# Patient Record
Sex: MALE | Race: BLACK OR AFRICAN AMERICAN | NOT HISPANIC OR LATINO
[De-identification: names, ages, dates, MRNs, and addresses within clinical notes are randomized per-mention and may not be internally consistent; named-entity substitution may affect disease eponyms.]

---

## 2017-03-28 ENCOUNTER — APPOINTMENT (OUTPATIENT)
Dept: ELECTROPHYSIOLOGY | Facility: CLINIC | Age: 77
End: 2017-03-28

## 2017-07-06 ENCOUNTER — APPOINTMENT (OUTPATIENT)
Dept: ELECTROPHYSIOLOGY | Facility: CLINIC | Age: 77
End: 2017-07-06

## 2017-08-20 ENCOUNTER — EMERGENCY (EMERGENCY)
Facility: HOSPITAL | Age: 77
LOS: 1 days | Discharge: HOME | End: 2017-08-20
Attending: EMERGENCY MEDICINE | Admitting: EMERGENCY MEDICINE
Payer: COMMERCIAL

## 2017-08-20 VITALS
OXYGEN SATURATION: 98 % | DIASTOLIC BLOOD PRESSURE: 87 MMHG | RESPIRATION RATE: 18 BRPM | SYSTOLIC BLOOD PRESSURE: 120 MMHG | TEMPERATURE: 98 F | HEART RATE: 68 BPM

## 2017-08-20 VITALS
HEART RATE: 64 BPM | OXYGEN SATURATION: 100 % | DIASTOLIC BLOOD PRESSURE: 103 MMHG | RESPIRATION RATE: 18 BRPM | SYSTOLIC BLOOD PRESSURE: 123 MMHG

## 2017-08-20 LAB
ALBUMIN SERPL ELPH-MCNC: 4 G/DL — SIGNIFICANT CHANGE UP (ref 3.3–5)
ALP SERPL-CCNC: 71 U/L — SIGNIFICANT CHANGE UP (ref 40–120)
ALT FLD-CCNC: 9 U/L — SIGNIFICANT CHANGE UP (ref 4–41)
APTT BLD: 28.9 SEC — SIGNIFICANT CHANGE UP (ref 27.5–37.4)
AST SERPL-CCNC: 15 U/L — SIGNIFICANT CHANGE UP (ref 4–40)
BILIRUB SERPL-MCNC: 0.7 MG/DL — SIGNIFICANT CHANGE UP (ref 0.2–1.2)
BUN SERPL-MCNC: 15 MG/DL — SIGNIFICANT CHANGE UP (ref 7–23)
CALCIUM SERPL-MCNC: 9.6 MG/DL — SIGNIFICANT CHANGE UP (ref 8.4–10.5)
CHLORIDE SERPL-SCNC: 107 MMOL/L — SIGNIFICANT CHANGE UP (ref 98–107)
CO2 SERPL-SCNC: 27 MMOL/L — SIGNIFICANT CHANGE UP (ref 22–31)
CREAT SERPL-MCNC: 1.12 MG/DL — SIGNIFICANT CHANGE UP (ref 0.5–1.3)
GLUCOSE SERPL-MCNC: 90 MG/DL — SIGNIFICANT CHANGE UP (ref 70–99)
HCT VFR BLD CALC: 33 % — LOW (ref 39–50)
HGB BLD-MCNC: 11 G/DL — LOW (ref 13–17)
INR BLD: 1.1 — SIGNIFICANT CHANGE UP (ref 0.88–1.17)
MCHC RBC-ENTMCNC: 30.2 PG — SIGNIFICANT CHANGE UP (ref 27–34)
MCHC RBC-ENTMCNC: 33.3 % — SIGNIFICANT CHANGE UP (ref 32–36)
MCV RBC AUTO: 90.7 FL — SIGNIFICANT CHANGE UP (ref 80–100)
NRBC # FLD: 0 — SIGNIFICANT CHANGE UP
PLATELET # BLD AUTO: 164 K/UL — SIGNIFICANT CHANGE UP (ref 150–400)
PMV BLD: 9.8 FL — SIGNIFICANT CHANGE UP (ref 7–13)
POTASSIUM SERPL-MCNC: 3.6 MMOL/L — SIGNIFICANT CHANGE UP (ref 3.5–5.3)
POTASSIUM SERPL-SCNC: 3.6 MMOL/L — SIGNIFICANT CHANGE UP (ref 3.5–5.3)
PROT SERPL-MCNC: 6.8 G/DL — SIGNIFICANT CHANGE UP (ref 6–8.3)
PROTHROM AB SERPL-ACNC: 12.4 SEC — SIGNIFICANT CHANGE UP (ref 9.8–13.1)
RBC # BLD: 3.64 M/UL — LOW (ref 4.2–5.8)
RBC # FLD: 13 % — SIGNIFICANT CHANGE UP (ref 10.3–14.5)
SODIUM SERPL-SCNC: 145 MMOL/L — SIGNIFICANT CHANGE UP (ref 135–145)
WBC # BLD: 4.22 K/UL — SIGNIFICANT CHANGE UP (ref 3.8–10.5)
WBC # FLD AUTO: 4.22 K/UL — SIGNIFICANT CHANGE UP (ref 3.8–10.5)

## 2017-08-20 PROCEDURE — 99285 EMERGENCY DEPT VISIT HI MDM: CPT | Mod: GC

## 2017-08-20 PROCEDURE — 93971 EXTREMITY STUDY: CPT | Mod: 26,LT

## 2017-08-20 NOTE — ED PROVIDER NOTE - ATTENDING CONTRIBUTION TO CARE
78 y/o male h/o cva, dm, htn here with atraumatic left ankle swelling this am.  No trauma, fall, calf pain/tenderness, recent long travel or prolonged immob.  No prior h/o dvt/pe.  Denies f/c, ha, neck stiffness, cp, sob, cough, abd pain, n/v/d, dysuria, rash.  Afebrile, vs wnl, nad, ctabil, s1s2 rrr no m/r/g, abd soft non ttp no r/g, no cva tenderness b/l, left ankle swollen, not deformed, no erythema, non ttp, full rom ankle, full wt bearing, strong dp pulse, distally nv intact, neg homans, neg emily.  R/o DVT, doubt fx as wt bearing and no h/o trauma.  Obtain cbc, bmp, coags, duplex, reassess.

## 2017-08-20 NOTE — ED PROVIDER NOTE - PMH
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

## 2017-08-20 NOTE — ED PROVIDER NOTE - OBJECTIVE STATEMENT
Patient is a 78 yo M with PMHx of CVA (1990), unknown arrhythmia s/p pacemaker, HTN, DM, HLD who presents today for 1 day history of L ankle swelling. He states that he first noticed it went waking up this AM. Denies trauma, pain, calf tenderness. He states that his leg feels warm but denies changes in sensation. He has never experienced this before. Denies HA, changes in vision, fever, chills, SOB, CP. He went to urgent care earlier who directed him to the ED for r/o DVT.

## 2017-08-20 NOTE — ED ADULT NURSE NOTE - OBJECTIVE STATEMENT
Pt received to spot 27. Pt complaining of left ankle and leg swelling. Pt states that he noticed the swelling this morning. Pt states he had pain to his left ankle earlier but denies any pain at this time. Pt denies any trauma. Pt appears in no acute distress.

## 2017-10-09 ENCOUNTER — APPOINTMENT (OUTPATIENT)
Dept: ELECTROPHYSIOLOGY | Facility: CLINIC | Age: 77
End: 2017-10-09

## 2018-01-11 ENCOUNTER — APPOINTMENT (OUTPATIENT)
Dept: ELECTROPHYSIOLOGY | Facility: CLINIC | Age: 78
End: 2018-01-11
Payer: COMMERCIAL

## 2018-01-11 PROCEDURE — 93280 PM DEVICE PROGR EVAL DUAL: CPT

## 2018-07-12 ENCOUNTER — APPOINTMENT (OUTPATIENT)
Dept: ELECTROPHYSIOLOGY | Facility: CLINIC | Age: 78
End: 2018-07-12
Payer: MEDICARE

## 2018-07-12 PROCEDURE — 93280 PM DEVICE PROGR EVAL DUAL: CPT

## 2018-10-23 ENCOUNTER — APPOINTMENT (OUTPATIENT)
Dept: ELECTROPHYSIOLOGY | Facility: CLINIC | Age: 78
End: 2018-10-23
Payer: MEDICARE

## 2018-10-23 PROCEDURE — 93296 REM INTERROG EVL PM/IDS: CPT

## 2018-10-23 PROCEDURE — 93294 REM INTERROG EVL PM/LDLS PM: CPT

## 2019-01-24 ENCOUNTER — APPOINTMENT (OUTPATIENT)
Dept: ELECTROPHYSIOLOGY | Facility: CLINIC | Age: 79
End: 2019-01-24
Payer: MEDICARE

## 2019-01-24 PROCEDURE — 93280 PM DEVICE PROGR EVAL DUAL: CPT

## 2019-01-29 ENCOUNTER — APPOINTMENT (OUTPATIENT)
Dept: ELECTROPHYSIOLOGY | Facility: CLINIC | Age: 79
End: 2019-01-29

## 2019-02-21 ENCOUNTER — EMERGENCY (EMERGENCY)
Facility: HOSPITAL | Age: 79
LOS: 1 days | Discharge: HOME | End: 2019-02-21
Attending: EMERGENCY MEDICINE | Admitting: EMERGENCY MEDICINE
Payer: COMMERCIAL

## 2019-02-21 VITALS
OXYGEN SATURATION: 99 % | TEMPERATURE: 98 F | DIASTOLIC BLOOD PRESSURE: 92 MMHG | RESPIRATION RATE: 18 BRPM | HEART RATE: 69 BPM | SYSTOLIC BLOOD PRESSURE: 150 MMHG

## 2019-02-21 VITALS
RESPIRATION RATE: 18 BRPM | DIASTOLIC BLOOD PRESSURE: 96 MMHG | OXYGEN SATURATION: 100 % | SYSTOLIC BLOOD PRESSURE: 171 MMHG | HEART RATE: 59 BPM | TEMPERATURE: 99 F

## 2019-02-21 LAB
ALBUMIN SERPL ELPH-MCNC: 4.1 G/DL — SIGNIFICANT CHANGE UP (ref 3.3–5)
ALP SERPL-CCNC: 79 U/L — SIGNIFICANT CHANGE UP (ref 40–120)
ALT FLD-CCNC: 9 U/L — SIGNIFICANT CHANGE UP (ref 4–41)
ANION GAP SERPL CALC-SCNC: 10 MMO/L — SIGNIFICANT CHANGE UP (ref 7–14)
APPEARANCE UR: CLEAR — SIGNIFICANT CHANGE UP
AST SERPL-CCNC: 17 U/L — SIGNIFICANT CHANGE UP (ref 4–40)
BILIRUB SERPL-MCNC: 1.1 MG/DL — SIGNIFICANT CHANGE UP (ref 0.2–1.2)
BILIRUB UR-MCNC: NEGATIVE — SIGNIFICANT CHANGE UP
BLOOD UR QL VISUAL: NEGATIVE — SIGNIFICANT CHANGE UP
BUN SERPL-MCNC: 10 MG/DL — SIGNIFICANT CHANGE UP (ref 7–23)
CALCIUM SERPL-MCNC: 9.9 MG/DL — SIGNIFICANT CHANGE UP (ref 8.4–10.5)
CHLORIDE SERPL-SCNC: 105 MMOL/L — SIGNIFICANT CHANGE UP (ref 98–107)
CO2 SERPL-SCNC: 26 MMOL/L — SIGNIFICANT CHANGE UP (ref 22–31)
COLOR SPEC: YELLOW — SIGNIFICANT CHANGE UP
CREAT SERPL-MCNC: 1.12 MG/DL — SIGNIFICANT CHANGE UP (ref 0.5–1.3)
GLUCOSE SERPL-MCNC: 105 MG/DL — HIGH (ref 70–99)
GLUCOSE UR-MCNC: NEGATIVE — SIGNIFICANT CHANGE UP
HCT VFR BLD CALC: 38.1 % — LOW (ref 39–50)
HGB BLD-MCNC: 12.5 G/DL — LOW (ref 13–17)
KETONES UR-MCNC: NEGATIVE — SIGNIFICANT CHANGE UP
LEUKOCYTE ESTERASE UR-ACNC: NEGATIVE — SIGNIFICANT CHANGE UP
MCHC RBC-ENTMCNC: 30 PG — SIGNIFICANT CHANGE UP (ref 27–34)
MCHC RBC-ENTMCNC: 32.8 % — SIGNIFICANT CHANGE UP (ref 32–36)
MCV RBC AUTO: 91.4 FL — SIGNIFICANT CHANGE UP (ref 80–100)
NITRITE UR-MCNC: NEGATIVE — SIGNIFICANT CHANGE UP
NRBC # FLD: 0 K/UL — LOW (ref 25–125)
PH UR: 6 — SIGNIFICANT CHANGE UP (ref 5–8)
PLATELET # BLD AUTO: 186 K/UL — SIGNIFICANT CHANGE UP (ref 150–400)
PMV BLD: 9.4 FL — SIGNIFICANT CHANGE UP (ref 7–13)
POTASSIUM SERPL-MCNC: 4.1 MMOL/L — SIGNIFICANT CHANGE UP (ref 3.5–5.3)
POTASSIUM SERPL-SCNC: 4.1 MMOL/L — SIGNIFICANT CHANGE UP (ref 3.5–5.3)
PROT SERPL-MCNC: 7.7 G/DL — SIGNIFICANT CHANGE UP (ref 6–8.3)
PROT UR-MCNC: NEGATIVE — SIGNIFICANT CHANGE UP
RBC # BLD: 4.17 M/UL — LOW (ref 4.2–5.8)
RBC # FLD: 12.5 % — SIGNIFICANT CHANGE UP (ref 10.3–14.5)
SODIUM SERPL-SCNC: 141 MMOL/L — SIGNIFICANT CHANGE UP (ref 135–145)
SP GR SPEC: 1.02 — SIGNIFICANT CHANGE UP (ref 1–1.04)
UROBILINOGEN FLD QL: NORMAL — SIGNIFICANT CHANGE UP
WBC # BLD: 6.04 K/UL — SIGNIFICANT CHANGE UP (ref 3.8–10.5)
WBC # FLD AUTO: 6.04 K/UL — SIGNIFICANT CHANGE UP (ref 3.8–10.5)

## 2019-02-21 PROCEDURE — 74019 RADEX ABDOMEN 2 VIEWS: CPT | Mod: 26

## 2019-02-21 PROCEDURE — 99283 EMERGENCY DEPT VISIT LOW MDM: CPT

## 2019-02-21 RX ORDER — LACTULOSE 10 G/15ML
20 SOLUTION ORAL ONCE
Qty: 0 | Refills: 0 | Status: COMPLETED | OUTPATIENT
Start: 2019-02-21 | End: 2019-02-21

## 2019-02-21 RX ORDER — LACTULOSE 10 G/15ML
30 SOLUTION ORAL
Qty: 960 | Refills: 0
Start: 2019-02-21

## 2019-02-21 RX ORDER — MINERAL OIL
133 OIL (ML) MISCELLANEOUS ONCE
Qty: 0 | Refills: 0 | Status: COMPLETED | OUTPATIENT
Start: 2019-02-21 | End: 2019-02-21

## 2019-02-21 RX ORDER — MINERAL OIL
133 OIL (ML) MISCELLANEOUS
Qty: 2 | Refills: 2
Start: 2019-02-21

## 2019-02-21 NOTE — ED ADULT NURSE NOTE - NSIMPLEMENTINTERV_GEN_ALL_ED
Implemented All Universal Safety Interventions:  [place] to call system. Call bell, personal items and telephone within reach. Instruct patient to call for assistance. Room bathroom lighting operational. Non-slip footwear when patient is off stretcher. Physically safe environment: no spills, clutter or unnecessary equipment. Stretcher in lowest position, wheels locked, appropriate side rails in place.

## 2019-02-21 NOTE — ED PROVIDER NOTE - NSFOLLOWUPINSTRUCTIONS_ED_ALL_ED_FT
See Constipation Discharge Instructions    Follow up with PCP next week.    Copies of labs and x-rays provided.

## 2019-02-21 NOTE — ED ADULT NURSE NOTE - OBJECTIVE STATEMENT
Pt received a&ox3, c/o generalized abd pain x 5 days, abd soft, non distended, mild generalized tenderness, pt denies fever/chills/NVD/Urinary symptoms, last BM yesterday, pt appears comfortable and to be in NAD, MD finch performed, will continue to monitor.

## 2019-02-21 NOTE — ED PROVIDER NOTE - CLINICAL SUMMARY MEDICAL DECISION MAKING FREE TEXT BOX
Elderly patient with no past abdominal surgeries presents with constipation with no N/V/D, fever/chills or urinary complaints.   Based on the ED W/U, the final diagnosis is  Constipation.     Will prescribe Lactulose and a fleet enema so that pt. can take these medication and have a BM in the comfort of his home and nt have to worry about soiling himself on route home.   Pt. is able to tolerate PO in the ED.  D/C and F/u plan and instructions were discussed prior to D/C.

## 2019-02-21 NOTE — ED PROVIDER NOTE - OBJECTIVE STATEMENT
77 y/o M presents to the ED with c/o abdominal pain with inability to have a BM for the past 3-4 days.   Pain is not associated fever/chills. N/V/D, urinary complaints, trauma.     No previous abdominal surgeries or known Hx of constipation

## 2019-02-21 NOTE — ED PROVIDER NOTE - CARE PLAN
Principal Discharge DX:	Constipation, unspecified constipation type  Assessment and plan of treatment:	Abdominal Pain with constipation X 1 week- R/O obstruction  1.  Labs  2.  AXR  3. Reassess

## 2019-05-07 ENCOUNTER — APPOINTMENT (OUTPATIENT)
Dept: ELECTROPHYSIOLOGY | Facility: CLINIC | Age: 79
End: 2019-05-07

## 2019-07-25 ENCOUNTER — APPOINTMENT (OUTPATIENT)
Dept: ELECTROPHYSIOLOGY | Facility: CLINIC | Age: 79
End: 2019-07-25
Payer: MEDICARE

## 2019-07-25 DIAGNOSIS — I49.5 SICK SINUS SYNDROME: ICD-10-CM

## 2019-07-25 PROCEDURE — 93280 PM DEVICE PROGR EVAL DUAL: CPT

## 2019-07-30 PROBLEM — I49.5 SINOATRIAL NODE DYSFUNCTION: Status: ACTIVE | Noted: 2017-07-06

## 2019-10-28 ENCOUNTER — APPOINTMENT (OUTPATIENT)
Dept: ELECTROPHYSIOLOGY | Facility: CLINIC | Age: 79
End: 2019-10-28

## 2020-01-29 ENCOUNTER — APPOINTMENT (OUTPATIENT)
Dept: ELECTROPHYSIOLOGY | Facility: CLINIC | Age: 80
End: 2020-01-29

## 2020-02-05 NOTE — ED PROVIDER NOTE - NSALCOHOLTYPE_GEN__A_CORE_SD
Pt taken to postpartum in stable condition per WC. Cares of pt transferred at this time. liquor/Socially

## 2020-02-20 ENCOUNTER — APPOINTMENT (OUTPATIENT)
Dept: ELECTROPHYSIOLOGY | Facility: CLINIC | Age: 80
End: 2020-02-20
Payer: MEDICARE

## 2020-02-20 PROCEDURE — 93296 REM INTERROG EVL PM/IDS: CPT

## 2020-02-20 PROCEDURE — 93294 REM INTERROG EVL PM/LDLS PM: CPT

## 2020-03-13 ENCOUNTER — EMERGENCY (EMERGENCY)
Facility: HOSPITAL | Age: 80
LOS: 0 days | Discharge: HOME | End: 2020-03-13
Attending: EMERGENCY MEDICINE
Payer: COMMERCIAL

## 2020-03-13 VITALS
HEIGHT: 69 IN | RESPIRATION RATE: 20 BRPM | WEIGHT: 240.08 LBS | DIASTOLIC BLOOD PRESSURE: 98 MMHG | TEMPERATURE: 101 F | OXYGEN SATURATION: 95 % | SYSTOLIC BLOOD PRESSURE: 162 MMHG | HEART RATE: 79 BPM

## 2020-03-13 VITALS
DIASTOLIC BLOOD PRESSURE: 93 MMHG | HEART RATE: 60 BPM | TEMPERATURE: 99 F | SYSTOLIC BLOOD PRESSURE: 156 MMHG | OXYGEN SATURATION: 98 % | RESPIRATION RATE: 17 BRPM

## 2020-03-13 DIAGNOSIS — J06.9 ACUTE UPPER RESPIRATORY INFECTION, UNSPECIFIED: ICD-10-CM

## 2020-03-13 DIAGNOSIS — B97.29 OTHER CORONAVIRUS AS THE CAUSE OF DISEASES CLASSIFIED ELSEWHERE: ICD-10-CM

## 2020-03-13 DIAGNOSIS — Z79.84 LONG TERM (CURRENT) USE OF ORAL HYPOGLYCEMIC DRUGS: ICD-10-CM

## 2020-03-13 DIAGNOSIS — E11.9 TYPE 2 DIABETES MELLITUS WITHOUT COMPLICATIONS: ICD-10-CM

## 2020-03-13 DIAGNOSIS — I10 ESSENTIAL (PRIMARY) HYPERTENSION: ICD-10-CM

## 2020-03-13 DIAGNOSIS — Z87.891 PERSONAL HISTORY OF NICOTINE DEPENDENCE: ICD-10-CM

## 2020-03-13 DIAGNOSIS — R05 COUGH: ICD-10-CM

## 2020-03-13 DIAGNOSIS — Z86.73 PERSONAL HISTORY OF TRANSIENT ISCHEMIC ATTACK (TIA), AND CEREBRAL INFARCTION WITHOUT RESIDUAL DEFICITS: ICD-10-CM

## 2020-03-13 DIAGNOSIS — J11.1 INFLUENZA DUE TO UNIDENTIFIED INFLUENZA VIRUS WITH OTHER RESPIRATORY MANIFESTATIONS: ICD-10-CM

## 2020-03-13 LAB
ALBUMIN SERPL ELPH-MCNC: 3.5 G/DL — SIGNIFICANT CHANGE UP (ref 3.3–5)
ALP SERPL-CCNC: 91 U/L — SIGNIFICANT CHANGE UP (ref 40–120)
ALT FLD-CCNC: 17 U/L — SIGNIFICANT CHANGE UP (ref 12–78)
ANION GAP SERPL CALC-SCNC: 7 MMOL/L — SIGNIFICANT CHANGE UP (ref 5–17)
AST SERPL-CCNC: 22 U/L — SIGNIFICANT CHANGE UP (ref 15–37)
BASOPHILS # BLD AUTO: 0.01 K/UL — SIGNIFICANT CHANGE UP (ref 0–0.2)
BASOPHILS NFR BLD AUTO: 0.2 % — SIGNIFICANT CHANGE UP (ref 0–2)
BILIRUB SERPL-MCNC: 1.1 MG/DL — SIGNIFICANT CHANGE UP (ref 0.2–1.2)
BUN SERPL-MCNC: 12 MG/DL — SIGNIFICANT CHANGE UP (ref 7–23)
CALCIUM SERPL-MCNC: 9.3 MG/DL — SIGNIFICANT CHANGE UP (ref 8.5–10.1)
CHLORIDE SERPL-SCNC: 108 MMOL/L — SIGNIFICANT CHANGE UP (ref 96–108)
CO2 SERPL-SCNC: 26 MMOL/L — SIGNIFICANT CHANGE UP (ref 22–31)
CREAT SERPL-MCNC: 1.14 MG/DL — SIGNIFICANT CHANGE UP (ref 0.5–1.3)
EOSINOPHIL # BLD AUTO: 0.13 K/UL — SIGNIFICANT CHANGE UP (ref 0–0.5)
EOSINOPHIL NFR BLD AUTO: 2.4 % — SIGNIFICANT CHANGE UP (ref 0–6)
FLU A RESULT: SIGNIFICANT CHANGE UP
FLU A RESULT: SIGNIFICANT CHANGE UP
FLUAV AG NPH QL: SIGNIFICANT CHANGE UP
FLUBV AG NPH QL: SIGNIFICANT CHANGE UP
GLUCOSE SERPL-MCNC: 91 MG/DL — SIGNIFICANT CHANGE UP (ref 70–99)
HCT VFR BLD CALC: 38.1 % — LOW (ref 39–50)
HGB BLD-MCNC: 12.5 G/DL — LOW (ref 13–17)
IMM GRANULOCYTES NFR BLD AUTO: 0.4 % — SIGNIFICANT CHANGE UP (ref 0–1.5)
LACTATE SERPL-SCNC: 1.1 MMOL/L — SIGNIFICANT CHANGE UP (ref 0.7–2)
LYMPHOCYTES # BLD AUTO: 0.97 K/UL — LOW (ref 1–3.3)
LYMPHOCYTES # BLD AUTO: 18.3 % — SIGNIFICANT CHANGE UP (ref 13–44)
MAGNESIUM SERPL-MCNC: 1.9 MG/DL — SIGNIFICANT CHANGE UP (ref 1.6–2.6)
MCHC RBC-ENTMCNC: 30.1 PG — SIGNIFICANT CHANGE UP (ref 27–34)
MCHC RBC-ENTMCNC: 32.8 GM/DL — SIGNIFICANT CHANGE UP (ref 32–36)
MCV RBC AUTO: 91.8 FL — SIGNIFICANT CHANGE UP (ref 80–100)
MONOCYTES # BLD AUTO: 0.78 K/UL — SIGNIFICANT CHANGE UP (ref 0–0.9)
MONOCYTES NFR BLD AUTO: 14.7 % — HIGH (ref 2–14)
NEUTROPHILS # BLD AUTO: 3.4 K/UL — SIGNIFICANT CHANGE UP (ref 1.8–7.4)
NEUTROPHILS NFR BLD AUTO: 64 % — SIGNIFICANT CHANGE UP (ref 43–77)
NRBC # BLD: 0 /100 WBCS — SIGNIFICANT CHANGE UP (ref 0–0)
PLATELET # BLD AUTO: 162 K/UL — SIGNIFICANT CHANGE UP (ref 150–400)
POTASSIUM SERPL-MCNC: 3.6 MMOL/L — SIGNIFICANT CHANGE UP (ref 3.5–5.3)
POTASSIUM SERPL-SCNC: 3.6 MMOL/L — SIGNIFICANT CHANGE UP (ref 3.5–5.3)
PROT SERPL-MCNC: 7.2 GM/DL — SIGNIFICANT CHANGE UP (ref 6–8.3)
RBC # BLD: 4.15 M/UL — LOW (ref 4.2–5.8)
RBC # FLD: 12.7 % — SIGNIFICANT CHANGE UP (ref 10.3–14.5)
RSV RESULT: SIGNIFICANT CHANGE UP
RSV RNA RESP QL NAA+PROBE: SIGNIFICANT CHANGE UP
SODIUM SERPL-SCNC: 141 MMOL/L — SIGNIFICANT CHANGE UP (ref 135–145)
WBC # BLD: 5.31 K/UL — SIGNIFICANT CHANGE UP (ref 3.8–10.5)
WBC # FLD AUTO: 5.31 K/UL — SIGNIFICANT CHANGE UP (ref 3.8–10.5)

## 2020-03-13 PROCEDURE — 71045 X-RAY EXAM CHEST 1 VIEW: CPT | Mod: 26

## 2020-03-13 PROCEDURE — 99284 EMERGENCY DEPT VISIT MOD MDM: CPT

## 2020-03-13 RX ORDER — SODIUM CHLORIDE 9 MG/ML
1000 INJECTION INTRAMUSCULAR; INTRAVENOUS; SUBCUTANEOUS ONCE
Refills: 0 | Status: COMPLETED | OUTPATIENT
Start: 2020-03-13 | End: 2020-03-13

## 2020-03-13 RX ORDER — PHENYLEPHRINE/DM/ACETAMINOP/GG 5-10-325MG
2 TABLET ORAL
Qty: 30 | Refills: 0
Start: 2020-03-13 | End: 2020-03-17

## 2020-03-13 RX ORDER — CODEINE SULFATE 60 MG/1
30 TABLET ORAL ONCE
Refills: 0 | Status: DISCONTINUED | OUTPATIENT
Start: 2020-03-13 | End: 2020-03-13

## 2020-03-13 RX ORDER — IBUPROFEN 200 MG
600 TABLET ORAL ONCE
Refills: 0 | Status: COMPLETED | OUTPATIENT
Start: 2020-03-13 | End: 2020-03-13

## 2020-03-13 RX ORDER — ALBUTEROL 90 UG/1
2.5 AEROSOL, METERED ORAL ONCE
Refills: 0 | Status: COMPLETED | OUTPATIENT
Start: 2020-03-13 | End: 2020-03-13

## 2020-03-13 RX ORDER — ACETAMINOPHEN 500 MG
975 TABLET ORAL ONCE
Refills: 0 | Status: COMPLETED | OUTPATIENT
Start: 2020-03-13 | End: 2020-03-13

## 2020-03-13 RX ADMIN — Medication 975 MILLIGRAM(S): at 08:15

## 2020-03-13 RX ADMIN — SODIUM CHLORIDE 1000 MILLILITER(S): 9 INJECTION INTRAMUSCULAR; INTRAVENOUS; SUBCUTANEOUS at 10:19

## 2020-03-13 RX ADMIN — Medication 600 MILLIGRAM(S): at 10:19

## 2020-03-13 RX ADMIN — Medication 600 MILLIGRAM(S): at 08:15

## 2020-03-13 RX ADMIN — SODIUM CHLORIDE 1000 MILLILITER(S): 9 INJECTION INTRAMUSCULAR; INTRAVENOUS; SUBCUTANEOUS at 08:32

## 2020-03-13 RX ADMIN — Medication 975 MILLIGRAM(S): at 10:19

## 2020-03-13 RX ADMIN — ALBUTEROL 2.5 MILLIGRAM(S): 90 AEROSOL, METERED ORAL at 08:44

## 2020-03-13 RX ADMIN — CODEINE SULFATE 30 MILLIGRAM(S): 60 TABLET ORAL at 10:19

## 2020-03-13 RX ADMIN — CODEINE SULFATE 30 MILLIGRAM(S): 60 TABLET ORAL at 08:38

## 2020-03-13 RX ADMIN — Medication 100 MILLIGRAM(S): at 08:38

## 2020-03-13 NOTE — ED ADULT NURSE NOTE - CAS ELECT INFOMATION PROVIDED
pt and grandson educated on patient self quarantine for 14 days. Pt and family verbalize understanding/DC instructions

## 2020-03-13 NOTE — ED PROVIDER NOTE - NSFOLLOWUPINSTRUCTIONS_ED_ALL_ED_FT
You are instructed to ISOLATE yourself at home for 14 days with minimal departure outside.    Take the cough medication as prescribed.

## 2020-03-13 NOTE — ED ADULT NURSE NOTE - NSIMPLEMENTINTERV_GEN_ALL_ED
Implemented All Fall Risk Interventions:  [place] to call system. Call bell, personal items and telephone within reach. Instruct patient to call for assistance. Room bathroom lighting operational. Non-slip footwear when patient is off stretcher. Physically safe environment: no spills, clutter or unnecessary equipment. Stretcher in lowest position, wheels locked, appropriate side rails in place. Provide visual cue, wrist band, yellow gown, etc. Monitor gait and stability. Monitor for mental status changes and reorient to person, place, and time. Review medications for side effects contributing to fall risk. Reinforce activity limits and safety measures with patient and family.

## 2020-03-13 NOTE — ED PROVIDER NOTE - PATIENT PORTAL LINK FT
You can access the FollowMyHealth Patient Portal offered by [facility] by registering at the following website: [email or web address]. By joining [employer]’s FollowMyHealth portal, you will also be able to view your health information using other applications (apps) compatible with our system.

## 2020-03-13 NOTE — ED PROVIDER NOTE - CLINICAL SUMMARY MEDICAL DECISION MAKING FREE TEXT BOX
pt pw cough and rhinorrhea. rule out flu vs pna pt pw cough and rhinorrhea. rule out flu vs pna. xray neg. flu neg. will send covid testing but given patient is well with normal vitals. will dc home.

## 2020-03-13 NOTE — ED PROVIDER NOTE - PHYSICAL EXAMINATION
Gen: Alert, NAD  Head: NC, AT   Eyes: PERRL, EOMI, normal lids/conjunctiva  ENT: copious rhinorrhea   Neck: supple, no tenderness, Trachea midline  Pulm: actively coughing.   CV: RRR, no M/R/G, 2+ radial and dp pulses bl, no edema  Abd: soft, NT/ND, +BS, no hepatosplenomegaly  Mskel: extremities x4 with normal ROM and no joint effusions. no ctl spine ttp.   Skin: no rash, no bruising   Neuro: AAOx3, no sensory/motor deficits, CN 2-12 intact

## 2020-03-13 NOTE — ED ADULT TRIAGE NOTE - HEIGHT IN CM
Inpatient Anticoagulation Service Note    Date: 4/7/2018  Reason for Anticoagulation: Deep Vein Thrombosis, Atrial Fibrillation   [id number] VASc Score: 6    Hemoglobin Value: 12.1  Hematocrit Value: 38.5  Lab Platelet Value: 229  Target INR: 2.0 to 3.0    INR from last 7 days     Date/Time INR Value    04/07/18 1106 (!)  1.17        Dose from last 7 days     Date/Time Dose (mg)    04/07/18 1500  10        Significant Interactions: Antibiotics, Thyroid Medications, Other (Comments) (Carbamazapine, TCA, SSRI)  Bridge Therapy: Yes  Bridge Therapy Start Date: 04/07/18  Days of Overlap Therapy: 0  INR Value Greater than 2 Prior to Discontinuation of Parenteral Anticoagulation: Not Applicable     Reversal Agent Administered: Not Applicable  Comments: Restarting patient on home dose of warfarin. Admitted for SOB and dizziness. Ran out of warfarin approximetly 10 days ago, follows with [facility]. Drug interactions noted. Heparin running for bridge therapy, patient previously treated for DVT, now with atrial fibrillation on EKG, patient has history of TIA. Will continue to follow and adjust warfarin appropriately.       Plan:  10mg  Education Material Provided?: No (Chronic anticoagulation with warfarin)  Pharmacist suggested discharge dosing: 10mg daily expect for 5mg on Wednesday. INR follow up within 72 hours of discharge.      Hunter Fernandes, PharmD               [phone]

## 2020-03-13 NOTE — ED PROVIDER NOTE - OBJECTIVE STATEMENT
Pertinent PMH/PSH/FHx/SHx and Review of Systems contained within:  79M hx of htn, dm, cva with right side weakness pw cough, fever, and rhinorrhea x1 day. notes severe coughing fits, not prod. the coughing makes him feel sob. no nausea, vomiting, cp, rash, bleeding, numbness, rash, bleeding, vision loss, dysuria. patient hasn't take anything for symptoms. no sick contacts, no recent travel  Fh and Sh not otherwise contributory  ROS otherwise negative

## 2020-03-13 NOTE — ED ADULT NURSE REASSESSMENT NOTE - NS ED NURSE REASSESS COMMENT FT1
Pt had decrease coughing at discharge. No apparent respiratory distress noted upon leaving ED with family

## 2020-03-13 NOTE — ED ADULT NURSE NOTE - OBJECTIVE STATEMENT
Pt c/o constant coughing since yesterday, denies any pain. Respiration even and unlabored upon arrival to ED. Labs sent, medicated as ordered. Plan of care discussed with patient and family

## 2020-03-14 LAB — RAPID RVP RESULT: SIGNIFICANT CHANGE UP

## 2020-03-15 LAB — SARS-COV-2 RNA SPEC QL NAA+PROBE: DETECTED

## 2020-03-18 LAB
CULTURE RESULTS: SIGNIFICANT CHANGE UP
CULTURE RESULTS: SIGNIFICANT CHANGE UP
SPECIMEN SOURCE: SIGNIFICANT CHANGE UP
SPECIMEN SOURCE: SIGNIFICANT CHANGE UP

## 2020-06-30 ENCOUNTER — EMERGENCY (EMERGENCY)
Facility: HOSPITAL | Age: 80
LOS: 1 days | Discharge: HOME | End: 2020-06-30
Attending: EMERGENCY MEDICINE | Admitting: EMERGENCY MEDICINE
Payer: COMMERCIAL

## 2020-06-30 VITALS
TEMPERATURE: 98 F | RESPIRATION RATE: 16 BRPM | DIASTOLIC BLOOD PRESSURE: 96 MMHG | HEART RATE: 90 BPM | SYSTOLIC BLOOD PRESSURE: 162 MMHG | OXYGEN SATURATION: 98 %

## 2020-06-30 VITALS
SYSTOLIC BLOOD PRESSURE: 185 MMHG | RESPIRATION RATE: 18 BRPM | HEART RATE: 71 BPM | OXYGEN SATURATION: 100 % | DIASTOLIC BLOOD PRESSURE: 108 MMHG

## 2020-06-30 PROCEDURE — 99283 EMERGENCY DEPT VISIT LOW MDM: CPT

## 2020-06-30 RX ORDER — MINERAL OIL
133 OIL (ML) MISCELLANEOUS ONCE
Refills: 0 | Status: COMPLETED | OUTPATIENT
Start: 2020-06-30 | End: 2020-06-30

## 2020-06-30 RX ORDER — LACTULOSE 10 G/15ML
30 SOLUTION ORAL
Qty: 900 | Refills: 0
Start: 2020-06-30

## 2020-06-30 RX ADMIN — Medication 133 MILLILITER(S): at 09:45

## 2020-06-30 NOTE — ED ADULT NURSE NOTE - NSIMPLEMENTINTERV_GEN_ALL_ED
Implemented All Fall with Harm Risk Interventions:  [place] to call system. Call bell, personal items and telephone within reach. Instruct patient to call for assistance. Room bathroom lighting operational. Non-slip footwear when patient is off stretcher. Physically safe environment: no spills, clutter or unnecessary equipment. Stretcher in lowest position, wheels locked, appropriate side rails in place. Provide visual cue, wrist band, yellow gown, etc. Monitor gait and stability. Monitor for mental status changes and reorient to person, place, and time. Review medications for side effects contributing to fall risk. Reinforce activity limits and safety measures with patient and family. Provide visual clues: red socks.

## 2020-06-30 NOTE — ED ADULT NURSE NOTE - CHIEF COMPLAINT QUOTE
Pt. brought to [facility] ED by [facility] for constipation x 1 week. No BM x 2 days. Pt. does not have any pain. Has history of stroke with right sided deficits, DM,  at scene. NAD noted.

## 2020-06-30 NOTE — ED PROVIDER NOTE - PROGRESS NOTE DETAILS
Patient received enema with no bowel movement. Abdomen still soft nontender. Will send lactulose to pharmacy. Patient understands return instructions.

## 2020-06-30 NOTE — ED ADULT NURSE NOTE - OBJECTIVE STATEMENT
pt received to 12, aox2.  pt c/o constipation.  reports last BM was 2 days ago but then states "he thinks he had one today".  abd soft, nondistended, non-tender.  pt has PMH: CVA with right sided residual.  skin on buttock is dry and scaly with some bleeding areas due to pt scratching.  MD at bedside.  awaiting further orders, will monoitor

## 2020-06-30 NOTE — ED PROVIDER NOTE - CLINICAL SUMMARY MEDICAL DECISION MAKING FREE TEXT BOX
79 yo M with DM, HTN, CVA with residual R sided weakness presents with constipation for 2 days. Vitals WNL. Abdomen benign. Absent bowel movement likely secondary to chronic constipation. No concern for SBO or intraabdominal pathology. Will do enema / provide laxatives and likely dc home

## 2020-06-30 NOTE — ED ADULT TRIAGE NOTE - CHIEF COMPLAINT QUOTE
Pt. brought to [facility] ED by [facility] for constipation x 1 week. No BM x 2 days. Pt. does not have any pain. Has history of stroke with right sided deficits, DM,  at scene. NAD noted.

## 2020-06-30 NOTE — ED PROVIDER NOTE - OBJECTIVE STATEMENT
81 yo M with DM, HTN, CVA with residual R sided weakness presents with constipation for 2 days. Takes constipation at home but does not remember name. Denies abdominal pain, nausea, vomiting or abdominal surgeries. No fever.

## 2020-06-30 NOTE — ED PROVIDER NOTE - PATIENT PORTAL LINK FT
You can access the FollowMyHealth Patient Portal offered by [facility] by registering at the following website: [email or web address]. By joining [employer]’s FollowMyHealth portal, you will also be able to view your health information using other applications (apps) compatible with our system.

## 2020-06-30 NOTE — ED PROVIDER NOTE - NSFOLLOWUPINSTRUCTIONS_ED_ALL_ED_FT
You were seen in the ED for constipation  The following labs/imaging were obtained: see attached (if applicable)  Take lactulose as indicated. Increase water and fiber intake.   Return to the ED if you develop abdominal pain, nausea, vomiting, or no bowel movement in the next 2 days, or worsening or new concerning symptoms.  Follow up with your primary care in 2-3 days.  Discussed with pt results of work up, strict return precautions, and need for follow up.  Pt expressed understanding and agrees with plan. Complex Repair And Z Plasty Text: The defect edges were debeveled with a #15 scalpel blade.  The primary defect was closed partially with a complex linear closure.  Given the location of the remaining defect, shape of the defect and the proximity to free margins a Z plasty was deemed most appropriate for complete closure of the defect.  Using a sterile surgical marker, an appropriate advancement flap was drawn incorporating the defect and placing the expected incisions within the relaxed skin tension lines where possible.    The area thus outlined was incised deep to adipose tissue with a #15 scalpel blade.  The skin margins were undermined to an appropriate distance in all directions utilizing iris scissors.

## 2020-06-30 NOTE — ED PROVIDER NOTE - PHYSICAL EXAMINATION
Gen: AAOx3, non-toxic  Head: NCAT  HEENT: EOMI, oral mucosa moist, normal conjunctiva  Lung: CTAB, no respiratory distress, no wheezes/rhonchi/rales B/L, speaking in full sentences  CV: RRR, no murmurs, rubs or gallops  Abd: soft, NTND, no guarding, no CVA tenderness  Rectal : no impacted stool appreciated   MSK: no visible deformities  Neuro: No focal sensory or motor deficits, normal CN exam   Skin: Warm, well perfused, no rash  Psych: normal affect.

## 2020-06-30 NOTE — ED PROVIDER NOTE - ATTENDING CONTRIBUTION TO CARE
Attending note:   After face to face evaluation of this patient, I concur with above noted hx, pe, and care plan for this patient.  Rosen: 80 yom with sensation to stool but no BM for 4 days. Takes lactulose for constipation that is chronic. Wife also noted rigth foot pain. No abd pain, no n/v, +good appetite. On exam, pt is well appearing, rue contracted, clear lungs, abd soft and non tender, rectal as noted. Pt most likely has constipation but it does not appears to be severe with no complications, will give enema and reassess.

## 2020-07-08 ENCOUNTER — EMERGENCY (EMERGENCY)
Facility: HOSPITAL | Age: 80
LOS: 1 days | Discharge: HOME | End: 2020-07-08
Attending: STUDENT IN AN ORGANIZED HEALTH CARE EDUCATION/TRAINING PROGRAM | Admitting: STUDENT IN AN ORGANIZED HEALTH CARE EDUCATION/TRAINING PROGRAM
Payer: COMMERCIAL

## 2020-07-08 VITALS
DIASTOLIC BLOOD PRESSURE: 78 MMHG | HEART RATE: 78 BPM | OXYGEN SATURATION: 99 % | RESPIRATION RATE: 18 BRPM | TEMPERATURE: 98 F | SYSTOLIC BLOOD PRESSURE: 138 MMHG

## 2020-07-08 VITALS
SYSTOLIC BLOOD PRESSURE: 135 MMHG | HEART RATE: 81 BPM | OXYGEN SATURATION: 99 % | DIASTOLIC BLOOD PRESSURE: 68 MMHG | RESPIRATION RATE: 18 BRPM | TEMPERATURE: 98 F

## 2020-07-08 LAB
ALBUMIN SERPL ELPH-MCNC: 4.1 G/DL — SIGNIFICANT CHANGE UP (ref 3.3–5)
ALP SERPL-CCNC: 71 U/L — SIGNIFICANT CHANGE UP (ref 40–120)
ALT FLD-CCNC: 8 U/L — SIGNIFICANT CHANGE UP (ref 4–41)
ANION GAP SERPL CALC-SCNC: 11 MMO/L — SIGNIFICANT CHANGE UP (ref 7–14)
APTT BLD: 28.6 SEC — SIGNIFICANT CHANGE UP (ref 27–36.3)
AST SERPL-CCNC: 13 U/L — SIGNIFICANT CHANGE UP (ref 4–40)
BASOPHILS # BLD AUTO: 0.02 K/UL — SIGNIFICANT CHANGE UP (ref 0–0.2)
BASOPHILS NFR BLD AUTO: 0.2 % — SIGNIFICANT CHANGE UP (ref 0–2)
BILIRUB SERPL-MCNC: 1.2 MG/DL — SIGNIFICANT CHANGE UP (ref 0.2–1.2)
BUN SERPL-MCNC: 12 MG/DL — SIGNIFICANT CHANGE UP (ref 7–23)
CALCIUM SERPL-MCNC: 9.9 MG/DL — SIGNIFICANT CHANGE UP (ref 8.4–10.5)
CHLORIDE SERPL-SCNC: 105 MMOL/L — SIGNIFICANT CHANGE UP (ref 98–107)
CO2 SERPL-SCNC: 24 MMOL/L — SIGNIFICANT CHANGE UP (ref 22–31)
CREAT SERPL-MCNC: 1.02 MG/DL — SIGNIFICANT CHANGE UP (ref 0.5–1.3)
EOSINOPHIL # BLD AUTO: 0.37 K/UL — SIGNIFICANT CHANGE UP (ref 0–0.5)
EOSINOPHIL NFR BLD AUTO: 4.1 % — SIGNIFICANT CHANGE UP (ref 0–6)
GLUCOSE SERPL-MCNC: 92 MG/DL — SIGNIFICANT CHANGE UP (ref 70–99)
HCT VFR BLD CALC: 35.4 % — LOW (ref 39–50)
HGB BLD-MCNC: 11.8 G/DL — LOW (ref 13–17)
IMM GRANULOCYTES NFR BLD AUTO: 0.2 % — SIGNIFICANT CHANGE UP (ref 0–1.5)
INR BLD: 1.16 — SIGNIFICANT CHANGE UP (ref 0.88–1.17)
LYMPHOCYTES # BLD AUTO: 1.66 K/UL — SIGNIFICANT CHANGE UP (ref 1–3.3)
LYMPHOCYTES # BLD AUTO: 18.4 % — SIGNIFICANT CHANGE UP (ref 13–44)
MCHC RBC-ENTMCNC: 30.1 PG — SIGNIFICANT CHANGE UP (ref 27–34)
MCHC RBC-ENTMCNC: 33.3 % — SIGNIFICANT CHANGE UP (ref 32–36)
MCV RBC AUTO: 90.3 FL — SIGNIFICANT CHANGE UP (ref 80–100)
MONOCYTES # BLD AUTO: 0.66 K/UL — SIGNIFICANT CHANGE UP (ref 0–0.9)
MONOCYTES NFR BLD AUTO: 7.3 % — SIGNIFICANT CHANGE UP (ref 2–14)
NEUTROPHILS # BLD AUTO: 6.29 K/UL — SIGNIFICANT CHANGE UP (ref 1.8–7.4)
NEUTROPHILS NFR BLD AUTO: 69.8 % — SIGNIFICANT CHANGE UP (ref 43–77)
NRBC # FLD: 0 K/UL — SIGNIFICANT CHANGE UP (ref 0–0)
PLATELET # BLD AUTO: 165 K/UL — SIGNIFICANT CHANGE UP (ref 150–400)
PMV BLD: 10.3 FL — SIGNIFICANT CHANGE UP (ref 7–13)
POTASSIUM SERPL-MCNC: 3.5 MMOL/L — SIGNIFICANT CHANGE UP (ref 3.5–5.3)
POTASSIUM SERPL-SCNC: 3.5 MMOL/L — SIGNIFICANT CHANGE UP (ref 3.5–5.3)
PROT SERPL-MCNC: 6.9 G/DL — SIGNIFICANT CHANGE UP (ref 6–8.3)
PROTHROM AB SERPL-ACNC: 13.3 SEC — HIGH (ref 9.8–13.1)
RBC # BLD: 3.92 M/UL — LOW (ref 4.2–5.8)
RBC # FLD: 12.9 % — SIGNIFICANT CHANGE UP (ref 10.3–14.5)
SODIUM SERPL-SCNC: 140 MMOL/L — SIGNIFICANT CHANGE UP (ref 135–145)
WBC # BLD: 9.02 K/UL — SIGNIFICANT CHANGE UP (ref 3.8–10.5)
WBC # FLD AUTO: 9.02 K/UL — SIGNIFICANT CHANGE UP (ref 3.8–10.5)

## 2020-07-08 PROCEDURE — 99284 EMERGENCY DEPT VISIT MOD MDM: CPT

## 2020-07-08 PROCEDURE — 73630 X-RAY EXAM OF FOOT: CPT | Mod: 26,RT

## 2020-07-08 PROCEDURE — 73610 X-RAY EXAM OF ANKLE: CPT | Mod: 26,RT

## 2020-07-08 RX ORDER — MORPHINE SULFATE 50 MG/1
2 CAPSULE, EXTENDED RELEASE ORAL ONCE
Refills: 0 | Status: DISCONTINUED | OUTPATIENT
Start: 2020-07-08 | End: 2020-07-08

## 2020-07-08 RX ORDER — ACETAMINOPHEN 500 MG
650 TABLET ORAL ONCE
Refills: 0 | Status: DISCONTINUED | OUTPATIENT
Start: 2020-07-08 | End: 2020-07-08

## 2020-07-08 RX ORDER — IBUPROFEN 200 MG
600 TABLET ORAL ONCE
Refills: 0 | Status: COMPLETED | OUTPATIENT
Start: 2020-07-08 | End: 2020-07-08

## 2020-07-08 RX ADMIN — Medication 600 MILLIGRAM(S): at 21:04

## 2020-07-08 RX ADMIN — MORPHINE SULFATE 2 MILLIGRAM(S): 50 CAPSULE, EXTENDED RELEASE ORAL at 22:41

## 2020-07-08 RX ADMIN — MORPHINE SULFATE 2 MILLIGRAM(S): 50 CAPSULE, EXTENDED RELEASE ORAL at 22:14

## 2020-07-08 NOTE — ED PROVIDER NOTE - PATIENT PORTAL LINK FT
You can access the FollowMyHealth Patient Portal offered by [facility] by registering at the following website: [email or web address]. By joining [employer]’s FollowMyHealth portal, you will also be able to view your health information using other applications (apps) compatible with our system.

## 2020-07-08 NOTE — ED PROVIDER NOTE - OBJECTIVE STATEMENT
79 yo M with DM, HTN, CVA with residual R sided weakness pw right foot swelling. per wife the stair railing fell on his foot. wife put ice on it but he was in a lot of pain. pt did not fall. has not been able to weight bear. pt minimally ambulatory at baseline.

## 2020-07-08 NOTE — ED ADULT NURSE NOTE - OBJECTIVE STATEMENT
received pt to room 13 A&oX2-3 at baseline, ambulatory with assistance at baseline, calm and cooperative. Pt is complaining of right foot swelling and bruising from when a "railing fell in his ankle today." Hx of CVA with right sided residual weakness, HTN, DM. Right foot is observed to be swollen, tender to touch and bruising is noted to below toes. Pedal pulses positive bilaterally. Denies chest pain, abdominal pain, SOB, fever/chills. Pt resting comfortably, warm blankets provided. meds given as ordered.

## 2020-07-08 NOTE — ED ADULT NURSE NOTE - NSFALLRSKHARMRISK_ED_ALL_ED
Chief Complaint:   Patient presents with:   Follow - Up    HPI:   This is a 53year old male presenting for annual physical.  Patient was seen for vasovagal episode in November 2019 was noted to have acute anxiety and possible vasovagal from chest injury wh Cardiovascular: Negative for chest pain, palpitations and leg swelling. Gastrointestinal: Negative for vomiting, abdominal pain, diarrhea, blood in stool and abdominal distention.    Endocrine: Negative for cold intolerance, heat intolerance, polydipsia intact distal pulses. No murmur heard. Edema not present. Pulmonary/Chest: Effort normal and breath sounds normal. No stridor. No respiratory distress. He has no wheezes. Abdominal: Soft. Bowel sounds are normal. He exhibits no distension.  There is no

## 2020-07-08 NOTE — CONSULT NOTE ADULT - ASSESSMENT
79 yo male with left foot 5th metatarsal base comminuted fracture w 5th metatarsal spiral fracture extending from proximal medial to distal lateral  - pt seen and evaluated  - VSS, no open wounds, no acute signs of infection  - Due to nature of fracture there was a concern for compartment pressure, measured foot compartment pressure using wick's catheter in calcaneal compartment, 2nd, 4th interspace were 8, 6, and 9 mmHg respectively  - no concern for compartment syndrome after measurement of normal pressures.  - pod plan no acute surgical intervention at this time  - applied Ag compression and posterior splint to the right LLE   - Instructed pt to keep dressing dry, clean, and intact till follow up  - Instructed pt to stay NWB to the RLE, pt is s/p CVA with residual weakness to the right side, uses wheelchair for ambulation  - follow up w Dr. Prakash 1 week after dc  - discussed w/ attending

## 2020-07-08 NOTE — ED PROVIDER NOTE - ATTENDING CONTRIBUTION TO CARE
81 yo M with DM, HTN, CVA with residual R sided weakness presents right foot pain and swelling. pt was carrying a 20lb weight and dropped it landing on his right first toe. now has pain in toe and swelling to whole foot  has not been able to ambulate on foot  will check xrs ro fracture 81 yo M with DM, HTN, CVA with residual R sided weakness presents right foot pain and swelling. pt was holding onto stair rail which then fell off and  landing on his right first toe. now has pain in toe and swelling to whole foot  has not been able to ambulate on foot  will check xrs ro fracture

## 2020-07-08 NOTE — ED PROVIDER NOTE - NSFOLLOWUPINSTRUCTIONS_ED_ALL_ED_FT
Thank you for visiting our Emergency Department, it has been a pleasure taking part in your healthcare. Please follow up with your primary doctor within x48 hours.    Your discharge diagnosis is: right foot fracture  Please follow-up podiatry within one week   Please come back if you have worsening pain or change in your skin.   please take over the counter pain medication such as motrin (600mg every 6hours) and tylenol (650mg every 4hours) for pain and follow up with your primary care doctor.     Return precautions to the Emergency Department include but are not limited to: unrelenting nausea, vomiting, fever, chills, chest pain, shortness of breath, dizziness, abdominal pain, worsening pain, syncope, blood in urine or stool, headache that doesn't resolve, numbness or tingling, loss of sensation, loss of motor function, or any other concerning symptoms.

## 2020-07-08 NOTE — ED ADULT TRIAGE NOTE - CHIEF COMPLAINT QUOTE
Pt s/p fall while going down stairs states he lost his  . Pt denies any LOC arrives with swelling to right ankle and entire foot. Pt with right side deficit secondary to stroke in the past and dementia.  Pt is awake and alert x 2. Pt type 2 DM finger stick 99 in triage.

## 2020-07-08 NOTE — CONSULT NOTE ADULT - SUBJECTIVE AND OBJECTIVE BOX
Podiatry pager #: [phone] ([place])/ [phone] ([facility])    Patient is a 80y old  Male who presents with a chief complaint of right foot pain and swelling    HPI: 81 yo M with DM, HTN, CVA with residual R sided weakness w right foot swelling. per wife the stair railing fell on his right foot. wife put ice on it but he was in a lot of pain. pt did not fall. has not been able to weight bear. pt minimally ambulatory at baseline.       PAST MEDICAL & SURGICAL HISTORY:  Hyperlipidemia  Diabetes  HTN (hypertension)  CVA (cerebral infarction): 1998 with residual right sided weakness  No significant past surgical history      MEDICATIONS  (STANDING):    MEDICATIONS  (PRN):      Allergies    No Known Allergies    Intolerances        VITALS:    Vital Signs Last 24 Hrs  T(C): 36.7 (08 Jul 2020 23:04), Max: 36.7 (08 Jul 2020 23:04)  T(F): 98.1 (08 Jul 2020 23:04), Max: 98.1 (08 Jul 2020 23:04)  HR: 81 (08 Jul 2020 23:04) (78 - 81)  BP: 135/68 (08 Jul 2020 23:04) (128/83 - 138/78)  BP(mean): --  RR: 18 (08 Jul 2020 23:04) (18 - 18)  SpO2: 99% (08 Jul 2020 23:04) (99% - 100%)    LABS:                          11.8   9.02  )-----------( 165      ( 08 Jul 2020 22:10 )             35.4       07-08    140  |  105  |  12  ----------------------------<  92  3.5   |  24  |  1.02    Ca    9.9      08 Jul 2020 22:10    TPro  6.9  /  Alb  4.1  /  TBili  1.2  /  DBili  x   /  AST  13  /  ALT  8   /  AlkPhos  71  07-08      CAPILLARY BLOOD GLUCOSE      POCT Blood Glucose.: 99 mg/dL (08 Jul 2020 19:49)      PT/INR - ( 08 Jul 2020 22:10 )   PT: 13.3 SEC;   INR: 1.16          PTT - ( 08 Jul 2020 22:10 )  PTT:28.6 SEC    LOWER EXTREMITY PHYSICAL EXAM:    Vascular: DP/PT 2/4 L, DP/PT nonpalpable due to edema R,  CFT <3 seconds B/L, Temperature gradient warm to cool B/L  Neuro: Epicritic sensation intact to the level of ankle B/L  Musculoskeletal/Ortho: tenderness to palpation to the right 5th metatarsal base and shaft  Skin: no open wounds, no clinical signs of infection      RADIOLOGY & ADDITIONAL STUDIES:  < from: Xray Foot AP + Lateral + Oblique, Right (07.08.20 @ 20:49) >    ******PRELIMINARY REPORT******    ******PRELIMINARY REPORT******            EXAM:  RAD FOOT MIN 3 VIEWS RIGHT        PROCEDURE DATE:  Jul 8 2020     ******PRELIMINARY REPORT******    ******PRELIMINARY REPORT******            INTERPRETATION:  Acutecomminuted fracture of mid to distal right fifth metacarpal.  No dislocation.  Right ankle soft tissue swelling.            ******PRELIMINARY REPORT******    ******PRELIMINARY REPORT******          ELDER BLACKBURN M.D., RADIOLOGY RESIDENT                       < end of copied text >

## 2020-07-08 NOTE — PROVIDER CONTACT NOTE (OTHER) - BACKGROUND
Arranged [facility] [phone]. Trip# [id number]. P/U 2.30. Spoke with wife Nerissa at [phone], she will be home to receive pt and she agreed to pay $65 cash to the .

## 2020-07-08 NOTE — ED PROVIDER NOTE - PROGRESS NOTE DETAILS
Yovanny Suarez, PGY 3: podiatry aware. for acute fracture. Yovanny Suarez, PGY 3: spoke with podiatry and concerning for compartment syndrome. will get labs and type and screen Yovanny Suarez, PGY 3: bedside compartment pressure was checked by podiatry and had <10 in calcaneous area and the dorsal area. Yovanny Suarez, PGY 3: splint placed and will d/c home with podiatry follow-up. wife is called.

## 2020-07-08 NOTE — ED PROVIDER NOTE - PHYSICAL EXAMINATION
Gen:  Well appearning in NAD  Head:  NC/AT  Resp: No distress   Ext: extensive swelling of right foot, tenderness 1st digit midfoot    Skin: warm and dry as visualized

## 2020-07-08 NOTE — ED PROVIDER NOTE - CARE PROVIDER_API CALL
Saturnino Prakash  PODIATRIC MEDICINE AND SURGERY  [address] [address] [place] [address]  Phone: [phone]  Fax: [phone]  Follow Up Time: 4-6 Days

## 2020-07-09 LAB
BLD GP AB SCN SERPL QL: NEGATIVE — SIGNIFICANT CHANGE UP
RH IG SCN BLD-IMP: POSITIVE — SIGNIFICANT CHANGE UP

## 2020-07-09 NOTE — ED ADULT NURSE REASSESSMENT NOTE - NS ED NURSE REASSESS COMMENT FT1
pt resting comfortably, states pain has improved. Denies any other acute complaints at this time, vitals stable as documented. Warm and clean blankets provided. pt discharged at this time, [place] arranged for pt. pickup to be at 12:30

## 2020-07-20 ENCOUNTER — APPOINTMENT (OUTPATIENT)
Dept: ELECTROPHYSIOLOGY | Facility: CLINIC | Age: 80
End: 2020-07-20
Payer: COMMERCIAL

## 2020-07-20 PROCEDURE — 93296 REM INTERROG EVL PM/IDS: CPT

## 2020-07-20 PROCEDURE — 93295 DEV INTERROG REMOTE 1/2/MLT: CPT

## 2020-09-10 ENCOUNTER — INPATIENT (INPATIENT)
Facility: HOSPITAL | Age: 80
LOS: 8 days | Discharge: NURSING, REHAB OR LONG-TERM CARE | End: 2020-09-19
Attending: INTERNAL MEDICINE | Admitting: INTERNAL MEDICINE
Payer: COMMERCIAL

## 2020-09-10 VITALS
HEART RATE: 79 BPM | OXYGEN SATURATION: 99 % | DIASTOLIC BLOOD PRESSURE: 66 MMHG | RESPIRATION RATE: 18 BRPM | TEMPERATURE: 98 F | SYSTOLIC BLOOD PRESSURE: 102 MMHG

## 2020-09-10 LAB
ALBUMIN SERPL ELPH-MCNC: 4.1 G/DL — SIGNIFICANT CHANGE UP (ref 3.3–5)
ALP SERPL-CCNC: 75 U/L — SIGNIFICANT CHANGE UP (ref 40–120)
ALT FLD-CCNC: 9 U/L — SIGNIFICANT CHANGE UP (ref 4–41)
ANION GAP SERPL CALC-SCNC: 10 MMO/L — SIGNIFICANT CHANGE UP (ref 7–14)
AST SERPL-CCNC: 11 U/L — SIGNIFICANT CHANGE UP (ref 4–40)
BILIRUB SERPL-MCNC: 0.9 MG/DL — SIGNIFICANT CHANGE UP (ref 0.2–1.2)
BUN SERPL-MCNC: 15 MG/DL — SIGNIFICANT CHANGE UP (ref 7–23)
CALCIUM SERPL-MCNC: 9.8 MG/DL — SIGNIFICANT CHANGE UP (ref 8.4–10.5)
CHLORIDE SERPL-SCNC: 103 MMOL/L — SIGNIFICANT CHANGE UP (ref 98–107)
CO2 SERPL-SCNC: 26 MMOL/L — SIGNIFICANT CHANGE UP (ref 22–31)
CREAT SERPL-MCNC: 1.21 MG/DL — SIGNIFICANT CHANGE UP (ref 0.5–1.3)
GLUCOSE SERPL-MCNC: 132 MG/DL — HIGH (ref 70–99)
HCT VFR BLD CALC: 35.6 % — LOW (ref 39–50)
HGB BLD-MCNC: 11.7 G/DL — LOW (ref 13–17)
MCHC RBC-ENTMCNC: 30.2 PG — SIGNIFICANT CHANGE UP (ref 27–34)
MCHC RBC-ENTMCNC: 32.9 % — SIGNIFICANT CHANGE UP (ref 32–36)
MCV RBC AUTO: 92 FL — SIGNIFICANT CHANGE UP (ref 80–100)
NRBC # FLD: 0 K/UL — SIGNIFICANT CHANGE UP (ref 0–0)
PLATELET # BLD AUTO: 171 K/UL — SIGNIFICANT CHANGE UP (ref 150–400)
PMV BLD: 9.6 FL — SIGNIFICANT CHANGE UP (ref 7–13)
POTASSIUM SERPL-MCNC: 3.7 MMOL/L — SIGNIFICANT CHANGE UP (ref 3.5–5.3)
POTASSIUM SERPL-SCNC: 3.7 MMOL/L — SIGNIFICANT CHANGE UP (ref 3.5–5.3)
PROT SERPL-MCNC: 6.8 G/DL — SIGNIFICANT CHANGE UP (ref 6–8.3)
RBC # BLD: 3.87 M/UL — LOW (ref 4.2–5.8)
RBC # FLD: 12.9 % — SIGNIFICANT CHANGE UP (ref 10.3–14.5)
SODIUM SERPL-SCNC: 139 MMOL/L — SIGNIFICANT CHANGE UP (ref 135–145)
TROPONIN T, HIGH SENSITIVITY: 12 NG/L — SIGNIFICANT CHANGE UP (ref ?–14)
WBC # BLD: 6.65 K/UL — SIGNIFICANT CHANGE UP (ref 3.8–10.5)
WBC # FLD AUTO: 6.65 K/UL — SIGNIFICANT CHANGE UP (ref 3.8–10.5)

## 2020-09-10 PROCEDURE — 71045 X-RAY EXAM CHEST 1 VIEW: CPT | Mod: 26

## 2020-09-10 PROCEDURE — 99285 EMERGENCY DEPT VISIT HI MDM: CPT

## 2020-09-10 PROCEDURE — 93010 ELECTROCARDIOGRAM REPORT: CPT

## 2020-09-10 NOTE — ED PROVIDER NOTE - OBJECTIVE STATEMENT
80 y.o. male hx of HTN, HLD, DM, CAD, s/p PPM on aspirin presents to the ED s/p syncopal episode after dinner while sitting at dining table around 8pm. Patient was unconscious for 10-20 minutes as per wife. When patient awoke, he was back to his baseline mental status. Wife denies patient having shaking episode, urination on self. Patient stated that before the episode, he suddenly felt nauseous and diaphoretic. Denied chest pain, shortness of breath, abdominal pain, urinary complaints, numbness, tingling, headache, visual changes.    Pacemaker: Medtronic; SN: [id number]; Model # [id number] 80 y.o. male hx of HTN, HLD, DM, CAD, s/p PPM on aspirin presents to the ED s/p syncopal episode after dinner while sitting at dining table around 8pm. Patient was unconscious for 10-20 minutes as per wife. When patient awoke, he was back to his baseline mental status. Wife denies patient having shaking episode, urination on self. Patient stated that before the episode, he suddenly felt nauseous and diaphoretic. Denied chest pain, shortness of breath, abdominal pain, urinary complaints, numbness, tingling, headache, visual changes. Patient did not fall out of chair. Did not hit head.    Pacemaker: Medtronic; SN: [id number]; Model # [id number]

## 2020-09-10 NOTE — ED ADULT NURSE NOTE - CHIEF COMPLAINT QUOTE
Per EMS, pt. had syncopal episode while his wife was feeding him dinner. Did not fall. Per EMS, pt.'s wife stated pt. lost consciousness for about 1 minute. After episode, pt. was vomiting,  on scene. Denies abd pain, nausea, chest pain, dizziness, weakness. 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 Finger Stick 152.  Wife [phone]

## 2020-09-10 NOTE — ED PROVIDER NOTE - NS ED ROS FT
CONSTITUTIONAL: SWEATING (Not currently) No fevers, chills, fatigue,   HEENT: No nasal congestion, runny nose, sore throat, jaw pain  CV: No chest pain, palpitations, paroxysmal nocturnal dyspnea, orthopnea  PULM: No cough, shortness of breath  GI: NAUSEA (not currently). No abdominal pain, vomiting, bloody stools  : No dysuria, hematuria, polyuria  SKIN: No rashes, arm or leg swelling  MSK: No muscle aches, back pain  NEURO: No headache, seizure, paresthesias  PSYCH: No anxiety

## 2020-09-10 NOTE — ED PROVIDER NOTE - PROGRESS NOTE DETAILS
Ernesto Parker D.O., PGY2 (Resident)  Attempted to call EP multiple times at [phone] and [phone] but no answer. Will continue to try. Ernesto Parker D.O., PGY2 (Resident)  Wife called about patient, stating he has a rash on his gluteal area x few days. Rash examined with nurse at bedside. Lichenified, no open areas. Will monitor. ANIKA: Pt was admitted to hospital for syncopal episode but started to become agitated and trying to get out of bed despite fall precautions. Tried to explain to pt that he could not get out of bed without assistance. He then reported he required to use bathroom to urinate, provided a urinal but pt continued to try to climb of out bed and began trying to kick and hit staff as well as bite staff. Tried to redirect pt and assess AO status but pt confused, unsure where he was and did not know year. Did not think he was in a hospital. Due to danger to self and others, provide ativan to pt initially 1mg IV, MAR paged and he arrived, orderd 2.5 mg Haldol. pt continued to be agitated and continued to be violent so provide one more dose 1mg IV Ativan and now pt calm and somnolent but no resp distress or apnea. will place on monitor. MAR at bedside and aware.

## 2020-09-10 NOTE — ED PROVIDER NOTE - ATTENDING CONTRIBUTION TO CARE
Patient is an 81 yo M with history of DM, HTN, hyperlipidemia, CVA with right sided weakness here for evaluation of syncopal episode that occurred today while eating dinner. He states he was eating dinner, felt sweaty and passed out. He states he had a little bit of mid-sternal chest pain that has now resolved. No headache, shortness of breath, abdominal pain. Denies fevers, chills, nausea, vomiting. Per patient's wife, patient was out for 10-20 minutes.     VS noted  Gen. no acute distress, Non toxic   HEENT: EOMI, mmm,   Lungs: CTAB/L no C/ W /R   CVS: RRR   Abd; Soft non tender, non distended   Ext: no edema  Skin: no rash  Neuro: awake, alert, CN 2-12 intact, right sided UE weakness, right sided LE weakness, LUE/LLE 5/5  a/p: syncope - ekg w/ paced rhythm. plan for labs, troponin, EP eval, admission  - Mary COVARRUBIAS

## 2020-09-10 NOTE — ED PROVIDER NOTE - PHYSICAL EXAMINATION
GENERAL: elderly male, lying in bed, NAD. Vital signs are within normal limits  HEENT: NC/AT, conjunctiva noninjected, sclera anicteric, moist mucous membranes, no tongue bite marks  NECK: Supple, trachea midline  LUNG: CTAB, no w/r/r appreciated  CV: RRR, no m/r/g appreciated, Pulses- Radial: 2+ b/l; posterior tibial: 2+ b/l; dorsalis pedis: 2+ b/l  ABDOMEN: Soft, NTND, no rebound or guarding. diaper on, no urine.  BACK: No midline spinal tenderness or step-offs. No paraspinal tenderness to palpation. No CVA tenderness  MSK: No edema, no visible deformities, full range of motion UE/LE b/l, 5/5 muscle strength LUE AND LLE. 4/5 RLE AND RUE , nontender extremities  NEURO: AAOx4 (to person, place, time, event), sensation grossly intact  -Cranial Nerves:  --CN II: PERRL  --CN III, IV, VI: EOMI b/l  --CN V1-3: Facial sensation intact to touch  --CN VII: RIGHT SIDED FACIAL DROOP (BASELINE)  --CN VIII: Hearing intact to rubbing fingers b/l  --CN IX, X: Palate elevates symmetrically. Normal phonation  --CN XI: Heading turning and shoulder shrug intact b/l  --CN XII: Tongue midline with normal movements   SKIN: Warm, dry, well perfused, no evidence of rash  PSYCH: Normal mood and affect

## 2020-09-10 NOTE — ED ADULT NURSE NOTE - OBJECTIVE STATEMENT
Pt arrives awake alert calm pleasant affect. Pt st" I passed out for a minute my wife told me....I was laying in bed. I have no chest pain no shortness of breath..I had a stroke and my rt side was affected and I use walker to walk...was ok today. I feel fine now. " Pt placed on cm vs as noted. sl and labs sent EKG in chart.

## 2020-09-10 NOTE — ED PROVIDER NOTE - CLINICAL SUMMARY MEDICAL DECISION MAKING FREE TEXT BOX
80 y.o. male has PPM here s/p witnessed syncopal episode at 8pm, felt nauseous and sweaty before the episode, down for 10-20 mins before waking up to wife. Vitals wnl. Exam w/o obvious murmur, new focal neuro deficit. Concern for cardiogenic cause, do not think primary neurovasc event given patient back at baseline mental status w/o focal deficit. Do not think seizure as no witnessed shaking, not postictal, no tongue bite marks. Will check labs, trop, consult EP for PPM interrogation, reassess.

## 2020-09-10 NOTE — ED ADULT TRIAGE NOTE - CHIEF COMPLAINT QUOTE
Per EMS, pt. had syncopal episode while his wife was feeding him dinner. Did not fall. Per EMS, pt.'s wife stated pt. lost consciousness for about 1 minute. After episode, pt. was vomiting,  on scene. Denies abd pain, nausea, chest pain, dizziness, weakness. 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 Finger Stick 152.  Wife [phone]

## 2020-09-11 DIAGNOSIS — R41.89 OTHER SYMPTOMS AND SIGNS INVOLVING COGNITIVE FUNCTIONS AND AWARENESS: ICD-10-CM

## 2020-09-11 DIAGNOSIS — R55 SYNCOPE AND COLLAPSE: ICD-10-CM

## 2020-09-11 DIAGNOSIS — E11.9 TYPE 2 DIABETES MELLITUS WITHOUT COMPLICATIONS: ICD-10-CM

## 2020-09-11 DIAGNOSIS — Z95.0 PRESENCE OF CARDIAC PACEMAKER: Chronic | ICD-10-CM

## 2020-09-11 DIAGNOSIS — I10 ESSENTIAL (PRIMARY) HYPERTENSION: ICD-10-CM

## 2020-09-11 DIAGNOSIS — Z29.9 ENCOUNTER FOR PROPHYLACTIC MEASURES, UNSPECIFIED: ICD-10-CM

## 2020-09-11 DIAGNOSIS — F03.90 UNSPECIFIED DEMENTIA, UNSPECIFIED SEVERITY, WITHOUT BEHAVIORAL DISTURBANCE, PSYCHOTIC DISTURBANCE, MOOD DISTURBANCE, AND ANXIETY: ICD-10-CM

## 2020-09-11 DIAGNOSIS — E78.5 HYPERLIPIDEMIA, UNSPECIFIED: ICD-10-CM

## 2020-09-11 LAB
ALBUMIN SERPL ELPH-MCNC: 4.1 G/DL — SIGNIFICANT CHANGE UP (ref 3.3–5)
ALP SERPL-CCNC: 80 U/L — SIGNIFICANT CHANGE UP (ref 40–120)
ALT FLD-CCNC: 9 U/L — SIGNIFICANT CHANGE UP (ref 4–41)
ANION GAP SERPL CALC-SCNC: 8 MMO/L — SIGNIFICANT CHANGE UP (ref 7–14)
APPEARANCE UR: CLEAR — SIGNIFICANT CHANGE UP
APTT BLD: 23.8 SEC — LOW (ref 27–36.3)
AST SERPL-CCNC: 14 U/L — SIGNIFICANT CHANGE UP (ref 4–40)
BASOPHILS # BLD AUTO: 0.01 K/UL — SIGNIFICANT CHANGE UP (ref 0–0.2)
BASOPHILS NFR BLD AUTO: 0.1 % — SIGNIFICANT CHANGE UP (ref 0–2)
BILIRUB SERPL-MCNC: 1.2 MG/DL — SIGNIFICANT CHANGE UP (ref 0.2–1.2)
BILIRUB UR-MCNC: NEGATIVE — SIGNIFICANT CHANGE UP
BLOOD UR QL VISUAL: NEGATIVE — SIGNIFICANT CHANGE UP
BUN SERPL-MCNC: 14 MG/DL — SIGNIFICANT CHANGE UP (ref 7–23)
CALCIUM SERPL-MCNC: 9.8 MG/DL — SIGNIFICANT CHANGE UP (ref 8.4–10.5)
CHLORIDE SERPL-SCNC: 104 MMOL/L — SIGNIFICANT CHANGE UP (ref 98–107)
CHOLEST SERPL-MCNC: 129 MG/DL — SIGNIFICANT CHANGE UP (ref 120–199)
CO2 SERPL-SCNC: 26 MMOL/L — SIGNIFICANT CHANGE UP (ref 22–31)
COLOR SPEC: YELLOW — SIGNIFICANT CHANGE UP
CREAT SERPL-MCNC: 1.03 MG/DL — SIGNIFICANT CHANGE UP (ref 0.5–1.3)
EOSINOPHIL # BLD AUTO: 0.24 K/UL — SIGNIFICANT CHANGE UP (ref 0–0.5)
EOSINOPHIL NFR BLD AUTO: 3.3 % — SIGNIFICANT CHANGE UP (ref 0–6)
GLUCOSE SERPL-MCNC: 97 MG/DL — SIGNIFICANT CHANGE UP (ref 70–99)
GLUCOSE UR-MCNC: NEGATIVE — SIGNIFICANT CHANGE UP
HBA1C BLD-MCNC: 5.5 % — SIGNIFICANT CHANGE UP (ref 4–5.6)
HCT VFR BLD CALC: 35.8 % — LOW (ref 39–50)
HDLC SERPL-MCNC: 49 MG/DL — SIGNIFICANT CHANGE UP (ref 35–55)
HGB BLD-MCNC: 11.8 G/DL — LOW (ref 13–17)
IMM GRANULOCYTES NFR BLD AUTO: 0.4 % — SIGNIFICANT CHANGE UP (ref 0–1.5)
INR BLD: 1.18 — HIGH (ref 0.88–1.16)
KETONES UR-MCNC: NEGATIVE — SIGNIFICANT CHANGE UP
LEUKOCYTE ESTERASE UR-ACNC: NEGATIVE — SIGNIFICANT CHANGE UP
LIPID PNL WITH DIRECT LDL SERPL: 77 MG/DL — SIGNIFICANT CHANGE UP
LYMPHOCYTES # BLD AUTO: 1.25 K/UL — SIGNIFICANT CHANGE UP (ref 1–3.3)
LYMPHOCYTES # BLD AUTO: 17.4 % — SIGNIFICANT CHANGE UP (ref 13–44)
MAGNESIUM SERPL-MCNC: 1.9 MG/DL — SIGNIFICANT CHANGE UP (ref 1.6–2.6)
MCHC RBC-ENTMCNC: 30.9 PG — SIGNIFICANT CHANGE UP (ref 27–34)
MCHC RBC-ENTMCNC: 33 % — SIGNIFICANT CHANGE UP (ref 32–36)
MCV RBC AUTO: 93.7 FL — SIGNIFICANT CHANGE UP (ref 80–100)
MONOCYTES # BLD AUTO: 0.51 K/UL — SIGNIFICANT CHANGE UP (ref 0–0.9)
MONOCYTES NFR BLD AUTO: 7.1 % — SIGNIFICANT CHANGE UP (ref 2–14)
NEUTROPHILS # BLD AUTO: 5.15 K/UL — SIGNIFICANT CHANGE UP (ref 1.8–7.4)
NEUTROPHILS NFR BLD AUTO: 71.7 % — SIGNIFICANT CHANGE UP (ref 43–77)
NITRITE UR-MCNC: NEGATIVE — SIGNIFICANT CHANGE UP
NRBC # FLD: 0 K/UL — SIGNIFICANT CHANGE UP (ref 0–0)
PH UR: 6 — SIGNIFICANT CHANGE UP (ref 5–8)
PLATELET # BLD AUTO: 154 K/UL — SIGNIFICANT CHANGE UP (ref 150–400)
PMV BLD: 10.4 FL — SIGNIFICANT CHANGE UP (ref 7–13)
POTASSIUM SERPL-MCNC: 3.6 MMOL/L — SIGNIFICANT CHANGE UP (ref 3.5–5.3)
POTASSIUM SERPL-SCNC: 3.6 MMOL/L — SIGNIFICANT CHANGE UP (ref 3.5–5.3)
PROT SERPL-MCNC: 7.1 G/DL — SIGNIFICANT CHANGE UP (ref 6–8.3)
PROT UR-MCNC: 20 — SIGNIFICANT CHANGE UP
PROTHROM AB SERPL-ACNC: 13.3 SEC — SIGNIFICANT CHANGE UP (ref 10.6–13.6)
RBC # BLD: 3.82 M/UL — LOW (ref 4.2–5.8)
RBC # FLD: 12.9 % — SIGNIFICANT CHANGE UP (ref 10.3–14.5)
RBC CASTS # UR COMP ASSIST: SIGNIFICANT CHANGE UP (ref 0–?)
SARS-COV-2 RNA SPEC QL NAA+PROBE: SIGNIFICANT CHANGE UP
SODIUM SERPL-SCNC: 138 MMOL/L — SIGNIFICANT CHANGE UP (ref 135–145)
SP GR SPEC: 1.03 — SIGNIFICANT CHANGE UP (ref 1–1.04)
TRIGL SERPL-MCNC: 50 MG/DL — SIGNIFICANT CHANGE UP (ref 10–149)
TROPONIN T, HIGH SENSITIVITY: 11 NG/L — SIGNIFICANT CHANGE UP (ref ?–14)
TSH SERPL-MCNC: 1.1 UIU/ML — SIGNIFICANT CHANGE UP (ref 0.27–4.2)
UROBILINOGEN FLD QL: 4 — SIGNIFICANT CHANGE UP
WBC # BLD: 7.19 K/UL — SIGNIFICANT CHANGE UP (ref 3.8–10.5)
WBC # FLD AUTO: 7.19 K/UL — SIGNIFICANT CHANGE UP (ref 3.8–10.5)
WBC UR QL: SIGNIFICANT CHANGE UP (ref 0–?)

## 2020-09-11 PROCEDURE — 93010 ELECTROCARDIOGRAM REPORT: CPT

## 2020-09-11 PROCEDURE — 70450 CT HEAD/BRAIN W/O DYE: CPT | Mod: 26

## 2020-09-11 PROCEDURE — 99222 1ST HOSP IP/OBS MODERATE 55: CPT

## 2020-09-11 RX ORDER — HALOPERIDOL DECANOATE 100 MG/ML
2.5 INJECTION INTRAMUSCULAR ONCE
Refills: 0 | Status: COMPLETED | OUTPATIENT
Start: 2020-09-11 | End: 2020-09-11

## 2020-09-11 RX ORDER — INSULIN LISPRO 100/ML
VIAL (ML) SUBCUTANEOUS AT BEDTIME
Refills: 0 | Status: DISCONTINUED | OUTPATIENT
Start: 2020-09-11 | End: 2020-09-12

## 2020-09-11 RX ORDER — ATORVASTATIN CALCIUM 80 MG/1
20 TABLET, FILM COATED ORAL AT BEDTIME
Refills: 0 | Status: DISCONTINUED | OUTPATIENT
Start: 2020-09-11 | End: 2020-09-19

## 2020-09-11 RX ORDER — DEXTROSE 50 % IN WATER 50 %
15 SYRINGE (ML) INTRAVENOUS ONCE
Refills: 0 | Status: DISCONTINUED | OUTPATIENT
Start: 2020-09-11 | End: 2020-09-12

## 2020-09-11 RX ORDER — ASPIRIN/CALCIUM CARB/MAGNESIUM 324 MG
81 TABLET ORAL DAILY
Refills: 0 | Status: DISCONTINUED | OUTPATIENT
Start: 2020-09-11 | End: 2020-09-19

## 2020-09-11 RX ORDER — SODIUM CHLORIDE 9 MG/ML
1000 INJECTION, SOLUTION INTRAVENOUS
Refills: 0 | Status: DISCONTINUED | OUTPATIENT
Start: 2020-09-11 | End: 2020-09-12

## 2020-09-11 RX ORDER — DEXTROSE 50 % IN WATER 50 %
25 SYRINGE (ML) INTRAVENOUS ONCE
Refills: 0 | Status: DISCONTINUED | OUTPATIENT
Start: 2020-09-11 | End: 2020-09-12

## 2020-09-11 RX ORDER — HALOPERIDOL DECANOATE 100 MG/ML
1 INJECTION INTRAMUSCULAR EVERY 6 HOURS
Refills: 0 | Status: DISCONTINUED | OUTPATIENT
Start: 2020-09-11 | End: 2020-09-19

## 2020-09-11 RX ORDER — INSULIN LISPRO 100/ML
VIAL (ML) SUBCUTANEOUS
Refills: 0 | Status: DISCONTINUED | OUTPATIENT
Start: 2020-09-11 | End: 2020-09-12

## 2020-09-11 RX ORDER — DEXTROSE 50 % IN WATER 50 %
12.5 SYRINGE (ML) INTRAVENOUS ONCE
Refills: 0 | Status: DISCONTINUED | OUTPATIENT
Start: 2020-09-11 | End: 2020-09-12

## 2020-09-11 RX ORDER — METOPROLOL TARTRATE 50 MG
25 TABLET ORAL DAILY
Refills: 0 | Status: DISCONTINUED | OUTPATIENT
Start: 2020-09-11 | End: 2020-09-19

## 2020-09-11 RX ORDER — NYSTATIN CREAM 100000 [USP'U]/G
1 CREAM TOPICAL
Refills: 0 | Status: DISCONTINUED | OUTPATIENT
Start: 2020-09-11 | End: 2020-09-19

## 2020-09-11 RX ORDER — PREGABALIN 225 MG/1
1000 CAPSULE ORAL DAILY
Refills: 0 | Status: DISCONTINUED | OUTPATIENT
Start: 2020-09-11 | End: 2020-09-19

## 2020-09-11 RX ORDER — ACETAMINOPHEN 500 MG
650 TABLET ORAL EVERY 6 HOURS
Refills: 0 | Status: DISCONTINUED | OUTPATIENT
Start: 2020-09-11 | End: 2020-09-19

## 2020-09-11 RX ORDER — ISOSORBIDE MONONITRATE 60 MG/1
30 TABLET, EXTENDED RELEASE ORAL DAILY
Refills: 0 | Status: DISCONTINUED | OUTPATIENT
Start: 2020-09-11 | End: 2020-09-19

## 2020-09-11 RX ORDER — GLUCAGON INJECTION, SOLUTION 0.5 MG/.1ML
1 INJECTION, SOLUTION SUBCUTANEOUS ONCE
Refills: 0 | Status: DISCONTINUED | OUTPATIENT
Start: 2020-09-11 | End: 2020-09-12

## 2020-09-11 RX ORDER — HALOPERIDOL DECANOATE 100 MG/ML
0.5 INJECTION INTRAMUSCULAR ONCE
Refills: 0 | Status: COMPLETED | OUTPATIENT
Start: 2020-09-11 | End: 2020-09-11

## 2020-09-11 RX ORDER — ENOXAPARIN SODIUM 100 MG/ML
40 INJECTION SUBCUTANEOUS DAILY
Refills: 0 | Status: DISCONTINUED | OUTPATIENT
Start: 2020-09-11 | End: 2020-09-19

## 2020-09-11 RX ADMIN — Medication 1 MILLIGRAM(S): at 04:54

## 2020-09-11 RX ADMIN — HALOPERIDOL DECANOATE 2.5 MILLIGRAM(S): 100 INJECTION INTRAMUSCULAR at 04:54

## 2020-09-11 RX ADMIN — Medication 1 MILLIGRAM(S): at 05:00

## 2020-09-11 RX ADMIN — ATORVASTATIN CALCIUM 20 MILLIGRAM(S): 80 TABLET, FILM COATED ORAL at 21:54

## 2020-09-11 RX ADMIN — Medication 25 MILLIGRAM(S): at 11:27

## 2020-09-11 RX ADMIN — Medication 650 MILLIGRAM(S): at 15:58

## 2020-09-11 RX ADMIN — Medication 650 MILLIGRAM(S): at 14:58

## 2020-09-11 RX ADMIN — Medication 81 MILLIGRAM(S): at 11:27

## 2020-09-11 RX ADMIN — HALOPERIDOL DECANOATE 0.5 MILLIGRAM(S): 100 INJECTION INTRAMUSCULAR at 14:58

## 2020-09-11 RX ADMIN — PREGABALIN 1000 MICROGRAM(S): 225 CAPSULE ORAL at 12:16

## 2020-09-11 RX ADMIN — ISOSORBIDE MONONITRATE 30 MILLIGRAM(S): 60 TABLET, EXTENDED RELEASE ORAL at 12:16

## 2020-09-11 RX ADMIN — ENOXAPARIN SODIUM 40 MILLIGRAM(S): 100 INJECTION SUBCUTANEOUS at 11:27

## 2020-09-11 NOTE — BEHAVIORAL HEALTH ASSESSMENT NOTE - OTHER
confused in bed , uses wheelchair at home minimally conversational - used few words, unable to answer most direct questions stated name clearly, other words mumbled at times restless unable to comply at this time fall, syncope ams

## 2020-09-11 NOTE — CONSULT NOTE ADULT - ASSESSMENT
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 PPM interrogation revealed no episodes that would correlate with his syncope. ECG showed atrial paced rhythm. During exam the patient fell asleep multiple times but was easily arousable.     Plan:   -Patients syncope is unlikely due to an arrhythmia given PPM interrogation and ECG  -TSH normal, electrolytes WNL    Tara Canales MD  Cardiology Fellow

## 2020-09-11 NOTE — ED ADULT NURSE REASSESSMENT NOTE - NS ED NURSE REASSESS COMMENT FT1
Charge RN notified will need c;o for safety.
Pt with noted sacral rash? dermatitis. Pt is incont of stool and noted to have 2 depends on saturated with urine. Resident Parker at bedside to eval rash. PM pericare provided . Will cont to assess freq pt to be kept clean and dry.

## 2020-09-11 NOTE — ED ADULT NURSE REASSESSMENT NOTE - CONDITION
deteriorated/Pt admitted Tele DX Syncopy Pt climbing out of bed, st" I need to go to bathroom...pt offered urinal becoming very agitated, pulling off monitor, attempts to pull out IV.

## 2020-09-11 NOTE — ED ADULT NURSE REASSESSMENT NOTE - REASSESS COMMUNICATION
MAR notified. ED MD Pryor at bedside Ativan and haldol given as ordered./ED physician notified
ED physician notified/Resident Parker

## 2020-09-11 NOTE — H&P ADULT - HISTORY OF PRESENT ILLNESS
79 y/o male, with a PmHx of HTN, HLD, Dementia, DM, CAD, PPM (Medtronic), CVA (1998 w/residual right sided weakness), COVID 3/2020, presented to the [facility] ED with syncope. Unable to get any information from patient. According to the ED chart he had become extremely agitated and with given Haldol and Ativan to calm him down. Information was obtained from his wife Nerissa via telephone. According to Nerissa, pt had gone to his PCP Dr. Patel yesterday for a physical and follow up at around 4pm. At around 5 pm he had gotten back home and when he was going back in the house (uses a wheel chair since fracturing his right foot a few months ago and has bilateral knee problems, unable to ambulate well), when the wheelchair fell backwards and then he fell to the ground. Wife states he didn't get injured at the time and she was able to help him up and get him in the house. Then at around 1800hrs, while sitting at the dinner table, he had just finished eating when his wife states he went unconscious for about 10 minutes (never fell out of the chair) so she called 911. She states she was having a hard time waking him up. After about 10 minutes she states he finally came around and was back to his baseline. Wife states he is A & O x 3 with periods of confusion. She denies him complaining of any fever, chills, chest pain, HA, dizziness, blurred vision, abd pain, n/v, sick contacts, recent travel. Wife states he had a similar episode of syncope about 1 month ago as well but never went to the hospital or PCP at that time. Currently, he is only able to be aroused via painful stimuli (opens his eyes and then goes back to sleep). He is now being admitted to telemetry for syncope.    ==> Of Note, pt had a Flu shot yesterday in PCP's office

## 2020-09-11 NOTE — BEHAVIORAL HEALTH ASSESSMENT NOTE - NSBHCHARTREVIEWLAB_PSY_A_CORE FT
11.8   7.19  )-----------( 154      ( 11 Sep 2020 07:02 )             35.8   09-11    138  |  104  |  14  ----------------------------<  97  3.6   |  26  |  1.03    Ca    9.8      11 Sep 2020 07:02  Mg     1.9     09-11    TPro  7.1  /  Alb  4.1  /  TBili  1.2  /  DBili  x   /  AST  14  /  ALT  9   /  AlkPhos  80  09-11  Urinalysis (09.11.20 @ 01:48)    Color: YELLOW    Urine Appearance: CLEAR    Glucose: NEGATIVE    Bilirubin: NEGATIVE    Ketone - Urine: NEGATIVE    Specific Gravity: 1.028    Blood: NEGATIVE    pH - Urine: 6.0    Protein, Urine: 20    Urobilinogen: 4    Nitrite: NEGATIVE    Leukocyte Esterase Concentration: NEGATIVE    Red Blood Cell - Urine: 0-2    White Blood Cell - Urine: 0-2

## 2020-09-11 NOTE — H&P ADULT - NSICDXPASTMEDICALHX_GEN_ALL_CORE_FT
PAST MEDICAL HISTORY:  CVA (cerebral infarction) 1998 with residual right sided weakness    Dementia     Diabetes     HTN (hypertension)     Hyperlipidemia

## 2020-09-11 NOTE — ADVANCED PRACTICE NURSE CONSULT - REASON FOR CONSULT
Patient seen on skin care rounds after wound care referral received for assessment of skin impairment and recommendations of topical management. Chart reviewed: Nghia 17, BMI 32.3kg/m2, WBC 7.19, Hemoglobin A1C 5.5%.  Patient H/O of HTN, HLD, Dementia, DM, CAD, PPM (Medtronic), CVA (1998 w/residual right sided weakness), COVID 3/2020, presented to the [facility] ED with syncope. Unable to get any information from patient. According to the ED chart he had become extremely agitated and with given Haldol and Ativan to calm him down. Information was obtained from his wife Nerissa via telephone. According to Nerissa, pt had gone to his PCP Dr. Patel yesterday for a physical and follow up at around 4pm. At around 5 pm he had gotten back home and when he was going back in the house (uses a wheel chair since fracturing his right foot a few months ago and has bilateral knee problems, unable to ambulate well), when the wheelchair fell backwards and then he fell to the ground. Wife states he didn't get injured at the time and she was able to help him up and get him in the house. Then at around 1800hrs, while sitting at the dinner table, he had just finished eating when his wife states he went unconscious for about 10 minutes (never fell out of the chair) so she called 911. She states she was having a hard time waking him up. After about 10 minutes she states he finally came around and was back to his baseline. Wife states he is A & O x 3 with periods of confusion. She denies him complaining of any fever, chills, chest pain, HA, dizziness, blurred vision, abd pain, n/v, sick contacts, recent travel. Wife states he had a similar episode of syncope about 1 month ago as well but never went to the hospital or PCP at that time. Currently, he is only able to be aroused via painful stimuli (opens his eyes and then goes back to sleep). He is now being admitted to telemetry for syncope.

## 2020-09-11 NOTE — ED ADULT NURSE REASSESSMENT NOTE - COMFORT CARE
darkened lights/plan of care explained/warm blanket provided/Pt not answering questions intially axox2-3 now not answering questions appropriately....
repositioned/side rails up/warm blanket provided/Pt incontinent, pt check q2.
repositioned/warm blanket provided/darkened lights
side rails up/plan of care explained/repositioned/warm blanket provided

## 2020-09-11 NOTE — BEHAVIORAL HEALTH ASSESSMENT NOTE - SUMMARY
Patient is an 79 y/o male, with a PmHx of HTN, HLD, Dementia, DM, CAD, PPM (Medtronic), CVA (1998 w/residual right sided weakness), COVID 3/2020, presented with syncope. Admitted to telemetry for syncope. Patient lives with wife at home, former smoker, no current substance use, no formal psychiatric hx. Patient with no hx of self harm or  harm to others are per wife collateral.  Wife states patients baseline is alert, oriented to his name, others names, place, and date.  Wife also reports patient has not been formally diagnosed with dementia however was recently referred to get testing from patients PCP.  Wife states at home patient is calm, no behavioral issues, but does note when he is in the hospital, he can become restless and agitated "when he wants to go home and when im not there" she states. Wife has no acute safety concerns for SI or HI.    PLAN  - patient on no standing medications at this time  - PRN for agitation if qtc < 500, haldol 1mg q6hrs   - monitor use of PRN, if frequent can consider standing intervention and would recommend repeat EKG to monitor qtc  - Level of observation will defer to primary team.  Collateral with no concern for SI/HI.   - delirium labs : b12, folate  - ongoing medical work up - neuro?

## 2020-09-11 NOTE — H&P ADULT - NEGATIVE OPHTHALMOLOGIC SYMPTOMS
no loss of vision R/no blurred vision L/no blurred vision R/no photophobia/no diplopia/no loss of vision L

## 2020-09-11 NOTE — ED ADULT NURSE REASSESSMENT NOTE - CONDITION
improved/Report to ESSU1 ARIE Maldonado PT transported to ESSU 1 PCA Bebe remains with Pt. Pt reassessed following Meds given for agitation:

## 2020-09-11 NOTE — BEHAVIORAL HEALTH ASSESSMENT NOTE - RISK ASSESSMENT
Low Acute Suicide Risk Risk: male gender, ams  Protective: no psychiaitric hx, support from wife, no recent SI or HI statements and no acute concern from wife, no inpt stays, no substance abuse, domiciled     patient unable to participate in full assessment, however as per wife patient is not an acute risk to self in regards to SI and HI.  He is however at increased risk of agitation during his hospital stay with ams. N/A

## 2020-09-11 NOTE — H&P ADULT - NSHPPHYSICALEXAM_GEN_ALL_CORE
Vital Signs Last 24 Hrs  T(C): 36.7 (11 Sep 2020 09:01), Max: 37.2 (11 Sep 2020 06:10)  T(F): 98 (11 Sep 2020 09:01), Max: 98.9 (11 Sep 2020 06:10)  HR: 64 (11 Sep 2020 09:01) (62 - 88)  BP: 140/80 (11 Sep 2020 09:02) (102/66 - 172/80)  BP(mean): --  RR: 18 (11 Sep 2020 09:02) (16 - 22)  SpO2: 100% (11 Sep 2020 09:02) (97% - 100%)    EKG: Electronic A-paced @ 65, T inv III, V5-6

## 2020-09-11 NOTE — BEHAVIORAL HEALTH ASSESSMENT NOTE - CASE SUMMARY
Patient is an 81 y/o male, with a PmHx of HTN, HLD, Dementia, DM, CAD, PPM (Medtronic), CVA (1998 w/residual right sided weakness), COVID 3/2020, presented with syncope. Admitted to telemetry for syncope. Patient lives with wife at home, former smoker, no current substance use, no formal psychiatric hx. Patient with no hx of self harm or  harm to others are per wife collateral.  Wife states patients baseline is alert, oriented to his name, others names, place, and date.  Wife also reports patient has not been formally diagnosed with dementia however was recently referred to get testing from patients PCP.  Wife states at home patient is calm, no behavioral issues, but does note when he is in the hospital, he can become restless and agitated "when he wants to go home and when im not there" she states. Wife has no acute safety concerns for SI or HI.     Chart reviewed, seen today, and discussed with primary team. Pt is pleasant, calm, cooperative. A bit fuzzy about details around his admission, and disoriented to date/year, but otherwise in no distress. He is redirectable, denies psychiatric distress, and lacks focal neuro deficits. He is amenable to continued care as needed.     Given low psychiatric acuity and patient being near baseline with lingering medical/hypoactive delirium, will sign off for now but welcome calls/questions as needed. Agree with observation level per primary medical team judgement and use of PRN medications only for severe instances.

## 2020-09-11 NOTE — BEHAVIORAL HEALTH ASSESSMENT NOTE - HPI (INCLUDE ILLNESS QUALITY, SEVERITY, DURATION, TIMING, CONTEXT, MODIFYING FACTORS, ASSOCIATED SIGNS AND SYMPTOMS)
Patient is an 81 y/o male, with a PmHx of HTN, HLD, Dementia, DM, CAD, PPM (Medtronic), CVA (1998 w/residual right sided weakness), COVID 3/2020, presented with syncope. Admitted to telemetry for syncope. Patient lives with wife at home, former smoker, no current substance use, no formal psychiatric hx including no outpt treatment and no inpt treatment.  Patient with no hx of self harm or  harm to others are per wife collateral.  Wife states patients baseline is alert, oriented to his name, others names, place, and date.  Wife also reports patient has not been formally diagnosed with dementia however was recently referred to get testing from patients PCP.  Wife states at home patient is calm, no behavioral issues, but does note when he is in the hospital, he can become restless and agitated "when he wants to go home and when im not there" she states. Wife has no acute safety concerns for SI or HI.    patient was seen and assessed at bedside.  Patient agitated in Ed and received PRN haldol 2.5mg, ativan 1mg x 2 with relief of agitation.  Again, today patient became agitated and received 0.5mg haldol prior to this providers assessment.  Patient without rigidity on exam.  He is alert, able to state his name and that he is in no pain, unable to answer where he is, why he is in the hospital or the date.  patient noted to be touching his own stool during assessment and attempting to climb oob at times.  He is unable to fully cooperate with interview, stays silent for much of the time without response to direct questions.  he did state he felt safe when asked, however he did report "yes" to many questions without certainty of full understanding.

## 2020-09-11 NOTE — H&P ADULT - ASSESSMENT
81 y/o male, with a PmHx of HTN, HLD, Dementia, DM, CAD, PPM (Medtronic), CVA (1998 w/residual right sided weakness), COVID 3/2020, presented to the [facility] ED with syncope. Admitted to telemetry for syncope.

## 2020-09-11 NOTE — CONSULT NOTE ADULT - SUBJECTIVE AND OBJECTIVE BOX
Tara Canales MD  Cardiology Fellow  [phone]  All Cardiology service information can be found  on [email or web address], password: [email or web address]    Patient seen and evaluated at bedside    Chief Complaint:    HPI:  81 y/o male, with a PmHx of HTN, HLD, Dementia, DM, CAD, PPM ([employer]), CVA ( w/residual right sided weakness), COVID 3/2020, presented to the [facility] ED with syncope. Unable to get any information from patient. According to the ED chart he had become extremely agitated and with given Haldol and Ativan to calm him down. Information was obtained from his wife Nerissa via telephone. According to Nerissa, pt had gone to his PCP Dr. Patel yesterday for a physical and follow up at around 4pm. At around 5 pm he had gotten back home and when he was going back in the house (uses a wheel chair since fracturing his right foot a few months ago and has bilateral knee problems, unable to ambulate well), when the wheelchair fell backwards and then he fell to the ground. Wife states he didn't get injured at the time and she was able to help him up and get him in the house. Then at around 1800hrs, while sitting at the dinner table, he had just finished eating when his wife states he went unconscious for about 10 minutes (never fell out of the chair) so she called 911. She states she was having a hard time waking him up. After about 10 minutes she states he finally came around and was back to his baseline. Wife states he is A & O x 3 with periods of confusion. She denies him complaining of any fever, chills, chest pain, HA, dizziness, blurred vision, abd pain, n/v, sick contacts, recent travel. Wife states he had a similar episode of syncope about 1 month ago as well but never went to the hospital or PCP at that time. Currently, he is only able to be aroused via painful stimuli (opens his eyes and then goes back to sleep). He is now being admitted to telemetry for syncope.    ==> Of Note, pt had a Flu shot yesterday in PCP's office (11 Sep 2020 08:32)    The patient had his PPM interrogated overnight, which showed no episodes, no bradycardia or pauses, VT/VF. This morning the patient was having lunch, states he feels fine, he does not know why he is here. He oriented to self and time but now to place.       PMHx:   Dementia  Hyperlipidemia  Diabetes  HTN (hypertension)  CVA (cerebral infarction)      PSHx:   Cardiac pacemaker  No significant past surgical history      Allergies:  No Known Allergies      Home Meds:    Current Medications:   acetaminophen   Tablet .. 650 milliGRAM(s) Oral every 6 hours PRN  aspirin enteric coated 81 milliGRAM(s) Oral daily  atorvastatin 20 milliGRAM(s) Oral at bedtime  cyanocobalamin 1000 MICROGram(s) Oral daily  dextrose 40% Gel 15 Gram(s) Oral once PRN  dextrose 5%. 1000 milliLiter(s) IV Continuous <Continuous>  dextrose 50% Injectable 12.5 Gram(s) IV Push once  dextrose 50% Injectable 25 Gram(s) IV Push once  dextrose 50% Injectable 25 Gram(s) IV Push once  enoxaparin Injectable 40 milliGRAM(s) SubCutaneous daily  glucagon  Injectable 1 milliGRAM(s) IntraMuscular once PRN  insulin lispro (HumaLOG) corrective regimen sliding scale   SubCutaneous three times a day before meals  insulin lispro (HumaLOG) corrective regimen sliding scale   SubCutaneous at bedtime  isosorbide   mononitrate ER Tablet (IMDUR) 30 milliGRAM(s) Oral daily  metoprolol succinate ER 25 milliGRAM(s) Oral daily      FAMILY HISTORY:  FHx: dementia: Father  FH: diabetes mellitus: Mother, Brother        REVIEW OF SYSTEMS:  patient denies, no reliable due to dementia     Physical Exam:  T(F): 98.6 (), Max: 98.9 ()  HR: 65 () (62 - 88)  BP: 146/78 () (102/66 - 172/80)  RR: 18 ()  SpO2: 98% ()  GENERAL: No acute distress, well-developed, patient falls asleep during the interview, easily arousable  HEAD:  Atraumatic, Normocephalic  ENT: EOMI, PERRLA, conjunctiva and sclera clear, Neck supple, No JVD, moist mucosa  CHEST/LUNG: Clear to auscultation bilaterally; No wheeze, equal breath sounds bilaterally   BACK: No spinal tenderness  HEART: Regular rate and rhythm; No murmurs, rubs, or gallops  ABDOMEN: Soft, Nontender, Nondistended; Bowel sounds present  EXTREMITIES:  No clubbing, cyanosis, or edema    Cardiovascular Diagnostic Testing:    ECG: Personally reviewed:    CXR: Personally reviewed    Labs: Personally reviewed                          )-----------( 154      ( 11 Sep 2020 07:02 )             35.8         138  |  104  |  14  ----------------------------<  97  3.6   |  26  |  1.03    Ca    9.8      11 Sep 2020 07:02  Mg     1.9         TPro  7.1  /  Alb  4.1  /  TBili  1.2  /  DBili  x   /  AST  14  /  ALT  9   /  AlkPhos  80      PT/INR - ( 11 Sep 2020 07:02 )   PT: 13.3 SEC;   INR: 1.18          PTT - ( 11 Sep 2020 07:02 )  PTT:23.8 SEC            Total Cholesterol: 129  LDL: 77  HDL: 49  T      Thyroid Stimulating Hormone, Serum: 1.10 uIU/mL ( @ 07:02)

## 2020-09-11 NOTE — H&P ADULT - RS GEN PE MLT RESP DETAILS PC
airway patent/clear to auscultation bilaterally/good air movement/breath sounds equal/respirations non-labored/no chest wall tenderness

## 2020-09-11 NOTE — ADVANCED PRACTICE NURSE CONSULT - RECOMMEDATIONS
Encourage PO intake     Topical Recommendations    Bilateral groin, sacral fold and buttocks: Clean with skin cleanser. Pat dry. Apply a thin layer of ANTIFUNGAL barrier cream. Apply twice a day or PRN with incontinence.     Continue low air loss bed therapy, heel elevation with Z-flex fluidized positioning boots, turn & reposition q2h with Z-alberto fluidized positioning device, soft pillow behind knees continue moisture management with barrier creams & single breathable pad, continue measures to decrease friction/shear/pressure.     Please contact Wound Care Service Line if we can be of further assistance (ext [phone]).

## 2020-09-11 NOTE — H&P ADULT - MUSCULOSKELETAL COMMENTS
bilateral chronic knee pain and right foot fracture (from a few months ago when cement fell on his foot)

## 2020-09-11 NOTE — H&P ADULT - ATTENDING COMMENTS
80 y.o. M w/ a hx of dementia, prior stroke w/ residual R sided weakness, CAD, PPM placement, HTN who presents after a syncopal episode. Patient was unable to provide any history but per hx obtained above and chart review- patient had been feeling nauseous and diaphoretic prior to the episode and then had a syncopal episode lasting about 10 minutes. No seizure activity noted.  At baseline, patient is wheelchair bound, A&Ox 2-3. EMS was called. In the ED, patient was agitated and received Haldol, Ativan x 2. PE: VSS, patient awake and able to intermittently answer questions though not reliably. Labs stable, Sujit 12 >11. CT head negative for ICH or stroke. PPM interrogated, no events detected.   # Syncope: Unclear etiology of syncope currently as unable to gather much information from the patient. May have had a vagal event given reported hx of diaphoresis and nausea right before. Obtain TTE. Monitor on tele. Will try to get more history from the patient as he wakes up  # Dementia: Would avoid any chemical restraints. Currently calm and cooperative. Would start 1:1 if patient becomes agitated. High risk for sundowning.

## 2020-09-11 NOTE — BEHAVIORAL HEALTH ASSESSMENT NOTE - NSBHCHARTREVIEWVS_PSY_A_CORE FT
ICU Vital Signs Last 24 Hrs  T(C): 36.7 (11 Sep 2020 14:06), Max: 37.2 (11 Sep 2020 06:10)  T(F): 98.1 (11 Sep 2020 14:06), Max: 98.9 (11 Sep 2020 06:10)  HR: 61 (11 Sep 2020 14:06) (61 - 88)  BP: 136/85 (11 Sep 2020 14:06) (102/66 - 172/80)  BP(mean): --  ABP: --  ABP(mean): --  RR: 18 (11 Sep 2020 14:06) (16 - 22)  SpO2: 98% (11 Sep 2020 14:06) (97% - 100%)

## 2020-09-11 NOTE — CHART NOTE - NSCHARTNOTEFT_GEN_A_CORE
ELECTROPHYSIOLOGY  Limited Device Interrogation Performed   Date/Time: 9/11 @ 0400  :    Parametric    Mode:  AAIR<=>DDDR   Rate:  60 bpm       Upper track 120 bpm       Observation: patient activity less than 1hr/day for 30 weeks.    Impression/Plan:    No events  Normal PPM / ICD function.   Normal sensing and pacing. Good battery status. No reprogramming.

## 2020-09-11 NOTE — CONSULT NOTE ADULT - ATTENDING COMMENTS
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 PPM interrogation revealed no episodes that would correlate with his syncope. ECG showed atrial paced rhythm. During exam the patient fell asleep multiple times but was easily arousable. No evidence of arrhythmias on tele or PPM.

## 2020-09-11 NOTE — BEHAVIORAL HEALTH ASSESSMENT NOTE - NSBHCHARTREVIEWINVESTIGATE_PSY_A_CORE FT
< from: 12 Lead ECG (09.10.20 @ 21:38) >    Ventricular Rate 65 BPM    Atrial Rate 65 BPM    P-R Interval 128 ms    QRS Duration 96 ms    Q-T Interval 392 ms    QTC Calculation(Bezet) 407 ms    R Axis -45 degrees    T Axis 15 degrees    Diagnosis Line Electronic atrial pacemaker  Left anterior fascicular block  Inferior infarct , age undetermined  Cannot rule out Anterior infarct , age undetermined  Abnormal ECG    < end of copied text >

## 2020-09-11 NOTE — BEHAVIORAL HEALTH ASSESSMENT NOTE - DETAILS
patient confused, unable to answer direct questions agitated in ED on 9/10 - wife reports no agitation at home

## 2020-09-11 NOTE — H&P ADULT - NEGATIVE ENMT SYMPTOMS
no sinus symptoms/no nasal congestion/no nasal discharge/no post-nasal discharge/no nasal obstruction

## 2020-09-11 NOTE — ADVANCED PRACTICE NURSE CONSULT - ASSESSMENT
A&Ox2, disoriented to year of birth, place and situation, patient received in enhanced care, right side weakness, bedbound, incontinent of urine and stool. Actively incontinence of urine during skin assessment, incontinence care provided and condom catheter placed. Skin warm, dry with increased moisture in intertriginous folds, adequate skin turgor, scattered areas of hyperpigmentation and hypopigmentation. Blanchable erythema on bilateral heels. Hypopigmentation in penis and right lower leg.     Severe Incontinence/moisture associated dermatitis with evidence of suspected candidiasis in bilateral groin and buttocks- noted purple hue and dry flaky skin. Intact skin.

## 2020-09-11 NOTE — ED ADULT NURSE REASSESSMENT NOTE - GENERAL PATIENT STATE
agitated, kicking attmpts to bite RN./anxious
comfortable appearance/cooperative/Pt appears less agitated, no longer climbing from bed no longer kicking and attempting to bite RN. Allows RN to replace CM leads and obtain vs.
comfortable appearance/pt is aox2
comfortable appearance

## 2020-09-11 NOTE — H&P ADULT - NSHPSOCIALHISTORY_GEN_ALL_CORE
Marital Status:     Occupation: Used to work Maintenance for a [employer]    Tobacco Use: d/c smoking 25 years ago; used to smoke about a pack per day x 20 years    ETOH Use: denies    Flu Vaccine:    9/10/2020                              Pneumonia Vaccine: ?

## 2020-09-12 LAB
ANION GAP SERPL CALC-SCNC: 8 MMO/L — SIGNIFICANT CHANGE UP (ref 7–14)
BASOPHILS # BLD AUTO: 0.01 K/UL — SIGNIFICANT CHANGE UP (ref 0–0.2)
BASOPHILS NFR BLD AUTO: 0.3 % — SIGNIFICANT CHANGE UP (ref 0–2)
BUN SERPL-MCNC: 11 MG/DL — SIGNIFICANT CHANGE UP (ref 7–23)
CALCIUM SERPL-MCNC: 9.5 MG/DL — SIGNIFICANT CHANGE UP (ref 8.4–10.5)
CHLORIDE SERPL-SCNC: 106 MMOL/L — SIGNIFICANT CHANGE UP (ref 98–107)
CO2 SERPL-SCNC: 24 MMOL/L — SIGNIFICANT CHANGE UP (ref 22–31)
CREAT SERPL-MCNC: 1.01 MG/DL — SIGNIFICANT CHANGE UP (ref 0.5–1.3)
CULTURE RESULTS: NO GROWTH — SIGNIFICANT CHANGE UP
EOSINOPHIL # BLD AUTO: 0.36 K/UL — SIGNIFICANT CHANGE UP (ref 0–0.5)
EOSINOPHIL NFR BLD AUTO: 9.5 % — HIGH (ref 0–6)
GLUCOSE SERPL-MCNC: 82 MG/DL — SIGNIFICANT CHANGE UP (ref 70–99)
HBA1C BLD-MCNC: 5.5 % — SIGNIFICANT CHANGE UP (ref 4–5.6)
HCT VFR BLD CALC: 39.2 % — SIGNIFICANT CHANGE UP (ref 39–50)
HGB BLD-MCNC: 12.6 G/DL — LOW (ref 13–17)
IMM GRANULOCYTES NFR BLD AUTO: 0.3 % — SIGNIFICANT CHANGE UP (ref 0–1.5)
LYMPHOCYTES # BLD AUTO: 1.44 K/UL — SIGNIFICANT CHANGE UP (ref 1–3.3)
LYMPHOCYTES # BLD AUTO: 38.1 % — SIGNIFICANT CHANGE UP (ref 13–44)
MCHC RBC-ENTMCNC: 30.3 PG — SIGNIFICANT CHANGE UP (ref 27–34)
MCHC RBC-ENTMCNC: 32.1 % — SIGNIFICANT CHANGE UP (ref 32–36)
MCV RBC AUTO: 94.2 FL — SIGNIFICANT CHANGE UP (ref 80–100)
MONOCYTES # BLD AUTO: 0.41 K/UL — SIGNIFICANT CHANGE UP (ref 0–0.9)
MONOCYTES NFR BLD AUTO: 10.8 % — SIGNIFICANT CHANGE UP (ref 2–14)
NEUTROPHILS # BLD AUTO: 1.55 K/UL — LOW (ref 1.8–7.4)
NEUTROPHILS NFR BLD AUTO: 41 % — LOW (ref 43–77)
NRBC # FLD: 0 K/UL — SIGNIFICANT CHANGE UP (ref 0–0)
PLATELET # BLD AUTO: 146 K/UL — LOW (ref 150–400)
PMV BLD: 10 FL — SIGNIFICANT CHANGE UP (ref 7–13)
POTASSIUM SERPL-MCNC: 4.2 MMOL/L — SIGNIFICANT CHANGE UP (ref 3.5–5.3)
POTASSIUM SERPL-SCNC: 4.2 MMOL/L — SIGNIFICANT CHANGE UP (ref 3.5–5.3)
RBC # BLD: 4.16 M/UL — LOW (ref 4.2–5.8)
RBC # FLD: 12.6 % — SIGNIFICANT CHANGE UP (ref 10.3–14.5)
SARS-COV-2 IGG SERPL QL IA: POSITIVE
SARS-COV-2 IGM SERPL IA-ACNC: 35.6 INDEX — HIGH
SODIUM SERPL-SCNC: 138 MMOL/L — SIGNIFICANT CHANGE UP (ref 135–145)
SPECIMEN SOURCE: SIGNIFICANT CHANGE UP
WBC # BLD: 3.78 K/UL — LOW (ref 3.8–10.5)
WBC # FLD AUTO: 3.78 K/UL — LOW (ref 3.8–10.5)

## 2020-09-12 PROCEDURE — 99223 1ST HOSP IP/OBS HIGH 75: CPT

## 2020-09-12 PROCEDURE — 99233 SBSQ HOSP IP/OBS HIGH 50: CPT | Mod: 25

## 2020-09-12 PROCEDURE — 99233 SBSQ HOSP IP/OBS HIGH 50: CPT

## 2020-09-12 PROCEDURE — 93280 PM DEVICE PROGR EVAL DUAL: CPT | Mod: 26,GC

## 2020-09-12 RX ORDER — LANOLIN ALCOHOL/MO/W.PET/CERES
3 CREAM (GRAM) TOPICAL AT BEDTIME
Refills: 0 | Status: DISCONTINUED | OUTPATIENT
Start: 2020-09-12 | End: 2020-09-13

## 2020-09-12 RX ORDER — POLYETHYLENE GLYCOL 3350 17 G/17G
17 POWDER, FOR SOLUTION ORAL DAILY
Refills: 0 | Status: DISCONTINUED | OUTPATIENT
Start: 2020-09-12 | End: 2020-09-19

## 2020-09-12 RX ORDER — HALOPERIDOL DECANOATE 100 MG/ML
2.5 INJECTION INTRAMUSCULAR ONCE
Refills: 0 | Status: COMPLETED | OUTPATIENT
Start: 2020-09-12 | End: 2020-09-12

## 2020-09-12 RX ORDER — SENNA PLUS 8.6 MG/1
2 TABLET ORAL AT BEDTIME
Refills: 0 | Status: DISCONTINUED | OUTPATIENT
Start: 2020-09-12 | End: 2020-09-19

## 2020-09-12 RX ORDER — HALOPERIDOL DECANOATE 100 MG/ML
1 INJECTION INTRAMUSCULAR ONCE
Refills: 0 | Status: COMPLETED | OUTPATIENT
Start: 2020-09-12 | End: 2020-09-12

## 2020-09-12 RX ADMIN — ENOXAPARIN SODIUM 40 MILLIGRAM(S): 100 INJECTION SUBCUTANEOUS at 11:56

## 2020-09-12 RX ADMIN — Medication 3 MILLIGRAM(S): at 22:04

## 2020-09-12 RX ADMIN — NYSTATIN CREAM 1 APPLICATION(S): 100000 CREAM TOPICAL at 09:55

## 2020-09-12 RX ADMIN — HALOPERIDOL DECANOATE 2.5 MILLIGRAM(S): 100 INJECTION INTRAMUSCULAR at 19:30

## 2020-09-12 RX ADMIN — NYSTATIN CREAM 1 APPLICATION(S): 100000 CREAM TOPICAL at 18:23

## 2020-09-12 RX ADMIN — ISOSORBIDE MONONITRATE 30 MILLIGRAM(S): 60 TABLET, EXTENDED RELEASE ORAL at 11:57

## 2020-09-12 RX ADMIN — HALOPERIDOL DECANOATE 1 MILLIGRAM(S): 100 INJECTION INTRAMUSCULAR at 19:07

## 2020-09-12 RX ADMIN — Medication 25 MILLIGRAM(S): at 06:45

## 2020-09-12 RX ADMIN — HALOPERIDOL DECANOATE 1 MILLIGRAM(S): 100 INJECTION INTRAMUSCULAR at 16:54

## 2020-09-12 RX ADMIN — PREGABALIN 1000 MICROGRAM(S): 225 CAPSULE ORAL at 11:56

## 2020-09-12 RX ADMIN — Medication 81 MILLIGRAM(S): at 11:56

## 2020-09-12 NOTE — PROVIDER CONTACT NOTE (OTHER) - SITUATION
Patient agitated, aggressive towards staff, and trying to get out of bed. Patient already received 1mg Haldol IM and still aggressive.

## 2020-09-12 NOTE — PHYSICAL THERAPY INITIAL EVALUATION ADULT - PERTINENT HX OF CURRENT PROBLEM, REHAB EVAL
Patient is an 80 year old male admitted to [facility] on 9/11 s/p syncopal episode at home. PMH: HTN, HLD, Dementia, DM, CAD, PPM ([employer]), CVA (1998 w/residual right sided weakness), COVID 3/2020. CT Head: Moderate to severe chronic small vessel ischemic changes in the frontoparietal white matter.

## 2020-09-12 NOTE — PROVIDER CONTACT NOTE (OTHER) - SITUATION
Patient agitated, aggressive towards staff, trying to get out of bed. Patient received a total of 2mg Haldol IM within past hour with patient very agitated and attempting to kick staff and security.

## 2020-09-12 NOTE — PROVIDER CONTACT NOTE (OTHER) - ASSESSMENT
Patient refusing assessment, care, vitals and medications. Patient restless, agitated upon approach and with touch, patient noted to swat hands and kick legs with approach or attempts to touch. Last vitals per flowsheet stable. Patient alert and orientated to person.

## 2020-09-12 NOTE — PHYSICAL THERAPY INITIAL EVALUATION ADULT - PATIENT PROFILE REVIEW, REHAB EVAL
yes/PT orders received: no formal activity order. Consult with ARIE BERNABE, pt may participate in PT evaluation.

## 2020-09-12 NOTE — PHYSICAL THERAPY INITIAL EVALUATION ADULT - RANGE OF MOTION EXAMINATION, REHAB EVAL
Left UE ROM was WFL (within functional limits)/bilateral lower extremity ROM was WFL (within functional limits)/Right shoulder flexion 0-25 degrees, elbow contracted

## 2020-09-12 NOTE — PROVIDER CONTACT NOTE (OTHER) - RECOMMENDATIONS
ACP made aware, will continue to try to encourage patient to allow for assessment. Will continue to monitor.

## 2020-09-12 NOTE — PROVIDER CONTACT NOTE (OTHER) - SITUATION
Patient refusing assessment, care, vitals and medications. Patient restless, agitated upon approach and with touch, patient noted to swat hands and kick legs with approach or touch.

## 2020-09-12 NOTE — PROGRESS NOTE ADULT - PROBLEM SELECTOR PLAN 6
DVT ppx: Lovenox 40mg sc daily  Dispo: PT eval DVT ppx: Lovenox 40mg sc daily  Dispo: PT eval recs rehab, wife agreeable

## 2020-09-12 NOTE — PHYSICAL THERAPY INITIAL EVALUATION ADULT - ADDITIONAL COMMENTS
Patient is a poor historian. Patient reports he lives with his wife in a private house. Patient reports he can ambulate with a rolling walker, however per chart review patient primarily uses wheelchair.     Patient was left semi-supine in bed as found, all lines/tubes intact and call rueda within reach, ARIE hatfield

## 2020-09-13 LAB
ANION GAP SERPL CALC-SCNC: 11 MMO/L — SIGNIFICANT CHANGE UP (ref 7–14)
BASOPHILS # BLD AUTO: 0.01 K/UL — SIGNIFICANT CHANGE UP (ref 0–0.2)
BASOPHILS NFR BLD AUTO: 0.3 % — SIGNIFICANT CHANGE UP (ref 0–2)
BUN SERPL-MCNC: 13 MG/DL — SIGNIFICANT CHANGE UP (ref 7–23)
CALCIUM SERPL-MCNC: 9.5 MG/DL — SIGNIFICANT CHANGE UP (ref 8.4–10.5)
CHLORIDE SERPL-SCNC: 109 MMOL/L — HIGH (ref 98–107)
CO2 SERPL-SCNC: 23 MMOL/L — SIGNIFICANT CHANGE UP (ref 22–31)
CREAT SERPL-MCNC: 0.98 MG/DL — SIGNIFICANT CHANGE UP (ref 0.5–1.3)
EOSINOPHIL # BLD AUTO: 0.32 K/UL — SIGNIFICANT CHANGE UP (ref 0–0.5)
EOSINOPHIL NFR BLD AUTO: 8.4 % — HIGH (ref 0–6)
FOLATE SERPL-MCNC: 8.6 NG/ML — SIGNIFICANT CHANGE UP (ref 4.7–20)
GLUCOSE SERPL-MCNC: 94 MG/DL — SIGNIFICANT CHANGE UP (ref 70–99)
HCT VFR BLD CALC: 36.4 % — LOW (ref 39–50)
HGB BLD-MCNC: 11.8 G/DL — LOW (ref 13–17)
IMM GRANULOCYTES NFR BLD AUTO: 0.3 % — SIGNIFICANT CHANGE UP (ref 0–1.5)
LYMPHOCYTES # BLD AUTO: 1.09 K/UL — SIGNIFICANT CHANGE UP (ref 1–3.3)
LYMPHOCYTES # BLD AUTO: 28.5 % — SIGNIFICANT CHANGE UP (ref 13–44)
MCHC RBC-ENTMCNC: 29.3 PG — SIGNIFICANT CHANGE UP (ref 27–34)
MCHC RBC-ENTMCNC: 32.4 % — SIGNIFICANT CHANGE UP (ref 32–36)
MCV RBC AUTO: 90.3 FL — SIGNIFICANT CHANGE UP (ref 80–100)
MONOCYTES # BLD AUTO: 0.44 K/UL — SIGNIFICANT CHANGE UP (ref 0–0.9)
MONOCYTES NFR BLD AUTO: 11.5 % — SIGNIFICANT CHANGE UP (ref 2–14)
NEUTROPHILS # BLD AUTO: 1.95 K/UL — SIGNIFICANT CHANGE UP (ref 1.8–7.4)
NEUTROPHILS NFR BLD AUTO: 51 % — SIGNIFICANT CHANGE UP (ref 43–77)
NRBC # FLD: 0 K/UL — SIGNIFICANT CHANGE UP (ref 0–0)
PLATELET # BLD AUTO: 160 K/UL — SIGNIFICANT CHANGE UP (ref 150–400)
PMV BLD: 9.8 FL — SIGNIFICANT CHANGE UP (ref 7–13)
POTASSIUM SERPL-MCNC: 3.9 MMOL/L — SIGNIFICANT CHANGE UP (ref 3.5–5.3)
POTASSIUM SERPL-SCNC: 3.9 MMOL/L — SIGNIFICANT CHANGE UP (ref 3.5–5.3)
RBC # BLD: 4.03 M/UL — LOW (ref 4.2–5.8)
RBC # FLD: 12.4 % — SIGNIFICANT CHANGE UP (ref 10.3–14.5)
SODIUM SERPL-SCNC: 143 MMOL/L — SIGNIFICANT CHANGE UP (ref 135–145)
VIT B12 SERPL-MCNC: 806 PG/ML — SIGNIFICANT CHANGE UP (ref 200–900)
WBC # BLD: 3.82 K/UL — SIGNIFICANT CHANGE UP (ref 3.8–10.5)
WBC # FLD AUTO: 3.82 K/UL — SIGNIFICANT CHANGE UP (ref 3.8–10.5)

## 2020-09-13 PROCEDURE — 93306 TTE W/DOPPLER COMPLETE: CPT | Mod: 26

## 2020-09-13 PROCEDURE — 99233 SBSQ HOSP IP/OBS HIGH 50: CPT

## 2020-09-13 RX ORDER — LANOLIN ALCOHOL/MO/W.PET/CERES
3 CREAM (GRAM) TOPICAL
Refills: 0 | Status: DISCONTINUED | OUTPATIENT
Start: 2020-09-13 | End: 2020-09-19

## 2020-09-13 RX ORDER — QUETIAPINE FUMARATE 200 MG/1
12.5 TABLET, FILM COATED ORAL
Refills: 0 | Status: DISCONTINUED | OUTPATIENT
Start: 2020-09-13 | End: 2020-09-14

## 2020-09-13 RX ORDER — FOLIC ACID 0.8 MG
1 TABLET ORAL DAILY
Refills: 0 | Status: DISCONTINUED | OUTPATIENT
Start: 2020-09-13 | End: 2020-09-19

## 2020-09-13 RX ADMIN — NYSTATIN CREAM 1 APPLICATION(S): 100000 CREAM TOPICAL at 11:41

## 2020-09-13 RX ADMIN — Medication 81 MILLIGRAM(S): at 11:40

## 2020-09-13 RX ADMIN — Medication 1 MILLIGRAM(S): at 11:40

## 2020-09-13 RX ADMIN — QUETIAPINE FUMARATE 12.5 MILLIGRAM(S): 200 TABLET, FILM COATED ORAL at 18:05

## 2020-09-13 RX ADMIN — Medication 25 MILLIGRAM(S): at 06:27

## 2020-09-13 RX ADMIN — ISOSORBIDE MONONITRATE 30 MILLIGRAM(S): 60 TABLET, EXTENDED RELEASE ORAL at 11:40

## 2020-09-13 RX ADMIN — PREGABALIN 1000 MICROGRAM(S): 225 CAPSULE ORAL at 11:40

## 2020-09-13 RX ADMIN — ENOXAPARIN SODIUM 40 MILLIGRAM(S): 100 INJECTION SUBCUTANEOUS at 11:40

## 2020-09-13 RX ADMIN — POLYETHYLENE GLYCOL 3350 17 GRAM(S): 17 POWDER, FOR SOLUTION ORAL at 11:40

## 2020-09-13 NOTE — PROGRESS NOTE ADULT - ATTENDING COMMENTS
Spoke with patient's wife Nerissa [phone] and updated. She is okay with starting low dose anti-psychotic for agitation at night. Wife would like him to go to rehab.
Spoke with patient's wife Nerissa [phone] and updated

## 2020-09-14 LAB
ANION GAP SERPL CALC-SCNC: 11 MMO/L — SIGNIFICANT CHANGE UP (ref 7–14)
BASOPHILS # BLD AUTO: 0.02 K/UL — SIGNIFICANT CHANGE UP (ref 0–0.2)
BASOPHILS NFR BLD AUTO: 0.4 % — SIGNIFICANT CHANGE UP (ref 0–2)
BUN SERPL-MCNC: 15 MG/DL — SIGNIFICANT CHANGE UP (ref 7–23)
CALCIUM SERPL-MCNC: 9.6 MG/DL — SIGNIFICANT CHANGE UP (ref 8.4–10.5)
CHLORIDE SERPL-SCNC: 106 MMOL/L — SIGNIFICANT CHANGE UP (ref 98–107)
CO2 SERPL-SCNC: 23 MMOL/L — SIGNIFICANT CHANGE UP (ref 22–31)
CREAT SERPL-MCNC: 1.11 MG/DL — SIGNIFICANT CHANGE UP (ref 0.5–1.3)
EOSINOPHIL # BLD AUTO: 0.36 K/UL — SIGNIFICANT CHANGE UP (ref 0–0.5)
EOSINOPHIL NFR BLD AUTO: 7.8 % — HIGH (ref 0–6)
GLUCOSE SERPL-MCNC: 88 MG/DL — SIGNIFICANT CHANGE UP (ref 70–99)
HCT VFR BLD CALC: 41.1 % — SIGNIFICANT CHANGE UP (ref 39–50)
HGB BLD-MCNC: 13.1 G/DL — SIGNIFICANT CHANGE UP (ref 13–17)
IMM GRANULOCYTES NFR BLD AUTO: 0.2 % — SIGNIFICANT CHANGE UP (ref 0–1.5)
LYMPHOCYTES # BLD AUTO: 1.98 K/UL — SIGNIFICANT CHANGE UP (ref 1–3.3)
LYMPHOCYTES # BLD AUTO: 42.7 % — SIGNIFICANT CHANGE UP (ref 13–44)
MCHC RBC-ENTMCNC: 29.5 PG — SIGNIFICANT CHANGE UP (ref 27–34)
MCHC RBC-ENTMCNC: 31.9 % — LOW (ref 32–36)
MCV RBC AUTO: 92.6 FL — SIGNIFICANT CHANGE UP (ref 80–100)
MONOCYTES # BLD AUTO: 0.45 K/UL — SIGNIFICANT CHANGE UP (ref 0–0.9)
MONOCYTES NFR BLD AUTO: 9.7 % — SIGNIFICANT CHANGE UP (ref 2–14)
NEUTROPHILS # BLD AUTO: 1.82 K/UL — SIGNIFICANT CHANGE UP (ref 1.8–7.4)
NEUTROPHILS NFR BLD AUTO: 39.2 % — LOW (ref 43–77)
NRBC # FLD: 0 K/UL — SIGNIFICANT CHANGE UP (ref 0–0)
PLATELET # BLD AUTO: 183 K/UL — SIGNIFICANT CHANGE UP (ref 150–400)
PMV BLD: 10.1 FL — SIGNIFICANT CHANGE UP (ref 7–13)
POTASSIUM SERPL-MCNC: 4 MMOL/L — SIGNIFICANT CHANGE UP (ref 3.5–5.3)
POTASSIUM SERPL-SCNC: 4 MMOL/L — SIGNIFICANT CHANGE UP (ref 3.5–5.3)
RBC # BLD: 4.44 M/UL — SIGNIFICANT CHANGE UP (ref 4.2–5.8)
RBC # FLD: 12.5 % — SIGNIFICANT CHANGE UP (ref 10.3–14.5)
SODIUM SERPL-SCNC: 140 MMOL/L — SIGNIFICANT CHANGE UP (ref 135–145)
WBC # BLD: 4.64 K/UL — SIGNIFICANT CHANGE UP (ref 3.8–10.5)
WBC # FLD AUTO: 4.64 K/UL — SIGNIFICANT CHANGE UP (ref 3.8–10.5)

## 2020-09-14 PROCEDURE — 99232 SBSQ HOSP IP/OBS MODERATE 35: CPT

## 2020-09-14 RX ADMIN — Medication 3 MILLIGRAM(S): at 22:00

## 2020-09-14 RX ADMIN — ATORVASTATIN CALCIUM 20 MILLIGRAM(S): 80 TABLET, FILM COATED ORAL at 22:00

## 2020-09-14 RX ADMIN — NYSTATIN CREAM 1 APPLICATION(S): 100000 CREAM TOPICAL at 05:53

## 2020-09-14 RX ADMIN — ISOSORBIDE MONONITRATE 30 MILLIGRAM(S): 60 TABLET, EXTENDED RELEASE ORAL at 11:30

## 2020-09-14 RX ADMIN — Medication 25 MILLIGRAM(S): at 05:53

## 2020-09-14 RX ADMIN — ENOXAPARIN SODIUM 40 MILLIGRAM(S): 100 INJECTION SUBCUTANEOUS at 11:30

## 2020-09-14 RX ADMIN — Medication 1 MILLIGRAM(S): at 11:30

## 2020-09-14 RX ADMIN — NYSTATIN CREAM 1 APPLICATION(S): 100000 CREAM TOPICAL at 17:44

## 2020-09-14 RX ADMIN — SENNA PLUS 2 TABLET(S): 8.6 TABLET ORAL at 22:00

## 2020-09-14 RX ADMIN — Medication 81 MILLIGRAM(S): at 11:30

## 2020-09-14 RX ADMIN — PREGABALIN 1000 MICROGRAM(S): 225 CAPSULE ORAL at 11:30

## 2020-09-15 ENCOUNTER — TRANSCRIPTION ENCOUNTER (OUTPATIENT)
Age: 80
End: 2020-09-15

## 2020-09-15 LAB
ANION GAP SERPL CALC-SCNC: 10 MMO/L — SIGNIFICANT CHANGE UP (ref 7–14)
BASOPHILS # BLD AUTO: 0.03 K/UL — SIGNIFICANT CHANGE UP (ref 0–0.2)
BASOPHILS NFR BLD AUTO: 0.6 % — SIGNIFICANT CHANGE UP (ref 0–2)
BUN SERPL-MCNC: 16 MG/DL — SIGNIFICANT CHANGE UP (ref 7–23)
CALCIUM SERPL-MCNC: 9.9 MG/DL — SIGNIFICANT CHANGE UP (ref 8.4–10.5)
CHLORIDE SERPL-SCNC: 106 MMOL/L — SIGNIFICANT CHANGE UP (ref 98–107)
CO2 SERPL-SCNC: 26 MMOL/L — SIGNIFICANT CHANGE UP (ref 22–31)
CREAT SERPL-MCNC: 1.03 MG/DL — SIGNIFICANT CHANGE UP (ref 0.5–1.3)
EOSINOPHIL # BLD AUTO: 0.39 K/UL — SIGNIFICANT CHANGE UP (ref 0–0.5)
EOSINOPHIL NFR BLD AUTO: 8 % — HIGH (ref 0–6)
GLUCOSE SERPL-MCNC: 88 MG/DL — SIGNIFICANT CHANGE UP (ref 70–99)
HCT VFR BLD CALC: 40.3 % — SIGNIFICANT CHANGE UP (ref 39–50)
HGB BLD-MCNC: 13.1 G/DL — SIGNIFICANT CHANGE UP (ref 13–17)
IMM GRANULOCYTES NFR BLD AUTO: 0.4 % — SIGNIFICANT CHANGE UP (ref 0–1.5)
LYMPHOCYTES # BLD AUTO: 2.13 K/UL — SIGNIFICANT CHANGE UP (ref 1–3.3)
LYMPHOCYTES # BLD AUTO: 43.7 % — SIGNIFICANT CHANGE UP (ref 13–44)
MCHC RBC-ENTMCNC: 29.6 PG — SIGNIFICANT CHANGE UP (ref 27–34)
MCHC RBC-ENTMCNC: 32.5 % — SIGNIFICANT CHANGE UP (ref 32–36)
MCV RBC AUTO: 91 FL — SIGNIFICANT CHANGE UP (ref 80–100)
MONOCYTES # BLD AUTO: 0.49 K/UL — SIGNIFICANT CHANGE UP (ref 0–0.9)
MONOCYTES NFR BLD AUTO: 10.1 % — SIGNIFICANT CHANGE UP (ref 2–14)
NEUTROPHILS # BLD AUTO: 1.81 K/UL — SIGNIFICANT CHANGE UP (ref 1.8–7.4)
NEUTROPHILS NFR BLD AUTO: 37.2 % — LOW (ref 43–77)
NRBC # FLD: 0 K/UL — SIGNIFICANT CHANGE UP (ref 0–0)
PLATELET # BLD AUTO: 174 K/UL — SIGNIFICANT CHANGE UP (ref 150–400)
PMV BLD: 9.8 FL — SIGNIFICANT CHANGE UP (ref 7–13)
POTASSIUM SERPL-MCNC: 4.1 MMOL/L — SIGNIFICANT CHANGE UP (ref 3.5–5.3)
POTASSIUM SERPL-SCNC: 4.1 MMOL/L — SIGNIFICANT CHANGE UP (ref 3.5–5.3)
RBC # BLD: 4.43 M/UL — SIGNIFICANT CHANGE UP (ref 4.2–5.8)
RBC # FLD: 12.4 % — SIGNIFICANT CHANGE UP (ref 10.3–14.5)
SODIUM SERPL-SCNC: 142 MMOL/L — SIGNIFICANT CHANGE UP (ref 135–145)
WBC # BLD: 4.87 K/UL — SIGNIFICANT CHANGE UP (ref 3.8–10.5)
WBC # FLD AUTO: 4.87 K/UL — SIGNIFICANT CHANGE UP (ref 3.8–10.5)

## 2020-09-15 PROCEDURE — 99232 SBSQ HOSP IP/OBS MODERATE 35: CPT

## 2020-09-15 PROCEDURE — 93280 PM DEVICE PROGR EVAL DUAL: CPT | Mod: 26

## 2020-09-15 RX ORDER — SODIUM CHLORIDE 9 MG/ML
1000 INJECTION INTRAMUSCULAR; INTRAVENOUS; SUBCUTANEOUS
Refills: 0 | Status: DISCONTINUED | OUTPATIENT
Start: 2020-09-15 | End: 2020-09-16

## 2020-09-15 RX ADMIN — Medication 25 MILLIGRAM(S): at 05:54

## 2020-09-15 RX ADMIN — Medication 81 MILLIGRAM(S): at 12:49

## 2020-09-15 RX ADMIN — PREGABALIN 1000 MICROGRAM(S): 225 CAPSULE ORAL at 12:49

## 2020-09-15 RX ADMIN — NYSTATIN CREAM 1 APPLICATION(S): 100000 CREAM TOPICAL at 18:49

## 2020-09-15 RX ADMIN — ISOSORBIDE MONONITRATE 30 MILLIGRAM(S): 60 TABLET, EXTENDED RELEASE ORAL at 12:49

## 2020-09-15 RX ADMIN — NYSTATIN CREAM 1 APPLICATION(S): 100000 CREAM TOPICAL at 05:54

## 2020-09-15 RX ADMIN — ENOXAPARIN SODIUM 40 MILLIGRAM(S): 100 INJECTION SUBCUTANEOUS at 12:49

## 2020-09-15 RX ADMIN — Medication 1 MILLIGRAM(S): at 12:49

## 2020-09-15 NOTE — DISCHARGE NOTE PROVIDER - NSDCFUSCHEDAPPT_GEN_ALL_CORE_FT
DESHAWN TIWARI ; 10/09/2020 ; [facility] [phone]  DESHAWN TIWARI ; 10/20/2020 ; [facility] [phone]

## 2020-09-15 NOTE — PROVIDER CONTACT NOTE (OTHER) - RECOMMENDATIONS
Provider said that the patient came into the hospital with these symptoms; all tests that were run on him were negative; provider said that she will come to the floor to see him

## 2020-09-15 NOTE — DISCHARGE NOTE PROVIDER - CARE PROVIDER_API CALL
YOUR PCP,   PCP: Dr. Burleson   Cardio: Dr. Mora Peoples  Phone: (   )    -  Fax: (   )    -  Follow Up Time: 1 week

## 2020-09-15 NOTE — PROGRESS NOTE ADULT - PROBLEM SELECTOR PLAN 6
DVT ppx: Lovenox 40mg sc daily  Dispo: PT eval recs rehab, wife agreeable, SW aware, pending placement

## 2020-09-15 NOTE — DISCHARGE NOTE PROVIDER - PROVIDER TOKENS
FREE:[LAST:[YOUR PCP],PHONE:[(   )    -],FAX:[(   )    -],ADDRESS:[PCP: Dr. Burleson   Cardio: Dr. Mora Peoples],FOLLOWUP:[1 week]]

## 2020-09-15 NOTE — DISCHARGE NOTE PROVIDER - NSDCMRMEDTOKEN_GEN_ALL_CORE_FT
aspirin 81 mg oral delayed release tablet: 1 tab(s) orally once a day  atorvastatin 20 mg oral tablet: 1 tab(s) orally once a day (at bedtime)  isosorbide mononitrate 30 mg oral tablet, extended release: 1 tab(s) orally once a day (in the morning)  metFORMIN 500 mg oral tablet, extended release: 1 tab(s) orally once a day  metoprolol succinate 25 mg oral tablet, extended release: 1 tab(s) orally once a day  Vitamin B12 1000 mcg oral tablet: 1 tab(s) orally once a day   acetaminophen 325 mg oral tablet: 2 tab(s) orally every 6 hours, As needed, Temp greater or equal to 38C (100.4F), Mild Pain (1 - 3), Moderate Pain (4 - 6)  aspirin 81 mg oral delayed release tablet: 1 tab(s) orally once a day  atorvastatin 20 mg oral tablet: 1 tab(s) orally once a day (at bedtime)  folic acid 1 mg oral tablet: 1 tab(s) orally once a day  isosorbide mononitrate 30 mg oral tablet, extended release: 1 tab(s) orally once a day (in the morning)  melatonin 3 mg oral tablet: 1 tab(s) orally   metoprolol succinate 25 mg oral tablet, extended release: 1 tab(s) orally once a day  nystatin 100,000 units/g topical cream: 1 application topically 2 times a day  polyethylene glycol 3350 oral powder for reconstitution: 17 gram(s) orally once a day  senna oral tablet: 2 tab(s) orally once a day (at bedtime)  Vitamin B12 1000 mcg oral tablet: 1 tab(s) orally once a day

## 2020-09-15 NOTE — DISCHARGE NOTE PROVIDER - HOSPITAL COURSE
80M HTN, Dementia, T2DM, CAD, PPM ([employer]), CVA (1998 w/residual right sided weakness), COVID 3/2020 admitted for syncope and agitation.  Syncope workup so far negative for cardiogenic/neurogenic causes. Currently awaiting MARILU placement.      Problem/Plan - 1:  ·  Problem: Syncope.  Plan: - Unclear etiology of syncope.  Possible that pt experienced a vagal event given reported hx of diaphoresis and nausea preceding event  - TTE reviewed, largely unremarkable, Stage 1 diastolic dysfunction  - No events on telemetry  - PPM interrogation revealed no episodes that correlated with his syncope  - CT head w/ chronic changes and volume loss   - Unlikely to benefit from orthostatics as uses wheelchair at baseline  - PT recs rehab and wife in agreement.      Problem/Plan - 2:  ·  Problem: Dementia.  Plan: - With behavioral disturbance   - Patient A&Ox2 at baseline, required Haldol 9/11 for agitation. Now calm and cooperative   - Psych recs appreciated, can give Haldol 1mg q6h PRN severe agitation, QTC from today 404ms   - Encourage re-orientation   - B12, folate, tsh wnl  - Per psych, dc Seroquel; will resume melatonin as written.      Problem/Plan - 3:  ·  Problem: HTN (hypertension).  Plan: - C/w home Imdur and Toprol.      Problem/Plan - 4:  ·  Problem: Diabetes.  Plan: A1c= 5.5, no need for SSI.      Problem/Plan - 5:  ·  Problem: Hyperlipidemia.  Plan: C/w statin.      Problem/Plan - 6:  Problem: Need for prophylactic measure. Plan: DVT ppx: Lovenox 40mg sc daily  Dispo: PT eval recs rehab, wife agreeable, SW aware.      Discharge Medication reconciliation and follow up reviewed with Medical Attending and confirmed before discharge __________________ 80M HTN, Dementia, T2DM, CAD, PPM ([employer]), CVA (1998 w/residual right sided weakness), COVID 3/2020 admitted for syncope and agitation.  Syncope workup so far negative for cardiogenic/neurogenic causes. Currently awaiting MARILU placement.      Syncope.  Plan: - Unclear etiology of syncope.  Possible that pt experienced a vagal event given reported hx of diaphoresis and nausea preceding event  - TTE reviewed, largely unremarkable, Stage 1 diastolic dysfunction  - No events on telemetry  - PPM interrogation revealed no episodes that correlated with his syncope  - CT head w/ chronic changes and volume loss   - Unlikely to benefit from orthostatics as uses wheelchair at baseline  - PT recs rehab and wife in agreement.      Dementia.  Plan: - With behavioral disturbance   - Patient A&Ox2 at baseline, required Haldol 9/11 for agitation. Now calm and cooperative   - Psych recs appreciated, can give Haldol 1mg q6h PRN severe agitation, QTC from today 404ms   - Encourage re-orientation   - B12, folate, tsh wnl  - Per psych, dc Seroquel; will resume melatonin as written.     HTN (hypertension).  Plan: - C/w home Imdur and Toprol.     Diabetes.  Plan: A1c= 5.5, no need for SSI.     Hyperlipidemia.  Plan: C/w statin.      Need for prophylactic measure. Plan: DVT ppx: Lovenox 40mg sc daily  Dispo: PT eval recs rehab, wife agreeable, SW aware.      Discharge Medication reconciliation and follow up reviewed with Medical Attending and confirmed before discharge on 9/19/2020

## 2020-09-15 NOTE — DISCHARGE NOTE PROVIDER - NSDCCPCAREPLAN_GEN_ALL_CORE_FT
PRINCIPAL DISCHARGE DIAGNOSIS  Diagnosis: Syncope  Assessment and Plan of Treatment: Workup unrevealing, Echo largely unremarkable Stage 1 diastolic dysfunction  . Possibly vagal event.  follow up with your primary care doctor within one week if you do not have one you can call the medicine clinic at [phone] to make an appointment      SECONDARY DISCHARGE DIAGNOSES  Diagnosis: Dementia  Assessment and Plan of Treatment: Dementia   ******Continue your medications ****??? as directed and follow up with your PCP and psychiatrist for further evaluation and medical management. If you are ever in need of immediate psychiatric assistance you may reach out to the [facility] [phone]. To make a routine appointment at [facility] Call [phone]    Diagnosis: HTN (hypertension)  Assessment and Plan of Treatment: HTN (hypertension) Low sodium and fat diet, Continue blood pressure medication regimen as directed. Monitor for any visual changes, headaches or dizziness.  Monitor blood pressure regularly.  Follow up with your primary care provider for further management for high blood pressure.    Diagnosis: Hyperlipidemia  Assessment and Plan of Treatment: Hyperlipidemia Continue prescribed medications to control your cholesterol levels and a DASH (Low fat/salt) diet. Follow up with your primary care provider upon discharge for further management and monitoring of cholesterol levels. exercise daily and continue current medications. Follow up with primary care physician and cardiologist for management.    Diagnosis: Diabetes  Assessment and Plan of Treatment: Diabetes Continue your medication regimen and a consistent carbohydrate diet (Meaning eating the same amount of carbohydrates at the same time each day). Monitor blood glucose levels throughout the day before meals and at bedtime. Record blood sugars and bring to outpatient providers appointment in order to be reviewed by your doctor for management modifications. If your sugars are more than 400 or less than 70 you should contact your PCP immediately. Monitor for signs/symptoms of low blood glucose, such as, dizziness, altered mental status, or cool/clammy skin. In addition, monitor for signs/symptoms of high blood glucose, such as, feeling hot, dry, fatigued, or with increased thirst/urination. Monitor finger sticks pre-meal and bedtime, low salt, fat and carbohydrate diet, minimize glucose intake.  Exercise daily for at least 30 minutes and weight loss.  Follow up with primary care physician and endocrinologist for routine Hemoglobin A1C checks and management.  Follow up with your ophthalmologist for routine yearly vision exams.Make regular podiatry appointments in order to have feet checked for wounds and uncontrolled toe nail growth to prevent infections, as well as, appointments with an ophthalmologist to monitor your vision.     PRINCIPAL DISCHARGE DIAGNOSIS  Diagnosis: Syncope  Assessment and Plan of Treatment: Workup unrevealing, Echo largely unremarkable Stage 1 diastolic dysfunction  . Possibly vagal event.  follow up with your primary care doctor within one week if you do not have one you can call the medicine clinic at [phone] to make an appointment      SECONDARY DISCHARGE DIAGNOSES  Diagnosis: Diabetes  Assessment and Plan of Treatment: Diabetes Continue your medication regimen and a consistent carbohydrate diet (Meaning eating the same amount of carbohydrates at the same time each day). Monitor blood glucose levels throughout the day before meals and at bedtime. Record blood sugars and bring to outpatient providers appointment in order to be reviewed by your doctor for management modifications. If your sugars are more than 400 or less than 70 you should contact your PCP immediately. Monitor for signs/symptoms of low blood glucose, such as, dizziness, altered mental status, or cool/clammy skin. In addition, monitor for signs/symptoms of high blood glucose, such as, feeling hot, dry, fatigued, or with increased thirst/urination. Monitor finger sticks pre-meal and bedtime, low salt, fat and carbohydrate diet, minimize glucose intake.  Exercise daily for at least 30 minutes and weight loss.  Follow up with primary care physician and endocrinologist for routine Hemoglobin A1C checks and management.  Follow up with your ophthalmologist for routine yearly vision exams.Make regular podiatry appointments in order to have feet checked for wounds and uncontrolled toe nail growth to prevent infections, as well as, appointments with an ophthalmologist to monitor your vision.    Diagnosis: Dementia  Assessment and Plan of Treatment: Dementia   ******Continue your medications ****??? as directed and follow up with your PCP and psychiatrist for further evaluation and medical management. If you are ever in need of immediate psychiatric assistance you may reach out to the [facility] [phone]. To make a routine appointment at [facility] Call [phone]    Diagnosis: HTN (hypertension)  Assessment and Plan of Treatment: HTN (hypertension) Low sodium and fat diet, Continue blood pressure medication regimen as directed. Monitor for any visual changes, headaches or dizziness.  Monitor blood pressure regularly.  Follow up with your primary care provider for further management for high blood pressure.    Diagnosis: Hyperlipidemia  Assessment and Plan of Treatment: Hyperlipidemia Continue prescribed medications to control your cholesterol levels and a DASH (Low fat/salt) diet. Follow up with your primary care provider upon discharge for further management and monitoring of cholesterol levels. exercise daily and continue current medications. Follow up with primary care physician and cardiologist for management.

## 2020-09-16 LAB
ANION GAP SERPL CALC-SCNC: 13 MMO/L — SIGNIFICANT CHANGE UP (ref 7–14)
BUN SERPL-MCNC: 14 MG/DL — SIGNIFICANT CHANGE UP (ref 7–23)
CALCIUM SERPL-MCNC: 10 MG/DL — SIGNIFICANT CHANGE UP (ref 8.4–10.5)
CHLORIDE SERPL-SCNC: 103 MMOL/L — SIGNIFICANT CHANGE UP (ref 98–107)
CO2 SERPL-SCNC: 21 MMOL/L — LOW (ref 22–31)
CREAT SERPL-MCNC: 0.98 MG/DL — SIGNIFICANT CHANGE UP (ref 0.5–1.3)
GLUCOSE SERPL-MCNC: 92 MG/DL — SIGNIFICANT CHANGE UP (ref 70–99)
HCT VFR BLD CALC: 41.4 % — SIGNIFICANT CHANGE UP (ref 39–50)
HGB BLD-MCNC: 13.7 G/DL — SIGNIFICANT CHANGE UP (ref 13–17)
MAGNESIUM SERPL-MCNC: 2 MG/DL — SIGNIFICANT CHANGE UP (ref 1.6–2.6)
MCHC RBC-ENTMCNC: 30.2 PG — SIGNIFICANT CHANGE UP (ref 27–34)
MCHC RBC-ENTMCNC: 33.1 % — SIGNIFICANT CHANGE UP (ref 32–36)
MCV RBC AUTO: 91.4 FL — SIGNIFICANT CHANGE UP (ref 80–100)
NRBC # FLD: 0 K/UL — SIGNIFICANT CHANGE UP (ref 0–0)
PHOSPHATE SERPL-MCNC: 3.1 MG/DL — SIGNIFICANT CHANGE UP (ref 2.5–4.5)
PLATELET # BLD AUTO: 164 K/UL — SIGNIFICANT CHANGE UP (ref 150–400)
PMV BLD: 10.6 FL — SIGNIFICANT CHANGE UP (ref 7–13)
POTASSIUM SERPL-MCNC: 4.3 MMOL/L — SIGNIFICANT CHANGE UP (ref 3.5–5.3)
POTASSIUM SERPL-SCNC: 4.3 MMOL/L — SIGNIFICANT CHANGE UP (ref 3.5–5.3)
RBC # BLD: 4.53 M/UL — SIGNIFICANT CHANGE UP (ref 4.2–5.8)
RBC # FLD: 12.5 % — SIGNIFICANT CHANGE UP (ref 10.3–14.5)
SODIUM SERPL-SCNC: 137 MMOL/L — SIGNIFICANT CHANGE UP (ref 135–145)
WBC # BLD: 5.01 K/UL — SIGNIFICANT CHANGE UP (ref 3.8–10.5)
WBC # FLD AUTO: 5.01 K/UL — SIGNIFICANT CHANGE UP (ref 3.8–10.5)

## 2020-09-16 PROCEDURE — 99232 SBSQ HOSP IP/OBS MODERATE 35: CPT

## 2020-09-16 RX ADMIN — HALOPERIDOL DECANOATE 1 MILLIGRAM(S): 100 INJECTION INTRAMUSCULAR at 18:22

## 2020-09-16 RX ADMIN — PREGABALIN 1000 MICROGRAM(S): 225 CAPSULE ORAL at 11:27

## 2020-09-16 RX ADMIN — Medication 81 MILLIGRAM(S): at 11:27

## 2020-09-16 RX ADMIN — ISOSORBIDE MONONITRATE 30 MILLIGRAM(S): 60 TABLET, EXTENDED RELEASE ORAL at 11:27

## 2020-09-16 RX ADMIN — SENNA PLUS 2 TABLET(S): 8.6 TABLET ORAL at 22:27

## 2020-09-16 RX ADMIN — NYSTATIN CREAM 1 APPLICATION(S): 100000 CREAM TOPICAL at 17:37

## 2020-09-16 RX ADMIN — Medication 25 MILLIGRAM(S): at 06:04

## 2020-09-16 RX ADMIN — ENOXAPARIN SODIUM 40 MILLIGRAM(S): 100 INJECTION SUBCUTANEOUS at 11:27

## 2020-09-16 RX ADMIN — NYSTATIN CREAM 1 APPLICATION(S): 100000 CREAM TOPICAL at 06:04

## 2020-09-16 RX ADMIN — POLYETHYLENE GLYCOL 3350 17 GRAM(S): 17 POWDER, FOR SOLUTION ORAL at 11:28

## 2020-09-16 RX ADMIN — ATORVASTATIN CALCIUM 20 MILLIGRAM(S): 80 TABLET, FILM COATED ORAL at 22:27

## 2020-09-16 NOTE — PROVIDER CONTACT NOTE (OTHER) - ACTION/TREATMENT ORDERED:
Give 12pm BP at 11am.
ACP aware. Continue to encourage. Continue to monitor.
Debby made aware. See eMAR.
Debby made aware. See eMAR.
Will continue to monitor

## 2020-09-16 NOTE — CHART NOTE - NSCHARTNOTEFT_GEN_A_CORE
Notified by RN that patient was dizzy and lethargic. 250 CC bolus of Normal Saline fluids ordered. Patient returned back to baseline.   Will continue to monitor patient closely.    Jessie Isaac PA-C  pager [phone]

## 2020-09-17 PROCEDURE — 99232 SBSQ HOSP IP/OBS MODERATE 35: CPT

## 2020-09-17 RX ADMIN — Medication 81 MILLIGRAM(S): at 11:51

## 2020-09-17 RX ADMIN — POLYETHYLENE GLYCOL 3350 17 GRAM(S): 17 POWDER, FOR SOLUTION ORAL at 11:52

## 2020-09-17 RX ADMIN — NYSTATIN CREAM 1 APPLICATION(S): 100000 CREAM TOPICAL at 06:59

## 2020-09-17 RX ADMIN — Medication 3 MILLIGRAM(S): at 21:55

## 2020-09-17 RX ADMIN — ATORVASTATIN CALCIUM 20 MILLIGRAM(S): 80 TABLET, FILM COATED ORAL at 21:55

## 2020-09-17 RX ADMIN — ISOSORBIDE MONONITRATE 30 MILLIGRAM(S): 60 TABLET, EXTENDED RELEASE ORAL at 11:51

## 2020-09-17 RX ADMIN — SENNA PLUS 2 TABLET(S): 8.6 TABLET ORAL at 21:55

## 2020-09-17 RX ADMIN — Medication 25 MILLIGRAM(S): at 06:59

## 2020-09-17 RX ADMIN — ENOXAPARIN SODIUM 40 MILLIGRAM(S): 100 INJECTION SUBCUTANEOUS at 11:52

## 2020-09-17 RX ADMIN — PREGABALIN 1000 MICROGRAM(S): 225 CAPSULE ORAL at 11:51

## 2020-09-17 RX ADMIN — NYSTATIN CREAM 1 APPLICATION(S): 100000 CREAM TOPICAL at 17:32

## 2020-09-18 ENCOUNTER — TRANSCRIPTION ENCOUNTER (OUTPATIENT)
Age: 80
End: 2020-09-18

## 2020-09-18 LAB — SARS-COV-2 RNA SPEC QL NAA+PROBE: SIGNIFICANT CHANGE UP

## 2020-09-18 PROCEDURE — 99232 SBSQ HOSP IP/OBS MODERATE 35: CPT

## 2020-09-18 RX ADMIN — NYSTATIN CREAM 1 APPLICATION(S): 100000 CREAM TOPICAL at 17:05

## 2020-09-18 RX ADMIN — NYSTATIN CREAM 1 APPLICATION(S): 100000 CREAM TOPICAL at 05:46

## 2020-09-18 RX ADMIN — Medication 25 MILLIGRAM(S): at 05:45

## 2020-09-18 RX ADMIN — Medication 3 MILLIGRAM(S): at 22:51

## 2020-09-18 RX ADMIN — SENNA PLUS 2 TABLET(S): 8.6 TABLET ORAL at 22:51

## 2020-09-18 RX ADMIN — POLYETHYLENE GLYCOL 3350 17 GRAM(S): 17 POWDER, FOR SOLUTION ORAL at 12:13

## 2020-09-18 RX ADMIN — HALOPERIDOL DECANOATE 1 MILLIGRAM(S): 100 INJECTION INTRAMUSCULAR at 02:33

## 2020-09-18 RX ADMIN — ENOXAPARIN SODIUM 40 MILLIGRAM(S): 100 INJECTION SUBCUTANEOUS at 12:13

## 2020-09-18 RX ADMIN — ATORVASTATIN CALCIUM 20 MILLIGRAM(S): 80 TABLET, FILM COATED ORAL at 22:51

## 2020-09-18 RX ADMIN — Medication 81 MILLIGRAM(S): at 12:13

## 2020-09-18 RX ADMIN — HALOPERIDOL DECANOATE 1 MILLIGRAM(S): 100 INJECTION INTRAMUSCULAR at 12:14

## 2020-09-18 RX ADMIN — Medication 1 MILLIGRAM(S): at 12:13

## 2020-09-18 RX ADMIN — HALOPERIDOL DECANOATE 1 MILLIGRAM(S): 100 INJECTION INTRAMUSCULAR at 06:10

## 2020-09-18 RX ADMIN — PREGABALIN 1000 MICROGRAM(S): 225 CAPSULE ORAL at 12:13

## 2020-09-18 RX ADMIN — ISOSORBIDE MONONITRATE 30 MILLIGRAM(S): 60 TABLET, EXTENDED RELEASE ORAL at 12:13

## 2020-09-18 NOTE — DIETITIAN INITIAL EVALUATION ADULT. - PERTINENT MEDS FT
MEDICATIONS  (STANDING):  aspirin enteric coated 81 milliGRAM(s) Oral daily  atorvastatin 20 milliGRAM(s) Oral at bedtime  cyanocobalamin 1000 MICROGram(s) Oral daily  enoxaparin Injectable 40 milliGRAM(s) SubCutaneous daily  folic acid 1 milliGRAM(s) Oral daily  isosorbide   mononitrate ER Tablet (IMDUR) 30 milliGRAM(s) Oral daily  melatonin 3 milliGRAM(s) Oral <User Schedule>  metoprolol succinate ER 25 milliGRAM(s) Oral daily  nystatin Cream 1 Application(s) Topical two times a day  polyethylene glycol 3350 17 Gram(s) Oral daily  senna 2 Tablet(s) Oral at bedtime

## 2020-09-18 NOTE — DISCHARGE NOTE NURSING/CASE MANAGEMENT/SOCIAL WORK - PATIENT PORTAL LINK FT
You can access the FollowMyHealth Patient Portal offered by [facility] by registering at the following website: [email or web address]. By joining [employer]’s FollowMyHealth portal, you will also be able to view your health information using other applications (apps) compatible with our system.

## 2020-09-18 NOTE — DIETITIAN INITIAL EVALUATION ADULT. - PERTINENT LABORATORY DATA
"Anesthesia Transfer of Care Note    Patient: Kamar Muñoz    Procedure(s) Performed: Procedure(s) (LRB):  COLONOSCOPY Suprep (N/A)    Patient location: GI    Anesthesia Type: MAC    Transport from OR: Transported from OR on room air with adequate spontaneous ventilation    Post pain: adequate analgesia    Post assessment: no apparent anesthetic complications and tolerated procedure well    Post vital signs: stable    Level of consciousness: awake, alert and oriented    Nausea/Vomiting: no nausea/vomiting    Complications: none    Transfer of care protocol was followed      Last vitals:   Visit Vitals  BP (!) 142/91 (BP Location: Left arm)   Pulse 77   Temp 36.9 °C (98.4 °F) (Temporal)   Resp 18   Ht 6' 2" (1.88 m)   Wt 89.8 kg (198 lb)   SpO2 (!) 90%   BMI 25.42 kg/m²     " 09-16 Na 137 mmol/L Glu 92 mg/dL K+ 4.3 mmol/L Cr 0.98 mg/dL BUN 14 mg/dL Phos 3.1 mg/dL

## 2020-09-18 NOTE — DIETITIAN INITIAL EVALUATION ADULT. - OTHER INFO
81 y/o male with dementia admitted with dx of syncope and collapse. Unable to conduct a face to face interview or nutrition-focused physical exam due to limited contact restrictions related to isolation precautions on unit. Attempted to contact pt's wife, however call went to voicemail. Collateral obtained from chart review. Unable to assess PO intake at the present time. No GI distress (nausea/vomiting/diarrhea/constipation) per chart; fecal incontinence noted. No chewing or swallowing difficulties noted. Unable to assess wt history. BMI in relation to weight is in Obesity Class I. If oral intake is suboptimal, consider Glucerna Therapeutic Nutrition Shake 240mls 2x daily (440kcals, 20g protein) to optimize oral intake. RDN services to remain available as needed.

## 2020-09-19 VITALS
OXYGEN SATURATION: 100 % | DIASTOLIC BLOOD PRESSURE: 82 MMHG | SYSTOLIC BLOOD PRESSURE: 134 MMHG | HEART RATE: 63 BPM | TEMPERATURE: 98 F | RESPIRATION RATE: 18 BRPM

## 2020-09-19 PROCEDURE — 99239 HOSP IP/OBS DSCHRG MGMT >30: CPT

## 2020-09-19 RX ORDER — POLYETHYLENE GLYCOL 3350 17 G/17G
17 POWDER, FOR SOLUTION ORAL
Qty: 0 | Refills: 0 | DISCHARGE
Start: 2020-09-19

## 2020-09-19 RX ORDER — NYSTATIN CREAM 100000 [USP'U]/G
1 CREAM TOPICAL
Qty: 0 | Refills: 0 | DISCHARGE
Start: 2020-09-19

## 2020-09-19 RX ORDER — LANOLIN ALCOHOL/MO/W.PET/CERES
1 CREAM (GRAM) TOPICAL
Qty: 0 | Refills: 0 | DISCHARGE
Start: 2020-09-19

## 2020-09-19 RX ORDER — FOLIC ACID 0.8 MG
1 TABLET ORAL
Qty: 0 | Refills: 0 | DISCHARGE
Start: 2020-09-19

## 2020-09-19 RX ORDER — SENNA PLUS 8.6 MG/1
2 TABLET ORAL
Qty: 0 | Refills: 0 | DISCHARGE
Start: 2020-09-19

## 2020-09-19 RX ORDER — ACETAMINOPHEN 500 MG
2 TABLET ORAL
Qty: 0 | Refills: 0 | DISCHARGE
Start: 2020-09-19

## 2020-09-19 RX ORDER — METOPROLOL TARTRATE 50 MG
25 TABLET ORAL
Refills: 0 | Status: DISCONTINUED | OUTPATIENT
Start: 2020-09-19 | End: 2020-09-19

## 2020-09-19 RX ORDER — METFORMIN HYDROCHLORIDE 850 MG/1
1 TABLET ORAL
Qty: 0 | Refills: 0 | DISCHARGE

## 2020-09-19 RX ADMIN — NYSTATIN CREAM 1 APPLICATION(S): 100000 CREAM TOPICAL at 06:21

## 2020-09-19 NOTE — PROGRESS NOTE ADULT - PROBLEM SELECTOR PLAN 3
- C/w home Imdur and Toprol
BP acceptable  C/w home Imdur and Toprol
- C/w home Imdur and Toprol
- C/w home Imdur and Toprol
BP acceptable  C/w home Imdur and Toprol

## 2020-09-19 NOTE — PROGRESS NOTE ADULT - PROBLEM SELECTOR PLAN 4
A1c= 5.5, no need for SSI

## 2020-09-19 NOTE — PROGRESS NOTE ADULT - PROBLEM SELECTOR PLAN 2
- With behavioral disturbance   - Patient A&Ox2 at baseline, required Haldol 9/11 and 9/12 for agitation. Now calm and cooperative, no PRNs needed since then   - Psych recs appreciated, can give Haldol 1mg q6h PRN severe agitation, recent QTC from 9/14 404ms   - Encourage re-orientation   - B12, folate, tsh wnl  - Per psych, dc Seroquel; will resume melatonin as written
- With behavioral disturbance   - Patient A&Ox2 at baseline, required Haldol 9/11 for agitation. Now calm and cooperative   - Psych recs appreciated, can give Haldol 1mg q6h PRN severe agitation, QTC from today 404ms   - Encourage re-orientation   - B12, folate, tsh wnl  - Per psych, dc Seroquel; will resume melatonin as written
- With behavioral disturbance   - Patient A&Ox2 at baseline, required Haldol 9/11 and 9/12 for agitation. Now calm and cooperative, no PRNs needed since then   - Psych recs appreciated, can give Haldol 1mg q6h PRN severe agitation, recent QTC from 9/14 404ms   - Encourage re-orientation   - B12, folate, tsh wnl  - Per psych, dc Seroquel; will resume melatonin as written
- With behavioral disturbance   - Patient A&Ox2 at baseline, required Haldol 9/11 and 9/12 for agitation. Now calm and cooperative, no PRNs needed since then   - Psych recs appreciated, can give Haldol 1mg q6h PRN severe agitation, recent QTC from 9/14 404ms   - Encourage re-orientation   - B12, folate, tsh wnl  - Per psych, dc Seroquel; will resume melatonin as written
With behavioral disturbance   Patient A&Ox2 at baseline, required Haldol overnight for agitation. Now calm and cooperative   - Psych recs appreciated, can give Haldol 1mg q6h PRN severe agitation   - Encourage re-orientation   - B12, folate, tsh wnl  - Start on standing melatonin and low dose seroquel - likely component of sundowning/ delirium
- With behavioral disturbance   - Patient A&Ox2 at baseline, required Haldol 9/11 and 9/12 for agitation. Now calm and cooperative, no PRNs needed since then   - Psych recs appreciated, can give Haldol 1mg q6h PRN severe agitation, recent QTC from 9/14 404ms   - Encourage re-orientation   - B12, folate, tsh wnl  - Per psych, dc Seroquel; will resume melatonin as written
- With behavioral disturbance   - Patient A&Ox2 at baseline, required Haldol 9/11 and 9/12 for agitation. Now calm and cooperative, no PRNs needed since then   - Psych recs appreciated, can give Haldol 1mg q6h PRN severe agitation, recent QTC from 9/14 404ms   - Encourage re-orientation   - B12, folate, tsh wnl  - Per psych, dc Seroquel; will resume melatonin as written
Patient A&Ox2 at baseline, required Haldol and ativan for agitation. Now calm and cooperative   - Psych recs appreciated, can give Haldol 1mg q6h PRN severe agitation   - Encourage re-orientation   - B12, folate in AM

## 2020-09-19 NOTE — PROGRESS NOTE ADULT - PROBLEM SELECTOR PLAN 1
- Unclear etiology of syncope.  Possible that pt experienced a vagal event given reported hx of diaphoresis and nausea preceding event  - TTE reviewed, largely unremarkable, Stage 1 diastolic dysfunction  - No events on telemetry  - PPM interrogation revealed no episodes that correlated with his syncope  - CT head w/ chronic changes and volume loss   - Unlikely to benefit from orthostatics as uses wheelchair at baseline  - PT recs rehab and wife in agreement
- Pt likely experienced a vagal event given reported hx of diaphoresis and nausea preceding event  - TTE reviewed, largely unremarkable, Stage 1 diastolic dysfunction  - No events on telemetry  - PPM interrogation revealed no episodes that correlated with his syncope  - CT head w/ chronic changes and volume loss   - Unlikely to benefit from orthostatics as uses wheelchair at baseline  - DC telemetry   - PT recs rehab and wife in agreement
- Unclear etiology of syncope.  Possible that pt experienced a vagal event given reported hx of diaphoresis and nausea preceding event  - TTE reviewed, largely unremarkable, Stage 1 diastolic dysfunction  - No events on telemetry  - PPM interrogation revealed no episodes that correlated with his syncope  - CT head w/ chronic changes and volume loss   - Unlikely to benefit from orthostatics as uses wheelchair at baseline  - DC telemetry   - PT recs rehab and wife in agreement
Unclear etiology of syncope currently as unable to gather much information from the patient. May have had a vagal event given reported hx of diaphoresis and nausea right before.  - TTE reviewed, largely unremarkable, Stage 1 diastolic dysfunction  - Monitor on tele.  - PPM interrogation revealed no episodes that correlated with his syncope  - CT head w/ chronic changes and volume loss   - Unlikely to benefit from orthostatics as uses wheelchair at baseline  - PT recs rehab
- Pt likely experienced a vagal event given reported hx of diaphoresis and nausea preceding event  - TTE reviewed, largely unremarkable, Stage 1 diastolic dysfunction  - No events on telemetry  - PPM interrogation revealed no episodes that correlated with his syncope  - CT head w/ chronic changes and volume loss   - Unlikely to benefit from orthostatics as uses wheelchair at baseline  - DC telemetry   - PT recs rehab and wife in agreement
- Unclear etiology of syncope.  Possible that pt experienced a vagal event given reported hx of diaphoresis and nausea preceding event  - TTE reviewed, largely unremarkable, Stage 1 diastolic dysfunction  - No events on telemetry  - PPM interrogation revealed no episodes that correlated with his syncope  - CT head w/ chronic changes and volume loss   - Unlikely to benefit from orthostatics as uses wheelchair at baseline  - DC telemetry   - PT recs rehab and wife in agreement
Unclear etiology of syncope currently as unable to gather much information from the patient. May have had a vagal event given reported hx of diaphoresis and nausea right before.  - Awaiting TTE.  - Monitor on tele.  - PPM interrogation revealed no episodes that correlated with his syncope  - CT head w/ chronic changes and volume loss   - Unlikely to benefit from orthostatics as uses wheelchair at baseline  - PT recs rehab
- Pt likely experienced a vagal event given reported hx of diaphoresis and nausea preceding event  - TTE reviewed, largely unremarkable, Stage 1 diastolic dysfunction  - No events on telemetry  - PPM interrogation revealed no episodes that correlated with his syncope  - CT head w/ chronic changes and volume loss   - Unlikely to benefit from orthostatics as uses wheelchair at baseline  - DC telemetry   - PT recs rehab and wife in agreement, plan for d/c today

## 2020-09-19 NOTE — PROGRESS NOTE ADULT - SUBJECTIVE AND OBJECTIVE BOX
[facility] Division of Hospital Medicine  Bhavana Reese DO  Pager (CHRIS, 8A-5P): [phone]    Patient is a 80y old  Male who presents with a chief complaint of Syncope (13 Sep 2020 15:12)    SUBJECTIVE / OVERNIGHT EVENTS: Pt a poor historian, but able to state his name and that he's in the hospital.  He cannot recall why he's here and believes his wife is at bedside when she isn't.  Pt's agitation improved; calm and cooperative this morning.     MEDICATIONS  (STANDING):  aspirin enteric coated 81 milliGRAM(s) Oral daily  atorvastatin 20 milliGRAM(s) Oral at bedtime  cyanocobalamin 1000 MICROGram(s) Oral daily  enoxaparin Injectable 40 milliGRAM(s) SubCutaneous daily  folic acid 1 milliGRAM(s) Oral daily  isosorbide   mononitrate ER Tablet (IMDUR) 30 milliGRAM(s) Oral daily  melatonin 3 milliGRAM(s) Oral <User Schedule>  metoprolol succinate ER 25 milliGRAM(s) Oral daily  nystatin Cream 1 Application(s) Topical two times a day  polyethylene glycol 3350 17 Gram(s) Oral daily  senna 2 Tablet(s) Oral at bedtime    MEDICATIONS  (PRN):  acetaminophen   Tablet .. 650 milliGRAM(s) Oral every 6 hours PRN Temp greater or equal to 38C (100.4F), Mild Pain (1 - 3), Moderate Pain (4 - 6)  haloperidol    Injectable 1 milliGRAM(s) IntraMuscular every 6 hours PRN Agitation      I&O's Summary    13 Sep 2020 07:01  -  14 Sep 2020 07:00  --------------------------------------------------------  IN: 340 mL / OUT: 450 mL / NET: -110 mL    14 Sep 2020 07:01  -  14 Sep 2020 13:46  --------------------------------------------------------  IN: 0 mL / OUT: 200 mL / NET: -200 mL        PHYSICAL EXAM:  Vital Signs Last 24 Hrs  T(C): 36.7 (14 Sep 2020 09:46), Max: 36.7 (13 Sep 2020 17:33)  T(F): 98 (14 Sep 2020 09:46), Max: 98.1 (13 Sep 2020 17:33)  HR: 60 (14 Sep 2020 09:46) (60 - 68)  BP: 160/94 (14 Sep 2020 09:46) (112/60 - 160/94)  BP(mean): --  RR: 16 (14 Sep 2020 09:46) (16 - 116)  SpO2: 99% (14 Sep 2020 09:46) (99% - 100%)    CONSTITUTIONAL: NAD, well-developed, well-groomed  EYES: PERRLA; conjunctiva and sclera clear  RESPIRATORY: Normal respiratory effort; lungs are clear to auscultation bilaterally  CARDIOVASCULAR: Regular rate and rhythm, normal S1 and S2, no murmur/rub/gallop; No lower extremity edema  ABDOMEN: Nontender to palpation, normoactive bowel sounds, no rebound/guarding  MUSCULOSKELETAL:  No joint swelling or tenderness to palpation  PSYCH: A+O to person, place; not to time; unable to carry on lengthy conversation   NEUROLOGY: CN 2-12 are intact and symmetric; no gross sensory deficits;   SKIN: No rashes; no palpable lesions    LABS:                        13.1   4.64  )-----------( 183      ( 14 Sep 2020 06:31 )             41.1     09-14    140  |  106  |  15  ----------------------------<  88  4.0   |  23  |  1.11    Ca    9.6      14 Sep 2020 06:31      RADIOLOGY & ADDITIONAL TESTS:  Results Reviewed:   < from: CT Head No Cont (09.11.20 @ 02:27) >  IMPRESSION:    No acute intracranial hemorrhage or mass effect.    Moderate to severe chronic small vessel ischemic changesin the frontoparietal white matter and chronic appearing left basal ganglia infarct.    Cerebral and cerebellar volume loss and ventriculomegaly.    < end of copied text >    < from: Xray Chest 1 View- PORTABLE-Urgent (Xray Chest 1 View- PORTABLE-Urgent .) (09.10.20 @ 22:44) >  IMPRESSION:    The lungs are clear although the patient's chin obscures the apices and right hand overlies the right lung base.    Pacemaker on the left chest wall.  No pleural effusion or pneumothorax.  Heart size cannot be accurately assessed on this AP projection.    < end of copied text >      
[facility] Division of Hospital Medicine  Erica Britt MD  Pager: [phone]      Patient is a 80y old  Male who presents with a chief complaint of Syncope (12 Sep 2020 16:19)      SUBJECTIVE / OVERNIGHT EVENTS: patient agitated overnight (kicking, attempting to hit staff, security called), given Haldol 1mg without improvement, then given Haldol 2.5mg with improvement in agitation. This AM, patient has no complaints, but is not able to tell me why he is in the hospital.     MEDICATIONS  (STANDING):  aspirin enteric coated 81 milliGRAM(s) Oral daily  atorvastatin 20 milliGRAM(s) Oral at bedtime  cyanocobalamin 1000 MICROGram(s) Oral daily  enoxaparin Injectable 40 milliGRAM(s) SubCutaneous daily  folic acid 1 milliGRAM(s) Oral daily  isosorbide   mononitrate ER Tablet (IMDUR) 30 milliGRAM(s) Oral daily  melatonin 3 milliGRAM(s) Oral <User Schedule>  metoprolol succinate ER 25 milliGRAM(s) Oral daily  nystatin Cream 1 Application(s) Topical two times a day  polyethylene glycol 3350 17 Gram(s) Oral daily  QUEtiapine 12.5 milliGRAM(s) Oral <User Schedule>  senna 2 Tablet(s) Oral at bedtime    MEDICATIONS  (PRN):  acetaminophen   Tablet .. 650 milliGRAM(s) Oral every 6 hours PRN Temp greater or equal to 38C (100.4F), Mild Pain (1 - 3), Moderate Pain (4 - 6)  haloperidol    Injectable 1 milliGRAM(s) IntraMuscular every 6 hours PRN Agitation      CAPILLARY BLOOD GLUCOSE      POCT Blood Glucose.: 89 mg/dL (13 Sep 2020 07:26)  POCT Blood Glucose.: 99 mg/dL (12 Sep 2020 16:55)    I&O's Summary    12 Sep 2020 07:01  -  13 Sep 2020 07:00  --------------------------------------------------------  IN: 20 mL / OUT: 2450 mL / NET: -2430 mL    13 Sep 2020 07:01  -  13 Sep 2020 15:12  --------------------------------------------------------  IN: 240 mL / OUT: 0 mL / NET: 240 mL        PHYSICAL EXAM:  Vital Signs Last 24 Hrs  T(C): 36.9 (13 Sep 2020 13:23), Max: 37.2 (13 Sep 2020 06:22)  T(F): 98.4 (13 Sep 2020 13:23), Max: 98.9 (13 Sep 2020 06:22)  HR: 51 (13 Sep 2020 13:23) (51 - 62)  BP: 120/77 (13 Sep 2020 13:23) (120/77 - 162/92)  BP(mean): --  RR: 18 (13 Sep 2020 13:23) (18 - 18)  SpO2: 98% (13 Sep 2020 13:23) (98% - 99%)    CONSTITUTIONAL: elderly male in NAD  ENMT: Moist oral mucosa  RESPIRATORY: Normal respiratory effort; lungs are clear to auscultation bilaterally  CARDIOVASCULAR:  S1/S2; No lower extremity edema  ABDOMEN: Nontender to palpation, normoactive bowel sounds, no rebound/guarding, decreased bowel sounds   PSYCH: A+O to person and year and place, not to month or situation. Calm and cooperative   NEUROLOGY: Contracted RUE w/ limited movement. Follows commands, 5/5 strength bilateral LE   SKIN: No rashes; no palpable lesions    LABS:                        11.8   3.82  )-----------( 160      ( 13 Sep 2020 06:47 )             36.4     09-13    143  |  109<H>  |  13  ----------------------------<  94  3.9   |  23  |  0.98    Ca    9.5      13 Sep 2020 06:47      Culture - Urine (collected 11 Sep 2020 00:51)  Source: .Urine Catheterized  Final Report (12 Sep 2020 12:13):    No growth    TTE:  CONCLUSIONS:  1. Mitral annular calcification, otherwise normal mitral  valve. Minimal mitral regurgitation.  2. Aortic valve not well visualized; appears calcified.  Mild aortic regurgitation.  3. Endocardium not well visualized; grossly normal left  ventricular systolic function.  4. Mild diastolic dysfunction (Stage I).  5. Unable to accurately evaluate right ventricular size or  systolic function.  A device wire is noted in the right  heart.    RADIOLOGY & ADDITIONAL TESTS:  Results Reviewed:   Imaging Personally Reviewed:  Electrocardiogram Personally Reviewed:    COORDINATION OF CARE:  Care Discussed with Consultants/Other Providers [Y/N]:  Prior or Outpatient Records Reviewed [Y/N]:  
[facility] Division of Hospital Medicine  Bhavana Reese DO  Pager (CHRIS, 8A-5P): [phone]    Patient is a 80y old  Male who presents with a chief complaint of Syncope (16 Sep 2020 14:19)    SUBJECTIVE / OVERNIGHT EVENTS: Pt doing well, no acute complaints.  Pt is much more awake and alert, interactive today than yesterday.  Denies CP, SOB.     MEDICATIONS  (STANDING):  aspirin enteric coated 81 milliGRAM(s) Oral daily  atorvastatin 20 milliGRAM(s) Oral at bedtime  cyanocobalamin 1000 MICROGram(s) Oral daily  enoxaparin Injectable 40 milliGRAM(s) SubCutaneous daily  folic acid 1 milliGRAM(s) Oral daily  isosorbide   mononitrate ER Tablet (IMDUR) 30 milliGRAM(s) Oral daily  melatonin 3 milliGRAM(s) Oral <User Schedule>  metoprolol succinate ER 25 milliGRAM(s) Oral daily  nystatin Cream 1 Application(s) Topical two times a day  polyethylene glycol 3350 17 Gram(s) Oral daily  senna 2 Tablet(s) Oral at bedtime    MEDICATIONS  (PRN):  acetaminophen   Tablet .. 650 milliGRAM(s) Oral every 6 hours PRN Temp greater or equal to 38C (100.4F), Mild Pain (1 - 3), Moderate Pain (4 - 6)  haloperidol    Injectable 1 milliGRAM(s) IntraMuscular every 6 hours PRN Agitation      CAPILLARY BLOOD GLUCOSE        I&O's Summary    16 Sep 2020 07:01  -  17 Sep 2020 07:00  --------------------------------------------------------  IN: 595 mL / OUT: 1305 mL / NET: -710 mL    17 Sep 2020 07:01  -  17 Sep 2020 15:12  --------------------------------------------------------  IN: 100 mL / OUT: 0 mL / NET: 100 mL        PHYSICAL EXAM:  Vital Signs Last 24 Hrs  T(C): 36.6 (17 Sep 2020 13:57), Max: 36.8 (16 Sep 2020 22:23)  T(F): 97.8 (17 Sep 2020 13:57), Max: 98.3 (16 Sep 2020 22:23)  HR: 60 (17 Sep 2020 13:57) (60 - 75)  BP: 100/63 (17 Sep 2020 13:57) (100/63 - 145/91)  BP(mean): --  RR: 17 (17 Sep 2020 13:57) (17 - 18)  SpO2: 100% (17 Sep 2020 13:57) (97% - 100%)    CONSTITUTIONAL: NAD, well-developed, well-groomed  EYES: PERRLA; conjunctiva and sclera clear  RESPIRATORY: Normal respiratory effort; lungs are clear to auscultation bilaterally  CARDIOVASCULAR: Regular rate and rhythm, normal S1 and S2, no murmur/rub/gallop; No lower extremity edema  ABDOMEN: Nontender to palpation, normoactive bowel sounds, no rebound/guarding  MUSCULOSKELETAL:  No joint swelling or tenderness to palpation  PSYCH: A+O to person, place; flat affect  NEUROLOGY: CN 2-12 are intact and symmetric; no gross sensory deficits;   SKIN: No rashes; no palpable lesions    LABS:                        13.7   5.01  )-----------( 164      ( 16 Sep 2020 10:15 )             41.4     09-16    137  |  103  |  14  ----------------------------<  92  4.3   |  21<L>  |  0.98    Ca    10.0      16 Sep 2020 10:15  Phos  3.1     09-16  Mg     2.0     09-16                  
[facility] Division of Hospital Medicine  Bhavana Reese DO  Pager (CHRIS, 8A-5P): [phone]    Patient is a 80y old  Male who presents with a chief complaint of Syncope (15 Sep 2020 12:14)    SUBJECTIVE / OVERNIGHT EVENTS: Pt calm and cooperative, denies any acute complaints.  Per PCA at bedside, pt consumed some of his breakfast. Pt politely asking to go home.      MEDICATIONS  (STANDING):  aspirin enteric coated 81 milliGRAM(s) Oral daily  atorvastatin 20 milliGRAM(s) Oral at bedtime  cyanocobalamin 1000 MICROGram(s) Oral daily  enoxaparin Injectable 40 milliGRAM(s) SubCutaneous daily  folic acid 1 milliGRAM(s) Oral daily  isosorbide   mononitrate ER Tablet (IMDUR) 30 milliGRAM(s) Oral daily  melatonin 3 milliGRAM(s) Oral <User Schedule>  metoprolol succinate ER 25 milliGRAM(s) Oral daily  nystatin Cream 1 Application(s) Topical two times a day  polyethylene glycol 3350 17 Gram(s) Oral daily  senna 2 Tablet(s) Oral at bedtime    MEDICATIONS  (PRN):  acetaminophen   Tablet .. 650 milliGRAM(s) Oral every 6 hours PRN Temp greater or equal to 38C (100.4F), Mild Pain (1 - 3), Moderate Pain (4 - 6)  haloperidol    Injectable 1 milliGRAM(s) IntraMuscular every 6 hours PRN Agitation      CAPILLARY BLOOD GLUCOSE        I&O's Summary    14 Sep 2020 07:01  -  15 Sep 2020 07:00  --------------------------------------------------------  IN: 0 mL / OUT: 850 mL / NET: -850 mL    15 Sep 2020 07:01  -  15 Sep 2020 13:30  --------------------------------------------------------  IN: 0 mL / OUT: 50 mL / NET: -50 mL        PHYSICAL EXAM:  Vital Signs Last 24 Hrs  T(C): 36.4 (15 Sep 2020 10:26), Max: 36.7 (15 Sep 2020 02:00)  T(F): 97.5 (15 Sep 2020 10:26), Max: 98 (15 Sep 2020 02:00)  HR: 60 (15 Sep 2020 12:44) (60 - 84)  BP: 151/82 (15 Sep 2020 12:44) (122/78 - 155/93)  BP(mean): --  RR: 17 (15 Sep 2020 10:26) (17 - 18)  SpO2: 100% (15 Sep 2020 10:26) (95% - 100%)    CONSTITUTIONAL: NAD, well-developed, well-groomed  EYES: PERRLA; conjunctiva and sclera clear  RESPIRATORY: Normal respiratory effort; lungs are clear to auscultation bilaterally  CARDIOVASCULAR: Regular rate and rhythm, normal S1 and S2, no murmur/rub/gallop; No lower extremity edema  ABDOMEN: Nontender to palpation, normoactive bowel sounds, no rebound/guarding  MUSCLOSKELETAL:  No joint swelling or tenderness to palpation  PSYCH: A+O to person, place not to time.  Calm, pleasant   NEUROLOGY: CN 2-12 are intact and symmetric; no gross sensory deficits;   SKIN: No rashes; no palpable lesions    LABS:                        13.1   4.87  )-----------( 174      ( 15 Sep 2020 06:00 )             40.3     09-15    142  |  106  |  16  ----------------------------<  88  4.1   |  26  |  1.03    Ca    9.9      15 Sep 2020 06:00  
[facility] Division of Hospital Medicine  Bhavana Reese DO  Pager (CHRIS, 8A-5P): [phone]    Patient is a 80y old  Male who presents with a chief complaint of Syncope (15 Sep 2020 13:29)    SUBJECTIVE / OVERNIGHT EVENTS: Overnight events noted; today, pt a bit more sleepy compared to yesterday, but still responding to questions and responding to simple commands.  Denies CP, SOB, abd pain; just feels "so-so."   Remains afebrile, breathing comfortable, no new URI symptoms     MEDICATIONS  (STANDING):  aspirin enteric coated 81 milliGRAM(s) Oral daily  atorvastatin 20 milliGRAM(s) Oral at bedtime  cyanocobalamin 1000 MICROGram(s) Oral daily  enoxaparin Injectable 40 milliGRAM(s) SubCutaneous daily  folic acid 1 milliGRAM(s) Oral daily  isosorbide   mononitrate ER Tablet (IMDUR) 30 milliGRAM(s) Oral daily  melatonin 3 milliGRAM(s) Oral <User Schedule>  metoprolol succinate ER 25 milliGRAM(s) Oral daily  nystatin Cream 1 Application(s) Topical two times a day  polyethylene glycol 3350 17 Gram(s) Oral daily  senna 2 Tablet(s) Oral at bedtime    MEDICATIONS  (PRN):  acetaminophen   Tablet .. 650 milliGRAM(s) Oral every 6 hours PRN Temp greater or equal to 38C (100.4F), Mild Pain (1 - 3), Moderate Pain (4 - 6)  haloperidol    Injectable 1 milliGRAM(s) IntraMuscular every 6 hours PRN Agitation      15 Sep 2020 07:01  -  16 Sep 2020 07:00  --------------------------------------------------------  IN: 0 mL / OUT: 550 mL / NET: -550 mL    16 Sep 2020 07:01  -  16 Sep 2020 14:19  --------------------------------------------------------  IN: 275 mL / OUT: 500 mL / NET: -225 mL        PHYSICAL EXAM:  Vital Signs Last 24 Hrs  T(C): 37.1 (16 Sep 2020 10:00), Max: 37.1 (16 Sep 2020 10:00)  T(F): 98.8 (16 Sep 2020 10:00), Max: 98.8 (16 Sep 2020 10:00)  HR: 61 (16 Sep 2020 10:00) (60 - 97)  BP: 138/80 (16 Sep 2020 11:11) (116/76 - 162/90)  BP(mean): --  RR: 18 (16 Sep 2020 10:00) (17 - 18)  SpO2: 98% (16 Sep 2020 10:00) (97% - 100%)    CONSTITUTIONAL: NAD, well-developed, well-groomed  EYES: PERRLA; conjunctiva and sclera clear  RESPIRATORY: Normal respiratory effort; lungs are clear to auscultation bilaterally  CARDIOVASCULAR: Regular rate and rhythm, normal S1 and S2, no murmur/rub/gallop; No lower extremity edema  ABDOMEN: Nontender to palpation, normoactive bowel sounds, no rebound/guarding  MUSCULOSKELETAL:  No joint swelling or tenderness to palpation  PSYCH: A+O to person, place; flat affect  NEUROLOGY: CN 2-12 are intact and symmetric; no gross sensory deficits;   SKIN: No rashes; no palpable lesions    LABS:                        13.7   5.01  )-----------( 164      ( 16 Sep 2020 10:15 )             41.4     09-16    137  |  103  |  14  ----------------------------<  92  4.3   |  21<L>  |  0.98    Ca    10.0      16 Sep 2020 10:15  Phos  3.1     09-16  Mg     2.0     09-16      
Patient is a 80y old  Male who presents with a chief complaint of Syncope (16 Sep 2020 14:19)    SUBJECTIVE / OVERNIGHT EVENTS: Patient feels well has no complaints. He states that he is without pain. He has been calm and has not required any PRN medications for agitation     MEDICATIONS  (STANDING):  aspirin enteric coated 81 milliGRAM(s) Oral daily  atorvastatin 20 milliGRAM(s) Oral at bedtime  cyanocobalamin 1000 MICROGram(s) Oral daily  enoxaparin Injectable 40 milliGRAM(s) SubCutaneous daily  folic acid 1 milliGRAM(s) Oral daily  isosorbide   mononitrate ER Tablet (IMDUR) 30 milliGRAM(s) Oral daily  melatonin 3 milliGRAM(s) Oral <User Schedule>  metoprolol succinate ER 25 milliGRAM(s) Oral daily  nystatin Cream 1 Application(s) Topical two times a day  polyethylene glycol 3350 17 Gram(s) Oral daily  senna 2 Tablet(s) Oral at bedtime    MEDICATIONS  (PRN):  acetaminophen   Tablet .. 650 milliGRAM(s) Oral every 6 hours PRN Temp greater or equal to 38C (100.4F), Mild Pain (1 - 3), Moderate Pain (4 - 6)  haloperidol    Injectable 1 milliGRAM(s) IntraMuscular every 6 hours PRN Agitation      CAPILLARY BLOOD GLUCOSE        I&O's Summary    16 Sep 2020 07:01  -  17 Sep 2020 07:00  --------------------------------------------------------  IN: 595 mL / OUT: 1305 mL / NET: -710 mL    17 Sep 2020 07:01  -  17 Sep 2020 15:12  --------------------------------------------------------  IN: 100 mL / OUT: 0 mL / NET: 100 mL        PHYSICAL EXAM:  Vital Signs Last 24 Hrs  T(C): 36.7 (09-18-20 @ 12:10)  T(F): 98.1 (09-18-20 @ 12:10), Max: 98.1 (09-17-20 @ 17:15)  HR: 60 (09-18-20 @ 12:10) (60 - 70)  BP: 157/75 (09-18-20 @ 12:10)  BP(mean): --  RR: 18 (09-18-20 @ 12:10) (17 - 18)  SpO2: 100% (09-18-20 @ 12:10) (97% - 100%)  Wt(kg): --    09-17 @ 07:01  -  09-18 @ 07:00  --------------------------------------------------------  IN: 620 mL / OUT: 400 mL / NET: 220 mL    09-18 @ 07:01  -  09-18 @ 15:29  --------------------------------------------------------  IN: 400 mL / OUT: 101 mL / NET: 299 mL      Constitutional: NAD, awake and alert  EYES: PERRLA, normal sclera  Respiratory: no respiratory distress, Breath sounds are clear bilaterally. No wheezing, rales or rhonchi  Cardiovascular: S1 and S2, regular rate and rhythm, no murmurs appreacitated. peripheral pulses palpable x4  Gastrointestinal: Soft, nontender, nondistended. +BS  Extremities: No lower extremity edema, no calf tenderness, warm to touch  Psych: Alert and Oriented only to person, normal mood, normal affect                  
[facility] Division of Hospital Medicine  Erica Britt MD  Pager: [phone]      Patient is a 80y old  Male who presents with a chief complaint of Syncope (11 Sep 2020 12:46)      SUBJECTIVE / OVERNIGHT EVENTS: HPI limited as patient is poor historian. He denies chest pain, SOB, fevers, chills, dizziness. Tele: A-paced     MEDICATIONS  (STANDING):  aspirin enteric coated 81 milliGRAM(s) Oral daily  atorvastatin 20 milliGRAM(s) Oral at bedtime  cyanocobalamin 1000 MICROGram(s) Oral daily  dextrose 5%. 1000 milliLiter(s) (50 mL/Hr) IV Continuous <Continuous>  dextrose 50% Injectable 12.5 Gram(s) IV Push once  dextrose 50% Injectable 25 Gram(s) IV Push once  dextrose 50% Injectable 25 Gram(s) IV Push once  enoxaparin Injectable 40 milliGRAM(s) SubCutaneous daily  insulin lispro (HumaLOG) corrective regimen sliding scale   SubCutaneous three times a day before meals  insulin lispro (HumaLOG) corrective regimen sliding scale   SubCutaneous at bedtime  isosorbide   mononitrate ER Tablet (IMDUR) 30 milliGRAM(s) Oral daily  metoprolol succinate ER 25 milliGRAM(s) Oral daily  nystatin Cream 1 Application(s) Topical two times a day    MEDICATIONS  (PRN):  acetaminophen   Tablet .. 650 milliGRAM(s) Oral every 6 hours PRN Temp greater or equal to 38C (100.4F), Mild Pain (1 - 3), Moderate Pain (4 - 6)  dextrose 40% Gel 15 Gram(s) Oral once PRN Blood Glucose LESS THAN 70 milliGRAM(s)/deciliter  glucagon  Injectable 1 milliGRAM(s) IntraMuscular once PRN Glucose LESS THAN 70 milligrams/deciliter  haloperidol    Injectable 1 milliGRAM(s) IntraMuscular every 6 hours PRN Agitation      CAPILLARY BLOOD GLUCOSE      POCT Blood Glucose.: 87 mg/dL (12 Sep 2020 11:34)  POCT Blood Glucose.: 75 mg/dL (12 Sep 2020 06:58)  POCT Blood Glucose.: 92 mg/dL (11 Sep 2020 21:14)  POCT Blood Glucose.: 102 mg/dL (11 Sep 2020 16:31)    I&O's Summary    12 Sep 2020 07:01  -  12 Sep 2020 16:20  --------------------------------------------------------  IN: 20 mL / OUT: 600 mL / NET: -580 mL        PHYSICAL EXAM:  Vital Signs Last 24 Hrs  T(C): 36.8 (12 Sep 2020 14:08), Max: 36.9 (12 Sep 2020 01:30)  T(F): 98.3 (12 Sep 2020 14:08), Max: 98.4 (12 Sep 2020 01:30)  HR: 60 (12 Sep 2020 14:08) (59 - 90)  BP: 122/70 (12 Sep 2020 14:08) (122/70 - 162/85)  BP(mean): --  RR: 19 (12 Sep 2020 14:08) (18 - 19)  SpO2: 99% (12 Sep 2020 14:08) (99% - 100%)    CONSTITUTIONAL: elderly male in NAD  ENMT: Moist oral mucosa  RESPIRATORY: Normal respiratory effort; lungs are clear to auscultation bilaterally  CARDIOVASCULAR:  S1/S2; No lower extremity edema  ABDOMEN: Nontender to palpation, normoactive bowel sounds, no rebound/guarding, decreased bowel sounds   PSYCH: A+O to person and year, not to month or place.  NEUROLOGY: Contracted RUE w/ limited movement. Follows commands, 5/5 strength bilateral LE   SKIN: No rashes; no palpable lesions    LABS:                        12.6   3.78  )-----------( 146      ( 12 Sep 2020 07:10 )             39.2     09-12    138  |  106  |  11  ----------------------------<  82  4.2   |  24  |  1.01    Ca    9.5      12 Sep 2020 07:10  Mg     1.9     09-11    TPro  7.1  /  Alb  4.1  /  TBili  1.2  /  DBili  x   /  AST  14  /  ALT  9   /  AlkPhos  80  09-11    PT/INR - ( 11 Sep 2020 07:02 )   PT: 13.3 SEC;   INR: 1.18          PTT - ( 11 Sep 2020 07:02 )  PTT:23.8 SEC      Urinalysis Basic - ( 11 Sep 2020 01:48 )    Color: YELLOW / Appearance: CLEAR / S.028 / pH: 6.0  Gluc: NEGATIVE / Ketone: NEGATIVE  / Bili: NEGATIVE / Urobili: 4   Blood: NEGATIVE / Protein: 20 / Nitrite: NEGATIVE   Leuk Esterase: NEGATIVE / RBC: 0-2 / WBC 0-2   Sq Epi: x / Non Sq Epi: x / Bacteria: x        Culture - Urine (collected 11 Sep 2020 00:51)  Source: .Urine Catheterized  Final Report (12 Sep 2020 12:13):    No growth        RADIOLOGY & ADDITIONAL TESTS:  Results Reviewed:   Imaging Personally Reviewed:  Electrocardiogram Personally Reviewed:    COORDINATION OF CARE:  Care Discussed with Consultants/Other Providers [Y/N]:  Prior or Outpatient Records Reviewed [Y/N]:  
Patient is a 80y old  Male who presents with a chief complaint of Syncope (18 Sep 2020 15:29)      SUBJECTIVE / OVERNIGHT EVENTS:    MEDICATIONS  (STANDING):  aspirin enteric coated 81 milliGRAM(s) Oral daily  atorvastatin 20 milliGRAM(s) Oral at bedtime  cyanocobalamin 1000 MICROGram(s) Oral daily  enoxaparin Injectable 40 milliGRAM(s) SubCutaneous daily  folic acid 1 milliGRAM(s) Oral daily  isosorbide   mononitrate ER Tablet (IMDUR) 30 milliGRAM(s) Oral daily  melatonin 3 milliGRAM(s) Oral <User Schedule>  metoprolol tartrate 25 milliGRAM(s) Oral two times a day  nystatin Cream 1 Application(s) Topical two times a day  polyethylene glycol 3350 17 Gram(s) Oral daily  senna 2 Tablet(s) Oral at bedtime    MEDICATIONS  (PRN):  acetaminophen   Tablet .. 650 milliGRAM(s) Oral every 6 hours PRN Temp greater or equal to 38C (100.4F), Mild Pain (1 - 3), Moderate Pain (4 - 6)  haloperidol    Injectable 1 milliGRAM(s) IntraMuscular every 6 hours PRN Agitation      Vital Signs Last 24 Hrs  T(C): 36.6 (19 Sep 2020 10:00), Max: 36.9 (18 Sep 2020 17:55)  T(F): 97.9 (19 Sep 2020 10:00), Max: 98.4 (18 Sep 2020 17:55)  HR: 63 (19 Sep 2020 10:00) (60 - 65)  BP: 134/82 (19 Sep 2020 10:00) (126/79 - 153/90)  BP(mean): --  RR: 18 (19 Sep 2020 10:00) (17 - 18)  SpO2: 99% (19 Sep 2020 10:00) (97% - 100%)  CAPILLARY BLOOD GLUCOSE        I&O's Summary    18 Sep 2020 07:01  -  19 Sep 2020 07:00  --------------------------------------------------------  IN: 575 mL / OUT: 251 mL / NET: 324 mL    19 Sep 2020 07:01  -  19 Sep 2020 15:41  --------------------------------------------------------  IN: 120 mL / OUT: 200 mL / NET: -80 mL      Constitutional: NAD, awake and alert  EYES: PERRLA, normal sclera  Respiratory: no respiratory distress, Breath sounds are clear bilaterally. No wheezing, rales or rhonchi  Cardiovascular: S1 and S2, regular rate and rhythm, no murmurs. peripheral pulses palpable  Gastrointestinal: Soft, nontender, nondistended. +BS  Extremities: No lower extremity edema, no calf tenderness, warm to touch  Psych: Alert and Oriented only to person,, normal affect

## 2020-10-09 ENCOUNTER — APPOINTMENT (OUTPATIENT)
Dept: ELECTROPHYSIOLOGY | Facility: CLINIC | Age: 80
End: 2020-10-09

## 2020-10-20 ENCOUNTER — APPOINTMENT (OUTPATIENT)
Dept: ELECTROPHYSIOLOGY | Facility: CLINIC | Age: 80
End: 2020-10-20

## 2020-10-26 ENCOUNTER — FORM ENCOUNTER (OUTPATIENT)
Age: 80
End: 2020-10-26

## 2021-06-09 ENCOUNTER — INPATIENT (INPATIENT)
Facility: HOSPITAL | Age: 81
LOS: 4 days | Discharge: HOME | End: 2021-06-14
Attending: HOSPITALIST | Admitting: HOSPITALIST
Payer: COMMERCIAL

## 2021-06-09 VITALS
HEART RATE: 66 BPM | RESPIRATION RATE: 16 BRPM | SYSTOLIC BLOOD PRESSURE: 125 MMHG | OXYGEN SATURATION: 100 % | TEMPERATURE: 98 F | HEIGHT: 66 IN | DIASTOLIC BLOOD PRESSURE: 76 MMHG

## 2021-06-09 DIAGNOSIS — Z95.0 PRESENCE OF CARDIAC PACEMAKER: Chronic | ICD-10-CM

## 2021-06-09 PROCEDURE — 99285 EMERGENCY DEPT VISIT HI MDM: CPT

## 2021-06-09 NOTE — ED ADULT TRIAGE NOTE - CHIEF COMPLAINT QUOTE
Pt w/ hx of GERD CVA with residual right side weakness, dementia baseline aaox2 minimally verbal follows commands c/o abdominal pain after eating tonight [facility] Pt called 911, denied nausea vomiting fever chills  Daughter Nerissa [phone]

## 2021-06-10 DIAGNOSIS — I63.40 CEREBRAL INFARCTION DUE TO EMBOLISM OF UNSPECIFIED CEREBRAL ARTERY: ICD-10-CM

## 2021-06-10 DIAGNOSIS — R68.83 CHILLS (WITHOUT FEVER): ICD-10-CM

## 2021-06-10 DIAGNOSIS — N39.0 URINARY TRACT INFECTION, SITE NOT SPECIFIED: ICD-10-CM

## 2021-06-10 DIAGNOSIS — I10 ESSENTIAL (PRIMARY) HYPERTENSION: ICD-10-CM

## 2021-06-10 DIAGNOSIS — E11.9 TYPE 2 DIABETES MELLITUS WITHOUT COMPLICATIONS: ICD-10-CM

## 2021-06-10 DIAGNOSIS — Z29.9 ENCOUNTER FOR PROPHYLACTIC MEASURES, UNSPECIFIED: ICD-10-CM

## 2021-06-10 DIAGNOSIS — F03.90 UNSPECIFIED DEMENTIA, UNSPECIFIED SEVERITY, WITHOUT BEHAVIORAL DISTURBANCE, PSYCHOTIC DISTURBANCE, MOOD DISTURBANCE, AND ANXIETY: ICD-10-CM

## 2021-06-10 DIAGNOSIS — R10.9 UNSPECIFIED ABDOMINAL PAIN: ICD-10-CM

## 2021-06-10 DIAGNOSIS — N17.9 ACUTE KIDNEY FAILURE, UNSPECIFIED: ICD-10-CM

## 2021-06-10 LAB
ALBUMIN SERPL ELPH-MCNC: 3.9 G/DL — SIGNIFICANT CHANGE UP (ref 3.3–5)
ALP SERPL-CCNC: 81 U/L — SIGNIFICANT CHANGE UP (ref 40–120)
ALT FLD-CCNC: 8 U/L — SIGNIFICANT CHANGE UP (ref 4–41)
ANION GAP SERPL CALC-SCNC: 12 MMOL/L — SIGNIFICANT CHANGE UP (ref 7–14)
APPEARANCE UR: CLEAR — SIGNIFICANT CHANGE UP
AST SERPL-CCNC: 13 U/L — SIGNIFICANT CHANGE UP (ref 4–40)
BACTERIA # UR AUTO: NEGATIVE — SIGNIFICANT CHANGE UP
BASOPHILS # BLD AUTO: 0.03 K/UL — SIGNIFICANT CHANGE UP (ref 0–0.2)
BASOPHILS NFR BLD AUTO: 0.6 % — SIGNIFICANT CHANGE UP (ref 0–2)
BILIRUB SERPL-MCNC: 1.1 MG/DL — SIGNIFICANT CHANGE UP (ref 0.2–1.2)
BILIRUB UR-MCNC: NEGATIVE — SIGNIFICANT CHANGE UP
BLOOD GAS VENOUS COMPREHENSIVE RESULT: SIGNIFICANT CHANGE UP
BUN SERPL-MCNC: 18 MG/DL — SIGNIFICANT CHANGE UP (ref 7–23)
CALCIUM SERPL-MCNC: 9.9 MG/DL — SIGNIFICANT CHANGE UP (ref 8.4–10.5)
CHLORIDE SERPL-SCNC: 106 MMOL/L — SIGNIFICANT CHANGE UP (ref 98–107)
CO2 SERPL-SCNC: 22 MMOL/L — SIGNIFICANT CHANGE UP (ref 22–31)
COLOR SPEC: YELLOW — SIGNIFICANT CHANGE UP
CREAT SERPL-MCNC: 1.35 MG/DL — HIGH (ref 0.5–1.3)
DIFF PNL FLD: ABNORMAL
EOSINOPHIL # BLD AUTO: 0.15 K/UL — SIGNIFICANT CHANGE UP (ref 0–0.5)
EOSINOPHIL NFR BLD AUTO: 3 % — SIGNIFICANT CHANGE UP (ref 0–6)
EPI CELLS # UR: SIGNIFICANT CHANGE UP
GLUCOSE SERPL-MCNC: 99 MG/DL — SIGNIFICANT CHANGE UP (ref 70–99)
GLUCOSE UR QL: NEGATIVE — SIGNIFICANT CHANGE UP
HCT VFR BLD CALC: 38.8 % — LOW (ref 39–50)
HGB BLD-MCNC: 12.3 G/DL — LOW (ref 13–17)
HYALINE CASTS # UR AUTO: NEGATIVE — SIGNIFICANT CHANGE UP (ref 0–7)
IANC: 3.17 K/UL — SIGNIFICANT CHANGE UP (ref 1.5–8.5)
IMM GRANULOCYTES NFR BLD AUTO: 0.4 % — SIGNIFICANT CHANGE UP (ref 0–1.5)
KETONES UR-MCNC: NEGATIVE — SIGNIFICANT CHANGE UP
LEUKOCYTE ESTERASE UR-ACNC: NEGATIVE — SIGNIFICANT CHANGE UP
LYMPHOCYTES # BLD AUTO: 1.12 K/UL — SIGNIFICANT CHANGE UP (ref 1–3.3)
LYMPHOCYTES # BLD AUTO: 22.4 % — SIGNIFICANT CHANGE UP (ref 13–44)
MAGNESIUM SERPL-MCNC: 2 MG/DL — SIGNIFICANT CHANGE UP (ref 1.6–2.6)
MCHC RBC-ENTMCNC: 30.1 PG — SIGNIFICANT CHANGE UP (ref 27–34)
MCHC RBC-ENTMCNC: 31.7 GM/DL — LOW (ref 32–36)
MCV RBC AUTO: 95.1 FL — SIGNIFICANT CHANGE UP (ref 80–100)
MONOCYTES # BLD AUTO: 0.51 K/UL — SIGNIFICANT CHANGE UP (ref 0–0.9)
MONOCYTES NFR BLD AUTO: 10.2 % — SIGNIFICANT CHANGE UP (ref 2–14)
NEUTROPHILS # BLD AUTO: 3.17 K/UL — SIGNIFICANT CHANGE UP (ref 1.8–7.4)
NEUTROPHILS NFR BLD AUTO: 63.4 % — SIGNIFICANT CHANGE UP (ref 43–77)
NITRITE UR-MCNC: NEGATIVE — SIGNIFICANT CHANGE UP
NRBC # BLD: 0 /100 WBCS — SIGNIFICANT CHANGE UP
NRBC # FLD: 0 K/UL — SIGNIFICANT CHANGE UP
PH UR: 5.5 — SIGNIFICANT CHANGE UP (ref 5–8)
PHOSPHATE SERPL-MCNC: 3.3 MG/DL — SIGNIFICANT CHANGE UP (ref 2.5–4.5)
PLATELET # BLD AUTO: 168 K/UL — SIGNIFICANT CHANGE UP (ref 150–400)
POTASSIUM SERPL-MCNC: 4.1 MMOL/L — SIGNIFICANT CHANGE UP (ref 3.5–5.3)
POTASSIUM SERPL-SCNC: 4.1 MMOL/L — SIGNIFICANT CHANGE UP (ref 3.5–5.3)
PROT SERPL-MCNC: 7 G/DL — SIGNIFICANT CHANGE UP (ref 6–8.3)
PROT UR-MCNC: ABNORMAL
RBC # BLD: 4.08 M/UL — LOW (ref 4.2–5.8)
RBC # FLD: 13.1 % — SIGNIFICANT CHANGE UP (ref 10.3–14.5)
RBC CASTS # UR COMP ASSIST: SIGNIFICANT CHANGE UP /HPF (ref 0–4)
SARS-COV-2 RNA SPEC QL NAA+PROBE: SIGNIFICANT CHANGE UP
SODIUM SERPL-SCNC: 140 MMOL/L — SIGNIFICANT CHANGE UP (ref 135–145)
SP GR SPEC: 1.02 — SIGNIFICANT CHANGE UP (ref 1.01–1.02)
UROBILINOGEN FLD QL: SIGNIFICANT CHANGE UP
WBC # BLD: 5 K/UL — SIGNIFICANT CHANGE UP (ref 3.8–10.5)
WBC # FLD AUTO: 5 K/UL — SIGNIFICANT CHANGE UP (ref 3.8–10.5)
WBC UR QL: 1 /HPF — SIGNIFICANT CHANGE UP (ref 0–5)

## 2021-06-10 PROCEDURE — 99223 1ST HOSP IP/OBS HIGH 75: CPT

## 2021-06-10 PROCEDURE — 70496 CT ANGIOGRAPHY HEAD: CPT | Mod: 26

## 2021-06-10 PROCEDURE — 70450 CT HEAD/BRAIN W/O DYE: CPT | Mod: 26,59

## 2021-06-10 PROCEDURE — 70498 CT ANGIOGRAPHY NECK: CPT | Mod: 26

## 2021-06-10 PROCEDURE — 93280 PM DEVICE PROGR EVAL DUAL: CPT | Mod: 26

## 2021-06-10 PROCEDURE — 74177 CT ABD & PELVIS W/CONTRAST: CPT | Mod: 26

## 2021-06-10 RX ORDER — ATORVASTATIN CALCIUM 80 MG/1
80 TABLET, FILM COATED ORAL AT BEDTIME
Refills: 0 | Status: DISCONTINUED | OUTPATIENT
Start: 2021-06-10 | End: 2021-06-14

## 2021-06-10 RX ORDER — HYDRALAZINE HCL 50 MG
5 TABLET ORAL ONCE
Refills: 0 | Status: COMPLETED | OUTPATIENT
Start: 2021-06-10 | End: 2021-06-10

## 2021-06-10 RX ORDER — LABETALOL HCL 100 MG
10 TABLET ORAL ONCE
Refills: 0 | Status: DISCONTINUED | OUTPATIENT
Start: 2021-06-10 | End: 2021-06-10

## 2021-06-10 RX ORDER — HYDRALAZINE HCL 50 MG
10 TABLET ORAL ONCE
Refills: 0 | Status: DISCONTINUED | OUTPATIENT
Start: 2021-06-10 | End: 2021-06-10

## 2021-06-10 RX ORDER — SODIUM CHLORIDE 9 MG/ML
500 INJECTION INTRAMUSCULAR; INTRAVENOUS; SUBCUTANEOUS ONCE
Refills: 0 | Status: COMPLETED | OUTPATIENT
Start: 2021-06-10 | End: 2021-06-10

## 2021-06-10 RX ORDER — ASPIRIN/CALCIUM CARB/MAGNESIUM 324 MG
300 TABLET ORAL DAILY
Refills: 0 | Status: DISCONTINUED | OUTPATIENT
Start: 2021-06-10 | End: 2021-06-12

## 2021-06-10 RX ORDER — HEPARIN SODIUM 5000 [USP'U]/ML
5000 INJECTION INTRAVENOUS; SUBCUTANEOUS EVERY 8 HOURS
Refills: 0 | Status: DISCONTINUED | OUTPATIENT
Start: 2021-06-10 | End: 2021-06-14

## 2021-06-10 RX ORDER — HALOPERIDOL DECANOATE 100 MG/ML
5 INJECTION INTRAMUSCULAR ONCE
Refills: 0 | Status: COMPLETED | OUTPATIENT
Start: 2021-06-10 | End: 2021-06-10

## 2021-06-10 RX ADMIN — SODIUM CHLORIDE 500 MILLILITER(S): 9 INJECTION INTRAMUSCULAR; INTRAVENOUS; SUBCUTANEOUS at 06:52

## 2021-06-10 RX ADMIN — Medication 5 MILLIGRAM(S): at 17:36

## 2021-06-10 RX ADMIN — SODIUM CHLORIDE 1000 MILLILITER(S): 9 INJECTION INTRAMUSCULAR; INTRAVENOUS; SUBCUTANEOUS at 08:25

## 2021-06-10 RX ADMIN — HEPARIN SODIUM 5000 UNIT(S): 5000 INJECTION INTRAVENOUS; SUBCUTANEOUS at 14:57

## 2021-06-10 RX ADMIN — Medication 2 MILLIGRAM(S): at 02:30

## 2021-06-10 RX ADMIN — HALOPERIDOL DECANOATE 5 MILLIGRAM(S): 100 INJECTION INTRAMUSCULAR at 02:20

## 2021-06-10 RX ADMIN — HEPARIN SODIUM 5000 UNIT(S): 5000 INJECTION INTRAVENOUS; SUBCUTANEOUS at 23:43

## 2021-06-10 NOTE — ED ADULT NURSE REASSESSMENT NOTE - NS ED NURSE REASSESS COMMENT FT1
Er code stroke was call ICU RN with pt. pt was not responding to verbal and tactile and sent for CT.   Neuro at bed no TPA at present time as per neuro, pt presently moving left arm and leg, head movements, pt hypertensive at present time. neurology aware, COVID 19 test done.   Pending dispo.      Ginette Resendez RN

## 2021-06-10 NOTE — H&P ADULT - PROBLEM SELECTOR PLAN 3
-AAO to self and place at baseline  -Currently AA0 to self only  -s/p ativan and haldol in ED due to sundowning  -Monitor for now

## 2021-06-10 NOTE — H&P ADULT - PROBLEM SELECTOR PLAN 2
-Initial complaint of abdominal pain  -CT with significant stool burden. ED attempted manual disimpaction however unsuccessful   -Start daily suppository and consider enema  -When tolerating PO start aggressive PO bowel regimen

## 2021-06-10 NOTE — ED PROVIDER NOTE - NS ED ROS FT

## 2021-06-10 NOTE — ED PROVIDER NOTE - PROGRESS NOTE DETAILS
Collateral from wife  (Nerissa @ [phone]):  Wife reports pt recently diagnosed w/ UTI and has is currently on day 4 of ciprofloxacin. Pt didn't eat  throughout the day and after finally eating dinner, c/o cold sweats, weak, unable to get up, minimally responsive, grabbing his abd in pain. Explained to wife pt's current mental status/confusion and she reports that this is baseline for him.  -Dr. Cramer Jam Jauregui, DO PGY-1: Spoke to pts wife Nerissa, agrees w/ above. Will give IVF bolus, reassess BMP for elevated Cr Dr. Clemens: When pt was placed back into a room to perform a disimpaction pt was noted to be more lethargic with left facial droop and left UE weakness. Noted to have a right UE contraction which is from previous CVA. FS 86 and stroke code called. Dr. Clemens: When pt was placed back into a room to perform a disimpaction pt was noted to be more lethargic with left facial droop and left UE weakness. Noted to have a right UE contraction which is from previous CVA. FS 86. Last known normal was around 8:15am as pt has been sleeping since with 1:1 at [facility]. Stroke code called. Dr. Clemens: Pt was signed out to me awaiting CT of abd for abd pain. Pt has a history of Dementia and on a 1:1. Jam Jauregui, DO PGY-1: Rectal disimpaction attempted, stool ball palpable, some stool removed but majority of stool still not able to be removed

## 2021-06-10 NOTE — ED PROVIDER NOTE - PMH
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

## 2021-06-10 NOTE — ED ADULT NURSE NOTE - NSIMPLEMENTINTERV_GEN_ALL_ED
Implemented All Fall Risk Interventions:  Friend to call system. Call bell, personal items and telephone within reach. Instruct patient to call for assistance. Room bathroom lighting operational. Non-slip footwear when patient is off stretcher. Physically safe environment: no spills, clutter or unnecessary equipment. Stretcher in lowest position, wheels locked, appropriate side rails in place. Provide visual cue, wrist band, yellow gown, etc. Monitor gait and stability. Monitor for mental status changes and reorient to person, place, and time. Review medications for side effects contributing to fall risk. Reinforce activity limits and safety measures with patient and family.

## 2021-06-10 NOTE — CONSULT NOTE ADULT - SUBJECTIVE AND OBJECTIVE BOX
Neurology  Consult Note  06-10-21    Name:  DESHAWN TIWARI; 81y (1940)    Reason for Admission: Chills without fever      Neurology consult: 80yo R-handed man with PMH of CVA (1998 with residual RUE weakness), HTN, HLD, DM, PPM, and dementia (baseline AAOx1-2) presented to the ED with abdominal distension and pain after eating dinner yesterday. Neurology consulted as code stroke. At approximately 1155h while in the ED, patient was noticed to be lethargic with possible L-sided weakness and L facial droop. Code stroke was called. Patient's wife (Ms. Multani [phone]) was called for collateral and for possible tPA administration as the LKN was initially unclear. She reports patient was less responsive yesterday evening which was why she called EMS but he was alert and talking today. Of note patient was being treated for a UTI and had been on ciprofloxacin for 4 days.     Patient was reportedly at baseline with LKN 0815h this morning when he was conversant. Patient was subsequently on 1:1 monitoring for agitation, described as restlessness, but was noted to be calm and potentially sleeping after hygiene maintenance.    NIHSS 19. Pre-MRS 4 as he needs assistance with ADLs and uses a wheelchair. CTH shows likely chronic L corona radiata infarct with no acute infarct. CTA was a limited study but showed no LVO. Patient was not a candidate for tPA since he was out of window and was not a candidate for thrombectomy due to no LVO.    Review of Systems:  As states in HPI.        PMHx:   CVA (cerebral infarction)    HTN (hypertension)    Diabetes    Hyperlipidemia    Dementia      FH:   diabetes mellitus    dementia      PSuHx:     Cardiac pacemaker        Medications:  MEDICATIONS  (STANDING):  heparin   Injectable 5000 Unit(s) SubCutaneous every 8 hours  hydrALAZINE Injectable 10 milliGRAM(s) IV Push once      Vitals:  T(C): 36.1 (06-10-21 @ 06:05), Max: 36.9 (06-09-21 @ 22:56)  HR: 60 (06-10-21 @ 12:36) (60 - 77)  BP: 212/96 (06-10-21 @ 12:36) (102/67 - 212/96)  RR: 18 (06-10-21 @ 12:36) (16 - 18)  SpO2: 99% (06-10-21 @ 12:36) (98% - 100%)    Physical Examination:  Neurologic:  - Mental Status: Stuporous with eyes closed.  - Cranial Nerves: Pupils are 2mm, equal, round, and reactive to light; Mild OD esotropia otherwise eyes in primary gaze. Face shows mild/moderate L lower facial palsy, however patient has hx of chronic R facial and is missing bottom teeth. Head turning intact.  - Motor: RUE is chronically contracted. Rest of extremities are antigravity but hit the bed within 5 seconds.  - Reflexes: 3+ and symmetric at the L biceps, brachioradialis, 2+ in the R triceps, 1+ in the R brachioradialis, 1+ L knee, and 2+ R knee. Plantar responses down in the L and up in the R.  - Sensory: Withdraws throughout extremities to noxious stimuli  - Coordination: Patient unable to complete task.    Labs:                        12.3   5.00  )-----------( 168      ( 10 Aries 2021 04:43 )             38.8     06-10    140  |  106  |  18  ----------------------------<  99  4.1   |  22  |  1.35<H>    Ca    9.9      10 Aries 2021 04:43  Phos  3.3     06-10  Mg     2.0     06-10    TPro  7.0  /  Alb  3.9  /  TBili  1.1  /  DBili  x   /  AST  13  /  ALT  8   /  AlkPhos  81  06-10    CAPILLARY BLOOD GLUCOSE  98 (10 Aries 2021 13:59)      POCT Blood Glucose.: 83 mg/dL (10 Aries 2021 11:56)    LIVER FUNCTIONS - ( 10 Aries 2021 04:43 )  Alb: 3.9 g/dL / Pro: 7.0 g/dL / ALK PHOS: 81 U/L / ALT: 8 U/L / AST: 13 U/L / GGT: x               Radiology:  CT Brain Stroke Protocol (06.10.21 @ 12:22)  Abnormal low-attenuation involving the left corona radiata region is again seen this is likely compatible with an old infarct.    CT Angio Neck w/ IV Cont (06.10.21 @ 12:22)  IMPRESSION: Limited poorly diagnostic CT angiogram of the neck and [place].  Grossly, no significant stenosis is seen involving the distal common carotid, proximal internal and external carotid arteries though better quality CT angiogram of the neck is recommended when possible. Limited evaluation of the vertebral arteries appear grossly normal Neurology  Consult Note  06-10-21    Name:  DESHAWN TIWARI; 81y (1940)    Reason for Admission: Chills without fever      Neurology consult: 80yo R-handed man with PMH of CVA (1998 with residual RUE weakness), HTN, HLD, DM, PPM, and dementia (baseline AAOx1-2) presented to the ED with abdominal distension and pain after eating dinner yesterday. Neurology consulted as code stroke. At approximately 1155h while in the ED, patient was noticed to be lethargic with possible L-sided weakness and L facial droop. Code stroke was called. Patient's wife (Ms. Multani [phone]) was called for collateral and for possible tPA administration as the LKN was initially unclear. She reports patient was less responsive yesterday evening which was why she called EMS but he was alert and talking today. Of note patient was being treated for a UTI and had been on ciprofloxacin for 4 days.     Patient was reportedly at baseline with LKN 0815h this morning when he was conversant. Patient was subsequently on 1:1 monitoring for agitation, described as restlessness, but was noted to be calm and potentially sleeping after hygiene maintenance.    NIHSS 19. Pre-MRS 4 as he needs assistance with ADLs and uses a wheelchair. CTH shows likely chronic L corona radiata infarct with no acute infarct. CTA was a limited study but showed no LVO. Patient was not a candidate for tPA since he was out of window and was not a candidate for thrombectomy due to no LVO.    Review of Systems:  CONSTITUTIONAL:   HEENT:  No visual loss, blurred vision, double vision.  No hearing loss, sneezing, congestion, runny nose or sore throat.  SKIN:  No rash or itching.  CARDIOVASCULAR:  No chest pain, chest pressure or chest discomfort. No palpitations or edema.  RESPIRATORY:  No shortness of breath, cough or sputum.  GASTROINTESTINAL:  No anorexia, nausea, vomiting or diarrhea. No abdominal pain.  GENITOURINARY:  NO dysuria. No increased frequency. No retention. No incontinence.  NEUROLOGICAL:  See HPI  MUSCULOSKELETAL:  No muscle, back pain, joint pain or stiffness.  HEMATOLOGIC:  No anemia, bleeding or bruising.  PSYCHIATRIC:    ENDOCRINOLOGIC:  No reports of sweating, cold or heat intolerance. No polyuria or polydipsia.        PMHx:   CVA (cerebral infarction)  HTN (hypertension)  Diabetes  Hyperlipidemia  Dementia      FH:   diabetes mellitus  dementia      PSuHx:   Cardiac pacemaker    SHx: lives with wife, walks with walker at baseline    Medications:  MEDICATIONS  (STANDING):  heparin   Injectable 5000 Unit(s) SubCutaneous every 8 hours  hydrALAZINE Injectable 10 milliGRAM(s) IV Push once      Vitals:  T(C): 36.1 (06-10-21 @ 06:05), Max: 36.9 (06-09-21 @ 22:56)  HR: 60 (06-10-21 @ 12:36) (60 - 77)  BP: 212/96 (06-10-21 @ 12:36) (102/67 - 212/96)  RR: 18 (06-10-21 @ 12:36) (16 - 18)  SpO2: 99% (06-10-21 @ 12:36) (98% - 100%)    Physical Examination:  Gen: NAD, nontoxic  HEENT: normocephalic, atraumatic, eyes non icteric, MMM    Neurologic:  - Mental Status: Stuporous with eyes closed.  - Cranial Nerves: Pupils are 2mm, equal, round, and reactive to light; Mild OD esotropia otherwise eyes in primary gaze. Face shows mild/moderate L lower facial palsy, however patient has hx of chronic R facial and is missing bottom teeth. Head turning intact.  - Motor: RUE is chronically contracted. Rest of extremities are antigravity but hit the bed within 5 seconds.  - Reflexes: 3+ and symmetric at the L biceps, brachioradialis, 2+ in the R triceps, 1+ in the R brachioradialis, 1+ L knee, and 2+ R knee. Plantar responses down in the L and up in the R.  - Sensory: Withdraws throughout extremities to noxious stimuli  - Coordination: Patient unable to complete task.    Labs:                        12.3   5.00  )-----------( 168      ( 10 Aries 2021 04:43 )             38.8     06-10    140  |  106  |  18  ----------------------------<  99  4.1   |  22  |  1.35<H>    Ca    9.9      10 Aries 2021 04:43  Phos  3.3     06-10  Mg     2.0     06-10    TPro  7.0  /  Alb  3.9  /  TBili  1.1  /  DBili  x   /  AST  13  /  ALT  8   /  AlkPhos  81  06-10    CAPILLARY BLOOD GLUCOSE  98 (10 Aries 2021 13:59)      POCT Blood Glucose.: 83 mg/dL (10 Aries 2021 11:56)    LIVER FUNCTIONS - ( 10 Aries 2021 04:43 )  Alb: 3.9 g/dL / Pro: 7.0 g/dL / ALK PHOS: 81 U/L / ALT: 8 U/L / AST: 13 U/L / GGT: x               Radiology:  CT Brain Stroke Protocol (06.10.21 @ 12:22)  Abnormal low-attenuation involving the left corona radiata region is again seen this is likely compatible with an old infarct.    CT Angio Neck w/ IV Cont (06.10.21 @ 12:22)  IMPRESSION: Limited poorly diagnostic CT angiogram of the neck and [place].  Grossly, no significant stenosis is seen involving the distal common carotid, proximal internal and external carotid arteries though better quality CT angiogram of the neck is recommended when possible. Limited evaluation of the vertebral arteries appear grossly normal

## 2021-06-10 NOTE — H&P ADULT - ASSESSMENT
82 y/o M with PMH of CVA with residual RUE weakness, HTN, HLD, GERD who presents to ED with abdominal pain. Course complicated by code stroke, now admitted for further stroke workup

## 2021-06-10 NOTE — ED ADULT NURSE NOTE - CHIEF COMPLAINT QUOTE
Pt w/ hx of GERD CVA with residual right side weakness, dementia baseline aaox2 minimally verbal follows commands c/o abdominal pain after eating tonight [facility] Pt called 911, denied nausea vomiting fever chills  Daughter Nerissa [phone]

## 2021-06-10 NOTE — ED PROVIDER NOTE - ATTENDING CONTRIBUTION TO CARE
MD Cramer:  I performed a face to face bedside interview with patient regarding history of present illness, review of symptoms and past medical history. I completed an independent physical exam(documented below).  I have discussed patient's plan of care with resident.   I agree with note as stated above, having amended the EMR as needed to reflect my findings. I have discussed the assessment and plan of care.  This includes during the time I functioned as the attending physician for this patient.  PE:  Gen: Alert, NAD  Head: NC, AT,  EOMI, normal lids/conjunctiva  ENT:  normal hearing, patent oropharynx without erythema/exudate  Neck: +supple, no tenderness/meningismus/JVD, +Trachea midline  Chest: no chest wall tenderness, equal chest rise  Pulm: Bilateral BS, normal resp effort, no wheeze/stridor/retractions  CV: RRR, no M/R/G, +dist pulses  Abd: +BS, soft, NT/ND  Rectal: deferred  Mskel: no edema/erythema/cyanosis  Skin: no rash  Neuro: AAOx3, no sensory/motor deficits, CN 2-12 intact   MDM:   82yo MD Cramer:  I performed a face to face bedside interview with patient regarding history of present illness, review of symptoms and past medical history. I completed an independent physical exam(documented below).  I have discussed patient's plan of care with resident.   I agree with note as stated above, having amended the EMR as needed to reflect my findings. I have discussed the assessment and plan of care.  This includes during the time I functioned as the attending physician for this patient.  PE:  Gen: Alert, NAD  Head: NC, AT,  EOMI, normal lids/conjunctiva  ENT:  normal hearing, patent oropharynx without erythema/exudate  Neck: +supple, no tenderness/meningismus/JVD, +Trachea midline  Chest: no chest wall tenderness, equal chest rise  Pulm: Bilateral BS, normal resp effort, no wheeze/stridor/retractions  CV: RRR, no M/R/G, +dist pulses  Abd: +BS, soft, NT/ND  Rectal: deferred  Mskel: no edema/erythema/cyanosis  Skin: no rash  Neuro: AAOx2, moves all extremities, follows some commands but confused  MDM:   80yo M w/ pmh as above (including dementia), bibems from home for complaints of abd pain after eating, however on our assessment - pt denying abd pain and does not know why he is here. Pt is confused (likely baseline given dementia) and refusing labs/workup. Tried contacting wife/daughter for collateral but unsuccessful, left VM. Will medicate to allow for w/u.

## 2021-06-10 NOTE — ED PROVIDER NOTE - CLINICAL SUMMARY MEDICAL DECISION MAKING FREE TEXT BOX
81M with PMH including GERD and CVA with RUE weakness p/w abdominal pain after eating dinner per EMS. On arrival to ED pt denies every having any symptoms. Pt well appearing, no new deficits on exam, abdomen completely benign. Collateral unable to be obtained. Will assess basic labs, ua, ekg, then determine need for further evaluation.

## 2021-06-10 NOTE — ED PROVIDER NOTE - OBJECTIVE STATEMENT
81M with PMH including HTN, HLD, DM, GERD, and CVA with RUE weakness p/w abdominal pain after eating dinner per EMS. On arrival to ED pt is A&Ox2 denies ever having any symptoms including fever, HA, chest pain, abd pain, N/V/D, new numbness/weakness.

## 2021-06-10 NOTE — PROCEDURE NOTE - ADDITIONAL PROCEDURE DETAILS
Appropriate pacing and sensing  Excellent capture threshold  Patient had some 3 episodes of NSVT since Jan 2021. The longest was 6 beats and the most recent event occurred on June 7, 2021 and had 3 beats of NSVT   No reprogramming done Appropriate pacing and sensing  Excellent capture threshold  Patient had some 3 episodes of NSVT since Jan 2021. The longest was 6 beats and the most recent event occurred on June 7, 2021 and had 3 beats of NSVT   No reprogramming done    MRI PPM mri conditional Appropriate pacing and sensing  Excellent capture threshold  Patient had some 3 episodes of NSVT since Jan 2021. The longest was 6 beats and the most recent event occurred on June 7, 2021 and had 3 beats of NSVT   No reprogramming done    MRI PPM and RA/RV leads are MRI conditional per [employer] representative Ellis ([phone]). Cardiology order form was fax to [facility] at [phone].

## 2021-06-10 NOTE — ED ADULT NURSE NOTE - OBJECTIVE STATEMENT
81 year old male received to rm 22 AAOx2. Per triage note pt called EMS for abd pain after eating. PMHx dementia. Pt presents with confusion. Pt denies pain at this time, stating "I don't know why I'm here, I have no pain.. who brought me here, where is my wife?" Pt refusing vital signs and blood work. MD Reyes made aware. Abd soft, round, non tender to palpation. Respirations even and unlabored sating 100% on room air. Bed in lowest position, safety maintained. 81 year old male received to rm 22 AAOx2. Per triage note pt called EMS for abd pain after eating. PMHx dementia. Pt presents with confusion. Pt denies pain at this time, stating "I don't know why I'm here, I have no pain.. who brought me here, where is my wife?" Pt refusing vital signs and blood work. MD Reyes made aware. Abd soft, round, non tender to palpation. Respirations even and unlabored sating 100% on room air. Pt arrived in gown with no clothing/belongings. Pt dentures placed in denture box with patient label in bag at bedside. Bed in lowest position, safety maintained.

## 2021-06-10 NOTE — H&P ADULT - PROBLEM SELECTOR PLAN 4
-Permissive HTN to 220 systolic for 24 hrs as per neuro -Permissive HTN to 220 systolic for 24 hrs as per neuro  -restart home meds tomorrow as tolerated

## 2021-06-10 NOTE — H&P ADULT - PROBLEM SELECTOR PLAN 1
-Patient with history of CVA with RUE weakness  -Patient noted to have LUE weakness and facial droop in ED and code stroke called  -Seen by neuro. Being admitted for stroke workup  -Needs MRI brain w/o con; MRA neck w/wocon  -TTE; PPM interrogation   -ASA/statin  -permissive HTN x 24 hrs  -Tele monitoring  -Check hgbA1c and lipid panel  -Speech and swallow and PT eval -Patient with history of CVA with RUE weakness  -Patient noted to have LUE weakness and facial droop in ED and code stroke called  -Seen by neuro. Being admitted for stroke workup  -Needs MRI brain however unable to do due to PPM.  -Will check repeat CTH non con in 24-48 hrs per neuro   -TTE; PPM interrogation   -ASA/statin  -permissive HTN x 24 hrs  -Tele monitoring  -Check hgbA1c and lipid panel  -Speech and swallow and PT eval

## 2021-06-10 NOTE — CONSULT NOTE ADULT - ASSESSMENT
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 Neurology consulted as code stroke. Patient was reportedly at baseline with LKN 0815h this morning when he was conversant. At approximately 1155h while in the ED, patient was noticed to be lethargic with possible L-sided weakness and L facial droop. Physical exam remarkable for altered stuporous mental status, RUE contracture, L facial droop, and extremities antigravity to noxious stimuli. NIHSS 19. Pre-MRS 4. CTH shows likely chronic L corona radiata infarct with no acute infarct. CTA was a limited study but showed no LVO. Patient was not a candidate for tPA since he was out of window and was not a candidate for thrombectomy due to no LVO.    Impression: L-hemiparesis and altered mental status possibly due to R brain dysfunction due to unclear etiology, r/o acute ischemic CVA of unclear mechanism vs. toxic-metabolic encephalopathy in the setting of fecal impaction.    Recommendations:  [] permissive HTN for 24 hours up to 220/110  [] gradual normotension after 24 hrs  [] telemetry monitoring  [] MRI head non contrast  [] STAT repeat CTH if change in neuro status  [] TTE with bubble  [] Continue ASA 81 daily if passes dysphagia, otherwise 300mg per rectum  [] start atorvastatin 80 mg daily, titrate to LDL<70  [] DVT ppx  [] CBC, BMP, LFTs, TSH  [] Lipid panel  [] HbA1C  [] PT/OT  [] S/S  [] dysphagia screen    Case to be seen and discussed with neurology attending Dr. Kang. 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 Neurology consulted as code stroke. Patient was reportedly at baseline with LKN 0815h this morning when he was conversant. At approximately 1155h while in the ED, patient was noticed to be lethargic with possible L-sided weakness and L facial droop. Physical exam remarkable for altered stuporous mental status, RUE contracture, L facial droop, and extremities antigravity to noxious stimuli. NIHSS 19. Pre-MRS 4. CTH shows likely chronic L corona radiata infarct with no acute infarct. CTA was a limited study but showed no LVO. Patient was not a candidate for tPA since he was out of window and was not a candidate for thrombectomy due to no LVO.    Impression: L-hemiparesis and altered mental status possibly due to R brain dysfunction of unclear etiology, r/o acute ischemic CVA of unclear mechanism vs. toxic-metabolic encephalopathy in the setting of fecal impaction and recent UTI.    Recommendations:  [] permissive HTN for 24 hours up to 220/110  [] gradual normotension after 24 hrs  [] telemetry monitoring  [] MRI head non contrast  [] STAT repeat CTH if change in neuro status  [] TTE with bubble  [] Continue ASA 81 daily if passes dysphagia, otherwise 300mg per rectum  [] start atorvastatin 80 mg daily, titrate to LDL<70  [] DVT ppx  [] CBC, BMP, LFTs, TSH  [] Lipid panel  [] HbA1C  [] PT/OT  [] S/S  [] dysphagia screen    Case to be seen and discussed with neurology attending Dr. Kang. 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 Neurology consulted as code stroke. Patient was reportedly at baseline with LKN 0815h this morning when he was conversant. At approximately 1155h while in the ED, patient was noticed to be lethargic with possible L-sided weakness and L facial droop. Physical exam remarkable for altered stuporous mental status, RUE contracture, L facial droop, and extremities antigravity to noxious stimuli. NIHSS 19. Pre-MRS 4. CTH shows likely chronic L corona radiata infarct with no acute infarct. CTA was a limited study but showed no LVO. Patient was not a candidate for tPA since he was out of window and was not a candidate for thrombectomy due to no LVO.    Impression: L-hemiparesis and altered mental status possibly due to R brain dysfunction of unclear etiology, r/o acute ischemic CVA of unclear mechanism vs. toxic-metabolic encephalopathy in the setting of fecal impaction and recent UTI vs. seizure.    Recommendations:  [] permissive HTN for 24 hours up to 220/110  [] gradual normotension after 24 hrs  [] telemetry monitoring  [] MRI head non contrast  [] STAT repeat CTH if change in neuro status  [] TTE with bubble  [] Would consider vEEG if no improvement in mental status but would prioritize MRI at this time  [] Continue ASA 81 daily if passes dysphagia, otherwise 300mg per rectum  [] start atorvastatin 80 mg daily, titrate to LDL<70  [] DVT ppx  [] CBC, BMP, LFTs, TSH  [] Lipid panel  [] HbA1C  [] PT/OT  [] S/S  [] dysphagia screen    Case to be seen and discussed with neurology attending Dr. Kang.

## 2021-06-10 NOTE — H&P ADULT - HISTORY OF PRESENT ILLNESS
82 y/o M with PMH of CVA with residual RUE weakness, HTN, HLD, GERD who presents to ED with abdominal pain. During my evaluation patient found to be sedated and unable to provide any history. Per ED documentation patient initially presented with abdominal pain. Per wife patient was recently diagnosed with UTI and is on 4th day of cipro at home. He didnt eat anything all day and grabbed his stomach in pain at which point patient presented to ED. In ED patient underwent CT which showed significant stool burden. Overnight patient also with agitation and received ativan and haldol. After attempted disimpaction by ED provider, patient noted to have facial droop and LUE weakness at which point stroke code was called. At this time patient is responsive to sternal rub. He is able to tell his name but no further history could be obtained.     Vital Signs Last 24 Hrs  T(C): 36.7 (10 Aries 2021 14:43), Max: 36.9 (09 Jun 2021 22:56)  T(F): 98 (10 Aries 2021 14:43), Max: 98.4 (09 Jun 2021 22:56)  HR: 65 (10 Aries 2021 14:43) (60 - 77)  BP: 229/128 (10 Aries 2021 14:43) (102/67 - 229/128)  BP(mean): --  RR: 12 (10 Aries 2021 14:43) (12 - 18)  SpO2: 100% (10 Aries 2021 14:43) (98% - 100%)  80 y/o M with PMH of CVA with residual RUE weakness, HTN, HLD, GERD who presents to ED with abdominal pain. During my evaluation patient found to be sedated and unable to provide any history. Per ED documentation patient initially presented with abdominal pain. Per wife patient was recently diagnosed with UTI and is on 4th day of cipro at home. He didnt eat anything all day and grabbed his stomach in pain at which point patient presented to ED. In ED patient underwent CT which showed significant stool burden. Overnight patient also with agitation and received ativan and haldol. After attempted disimpaction by ED provider, patient noted to have facial droop and LUE weakness at which point stroke code was called. At this time patient is responsive to sternal rub. He is able to tell his name but no further history could be obtained. Per wife PPM was placed about 20 years ago. Not sure if he has had MRI since PPM placement.     Vital Signs Last 24 Hrs  T(C): 36.7 (10 Aries 2021 14:43), Max: 36.9 (09 Jun 2021 22:56)  T(F): 98 (10 Aries 2021 14:43), Max: 98.4 (09 Jun 2021 22:56)  HR: 65 (10 Aries 2021 14:43) (60 - 77)  BP: 229/128 (10 Aries 2021 14:43) (102/67 - 229/128)  BP(mean): --  RR: 12 (10 Aries 2021 14:43) (12 - 18)  SpO2: 100% (10 Aries 2021 14:43) (98% - 100%)

## 2021-06-10 NOTE — H&P ADULT - PROBLEM SELECTOR PLAN 7
-Recent diagnosis on UTI  -S/p 4 day course of cipro  -UA here negative  -Monitor off antibiotics now
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
## 2021-06-10 NOTE — ED ADULT NURSE REASSESSMENT NOTE - NS ED NURSE REASSESS COMMENT FT1
Pt straight cathed with 200 cc clear vincenzo urine. Pt did not tolerate well, scant red blood noted upon insertion. UA/UC sent to lab.

## 2021-06-10 NOTE — ED PROVIDER NOTE - PHYSICAL EXAMINATION
Gen: AAOx2, non-toxic  Head: NCAT  HEENT: EOMI, oral mucosa moist, normal conjunctiva  Lung: CTAB, no respiratory distress, no wheezes/rhonchi/rales B/L, speaking in full sentences  CV: RRR, no murmurs, rubs or gallops  Abd: soft, NTND, no guarding, no CVA tenderness  MSK: full ROM of both legs w/o pain or limitation. no visible deformities  Neuro: +contracted RUE, no other deficits noted  Skin: Warm, well perfused, no rash  Psych: normal affect.     Demarcus Reyes PGY3
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
## 2021-06-11 LAB
A1C WITH ESTIMATED AVERAGE GLUCOSE RESULT: 5.5 % — SIGNIFICANT CHANGE UP (ref 4–5.6)
ANION GAP SERPL CALC-SCNC: 12 MMOL/L — SIGNIFICANT CHANGE UP (ref 7–14)
BUN SERPL-MCNC: 12 MG/DL — SIGNIFICANT CHANGE UP (ref 7–23)
CALCIUM SERPL-MCNC: 9.9 MG/DL — SIGNIFICANT CHANGE UP (ref 8.4–10.5)
CHLORIDE SERPL-SCNC: 104 MMOL/L — SIGNIFICANT CHANGE UP (ref 98–107)
CHOLEST SERPL-MCNC: 199 MG/DL — SIGNIFICANT CHANGE UP
CO2 SERPL-SCNC: 21 MMOL/L — LOW (ref 22–31)
COVID-19 SPIKE DOMAIN AB INTERP: POSITIVE
COVID-19 SPIKE DOMAIN ANTIBODY RESULT: >250 U/ML — HIGH
CREAT SERPL-MCNC: 1.07 MG/DL — SIGNIFICANT CHANGE UP (ref 0.5–1.3)
CULTURE RESULTS: NO GROWTH — SIGNIFICANT CHANGE UP
ESTIMATED AVERAGE GLUCOSE: 111 MG/DL — SIGNIFICANT CHANGE UP (ref 68–114)
GLUCOSE SERPL-MCNC: 64 MG/DL — LOW (ref 70–99)
HCT VFR BLD CALC: 41.9 % — SIGNIFICANT CHANGE UP (ref 39–50)
HDLC SERPL-MCNC: 53 MG/DL — SIGNIFICANT CHANGE UP
HGB BLD-MCNC: 13.7 G/DL — SIGNIFICANT CHANGE UP (ref 13–17)
LIPID PNL WITH DIRECT LDL SERPL: 133 MG/DL — HIGH
MAGNESIUM SERPL-MCNC: 1.9 MG/DL — SIGNIFICANT CHANGE UP (ref 1.6–2.6)
MCHC RBC-ENTMCNC: 30.1 PG — SIGNIFICANT CHANGE UP (ref 27–34)
MCHC RBC-ENTMCNC: 32.7 GM/DL — SIGNIFICANT CHANGE UP (ref 32–36)
MCV RBC AUTO: 92.1 FL — SIGNIFICANT CHANGE UP (ref 80–100)
NON HDL CHOLESTEROL: 146 MG/DL — HIGH
NRBC # BLD: 0 /100 WBCS — SIGNIFICANT CHANGE UP
NRBC # FLD: 0 K/UL — SIGNIFICANT CHANGE UP
PHOSPHATE SERPL-MCNC: 3 MG/DL — SIGNIFICANT CHANGE UP (ref 2.5–4.5)
PLATELET # BLD AUTO: 153 K/UL — SIGNIFICANT CHANGE UP (ref 150–400)
POTASSIUM SERPL-MCNC: 3.9 MMOL/L — SIGNIFICANT CHANGE UP (ref 3.5–5.3)
POTASSIUM SERPL-SCNC: 3.9 MMOL/L — SIGNIFICANT CHANGE UP (ref 3.5–5.3)
RBC # BLD: 4.55 M/UL — SIGNIFICANT CHANGE UP (ref 4.2–5.8)
RBC # FLD: 12.9 % — SIGNIFICANT CHANGE UP (ref 10.3–14.5)
SARS-COV-2 IGG+IGM SERPL QL IA: >250 U/ML — HIGH
SARS-COV-2 IGG+IGM SERPL QL IA: POSITIVE
SODIUM SERPL-SCNC: 137 MMOL/L — SIGNIFICANT CHANGE UP (ref 135–145)
SPECIMEN SOURCE: SIGNIFICANT CHANGE UP
TRIGL SERPL-MCNC: 63 MG/DL — SIGNIFICANT CHANGE UP
WBC # BLD: 4.01 K/UL — SIGNIFICANT CHANGE UP (ref 3.8–10.5)
WBC # FLD AUTO: 4.01 K/UL — SIGNIFICANT CHANGE UP (ref 3.8–10.5)

## 2021-06-11 PROCEDURE — 99222 1ST HOSP IP/OBS MODERATE 55: CPT | Mod: GC

## 2021-06-11 PROCEDURE — 99233 SBSQ HOSP IP/OBS HIGH 50: CPT

## 2021-06-11 PROCEDURE — 70450 CT HEAD/BRAIN W/O DYE: CPT | Mod: 26

## 2021-06-11 RX ORDER — DEXTROSE 50 % IN WATER 50 %
25 SYRINGE (ML) INTRAVENOUS ONCE
Refills: 0 | Status: DISCONTINUED | OUTPATIENT
Start: 2021-06-11 | End: 2021-06-14

## 2021-06-11 RX ORDER — HALOPERIDOL DECANOATE 100 MG/ML
2 INJECTION INTRAMUSCULAR ONCE
Refills: 0 | Status: COMPLETED | OUTPATIENT
Start: 2021-06-11 | End: 2021-06-11

## 2021-06-11 RX ORDER — GLUCAGON INJECTION, SOLUTION 0.5 MG/.1ML
1 INJECTION, SOLUTION SUBCUTANEOUS ONCE
Refills: 0 | Status: DISCONTINUED | OUTPATIENT
Start: 2021-06-11 | End: 2021-06-14

## 2021-06-11 RX ORDER — DEXTROSE 50 % IN WATER 50 %
25 SYRINGE (ML) INTRAVENOUS ONCE
Refills: 0 | Status: COMPLETED | OUTPATIENT
Start: 2021-06-11 | End: 2021-06-11

## 2021-06-11 RX ORDER — LANOLIN ALCOHOL/MO/W.PET/CERES
3 CREAM (GRAM) TOPICAL ONCE
Refills: 0 | Status: COMPLETED | OUTPATIENT
Start: 2021-06-11 | End: 2021-06-11

## 2021-06-11 RX ORDER — DEXTROSE 50 % IN WATER 50 %
12.5 SYRINGE (ML) INTRAVENOUS ONCE
Refills: 0 | Status: DISCONTINUED | OUTPATIENT
Start: 2021-06-11 | End: 2021-06-14

## 2021-06-11 RX ORDER — DEXTROSE 50 % IN WATER 50 %
15 SYRINGE (ML) INTRAVENOUS ONCE
Refills: 0 | Status: DISCONTINUED | OUTPATIENT
Start: 2021-06-11 | End: 2021-06-14

## 2021-06-11 RX ORDER — METOPROLOL TARTRATE 50 MG
25 TABLET ORAL DAILY
Refills: 0 | Status: DISCONTINUED | OUTPATIENT
Start: 2021-06-11 | End: 2021-06-14

## 2021-06-11 RX ADMIN — ATORVASTATIN CALCIUM 80 MILLIGRAM(S): 80 TABLET, FILM COATED ORAL at 21:56

## 2021-06-11 RX ADMIN — HEPARIN SODIUM 5000 UNIT(S): 5000 INJECTION INTRAVENOUS; SUBCUTANEOUS at 21:56

## 2021-06-11 RX ADMIN — HEPARIN SODIUM 5000 UNIT(S): 5000 INJECTION INTRAVENOUS; SUBCUTANEOUS at 05:07

## 2021-06-11 RX ADMIN — Medication 1 MILLIGRAM(S): at 15:12

## 2021-06-11 RX ADMIN — HALOPERIDOL DECANOATE 2 MILLIGRAM(S): 100 INJECTION INTRAMUSCULAR at 23:37

## 2021-06-11 RX ADMIN — Medication 25 GRAM(S): at 02:51

## 2021-06-11 RX ADMIN — Medication 25 MILLIGRAM(S): at 18:02

## 2021-06-11 NOTE — PHYSICAL THERAPY INITIAL EVALUATION ADULT - RANGE OF MOTION EXAMINATION, REHAB EVAL
right upper extremity in flexor synergy - shoulder flexion 0-60,/bilateral lower extremity ROM was WFL (within functional limits) right upper extremity in flexor synergy - shoulder flexion 0-60, elbow flexion WFL/bilateral lower extremity ROM was WFL (within functional limits)

## 2021-06-11 NOTE — PROVIDER CONTACT NOTE (OTHER) - ASSESSMENT
Subjective





- Date & Time of Evaluation


Date of Evaluation: 06/16/17


Time of Evaluation: 08:27





- Subjective


Subjective: 





S/O no complaints/distress, no f/c n/v/d. pending placement


A/P cont current plan





Objective





- Vital Signs/Intake and Output


Vital Signs (last 24 hours): 


 











Temp Pulse Resp BP Pulse Ox


 


 97.8 F   68   20   102/68   96 


 


 06/16/17 07:48  06/16/17 07:48  06/16/17 07:48  06/16/17 07:48  06/16/17 07:48











- Medications


Medications: 


 Current Medications





Atorvastatin Calcium (Lipitor)  20 mg PO HS [facility]


   Last Admin: 06/15/17 21:18 Dose:  20 mg


Divalproex Sodium (Depakote Dr(*Bid*))  500 mg PO BID [facility]


Enalapril Maleate (Vasotec)  10 mg PO DAILY [facility]


   Last Admin: 06/15/17 09:59 Dose:  Not Given


Fenofibrate (Tricor)  145 mg PO DAILY [facility]


   Last Admin: 06/15/17 09:59 Dose:  145 mg


Metoprolol Tartrate (Lopressor)  50 mg PO Q12 [facility]


   Last Admin: 06/15/17 21:19 Dose:  50 mg


Quetiapine Fumarate (Seroquel)  25 mg PO HS [facility]


   Last Admin: 06/15/17 21:19 Dose:  25 mg











- Labs


Labs: 


 





 03/23/17 05:45 





 03/23/17 05:45 





 











PT  11.2 SECONDS (9.4-12.0)   12/14/15  05:20    


 


INR  1.05  (0.89-1.20)   12/14/15  05:20    


 


APTT  36.2 SECONDS (23.1-32.0)  H  12/14/15  05:20    














- Constitutional


Appears: Well, Non-toxic, No Acute Distress





- Head Exam


Head Exam: ATRAUMATIC, NORMAL INSPECTION, NORMOCEPHALIC





- Eye Exam


Eye Exam: EOMI, Normal appearance, PERRL


Pupil Exam: NORMAL ACCOMODATION, PERRL





- ENT Exam


ENT Exam: Mucous Membranes Moist, Normal Exam





- Neck Exam


Neck Exam: Full ROM, Normal Inspection.  absent: Lymphadenopathy





- Respiratory Exam


Respiratory Exam: Clear to Ausculation Bilateral, NORMAL BREATHING PATTERN





- Cardiovascular Exam


Cardiovascular Exam: REGULAR RHYTHM, RRR, +S1, +S2.  absent: Murmur





- GI/Abdominal Exam


GI & Abdominal Exam: Soft, Normal Bowel Sounds.  absent: Tenderness





- Extremities Exam


Extremities Exam: Full ROM, Normal Capillary Refill, Normal Inspection.  absent

: Joint Swelling, Pedal Edema





- Back Exam


Back Exam: NORMAL INSPECTION





- Neurological Exam


Neurological Exam: Alert, Awake, CN II-XII Intact, Normal Gait, Oriented x3





- Psychiatric Exam


Psychiatric exam: Normal Affect, Normal Mood





- Skin


Skin Exam: Dry, Intact, Normal Color, Warm





Assessment and Plan


(1) DVT prophylaxis


Status: Chronic   





(2) Scrotal edema


Status: Chronic   





(3) CAD (coronary artery disease)


Status: Chronic   





(4) Smoking


Status: Chronic   





(5) Low back pain


Status: Chronic   





(6) Prediabetes


Status: Chronic   





(7) Neurocognitive disorder


Status: Chronic
pt a&ox0. pt is a rule out stroke with right sided residual.
pt a&ox0. pt is sedated from medications given in ED. pt fs is 77. fs has been trending down. pt is NPO.

## 2021-06-11 NOTE — PHYSICAL THERAPY INITIAL EVALUATION ADULT - PATIENT PROFILE REVIEW, REHAB EVAL
PT orders received, ambulate with assist. Consulted with Danelle SARGENT, pt may participate in PT evaluation./yes

## 2021-06-11 NOTE — PHYSICAL THERAPY INITIAL EVALUATION ADULT - ADDITIONAL COMMENTS
Pt poor historian - per care coordination assessment, pt resides with wife in a house and is non-ambulatory for >1 year. Pt poor historian - per care coordination assessment, pt resides with wife in a house (4 steps to enter) and is non-ambulatory for >1 year. Pt OOB to chair at home with assist. Pt has home health aide 7 days/week, 9.5 hours/day. Pt owns wheelchair, rolling walker and commode.

## 2021-06-11 NOTE — SWALLOW BEDSIDE ASSESSMENT ADULT - SWALLOW EVAL: DIAGNOSIS
1. Patient demonstrated functional oral management for puree, honey thick, nectar thick and thin liquid textures marked by adequate bolus collection, transfer, and posterior transport. 2. Patient demonstrated a mild oral dysphagia for solids marked by adequate bolus acceptance with prolonged bolus manipulation resulting in delayed bolus collection, transfer, and posterior transport. Trace lingual residue noted subsequent to deglutition, which cleared with utilization of liquid wash.3. Patient demonstrated a judged pharyngeal phase of swallow for puree, solids, honey thick, and nectar thick liquid textures marked by a suspected timely pharyngeal swallow trigger with hyolaryngeal elevation noted upon digital palpation without evidence of airway penetration/aspiration. 4. Patient demonstrated a moderate pharyngeal dysphagia for thin liquid textures marked by a suspected delayed pharyngeal swallow trigger with hyolaryngeal elevation noted upon digital palpation resulting in change in

## 2021-06-11 NOTE — PHYSICAL THERAPY INITIAL EVALUATION ADULT - MANUAL MUSCLE TESTING RESULTS, REHAB EVAL
left lower extremity grossly 3/5, right knee extension 3/5, hip flexion 2+/5, ankle dorsiflexion 1/5

## 2021-06-11 NOTE — SWALLOW BEDSIDE ASSESSMENT ADULT - COMMENTS
Consult received and chart reviewed. Patient seen at bedside this AM for an initial assessment of swallow function, at which time the patient was alert and cooperative. Per patient report, patient consumes regular solids with thin liquid textures at baseline. Patient denies any pain pre/post today's assessment.     Per charting, patient is an "82 y/o M with PMH of CVA with residual RUE weakness, HTN, HLD, GERD who presents to ED with abdominal pain. During my evaluation patient found to be sedated and unable to provide any history. Per ED documentation patient initially presented with abdominal pain. Per wife patient was recently diagnosed with UTI and is on 4th day of cipro at home. He didn't eat anything all day and grabbed his stomach in pain at which point patient presented to ED. In ED patient underwent CT which showed significant stool burden. Overnight patient also with agitation and received ativan and haldol. After attempted disimpaction by ED provider, patient noted to have facial droop and LUE weakness at which point stroke code was called. At this time patient is responsive to sternal rub. He is able to tell his name but no further history could be obtained. Per wife PPM was placed about 20 years ago. Not sure if he has had MRI since PPM placement."    WBC is WFL. Most recent CT of the brain revealed "Old infarct". Most recent CT angio of head revealed "Limited poorly diagnostic CT angiogram of the neck and [place] of [place]."    Discussed results with patient, RN, and will page care team.

## 2021-06-11 NOTE — SWALLOW BEDSIDE ASSESSMENT ADULT - ORAL PHASE
Lingual stasis cleared with liquid wash./Decreased anterior-posterior movement of the bolus/Delayed oral transit time/Lingual stasis Within functional limits

## 2021-06-11 NOTE — PROVIDER CONTACT NOTE (OTHER) - ACTION/TREATMENT ORDERED:
ACP made aware. providers want to keep blood pressure above 160 because of rule out stroke. will continue to monitor

## 2021-06-11 NOTE — PROVIDER CONTACT NOTE (OTHER) - ACTION/TREATMENT ORDERED:
ACP made aware. D50 IV push ordered. will continue to monitor ACP made aware. Dextrose 50% IV push ordered. will continue to monitor

## 2021-06-11 NOTE — SWALLOW BEDSIDE ASSESSMENT ADULT - PHARYNGEAL PHASE
Within functional limits Delayed pharyngeal swallow/Decreased laryngeal elevation/Wet vocal quality post oral intake/Delayed throat clear post oral intake Delayed pharyngeal swallow/Wet vocal quality post oral intake/Throat clear post oral intake

## 2021-06-11 NOTE — PROGRESS NOTE ADULT - SUBJECTIVE AND OBJECTIVE BOX
[facility] Division of Hospital Medicine  Yamilka Garza MD  Pager (M-F, 8A-5P): [phone]  Other Times:      Patient is a 81y old  Male who presents with a chief complaint of Abdominal pain (10 Aries 2021 14:48)    SUBJECTIVE / OVERNIGHT EVENTS:  Pt feels constipated - denies pain or sob.  He is mentating at baseline.     MEDICATIONS  (STANDING):  aspirin Suppository 300 milliGRAM(s) Rectal daily  atorvastatin 80 milliGRAM(s) Oral at bedtime  bisacodyl Suppository 10 milliGRAM(s) Rectal daily  dextrose 40% Gel 15 Gram(s) Oral once  dextrose 50% Injectable 25 Gram(s) IV Push once  dextrose 50% Injectable 12.5 Gram(s) IV Push once  dextrose 50% Injectable 25 Gram(s) IV Push once  glucagon  Injectable 1 milliGRAM(s) IntraMuscular once  heparin   Injectable 5000 Unit(s) SubCutaneous every 8 hours    MEDICATIONS  (PRN):      CAPILLARY BLOOD GLUCOSE  98 (10 Aries 2021 13:59)      POCT Blood Glucose.: 161 mg/dL (2021 03:29)  POCT Blood Glucose.: 82 mg/dL (2021 02:54)  POCT Blood Glucose.: 77 mg/dL (2021 01:15)    I&O's Summary      PHYSICAL EXAM:  Vital Signs Last 24 Hrs  T(C): 37.1 (2021 10:27), Max: 37.1 (2021 05:05)  T(F): 98.7 (2021 10:27), Max: 98.7 (2021 05:05)  HR: 60 (2021 10:27) (60 - 66)  BP: 163/98 (2021 10:27) (163/98 - 229/128)  BP(mean): --  RR: 17 (2021 10:27) (12 - 19)  SpO2: 97% (2021 10:27) (97% - 100%)    CONSTITUTIONAL: NAD, well-developed, well-groomed  EYES: EOMI; conjunctiva and sclera clear  ENMT: Moist oral mucosa  RESPIRATORY: Normal respiratory effort; lungs are clear to auscultation bilaterally  CARDIOVASCULAR: Regular rate and rhythm, normal S1 and S2, no murmur; No lower extremity edema  ABDOMEN: Nontender to palpation, normoactive bowel sounds, no rebound/guarding  MUSCULOSKELETAL:   no clubbing or cyanosis of digits; RUE contracture   PSYCH: A+O to person, place, and time; affect appropriate  NEUROLOGY: CN 2-12 are intact and symmetric; no gross sensory deficits   SKIN: No rashes; no palpable lesions    LABS:                        13.7   4.01  )-----------( 153      ( 2021 08:07 )             41.9     06-11    137  |  104  |  12  ----------------------------<  64<L>  3.9   |  21<L>  |  1.07    Ca    9.9      2021 08:07  Phos  3.0     06-  Mg     1.9     06-11    TPro  7.0  /  Alb  3.9  /  TBili  1.1  /  DBili  x   /  AST  13  /  ALT  8   /  AlkPhos  81  06-10          Urinalysis Basic - ( 10 Aries 2021 07:41 )    Color: Yellow / Appearance: Clear / S.025 / pH: x  Gluc: x / Ketone: Negative  / Bili: Negative / Urobili: <2 mg/dL   Blood: x / Protein: Trace / Nitrite: Negative   Leuk Esterase: Negative / RBC: 25-50 /HPF / WBC 1 /HPF   Sq Epi: x / Non Sq Epi: Occasional / Bacteria: Negative        RADIOLOGY & ADDITIONAL TESTS:  Results Reviewed:   Imaging Personally Reviewed:  Electrocardiogram Personally Reviewed:    COORDINATION OF CARE:  Care Discussed with Consultants/Other Providers [Y/N]: Y  Prior or Outpatient Records Reviewed [Y/N]: Y

## 2021-06-11 NOTE — PHYSICAL THERAPY INITIAL EVALUATION ADULT - PERTINENT HX OF CURRENT PROBLEM, REHAB EVAL
Pt is an 81 year old male presenting to [facility] with abdominal pain. Per wife pt was recently dx with UTI. In ED pt underwent CT which showed significant stool burden.  After attempted disimpaction, pt noted to have facial droop and left upper extremity weakness at which point stroke code was called. PMH: CVA with residual right upper extremity weakness, HTN, HLD, GERD

## 2021-06-12 LAB
ANION GAP SERPL CALC-SCNC: 13 MMOL/L — SIGNIFICANT CHANGE UP (ref 7–14)
BUN SERPL-MCNC: 11 MG/DL — SIGNIFICANT CHANGE UP (ref 7–23)
CALCIUM SERPL-MCNC: 10.1 MG/DL — SIGNIFICANT CHANGE UP (ref 8.4–10.5)
CHLORIDE SERPL-SCNC: 105 MMOL/L — SIGNIFICANT CHANGE UP (ref 98–107)
CO2 SERPL-SCNC: 21 MMOL/L — LOW (ref 22–31)
CREAT SERPL-MCNC: 0.96 MG/DL — SIGNIFICANT CHANGE UP (ref 0.5–1.3)
GLUCOSE SERPL-MCNC: 83 MG/DL — SIGNIFICANT CHANGE UP (ref 70–99)
HCT VFR BLD CALC: 42.8 % — SIGNIFICANT CHANGE UP (ref 39–50)
HGB BLD-MCNC: 13.9 G/DL — SIGNIFICANT CHANGE UP (ref 13–17)
MAGNESIUM SERPL-MCNC: 1.9 MG/DL — SIGNIFICANT CHANGE UP (ref 1.6–2.6)
MCHC RBC-ENTMCNC: 30.2 PG — SIGNIFICANT CHANGE UP (ref 27–34)
MCHC RBC-ENTMCNC: 32.5 GM/DL — SIGNIFICANT CHANGE UP (ref 32–36)
MCV RBC AUTO: 92.8 FL — SIGNIFICANT CHANGE UP (ref 80–100)
NRBC # BLD: 0 /100 WBCS — SIGNIFICANT CHANGE UP
NRBC # FLD: 0 K/UL — SIGNIFICANT CHANGE UP
PHOSPHATE SERPL-MCNC: 3.3 MG/DL — SIGNIFICANT CHANGE UP (ref 2.5–4.5)
PLATELET # BLD AUTO: 156 K/UL — SIGNIFICANT CHANGE UP (ref 150–400)
POTASSIUM SERPL-MCNC: 3.5 MMOL/L — SIGNIFICANT CHANGE UP (ref 3.5–5.3)
POTASSIUM SERPL-SCNC: 3.5 MMOL/L — SIGNIFICANT CHANGE UP (ref 3.5–5.3)
RBC # BLD: 4.61 M/UL — SIGNIFICANT CHANGE UP (ref 4.2–5.8)
RBC # FLD: 12.5 % — SIGNIFICANT CHANGE UP (ref 10.3–14.5)
SODIUM SERPL-SCNC: 139 MMOL/L — SIGNIFICANT CHANGE UP (ref 135–145)
WBC # BLD: 4.23 K/UL — SIGNIFICANT CHANGE UP (ref 3.8–10.5)
WBC # FLD AUTO: 4.23 K/UL — SIGNIFICANT CHANGE UP (ref 3.8–10.5)

## 2021-06-12 PROCEDURE — 93306 TTE W/DOPPLER COMPLETE: CPT | Mod: 26

## 2021-06-12 PROCEDURE — 90792 PSYCH DIAG EVAL W/MED SRVCS: CPT | Mod: 95

## 2021-06-12 PROCEDURE — 99232 SBSQ HOSP IP/OBS MODERATE 35: CPT

## 2021-06-12 RX ORDER — ISOSORBIDE MONONITRATE 60 MG/1
30 TABLET, EXTENDED RELEASE ORAL DAILY
Refills: 0 | Status: DISCONTINUED | OUTPATIENT
Start: 2021-06-12 | End: 2021-06-14

## 2021-06-12 RX ORDER — ASPIRIN/CALCIUM CARB/MAGNESIUM 324 MG
81 TABLET ORAL DAILY
Refills: 0 | Status: DISCONTINUED | OUTPATIENT
Start: 2021-06-12 | End: 2021-06-14

## 2021-06-12 RX ADMIN — HEPARIN SODIUM 5000 UNIT(S): 5000 INJECTION INTRAVENOUS; SUBCUTANEOUS at 22:56

## 2021-06-12 RX ADMIN — Medication 81 MILLIGRAM(S): at 17:45

## 2021-06-12 RX ADMIN — HEPARIN SODIUM 5000 UNIT(S): 5000 INJECTION INTRAVENOUS; SUBCUTANEOUS at 06:59

## 2021-06-12 RX ADMIN — Medication 25 MILLIGRAM(S): at 06:58

## 2021-06-12 RX ADMIN — HEPARIN SODIUM 5000 UNIT(S): 5000 INJECTION INTRAVENOUS; SUBCUTANEOUS at 14:20

## 2021-06-12 RX ADMIN — ISOSORBIDE MONONITRATE 30 MILLIGRAM(S): 60 TABLET, EXTENDED RELEASE ORAL at 17:45

## 2021-06-12 NOTE — BH CONSULTATION LIAISON ASSESSMENT NOTE - NSBHDSMAXES_PSY_ALL_CORE
Regarding: [place]  Out of Advair HE NEEDS IT. Can Dr Weinstein do a one time His allergist is non Rachell  ----- Message from Sofy Stafford sent at 9/6/2020 10:21 AM CDT -----  Patient Name: Dave Lindsey    Specialist or PCP Full Name: NAUN Corinna    Pregnant (If Yes, how long?)n    Symptoms:Out of Advair HE NEEDS IT. Can Dr Weinstein do a one time His allergist is non [facility]achell uses twice a day  Do you or any of your household members have the following symptoms:  Fever >100.4#F or >38.0#C: No    New or worsening cough, shortness of breath, or sore throat: No    New onset of nausea, vomiting or diarrhea: No    New onset of loss of taste or smell, chills, repeated shaking with chills, muscle pain, or headache: No    Have you, a household member, or another person you have been in contact with tested positive for COVID-19 in the last 14 days?: No    Call Back #:    Call Center Account #:[id number]    Which State are you currently located in? (if [place], [place], [place] or [place], enter State name in Summary field):[place]    Please update the Demographics section with the patients permanent resident address     Emergent COVID-19 Symptoms requiring Nurse Triage:  Trouble breathing, Persistent pain or pressure in the chest, New confusion, Inability to wake or stay awake, Bluish lips or face       See above

## 2021-06-12 NOTE — PROGRESS NOTE ADULT - SUBJECTIVE AND OBJECTIVE BOX
Neurology Progress    DESHAWN [id number]    HPI:  80 y/o M with PMH of CVA with residual RUE weakness, HTN, HLD, GERD who presents to ED with abdominal pain. During my evaluation patient found to be sedated and unable to provide any history. Per ED documentation patient initially presented with abdominal pain. Per wife patient was recently diagnosed with UTI and is on 4th day of cipro at home. He didnt eat anything all day and grabbed his stomach in pain at which point patient presented to ED. In ED patient underwent CT which showed significant stool burden. Overnight patient also with agitation and received ativan and haldol. After attempted disimpaction by ED provider, patient noted to have facial droop and LUE weakness at which point stroke code was called. At this time patient is responsive to sternal rub. He is able to tell his name but no further history could be obtained. Per wife PPM was placed about 20 years ago. Not sure if he has had MRI since PPM placement.     Vital Signs Last 24 Hrs  T(C): 36.7 (10 Aries 2021 14:43), Max: 36.9 (09 Jun 2021 22:56)  T(F): 98 (10 Aries 2021 14:43), Max: 98.4 (09 Jun 2021 22:56)  HR: 65 (10 Aries 2021 14:43) (60 - 77)  BP: 229/128 (10 Aries 2021 14:43) (102/67 - 229/128)  BP(mean): --  RR: 12 (10 Aries 2021 14:43) (12 - 18)  SpO2: 100% (10 Aries 2021 14:43) (98% - 100%)  (10 Aries 2021 14:48)      Past Medical History  CVA (cerebral infarction)    HTN (hypertension)    Diabetes    Hyperlipidemia    Dementia        Past Surgical History  No significant past surgical history    Cardiac pacemaker        MEDICATIONS    aspirin Suppository 300 milliGRAM(s) Rectal daily  atorvastatin 80 milliGRAM(s) Oral at bedtime  bisacodyl Suppository 10 milliGRAM(s) Rectal daily  dextrose 40% Gel 15 Gram(s) Oral once  dextrose 50% Injectable 25 Gram(s) IV Push once  dextrose 50% Injectable 12.5 Gram(s) IV Push once  dextrose 50% Injectable 25 Gram(s) IV Push once  glucagon  Injectable 1 milliGRAM(s) IntraMuscular once  heparin   Injectable 5000 Unit(s) SubCutaneous every 8 hours  metoprolol succinate ER 25 milliGRAM(s) Oral daily         Family history: No history of dementia, strokes, or seizures   FAMILY HISTORY:  FH: diabetes mellitus  Mother, Brother    FHx: dementia  Father      SOCIAL HISTORY -- No history of tobacco or alcohol use     Allergies    No Known Allergies    Intolerances            Vital Signs Last 24 Hrs  T(C): 36.5 (12 Jun 2021 06:15), Max: 37.2 (11 Jun 2021 22:15)  T(F): 97.7 (12 Jun 2021 06:15), Max: 99 (11 Jun 2021 22:15)  HR: 60 (12 Jun 2021 06:15) (60 - 84)  BP: 181/98 (12 Jun 2021 06:15) (163/98 - 181/98)  BP(mean): --  RR: 18 (12 Jun 2021 06:15) (17 - 18)  SpO2: 100% (12 Jun 2021 06:15) (97% - 100%)        On Neurological Examination:    Mental Status - Patient is alert, awake,.  Speech - moans                              Cranial Nerves - Extraocular muscle intact  CARA Facial symmetry Tongue midline, CnV1to V3 intact gross hearing intact      Motor Exam -  RUE stiff  moves all extremities  .      Sensory    Bilateral intact to light touch    GENERAL Exam:     Nontoxic , No Acute Distress   	  HEENT:  normocephalic, atraumatic  		  LUNGS:	Clear bilaterally  No Wheeze      VASCULAR: no carotid brui  	  HEART:	 Normal S1S2   No murmur RRR        	  MUSCULOSKELETAL: Normal Range of Motion  	   SKIN:      Normal   No Ecchymosis               LABS:  CBC Full  -  ( 12 Jun 2021 07:27 )  WBC Count : 4.23 K/uL  RBC Count : 4.61 M/uL  Hemoglobin : 13.9 g/dL  Hematocrit : 42.8 %  Platelet Count - Automated : 156 K/uL  Mean Cell Volume : 92.8 fL  Mean Cell Hemoglobin : 30.2 pg  Mean Cell Hemoglobin Concentration : 32.5 gm/dL  Auto Neutrophil # : x  Auto Lymphocyte # : x  Auto Monocyte # : x  Auto Eosinophil # : x  Auto Basophil # : x  Auto Neutrophil % : x  Auto Lymphocyte % : x  Auto Monocyte % : x  Auto Eosinophil % : x  Auto Basophil % : x      06-12    139  |  105  |  11  ----------------------------<  83  3.5   |  21<L>  |  0.96    Ca    10.1      12 Jun 2021 07:27  Phos  3.3     06-12  Mg     1.9     06-12      Hemoglobin A1C:       Vitamin B12         RADIOLOGY    EKG      IMPRESSION  This is a  year old           schoenberg

## 2021-06-12 NOTE — BH CONSULTATION LIAISON ASSESSMENT NOTE - RISK ASSESSMENT
Risk Factors: acute medical condition, chronic medical comorbidities, agitation  Protective Factors: residential stability, supportive family, denies si/sa, denies ah/vh

## 2021-06-12 NOTE — BH CONSULTATION LIAISON ASSESSMENT NOTE - CURRENT MEDICATION
MEDICATIONS  (STANDING):  aspirin enteric coated 81 milliGRAM(s) Oral daily  atorvastatin 80 milliGRAM(s) Oral at bedtime  dextrose 40% Gel 15 Gram(s) Oral once  dextrose 50% Injectable 25 Gram(s) IV Push once  dextrose 50% Injectable 12.5 Gram(s) IV Push once  dextrose 50% Injectable 25 Gram(s) IV Push once  glucagon  Injectable 1 milliGRAM(s) IntraMuscular once  heparin   Injectable 5000 Unit(s) SubCutaneous every 8 hours  isosorbide   mononitrate ER Tablet (IMDUR) 30 milliGRAM(s) Oral daily  metoprolol succinate ER 25 milliGRAM(s) Oral daily    MEDICATIONS  (PRN):

## 2021-06-12 NOTE — BH CONSULTATION LIAISON ASSESSMENT NOTE - NSBHCHARTREVIEWLAB_PSY_A_CORE FT
CBC Full  -  ( 12 Jun 2021 07:27 )  WBC Count : 4.23 K/uL  RBC Count : 4.61 M/uL  Hemoglobin : 13.9 g/dL  Hematocrit : 42.8 %  Platelet Count - Automated : 156 K/uL  Mean Cell Volume : 92.8 fL  Mean Cell Hemoglobin : 30.2 pg  Mean Cell Hemoglobin Concentration : 32.5 gm/dL  Auto Neutrophil # : x  Auto Lymphocyte # : x  Auto Monocyte # : x  Auto Eosinophil # : x  Auto Basophil # : x  Auto Neutrophil % : x  Auto Lymphocyte % : x  Auto Monocyte % : x  Auto Eosinophil % : x  Auto Basophil % : x  06-12    139  |  105  |  11  ----------------------------<  83  3.5   |  21<L>  |  0.96    Ca    10.1      12 Jun 2021 07:27  Phos  3.3     06-12  Mg     1.9     06-12

## 2021-06-12 NOTE — PROGRESS NOTE ADULT - SUBJECTIVE AND OBJECTIVE BOX
Hospitalist Progress Note  Martina Voss MD Pager # [phone]    OVERNIGHT EVENTS:    SUBJECTIVE / INTERVAL HPI: Patient seen and examined at bedside.     VITAL SIGNS:  Vital Signs Last 24 Hrs  T(C): 36.5 (12 Jun 2021 06:15), Max: 37.2 (11 Jun 2021 22:15)  T(F): 97.7 (12 Jun 2021 06:15), Max: 99 (11 Jun 2021 22:15)  HR: 60 (12 Jun 2021 06:15) (60 - 84)  BP: 181/98 (12 Jun 2021 06:15) (164/69 - 181/98)  BP(mean): --  RR: 18 (12 Jun 2021 06:15) (18 - 18)  SpO2: 100% (12 Jun 2021 06:15) (98% - 100%)    PHYSICAL EXAM:    General: WDWN  HEENT: NC/AT; PERRL, anicteric sclera; MMM  Neck: supple  Cardiovascular: +S1/S2; RRR  Respiratory: CTA B/L; no W/R/R  Gastrointestinal: soft, NT/ND; +BSx4  Extremities: WWP; no edema, clubbing or cyanosis  Vascular: 2+ radial, DP/PT pulses B/L  Neurological: AAOx3; no focal deficits    MEDICATIONS:  MEDICATIONS  (STANDING):  aspirin Suppository 300 milliGRAM(s) Rectal daily  atorvastatin 80 milliGRAM(s) Oral at bedtime  dextrose 40% Gel 15 Gram(s) Oral once  dextrose 50% Injectable 25 Gram(s) IV Push once  dextrose 50% Injectable 12.5 Gram(s) IV Push once  dextrose 50% Injectable 25 Gram(s) IV Push once  glucagon  Injectable 1 milliGRAM(s) IntraMuscular once  heparin   Injectable 5000 Unit(s) SubCutaneous every 8 hours  metoprolol succinate ER 25 milliGRAM(s) Oral daily    MEDICATIONS  (PRN):      ALLERGIES:  Allergies    No Known Allergies    Intolerances        LABS:                        13.9   4.23  )-----------( 156      ( 12 Jun 2021 07:27 )             42.8     06-12    139  |  105  |  11  ----------------------------<  83  3.5   |  21<L>  |  0.96    Ca    10.1      12 Jun 2021 07:27  Phos  3.3     06-12  Mg     1.9     06-12          CAPILLARY BLOOD GLUCOSE  98 (10 Aries 2021 13:59)      POCT Blood Glucose.: 141 mg/dL (12 Jun 2021 12:06)      RADIOLOGY & ADDITIONAL TESTS: Reviewed.    ASSESSMENT:    PLAN:  Hospitalist Progress Note  Martina Voss MD Pager # [phone]    OVERNIGHT EVENTS: CAROLYN     SUBJECTIVE / INTERVAL HPI: Patient seen and examined at bedside. Pt reports that he is comfortable. Mildly confused. He is not sure why he is in the hospital.     VITAL SIGNS:  Vital Signs Last 24 Hrs  T(C): 36.5 (12 Jun 2021 06:15), Max: 37.2 (11 Jun 2021 22:15)  T(F): 97.7 (12 Jun 2021 06:15), Max: 99 (11 Jun 2021 22:15)  HR: 60 (12 Jun 2021 06:15) (60 - 84)  BP: 181/98 (12 Jun 2021 06:15) (164/69 - 181/98)  BP(mean): --  RR: 18 (12 Jun 2021 06:15) (18 - 18)  SpO2: 100% (12 Jun 2021 06:15) (98% - 100%)    PHYSICAL EXAM:    General: WDWN male  HEENT: NC/AT; PERRL, anicteric sclera; MMM  Neck: supple  Cardiovascular: +S1/S2; RRR  Respiratory: CTA B/L; no W/R/R  Gastrointestinal: soft, NT/ND; +BSx4  Extremities: WWP; no edema, clubbing or cyanosis  Vascular: 2+ radial, DP/PT pulses B/L  Neurological: AAOx1- alert to name and knows he is in a hospital; chronic RUE weakness. 4/5 left upper extremity and left lower extremity weakness - moving left side.     MEDICATIONS:  MEDICATIONS  (STANDING):  aspirin Suppository 300 milliGRAM(s) Rectal daily  atorvastatin 80 milliGRAM(s) Oral at bedtime  dextrose 40% Gel 15 Gram(s) Oral once  dextrose 50% Injectable 25 Gram(s) IV Push once  dextrose 50% Injectable 12.5 Gram(s) IV Push once  dextrose 50% Injectable 25 Gram(s) IV Push once  glucagon  Injectable 1 milliGRAM(s) IntraMuscular once  heparin   Injectable 5000 Unit(s) SubCutaneous every 8 hours  metoprolol succinate ER 25 milliGRAM(s) Oral daily    MEDICATIONS  (PRN):      ALLERGIES:  Allergies    No Known Allergies    Intolerances        LABS:                        13.9   4.23  )-----------( 156      ( 12 Jun 2021 07:27 )             42.8     06-12    139  |  105  |  11  ----------------------------<  83  3.5   |  21<L>  |  0.96    Ca    10.1      12 Jun 2021 07:27  Phos  3.3     06-12  Mg     1.9     06-12          CAPILLARY BLOOD GLUCOSE  98 (10 Aries 2021 13:59)      POCT Blood Glucose.: 141 mg/dL (12 Jun 2021 12:06)      RADIOLOGY & ADDITIONAL TESTS: Reviewed.    ASSESSMENT:    PLAN:

## 2021-06-12 NOTE — BH CONSULTATION LIAISON ASSESSMENT NOTE - NSBHCHARTREVIEWVS_PSY_A_CORE FT
Vital Signs Last 24 Hrs  T(C): 36.7 (12 Jun 2021 14:24), Max: 37.2 (11 Jun 2021 22:15)  T(F): 98 (12 Jun 2021 14:24), Max: 99 (11 Jun 2021 22:15)  HR: 61 (12 Jun 2021 14:24) (60 - 84)  BP: 142/78 (12 Jun 2021 14:24) (142/78 - 181/98)  BP(mean): 91 (12 Jun 2021 14:24) (91 - 91)  RR: 17 (12 Jun 2021 14:24) (17 - 18)  SpO2: 99% (12 Jun 2021 14:24) (98% - 100%)

## 2021-06-12 NOTE — BH CONSULTATION LIAISON ASSESSMENT NOTE - CASE SUMMARY
Chart reviewed. I personally saw and examined this patient using video platform. NATHALY Carl was the presenter. NATHALY Carl saw and examined the patient in person. I agree with NATHALY Carl's history, mental status exam, and A/P with below additions. In brief, patient was alert, responding to most questions with Mmmhmmm or the negative of mmhmmm. Followed basic command. Denied SI/HI. No AH/VH elicited. Thought process concrete. Poor attention (though unclear if hearing affecting this as well). Did not appear internally preoccupied.  Agree with delirium on top of likely dementia.   Plan per above with following addition- treat and workup delirium per medical team. Severe constipation can be cause of delirium- defer to team regarding treatment. Please call with questions.

## 2021-06-13 LAB
ANION GAP SERPL CALC-SCNC: 12 MMOL/L — SIGNIFICANT CHANGE UP (ref 7–14)
BUN SERPL-MCNC: 15 MG/DL — SIGNIFICANT CHANGE UP (ref 7–23)
CALCIUM SERPL-MCNC: 10.2 MG/DL — SIGNIFICANT CHANGE UP (ref 8.4–10.5)
CHLORIDE SERPL-SCNC: 108 MMOL/L — HIGH (ref 98–107)
CO2 SERPL-SCNC: 21 MMOL/L — LOW (ref 22–31)
CREAT SERPL-MCNC: 0.99 MG/DL — SIGNIFICANT CHANGE UP (ref 0.5–1.3)
FOLATE SERPL-MCNC: 13.6 NG/ML — SIGNIFICANT CHANGE UP (ref 3.1–17.5)
GLUCOSE SERPL-MCNC: 135 MG/DL — HIGH (ref 70–99)
HCT VFR BLD CALC: 41.3 % — SIGNIFICANT CHANGE UP (ref 39–50)
HGB BLD-MCNC: 13.8 G/DL — SIGNIFICANT CHANGE UP (ref 13–17)
MAGNESIUM SERPL-MCNC: 1.9 MG/DL — SIGNIFICANT CHANGE UP (ref 1.6–2.6)
MCHC RBC-ENTMCNC: 30.4 PG — SIGNIFICANT CHANGE UP (ref 27–34)
MCHC RBC-ENTMCNC: 33.4 GM/DL — SIGNIFICANT CHANGE UP (ref 32–36)
MCV RBC AUTO: 91 FL — SIGNIFICANT CHANGE UP (ref 80–100)
NRBC # BLD: 0 /100 WBCS — SIGNIFICANT CHANGE UP
NRBC # FLD: 0 K/UL — SIGNIFICANT CHANGE UP
PHOSPHATE SERPL-MCNC: 2.5 MG/DL — SIGNIFICANT CHANGE UP (ref 2.5–4.5)
PLATELET # BLD AUTO: 182 K/UL — SIGNIFICANT CHANGE UP (ref 150–400)
POTASSIUM SERPL-MCNC: 3.8 MMOL/L — SIGNIFICANT CHANGE UP (ref 3.5–5.3)
POTASSIUM SERPL-SCNC: 3.8 MMOL/L — SIGNIFICANT CHANGE UP (ref 3.5–5.3)
RBC # BLD: 4.54 M/UL — SIGNIFICANT CHANGE UP (ref 4.2–5.8)
RBC # FLD: 12.6 % — SIGNIFICANT CHANGE UP (ref 10.3–14.5)
SARS-COV-2 RNA SPEC QL NAA+PROBE: SIGNIFICANT CHANGE UP
SODIUM SERPL-SCNC: 141 MMOL/L — SIGNIFICANT CHANGE UP (ref 135–145)
TSH SERPL-MCNC: 0.73 UIU/ML — SIGNIFICANT CHANGE UP (ref 0.27–4.2)
VIT B12 SERPL-MCNC: 1043 PG/ML — HIGH (ref 200–900)
WBC # BLD: 3.71 K/UL — LOW (ref 3.8–10.5)
WBC # FLD AUTO: 3.71 K/UL — LOW (ref 3.8–10.5)

## 2021-06-13 PROCEDURE — 99232 SBSQ HOSP IP/OBS MODERATE 35: CPT

## 2021-06-13 RX ADMIN — ISOSORBIDE MONONITRATE 30 MILLIGRAM(S): 60 TABLET, EXTENDED RELEASE ORAL at 12:37

## 2021-06-13 RX ADMIN — Medication 81 MILLIGRAM(S): at 12:36

## 2021-06-13 RX ADMIN — HEPARIN SODIUM 5000 UNIT(S): 5000 INJECTION INTRAVENOUS; SUBCUTANEOUS at 06:32

## 2021-06-13 RX ADMIN — HEPARIN SODIUM 5000 UNIT(S): 5000 INJECTION INTRAVENOUS; SUBCUTANEOUS at 22:27

## 2021-06-13 NOTE — PROGRESS NOTE ADULT - SUBJECTIVE AND OBJECTIVE BOX
Hospitalist Progress Note  Martina Voss MD Pager # [phone]    OVERNIGHT EVENTS: CAROLYN     SUBJECTIVE / INTERVAL HPI: Patient seen and examined at bedside. Comfortable appearing. Mildly confused. No complaints. Difficulty staying on track to answer ROS questions.     VITAL SIGNS:  Vital Signs Last 24 Hrs  T(C): 36.1 (13 Jun 2021 13:13), Max: 36.9 (12 Jun 2021 17:33)  T(F): 97 (13 Jun 2021 13:13), Max: 98.4 (12 Jun 2021 17:33)  HR: 64 (13 Jun 2021 13:13) (64 - 99)  BP: 148/94 (13 Jun 2021 13:13) (135/85 - 164/108)  BP(mean): 104 (13 Jun 2021 05:30) (98 - 104)  RR: 18 (13 Jun 2021 13:13) (18 - 18)  SpO2: 98% (13 Jun 2021 13:13) (98% - 100%)    PHYSICAL EXAM:    General: WDWN male resting in bed   HEENT: NC/AT; PERRL, anicteric sclera; MMM  Neck: supple  Cardiovascular: +S1/S2; RRR  Respiratory: CTA B/L; no W/R/R  Gastrointestinal: soft, NT/ND; +BSx4  Extremities: WWP; no edema, clubbing or cyanosis  Vascular: 2+ radial, DP/PT pulses B/L  Neurological: AAOx3; no focal deficits. Left upper and lower extremities strong. Left upper extremity holding on tight to the bed rail.     MEDICATIONS:  MEDICATIONS  (STANDING):  aspirin enteric coated 81 milliGRAM(s) Oral daily  atorvastatin 80 milliGRAM(s) Oral at bedtime  dextrose 40% Gel 15 Gram(s) Oral once  dextrose 50% Injectable 25 Gram(s) IV Push once  dextrose 50% Injectable 12.5 Gram(s) IV Push once  dextrose 50% Injectable 25 Gram(s) IV Push once  glucagon  Injectable 1 milliGRAM(s) IntraMuscular once  heparin   Injectable 5000 Unit(s) SubCutaneous every 8 hours  isosorbide   mononitrate ER Tablet (IMDUR) 30 milliGRAM(s) Oral daily  metoprolol succinate ER 25 milliGRAM(s) Oral daily    MEDICATIONS  (PRN):      ALLERGIES:  Allergies    No Known Allergies    Intolerances        LABS:                        13.8   3.71  )-----------( 182      ( 13 Jun 2021 12:57 )             41.3     06-13    141  |  108<H>  |  15  ----------------------------<  135<H>  3.8   |  21<L>  |  0.99    Ca    10.2      13 Jun 2021 12:57  Phos  2.5     06-13  Mg     1.9     06-13          CAPILLARY BLOOD GLUCOSE  105 (13 Jun 2021 09:00)      POCT Blood Glucose.: 134 mg/dL (13 Jun 2021 12:31)      RADIOLOGY & ADDITIONAL TESTS: Reviewed.    ASSESSMENT:    PLAN:

## 2021-06-13 NOTE — PROGRESS NOTE ADULT - SUBJECTIVE AND OBJECTIVE BOX
82 y/o M with PMH of CVA with residual RUE weakness, HTN, HLD, GERD who presents to ED with abdominal pain. During my evaluation patient found to be sedated and unable to provide any history. Per ED documentation patient initially presented with abdominal pain. Per wife patient was recently diagnosed with UTI and is on 4th day of cipro at home. He didnt eat anything all day and grabbed his stomach in pain at which point patient presented to ED. In ED patient underwent CT which showed significant stool burden. Overnight patient also with agitation and received ativan and haldol. After attempted disimpaction by ED provider, patient noted to have facial droop and LUE weakness at which point stroke code was called. At this time patient is responsive to sternal rub. He is able to tell his name but no further history could be obtained. Per wife PPM was placed about 20 years ago. Not sure if he has had MRI since PPM placement.     Vital Signs Last 24 Hrs  T(C): 36.7 (10 Aries 2021 14:43), Max: 36.9 (09 Jun 2021 22:56)  T(F): 98 (10 Aries 2021 14:43), Max: 98.4 (09 Jun 2021 22:56)  HR: 65 (10 Aries 2021 14:43) (60 - 77)  BP: 229/128 (10 Aries 2021 14:43) (102/67 - 229/128)  BP(mean): --  RR: 12 (10 Aries 2021 14:43) (12 - 18)  SpO2: 100% (10 Aries 2021 14:43) (98% - 100%)  (10 Aries 2021 14:48)      Past Medical History  CVA (cerebral infarction)    HTN (hypertension)    Diabetes    Hyperlipidemia    Dementia        Past Surgical History  No significant past surgical history    Cardiac pacemaker        MEDICATIONS    aspirin Suppository 300 milliGRAM(s) Rectal daily  atorvastatin 80 milliGRAM(s) Oral at bedtime  bisacodyl Suppository 10 milliGRAM(s) Rectal daily  dextrose 40% Gel 15 Gram(s) Oral once  dextrose 50% Injectable 25 Gram(s) IV Push once  dextrose 50% Injectable 12.5 Gram(s) IV Push once  dextrose 50% Injectable 25 Gram(s) IV Push once  glucagon  Injectable 1 milliGRAM(s) IntraMuscular once  heparin   Injectable 5000 Unit(s) SubCutaneous every 8 hours  metoprolol succinate ER 25 milliGRAM(s) Oral daily         Family history: No history of dementia, strokes, or seizures   FAMILY HISTORY:  FH: diabetes mellitus  Mother, Brother    FHx: dementia  Father      SOCIAL HISTORY -- No history of tobacco or alcohol use     Allergies    No Known Allergies    Intolerances            Vital Signs Last 24 Hrs  T(C): 36.5 (12 Jun 2021 06:15), Max: 37.2 (11 Jun 2021 22:15)  T(F): 97.7 (12 Jun 2021 06:15), Max: 99 (11 Jun 2021 22:15)  HR: 60 (12 Jun 2021 06:15) (60 - 84)  BP: 181/98 (12 Jun 2021 06:15) (163/98 - 181/98)  BP(mean): --  RR: 18 (12 Jun 2021 06:15) (17 - 18)  SpO2: 100% (12 Jun 2021 06:15) (97% - 100%)        On Neurological Examination:    Mental Status - Patient is alert, awake,.  Speech - moans                              Cranial Nerves - Extraocular muscle intact  CARA Facial symmetry Tongue midline, CnV1to V3 intact gross hearing intact      Motor Exam -  RUE stiff  moves all extremities  .      Sensory    Bilateral intact to light touch    GENERAL Exam:     Nontoxic , No Acute Distress   	  HEENT:  normocephalic, atraumatic  		  LUNGS:	Clear bilaterally  No Wheeze      VASCULAR: no carotid brui  	  HEART:	 Normal S1S2   No murmur RRR        	  MUSCULOSKELETAL: Normal Range of Motion  	   SKIN:      Normal   No Ecchymosis               LABS:  CBC Full  -  ( 12 Jun 2021 07:27 )  WBC Count : 4.23 K/uL  RBC Count : 4.61 M/uL  Hemoglobin : 13.9 g/dL  Hematocrit : 42.8 %  Platelet Count - Automated : 156 K/uL  Mean Cell Volume : 92.8 fL  Mean Cell Hemoglobin : 30.2 pg  Mean Cell Hemoglobin Concentration : 32.5 gm/dL  Auto Neutrophil # : x  Auto Lymphocyte # : x  Auto Monocyte # : x  Auto Eosinophil # : x  Auto Basophil # : x  Auto Neutrophil % : x  Auto Lymphocyte % : x  Auto Monocyte % : x  Auto Eosinophil % : x  Auto Basophil % : x      06-12    139  |  105  |  11  ----------------------------<  83  3.5   |  21<L>  |  0.96    Ca    10.1      12 Jun 2021 07:27  Phos  3.3     06-12  Mg     1.9     06-12      Hemoglobin A1C:       Vitamin B12         RADIOLOGY    EKG      IMPRESSION  This is a  year old           schoenberg     Assessment and Plan:   · Assessment	   y/o M with PMH of CVA with residual RUE weakness, HTN, HLD, GERD who presents to ED with abdominal pain. patient with left sided weakness     most likely toxic metabolic cause      Electronic Signatures:  Schoenberg, Laura Gray (MD)

## 2021-06-14 ENCOUNTER — TRANSCRIPTION ENCOUNTER (OUTPATIENT)
Age: 81
End: 2021-06-14

## 2021-06-14 VITALS
TEMPERATURE: 98 F | DIASTOLIC BLOOD PRESSURE: 87 MMHG | RESPIRATION RATE: 18 BRPM | SYSTOLIC BLOOD PRESSURE: 147 MMHG | HEART RATE: 61 BPM | OXYGEN SATURATION: 99 %

## 2021-06-14 DIAGNOSIS — R10.9 UNSPECIFIED ABDOMINAL PAIN: ICD-10-CM

## 2021-06-14 LAB
ANION GAP SERPL CALC-SCNC: 12 MMOL/L — SIGNIFICANT CHANGE UP (ref 7–14)
BUN SERPL-MCNC: 13 MG/DL — SIGNIFICANT CHANGE UP (ref 7–23)
CALCIUM SERPL-MCNC: 9.6 MG/DL — SIGNIFICANT CHANGE UP (ref 8.4–10.5)
CHLORIDE SERPL-SCNC: 107 MMOL/L — SIGNIFICANT CHANGE UP (ref 98–107)
CO2 SERPL-SCNC: 22 MMOL/L — SIGNIFICANT CHANGE UP (ref 22–31)
CREAT SERPL-MCNC: 1 MG/DL — SIGNIFICANT CHANGE UP (ref 0.5–1.3)
GLUCOSE SERPL-MCNC: 90 MG/DL — SIGNIFICANT CHANGE UP (ref 70–99)
HCT VFR BLD CALC: 40.7 % — SIGNIFICANT CHANGE UP (ref 39–50)
HGB BLD-MCNC: 13.2 G/DL — SIGNIFICANT CHANGE UP (ref 13–17)
MAGNESIUM SERPL-MCNC: 1.9 MG/DL — SIGNIFICANT CHANGE UP (ref 1.6–2.6)
MCHC RBC-ENTMCNC: 29.9 PG — SIGNIFICANT CHANGE UP (ref 27–34)
MCHC RBC-ENTMCNC: 32.4 GM/DL — SIGNIFICANT CHANGE UP (ref 32–36)
MCV RBC AUTO: 92.3 FL — SIGNIFICANT CHANGE UP (ref 80–100)
NRBC # BLD: 0 /100 WBCS — SIGNIFICANT CHANGE UP
NRBC # FLD: 0 K/UL — SIGNIFICANT CHANGE UP
PHOSPHATE SERPL-MCNC: 3.2 MG/DL — SIGNIFICANT CHANGE UP (ref 2.5–4.5)
PLATELET # BLD AUTO: 159 K/UL — SIGNIFICANT CHANGE UP (ref 150–400)
POTASSIUM SERPL-MCNC: 3.5 MMOL/L — SIGNIFICANT CHANGE UP (ref 3.5–5.3)
POTASSIUM SERPL-SCNC: 3.5 MMOL/L — SIGNIFICANT CHANGE UP (ref 3.5–5.3)
RBC # BLD: 4.41 M/UL — SIGNIFICANT CHANGE UP (ref 4.2–5.8)
RBC # FLD: 12.6 % — SIGNIFICANT CHANGE UP (ref 10.3–14.5)
SODIUM SERPL-SCNC: 141 MMOL/L — SIGNIFICANT CHANGE UP (ref 135–145)
WBC # BLD: 3.74 K/UL — LOW (ref 3.8–10.5)
WBC # FLD AUTO: 3.74 K/UL — LOW (ref 3.8–10.5)

## 2021-06-14 PROCEDURE — 99232 SBSQ HOSP IP/OBS MODERATE 35: CPT

## 2021-06-14 PROCEDURE — 99239 HOSP IP/OBS DSCHRG MGMT >30: CPT

## 2021-06-14 RX ORDER — ATORVASTATIN CALCIUM 80 MG/1
40 TABLET, FILM COATED ORAL AT BEDTIME
Refills: 0 | Status: DISCONTINUED | OUTPATIENT
Start: 2021-06-14 | End: 2021-06-14

## 2021-06-14 RX ORDER — ATORVASTATIN CALCIUM 80 MG/1
1 TABLET, FILM COATED ORAL
Qty: 30 | Refills: 0
Start: 2021-06-14 | End: 2021-07-13

## 2021-06-14 RX ADMIN — Medication 81 MILLIGRAM(S): at 12:33

## 2021-06-14 RX ADMIN — ISOSORBIDE MONONITRATE 30 MILLIGRAM(S): 60 TABLET, EXTENDED RELEASE ORAL at 12:33

## 2021-06-14 RX ADMIN — HEPARIN SODIUM 5000 UNIT(S): 5000 INJECTION INTRAVENOUS; SUBCUTANEOUS at 13:33

## 2021-06-14 RX ADMIN — HEPARIN SODIUM 5000 UNIT(S): 5000 INJECTION INTRAVENOUS; SUBCUTANEOUS at 05:37

## 2021-06-14 NOTE — PROGRESS NOTE ADULT - PROBLEM SELECTOR PLAN 4
Restart home medications, imdur 30mg qd
Restart home medications, imdur 30mg qd
-Permissive HTN to 220 systolic for 24 hrs as per neuro  -restart home meds tomorrow as tolerated
Restart home medications, imdur 30mg qd

## 2021-06-14 NOTE — PROGRESS NOTE ADULT - PROBLEM SELECTOR PLAN 8
-HSQ    Dispo: PT recommending restorative rehab.
-HSQ    Dispo: PT recommending restorative rehab. Family opted for home  Spent 35min on dc
-HSQ
-HSQ    Dispo: PT recommending restorative rehab.

## 2021-06-14 NOTE — DISCHARGE NOTE NURSING/CASE MANAGEMENT/SOCIAL WORK - NSDCDMETYPESERV_GEN_ALL_CORE_FT
HOSPITAL BED: PLEASE FOLLOW-UP WITH COMPANY REGARDING DELIVERY. AT TIME OF DISCHARGE, SURGICAL COMPANY AWAITING INSURANCE AUTHORIZATION.

## 2021-06-14 NOTE — DISCHARGE NOTE NURSING/CASE MANAGEMENT/SOCIAL WORK - NSSCCARECORD_GEN_ALL_CORE
Home Care Agency/Community Resource Home Care Agency/Durable Medical Equipment Agency/Community [facility]

## 2021-06-14 NOTE — DISCHARGE NOTE PROVIDER - HOSPITAL COURSE
82 y/o M with PMH of CVA with residual RUE weakness, HTN, HLD, GERD who presents to ED with abdominal pain. CT with significant stool burden. Had bowel movements and abdominal pain resolved. in the ED, patient noted to have LUE weakness and facial droop. Code stroke called- CT Head imaging without acute CVA. TTE and PPM interrogation normal. On ASA and statin. Per neuro, MRI can be performed as an outpatient as it would likely not . Likely toxic metabolic cause per neurology.     Patient seen and evaluated. Reviewed discharge medications with patient and attending. All new medications requiring new prescriptions were sent to the pharmacy of patient's choice. Reviewed need for prescription for previous home medications and new prescriptions sent if requested. Medically cleared/stable for discharge as per Dr. Nguyen on 6/14/2021 with appropriate follow up. Patient understands and agrees with plan of care.

## 2021-06-14 NOTE — PROGRESS NOTE ADULT - PROBLEM SELECTOR PLAN 6
-Cr 1.35  -baseline cr 0.98
-Cr 1.35 baseline cr 0.98  - now improving

## 2021-06-14 NOTE — DISCHARGE NOTE NURSING/CASE MANAGEMENT/SOCIAL WORK - NSTRANSFERBELONGINGSRESP_GEN_A_NUR
yes Render Note In Bullet Format When Appropriate: No Post-Care Instructions: I reviewed with the patient in detail post-care instructions. Patient is to wear sunprotection, and avoid picking at any of the treated lesions. Pt may apply Vaseline to crusted or scabbing areas. Consent: The patient's consent was obtained including but not limited to risks of crusting, scabbing, blistering, scarring, darker or lighter pigmentary change, recurrence, incomplete removal and infection. Duration Of Freeze Thaw-Cycle (Seconds): 10 Detail Level: Detailed Number Of Freeze-Thaw Cycles: 1 freeze-thaw cycle

## 2021-06-14 NOTE — PROGRESS NOTE ADULT - PROBLEM SELECTOR PROBLEM 6
TAE (acute kidney injury)

## 2021-06-14 NOTE — BH CONSULTATION LIAISON PROGRESS NOTE - NSBHFUPINTERVALHXFT_PSY_A_CORE
Chart reviewed. No PRNs since last psych note.  Seen this AM. He is calmly sitting in his chair. Does not know where he is or why he is here. No overt paranoid ideation elicited. Denies SI/HI. No AH/VH elicited.

## 2021-06-14 NOTE — PROGRESS NOTE ADULT - SUBJECTIVE AND OBJECTIVE BOX
[facility] Division of Hospital Medicine  Preet Nguyen MD  Pager [phone]      Patient is a 81y old  Male who presents with a chief complaint of Abdominal pain (14 Jun 2021 10:52)      SUBJECTIVE / OVERNIGHT EVENTS: Improved abd, no CP or SOB    MEDICATIONS  (STANDING):  aspirin enteric coated 81 milliGRAM(s) Oral daily  atorvastatin 40 milliGRAM(s) Oral at bedtime  dextrose 40% Gel 15 Gram(s) Oral once  dextrose 50% Injectable 25 Gram(s) IV Push once  dextrose 50% Injectable 12.5 Gram(s) IV Push once  dextrose 50% Injectable 25 Gram(s) IV Push once  glucagon  Injectable 1 milliGRAM(s) IntraMuscular once  heparin   Injectable 5000 Unit(s) SubCutaneous every 8 hours  isosorbide   mononitrate ER Tablet (IMDUR) 30 milliGRAM(s) Oral daily  metoprolol succinate ER 25 milliGRAM(s) Oral daily    MEDICATIONS  (PRN):      CAPILLARY BLOOD GLUCOSE      POCT Blood Glucose.: 122 mg/dL (14 Jun 2021 12:48)  POCT Blood Glucose.: 102 mg/dL (14 Jun 2021 08:40)  POCT Blood Glucose.: 104 mg/dL (14 Jun 2021 06:51)  POCT Blood Glucose.: 114 mg/dL (13 Jun 2021 21:35)  POCT Blood Glucose.: 120 mg/dL (13 Jun 2021 17:32)    I&O's Summary      PHYSICAL EXAM:  Vital Signs Last 24 Hrs  T(C): 36.6 (14 Jun 2021 12:49), Max: 36.7 (13 Jun 2021 22:21)  T(F): 97.8 (14 Jun 2021 12:49), Max: 98 (13 Jun 2021 22:21)  HR: 61 (14 Jun 2021 12:49) (60 - 65)  BP: 147/87 (14 Jun 2021 12:49) (147/87 - 162/92)  BP(mean): 102 (14 Jun 2021 06:07) (102 - 109)  RR: 18 (14 Jun 2021 12:49) (18 - 20)  SpO2: 99% (14 Jun 2021 12:49) (98% - 100%)  CONSTITUTIONAL: NAD  EYES: conjunctiva and sclera clear  ENMT: mmm  NECK: Supple,  RESPIRATORY: Normal respiratory effort; lungs are clear to auscultation bilaterally  CARDIOVASCULAR: Regular rate and rhythm, + S1 and S2  ABDOMEN: Nontender to palpation, normoactive bowel sounds, no rebound/guarding  MUSCULOSKELETAL:  no clubbing or cyanosis of digits;   PSYCH: A+O x 1-2 ( self and place)  NEUROLOGY: LLE weakness 3+/5, Contracted RUE  SKIN: No rashes;     LABS:                        13.2   3.74  )-----------( 159      ( 14 Jun 2021 07:11 )             40.7     06-14    141  |  107  |  13  ----------------------------<  90  3.5   |  22  |  1.00    Ca    9.6      14 Jun 2021 07:11  Phos  3.2     06-14  Mg     1.9     06-14

## 2021-06-14 NOTE — DISCHARGE NOTE PROVIDER - CARE PROVIDER_API CALL
Schoenberg, Laura G (DO)  Neurology  [address] [place] [address]  Phone: [phone]  Fax: [phone]  Follow Up Time:

## 2021-06-14 NOTE — PROGRESS NOTE ADULT - ASSESSMENT
y/o M with PMH of CVA with residual RUE weakness, HTN, HLD, GERD who presents to ED with abdominal pain. patient with left sided weakness     most likely toxic metabolic cause
82 y/o M with PMH of CVA with residual RUE weakness, HTN, HLD, GERD who presents to ED with abdominal pain. Course complicated by code stroke, now admitted for further stroke workup 
80 y/o M with PMH of CVA with residual RUE weakness, HTN, HLD, GERD who presents to ED with abdominal pain. Course complicated by code stroke, now admitted for further stroke workup 

## 2021-06-14 NOTE — BH CONSULTATION LIAISON PROGRESS NOTE - NSBHCHARTREVIEWLAB_PSY_A_CORE FT
13.2   3.74  )-----------( 159      ( 14 Jun 2021 07:11 )             40.7     06-14    141  |  107  |  13  ----------------------------<  90  3.5   |  22  |  1.00    Ca    9.6      14 Jun 2021 07:11  Phos  3.2     06-14  Mg     1.9     06-14

## 2021-06-14 NOTE — DISCHARGE NOTE PROVIDER - NSDCMRMEDTOKEN_GEN_ALL_CORE_FT
aspirin 81 mg oral delayed release tablet: 1 tab(s) orally once a day  atorvastatin 20 mg oral tablet: 1 tab(s) orally once a day (at bedtime)  folic acid 1 mg oral tablet: 1 tab(s) orally once a day  isosorbide mononitrate 30 mg oral tablet, extended release: 1 tab(s) orally once a day (in the morning)  melatonin 3 mg oral tablet: 1 tab(s) orally   metoprolol succinate 25 mg oral tablet, extended release: 1 tab(s) orally once a day  polyethylene glycol 3350 oral powder for reconstitution: 17 gram(s) orally once a day  senna oral tablet: 2 tab(s) orally once a day (at bedtime)  Vitamin B12 1000 mcg oral tablet: 1 tab(s) orally once a day   aspirin 81 mg oral delayed release tablet: 1 tab(s) orally once a day  atorvastatin 40 mg oral tablet: 1 tab(s) orally once a day (at bedtime)  folic acid 1 mg oral tablet: 1 tab(s) orally once a day  isosorbide mononitrate 30 mg oral tablet, extended release: 1 tab(s) orally once a day (in the morning)  melatonin 3 mg oral tablet: 1 tab(s) orally   metoprolol succinate 25 mg oral tablet, extended release: 1 tab(s) orally once a day  polyethylene glycol 3350 oral powder for reconstitution: 17 gram(s) orally once a day  senna oral tablet: 2 tab(s) orally once a day (at bedtime)  Vitamin B12 1000 mcg oral tablet: 1 tab(s) orally once a day

## 2021-06-14 NOTE — DISCHARGE NOTE PROVIDER - NSDCCPCAREPLAN_GEN_ALL_CORE_FT
PRINCIPAL DISCHARGE DIAGNOSIS  Diagnosis: Abdominal pain  Assessment and Plan of Treatment: You came to the hospital with abdominal pain which was likely due to constipation. You have had multiple bowel movements and your abdominal pain has resolved. Stay well hydrated and use stool softeners as needed.      SECONDARY DISCHARGE DIAGNOSES  Diagnosis: Essential hypertension  Assessment and Plan of Treatment: Continue blood pressure medications as prescribed. Routine follow up with your PCP for blood pressure checks and medication management. A low salt diet is recommended.    Diagnosis: Facial droop  Assessment and Plan of Treatment: You had a code stroke called the ER for facial droop and left sided weakness. A CT scan of the head was performed which did not show a stroke. Neurology evaluated you and recommends stroke prevention with aspirin and atorvastatin (home dose was increased). Your ultrasound of the heart did not show concerning findings. You should have an MRI performed as an outpatient to further evaluate for stroke or other pathology. Follow up with your primary care physician or neurology within one week of discharge.

## 2021-06-14 NOTE — PROGRESS NOTE ADULT - PROBLEM SELECTOR PLAN 1
-Patient with history of CVA with RUE weakness  -Patient noted to have LUE weakness and facial droop in ED and code stroke called  -Seen by neuro. Being admitted for stroke workup  -Needs MRI brain however unable to do due to PPM.  - Repeat CTH stable.   -TTE; PPM interrogation   -ASA/statin  -permissive HTN x 24 hrs  -Tele monitoring
-Patient with history of CVA with RUE weakness  -Patient noted to have LUE weakness and facial droop in ED and code stroke called  -Seen by neuro. Being admitted for stroke workup  -Needs MRI brain, cannot rule out acute cva, mri  wouldn't , can be done outpt  - Repeat CTH stable.   -TTE; PPM interrogation   -ASA/statin  -No acute events on tele
-Patient with history of CVA with RUE weakness  -Patient noted to have LUE weakness and facial droop in ED and code stroke called  -Seen by neuro. Being admitted for stroke workup  -Needs MRI brain however unable to do due to PPM.  -Will check repeat CTH non con in 24-48 hrs per neuro   -TTE; PPM interrogation   -ASA/statin  -permissive HTN x 24 hrs  -Tele monitoring  -Speech and swallow and PT eval
-Patient with history of CVA with RUE weakness  -Patient noted to have LUE weakness and facial droop in ED and code stroke called  -Seen by neuro. Being admitted for stroke workup  -Needs MRI brain however unable to do due to PPM.  - Repeat CTH stable.   -TTE; PPM interrogation   -ASA/statin  -permissive HTN x 24 hrs  -Tele monitoring

## 2021-06-14 NOTE — CHART NOTE - NSCHARTNOTEFT_GEN_A_CORE
Discussed case with neurology, no need for inpatient MRI, can be done as outpatient.
Patient has a medical history of dementia and CVA with right sided weakness. Patient demonstrates profound weakness, body rigidity/stiffness and decreased mobility as a result of these diagnoses. Due to above physical limitations and impairments, patient would medically benefit from a semi-electrical hospital bed. A semi-electrical hospital bed would allow for head-of-bed elevation to 30 degrees to prevent aspiration and allow for assistance with bed mobility and OOB chair activities. Patient would also benefit from gel overlay to prevent decubitus ulcers.

## 2021-06-14 NOTE — BH CONSULTATION LIAISON PROGRESS NOTE - NSBHASSESSMENTFT_PSY_ALL_CORE
82yo male domiciled with spouse with PMHX CVA with residual RUE weakness, HTN, HLD, PPM, GERD, unknown psychiatric history, who presents to ED with abdominal pain, recently diagnosed with UTI, on Cipro at home, underwent CT in ED significant stool burden s/p disimpaction in ED, noted to have facial droop and LUE weakness, stroke code called. Patient received Haldol and Ativan in ED d/t agitation.  Psych CL initially consulted for agitation.    Patient's presentation consistent of a delirium on top of a dementia. He has not recently needed any PRNs.    At this point, defer to medical team re: treatment of medical issues. Ensure patient is having BMs as constipation can be cause of delirium, agitaiton, etc.  [] Agitation: Haldol 0.5mg po/iv/im q6h prn, if ineffective s/p 30 minutes may give additional 0.5mg-hold if qtc >500  [] Monitor EKG qtc interval  [] DEFER obs status to primary team, if any changes in behavior adjust accordingly

## 2021-06-14 NOTE — DISCHARGE NOTE PROVIDER - NSDCHHCONTACT_GEN_ALL_CORE_FT
As certified below, I, or a nurse practitioner or physician assistant working with me, had a face-to-face encounter that meets the physician face-to-face encounter requirements.
None

## 2021-06-14 NOTE — BH CONSULTATION LIAISON PROGRESS NOTE - MSE UNSTRUCTURED FT
Mental Status Exam:  Sue: well groomed, fair hygiene     Behavior: calm, cooperative, no psychomotor retardation/agitation  Motor: contracture of right arm- bent towards neck  Gait: did not assess, pt in bed  Speech: overall decreased in spontaneity and an increased in latency  Mood: "okay"  Affect: euthymic, restricted range  Thought process: goal directed   Thought Content: denies paranoia, delusions   Perception: doesn't appear internally preoccupied  SI: denies  HI: denies  Insight: limited  Judgment: fair    Cognitive Exam:  Orientation: AOx1  Attention: fair

## 2021-06-14 NOTE — BH CONSULTATION LIAISON PROGRESS NOTE - NSBHCHARTREVIEWVS_PSY_A_CORE FT
Vital Signs Last 24 Hrs  T(C): 36.4 (14 Jun 2021 06:07), Max: 36.7 (13 Jun 2021 22:21)  T(F): 97.6 (14 Jun 2021 06:07), Max: 98 (13 Jun 2021 22:21)  HR: 60 (14 Jun 2021 06:07) (60 - 65)  BP: 157/83 (14 Jun 2021 06:07) (148/94 - 162/92)  BP(mean): 102 (14 Jun 2021 06:07) (102 - 109)  RR: 18 (14 Jun 2021 06:07) (18 - 20)  SpO2: 100% (14 Jun 2021 06:07) (98% - 100%)

## 2021-06-14 NOTE — PROGRESS NOTE ADULT - PROBLEM SELECTOR PLAN 3
-AAO to self and place currently - at baseline  -s/p ativan and haldol in ED due to sundowning  -Monitor for now

## 2021-06-14 NOTE — PROGRESS NOTE ADULT - PROBLEM SELECTOR PLAN 2
Initial complaint of abdominal pain, currently asymptomatic. Likely etiology is constipation. CT with significant stool burden. ED attempted manual disimpaction however unsuccessful. Now s/p 3 large BMs. Constipation resolved.   -C/w bowel regimen.
Initial complaint of abdominal pain, currently asymptomatic. Likely etiology is constipation. CT with significant stool burden. ED attempted manual disimpaction however unsuccessful. Now s/p 3 large BMs. Constipation resolved.   -C/w bowel regimen.
-Initial complaint of abdominal pain  -CT with significant stool burden. ED attempted manual disimpaction however unsuccessful   -Start daily suppository and consider enema  -When tolerating PO start aggressive PO bowel regimen
-Initial complaint of abdominal pain, currently asymptomatic.   -CT with significant stool burden. ED attempted manual disimpaction however unsuccessful   -Start daily suppository and consider enema

## 2021-06-14 NOTE — BH CONSULTATION LIAISON PROGRESS NOTE - CURRENT MEDICATION
MEDICATIONS  (STANDING):  aspirin enteric coated 81 milliGRAM(s) Oral daily  atorvastatin 40 milliGRAM(s) Oral at bedtime  dextrose 40% Gel 15 Gram(s) Oral once  dextrose 50% Injectable 25 Gram(s) IV Push once  dextrose 50% Injectable 12.5 Gram(s) IV Push once  dextrose 50% Injectable 25 Gram(s) IV Push once  glucagon  Injectable 1 milliGRAM(s) IntraMuscular once  heparin   Injectable 5000 Unit(s) SubCutaneous every 8 hours  isosorbide   mononitrate ER Tablet (IMDUR) 30 milliGRAM(s) Oral daily  metoprolol succinate ER 25 milliGRAM(s) Oral daily    MEDICATIONS  (PRN):

## 2021-06-14 NOTE — DISCHARGE NOTE NURSING/CASE MANAGEMENT/SOCIAL WORK - PATIENT PORTAL LINK FT
You can access the FollowMyHealth Patient Portal offered by [facility] by registering at the following website: [email or web address]. By joining [employer]’s FollowMyHealth portal, you will also be able to view your health information using other applications (apps) compatible with our system.

## 2021-06-23 ENCOUNTER — INPATIENT (INPATIENT)
Facility: HOSPITAL | Age: 81
LOS: 0 days | Discharge: HOME HEALTH | End: 2021-06-24
Attending: STUDENT IN AN ORGANIZED HEALTH CARE EDUCATION/TRAINING PROGRAM | Admitting: STUDENT IN AN ORGANIZED HEALTH CARE EDUCATION/TRAINING PROGRAM
Payer: COMMERCIAL

## 2021-06-23 VITALS
TEMPERATURE: 98 F | HEART RATE: 88 BPM | OXYGEN SATURATION: 100 % | HEIGHT: 66 IN | DIASTOLIC BLOOD PRESSURE: 76 MMHG | SYSTOLIC BLOOD PRESSURE: 142 MMHG | RESPIRATION RATE: 16 BRPM

## 2021-06-23 DIAGNOSIS — Z95.0 PRESENCE OF CARDIAC PACEMAKER: Chronic | ICD-10-CM

## 2021-06-23 DIAGNOSIS — R07.9 CHEST PAIN, UNSPECIFIED: ICD-10-CM

## 2021-06-23 LAB
ALBUMIN SERPL ELPH-MCNC: 3.6 G/DL — SIGNIFICANT CHANGE UP (ref 3.3–5)
ALP SERPL-CCNC: 71 U/L — SIGNIFICANT CHANGE UP (ref 40–120)
ALT FLD-CCNC: 7 U/L — SIGNIFICANT CHANGE UP (ref 4–41)
ANION GAP SERPL CALC-SCNC: 10 MMOL/L — SIGNIFICANT CHANGE UP (ref 7–14)
AST SERPL-CCNC: 10 U/L — SIGNIFICANT CHANGE UP (ref 4–40)
BASOPHILS # BLD AUTO: 0.03 K/UL — SIGNIFICANT CHANGE UP (ref 0–0.2)
BASOPHILS NFR BLD AUTO: 0.6 % — SIGNIFICANT CHANGE UP (ref 0–2)
BILIRUB SERPL-MCNC: 1 MG/DL — SIGNIFICANT CHANGE UP (ref 0.2–1.2)
BUN SERPL-MCNC: 11 MG/DL — SIGNIFICANT CHANGE UP (ref 7–23)
CALCIUM SERPL-MCNC: 9.8 MG/DL — SIGNIFICANT CHANGE UP (ref 8.4–10.5)
CHLORIDE SERPL-SCNC: 105 MMOL/L — SIGNIFICANT CHANGE UP (ref 98–107)
CO2 SERPL-SCNC: 23 MMOL/L — SIGNIFICANT CHANGE UP (ref 22–31)
CREAT SERPL-MCNC: 1.07 MG/DL — SIGNIFICANT CHANGE UP (ref 0.5–1.3)
EOSINOPHIL # BLD AUTO: 0.25 K/UL — SIGNIFICANT CHANGE UP (ref 0–0.5)
EOSINOPHIL NFR BLD AUTO: 5.4 % — SIGNIFICANT CHANGE UP (ref 0–6)
GLUCOSE SERPL-MCNC: 86 MG/DL — SIGNIFICANT CHANGE UP (ref 70–99)
HCT VFR BLD CALC: 38.6 % — LOW (ref 39–50)
HGB BLD-MCNC: 12.8 G/DL — LOW (ref 13–17)
IANC: 2.04 K/UL — SIGNIFICANT CHANGE UP (ref 1.5–8.5)
IMM GRANULOCYTES NFR BLD AUTO: 0.2 % — SIGNIFICANT CHANGE UP (ref 0–1.5)
LYMPHOCYTES # BLD AUTO: 1.81 K/UL — SIGNIFICANT CHANGE UP (ref 1–3.3)
LYMPHOCYTES # BLD AUTO: 38.8 % — SIGNIFICANT CHANGE UP (ref 13–44)
MCHC RBC-ENTMCNC: 30.1 PG — SIGNIFICANT CHANGE UP (ref 27–34)
MCHC RBC-ENTMCNC: 33.2 GM/DL — SIGNIFICANT CHANGE UP (ref 32–36)
MCV RBC AUTO: 90.8 FL — SIGNIFICANT CHANGE UP (ref 80–100)
MONOCYTES # BLD AUTO: 0.52 K/UL — SIGNIFICANT CHANGE UP (ref 0–0.9)
MONOCYTES NFR BLD AUTO: 11.2 % — SIGNIFICANT CHANGE UP (ref 2–14)
NEUTROPHILS # BLD AUTO: 2.04 K/UL — SIGNIFICANT CHANGE UP (ref 1.8–7.4)
NEUTROPHILS NFR BLD AUTO: 43.8 % — SIGNIFICANT CHANGE UP (ref 43–77)
NRBC # BLD: 0 /100 WBCS — SIGNIFICANT CHANGE UP
NRBC # FLD: 0 K/UL — SIGNIFICANT CHANGE UP
PLATELET # BLD AUTO: 180 K/UL — SIGNIFICANT CHANGE UP (ref 150–400)
POTASSIUM SERPL-MCNC: 3.9 MMOL/L — SIGNIFICANT CHANGE UP (ref 3.5–5.3)
POTASSIUM SERPL-SCNC: 3.9 MMOL/L — SIGNIFICANT CHANGE UP (ref 3.5–5.3)
PROT SERPL-MCNC: 6.6 G/DL — SIGNIFICANT CHANGE UP (ref 6–8.3)
RBC # BLD: 4.25 M/UL — SIGNIFICANT CHANGE UP (ref 4.2–5.8)
RBC # FLD: 12.6 % — SIGNIFICANT CHANGE UP (ref 10.3–14.5)
SODIUM SERPL-SCNC: 138 MMOL/L — SIGNIFICANT CHANGE UP (ref 135–145)
TROPONIN T, HIGH SENSITIVITY RESULT: 11 NG/L — SIGNIFICANT CHANGE UP
WBC # BLD: 4.66 K/UL — SIGNIFICANT CHANGE UP (ref 3.8–10.5)
WBC # FLD AUTO: 4.66 K/UL — SIGNIFICANT CHANGE UP (ref 3.8–10.5)

## 2021-06-23 PROCEDURE — 71045 X-RAY EXAM CHEST 1 VIEW: CPT | Mod: 26

## 2021-06-23 PROCEDURE — 93010 ELECTROCARDIOGRAM REPORT: CPT

## 2021-06-23 PROCEDURE — 99285 EMERGENCY DEPT VISIT HI MDM: CPT | Mod: 25

## 2021-06-23 RX ORDER — HALOPERIDOL DECANOATE 100 MG/ML
2.5 INJECTION INTRAMUSCULAR ONCE
Refills: 0 | Status: COMPLETED | OUTPATIENT
Start: 2021-06-23 | End: 2021-06-23

## 2021-06-23 RX ADMIN — HALOPERIDOL DECANOATE 2.5 MILLIGRAM(S): 100 INJECTION INTRAMUSCULAR at 22:32

## 2021-06-23 NOTE — ED PROVIDER NOTE - ATTENDING CONTRIBUTION TO CARE
agree with resident note    " 82yo male with pmh cad s/p stent, ppm, cva with residual R weakness, htn, hld, presenting with chest pain.  Pain started earlier this afternoon, left sided, nonradiating, nonpleuritic.  States it feels similar to previous time when he needed a stent.  Endorsing cough over last few days but no fevers and nonproductive.  No shortness of breath, n/v.  No sick contacts.  No LE pain, edema."    PE: well appearing; VSS; CTAB/L; s1 s2 no m/r/g abd soft/NT/ND ext: no edema    EKG: paced    Imp: CP in pt with CAD, labs, cxr, ASA, admit

## 2021-06-23 NOTE — ED ADULT NURSE REASSESSMENT NOTE - NS ED NURSE REASSESS COMMENT FT1
Received report from danielhivalerie Bingham. Pt. denies any complaints and in no acute distress. VS as noted. NSR on cardiac monitor. Awaiting further orders. Will continue to monitor.

## 2021-06-23 NOTE — ED PROVIDER NOTE - PHYSICAL EXAMINATION
General appearance: NAD, conversant, afebrile    Neck: Trachea midline; Full range of motion, supple   Pulm: CTA bl, normal respiratory effort and no intercostal retractions, normal work of breathing   Chest: RRR, No murmurs, rubs, or gallops, no chest wall tenderness   Abdomen: Soft, non-tender, non-distended; no guarding or rebound   Extremities: No peripheral edema    Skin: Dry, normal temperature, turgor and texture; no rash   Psych: Appropriate affect, cooperative; alert and oriented to person, place and time

## 2021-06-23 NOTE — ED PROVIDER NOTE - CLINICAL SUMMARY MEDICAL DECISION MAKING FREE TEXT BOX
82yo male with pmh cad s/p stent, ppm, cva with residual R weakness, htn, hld, presenting with chest pain.  Described as similar to previous time he needed stent.  Will start acs workup and monitor on tele.  Plan to admit for further evaluation.

## 2021-06-23 NOTE — ED ADULT NURSE NOTE - NSIMPLEMENTINTERV_GEN_ALL_ED
Implemented All Fall with Harm Risk Interventions:  [place] to call system. Call bell, personal items and telephone within reach. Instruct patient to call for assistance. Room bathroom lighting operational. Non-slip footwear when patient is off stretcher. Physically safe environment: no spills, clutter or unnecessary equipment. Stretcher in lowest position, wheels locked, appropriate side rails in place. Provide visual cue, wrist band, yellow gown, etc. Monitor gait and stability. Monitor for mental status changes and reorient to person, place, and time. Review medications for side effects contributing to fall risk. Reinforce activity limits and safety measures with patient and family. Provide visual clues: red socks.

## 2021-06-23 NOTE — ED ADULT NURSE REASSESSMENT NOTE - NS ED NURSE REASSESS COMMENT FT1
Report given to ESSU 2 RN. Patient transported to ESSU2. Respirations unlabored, NSR on cardiac monitor.

## 2021-06-23 NOTE — ED PROVIDER NOTE - OBJECTIVE STATEMENT
82yo male with pmh cad s/p stent, ppm, cva with residual R weakness, htn, hld, presenting with chest pain.  Pain started earlier this afternoon, left sided, nonradiating, nonpleuritic.  States it feels similar to previous time when he needed a stent.  Endorsing cough over last few days but no fevers and nonproductive.  No shortness of breath, n/v.  No sick contacts.  No LE pain, edema.

## 2021-06-23 NOTE — ED ADULT TRIAGE NOTE - CHIEF COMPLAINT QUOTE
pt brought in by EMS from home. pt c/o midsternal non radiating chest pain beginning 1 hour ago, subsided. pt also c/o clear sputum cough. denies sob, dizziness, headache. pt appears to be in no distress. hx. dementia, CVA, pacemaker, HTN, HLD, GERD.

## 2021-06-23 NOTE — ED ADULT NURSE NOTE - OBJECTIVE STATEMENT
Pt awake and alert x 3. Pt co chest pain earlier but now has subsided. Pt with cough ,per wife has increased secretions. Pt denies sob . o2 sat wnl. Pt is NSR . iv placed blood collected. Pt awaiting dispo.

## 2021-06-24 ENCOUNTER — TRANSCRIPTION ENCOUNTER (OUTPATIENT)
Age: 81
End: 2021-06-24

## 2021-06-24 VITALS
SYSTOLIC BLOOD PRESSURE: 140 MMHG | DIASTOLIC BLOOD PRESSURE: 82 MMHG | TEMPERATURE: 98 F | OXYGEN SATURATION: 98 % | HEART RATE: 69 BPM | RESPIRATION RATE: 18 BRPM

## 2021-06-24 DIAGNOSIS — E78.49 OTHER HYPERLIPIDEMIA: ICD-10-CM

## 2021-06-24 DIAGNOSIS — E11.9 TYPE 2 DIABETES MELLITUS WITHOUT COMPLICATIONS: ICD-10-CM

## 2021-06-24 DIAGNOSIS — Z29.9 ENCOUNTER FOR PROPHYLACTIC MEASURES, UNSPECIFIED: ICD-10-CM

## 2021-06-24 DIAGNOSIS — R07.9 CHEST PAIN, UNSPECIFIED: ICD-10-CM

## 2021-06-24 DIAGNOSIS — J45.909 UNSPECIFIED ASTHMA, UNCOMPLICATED: ICD-10-CM

## 2021-06-24 DIAGNOSIS — F03.91 UNSPECIFIED DEMENTIA WITH BEHAVIORAL DISTURBANCE: ICD-10-CM

## 2021-06-24 DIAGNOSIS — I63.9 CEREBRAL INFARCTION, UNSPECIFIED: ICD-10-CM

## 2021-06-24 DIAGNOSIS — I10 ESSENTIAL (PRIMARY) HYPERTENSION: ICD-10-CM

## 2021-06-24 DIAGNOSIS — Z79.899 OTHER LONG TERM (CURRENT) DRUG THERAPY: ICD-10-CM

## 2021-06-24 LAB
24R-OH-CALCIDIOL SERPL-MCNC: 17.2 NG/ML — LOW (ref 30–80)
A1C WITH ESTIMATED AVERAGE GLUCOSE RESULT: 5.5 % — SIGNIFICANT CHANGE UP (ref 4–5.6)
ANION GAP SERPL CALC-SCNC: 12 MMOL/L — SIGNIFICANT CHANGE UP (ref 7–14)
BUN SERPL-MCNC: 13 MG/DL — SIGNIFICANT CHANGE UP (ref 7–23)
CALCIUM SERPL-MCNC: 10.2 MG/DL — SIGNIFICANT CHANGE UP (ref 8.4–10.5)
CHLORIDE SERPL-SCNC: 106 MMOL/L — SIGNIFICANT CHANGE UP (ref 98–107)
CHOLEST SERPL-MCNC: 173 MG/DL — SIGNIFICANT CHANGE UP
CO2 SERPL-SCNC: 22 MMOL/L — SIGNIFICANT CHANGE UP (ref 22–31)
CREAT SERPL-MCNC: 0.87 MG/DL — SIGNIFICANT CHANGE UP (ref 0.5–1.3)
ESTIMATED AVERAGE GLUCOSE: 111 MG/DL — SIGNIFICANT CHANGE UP (ref 68–114)
GLUCOSE BLDC GLUCOMTR-MCNC: 109 MG/DL — HIGH (ref 70–99)
GLUCOSE SERPL-MCNC: 91 MG/DL — SIGNIFICANT CHANGE UP (ref 70–99)
HCT VFR BLD CALC: 41.1 % — SIGNIFICANT CHANGE UP (ref 39–50)
HDLC SERPL-MCNC: 55 MG/DL — SIGNIFICANT CHANGE UP
HGB BLD-MCNC: 13.3 G/DL — SIGNIFICANT CHANGE UP (ref 13–17)
LIPID PNL WITH DIRECT LDL SERPL: 105 MG/DL — HIGH
MAGNESIUM SERPL-MCNC: 2 MG/DL — SIGNIFICANT CHANGE UP (ref 1.6–2.6)
MCHC RBC-ENTMCNC: 29.3 PG — SIGNIFICANT CHANGE UP (ref 27–34)
MCHC RBC-ENTMCNC: 32.4 GM/DL — SIGNIFICANT CHANGE UP (ref 32–36)
MCV RBC AUTO: 90.5 FL — SIGNIFICANT CHANGE UP (ref 80–100)
NON HDL CHOLESTEROL: 118 MG/DL — SIGNIFICANT CHANGE UP
NRBC # BLD: 0 /100 WBCS — SIGNIFICANT CHANGE UP
NRBC # FLD: 0 K/UL — SIGNIFICANT CHANGE UP
PHOSPHATE SERPL-MCNC: 2.7 MG/DL — SIGNIFICANT CHANGE UP (ref 2.5–4.5)
PLATELET # BLD AUTO: 164 K/UL — SIGNIFICANT CHANGE UP (ref 150–400)
POTASSIUM SERPL-MCNC: 4 MMOL/L — SIGNIFICANT CHANGE UP (ref 3.5–5.3)
POTASSIUM SERPL-SCNC: 4 MMOL/L — SIGNIFICANT CHANGE UP (ref 3.5–5.3)
RBC # BLD: 4.54 M/UL — SIGNIFICANT CHANGE UP (ref 4.2–5.8)
RBC # FLD: 12.4 % — SIGNIFICANT CHANGE UP (ref 10.3–14.5)
SARS-COV-2 RNA SPEC QL NAA+PROBE: SIGNIFICANT CHANGE UP
SODIUM SERPL-SCNC: 140 MMOL/L — SIGNIFICANT CHANGE UP (ref 135–145)
TRIGL SERPL-MCNC: 64 MG/DL — SIGNIFICANT CHANGE UP
TROPONIN T, HIGH SENSITIVITY RESULT: 11 NG/L — SIGNIFICANT CHANGE UP
TSH SERPL-MCNC: 2.1 UIU/ML — SIGNIFICANT CHANGE UP (ref 0.27–4.2)
WBC # BLD: 4.57 K/UL — SIGNIFICANT CHANGE UP (ref 3.8–10.5)
WBC # FLD AUTO: 4.57 K/UL — SIGNIFICANT CHANGE UP (ref 3.8–10.5)

## 2021-06-24 PROCEDURE — 99222 1ST HOSP IP/OBS MODERATE 55: CPT

## 2021-06-24 RX ORDER — ISOSORBIDE MONONITRATE 60 MG/1
1 TABLET, EXTENDED RELEASE ORAL
Qty: 0 | Refills: 0 | DISCHARGE

## 2021-06-24 RX ORDER — ENOXAPARIN SODIUM 100 MG/ML
40 INJECTION SUBCUTANEOUS DAILY
Refills: 0 | Status: DISCONTINUED | OUTPATIENT
Start: 2021-06-24 | End: 2021-06-24

## 2021-06-24 RX ORDER — ATORVASTATIN CALCIUM 80 MG/1
40 TABLET, FILM COATED ORAL AT BEDTIME
Refills: 0 | Status: DISCONTINUED | OUTPATIENT
Start: 2021-06-24 | End: 2021-06-24

## 2021-06-24 RX ORDER — SODIUM CHLORIDE 9 MG/ML
3 INJECTION INTRAMUSCULAR; INTRAVENOUS; SUBCUTANEOUS EVERY 8 HOURS
Refills: 0 | Status: DISCONTINUED | OUTPATIENT
Start: 2021-06-24 | End: 2021-06-24

## 2021-06-24 RX ORDER — PREGABALIN 225 MG/1
1 CAPSULE ORAL
Qty: 0 | Refills: 0 | DISCHARGE

## 2021-06-24 RX ORDER — ISOSORBIDE MONONITRATE 60 MG/1
0 TABLET, EXTENDED RELEASE ORAL
Qty: 0 | Refills: 0 | DISCHARGE

## 2021-06-24 RX ORDER — PANTOPRAZOLE SODIUM 20 MG/1
40 TABLET, DELAYED RELEASE ORAL
Refills: 0 | Status: DISCONTINUED | OUTPATIENT
Start: 2021-06-24 | End: 2021-06-24

## 2021-06-24 RX ORDER — HYDRALAZINE HCL 50 MG
5 TABLET ORAL ONCE
Refills: 0 | Status: COMPLETED | OUTPATIENT
Start: 2021-06-24 | End: 2021-06-24

## 2021-06-24 RX ORDER — BUDESONIDE AND FORMOTEROL FUMARATE DIHYDRATE 160; 4.5 UG/1; UG/1
0 AEROSOL RESPIRATORY (INHALATION)
Qty: 0 | Refills: 11 | DISCHARGE

## 2021-06-24 RX ORDER — ALBUTEROL 90 UG/1
0 AEROSOL, METERED ORAL
Qty: 0 | Refills: 5 | DISCHARGE

## 2021-06-24 RX ORDER — ISOSORBIDE MONONITRATE 60 MG/1
30 TABLET, EXTENDED RELEASE ORAL DAILY
Refills: 0 | Status: DISCONTINUED | OUTPATIENT
Start: 2021-06-24 | End: 2021-06-24

## 2021-06-24 RX ORDER — BENAZEPRIL HYDROCHLORIDE 40 MG/1
0 TABLET ORAL
Qty: 0 | Refills: 1 | DISCHARGE

## 2021-06-24 RX ORDER — METOPROLOL TARTRATE 50 MG
1 TABLET ORAL
Qty: 0 | Refills: 0 | DISCHARGE

## 2021-06-24 RX ORDER — ASPIRIN/CALCIUM CARB/MAGNESIUM 324 MG
1 TABLET ORAL
Qty: 0 | Refills: 0 | DISCHARGE
Start: 2021-06-24

## 2021-06-24 RX ORDER — METOPROLOL TARTRATE 50 MG
25 TABLET ORAL DAILY
Refills: 0 | Status: DISCONTINUED | OUTPATIENT
Start: 2021-06-24 | End: 2021-06-24

## 2021-06-24 RX ORDER — ATORVASTATIN CALCIUM 80 MG/1
0 TABLET, FILM COATED ORAL
Qty: 0 | Refills: 0 | DISCHARGE

## 2021-06-24 RX ORDER — ASPIRIN/CALCIUM CARB/MAGNESIUM 324 MG
81 TABLET ORAL DAILY
Refills: 0 | Status: DISCONTINUED | OUTPATIENT
Start: 2021-06-24 | End: 2021-06-24

## 2021-06-24 RX ORDER — LISINOPRIL 2.5 MG/1
10 TABLET ORAL DAILY
Refills: 0 | Status: DISCONTINUED | OUTPATIENT
Start: 2021-06-24 | End: 2021-06-24

## 2021-06-24 RX ORDER — OMEPRAZOLE 10 MG/1
0 CAPSULE, DELAYED RELEASE ORAL
Qty: 0 | Refills: 1 | DISCHARGE

## 2021-06-24 RX ORDER — ALBUTEROL 90 UG/1
2 AEROSOL, METERED ORAL EVERY 6 HOURS
Refills: 0 | Status: DISCONTINUED | OUTPATIENT
Start: 2021-06-24 | End: 2021-06-24

## 2021-06-24 RX ORDER — METOPROLOL TARTRATE 50 MG
0 TABLET ORAL
Qty: 0 | Refills: 1 | DISCHARGE

## 2021-06-24 RX ORDER — BUDESONIDE AND FORMOTEROL FUMARATE DIHYDRATE 160; 4.5 UG/1; UG/1
2 AEROSOL RESPIRATORY (INHALATION)
Refills: 0 | Status: DISCONTINUED | OUTPATIENT
Start: 2021-06-24 | End: 2021-06-24

## 2021-06-24 RX ADMIN — Medication 81 MILLIGRAM(S): at 11:41

## 2021-06-24 RX ADMIN — SODIUM CHLORIDE 3 MILLILITER(S): 9 INJECTION INTRAMUSCULAR; INTRAVENOUS; SUBCUTANEOUS at 13:01

## 2021-06-24 RX ADMIN — ENOXAPARIN SODIUM 40 MILLIGRAM(S): 100 INJECTION SUBCUTANEOUS at 11:42

## 2021-06-24 RX ADMIN — Medication 25 MILLIGRAM(S): at 14:35

## 2021-06-24 RX ADMIN — Medication 5 MILLIGRAM(S): at 05:05

## 2021-06-24 RX ADMIN — SODIUM CHLORIDE 3 MILLILITER(S): 9 INJECTION INTRAMUSCULAR; INTRAVENOUS; SUBCUTANEOUS at 05:48

## 2021-06-24 RX ADMIN — ISOSORBIDE MONONITRATE 30 MILLIGRAM(S): 60 TABLET, EXTENDED RELEASE ORAL at 11:41

## 2021-06-24 RX ADMIN — LISINOPRIL 10 MILLIGRAM(S): 2.5 TABLET ORAL at 14:35

## 2021-06-24 RX ADMIN — Medication 5 MILLIGRAM(S): at 06:30

## 2021-06-24 NOTE — DISCHARGE NOTE NURSING/CASE MANAGEMENT/SOCIAL WORK - NSSCNAMETXT_GEN_ALL_CORE
[facility] [phone].  Nurse to visit on the day after discharge.  Other appropriate services to be arranged thereafter.   HHA 9.5 hour/7 days through [facility] JO Ayoub [phone]. Agency: [facility] [phone].

## 2021-06-24 NOTE — PHYSICAL THERAPY INITIAL EVALUATION ADULT - RANGE OF MOTION EXAMINATION, REHAB EVAL
right UE Fixed in flexion/Left UE ROM was WFL (within functional limits)/bilateral lower extremity ROM was WFL (within functional limits)

## 2021-06-24 NOTE — PHYSICAL THERAPY INITIAL EVALUATION ADULT - PERTINENT HX OF CURRENT PROBLEM, REHAB EVAL
81 year old Male history of CAD s/p stent, s/p PPM, CVA with residual Right weakness, dementia, HTN, Hyperlipidemia, GERD, Asthma, brought to the Ed by EMS from home due to midsternal non radiating chest pain.

## 2021-06-24 NOTE — H&P ADULT - PROBLEM SELECTOR PROBLEM 2
Type 2 diabetes mellitus without complication, without long-term current use of insulin Dementia with behavioral disturbance, unspecified dementia type

## 2021-06-24 NOTE — H&P ADULT - NEGATIVE NEUROLOGICAL SYMPTOMS
no paresthesias/no generalized seizures/no focal seizures/no syncope/no loss of sensation/no difficulty walking/no loss of consciousness/no hemiparesis/no facial palsy

## 2021-06-24 NOTE — H&P ADULT - HISTORY OF PRESENT ILLNESS
History obtained from the chart due to the pt unwilling to answer any questions at this time. Called emergency contact/ spouse Nerissa Perez  and phone "not in service."    81M history of cad s/p stent, PPM, CVA with residual R weakness, dementia, HTN, Hyperlipidemia, GERD, Asthma, brought to the Ed by EMS from home due to midsternal non radiating chest pain.  The chest pain started earlier in the afternoon, states it feels similar to previous time when he needed a stent.  Endorsing cough over last few days but no fevers and nonproductive.  Denies HA, dizziness, SOB, nausea, vomit, sick contacts, LE pain/ edema.  While in the Ed, the pt  became uncooperative with the treatment, noted to take of telemetry leads and throw it on the floor refusing to wear the cardiac monitor. The pt was given haldol 2.5 mg IV x 1 and the pt fell asleep  - EKG A Paced@ 61b/ min, QW, 2,3, AVF, QT/ QTC= 386/ 388.   - TROP = 11> 11  - CXR : Clear.     Vitals: T Max: 98.6 oral, HR= 64b/ min, BP = 151/ 95, RR= 18b/ min, IZN8=968% ra  History obtained from the chart as pt sedated s/p haldol. Called emergency contact/ spouse Nerissa Perez  and phone "not in service."    81M history of CAD s/p stent, s/p PPM, CVA with residual R weakness, dementia, HTN, Hyperlipidemia, GERD, Asthma, brought to the Ed by EMS from home due to midsternal non radiating chest pain.  The chest pain started earlier in the afternoon, states it feels similar to previous time when he needed a stent.  Endorsing cough over last few days but no fevers and nonproductive.  Denies HA, dizziness, SOB, nausea, vomiting, sick contacts, LE pain/ edema. Currently denies chest pain.  While in the Ed, the pt  became uncooperative with the treatment, noted to take of telemetry leads and throw it on the floor refusing to wear the cardiac monitor. The pt was given haldol 2.5 mg IV x 1 and the pt fell asleep  - EKG A Paced@ 61b/ min, QW, 2,3, AVF, QT/ QTC= 386/ 388.   - TROP = 11> 11  - CXR : Clear.     Vitals: T Max: 98.6 oral, HR= 64b/ min, BP = 151/ 95, RR= 18b/ min, FKL6=577% ra

## 2021-06-24 NOTE — CHART NOTE - NSCHARTNOTEFT_GEN_A_CORE
Patient examined at bedside. Patient is AO x 1-2 self and place. No medical complaints. Denies any chest pain. Troponin negative x 2, EKG non-ischemic and recent TTE from 6/12, reviewed and noted. Discussed with wife. PT recommends rehab. Family prefers home with home PT as prior hospitalization. Plan for outpatient visit tomorrow. 35 minutes spent discharge planning.

## 2021-06-24 NOTE — H&P ADULT - ASSESSMENT
81M history of cad s/p stent, PPM, CVA with residual R weakness, dementia, HTN, Hyperlipidemia, GERD, Asthma admitted for chest pain. 81M history of cad s/p stent, PPM, CVA with residual R weakness, dementia, HTN, Hyperlipidemia, GERD, Asthma admitted for further evaluation of chest pain

## 2021-06-24 NOTE — H&P ADULT - PROBLEM SELECTOR PROBLEM 3
Dementia with behavioral disturbance, unspecified dementia type Type 2 diabetes mellitus without complication, without long-term current use of insulin

## 2021-06-24 NOTE — PHYSICAL THERAPY INITIAL EVALUATION ADULT - IMPAIRMENTS FOUND, PT EVAL
aerobic capacity/endurance/arousal, attention, and cognition/cognitive impairment/muscle strength/poor safety awareness/tone

## 2021-06-24 NOTE — PATIENT PROFILE ADULT - VISION (WITH CORRECTIVE LENSES IF THE PATIENT USUALLY WEARS THEM):
Pt has 1 pair of glasses/Normal vision: sees adequately in most situations; can see medication labels, newsprint

## 2021-06-24 NOTE — H&P ADULT - PROBLEM SELECTOR PLAN 1
Cardiac monitor.  Trop 11> 11  Pt currently not expressing chest pain.  Give O2, ASA, nitrate, BB, Statin.  F/U TSH, Lipid panel. Cardiac monitor.  Trop 11> 11  Pt currently not expressing chest pain.  Give O2, ASA, nitrate, BB, Statin.  F/U TTE, TSH, Lipid panel. -Pt currently chest pain free. EKG NSR, no acute ST changes. Trops flat  -Low suspicion for ACS  -Give O2, ASA, nitrate, BB, Statin.  -TTE pending to eval for wall motion abnormalities  -CXR clear, satting 100%. Unlikely PNA or acute PE

## 2021-06-24 NOTE — PHYSICAL THERAPY INITIAL EVALUATION ADULT - PATIENT/FAMILY/SIGNIFICANT OTHER GOALS STATEMENT, PT EVAL
pt. presenting with confusion and difficulty in following instructions, no goals provided at this time.

## 2021-06-24 NOTE — PHYSICAL THERAPY INITIAL EVALUATION ADULT - PERSONAL SAFETY AND JUDGMENT, REHAB EVAL
pt. presenting with impulsivity throughout all functional mobility requiring assist from therapist to maintain proper safety awareness by decreasing fall risk/at risk behaviors demonstrated

## 2021-06-24 NOTE — DISCHARGE NOTE PROVIDER - NSDCCPCAREPLAN_GEN_ALL_CORE_FT
PRINCIPAL DISCHARGE DIAGNOSIS  Diagnosis: Chest pain  Assessment and Plan of Treatment: You were ruled out for a acute coronary syndrome your blood work was negative for a heart attack. You had a recent echo that was doen which was stable, no need for further workup. Continue your current medications as prescribed      SECONDARY DISCHARGE DIAGNOSES  Diagnosis: Type 2 diabetes mellitus without complication, without long-term current use of insulin  Assessment and Plan of Treatment: Monitor finger sticks pre-meal and bedtime, low salt, fat and carbohydrate diet, minimize glucose intake.  Continue your oral diabetic medications. Follow up with primary care physician and endocrinologist for routine Hemoglobin A1C checks and management.  Follow up with your ophthalmologist for routine yearly vision exams.      Diagnosis: Essential hypertension  Assessment and Plan of Treatment: Low sodium and fat diet, continue anti-hypertensive medications, and follow up with primary care physician.      Diagnosis: Cerebrovascular accident (CVA)  Assessment and Plan of Treatment: continue aspirin    Diagnosis: CAD (coronary artery disease)  Assessment and Plan of Treatment: Continue aspirin and other cardiac medications. Continue low salt, fat, cholesterol and carbohydrate diet. Follow up with cardiologist and primary care physician's routine appointment.

## 2021-06-24 NOTE — PHYSICAL THERAPY INITIAL EVALUATION ADULT - LEVEL OF INDEPENDENCE: SIT/STAND, REHAB EVAL
attempted to perform sit<>stand transfer multiple attempts however, pt. unable to perform at this time secondary to weakness, increased posterior trunk sway and knee buckling preventing pt. from obtaining an upright posture. will progress as feasible/unable to perform

## 2021-06-24 NOTE — DISCHARGE NOTE NURSING/CASE MANAGEMENT/SOCIAL WORK - PATIENT PORTAL LINK FT
You can access the FollowMyHealth Patient Portal offered by [facility] by registering at the following website: [email or web address]. By joining [employer]’s FollowMyHealth portal, you will also be able to view your health information using other applications (apps) compatible with our system.

## 2021-06-24 NOTE — DISCHARGE NOTE PROVIDER - CARE PROVIDER_API CALL
[facility]  [address] [place] [address]  Phone: [phone]  Fax: [phone]  Follow Up Time: 1 week

## 2021-06-24 NOTE — PATIENT PROFILE ADULT - NSPROMEDSADMININFO_GEN_A_NUR
Pt is A&Ox1, unable to obtain information. Pt is letharic, pending dysphagia screening/crush pills for administration

## 2021-06-24 NOTE — DISCHARGE NOTE PROVIDER - NSDCMRMEDTOKEN_GEN_ALL_CORE_FT
albuterol 90 mcg/inh inhalation aerosol: 2 puff(s) inhaled every 6 hours, As Needed  aspirin 81 mg oral delayed release tablet: 1 tab(s) orally once a day  atorvastatin 40 mg oral tablet: 1 tab(s) orally once a day  benazepril 10 mg oral tablet: 1 tab(s) orally once a day  isosorbide mononitrate 30 mg oral tablet, extended release: 1 tab(s) orally once a day (in the morning)  metFORMIN 500 mg oral tablet: 1 tab(s) orally once a day (in the morning)  Metoprolol Succinate ER 25 mg oral tablet, extended release: 1 tab(s) orally once a day  omeprazole 20 mg oral delayed release capsule: 1 cap(s) orally once a day  Symbicort 160 mcg-4.5 mcg/inh inhalation aerosol: 2 puff(s) inhaled 2 times a day

## 2021-06-24 NOTE — PROVIDER CONTACT NOTE (OTHER) - ASSESSMENT
Pt's blood pressure is 165/100 at 03:30, 189/99 at 05:00, 188/99 at 06:20. Pt in no acute distress. No signs or symptoms of pain, chest pain, changes in visions noted.
Patient uncooperative at this time. Pt was placed on telemetry monitoring upon arrival to [facility]. Pt then proceeded to take of telemetry leads and throw it on the floor. Patient will not be on telemetry monitoring at this time as this is a repeated action of his.

## 2021-06-24 NOTE — H&P ADULT - PROBLEM SELECTOR PLAN 3
Currently not on any disease slowing medications.  Currently not agitated s/p Haldol 2.5 mg IV x 1 given in the ED.  F/U VitD25 level. Currently not any diabetic medications.  BS= 89  F/U A1C  DM diet.

## 2021-06-24 NOTE — PHYSICAL THERAPY INITIAL EVALUATION ADULT - MANUAL MUSCLE TESTING RESULTS, REHAB EVAL
left UE strength grossly 3/5 throughout, unable to formally assess right UE strength, bilateral LE strength grossly 3-/5 throughout

## 2021-06-24 NOTE — H&P ADULT - PROBLEM SELECTOR PLAN 2
Currently not any diabetic medications.  BS= 89  F/U A1C  DM diet. With acute agitation, s/p haldol with sedation   Monitor for sundowning  Fall and aspiration precautions

## 2021-06-24 NOTE — DISCHARGE NOTE PROVIDER - HOSPITAL COURSE
81M history of cad s/p stent, PPM, CVA with residual R weakness, dementia, HTN, Hyperlipidemia, GERD, Asthma admitted for chest pain.  Patient was ruled out for ACS, Troponin negative x 2, EKG non-ischemic and recent TTE from 6/12 showed Low normal left ventricular systolic function. No  segmental wall motion abnormalities. Normal right ventricular size with decreased right ventricular systolic function. No further ischemic workup needed at this time. PT recommends rehab. Family prefers home with home PT as prior hospitalization.  set up home care.     Patient stable and cleared for discharge home d/w Dr. Goddard 6/24.

## 2021-06-24 NOTE — H&P ADULT - RS GEN PE MLT RESP DETAILS PC
airway patent/good air movement/respirations non-labored/clear to auscultation bilaterally/no chest wall tenderness/no intercostal retractions/no rales/no rhonchi/no subcutaneous emphysema/no wheezes

## 2021-06-24 NOTE — H&P ADULT - ATTENDING COMMENTS
81M history of cad s/p stent, PPM, CVA with residual R weakness, dementia, HTN, Hyperlipidemia, GERD, Asthma admitted for further evaluation of chest pain. Currently calm, denies chest pain but refusing to answer further questions. Low suspicion for ACS, EKG non ischemic, trops flat. TTE pending to eval for WMA. Full med rec in AM.

## 2021-06-24 NOTE — PROVIDER CONTACT NOTE (OTHER) - ACTION/TREATMENT ORDERED:
Provider noted the information, attempt to place pt back on tele monitoring when pt asleep
Paramjit Concepcion made aware, but no response or further interventions were ordered even with multiple concerns about the elevated pressure of 165/100. Joy Clinton notified, IV Hydralazine ordered

## 2021-06-24 NOTE — PHYSICAL THERAPY INITIAL EVALUATION ADULT - ADDITIONAL COMMENTS
Unable to obtain accurate social history secondary to pt. presenting with confusion during subjective history and presenting with difficulty following commands, limited history obtained from pt. interview. as per pt. pt. lives with his spouse. Pt. reports he walks at home however, pt. presenting with confusion and unable to report if he uses an assistive device. please consult with social work/  case management for further functional mobility assessment. Pt. left comfortable in bed with all tubes/lines intact, call bell in reach and in NAD.

## 2021-06-24 NOTE — PROVIDER CONTACT NOTE (OTHER) - SITUATION
Pt's blood pressure is 165/100 at 03:30, 189/99 at 05:00, 188/99 at 06:20.
Patient uncooperative at this time

## 2021-09-22 NOTE — PROGRESS NOTE ADULT - PROBLEM/PLAN-1
DISPLAY PLAN FREE TEXT
Pt bp 111/53
pt c/o of itching / rash at IV site right after Cipro Abx was given
pt with diastolic of 59
DISPLAY PLAN FREE TEXT
Pt diastolic 54
pt with diastolic of 57
DISPLAY PLAN FREE TEXT

## 2021-10-07 NOTE — PROGRESS NOTE ADULT - PROBLEM/PLAN-5
DISPLAY PLAN FREE TEXT
07-Oct-2021 00:35

## 2021-11-18 NOTE — ED ADULT NURSE NOTE - PATIENT DISCHARGE SIGNATURE
Patient: Willis Lechuga    Procedure Summary     Date: 11/18/21 Room / Location: [facility] ENDOSCOPY 3 / [facility] ENDOSCOPY    Anesthesia Start: 1525 Anesthesia Stop: 1541    Procedure: BRONCHOSCOPY WITH BRONCHIAL WASHING (N/A Bronchus) Diagnosis:       Sepsis due to pneumonia (HCC)      Idiopathic pulmonary fibrosis (HCC)      (Sepsis due to pneumonia (HCC) [J18.9, A41.9])      (Idiopathic pulmonary fibrosis (HCC) [J84.112])    Surgeons: Marj Vincent MD Provider: Evaristo Mcclain MD    Anesthesia Type: MAC ASA Status: 3          Anesthesia Type: MAC    Vitals  Vitals Value Taken Time   /66 11/18/21 1610   Temp     Pulse 96 11/18/21 1610   Resp 22 11/18/21 1600   SpO2 96 % 11/18/21 1610   Vitals shown include unvalidated device data.        Post Anesthesia Care and Evaluation    Patient location during evaluation: PACU  Patient participation: complete - patient participated  Level of consciousness: awake  Pain scale: See nurse's notes for pain score.  Pain management: adequate  Airway patency: patent  Anesthetic complications: No anesthetic complications  PONV Status: none  Cardiovascular status: acceptable  Respiratory status: acceptable  Hydration status: acceptable    Comments: Patient seen and examined postoperatively; vital signs stable; SpO2 greater than or equal to 90%; cardiopulmonary status stable; nausea/vomiting adequately controlled; pain adequately controlled; no apparent anesthesia complications; patient discharged from anesthesia care when discharge criteria were met      
20-Aug-2017

## 2021-12-16 ENCOUNTER — INPATIENT (INPATIENT)
Facility: HOSPITAL | Age: 81
LOS: 4 days | Discharge: NURSING, REHAB OR LONG-TERM CARE | End: 2021-12-21
Attending: INTERNAL MEDICINE | Admitting: INTERNAL MEDICINE
Payer: COMMERCIAL

## 2021-12-16 VITALS
TEMPERATURE: 98 F | OXYGEN SATURATION: 97 % | DIASTOLIC BLOOD PRESSURE: 75 MMHG | RESPIRATION RATE: 16 BRPM | HEIGHT: 66 IN | HEART RATE: 85 BPM | SYSTOLIC BLOOD PRESSURE: 119 MMHG

## 2021-12-16 DIAGNOSIS — I10 ESSENTIAL (PRIMARY) HYPERTENSION: ICD-10-CM

## 2021-12-16 DIAGNOSIS — E11.8 TYPE 2 DIABETES MELLITUS WITH UNSPECIFIED COMPLICATIONS: ICD-10-CM

## 2021-12-16 DIAGNOSIS — R62.7 ADULT FAILURE TO THRIVE: ICD-10-CM

## 2021-12-16 DIAGNOSIS — N39.0 URINARY TRACT INFECTION, SITE NOT SPECIFIED: ICD-10-CM

## 2021-12-16 DIAGNOSIS — I63.9 CEREBRAL INFARCTION, UNSPECIFIED: ICD-10-CM

## 2021-12-16 DIAGNOSIS — J13 PNEUMONIA DUE TO STREPTOCOCCUS PNEUMONIAE: ICD-10-CM

## 2021-12-16 DIAGNOSIS — Z95.0 PRESENCE OF CARDIAC PACEMAKER: Chronic | ICD-10-CM

## 2021-12-16 DIAGNOSIS — F03.90 UNSPECIFIED DEMENTIA WITHOUT BEHAVIORAL DISTURBANCE: ICD-10-CM

## 2021-12-16 LAB
ALBUMIN SERPL ELPH-MCNC: 4.2 G/DL — SIGNIFICANT CHANGE UP (ref 3.3–5)
ALP SERPL-CCNC: 74 U/L — SIGNIFICANT CHANGE UP (ref 40–120)
ALT FLD-CCNC: 15 U/L — SIGNIFICANT CHANGE UP (ref 4–41)
ANION GAP SERPL CALC-SCNC: 14 MMOL/L — SIGNIFICANT CHANGE UP (ref 7–14)
APPEARANCE UR: ABNORMAL
AST SERPL-CCNC: 16 U/L — SIGNIFICANT CHANGE UP (ref 4–40)
B PERT DNA SPEC QL NAA+PROBE: SIGNIFICANT CHANGE UP
B PERT+PARAPERT DNA PNL SPEC NAA+PROBE: SIGNIFICANT CHANGE UP
BACTERIA # UR AUTO: NEGATIVE — SIGNIFICANT CHANGE UP
BASOPHILS # BLD AUTO: 0.02 K/UL — SIGNIFICANT CHANGE UP (ref 0–0.2)
BASOPHILS NFR BLD AUTO: 0.2 % — SIGNIFICANT CHANGE UP (ref 0–2)
BILIRUB SERPL-MCNC: 2.3 MG/DL — HIGH (ref 0.2–1.2)
BILIRUB UR-MCNC: ABNORMAL
BORDETELLA PARAPERTUSSIS (RAPRVP): SIGNIFICANT CHANGE UP
BUN SERPL-MCNC: 24 MG/DL — HIGH (ref 7–23)
C DIFF BY PCR RESULT: SIGNIFICANT CHANGE UP
C DIFF TOX GENS STL QL NAA+PROBE: SIGNIFICANT CHANGE UP
C PNEUM DNA SPEC QL NAA+PROBE: SIGNIFICANT CHANGE UP
CALCIUM SERPL-MCNC: 10.5 MG/DL — SIGNIFICANT CHANGE UP (ref 8.4–10.5)
CHLORIDE SERPL-SCNC: 102 MMOL/L — SIGNIFICANT CHANGE UP (ref 98–107)
CO2 SERPL-SCNC: 24 MMOL/L — SIGNIFICANT CHANGE UP (ref 22–31)
COLOR SPEC: ABNORMAL
CREAT SERPL-MCNC: 1.37 MG/DL — HIGH (ref 0.5–1.3)
DIFF PNL FLD: ABNORMAL
EOSINOPHIL # BLD AUTO: 0.09 K/UL — SIGNIFICANT CHANGE UP (ref 0–0.5)
EOSINOPHIL NFR BLD AUTO: 0.8 % — SIGNIFICANT CHANGE UP (ref 0–6)
EPI CELLS # UR: SIGNIFICANT CHANGE UP
FLUAV SUBTYP SPEC NAA+PROBE: SIGNIFICANT CHANGE UP
FLUBV RNA SPEC QL NAA+PROBE: SIGNIFICANT CHANGE UP
GLUCOSE SERPL-MCNC: 113 MG/DL — HIGH (ref 70–99)
GLUCOSE UR QL: NEGATIVE — SIGNIFICANT CHANGE UP
HADV DNA SPEC QL NAA+PROBE: SIGNIFICANT CHANGE UP
HCOV 229E RNA SPEC QL NAA+PROBE: SIGNIFICANT CHANGE UP
HCOV HKU1 RNA SPEC QL NAA+PROBE: SIGNIFICANT CHANGE UP
HCOV NL63 RNA SPEC QL NAA+PROBE: SIGNIFICANT CHANGE UP
HCOV OC43 RNA SPEC QL NAA+PROBE: SIGNIFICANT CHANGE UP
HCT VFR BLD CALC: 41.9 % — SIGNIFICANT CHANGE UP (ref 39–50)
HGB BLD-MCNC: 13.7 G/DL — SIGNIFICANT CHANGE UP (ref 13–17)
HMPV RNA SPEC QL NAA+PROBE: SIGNIFICANT CHANGE UP
HPIV1 RNA SPEC QL NAA+PROBE: SIGNIFICANT CHANGE UP
HPIV2 RNA SPEC QL NAA+PROBE: SIGNIFICANT CHANGE UP
HPIV3 RNA SPEC QL NAA+PROBE: SIGNIFICANT CHANGE UP
HPIV4 RNA SPEC QL NAA+PROBE: SIGNIFICANT CHANGE UP
HYALINE CASTS # UR AUTO: SIGNIFICANT CHANGE UP (ref 0–7)
IANC: 8.84 K/UL — HIGH (ref 1.5–8.5)
IMM GRANULOCYTES NFR BLD AUTO: 0.4 % — SIGNIFICANT CHANGE UP (ref 0–1.5)
KETONES UR-MCNC: NEGATIVE — SIGNIFICANT CHANGE UP
LEUKOCYTE ESTERASE UR-ACNC: ABNORMAL
LYMPHOCYTES # BLD AUTO: 1.53 K/UL — SIGNIFICANT CHANGE UP (ref 1–3.3)
LYMPHOCYTES # BLD AUTO: 13.1 % — SIGNIFICANT CHANGE UP (ref 13–44)
M PNEUMO DNA SPEC QL NAA+PROBE: SIGNIFICANT CHANGE UP
MCHC RBC-ENTMCNC: 30.7 PG — SIGNIFICANT CHANGE UP (ref 27–34)
MCHC RBC-ENTMCNC: 32.7 GM/DL — SIGNIFICANT CHANGE UP (ref 32–36)
MCV RBC AUTO: 93.9 FL — SIGNIFICANT CHANGE UP (ref 80–100)
MONOCYTES # BLD AUTO: 1.12 K/UL — HIGH (ref 0–0.9)
MONOCYTES NFR BLD AUTO: 9.6 % — SIGNIFICANT CHANGE UP (ref 2–14)
NEUTROPHILS # BLD AUTO: 8.84 K/UL — HIGH (ref 1.8–7.4)
NEUTROPHILS NFR BLD AUTO: 75.9 % — SIGNIFICANT CHANGE UP (ref 43–77)
NITRITE UR-MCNC: NEGATIVE — SIGNIFICANT CHANGE UP
NRBC # BLD: 0 /100 WBCS — SIGNIFICANT CHANGE UP
NRBC # FLD: 0 K/UL — SIGNIFICANT CHANGE UP
PH UR: 5.5 — SIGNIFICANT CHANGE UP (ref 5–8)
PLATELET # BLD AUTO: 168 K/UL — SIGNIFICANT CHANGE UP (ref 150–400)
POTASSIUM SERPL-MCNC: 4 MMOL/L — SIGNIFICANT CHANGE UP (ref 3.5–5.3)
POTASSIUM SERPL-SCNC: 4 MMOL/L — SIGNIFICANT CHANGE UP (ref 3.5–5.3)
PROT SERPL-MCNC: 7.3 G/DL — SIGNIFICANT CHANGE UP (ref 6–8.3)
PROT UR-MCNC: ABNORMAL
RAPID RVP RESULT: SIGNIFICANT CHANGE UP
RBC # BLD: 4.46 M/UL — SIGNIFICANT CHANGE UP (ref 4.2–5.8)
RBC # FLD: 13 % — SIGNIFICANT CHANGE UP (ref 10.3–14.5)
RBC CASTS # UR COMP ASSIST: SIGNIFICANT CHANGE UP /HPF (ref 0–4)
RSV RNA SPEC QL NAA+PROBE: SIGNIFICANT CHANGE UP
RV+EV RNA SPEC QL NAA+PROBE: SIGNIFICANT CHANGE UP
SARS-COV-2 RNA SPEC QL NAA+PROBE: SIGNIFICANT CHANGE UP
SODIUM SERPL-SCNC: 140 MMOL/L — SIGNIFICANT CHANGE UP (ref 135–145)
SP GR SPEC: 1.03 — SIGNIFICANT CHANGE UP (ref 1–1.05)
UROBILINOGEN FLD QL: ABNORMAL
WBC # BLD: 11.65 K/UL — HIGH (ref 3.8–10.5)
WBC # FLD AUTO: 11.65 K/UL — HIGH (ref 3.8–10.5)
WBC UR QL: SIGNIFICANT CHANGE UP /HPF (ref 0–5)

## 2021-12-16 PROCEDURE — 99285 EMERGENCY DEPT VISIT HI MDM: CPT

## 2021-12-16 PROCEDURE — 74177 CT ABD & PELVIS W/CONTRAST: CPT | Mod: 26,MA

## 2021-12-16 PROCEDURE — G1004: CPT

## 2021-12-16 PROCEDURE — 70450 CT HEAD/BRAIN W/O DYE: CPT | Mod: 26,MG

## 2021-12-16 PROCEDURE — 99222 1ST HOSP IP/OBS MODERATE 55: CPT

## 2021-12-16 RX ORDER — HEPARIN SODIUM 5000 [USP'U]/ML
5000 INJECTION INTRAVENOUS; SUBCUTANEOUS EVERY 12 HOURS
Refills: 0 | Status: DISCONTINUED | OUTPATIENT
Start: 2021-12-16 | End: 2021-12-21

## 2021-12-16 RX ORDER — PANTOPRAZOLE SODIUM 20 MG/1
40 TABLET, DELAYED RELEASE ORAL
Refills: 0 | Status: DISCONTINUED | OUTPATIENT
Start: 2021-12-16 | End: 2021-12-21

## 2021-12-16 RX ORDER — DEXTROSE 50 % IN WATER 50 %
25 SYRINGE (ML) INTRAVENOUS ONCE
Refills: 0 | Status: DISCONTINUED | OUTPATIENT
Start: 2021-12-16 | End: 2021-12-21

## 2021-12-16 RX ORDER — DEXTROSE 50 % IN WATER 50 %
12.5 SYRINGE (ML) INTRAVENOUS ONCE
Refills: 0 | Status: DISCONTINUED | OUTPATIENT
Start: 2021-12-16 | End: 2021-12-21

## 2021-12-16 RX ORDER — ISOSORBIDE MONONITRATE 60 MG/1
1 TABLET, EXTENDED RELEASE ORAL
Qty: 0 | Refills: 0 | DISCHARGE

## 2021-12-16 RX ORDER — ASPIRIN/CALCIUM CARB/MAGNESIUM 324 MG
81 TABLET ORAL DAILY
Refills: 0 | Status: DISCONTINUED | OUTPATIENT
Start: 2021-12-16 | End: 2021-12-21

## 2021-12-16 RX ORDER — ATORVASTATIN CALCIUM 80 MG/1
40 TABLET, FILM COATED ORAL AT BEDTIME
Refills: 0 | Status: DISCONTINUED | OUTPATIENT
Start: 2021-12-16 | End: 2021-12-21

## 2021-12-16 RX ORDER — GLUCAGON INJECTION, SOLUTION 0.5 MG/.1ML
1 INJECTION, SOLUTION SUBCUTANEOUS ONCE
Refills: 0 | Status: DISCONTINUED | OUTPATIENT
Start: 2021-12-16 | End: 2021-12-21

## 2021-12-16 RX ORDER — ATORVASTATIN CALCIUM 80 MG/1
40 TABLET, FILM COATED ORAL DAILY
Refills: 0 | Status: DISCONTINUED | OUTPATIENT
Start: 2021-12-16 | End: 2021-12-16

## 2021-12-16 RX ORDER — ATORVASTATIN CALCIUM 80 MG/1
1 TABLET, FILM COATED ORAL
Qty: 0 | Refills: 0 | DISCHARGE

## 2021-12-16 RX ORDER — METOPROLOL TARTRATE 50 MG
25 TABLET ORAL DAILY
Refills: 0 | Status: DISCONTINUED | OUTPATIENT
Start: 2021-12-16 | End: 2021-12-17

## 2021-12-16 RX ORDER — SODIUM CHLORIDE 9 MG/ML
1000 INJECTION INTRAMUSCULAR; INTRAVENOUS; SUBCUTANEOUS ONCE
Refills: 0 | Status: COMPLETED | OUTPATIENT
Start: 2021-12-16 | End: 2021-12-16

## 2021-12-16 RX ORDER — SODIUM CHLORIDE 9 MG/ML
1000 INJECTION, SOLUTION INTRAVENOUS
Refills: 0 | Status: DISCONTINUED | OUTPATIENT
Start: 2021-12-16 | End: 2021-12-21

## 2021-12-16 RX ORDER — QUETIAPINE FUMARATE 200 MG/1
25 TABLET, FILM COATED ORAL ONCE
Refills: 0 | Status: COMPLETED | OUTPATIENT
Start: 2021-12-16 | End: 2021-12-16

## 2021-12-16 RX ORDER — INSULIN LISPRO 100/ML
VIAL (ML) SUBCUTANEOUS
Refills: 0 | Status: DISCONTINUED | OUTPATIENT
Start: 2021-12-16 | End: 2021-12-21

## 2021-12-16 RX ORDER — OMEPRAZOLE 10 MG/1
1 CAPSULE, DELAYED RELEASE ORAL
Qty: 0 | Refills: 0 | DISCHARGE

## 2021-12-16 RX ORDER — ALBUTEROL 90 UG/1
2 AEROSOL, METERED ORAL EVERY 6 HOURS
Refills: 0 | Status: DISCONTINUED | OUTPATIENT
Start: 2021-12-16 | End: 2021-12-21

## 2021-12-16 RX ORDER — DEXTROSE 50 % IN WATER 50 %
15 SYRINGE (ML) INTRAVENOUS ONCE
Refills: 0 | Status: DISCONTINUED | OUTPATIENT
Start: 2021-12-16 | End: 2021-12-21

## 2021-12-16 RX ORDER — ALBUTEROL 90 UG/1
2 AEROSOL, METERED ORAL
Qty: 0 | Refills: 0 | DISCHARGE

## 2021-12-16 RX ORDER — BUDESONIDE AND FORMOTEROL FUMARATE DIHYDRATE 160; 4.5 UG/1; UG/1
2 AEROSOL RESPIRATORY (INHALATION)
Refills: 0 | Status: DISCONTINUED | OUTPATIENT
Start: 2021-12-16 | End: 2021-12-21

## 2021-12-16 RX ORDER — ISOSORBIDE MONONITRATE 60 MG/1
30 TABLET, EXTENDED RELEASE ORAL DAILY
Refills: 0 | Status: DISCONTINUED | OUTPATIENT
Start: 2021-12-16 | End: 2021-12-17

## 2021-12-16 RX ORDER — BUDESONIDE AND FORMOTEROL FUMARATE DIHYDRATE 160; 4.5 UG/1; UG/1
2 AEROSOL RESPIRATORY (INHALATION)
Qty: 0 | Refills: 0 | DISCHARGE

## 2021-12-16 RX ORDER — METFORMIN HYDROCHLORIDE 850 MG/1
1 TABLET ORAL
Qty: 0 | Refills: 0 | DISCHARGE

## 2021-12-16 RX ORDER — LISINOPRIL 2.5 MG/1
10 TABLET ORAL DAILY
Refills: 0 | Status: DISCONTINUED | OUTPATIENT
Start: 2021-12-16 | End: 2021-12-17

## 2021-12-16 RX ADMIN — SODIUM CHLORIDE 1000 MILLILITER(S): 9 INJECTION INTRAMUSCULAR; INTRAVENOUS; SUBCUTANEOUS at 08:47

## 2021-12-16 RX ADMIN — ATORVASTATIN CALCIUM 40 MILLIGRAM(S): 80 TABLET, FILM COATED ORAL at 22:24

## 2021-12-16 RX ADMIN — QUETIAPINE FUMARATE 25 MILLIGRAM(S): 200 TABLET, FILM COATED ORAL at 11:26

## 2021-12-16 RX ADMIN — ISOSORBIDE MONONITRATE 30 MILLIGRAM(S): 60 TABLET, EXTENDED RELEASE ORAL at 22:24

## 2021-12-16 RX ADMIN — HEPARIN SODIUM 5000 UNIT(S): 5000 INJECTION INTRAVENOUS; SUBCUTANEOUS at 19:25

## 2021-12-16 RX ADMIN — BUDESONIDE AND FORMOTEROL FUMARATE DIHYDRATE 2 PUFF(S): 160; 4.5 AEROSOL RESPIRATORY (INHALATION) at 22:27

## 2021-12-16 NOTE — ED ADULT NURSE REASSESSMENT NOTE - NS ED NURSE REASSESS COMMENT FT1
PT is resting in stretcher, easily arousable to verbal stimuli. no apparent distress noted. will continue to monitor.

## 2021-12-16 NOTE — CONSULT NOTE ADULT - ATTENDING COMMENTS
81 year old male with CAD s/p stent, PPM, CVA with R residual weakness, Dementia, HTN, HLD, GERD, Asthma presenting with hematuria.     CT A/P with stercoral colitis, hyperdense layering in the bladder consistent with bladder stones. No hydroureteronephrosis. RLL consolidation - atelectasis vs pna.    UA with WBC 5-10.     Recommend:  #Hematuria  -Suspect from bladder stones  -UA with very min pyuria and blood trace  -Continue to monitor    #RLL consolidation  -Suspect atelectasis  -No cough  -No fevers   -Low suspicion for pna at this time    #Leukocytosis/ stercoral colitis  -Suspect reactive to stercoral colitis  -Bowel regimen    Richard Holley MD  Pager [phone]  After 5pm/weekends call [phone]

## 2021-12-16 NOTE — ED ADULT TRIAGE NOTE - CHIEF COMPLAINT QUOTE
C/o hematuria 1x day with bladder pain. Poor historian, EMS obtained info from wife. Hx of CVA with right sided weakness, DMT2, HTN, pacemaker C/o hematuria 1x day with bladder pain. Poor historian, EMS obtained info from wife. Hx of CVA with right sided residual weakness, DMT2, HTN, pacemaker

## 2021-12-16 NOTE — ED PROVIDER NOTE - SHIFT CHANGE DETAILS
HALEY:  Patient signed to Dr. Mckinley pending labs, ua, ct imaging.  Patient hd stable at time of signout.

## 2021-12-16 NOTE — ED PROVIDER NOTE - OBJECTIVE STATEMENT
Hx obtained from the chart  Pt is not answering any questions  80 yo M with pmhx of CVA with RT sided weakness, dementia, HTN, CAD s/p stent, PPM, ashtma presents for hematuria and bladder pain as per the triage note. As per the wife - pt was having difficulty having BM, gave him suppository - he was able to BM. Today wife noted he had gross red blood in urine. Has not been eating and drinking since yesterday. Had fever. Denies taking blood thinners. Takes ASA 81mg.   Wife [phone]

## 2021-12-16 NOTE — ED ADULT NURSE NOTE - CHIEF COMPLAINT QUOTE
C/o hematuria 1x day with bladder pain. Poor historian, EMS obtained info from wife. Hx of CVA with right sided residual weakness, DMT2, HTN, pacemaker

## 2021-12-16 NOTE — CONSULT NOTE ADULT - SUBJECTIVE AND OBJECTIVE BOX
HPI:  82 yo M with pmhx of CVA with RT sided weakness, dementia, HTN, CAD s/p stent, PPM, ashtma presents for hematuria and bladder pain as per the triage note. As per the wife - pt was having difficulty having BM, gave him suppository - he was able to BM. Today wife noted he had gross red blood in urine. Has not been eating and drinking since yesterday.   Imaging in ER shows suspected urolithiasis, and suspected RLL pneumonia. (16 Dec 2021 13:02)      PAST MEDICAL & SURGICAL HISTORY:  CVA (cerebral infarction)   with residual right sided weakness    HTN (hypertension)    Diabetes    Hyperlipidemia    Dementia    Cardiac pacemaker  Medtronic (SN: [id number]; Model: [id number])        Allergies  No Known Allergies        ANTIMICROBIALS:      OTHER MEDS: MEDICATIONS  (STANDING):  ALBUTerol    90 MICROgram(s) HFA Inhaler 2 every 6 hours PRN  aspirin enteric coated 81 daily  atorvastatin Oral Tab/Cap - Peds 40 daily  budesonide 160 MICROgram(s)/formoterol 4.5 MICROgram(s) Inhaler 2 two times a day  dextrose 40% Gel 15 once  dextrose 50% Injectable 25 once  dextrose 50% Injectable 12.5 once  dextrose 50% Injectable 25 once  glucagon  Injectable 1 once  heparin   Injectable 5000 every 12 hours  insulin lispro (ADMELOG) corrective regimen sliding scale  three times a day before meals  isosorbide   mononitrate ER Tablet (IMDUR) 30 daily  lisinopril 10 daily  metoprolol succinate ER 25 daily  pantoprazole    Tablet 40 before breakfast      SOCIAL HISTORY:  [ ] etoh [ ] tobacco [ ] former smoker [ ] IVDU    FAMILY HISTORY:  FH: diabetes mellitus  Mother, Brother    FHx: dementia  Father    REVIEW OF SYSTEMS  [ x ] ROS unobtainable because:  dementia    Vital Signs Last 24 Hrs  T(F): 98 (21 @ 06:57), Max: 98.5 (21 @ 05:43)    Vital Signs Last 24 Hrs  HR: 69 (21 @ 08:41) (65 - 85)  BP: 125/73 (21 @ 08:41) (119/75 - 147/86)  RR: 15 (21 @ 08:41)  SpO2: 100% (21 @ 08:41) (97% - 100%)  Wt(kg): --    PHYSICAL EXAM:  Constitutional: non-toxic, no distress  HEAD/EYES: anicteric, no conjunctival injection  ENT:  supple, no thrush  Cardiovascular:   normal S1, S2, no murmur, no edema  Respiratory:  clear BS bilaterally, no wheezes, no rales  GI:  soft, non-tender, normal bowel sounds  :  no valencia, no CVA tenderness  Musculoskeletal:  no synovitis, normal ROM  Neurologic: awake and alert, normal strength, no focal findings  Skin:  no rash, no erythema, no phlebitis  Heme/Onc: no lymphadenopathy   Psychiatric:  awake, alert, appropriate mood                                13.7   11.65 )-----------( 168      ( 16 Dec 2021 07:06 )             41.9           140  |  102  |  24<H>  ----------------------------<  113<H>  4.0   |  24  |  1.37<H>    Ca    10.5      16 Dec 2021 07:06    TPro  7.3  /  Alb  4.2  /  TBili  2.3<H>  /  DBili  x   /  AST  16  /  ALT  15  /  AlkPhos  74  12-16      Urinalysis Basic - ( 16 Dec 2021 07:06 )    Color: Dark Orange / Appearance: Slightly Turbid / S.030 / pH: x  Gluc: x / Ketone: Negative  / Bili: Small / Urobili: 6 mg/dL   Blood: x / Protein: 30 mg/dL / Nitrite: Negative   Leuk Esterase: Small / RBC: 2-5 /HPF / WBC 5-10 /HPF   Sq Epi: x / Non Sq Epi: Occasional / Bacteria: Negative        MICROBIOLOGY:          v    Rapid RVP Result: NotDetec ( @ 07:36)          RADIOLOGY:  ACC: [id number] EXAM:  CT ABDOMEN AND PELVIS IC                          PROCEDURE DATE:  2021          INTERPRETATION:  CLINICAL INFORMATION: Hematuria    COMPARISON: CT abdomen pelvis 6/10/2021    CONTRAST/COMPLICATIONS:  IV Contrast: Omnipaque 350  90 cc administered   10 cc discarded  Oral Contrast: NONE  Complications: None reported at time of study completion    PROCEDURE:  CT of the Abdomen and Pelvis was performed.  Sagittal and coronal reformats were performed.    FINDINGS:  LOWER CHEST: Bilateral peribronchial thickening. Right lung base   consolidation. Pacer wires in situ.    LIVER: Within normal limits.  BILE DUCTS: Normal caliber.  GALLBLADDER: Within normal limits.  SPLEEN: Within normal limits.  PANCREAS: Atrophic pancreas.  ADRENALS: Stable 1.3 cm left adrenal nodule.  KIDNEYS/URETERS: Stable bilateral renal cysts. No hydronephrosis.    BLADDER: Hyperdense layering in the bladder consistent with bladder   stones.  REPRODUCTIVE ORGANS: Prostate is enlarged.    BOWEL: Large stool burden in the distended rectum with rectal wall   thickening and perirectal fat stranding, consistent with stercoral   colitis. No bowel obstruction. Appendix is not visualized. No evidence of   inflammation in the pericecal region.  PERITONEUM: No ascites.  VESSELS: Atherosclerotic changes. Ectasia of the abdominal aorta.  RETROPERITONEUM/LYMPH NODES: No lymphadenopathy.  ABDOMINAL WALL: Within normal limits.  BONES: Degenerative changes.    IMPRESSION:  1.  Stercoral colitis.  2.  Hyperdense layering in the bladder consistent with bladder stones. No   hydroureteronephrosis.  3.  Bilateral peribronchial thickening and right lung base consolidation,   likely atelectasis but could be pneumonia in the appropriate clinical   setting.   HPI:  80 yo M with pmhx of CVA with RT sided weakness, dementia, HTN, CAD s/p stent, PPM, ashtma presents for hematuria and bladder pain as per the triage note. As per the wife - pt was having difficulty having BM, gave him suppository - he was able to BM. Today wife noted he had gross red blood in urine. Has not been eating and drinking since yesterday.   Imaging in ER shows suspected urolithiasis, and suspected RLL pneumonia. (16 Dec 2021 13:02)      PAST MEDICAL & SURGICAL HISTORY:  CVA (cerebral infarction)   with residual right sided weakness    HTN (hypertension)    Diabetes    Hyperlipidemia    Dementia    Cardiac pacemaker  Medtronic (SN: [id number]; Model: [id number])        Allergies  No Known Allergies        ANTIMICROBIALS:      OTHER MEDS: MEDICATIONS  (STANDING):  ALBUTerol    90 MICROgram(s) HFA Inhaler 2 every 6 hours PRN  aspirin enteric coated 81 daily  atorvastatin Oral Tab/Cap - Peds 40 daily  budesonide 160 MICROgram(s)/formoterol 4.5 MICROgram(s) Inhaler 2 two times a day  dextrose 40% Gel 15 once  dextrose 50% Injectable 25 once  dextrose 50% Injectable 12.5 once  dextrose 50% Injectable 25 once  glucagon  Injectable 1 once  heparin   Injectable 5000 every 12 hours  insulin lispro (ADMELOG) corrective regimen sliding scale  three times a day before meals  isosorbide   mononitrate ER Tablet (IMDUR) 30 daily  lisinopril 10 daily  metoprolol succinate ER 25 daily  pantoprazole    Tablet 40 before breakfast      SOCIAL HISTORY:  [ ] etoh [ ] tobacco [ ] former smoker [ ] IVDU    FAMILY HISTORY:  FH: diabetes mellitus  Mother, Brother    FHx: dementia  Father    REVIEW OF SYSTEMS  [ x ] ROS unobtainable because:  dementia    Vital Signs Last 24 Hrs  T(F): 98 (21 @ 06:57), Max: 98.5 (21 @ 05:43)    Vital Signs Last 24 Hrs  HR: 69 (21 @ 08:41) (65 - 85)  BP: 125/73 (21 @ 08:41) (119/75 - 147/86)  RR: 15 (21 @ 08:41)  SpO2: 100% (21 @ 08:41) (97% - 100%)  Wt(kg): --    PHYSICAL EXAM:  Constitutional: non-toxic, no distress, does not follow simple commands  HEAD/EYES: anicteric, no conjunctival injection  ENT:  supple, no thrush  Cardiovascular:   normal S1, S2, no murmur, no edema  Respiratory:  clear BS bilaterally, no wheezes, no rales  GI:  soft, non-distended, mildly TTP lower abdomen, normal bowel sounds  :  no valencia, no CVA tenderness  Musculoskeletal:  no synovitis, normal ROM  Neurologic: A&Ox1, moved LUE and BLEs spontaneously. RUE kept flexed.  Skin:  no rash, no erythema, no phlebitis  Heme/Onc: no lymphadenopathy   Psychiatric:  awake, unable to assess due to mental status                                13.7   11.65 )-----------( 168      ( 16 Dec 2021 07:06 )             41.9           140  |  102  |  24<H>  ----------------------------<  113<H>  4.0   |  24  |  1.37<H>    Ca    10.5      16 Dec 2021 07:06    TPro  7.3  /  Alb  4.2  /  TBili  2.3<H>  /  DBili  x   /  AST  16  /  ALT  15  /  AlkPhos  74  12-16      Urinalysis Basic - ( 16 Dec 2021 07:06 )    Color: Dark Orange / Appearance: Slightly Turbid / S.030 / pH: x  Gluc: x / Ketone: Negative  / Bili: Small / Urobili: 6 mg/dL   Blood: x / Protein: 30 mg/dL / Nitrite: Negative   Leuk Esterase: Small / RBC: 2-5 /HPF / WBC 5-10 /HPF   Sq Epi: x / Non Sq Epi: Occasional / Bacteria: Negative        MICROBIOLOGY:          v    Rapid RVP Result: NotDetec ( @ 07:36)          RADIOLOGY:  ACC: [id number] EXAM:  CT ABDOMEN AND PELVIS IC                          PROCEDURE DATE:  2021          INTERPRETATION:  CLINICAL INFORMATION: Hematuria    COMPARISON: CT abdomen pelvis 6/10/2021    CONTRAST/COMPLICATIONS:  IV Contrast: Omnipaque 350  90 cc administered   10 cc discarded  Oral Contrast: NONE  Complications: None reported at time of study completion    PROCEDURE:  CT of the Abdomen and Pelvis was performed.  Sagittal and coronal reformats were performed.    FINDINGS:  LOWER CHEST: Bilateral peribronchial thickening. Right lung base   consolidation. Pacer wires in situ.    LIVER: Within normal limits.  BILE DUCTS: Normal caliber.  GALLBLADDER: Within normal limits.  SPLEEN: Within normal limits.  PANCREAS: Atrophic pancreas.  ADRENALS: Stable 1.3 cm left adrenal nodule.  KIDNEYS/URETERS: Stable bilateral renal cysts. No hydronephrosis.    BLADDER: Hyperdense layering in the bladder consistent with bladder   stones.  REPRODUCTIVE ORGANS: Prostate is enlarged.    BOWEL: Large stool burden in the distended rectum with rectal wall   thickening and perirectal fat stranding, consistent with stercoral   colitis. No bowel obstruction. Appendix is not visualized. No evidence of   inflammation in the pericecal region.  PERITONEUM: No ascites.  VESSELS: Atherosclerotic changes. Ectasia of the abdominal aorta.  RETROPERITONEUM/LYMPH NODES: No lymphadenopathy.  ABDOMINAL WALL: Within normal limits.  BONES: Degenerative changes.    IMPRESSION:  1.  Stercoral colitis.  2.  Hyperdense layering in the bladder consistent with bladder stones. No   hydroureteronephrosis.  3.  Bilateral peribronchial thickening and right lung base consolidation,   likely atelectasis but could be pneumonia in the appropriate clinical   setting.

## 2021-12-16 NOTE — ED ADULT NURSE NOTE - NSIMPLEMENTINTERV_GEN_ALL_ED
Implemented All Fall Risk Interventions:  [place] to call system. Call bell, personal items and telephone within reach. Instruct patient to call for assistance. Room bathroom lighting operational. Non-slip footwear when patient is off stretcher. Physically safe environment: no spills, clutter or unnecessary equipment. Stretcher in lowest position, wheels locked, appropriate side rails in place. Provide visual cue, wrist band, yellow gown, etc. Monitor gait and stability. Monitor for mental status changes and reorient to person, place, and time. Review medications for side effects contributing to fall risk. Reinforce activity limits and safety measures with patient and family.

## 2021-12-16 NOTE — ED ADULT NURSE REASSESSMENT NOTE - NS ED NURSE REASSESS COMMENT FT1
Pt at baseline mental status, breathing even and unlabored, vs as noted. Pt resting comfortably. Awaiting dispo

## 2021-12-16 NOTE — ED PROVIDER NOTE - PHYSICAL EXAMINATION
Gen: non toxic appearing, NAD   Head: NC/NT  Eyes:  anicteric  ENT: airway patent, mmm   CV: RRR, +S1/S2   Resp: CTAB, symmetric breath sounds, no W/R/R  GI:  abdomen soft non-distended, doesn't grimace with palpation  Extremities -no edema in lower extremities  Neuro: pt is not answering any questions, eyes open looking around

## 2021-12-16 NOTE — ED PROVIDER NOTE - CLINICAL SUMMARY MEDICAL DECISION MAKING FREE TEXT BOX
Concern for head bleed/mass, electrolyte abnormality, UTI, failure to thrive. Labs, imaging, IVF. Plan to admit.

## 2021-12-16 NOTE — ED ADULT NURSE NOTE - OBJECTIVE STATEMENT
pt received to rm 3 , a&ox1 , non ambulatory , pmh of CVA with RT sided weakness, dementia, HTN, CAD s/p stent, PPM, ashtma , p/w hematuria. pt wife stated he has had a decreased appetite, constipation, and hematuria.  Pt breathing even and unlabored on room air. NSR on cardiac monitor. IV placed #20G R. Labs collected and sent. pending EKG . Stretcher locked in lowest position with siderails up x2.

## 2021-12-16 NOTE — H&P ADULT - HISTORY OF PRESENT ILLNESS
80 yo M with pmhx of CVA with RT sided weakness, dementia, HTN, CAD s/p stent, PPM, ashtma presents for hematuria and bladder pain as per the triage note. As per the wife - pt was having difficulty having BM, gave him suppository - he was able to BM. Today wife noted he had gross red blood in urine. Has not been eating and drinking since yesterday.   Imaging in ER shows suspected urolithiasis, and suspected RLL pneumonia.

## 2021-12-16 NOTE — H&P ADULT - PROBLEM SELECTOR PLAN 2
ID is evaluating patient for appropriate antibiotics. Patient does not have any symptoms. Swallowing evaluation to r/o aspiration

## 2021-12-17 LAB
A1C WITH ESTIMATED AVERAGE GLUCOSE RESULT: 5.3 % — SIGNIFICANT CHANGE UP (ref 4–5.6)
ALBUMIN SERPL ELPH-MCNC: 3.8 G/DL — SIGNIFICANT CHANGE UP (ref 3.3–5)
ALP SERPL-CCNC: 74 U/L — SIGNIFICANT CHANGE UP (ref 40–120)
ALT FLD-CCNC: 13 U/L — SIGNIFICANT CHANGE UP (ref 4–41)
ANION GAP SERPL CALC-SCNC: 14 MMOL/L — SIGNIFICANT CHANGE UP (ref 7–14)
AST SERPL-CCNC: 16 U/L — SIGNIFICANT CHANGE UP (ref 4–40)
BILIRUB SERPL-MCNC: 2.2 MG/DL — HIGH (ref 0.2–1.2)
BUN SERPL-MCNC: 20 MG/DL — SIGNIFICANT CHANGE UP (ref 7–23)
CALCIUM SERPL-MCNC: 10.4 MG/DL — SIGNIFICANT CHANGE UP (ref 8.4–10.5)
CHLORIDE SERPL-SCNC: 104 MMOL/L — SIGNIFICANT CHANGE UP (ref 98–107)
CO2 SERPL-SCNC: 21 MMOL/L — LOW (ref 22–31)
CREAT SERPL-MCNC: 0.9 MG/DL — SIGNIFICANT CHANGE UP (ref 0.5–1.3)
ESTIMATED AVERAGE GLUCOSE: 105 — SIGNIFICANT CHANGE UP
GLUCOSE SERPL-MCNC: 91 MG/DL — SIGNIFICANT CHANGE UP (ref 70–99)
HCT VFR BLD CALC: 39.6 % — SIGNIFICANT CHANGE UP (ref 39–50)
HGB BLD-MCNC: 13.2 G/DL — SIGNIFICANT CHANGE UP (ref 13–17)
MAGNESIUM SERPL-MCNC: 1.9 MG/DL — SIGNIFICANT CHANGE UP (ref 1.6–2.6)
MCHC RBC-ENTMCNC: 30.3 PG — SIGNIFICANT CHANGE UP (ref 27–34)
MCHC RBC-ENTMCNC: 33.3 GM/DL — SIGNIFICANT CHANGE UP (ref 32–36)
MCV RBC AUTO: 91 FL — SIGNIFICANT CHANGE UP (ref 80–100)
NRBC # BLD: 0 /100 WBCS — SIGNIFICANT CHANGE UP
NRBC # FLD: 0 K/UL — SIGNIFICANT CHANGE UP
PHOSPHATE SERPL-MCNC: 2.7 MG/DL — SIGNIFICANT CHANGE UP (ref 2.5–4.5)
PLATELET # BLD AUTO: 127 K/UL — LOW (ref 150–400)
POTASSIUM SERPL-MCNC: 4 MMOL/L — SIGNIFICANT CHANGE UP (ref 3.5–5.3)
POTASSIUM SERPL-SCNC: 4 MMOL/L — SIGNIFICANT CHANGE UP (ref 3.5–5.3)
PROT SERPL-MCNC: 7 G/DL — SIGNIFICANT CHANGE UP (ref 6–8.3)
RBC # BLD: 4.35 M/UL — SIGNIFICANT CHANGE UP (ref 4.2–5.8)
RBC # FLD: 13 % — SIGNIFICANT CHANGE UP (ref 10.3–14.5)
SODIUM SERPL-SCNC: 139 MMOL/L — SIGNIFICANT CHANGE UP (ref 135–145)
WBC # BLD: 8.28 K/UL — SIGNIFICANT CHANGE UP (ref 3.8–10.5)
WBC # FLD AUTO: 8.28 K/UL — SIGNIFICANT CHANGE UP (ref 3.8–10.5)

## 2021-12-17 PROCEDURE — 99232 SBSQ HOSP IP/OBS MODERATE 35: CPT

## 2021-12-17 RX ORDER — INSULIN LISPRO 100/ML
VIAL (ML) SUBCUTANEOUS AT BEDTIME
Refills: 0 | Status: DISCONTINUED | OUTPATIENT
Start: 2021-12-17 | End: 2021-12-21

## 2021-12-17 RX ORDER — LISINOPRIL 2.5 MG/1
10 TABLET ORAL DAILY
Refills: 0 | Status: DISCONTINUED | OUTPATIENT
Start: 2021-12-17 | End: 2021-12-21

## 2021-12-17 RX ORDER — ISOSORBIDE MONONITRATE 60 MG/1
30 TABLET, EXTENDED RELEASE ORAL DAILY
Refills: 0 | Status: DISCONTINUED | OUTPATIENT
Start: 2021-12-17 | End: 2021-12-21

## 2021-12-17 RX ORDER — METOPROLOL TARTRATE 50 MG
25 TABLET ORAL DAILY
Refills: 0 | Status: DISCONTINUED | OUTPATIENT
Start: 2021-12-17 | End: 2021-12-21

## 2021-12-17 RX ORDER — SENNA PLUS 8.6 MG/1
2 TABLET ORAL AT BEDTIME
Refills: 0 | Status: DISCONTINUED | OUTPATIENT
Start: 2021-12-17 | End: 2021-12-19

## 2021-12-17 RX ORDER — POLYETHYLENE GLYCOL 3350 17 G/17G
17 POWDER, FOR SOLUTION ORAL DAILY
Refills: 0 | Status: DISCONTINUED | OUTPATIENT
Start: 2021-12-17 | End: 2021-12-19

## 2021-12-17 RX ADMIN — Medication 25 MILLIGRAM(S): at 05:36

## 2021-12-17 RX ADMIN — HEPARIN SODIUM 5000 UNIT(S): 5000 INJECTION INTRAVENOUS; SUBCUTANEOUS at 19:04

## 2021-12-17 RX ADMIN — BUDESONIDE AND FORMOTEROL FUMARATE DIHYDRATE 2 PUFF(S): 160; 4.5 AEROSOL RESPIRATORY (INHALATION) at 21:14

## 2021-12-17 RX ADMIN — ISOSORBIDE MONONITRATE 30 MILLIGRAM(S): 60 TABLET, EXTENDED RELEASE ORAL at 12:29

## 2021-12-17 RX ADMIN — PANTOPRAZOLE SODIUM 40 MILLIGRAM(S): 20 TABLET, DELAYED RELEASE ORAL at 05:36

## 2021-12-17 RX ADMIN — LISINOPRIL 10 MILLIGRAM(S): 2.5 TABLET ORAL at 05:36

## 2021-12-17 RX ADMIN — BUDESONIDE AND FORMOTEROL FUMARATE DIHYDRATE 2 PUFF(S): 160; 4.5 AEROSOL RESPIRATORY (INHALATION) at 09:59

## 2021-12-17 RX ADMIN — ATORVASTATIN CALCIUM 40 MILLIGRAM(S): 80 TABLET, FILM COATED ORAL at 21:14

## 2021-12-17 RX ADMIN — HEPARIN SODIUM 5000 UNIT(S): 5000 INJECTION INTRAVENOUS; SUBCUTANEOUS at 05:37

## 2021-12-17 RX ADMIN — Medication 81 MILLIGRAM(S): at 12:29

## 2021-12-17 NOTE — PROGRESS NOTE ADULT - ASSESSMENT
81 year old male with CAD s/p stent, PPM, CVA with R residual weakness, Dementia, HTN, HLD, GERD, Asthma presenting with hematuria.     CT A/P with stercoral colitis, hyperdense layering in the bladder consistent with bladder stones. No hydroureteronephrosis. RLL consolidation - atelectasis vs pna.    UA with WBC 5-10.   Remains afebrile.  WBC WNL.    Recommend:  #Hematuria  -UA with very min pyuria and blood trace  -Continue to monitor  -Urology following - f/u urine cytology    #RLL consolidation  -Suspect atelectasis  -No cough  -No fevers   -Low suspicion for pna at this time    #Leukocytosis/ stercoral colitis  -Suspect reactive to stercoral colitis  -Bowel regimen    Richard Holley MD  Pager [phone]  After 5pm/weekends call [phone]

## 2021-12-17 NOTE — CONSULT NOTE ADULT - SUBJECTIVE AND OBJECTIVE BOX
Patient is a 81y old  Male who presents with a chief complaint of Hematuria (16 Dec 2021 13:34)      HPI:  80 yo M with pmhx of CVA with RT sided weakness, dementia, HTN, CAD s/p stent, PPM, ashtma presents for hematuria and bladder pain as per the triage note. As per the wife - pt was having difficulty having BM, gave him suppository - he was able to BM. Today wife noted he had gross red blood in urine. Has not been eating and drinking since yesterday.   Imaging in ER shows suspected urolithiasis, and suspected RLL pneumonia. (16 Dec 2021 13:02)     consulted for evaluation of hematuria. CT reviewed. There is very significant stool burden with suggestion stercoral colitis. The bladder is compressed by the rectum. There appears to be minimal layering suggestive of debris. No clear calculi identified. There is no hydronephrosis.  The patient is wearing a condom catheter with no urine in his drainage bag for evaluation. He is a poor historian with dementia.      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 No nausea, vomiting, or hematemesis; No diarrhea, +constipation. No melena or hematochezia.  NEUROLOGICAL: No numbness or weakness  PSYCH: No depression, no mood changes  SKIN: No itching, burning, rashes, or lesions   All other review of systems is negative unless indicated above.    MEDICATIONS  (STANDING):  aspirin enteric coated 81 milliGRAM(s) Oral daily  atorvastatin 40 milliGRAM(s) Oral at bedtime  budesonide 160 MICROgram(s)/formoterol 4.5 MICROgram(s) Inhaler 2 Puff(s) Inhalation two times a day  dextrose 40% Gel 15 Gram(s) Oral once  dextrose 5%. 1000 milliLiter(s) (50 mL/Hr) IV Continuous <Continuous>  dextrose 5%. 1000 milliLiter(s) (100 mL/Hr) IV Continuous <Continuous>  dextrose 50% Injectable 25 Gram(s) IV Push once  dextrose 50% Injectable 12.5 Gram(s) IV Push once  dextrose 50% Injectable 25 Gram(s) IV Push once  glucagon  Injectable 1 milliGRAM(s) IntraMuscular once  heparin   Injectable 5000 Unit(s) SubCutaneous every 12 hours  insulin lispro (ADMELOG) corrective regimen sliding scale   SubCutaneous three times a day before meals  isosorbide   mononitrate ER Tablet (IMDUR) 30 milliGRAM(s) Oral daily  lisinopril 10 milliGRAM(s) Oral daily  metoprolol succinate ER 25 milliGRAM(s) Oral daily  pantoprazole    Tablet 40 milliGRAM(s) Oral before breakfast  senna 2 Tablet(s) Oral at bedtime    MEDICATIONS  (PRN):  ALBUTerol    90 MICROgram(s) HFA Inhaler 2 Puff(s) Inhalation every 6 hours PRN Shortness of Breath and/or Wheezing  polyethylene glycol 3350 17 Gram(s) Oral daily PRN Constipation      Allergies    No Known Allergies    Intolerances        SOCIAL HISTORY: No illicit drug use    FAMILY HISTORY:  FH: diabetes mellitus  Mother, Brother    FHx: dementia  Father        Vital Signs Last 24 Hrs  T(C): 36.7 (17 Dec 2021 07:29), Max: 37.1 (16 Dec 2021 22:15)  T(F): 98 (17 Dec 2021 07:29), Max: 98.7 (16 Dec 2021 22:15)  HR: 63 (17 Dec 2021 07:29) (60 - 69)  BP: 132/69 (17 Dec 2021 07:29) (119/85 - 138/77)  BP(mean): --  RR: 17 (17 Dec 2021 07:29) (15 - 18)  SpO2: 98% (17 Dec 2021 07:29) (98% - 100%)    PHYSICAL EXAM:    Constitutional: NAD, well-developed  HEENT: DOMINIC, EOMI, Normal Hearing, MMM  Neck: No JVD  Back: No CVA tenderness  Respiratory: No accessory respiratory muscle use  Abd: Soft, NT/ND  : condom catheter in place  Extremities: No calf tenderness  Neurological: A/O x 1  Psychiatric: Normal mood, normal affect  Skin: No rashes    I&O's Summary      LABS:                        13.2   8.28  )-----------( 127      ( 17 Dec 2021 06:49 )             39.6         139  |  104  |  20  ----------------------------<  91  4.0   |  21<L>  |  0.90    Ca    10.4      17 Dec 2021 06:49  Phos  2.7       Mg     1.90         TPro  7.0  /  Alb  3.8  /  TBili  2.2<H>  /  DBili  x   /  AST  16  /  ALT  13  /  AlkPhos  74        Urinalysis Basic - ( 16 Dec 2021 07:06 )    Color: Dark Orange / Appearance: Slightly Turbid / S.030 / pH: x  Gluc: x / Ketone: Negative  / Bili: Small / Urobili: 6 mg/dL   Blood: x / Protein: 30 mg/dL / Nitrite: Negative   Leuk Esterase: Small / RBC: 2-5 /HPF / WBC 5-10 /HPF   Sq Epi: x / Non Sq Epi: Occasional / Bacteria: Negative      Urine Culture: pending    RADIOLOGY & ADDITIONAL STUDIES:  CT ABDOMEN AND PELVIS IC                          PROCEDURE DATE:  2021          INTERPRETATION:  CLINICAL INFORMATION: Hematuria    COMPARISON: CT abdomen pelvis 6/10/2021    CONTRAST/COMPLICATIONS:  IV Contrast: Omnipaque 350  90 cc administered   10 cc discarded  Oral Contrast: NONE  Complications: None reported at time of study completion    PROCEDURE:  CT of the Abdomen and Pelvis was performed.  Sagittal and coronal reformats were performed.    FINDINGS:  LOWER CHEST: Bilateral peribronchial thickening. Right lung base   consolidation. Pacer wires in situ.    LIVER: Within normal limits.  BILE DUCTS: Normal caliber.  GALLBLADDER: Within normal limits.  SPLEEN: Within normal limits.  PANCREAS: Atrophic pancreas.  ADRENALS: Stable 1.3 cm left adrenal nodule.  KIDNEYS/URETERS: Stable bilateral renal cysts. No hydronephrosis.    BLADDER: Hyperdense layering in the bladder consistent with bladder   stones.  REPRODUCTIVE ORGANS: Prostate is enlarged.    BOWEL: Large stool burden in the distended rectum with rectal wall   thickening and perirectal fat stranding, consistent with stercoral   colitis. No bowel obstruction. Appendix is not visualized. No evidence of   inflammation in the pericecal region.  PERITONEUM: No ascites.  VESSELS: Atherosclerotic changes. Ectasia of the abdominal aorta.  RETROPERITONEUM/LYMPH NODES: No lymphadenopathy.  ABDOMINAL WALL: Within normal limits.  BONES: Degenerative changes.    IMPRESSION:  1.  Stercoral colitis.  2.  Hyperdense layering in the bladder consistent with bladder stones. No   hydroureteronephrosis.  3.  Bilateral peribronchial thickening and right lung base consolidation,   likely atelectasis but could be pneumonia in the appropriate clinical   setting.

## 2021-12-17 NOTE — SWALLOW BEDSIDE ASSESSMENT ADULT - SWALLOW EVAL: DIAGNOSIS
1) Mild to moderate oral dysphagia for puree and regular solids marked by prolonged bolus manipulation and mastication for solids. Patient noted to chew on puree consistencies resulting in delayed bolus collection. Delayed posterior bolus transport noted. Adequate oral clearance observed. 2) Functional oral stage of swallow for moderately thick fluids, mildly thick fluids, and thin fluids marked by adequate containment, manipulation, and timely posterior transport of the bolus. 3) Moderate to severe pharyngeal dysphagia for thin liquids marked by suspected delayed pharyngeal swallow trigger. Hyolaryngeal elevation noted upon digital palpation. Patient with delayed coughing suggestive of airway penetration/aspiration.

## 2021-12-17 NOTE — PATIENT PROFILE ADULT - NSPROMEDSADMININFO_GEN_A_NUR
Pt is A&Ox1, unable to obtain information. Pt pending dysphagia screening/crush pills for administration

## 2021-12-17 NOTE — PATIENT PROFILE ADULT - FALL HARM RISK - HARM RISK INTERVENTIONS
Assistance OOB with selected safe patient handling equipment/Communicate Risk of Fall with Harm to all staff/Discuss with provider need for PT consult/Monitor gait and stability/Provide patient with walking aids - walker, cane, crutches/Reinforce activity limits and safety measures with patient and family/Reorient to person, place and time as needed/Tailored Fall Risk Interventions/Toileting schedule using arm’s reach rule for commode and bathroom/Use of alarms - bed, chair and/or voice tab/Visual Cue: Yellow wristband and red socks/Bed in lowest position, wheels locked, appropriate side rails in place/Call bell, personal items and telephone in reach/Non-slip footwear when patient is out of bed/Physically safe environment - no spills, clutter or unnecessary equipment/Purposeful Proactive Rounding/Unable to comprehend

## 2021-12-17 NOTE — SWALLOW BEDSIDE ASSESSMENT ADULT - SWALLOW EVAL: PROGNOSIS
DIAGNOSIS CONTINUED: 4) Functional pharyngeal stage of swallow for regular solids, puree, moderately thick fluids, and mildly thick fluids marked by present pharyngeal swallow trigger. Hyolaryngeal elevation noted upon digital palpation. No overt s/sx of penetration/aspiration noted for these consistencies.

## 2021-12-17 NOTE — CONSULT NOTE ADULT - ASSESSMENT
81M with reported gross hematuria. CT suggestive of debris vs bladder stones. He is severely constipated. UA with only 2-5 RBCs.    -Considering his presentation with reported gross hematuria and debris within the bladder, a workup is warranted.  -please send urine for culture and cytology   -please obtain CT Urogram after constipation is resolved for more comprehensive evaluation of the urinary tract  -cystoscopy as outpatient. Patient should f/u following discharge.    Thank you for the courtesy of this referral. Please do not hesitate to call with any questions.    Charles Loja MD  [facility]  [address], [address], [place] [address]  Office: [phone] Fax: [phone]

## 2021-12-17 NOTE — SWALLOW BEDSIDE ASSESSMENT ADULT - COMMENTS
As per HPI, pt is a "80 yo M with pmhx of CVA with RT sided weakness, dementia, HTN, CAD s/p stent, PPM, ashtma presents for hematuria and bladder pain as per the triage note. As per the wife - pt was having difficulty having BM, gave him suppository - he was able to BM. Today wife noted he had gross red blood in urine. Has not been eating and drinking since yesterday. Imaging in ER shows suspected urolithiasis, and suspected RLL pneumonia."    WBC is WFL. CT Abdomen 12/16 revealed "Bilateral peribronchial thickening and right lung base consolidation, likely atelectasis but could be pneumonia in the appropriate clinical setting."    Patient seen at bedside, awake/alert, during clinical swallow evaluation this AM. Patient noted with confusion, however able to follow directions and answer questions given minimal cues. Patient was cooperative and accepted all PO trials.

## 2021-12-18 LAB
ANION GAP SERPL CALC-SCNC: 8 MMOL/L — SIGNIFICANT CHANGE UP (ref 7–14)
BASOPHILS # BLD AUTO: 0.02 K/UL — SIGNIFICANT CHANGE UP (ref 0–0.2)
BASOPHILS NFR BLD AUTO: 0.3 % — SIGNIFICANT CHANGE UP (ref 0–2)
BUN SERPL-MCNC: 15 MG/DL — SIGNIFICANT CHANGE UP (ref 7–23)
CALCIUM SERPL-MCNC: 10.4 MG/DL — SIGNIFICANT CHANGE UP (ref 8.4–10.5)
CHLORIDE SERPL-SCNC: 106 MMOL/L — SIGNIFICANT CHANGE UP (ref 98–107)
CO2 SERPL-SCNC: 24 MMOL/L — SIGNIFICANT CHANGE UP (ref 22–31)
CREAT SERPL-MCNC: 0.79 MG/DL — SIGNIFICANT CHANGE UP (ref 0.5–1.3)
CULTURE RESULTS: NO GROWTH — SIGNIFICANT CHANGE UP
EOSINOPHIL # BLD AUTO: 0.14 K/UL — SIGNIFICANT CHANGE UP (ref 0–0.5)
EOSINOPHIL NFR BLD AUTO: 2.1 % — SIGNIFICANT CHANGE UP (ref 0–6)
GLUCOSE SERPL-MCNC: 85 MG/DL — SIGNIFICANT CHANGE UP (ref 70–99)
HCT VFR BLD CALC: 42.5 % — SIGNIFICANT CHANGE UP (ref 39–50)
HGB BLD-MCNC: 14.3 G/DL — SIGNIFICANT CHANGE UP (ref 13–17)
IANC: 4.28 K/UL — SIGNIFICANT CHANGE UP (ref 1.5–8.5)
IMM GRANULOCYTES NFR BLD AUTO: 0.6 % — SIGNIFICANT CHANGE UP (ref 0–1.5)
LYMPHOCYTES # BLD AUTO: 1.34 K/UL — SIGNIFICANT CHANGE UP (ref 1–3.3)
LYMPHOCYTES # BLD AUTO: 20.5 % — SIGNIFICANT CHANGE UP (ref 13–44)
MAGNESIUM SERPL-MCNC: 1.9 MG/DL — SIGNIFICANT CHANGE UP (ref 1.6–2.6)
MCHC RBC-ENTMCNC: 30.6 PG — SIGNIFICANT CHANGE UP (ref 27–34)
MCHC RBC-ENTMCNC: 33.6 GM/DL — SIGNIFICANT CHANGE UP (ref 32–36)
MCV RBC AUTO: 90.8 FL — SIGNIFICANT CHANGE UP (ref 80–100)
MONOCYTES # BLD AUTO: 0.72 K/UL — SIGNIFICANT CHANGE UP (ref 0–0.9)
MONOCYTES NFR BLD AUTO: 11 % — SIGNIFICANT CHANGE UP (ref 2–14)
NEUTROPHILS # BLD AUTO: 4.28 K/UL — SIGNIFICANT CHANGE UP (ref 1.8–7.4)
NEUTROPHILS NFR BLD AUTO: 65.5 % — SIGNIFICANT CHANGE UP (ref 43–77)
NRBC # BLD: 0 /100 WBCS — SIGNIFICANT CHANGE UP
NRBC # FLD: 0 K/UL — SIGNIFICANT CHANGE UP
PHOSPHATE SERPL-MCNC: 2.4 MG/DL — LOW (ref 2.5–4.5)
PLATELET # BLD AUTO: 128 K/UL — LOW (ref 150–400)
POTASSIUM SERPL-MCNC: 3.8 MMOL/L — SIGNIFICANT CHANGE UP (ref 3.5–5.3)
POTASSIUM SERPL-SCNC: 3.8 MMOL/L — SIGNIFICANT CHANGE UP (ref 3.5–5.3)
RBC # BLD: 4.68 M/UL — SIGNIFICANT CHANGE UP (ref 4.2–5.8)
RBC # FLD: 12.8 % — SIGNIFICANT CHANGE UP (ref 10.3–14.5)
SODIUM SERPL-SCNC: 138 MMOL/L — SIGNIFICANT CHANGE UP (ref 135–145)
SPECIMEN SOURCE: SIGNIFICANT CHANGE UP
WBC # BLD: 6.54 K/UL — SIGNIFICANT CHANGE UP (ref 3.8–10.5)
WBC # FLD AUTO: 6.54 K/UL — SIGNIFICANT CHANGE UP (ref 3.8–10.5)

## 2021-12-18 RX ORDER — SODIUM,POTASSIUM PHOSPHATES 278-250MG
1 POWDER IN PACKET (EA) ORAL ONCE
Refills: 0 | Status: COMPLETED | OUTPATIENT
Start: 2021-12-18 | End: 2021-12-18

## 2021-12-18 RX ADMIN — LISINOPRIL 10 MILLIGRAM(S): 2.5 TABLET ORAL at 05:31

## 2021-12-18 RX ADMIN — BUDESONIDE AND FORMOTEROL FUMARATE DIHYDRATE 2 PUFF(S): 160; 4.5 AEROSOL RESPIRATORY (INHALATION) at 09:25

## 2021-12-18 RX ADMIN — HEPARIN SODIUM 5000 UNIT(S): 5000 INJECTION INTRAVENOUS; SUBCUTANEOUS at 05:32

## 2021-12-18 RX ADMIN — SENNA PLUS 2 TABLET(S): 8.6 TABLET ORAL at 21:48

## 2021-12-18 RX ADMIN — HEPARIN SODIUM 5000 UNIT(S): 5000 INJECTION INTRAVENOUS; SUBCUTANEOUS at 17:15

## 2021-12-18 RX ADMIN — Medication 1 PACKET(S): at 15:23

## 2021-12-18 RX ADMIN — ISOSORBIDE MONONITRATE 30 MILLIGRAM(S): 60 TABLET, EXTENDED RELEASE ORAL at 17:14

## 2021-12-18 RX ADMIN — ATORVASTATIN CALCIUM 40 MILLIGRAM(S): 80 TABLET, FILM COATED ORAL at 21:48

## 2021-12-18 RX ADMIN — Medication 81 MILLIGRAM(S): at 17:13

## 2021-12-18 RX ADMIN — Medication 25 MILLIGRAM(S): at 05:31

## 2021-12-18 RX ADMIN — BUDESONIDE AND FORMOTEROL FUMARATE DIHYDRATE 2 PUFF(S): 160; 4.5 AEROSOL RESPIRATORY (INHALATION) at 21:48

## 2021-12-18 RX ADMIN — PANTOPRAZOLE SODIUM 40 MILLIGRAM(S): 20 TABLET, DELAYED RELEASE ORAL at 05:31

## 2021-12-18 NOTE — DIETITIAN INITIAL EVALUATION ADULT. - ADD RECOMMEND
I called pt Left message for pt to call the office back. Does pt need the refill or is this an error from the pharmacy?   Await for pt to call back 1). Monitor weights, labs, BM's, skin integrity, p.o. intake. 2). Follow pt as per protocol.

## 2021-12-18 NOTE — DIETITIAN INITIAL EVALUATION ADULT. - OTHER INFO
80 y/o male with dementia admitted with dx Failure to Thrive and UTI. Visited with pt and his wife at bedside to obtain nutrition hx. Wife said that pt has had poor oral intake over the past several days PTA because she believes pt was constipated. She said that his oral intake has diminished overall for the past several months due to his physical and mental decline. She said his UBW had been 180 lbs several months ago with current dosing weight of 161 lbs. 11% weight loss with <75% of estimated nutrition needs met over the past 6 mos present pt at severe risk for malnutrition. Pt had a SLP evaluation 12/17 with suggestion for Minced and Moist solids and Mildly Thick liquids. Pt noted to be tolerating ordered diet with total feeding assistance. Wife offered food preferences of patient - to be honored. Suggest liberalizing diet by d/cing DASH/TLC restriction to allow more food options to be provided on menus. NKFA. In view of poor oral intake, recommend Glucerna Therapeutic Nutrition Shake 240mls 3x daily (660kcals, 30g protein) to enhance PO, thickened to appropriate consistency. No GI distress presently; last BM 12/19. Encourage and monitor oral intake, especially of nutrition supplement. RDN services to remain available as needed.

## 2021-12-18 NOTE — DIETITIAN INITIAL EVALUATION ADULT. - PERTINENT LABORATORY DATA
12-18 Na 138 mmol/L Glu 85 mg/dL K+ 3.8 mmol/L Cr 0.79 mg/dL BUN 15 mg/dL Phos 2.4 mg/dL<L>  12-18 @ 11:30 POCT 108 mg/dL  12-18 @ 07:25 POCT 102 mg/dL  12-17 @ 22:15 POCT 111 mg/dL  12-17 @ 16:46 POCT 113 mg/dL

## 2021-12-18 NOTE — DIETITIAN INITIAL EVALUATION ADULT. - ORAL NUTRITION SUPPLEMENTS
Glucer Therapeutic Nutrition Shake 240mls 3x daily (660kcals, 30g protein) thickened to appropriate texture.

## 2021-12-18 NOTE — DIETITIAN INITIAL EVALUATION ADULT. - PERTINENT MEDS FT
MEDICATIONS  (STANDING):  aspirin enteric coated 81 milliGRAM(s) Oral daily  atorvastatin 40 milliGRAM(s) Oral at bedtime  budesonide 160 MICROgram(s)/formoterol 4.5 MICROgram(s) Inhaler 2 Puff(s) Inhalation two times a day  dextrose 40% Gel 15 Gram(s) Oral once  dextrose 5%. 1000 milliLiter(s) (50 mL/Hr) IV Continuous <Continuous>  dextrose 5%. 1000 milliLiter(s) (100 mL/Hr) IV Continuous <Continuous>  dextrose 50% Injectable 25 Gram(s) IV Push once  dextrose 50% Injectable 12.5 Gram(s) IV Push once  dextrose 50% Injectable 25 Gram(s) IV Push once  glucagon  Injectable 1 milliGRAM(s) IntraMuscular once  heparin   Injectable 5000 Unit(s) SubCutaneous every 12 hours  insulin lispro (ADMELOG) corrective regimen sliding scale   SubCutaneous three times a day before meals  insulin lispro (ADMELOG) corrective regimen sliding scale   SubCutaneous at bedtime  isosorbide   mononitrate ER Tablet (IMDUR) 30 milliGRAM(s) Oral daily  lisinopril 10 milliGRAM(s) Oral daily  metoprolol succinate ER 25 milliGRAM(s) Oral daily  pantoprazole    Tablet 40 milliGRAM(s) Oral before breakfast  potassium phosphate / sodium phosphate Powder (PHOS-NaK) 1 Packet(s) Oral once  senna 2 Tablet(s) Oral at bedtime

## 2021-12-19 ENCOUNTER — TRANSCRIPTION ENCOUNTER (OUTPATIENT)
Age: 81
End: 2021-12-19

## 2021-12-19 LAB
ANION GAP SERPL CALC-SCNC: 17 MMOL/L — HIGH (ref 7–14)
BASOPHILS # BLD AUTO: 0.03 K/UL — SIGNIFICANT CHANGE UP (ref 0–0.2)
BASOPHILS NFR BLD AUTO: 0.5 % — SIGNIFICANT CHANGE UP (ref 0–2)
BUN SERPL-MCNC: 13 MG/DL — SIGNIFICANT CHANGE UP (ref 7–23)
CALCIUM SERPL-MCNC: 9.9 MG/DL — SIGNIFICANT CHANGE UP (ref 8.4–10.5)
CHLORIDE SERPL-SCNC: 102 MMOL/L — SIGNIFICANT CHANGE UP (ref 98–107)
CO2 SERPL-SCNC: 20 MMOL/L — LOW (ref 22–31)
CREAT SERPL-MCNC: 0.78 MG/DL — SIGNIFICANT CHANGE UP (ref 0.5–1.3)
EOSINOPHIL # BLD AUTO: 0.13 K/UL — SIGNIFICANT CHANGE UP (ref 0–0.5)
EOSINOPHIL NFR BLD AUTO: 2.3 % — SIGNIFICANT CHANGE UP (ref 0–6)
GLUCOSE SERPL-MCNC: 86 MG/DL — SIGNIFICANT CHANGE UP (ref 70–99)
HCT VFR BLD CALC: 43.2 % — SIGNIFICANT CHANGE UP (ref 39–50)
HGB BLD-MCNC: 14.4 G/DL — SIGNIFICANT CHANGE UP (ref 13–17)
IANC: 3.53 K/UL — SIGNIFICANT CHANGE UP (ref 1.5–8.5)
IMM GRANULOCYTES NFR BLD AUTO: 0.7 % — SIGNIFICANT CHANGE UP (ref 0–1.5)
LYMPHOCYTES # BLD AUTO: 1.14 K/UL — SIGNIFICANT CHANGE UP (ref 1–3.3)
LYMPHOCYTES # BLD AUTO: 20.4 % — SIGNIFICANT CHANGE UP (ref 13–44)
MAGNESIUM SERPL-MCNC: 1.8 MG/DL — SIGNIFICANT CHANGE UP (ref 1.6–2.6)
MCHC RBC-ENTMCNC: 30.7 PG — SIGNIFICANT CHANGE UP (ref 27–34)
MCHC RBC-ENTMCNC: 33.3 GM/DL — SIGNIFICANT CHANGE UP (ref 32–36)
MCV RBC AUTO: 92.1 FL — SIGNIFICANT CHANGE UP (ref 80–100)
MONOCYTES # BLD AUTO: 0.71 K/UL — SIGNIFICANT CHANGE UP (ref 0–0.9)
MONOCYTES NFR BLD AUTO: 12.7 % — SIGNIFICANT CHANGE UP (ref 2–14)
NEUTROPHILS # BLD AUTO: 3.53 K/UL — SIGNIFICANT CHANGE UP (ref 1.8–7.4)
NEUTROPHILS NFR BLD AUTO: 63.4 % — SIGNIFICANT CHANGE UP (ref 43–77)
NRBC # BLD: 0 /100 WBCS — SIGNIFICANT CHANGE UP
NRBC # FLD: 0 K/UL — SIGNIFICANT CHANGE UP
PHOSPHATE SERPL-MCNC: 2.7 MG/DL — SIGNIFICANT CHANGE UP (ref 2.5–4.5)
PLATELET # BLD AUTO: 136 K/UL — LOW (ref 150–400)
POTASSIUM SERPL-MCNC: 4.3 MMOL/L — SIGNIFICANT CHANGE UP (ref 3.5–5.3)
POTASSIUM SERPL-SCNC: 4.3 MMOL/L — SIGNIFICANT CHANGE UP (ref 3.5–5.3)
RBC # BLD: 4.69 M/UL — SIGNIFICANT CHANGE UP (ref 4.2–5.8)
RBC # FLD: 12.6 % — SIGNIFICANT CHANGE UP (ref 10.3–14.5)
SODIUM SERPL-SCNC: 139 MMOL/L — SIGNIFICANT CHANGE UP (ref 135–145)
WBC # BLD: 5.58 K/UL — SIGNIFICANT CHANGE UP (ref 3.8–10.5)
WBC # FLD AUTO: 5.58 K/UL — SIGNIFICANT CHANGE UP (ref 3.8–10.5)

## 2021-12-19 RX ADMIN — HEPARIN SODIUM 5000 UNIT(S): 5000 INJECTION INTRAVENOUS; SUBCUTANEOUS at 11:53

## 2021-12-19 RX ADMIN — ISOSORBIDE MONONITRATE 30 MILLIGRAM(S): 60 TABLET, EXTENDED RELEASE ORAL at 21:31

## 2021-12-19 RX ADMIN — Medication 81 MILLIGRAM(S): at 11:53

## 2021-12-19 RX ADMIN — ATORVASTATIN CALCIUM 40 MILLIGRAM(S): 80 TABLET, FILM COATED ORAL at 21:31

## 2021-12-19 RX ADMIN — Medication 25 MILLIGRAM(S): at 11:54

## 2021-12-19 RX ADMIN — LISINOPRIL 10 MILLIGRAM(S): 2.5 TABLET ORAL at 12:15

## 2021-12-19 NOTE — DISCHARGE NOTE PROVIDER - DISCHARGE DIET
Consistent Carbohydrate Diabetic Diets/Minced and Moist Diet/Nutrition Supplements/Mildly Thick Liquids

## 2021-12-19 NOTE — DISCHARGE NOTE PROVIDER - DETAILS OF MALNUTRITION DIAGNOSIS/DIAGNOSES
This patient has been assessed with a concern for Malnutrition and was treated during this hospitalization for the following Nutrition diagnosis/diagnoses:     -  12/18/2021: Severe protein-calorie malnutrition

## 2021-12-19 NOTE — DISCHARGE NOTE PROVIDER - NSDCCPCAREPLAN_GEN_ALL_CORE_FT
PRINCIPAL DISCHARGE DIAGNOSIS  Diagnosis: Urinary tract infection with hematuria  Assessment and Plan of Treatment: You came to the hospital due to having blood in your urine. You were found to have a UTI and bladder stones. Infectious Disease was consulted and determined that you did not require antibiotic treatment during this admission. Please follow up with your primary care physician.      SECONDARY DISCHARGE DIAGNOSES  Diagnosis: DM w/ complication  Assessment and Plan of Treatment: Your Hemoglobin A1C was 5.3% on 12/17. Target goal for hemoglobin A1C is <7. Monitor blood glucose levels throughout the day before meals and at bedtime. Record blood sugars and bring to outpatient providers appointment in order to be reviewed by your doctor for management modifications. If your sugars are more than 400 or less than 70 you should contact your PCP immediately. Monitor for signs/symptoms of low blood glucose, such as, dizziness, altered mental status, or cool/clammy skin. In addition, monitor for signs/symptoms of high blood glucose, such as, feeling hot, dry, fatigued, or with increased thirst/urination. Make regular podiatry appointments in order to have feet checked for wounds and uncontrolled toe nail growth to prevent infections, as well as, appointments with an ophthalmologist to monitor your vision.    Diagnosis: CVA (cerebral vascular accident)  Assessment and Plan of Treatment: You have right sided weakness related to a previous stroke. You had a CT scan of your head which did not show any acute brain bleed. Continue taking Aspirin and Atorvastatin as prescribed.    Diagnosis: Hypertension  Assessment and Plan of Treatment: Follow a low sodium and fat diet, continue taking your anti-hypertensive medications, Imdur, Metoprolol, and Lisinopril, as prescribed. Follow up with your primary care physician.     PRINCIPAL DISCHARGE DIAGNOSIS  Diagnosis: Urinary tract infection with hematuria  Assessment and Plan of Treatment: You came to the hospital due to having blood in your urine. You were found to have a UTI and bladder stones. Infectious Disease was consulted and determined that you did not require antibiotic treatment during this admission. You were seen by urology who recommended a CT scan and cystoscopy in the outpatient setting. Please follow up with Dr. Loja for further evaluation.      SECONDARY DISCHARGE DIAGNOSES  Diagnosis: DM w/ complication  Assessment and Plan of Treatment: Your Hemoglobin A1C was 5.3% on 12/17. Target goal for hemoglobin A1C is <7. Monitor blood glucose levels throughout the day before meals and at bedtime. Record blood sugars and bring to outpatient providers appointment in order to be reviewed by your doctor for management modifications. If your sugars are more than 400 or less than 70 you should contact your PCP immediately. Monitor for signs/symptoms of low blood glucose, such as, dizziness, altered mental status, or cool/clammy skin. In addition, monitor for signs/symptoms of high blood glucose, such as, feeling hot, dry, fatigued, or with increased thirst/urination. Make regular podiatry appointments in order to have feet checked for wounds and uncontrolled toe nail growth to prevent infections, as well as, appointments with an ophthalmologist to monitor your vision.    Diagnosis: CVA (cerebral vascular accident)  Assessment and Plan of Treatment: You have right sided weakness related to a previous stroke. You had a CT scan of your head which did not show any acute brain bleed. Continue taking Aspirin and Atorvastatin as prescribed.    Diagnosis: Hypertension  Assessment and Plan of Treatment: Follow a low sodium and fat diet, continue taking your anti-hypertensive medications, Imdur, Metoprolol, and Lisinopril, as prescribed. Follow up with your primary care physician.

## 2021-12-19 NOTE — DISCHARGE NOTE PROVIDER - CARE PROVIDER_API CALL
PCP,   Phone: (   )    -  Fax: (   )    -  Follow Up Time:    PCP,   Phone: (   )    -  Fax: (   )    -  Follow Up Time:     Charles Loja)  Urology  [address], [facility] [address]  [place] [address]  Phone: [phone]  Fax: [phone]  Follow Up Time:

## 2021-12-19 NOTE — DISCHARGE NOTE PROVIDER - NSDCFUADDINST_GEN_ALL_CORE_FT
Follow up with your primary care physician within 1-2 weeks of discharge. Please make a follow up appointment with Dr. Loja in 1-2 weeks for CT scan and cystoscopy. Follow up with your primary care physician within 1-2 weeks of discharge.

## 2021-12-19 NOTE — PHYSICAL THERAPY INITIAL EVALUATION ADULT - PERTINENT HX OF CURRENT PROBLEM, REHAB EVAL
Patient is 81 year old male admitted with history of  CVA with right sided weakness, dementia, HTN, CAD s/p stent, PPM, ashtma presents for hematuria and bladder pain

## 2021-12-19 NOTE — DISCHARGE NOTE PROVIDER - NSDCMRMEDTOKEN_GEN_ALL_CORE_FT
benazepril 10 mg oral tablet: 1 tab(s) orally once a day  Metoprolol Succinate ER 25 mg oral tablet, extended release: 1 tab(s) orally once a day   aspirin 81 mg oral delayed release tablet: 1 tab(s) orally once a day  benazepril 10 mg oral tablet: 1 tab(s) orally once a day  isosorbide mononitrate 30 mg oral tablet, extended release: 1 tab(s) orally once a day  Lipitor 40 mg oral tablet: 1 tab(s) orally once a day (at bedtime)  Metoprolol Succinate ER 25 mg oral tablet, extended release: 1 tab(s) orally once a day

## 2021-12-19 NOTE — DISCHARGE NOTE PROVIDER - HOSPITAL COURSE
82 yo M with pmhx of CVA with RT sided weakness, dementia, HTN, CAD s/p stent, PPM, asthma presents for hematuria and bladder pain. As per the wife - pt was having difficulty having BM, had gross red blood in urine, and poor oral intake. Imaging in ER revealed suspected urolithiasis, and RLL pneumonia.         82 y/o M with PMHx of CVA with right sided weakness, dementia, HTN, CAD s/p stent, PPM, and asthma presents for hematuria and "bladder pain". As per patient's wife, patient noted to have gross red blood in urine. Imaging in ER showed suspected urolithiasis, and suspected RLL pneumonia.    UTI with hematuria  - UA with trace blood, small leukocyte esterase  - UCx - negative  - CT abdomen/pelvis w/ contrast showed stercoral colitis; hyperdense layering in the bladder consistent with bladder stones; no hydroureteronephrosis  - leukocytosis can be attributed to stercoral colitis  - per ID, no need for antibiotic therapy, patient monitored off of antibiotics    PNA  - afebrile during admission  - CT showed bilateral peribronchial thickening and right lung base consolidation, likely atelectasis but could be pneumonia in the appropriate clinical setting  - per ID, no need for antibiotics as clinical picture not convincing for PNA, patient monitored off of antibiotics    CVA  - old right sided weakness  - CT head w/o contrast showed similar mild to moderately enlarged ventricles which may be on the basis of brain atrophy; no acute intracranial hemorrhage or brain edema; extensive white matter microvascular ischemic disease  - c/w ASA, Atorvastatin     DM2  - A1C 5.3% on 12/17    HTN  - c/w Imdur, Metoprolol, Lisinopril     Dementia   - supportive care    Case discussed with ___ on ___. Patient is medically stable for discharge to ___. 82 y/o M with PMHx of CVA with right sided weakness, dementia, HTN, CAD s/p stent, PPM, and asthma presents for hematuria and "bladder pain". As per patient's wife, patient noted to have gross red blood in urine. Imaging in ER showed suspected urolithiasis and suspected RLL pneumonia.    UTI with hematuria  - UA with trace blood, small leukocyte esterase  - UCx - negative  - CT abdomen/pelvis w/ contrast showed stercoral colitis; hyperdense layering in the bladder consistent with bladder stones; no hydroureteronephrosis  - leukocytosis can be attributed to stercoral colitis  - per ID, no need for antibiotic therapy, patient monitored off of antibiotics  - urology consulted, recommending CT urogram and cystoscopy in outpatient setting  - patient to follow up with Dr. Loja, urology, outpatient for further evaluation    PNA  - afebrile during admission  - CT showed bilateral peribronchial thickening and right lung base consolidation, likely atelectasis but could be pneumonia in the appropriate clinical setting  - per ID, no need for antibiotics as clinical picture not convincing for PNA, patient monitored off of antibiotics    CVA  - old right sided weakness  - CT head w/o contrast showed similar mild to moderately enlarged ventricles which may be on the basis of brain atrophy; no acute intracranial hemorrhage or brain edema; extensive white matter microvascular ischemic disease  - c/w ASA, Atorvastatin     DM2  - A1C 5.3% on 12/17    HTN  - c/w Imdur, Metoprolol, Lisinopril     Dementia   - supportive care    Case discussed with ___ on ___. Patient is medically stable for discharge to ___. 80 y/o M with PMHx of CVA with right sided weakness, dementia, HTN, CAD s/p stent, PPM, and asthma presents for hematuria and "bladder pain". As per patient's wife, patient noted to have gross red blood in urine. Imaging in ER showed suspected urolithiasis and suspected RLL pneumonia.    UTI with hematuria  - UA with trace blood, small leukocyte esterase  - UCx - negative  - CT abdomen/pelvis w/ contrast showed stercoral colitis; hyperdense layering in the bladder consistent with bladder stones; no hydroureteronephrosis  - leukocytosis can be attributed to stercoral colitis  - per ID, no need for antibiotic therapy, patient monitored off of antibiotics  - urology consulted, recommending CT urogram and cystoscopy in outpatient setting  - patient to follow up with Dr. Loja, urology, outpatient for further evaluation    PNA  - afebrile during admission  - CT showed bilateral peribronchial thickening and right lung base consolidation, likely atelectasis but could be pneumonia in the appropriate clinical setting  - per ID, no need for antibiotics as clinical picture not convincing for PNA, patient monitored off of antibiotics    CVA  - old right sided weakness  - CT head w/o contrast showed similar mild to moderately enlarged ventricles which may be on the basis of brain atrophy; no acute intracranial hemorrhage or brain edema; extensive white matter microvascular ischemic disease  - c/w ASA, Atorvastatin     DM2  - A1C 5.3% on 12/17    HTN  - c/w Imdur, Metoprolol, Lisinopril     Dementia   - supportive care    Case discussed with Dr. Rice on 12/21. Patient is medically stable for discharge to home.

## 2021-12-19 NOTE — DISCHARGE NOTE PROVIDER - PROVIDER TOKENS
FREE:[LAST:[PCP],PHONE:[(   )    -],FAX:[(   )    -]] FREE:[LAST:[PCP],PHONE:[(   )    -],FAX:[(   )    -]],PROVIDER:[TOKEN:[[id number]]]

## 2021-12-19 NOTE — PHYSICAL THERAPY INITIAL EVALUATION ADULT - PASSIVE RANGE OF MOTION EXAMINATION, REHAB EVAL
Unable to assess due to strong resistance/Left UE Passive ROM was WFL (within functional limits)/bilateral lower extremity Passive ROM was WFL (within functional limits)

## 2021-12-20 LAB
ACANTHOCYTES BLD QL SMEAR: SLIGHT — SIGNIFICANT CHANGE UP
ANION GAP SERPL CALC-SCNC: 12 MMOL/L — SIGNIFICANT CHANGE UP (ref 7–14)
ANISOCYTOSIS BLD QL: SLIGHT — SIGNIFICANT CHANGE UP
BASOPHILS # BLD AUTO: 0 K/UL — SIGNIFICANT CHANGE UP (ref 0–0.2)
BASOPHILS NFR BLD AUTO: 0 % — SIGNIFICANT CHANGE UP (ref 0–2)
BUN SERPL-MCNC: 18 MG/DL — SIGNIFICANT CHANGE UP (ref 7–23)
CALCIUM SERPL-MCNC: 10.1 MG/DL — SIGNIFICANT CHANGE UP (ref 8.4–10.5)
CHLORIDE SERPL-SCNC: 103 MMOL/L — SIGNIFICANT CHANGE UP (ref 98–107)
CO2 SERPL-SCNC: 25 MMOL/L — SIGNIFICANT CHANGE UP (ref 22–31)
CREAT SERPL-MCNC: 1.08 MG/DL — SIGNIFICANT CHANGE UP (ref 0.5–1.3)
EOSINOPHIL # BLD AUTO: 0 K/UL — SIGNIFICANT CHANGE UP (ref 0–0.5)
EOSINOPHIL NFR BLD AUTO: 0 % — SIGNIFICANT CHANGE UP (ref 0–6)
GIANT PLATELETS BLD QL SMEAR: PRESENT — SIGNIFICANT CHANGE UP
GLUCOSE SERPL-MCNC: 97 MG/DL — SIGNIFICANT CHANGE UP (ref 70–99)
HCT VFR BLD CALC: 43 % — SIGNIFICANT CHANGE UP (ref 39–50)
HGB BLD-MCNC: 14.4 G/DL — SIGNIFICANT CHANGE UP (ref 13–17)
IANC: 3.74 K/UL — SIGNIFICANT CHANGE UP (ref 1.5–8.5)
LYMPHOCYTES # BLD AUTO: 1.65 K/UL — SIGNIFICANT CHANGE UP (ref 1–3.3)
LYMPHOCYTES # BLD AUTO: 23.9 % — SIGNIFICANT CHANGE UP (ref 13–44)
MAGNESIUM SERPL-MCNC: 1.9 MG/DL — SIGNIFICANT CHANGE UP (ref 1.6–2.6)
MANUAL SMEAR VERIFICATION: SIGNIFICANT CHANGE UP
MCHC RBC-ENTMCNC: 30.2 PG — SIGNIFICANT CHANGE UP (ref 27–34)
MCHC RBC-ENTMCNC: 33.5 GM/DL — SIGNIFICANT CHANGE UP (ref 32–36)
MCV RBC AUTO: 90.1 FL — SIGNIFICANT CHANGE UP (ref 80–100)
METAMYELOCYTES # FLD: 0.9 % — SIGNIFICANT CHANGE UP (ref 0–1)
MONOCYTES # BLD AUTO: 1.16 K/UL — HIGH (ref 0–0.9)
MONOCYTES NFR BLD AUTO: 16.8 % — HIGH (ref 2–14)
MYELOCYTES NFR BLD: 1.8 % — HIGH (ref 0–0)
NEUTROPHILS # BLD AUTO: 3.73 K/UL — SIGNIFICANT CHANGE UP (ref 1.8–7.4)
NEUTROPHILS NFR BLD AUTO: 54 % — SIGNIFICANT CHANGE UP (ref 43–77)
PHOSPHATE SERPL-MCNC: 2.9 MG/DL — SIGNIFICANT CHANGE UP (ref 2.5–4.5)
PLAT MORPH BLD: NORMAL — SIGNIFICANT CHANGE UP
PLATELET # BLD AUTO: 194 K/UL — SIGNIFICANT CHANGE UP (ref 150–400)
POIKILOCYTOSIS BLD QL AUTO: SLIGHT — SIGNIFICANT CHANGE UP
POTASSIUM SERPL-MCNC: 3.6 MMOL/L — SIGNIFICANT CHANGE UP (ref 3.5–5.3)
POTASSIUM SERPL-SCNC: 3.6 MMOL/L — SIGNIFICANT CHANGE UP (ref 3.5–5.3)
RBC # BLD: 4.77 M/UL — SIGNIFICANT CHANGE UP (ref 4.2–5.8)
RBC # FLD: 12.5 % — SIGNIFICANT CHANGE UP (ref 10.3–14.5)
RBC BLD AUTO: NORMAL — SIGNIFICANT CHANGE UP
SMUDGE CELLS # BLD: PRESENT — SIGNIFICANT CHANGE UP
SODIUM SERPL-SCNC: 140 MMOL/L — SIGNIFICANT CHANGE UP (ref 135–145)
VARIANT LYMPHS # BLD: 2.6 % — SIGNIFICANT CHANGE UP (ref 0–6)
WBC # BLD: 6.91 K/UL — SIGNIFICANT CHANGE UP (ref 3.8–10.5)
WBC # FLD AUTO: 6.91 K/UL — SIGNIFICANT CHANGE UP (ref 3.8–10.5)

## 2021-12-20 RX ADMIN — ATORVASTATIN CALCIUM 40 MILLIGRAM(S): 80 TABLET, FILM COATED ORAL at 22:12

## 2021-12-20 RX ADMIN — ISOSORBIDE MONONITRATE 30 MILLIGRAM(S): 60 TABLET, EXTENDED RELEASE ORAL at 13:05

## 2021-12-20 RX ADMIN — HEPARIN SODIUM 5000 UNIT(S): 5000 INJECTION INTRAVENOUS; SUBCUTANEOUS at 11:57

## 2021-12-20 RX ADMIN — HEPARIN SODIUM 5000 UNIT(S): 5000 INJECTION INTRAVENOUS; SUBCUTANEOUS at 22:12

## 2021-12-20 RX ADMIN — Medication 81 MILLIGRAM(S): at 11:57

## 2021-12-20 RX ADMIN — HEPARIN SODIUM 5000 UNIT(S): 5000 INJECTION INTRAVENOUS; SUBCUTANEOUS at 00:29

## 2021-12-20 RX ADMIN — BUDESONIDE AND FORMOTEROL FUMARATE DIHYDRATE 2 PUFF(S): 160; 4.5 AEROSOL RESPIRATORY (INHALATION) at 22:11

## 2021-12-20 NOTE — PROGRESS NOTE ADULT - PROBLEM SELECTOR PLAN 3
Old right sided weakness. Supportive care, statin

## 2021-12-20 NOTE — PROGRESS NOTE ADULT - PROBLEM SELECTOR PROBLEM 1
Urinary tract infection with hematuria

## 2021-12-20 NOTE — PROVIDER CONTACT NOTE (OTHER) - ASSESSMENT
Arreola inserted without any resistance however, no urine was found to be draining in catheter tubing. Small amount of dark blood noted but not draining down to bag

## 2021-12-20 NOTE — PROCEDURE NOTE - NSPROCDETAILS_GEN_ALL_CORE
sterile technique, old cysto removed, new tube inserted old tube removed/sterile technique, old cysto removed, new tube inserted

## 2021-12-20 NOTE — PROGRESS NOTE ADULT - SUBJECTIVE AND OBJECTIVE BOX
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    HTN (hypertension)    Diabetes    Hyperlipidemia    Dementia    Cardiac pacemaker  Medtronic (SN: [id number]; Model: [id number])        Review of Systems:   CONSTITUTIONAL: No fever, weight loss, or fatigue  EYES: No eye pain, visual disturbances, or discharge  ENMT:  No difficulty hearing, tinnitus, vertigo; No sinus or throat pain  NECK: No pain or stiffness  BREASTS: No pain, masses, or nipple discharge  RESPIRATORY: No cough, wheezing, chills or hemoptysis; No shortness of breath  CARDIOVASCULAR: No chest pain, palpitations, dizziness, or leg swelling  GASTROINTESTINAL: No abdominal or epigastric pain. No nausea, vomiting, or hematemesis; No diarrhea or constipation. No melena or hematochezia.  GENITOURINARY: No dysuria, frequency, hematuria, or incontinence  NEUROLOGICAL: No headaches, memory loss, loss of strength, numbness, or tremors  SKIN: No itching, burning, rashes, or lesions   LYMPH NODES: No enlarged glands  ENDOCRINE: No heat or cold intolerance; No hair loss  MUSCULOSKELETAL: No joint pain or swelling; No muscle, back, or extremity pain  PSYCHIATRIC: No depression, anxiety, mood swings, or difficulty sleeping  HEME/LYMPH: No easy bruising, or bleeding gums  ALLERY AND IMMUNOLOGIC: No hives or eczema    Allergies    No Known Allergies    Intolerances        Social History:     FAMILY HISTORY:  FH: diabetes mellitus  Mother, Brother    FHx: dementia  Father        MEDICATIONS  (STANDING):  aspirin enteric coated 81 milliGRAM(s) Oral daily  atorvastatin 40 milliGRAM(s) Oral at bedtime  budesonide 160 MICROgram(s)/formoterol 4.5 MICROgram(s) Inhaler 2 Puff(s) Inhalation two times a day  dextrose 40% Gel 15 Gram(s) Oral once  dextrose 5%. 1000 milliLiter(s) (50 mL/Hr) IV Continuous <Continuous>  dextrose 5%. 1000 milliLiter(s) (100 mL/Hr) IV Continuous <Continuous>  dextrose 50% Injectable 25 Gram(s) IV Push once  dextrose 50% Injectable 12.5 Gram(s) IV Push once  dextrose 50% Injectable 25 Gram(s) IV Push once  glucagon  Injectable 1 milliGRAM(s) IntraMuscular once  heparin   Injectable 5000 Unit(s) SubCutaneous every 12 hours  insulin lispro (ADMELOG) corrective regimen sliding scale   SubCutaneous three times a day before meals  isosorbide   mononitrate ER Tablet (IMDUR) 30 milliGRAM(s) Oral daily  lisinopril 10 milliGRAM(s) Oral daily  metoprolol succinate ER 25 milliGRAM(s) Oral daily  pantoprazole    Tablet 40 milliGRAM(s) Oral before breakfast  senna 2 Tablet(s) Oral at bedtime    MEDICATIONS  (PRN):  ALBUTerol    90 MICROgram(s) HFA Inhaler 2 Puff(s) Inhalation every 6 hours PRN Shortness of Breath and/or Wheezing  polyethylene glycol 3350 17 Gram(s) Oral daily PRN Constipation        CAPILLARY BLOOD GLUCOSE      POCT Blood Glucose.: 113 mg/dL (17 Dec 2021 16:46)  POCT Blood Glucose.: 91 mg/dL (17 Dec 2021 11:53)  POCT Blood Glucose.: 104 mg/dL (17 Dec 2021 07:34)    I&O's Summary    17 Dec 2021 07:01  -  17 Dec 2021 20:06  --------------------------------------------------------  IN: 60 mL / OUT: 150 mL / NET: -90 mL        PHYSICAL EXAM:  Vital Signs Last 24 Hrs  T(C): 36.9 (17 Dec 2021 17:49), Max: 37.2 (17 Dec 2021 09:39)  T(F): 98.5 (17 Dec 2021 17:49), Max: 99 (17 Dec 2021 09:39)  HR: 59 (17 Dec 2021 17:49) (59 - 68)  BP: 142/75 (17 Dec 2021 17:49) (116/64 - 145/76)  BP(mean): --  RR: 17 (17 Dec 2021 17:49) (17 - 18)  SpO2: 94% (17 Dec 2021 17:49) (94% - 100%)    GENERAL: NAD, well-developed  HEAD:  Atraumatic, Normocephalic  EYES: EOMI, PERRLA, conjunctiva and sclera clear  NECK: Supple, No JVD  CHEST/LUNG: Clear to auscultation bilaterally; No wheeze  HEART: Regular rate and rhythm; No murmurs, rubs, or gallops  ABDOMEN: Soft, Nontender, Nondistended; Bowel sounds present  EXTREMITIES:  2+ Peripheral Pulses, No clubbing, cyanosis, or edema  PSYCH: AAOx3  NEUROLOGY: non-focal  SKIN: No rashes or lesions    LABS:                        13.2   8.28  )-----------( 127      ( 17 Dec 2021 06:49 )             39.6     -    139  |  104  |  20  ----------------------------<  91  4.0   |  21<L>  |  0.90    Ca    10.4      17 Dec 2021 06:49  Phos  2.7     -  Mg     1.90         TPro  7.0  /  Alb  3.8  /  TBili  2.2<H>  /  DBili  x   /  AST  16  /  ALT  13  /  AlkPhos  74  -          Urinalysis Basic - ( 16 Dec 2021 07:06 )    Color: Dark Orange / Appearance: Slightly Turbid / S.030 / pH: x  Gluc: x / Ketone: Negative  / Bili: Small / Urobili: 6 mg/dL   Blood: x / Protein: 30 mg/dL / Nitrite: Negative   Leuk Esterase: Small / RBC: 2-5 /HPF / WBC 5-10 /HPF   Sq Epi: x / Non Sq Epi: Occasional / Bacteria: Negative        RADIOLOGY & ADDITIONAL TESTS:    Imaging Personally Reviewed:    Consultant(s) Notes Reviewed:      Care Discussed with Consultants/Other Providers:  
Patient is a 81y old  Male who presents with a chief complaint of Hematuria (16 Dec 2021 13:34)      HPI:  Afebrile, no s/S of sepsis    PAST MEDICAL & SURGICAL HISTORY:  CVA (cerebral infarction)   with residual right sided weakness    HTN (hypertension)    Diabetes    Hyperlipidemia    Dementia    Cardiac pacemaker  [employer]tronic (SN: [id number]; Model: [id number])        Review of Systems:   CONSTITUTIONAL: No fever, weight loss, or fatigue  EYES: No eye pain, visual disturbances, or discharge  ENMT:  No difficulty hearing, tinnitus, vertigo; No sinus or throat pain  NECK: No pain or stiffness  BREASTS: No pain, masses, or nipple discharge  RESPIRATORY: No cough, wheezing, chills or hemoptysis; No shortness of breath  CARDIOVASCULAR: No chest pain, palpitations, dizziness, or leg swelling  GASTROINTESTINAL: No abdominal or epigastric pain. No nausea, vomiting, or hematemesis; No diarrhea or constipation. No melena or hematochezia.  GENITOURINARY: No dysuria, frequency, hematuria, or incontinence  NEUROLOGICAL: No headaches, memory loss, loss of strength, numbness, or tremors  SKIN: No itching, burning, rashes, or lesions   LYMPH NODES: No enlarged glands  ENDOCRINE: No heat or cold intolerance; No hair loss  MUSCULOSKELETAL: No joint pain or swelling; No muscle, back, or extremity pain  PSYCHIATRIC: No depression, anxiety, mood swings, or difficulty sleeping  HEME/LYMPH: No easy bruising, or bleeding gums  ALLERY AND IMMUNOLOGIC: No hives or eczema    Allergies    No Known Allergies    Intolerances        Social History:     FAMILY HISTORY:  FH: diabetes mellitus  Mother, Brother    FHx: dementia  Father        MEDICATIONS  (STANDING):  aspirin enteric coated 81 milliGRAM(s) Oral daily  atorvastatin 40 milliGRAM(s) Oral at bedtime  budesonide 160 MICROgram(s)/formoterol 4.5 MICROgram(s) Inhaler 2 Puff(s) Inhalation two times a day  dextrose 40% Gel 15 Gram(s) Oral once  dextrose 5%. 1000 milliLiter(s) (50 mL/Hr) IV Continuous <Continuous>  dextrose 5%. 1000 milliLiter(s) (100 mL/Hr) IV Continuous <Continuous>  dextrose 50% Injectable 25 Gram(s) IV Push once  dextrose 50% Injectable 12.5 Gram(s) IV Push once  dextrose 50% Injectable 25 Gram(s) IV Push once  glucagon  Injectable 1 milliGRAM(s) IntraMuscular once  heparin   Injectable 5000 Unit(s) SubCutaneous every 12 hours  insulin lispro (ADMELOG) corrective regimen sliding scale   SubCutaneous three times a day before meals  isosorbide   mononitrate ER Tablet (IMDUR) 30 milliGRAM(s) Oral daily  lisinopril 10 milliGRAM(s) Oral daily  metoprolol succinate ER 25 milliGRAM(s) Oral daily  pantoprazole    Tablet 40 milliGRAM(s) Oral before breakfast  senna 2 Tablet(s) Oral at bedtime    MEDICATIONS  (PRN):  ALBUTerol    90 MICROgram(s) HFA Inhaler 2 Puff(s) Inhalation every 6 hours PRN Shortness of Breath and/or Wheezing  polyethylene glycol 3350 17 Gram(s) Oral daily PRN Constipation        CAPILLARY BLOOD GLUCOSE      POCT Blood Glucose.: 113 mg/dL (17 Dec 2021 16:46)  POCT Blood Glucose.: 91 mg/dL (17 Dec 2021 11:53)  POCT Blood Glucose.: 104 mg/dL (17 Dec 2021 07:34)    I&O's Summary    17 Dec 2021 07:01  -  17 Dec 2021 20:06  --------------------------------------------------------  IN: 60 mL / OUT: 150 mL / NET: -90 mL        PHYSICAL EXAM:  Vital Signs Last 24 Hrs  T(C): 36.9 (17 Dec 2021 17:49), Max: 37.2 (17 Dec 2021 09:39)  T(F): 98.5 (17 Dec 2021 17:49), Max: 99 (17 Dec 2021 09:39)  HR: 59 (17 Dec 2021 17:49) (59 - 68)  BP: 142/75 (17 Dec 2021 17:49) (116/64 - 145/76)  BP(mean): --  RR: 17 (17 Dec 2021 17:49) (17 - 18)  SpO2: 94% (17 Dec 2021 17:49) (94% - 100%)    GENERAL: NAD, well-developed  HEAD:  Atraumatic, Normocephalic  EYES: EOMI, PERRLA, conjunctiva and sclera clear  NECK: Supple, No JVD  CHEST/LUNG: Clear to auscultation bilaterally; No wheeze  HEART: Regular rate and rhythm; No murmurs, rubs, or gallops  ABDOMEN: Soft, Nontender, Nondistended; Bowel sounds present  EXTREMITIES:  2+ Peripheral Pulses, No clubbing, cyanosis, or edema  PSYCH: AAOx3  NEUROLOGY: non-focal  SKIN: No rashes or lesions    LABS:                        13.2   8.28  )-----------( 127      ( 17 Dec 2021 06:49 )             39.6     -    139  |  104  |  20  ----------------------------<  91  4.0   |  21<L>  |  0.90    Ca    10.4      17 Dec 2021 06:49  Phos  2.7     -  Mg     1.90         TPro  7.0  /  Alb  3.8  /  TBili  2.2<H>  /  DBili  x   /  AST  16  /  ALT  13  /  AlkPhos  74  -          Urinalysis Basic - ( 16 Dec 2021 07:06 )    Color: Dark Orange / Appearance: Slightly Turbid / S.030 / pH: x  Gluc: x / Ketone: Negative  / Bili: Small / Urobili: 6 mg/dL   Blood: x / Protein: 30 mg/dL / Nitrite: Negative   Leuk Esterase: Small / RBC: 2-5 /HPF / WBC 5-10 /HPF   Sq Epi: x / Non Sq Epi: Occasional / Bacteria: Negative        RADIOLOGY & ADDITIONAL TESTS:    Imaging Personally Reviewed:    Consultant(s) Notes Reviewed:      Care Discussed with Consultants/Other Providers:  
CC: Patient is a 81y old  Male who presents with a chief complaint of Hematuria (16 Dec 2021 13:34)    ID following for hematuria    Interval History/ROS: Patient agitated when seen today. No fevers, no chills, no cough.    Rest of ROS negative.    Allergies  No Known Allergies    ANTIMICROBIALS:      OTHER MEDS:  ALBUTerol    90 MICROgram(s) HFA Inhaler 2 Puff(s) Inhalation every 6 hours PRN  aspirin enteric coated 81 milliGRAM(s) Oral daily  atorvastatin 40 milliGRAM(s) Oral at bedtime  budesonide 160 MICROgram(s)/formoterol 4.5 MICROgram(s) Inhaler 2 Puff(s) Inhalation two times a day  dextrose 40% Gel 15 Gram(s) Oral once  dextrose 5%. 1000 milliLiter(s) IV Continuous <Continuous>  dextrose 5%. 1000 milliLiter(s) IV Continuous <Continuous>  dextrose 50% Injectable 25 Gram(s) IV Push once  dextrose 50% Injectable 12.5 Gram(s) IV Push once  dextrose 50% Injectable 25 Gram(s) IV Push once  glucagon  Injectable 1 milliGRAM(s) IntraMuscular once  heparin   Injectable 5000 Unit(s) SubCutaneous every 12 hours  insulin lispro (ADMELOG) corrective regimen sliding scale   SubCutaneous three times a day before meals  isosorbide   mononitrate ER Tablet (IMDUR) 30 milliGRAM(s) Oral daily  lisinopril 10 milliGRAM(s) Oral daily  metoprolol succinate ER 25 milliGRAM(s) Oral daily  pantoprazole    Tablet 40 milliGRAM(s) Oral before breakfast  polyethylene glycol 3350 17 Gram(s) Oral daily PRN  senna 2 Tablet(s) Oral at bedtime    PE:    Vital Signs Last 24 Hrs  T(C): 36.6 (17 Dec 2021 12:26), Max: 37.2 (17 Dec 2021 09:39)  T(F): 97.8 (17 Dec 2021 12:26), Max: 99 (17 Dec 2021 09:39)  HR: 62 (17 Dec 2021 12:26) (60 - 68)  BP: 145/76 (17 Dec 2021 12:26) (116/64 - 145/76)  BP(mean): --  RR: 17 (17 Dec 2021 12:26) (17 - 18)  SpO2: 100% (17 Dec 2021 12:26) (98% - 100%)    Gen: Agitated, awake  CV: S1+S2 normal, no murmurs  Resp: Clear bilat, no resp distress  Abd: Soft, nontender, +BS  Ext: No LE edema, no wounds  : Condom catheter  IV/Skin: No thrombophlebitis  Neuro: no focal deficits    LABS:                          13.2   8.28  )-----------( 127      ( 17 Dec 2021 06:49 )             39.6           139  |  104  |  20  ----------------------------<  91  4.0   |  21<L>  |  0.90    Ca    10.4      17 Dec 2021 06:49  Phos  2.7       Mg     1.90         TPro  7.0  /  Alb  3.8  /  TBili  2.2<H>  /  DBili  x   /  AST  16  /  ALT  13  /  AlkPhos  74        Urinalysis Basic - ( 16 Dec 2021 07:06 )    Color: Dark Orange / Appearance: Slightly Turbid / S.030 / pH: x  Gluc: x / Ketone: Negative  / Bili: Small / Urobili: 6 mg/dL   Blood: x / Protein: 30 mg/dL / Nitrite: Negative   Leuk Esterase: Small / RBC: 2-5 /HPF / WBC 5-10 /HPF   Sq Epi: x / Non Sq Epi: Occasional / Bacteria: Negative    MICROBIOLOGY:  v    C Diff by PCR Result: NotDetec ( @ 20:10)  Rapid RVP Result: NotDetec ( @ 07:36)    C Diff by PCR Result: NotDetec (21 @ 20:10)    RADIOLOGY:    < from: CT Abdomen and Pelvis w/ IV Cont (21 @ 09:26) >    IMPRESSION:  1.  Stercoral colitis.  2.  Hyperdense layering in the bladder consistent with bladder stones. No   hydroureteronephrosis.  3.  Bilateral peribronchial thickening and right lung base consolidation,   likely atelectasis but could be pneumonia in the appropriate clinical   setting.    < end of copied text >  
Patient is a 81y old  Male who presents with a chief complaint of Hematuria (16 Dec 2021 13:34)      HPI:  Afebrile, no s/S of sepsis. 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    HTN (hypertension)    Diabetes    Hyperlipidemia    Dementia    Cardiac pacemaker  [employer]tronic (SN: [id number]; Model: [id number])        Review of Systems:   CONSTITUTIONAL: No fever, weight loss, or fatigue  EYES: No eye pain, visual disturbances, or discharge  ENMT:  No difficulty hearing, tinnitus, vertigo; No sinus or throat pain  NECK: No pain or stiffness  BREASTS: No pain, masses, or nipple discharge  RESPIRATORY: No cough, wheezing, chills or hemoptysis; No shortness of breath  CARDIOVASCULAR: No chest pain, palpitations, dizziness, or leg swelling  GASTROINTESTINAL: No abdominal or epigastric pain. No nausea, vomiting, or hematemesis; No diarrhea or constipation. No melena or hematochezia.  GENITOURINARY: No dysuria, frequency, hematuria, or incontinence  NEUROLOGICAL: No headaches, memory loss, loss of strength, numbness, or tremors  SKIN: No itching, burning, rashes, or lesions   LYMPH NODES: No enlarged glands  ENDOCRINE: No heat or cold intolerance; No hair loss  MUSCULOSKELETAL: No joint pain or swelling; No muscle, back, or extremity pain  PSYCHIATRIC: No depression, anxiety, mood swings, or difficulty sleeping  HEME/LYMPH: No easy bruising, or bleeding gums  ALLERY AND IMMUNOLOGIC: No hives or eczema    Allergies    No Known Allergies    Intolerances        Social History:     FAMILY HISTORY:  FH: diabetes mellitus  Mother, Brother    FHx: dementia  Father        MEDICATIONS  (STANDING):  aspirin enteric coated 81 milliGRAM(s) Oral daily  atorvastatin 40 milliGRAM(s) Oral at bedtime  budesonide 160 MICROgram(s)/formoterol 4.5 MICROgram(s) Inhaler 2 Puff(s) Inhalation two times a day  dextrose 40% Gel 15 Gram(s) Oral once  dextrose 5%. 1000 milliLiter(s) (50 mL/Hr) IV Continuous <Continuous>  dextrose 5%. 1000 milliLiter(s) (100 mL/Hr) IV Continuous <Continuous>  dextrose 50% Injectable 25 Gram(s) IV Push once  dextrose 50% Injectable 12.5 Gram(s) IV Push once  dextrose 50% Injectable 25 Gram(s) IV Push once  glucagon  Injectable 1 milliGRAM(s) IntraMuscular once  heparin   Injectable 5000 Unit(s) SubCutaneous every 12 hours  insulin lispro (ADMELOG) corrective regimen sliding scale   SubCutaneous three times a day before meals  isosorbide   mononitrate ER Tablet (IMDUR) 30 milliGRAM(s) Oral daily  lisinopril 10 milliGRAM(s) Oral daily  metoprolol succinate ER 25 milliGRAM(s) Oral daily  pantoprazole    Tablet 40 milliGRAM(s) Oral before breakfast  senna 2 Tablet(s) Oral at bedtime    MEDICATIONS  (PRN):  ALBUTerol    90 MICROgram(s) HFA Inhaler 2 Puff(s) Inhalation every 6 hours PRN Shortness of Breath and/or Wheezing  polyethylene glycol 3350 17 Gram(s) Oral daily PRN Constipation        CAPILLARY BLOOD GLUCOSE      POCT Blood Glucose.: 113 mg/dL (17 Dec 2021 16:46)  POCT Blood Glucose.: 91 mg/dL (17 Dec 2021 11:53)  POCT Blood Glucose.: 104 mg/dL (17 Dec 2021 07:34)    I&O's Summary    17 Dec 2021 07:01  -  17 Dec 2021 20:06  --------------------------------------------------------  IN: 60 mL / OUT: 150 mL / NET: -90 mL        PHYSICAL EXAM:  Vital Signs Last 24 Hrs  T(C): 36.9 (17 Dec 2021 17:49), Max: 37.2 (17 Dec 2021 09:39)  T(F): 98.5 (17 Dec 2021 17:49), Max: 99 (17 Dec 2021 09:39)  HR: 59 (17 Dec 2021 17:49) (59 - 68)  BP: 142/75 (17 Dec 2021 17:49) (116/64 - 145/76)  BP(mean): --  RR: 17 (17 Dec 2021 17:49) (17 - 18)  SpO2: 94% (17 Dec 2021 17:49) (94% - 100%)    GENERAL: NAD, well-developed  HEAD:  Atraumatic, Normocephalic  EYES: EOMI, PERRLA, conjunctiva and sclera clear  NECK: Supple, No JVD  CHEST/LUNG: Clear to auscultation bilaterally; No wheeze  HEART: Regular rate and rhythm; No murmurs, rubs, or gallops  ABDOMEN: Soft, Nontender, Nondistended; Bowel sounds present  EXTREMITIES:  2+ Peripheral Pulses, No clubbing, cyanosis, or edema  PSYCH: AAOx3  NEUROLOGY: non-focal  SKIN: No rashes or lesions    LABS:                        13.2   8.28  )-----------( 127      ( 17 Dec 2021 06:49 )             39.6         139  |  104  |  20  ----------------------------<  91  4.0   |  21<L>  |  0.90    Ca    10.4      17 Dec 2021 06:49  Phos  2.7     -  Mg     1.90         TPro  7.0  /  Alb  3.8  /  TBili  2.2<H>  /  DBili  x   /  AST  16  /  ALT  13  /  AlkPhos  74  -          Urinalysis Basic - ( 16 Dec 2021 07:06 )    Color: Dark Orange / Appearance: Slightly Turbid / S.030 / pH: x  Gluc: x / Ketone: Negative  / Bili: Small / Urobili: 6 mg/dL   Blood: x / Protein: 30 mg/dL / Nitrite: Negative   Leuk Esterase: Small / RBC: 2-5 /HPF / WBC 5-10 /HPF   Sq Epi: x / Non Sq Epi: Occasional / Bacteria: Negative        RADIOLOGY & ADDITIONAL TESTS:    Imaging Personally Reviewed:    Consultant(s) Notes Reviewed:      Care Discussed with Consultants/Other Providers:  
Patient is a 81y old  Male who presents with a chief complaint of Hematuria (16 Dec 2021 13:34)      HPI:  Afebrile, no s/S of sepsis  BM+    PAST MEDICAL & SURGICAL HISTORY:  CVA (cerebral infarction)   with residual right sided weakness    HTN (hypertension)    Diabetes    Hyperlipidemia    Dementia    Cardiac pacemaker  Medtronic (SN: [id number]; Model: [id number])        Review of Systems:   CONSTITUTIONAL: No fever, weight loss, or fatigue  EYES: No eye pain, visual disturbances, or discharge  ENMT:  No difficulty hearing, tinnitus, vertigo; No sinus or throat pain  NECK: No pain or stiffness  BREASTS: No pain, masses, or nipple discharge  RESPIRATORY: No cough, wheezing, chills or hemoptysis; No shortness of breath  CARDIOVASCULAR: No chest pain, palpitations, dizziness, or leg swelling  GASTROINTESTINAL: No abdominal or epigastric pain. No nausea, vomiting, or hematemesis; No diarrhea or constipation. No melena or hematochezia.  GENITOURINARY: No dysuria, frequency, hematuria, or incontinence  NEUROLOGICAL: No headaches, memory loss, loss of strength, numbness, or tremors  SKIN: No itching, burning, rashes, or lesions   LYMPH NODES: No enlarged glands  ENDOCRINE: No heat or cold intolerance; No hair loss  MUSCULOSKELETAL: No joint pain or swelling; No muscle, back, or extremity pain  PSYCHIATRIC: No depression, anxiety, mood swings, or difficulty sleeping  HEME/LYMPH: No easy bruising, or bleeding gums  ALLERY AND IMMUNOLOGIC: No hives or eczema    Allergies    No Known Allergies    Intolerances        Social History:     FAMILY HISTORY:  FH: diabetes mellitus  Mother, Brother    FHx: dementia  Father        MEDICATIONS  (STANDING):  aspirin enteric coated 81 milliGRAM(s) Oral daily  atorvastatin 40 milliGRAM(s) Oral at bedtime  budesonide 160 MICROgram(s)/formoterol 4.5 MICROgram(s) Inhaler 2 Puff(s) Inhalation two times a day  dextrose 40% Gel 15 Gram(s) Oral once  dextrose 5%. 1000 milliLiter(s) (50 mL/Hr) IV Continuous <Continuous>  dextrose 5%. 1000 milliLiter(s) (100 mL/Hr) IV Continuous <Continuous>  dextrose 50% Injectable 25 Gram(s) IV Push once  dextrose 50% Injectable 12.5 Gram(s) IV Push once  dextrose 50% Injectable 25 Gram(s) IV Push once  glucagon  Injectable 1 milliGRAM(s) IntraMuscular once  heparin   Injectable 5000 Unit(s) SubCutaneous every 12 hours  insulin lispro (ADMELOG) corrective regimen sliding scale   SubCutaneous three times a day before meals  isosorbide   mononitrate ER Tablet (IMDUR) 30 milliGRAM(s) Oral daily  lisinopril 10 milliGRAM(s) Oral daily  metoprolol succinate ER 25 milliGRAM(s) Oral daily  pantoprazole    Tablet 40 milliGRAM(s) Oral before breakfast  senna 2 Tablet(s) Oral at bedtime    MEDICATIONS  (PRN):  ALBUTerol    90 MICROgram(s) HFA Inhaler 2 Puff(s) Inhalation every 6 hours PRN Shortness of Breath and/or Wheezing  polyethylene glycol 3350 17 Gram(s) Oral daily PRN Constipation        CAPILLARY BLOOD GLUCOSE      POCT Blood Glucose.: 113 mg/dL (17 Dec 2021 16:46)  POCT Blood Glucose.: 91 mg/dL (17 Dec 2021 11:53)  POCT Blood Glucose.: 104 mg/dL (17 Dec 2021 07:34)    I&O's Summary    17 Dec 2021 07:01  -  17 Dec 2021 20:06  --------------------------------------------------------  IN: 60 mL / OUT: 150 mL / NET: -90 mL        PHYSICAL EXAM:  Vital Signs Last 24 Hrs  T(C): 36.9 (17 Dec 2021 17:49), Max: 37.2 (17 Dec 2021 09:39)  T(F): 98.5 (17 Dec 2021 17:49), Max: 99 (17 Dec 2021 09:39)  HR: 59 (17 Dec 2021 17:49) (59 - 68)  BP: 142/75 (17 Dec 2021 17:49) (116/64 - 145/76)  BP(mean): --  RR: 17 (17 Dec 2021 17:49) (17 - 18)  SpO2: 94% (17 Dec 2021 17:49) (94% - 100%)    GENERAL: NAD, well-developed  HEAD:  Atraumatic, Normocephalic  EYES: EOMI, PERRLA, conjunctiva and sclera clear  NECK: Supple, No JVD  CHEST/LUNG: Clear to auscultation bilaterally; No wheeze  HEART: Regular rate and rhythm; No murmurs, rubs, or gallops  ABDOMEN: Soft, Nontender, Nondistended; Bowel sounds present  EXTREMITIES:  2+ Peripheral Pulses, No clubbing, cyanosis, or edema  PSYCH: AAOx3  NEUROLOGY: non-focal  SKIN: No rashes or lesions    LABS:                        13.2   8.28  )-----------( 127      ( 17 Dec 2021 06:49 )             39.6     -    139  |  104  |  20  ----------------------------<  91  4.0   |  21<L>  |  0.90    Ca    10.4      17 Dec 2021 06:49  Phos  2.7     -  Mg     1.90         TPro  7.0  /  Alb  3.8  /  TBili  2.2<H>  /  DBili  x   /  AST  16  /  ALT  13  /  AlkPhos  74  -          Urinalysis Basic - ( 16 Dec 2021 07:06 )    Color: Dark Orange / Appearance: Slightly Turbid / S.030 / pH: x  Gluc: x / Ketone: Negative  / Bili: Small / Urobili: 6 mg/dL   Blood: x / Protein: 30 mg/dL / Nitrite: Negative   Leuk Esterase: Small / RBC: 2-5 /HPF / WBC 5-10 /HPF   Sq Epi: x / Non Sq Epi: Occasional / Bacteria: Negative        RADIOLOGY & ADDITIONAL TESTS:    Imaging Personally Reviewed:    Consultant(s) Notes Reviewed:      Care Discussed with Consultants/Other Providers:

## 2021-12-20 NOTE — PROGRESS NOTE ADULT - PROBLEM SELECTOR PLAN 1
Afebrile at this time. ID FU noted  monitor off antibiotics  DC planning in AM
Afebrile at this time. ID josette noted  monitor off antibiotics
Afebrile at this time. ID josette noted  monitor off antibiotics  DC planning in AM
Afebrile at this time. ID FU noted  monitor off antibiotics   FU, CT Urogram ordered for hydronephrosis

## 2021-12-20 NOTE — PROGRESS NOTE ADULT - PROBLEM SELECTOR PLAN 2
Afebrile, no need for antibiotics as clinical picture is not convincing for pneumonia.
Afebrile, no need for antibiotics as imaging is not convincing for pneumonia.

## 2021-12-21 ENCOUNTER — TRANSCRIPTION ENCOUNTER (OUTPATIENT)
Age: 81
End: 2021-12-21

## 2021-12-21 VITALS
TEMPERATURE: 98 F | DIASTOLIC BLOOD PRESSURE: 84 MMHG | RESPIRATION RATE: 19 BRPM | SYSTOLIC BLOOD PRESSURE: 112 MMHG | HEART RATE: 76 BPM | OXYGEN SATURATION: 100 %

## 2021-12-21 LAB
ANION GAP SERPL CALC-SCNC: 12 MMOL/L — SIGNIFICANT CHANGE UP (ref 7–14)
BASOPHILS # BLD AUTO: 0.03 K/UL — SIGNIFICANT CHANGE UP (ref 0–0.2)
BASOPHILS NFR BLD AUTO: 0.4 % — SIGNIFICANT CHANGE UP (ref 0–2)
BUN SERPL-MCNC: 20 MG/DL — SIGNIFICANT CHANGE UP (ref 7–23)
CALCIUM SERPL-MCNC: 9.9 MG/DL — SIGNIFICANT CHANGE UP (ref 8.4–10.5)
CHLORIDE SERPL-SCNC: 104 MMOL/L — SIGNIFICANT CHANGE UP (ref 98–107)
CO2 SERPL-SCNC: 25 MMOL/L — SIGNIFICANT CHANGE UP (ref 22–31)
CREAT SERPL-MCNC: 0.97 MG/DL — SIGNIFICANT CHANGE UP (ref 0.5–1.3)
EOSINOPHIL # BLD AUTO: 0.23 K/UL — SIGNIFICANT CHANGE UP (ref 0–0.5)
EOSINOPHIL NFR BLD AUTO: 3 % — SIGNIFICANT CHANGE UP (ref 0–6)
GLUCOSE SERPL-MCNC: 88 MG/DL — SIGNIFICANT CHANGE UP (ref 70–99)
HCT VFR BLD CALC: 41.2 % — SIGNIFICANT CHANGE UP (ref 39–50)
HGB BLD-MCNC: 13.6 G/DL — SIGNIFICANT CHANGE UP (ref 13–17)
IANC: 4.18 K/UL — SIGNIFICANT CHANGE UP (ref 1.5–8.5)
IMM GRANULOCYTES NFR BLD AUTO: 2.3 % — HIGH (ref 0–1.5)
LYMPHOCYTES # BLD AUTO: 2.16 K/UL — SIGNIFICANT CHANGE UP (ref 1–3.3)
LYMPHOCYTES # BLD AUTO: 27.7 % — SIGNIFICANT CHANGE UP (ref 13–44)
MAGNESIUM SERPL-MCNC: 2.1 MG/DL — SIGNIFICANT CHANGE UP (ref 1.6–2.6)
MCHC RBC-ENTMCNC: 29.8 PG — SIGNIFICANT CHANGE UP (ref 27–34)
MCHC RBC-ENTMCNC: 33 GM/DL — SIGNIFICANT CHANGE UP (ref 32–36)
MCV RBC AUTO: 90.2 FL — SIGNIFICANT CHANGE UP (ref 80–100)
MONOCYTES # BLD AUTO: 1.01 K/UL — HIGH (ref 0–0.9)
MONOCYTES NFR BLD AUTO: 13 % — SIGNIFICANT CHANGE UP (ref 2–14)
NEUTROPHILS # BLD AUTO: 4.18 K/UL — SIGNIFICANT CHANGE UP (ref 1.8–7.4)
NEUTROPHILS NFR BLD AUTO: 53.6 % — SIGNIFICANT CHANGE UP (ref 43–77)
NRBC # BLD: 0 /100 WBCS — SIGNIFICANT CHANGE UP
NRBC # FLD: 0 K/UL — SIGNIFICANT CHANGE UP
PHOSPHATE SERPL-MCNC: 2.5 MG/DL — SIGNIFICANT CHANGE UP (ref 2.5–4.5)
PLATELET # BLD AUTO: 195 K/UL — SIGNIFICANT CHANGE UP (ref 150–400)
POTASSIUM SERPL-MCNC: 3.7 MMOL/L — SIGNIFICANT CHANGE UP (ref 3.5–5.3)
POTASSIUM SERPL-SCNC: 3.7 MMOL/L — SIGNIFICANT CHANGE UP (ref 3.5–5.3)
RBC # BLD: 4.57 M/UL — SIGNIFICANT CHANGE UP (ref 4.2–5.8)
RBC # FLD: 12.5 % — SIGNIFICANT CHANGE UP (ref 10.3–14.5)
SODIUM SERPL-SCNC: 141 MMOL/L — SIGNIFICANT CHANGE UP (ref 135–145)
WBC # BLD: 7.79 K/UL — SIGNIFICANT CHANGE UP (ref 3.8–10.5)
WBC # FLD AUTO: 7.79 K/UL — SIGNIFICANT CHANGE UP (ref 3.8–10.5)

## 2021-12-21 RX ORDER — ISOSORBIDE MONONITRATE 60 MG/1
1 TABLET, EXTENDED RELEASE ORAL
Qty: 30 | Refills: 0
Start: 2021-12-21 | End: 2022-01-19

## 2021-12-21 RX ORDER — ASPIRIN/CALCIUM CARB/MAGNESIUM 324 MG
1 TABLET ORAL
Qty: 0 | Refills: 0 | DISCHARGE
Start: 2021-12-21

## 2021-12-21 RX ORDER — ATORVASTATIN CALCIUM 80 MG/1
1 TABLET, FILM COATED ORAL
Qty: 30 | Refills: 0
Start: 2021-12-21 | End: 2022-01-19

## 2021-12-21 RX ADMIN — HEPARIN SODIUM 5000 UNIT(S): 5000 INJECTION INTRAVENOUS; SUBCUTANEOUS at 09:57

## 2021-12-21 RX ADMIN — LISINOPRIL 10 MILLIGRAM(S): 2.5 TABLET ORAL at 05:48

## 2021-12-21 RX ADMIN — ATORVASTATIN CALCIUM 40 MILLIGRAM(S): 80 TABLET, FILM COATED ORAL at 22:12

## 2021-12-21 RX ADMIN — BUDESONIDE AND FORMOTEROL FUMARATE DIHYDRATE 2 PUFF(S): 160; 4.5 AEROSOL RESPIRATORY (INHALATION) at 09:57

## 2021-12-21 RX ADMIN — Medication 81 MILLIGRAM(S): at 12:04

## 2021-12-21 RX ADMIN — PANTOPRAZOLE SODIUM 40 MILLIGRAM(S): 20 TABLET, DELAYED RELEASE ORAL at 05:48

## 2021-12-21 RX ADMIN — HEPARIN SODIUM 5000 UNIT(S): 5000 INJECTION INTRAVENOUS; SUBCUTANEOUS at 22:11

## 2021-12-21 RX ADMIN — ISOSORBIDE MONONITRATE 30 MILLIGRAM(S): 60 TABLET, EXTENDED RELEASE ORAL at 12:04

## 2021-12-21 RX ADMIN — Medication 25 MILLIGRAM(S): at 05:48

## 2021-12-21 RX ADMIN — BUDESONIDE AND FORMOTEROL FUMARATE DIHYDRATE 2 PUFF(S): 160; 4.5 AEROSOL RESPIRATORY (INHALATION) at 22:11

## 2021-12-21 NOTE — DISCHARGE NOTE NURSING/CASE MANAGEMENT/SOCIAL WORK - PATIENT PORTAL LINK FT
You can access the FollowMyHealth Patient Portal offered by [facility] by registering at the following website: [email or web address]. By joining [facility]’s FollowMyHealth portal, you will also be able to view your health information using other applications (apps) compatible with our system.

## 2021-12-21 NOTE — PHARMACOTHERAPY INTERVENTION NOTE - COMMENTS
unable to verify meds with patient  meds verified with [facility]
as per pharmacy [facility], patient has only most recently filled the following:  Metoprolol Succinate 25mg once daily  Benazepril 10mg once daily

## 2021-12-21 NOTE — DISCHARGE NOTE NURSING/CASE MANAGEMENT/SOCIAL WORK - NSDCCRNAME_GEN_ALL_CORE_FT
[facility] Plan for [facility];  Your care manager, Robb, is reinstating patient's PCA services to resume on 12/22/21.

## 2021-12-21 NOTE — DISCHARGE NOTE NURSING/CASE MANAGEMENT/SOCIAL WORK - NSDCPNINST_GEN_ALL_CORE
Call MD with any worsening of symptoms ie. Difficulty breathing, shortness of breath, fever over 100.5. Continue to drink fluids to hydrate.   Continue hand hygiene as instructed, and measures to prevent the spread of infection ie. wear mask, and practice social distancing as advised.  Take all Antibiotics and medications as prescribed, follow-up with PMD as instructed for continuity of care.  Continue Diabetes management, diet and glucose monitoring, know your A1C blood level and follow up with PMD for continuity of care. Remember hand hygiene, skin inspection for prevention of infection.

## 2021-12-21 NOTE — DISCHARGE NOTE NURSING/CASE MANAGEMENT/SOCIAL WORK - NSDCPEFALRISK_GEN_ALL_CORE
For information on Fall & Injury Prevention, visit: [email or web address] OR  [email or web address] OR  https://www.cdc.gov/steadi/patient.html

## 2021-12-21 NOTE — DISCHARGE NOTE NURSING/CASE MANAGEMENT/SOCIAL WORK - NSDCPECAREGIVERED_GEN_ALL_CORE
Medline and carenotes for kidney stones, pneumonia, diabetes, A1C, as well as DC Medications and side effects literature for patient reference.

## 2021-12-21 NOTE — CHART NOTE - NSCHARTNOTEFT_GEN_A_CORE
Per CT, patient requiring valencia catheter placement for CT urogram. Provider called to bedside by RN who placed catheter due to dark red hematuria. Per RN, no resistance met during insertion of valencia, however, concern for creation of false passage due to lack of urine drainage. Bladder scan with 59cc PVR. Urology consult called for hematuria; pending recommendations.
Provider spoke with patient's wife/HCP, Nerissa, in order to obtain consent for CT abdomen/pelvis with contrast. Consent was obtained over the phone and witnessed by RNAdri. Consents for diagnostic procedure and use of IV contrast placed in the patient's chart. Patient's wife made aware of risks; all questions answered.
Patient brought to CT for CT urogram on 12/20 - per CT, patient unable to stay still or tolerate imaging. Spoke with Dr. Rice and Dr. Loja 12/21 AM, both in agreement that imaging can be done in the outpatient setting.

## 2021-12-21 NOTE — DISCHARGE NOTE NURSING/CASE MANAGEMENT/SOCIAL WORK - NSDPDISTO_GEN_ALL_CORE
Pt Arreola DCd this AM, voiding, incontinent.  VS stable Afebrile. pt with positive bowel sounds mitchell po diet. Seen by MD and cleared for DC to home with home care as per safe DC plan./[facility]

## 2021-12-25 ENCOUNTER — INPATIENT (INPATIENT)
Facility: HOSPITAL | Age: 81
LOS: 3 days | Discharge: ANOTHER HOSPITAL | End: 2021-12-29
Attending: INTERNAL MEDICINE | Admitting: INTERNAL MEDICINE
Payer: COMMERCIAL

## 2021-12-25 VITALS
TEMPERATURE: 98 F | HEIGHT: 66 IN | RESPIRATION RATE: 18 BRPM | OXYGEN SATURATION: 95 % | HEART RATE: 100 BPM | DIASTOLIC BLOOD PRESSURE: 78 MMHG | SYSTOLIC BLOOD PRESSURE: 136 MMHG

## 2021-12-25 DIAGNOSIS — R31.0 GROSS HEMATURIA: ICD-10-CM

## 2021-12-25 DIAGNOSIS — Z95.0 PRESENCE OF CARDIAC PACEMAKER: Chronic | ICD-10-CM

## 2021-12-25 DIAGNOSIS — N21.0 CALCULUS IN BLADDER: ICD-10-CM

## 2021-12-25 DIAGNOSIS — I10 ESSENTIAL (PRIMARY) HYPERTENSION: ICD-10-CM

## 2021-12-25 DIAGNOSIS — A41.9 SEPSIS, UNSPECIFIED ORGANISM: ICD-10-CM

## 2021-12-25 DIAGNOSIS — N39.0 URINARY TRACT INFECTION, SITE NOT SPECIFIED: ICD-10-CM

## 2021-12-25 DIAGNOSIS — Z86.73 PERSONAL HISTORY OF TRANSIENT ISCHEMIC ATTACK (TIA), AND CEREBRAL INFARCTION WITHOUT RESIDUAL DEFICITS: ICD-10-CM

## 2021-12-25 DIAGNOSIS — I25.10 ATHEROSCLEROTIC HEART DISEASE OF NATIVE CORONARY ARTERY WITHOUT ANGINA PECTORIS: ICD-10-CM

## 2021-12-25 DIAGNOSIS — Z29.9 ENCOUNTER FOR PROPHYLACTIC MEASURES, UNSPECIFIED: ICD-10-CM

## 2021-12-25 DIAGNOSIS — F03.90 UNSPECIFIED DEMENTIA, UNSPECIFIED SEVERITY, WITHOUT BEHAVIORAL DISTURBANCE, PSYCHOTIC DISTURBANCE, MOOD DISTURBANCE, AND ANXIETY: ICD-10-CM

## 2021-12-25 LAB
ALBUMIN SERPL ELPH-MCNC: 3.5 G/DL — SIGNIFICANT CHANGE UP (ref 3.3–5)
ALP SERPL-CCNC: 200 U/L — HIGH (ref 40–120)
ALT FLD-CCNC: 24 U/L — SIGNIFICANT CHANGE UP (ref 4–41)
ANION GAP SERPL CALC-SCNC: 13 MMOL/L — SIGNIFICANT CHANGE UP (ref 7–14)
ANISOCYTOSIS BLD QL: SLIGHT — SIGNIFICANT CHANGE UP
APPEARANCE UR: ABNORMAL
APTT BLD: 21.7 SEC — LOW (ref 27–36.3)
AST SERPL-CCNC: 35 U/L — SIGNIFICANT CHANGE UP (ref 4–40)
B PERT DNA SPEC QL NAA+PROBE: SIGNIFICANT CHANGE UP
B PERT+PARAPERT DNA PNL SPEC NAA+PROBE: SIGNIFICANT CHANGE UP
BACTERIA # UR AUTO: ABNORMAL
BASE EXCESS BLDV CALC-SCNC: -0.4 MMOL/L — SIGNIFICANT CHANGE UP (ref -2–3)
BASE EXCESS BLDV CALC-SCNC: 2.4 MMOL/L — SIGNIFICANT CHANGE UP (ref -2–3)
BASOPHILS # BLD AUTO: 0 K/UL — SIGNIFICANT CHANGE UP (ref 0–0.2)
BASOPHILS NFR BLD AUTO: 0 % — SIGNIFICANT CHANGE UP (ref 0–2)
BILIRUB SERPL-MCNC: 1.5 MG/DL — HIGH (ref 0.2–1.2)
BILIRUB UR-MCNC: NEGATIVE — SIGNIFICANT CHANGE UP
BLOOD GAS VENOUS COMPREHENSIVE RESULT: SIGNIFICANT CHANGE UP
BLOOD GAS VENOUS COMPREHENSIVE RESULT: SIGNIFICANT CHANGE UP
BORDETELLA PARAPERTUSSIS (RAPRVP): SIGNIFICANT CHANGE UP
BUN SERPL-MCNC: 20 MG/DL — SIGNIFICANT CHANGE UP (ref 7–23)
C PNEUM DNA SPEC QL NAA+PROBE: SIGNIFICANT CHANGE UP
CALCIUM SERPL-MCNC: 9.6 MG/DL — SIGNIFICANT CHANGE UP (ref 8.4–10.5)
CHLORIDE BLDV-SCNC: 102 MMOL/L — SIGNIFICANT CHANGE UP (ref 96–108)
CHLORIDE BLDV-SCNC: 104 MMOL/L — SIGNIFICANT CHANGE UP (ref 96–108)
CHLORIDE SERPL-SCNC: 100 MMOL/L — SIGNIFICANT CHANGE UP (ref 98–107)
CO2 BLDV-SCNC: 23.8 MMOL/L — SIGNIFICANT CHANGE UP (ref 22–26)
CO2 BLDV-SCNC: 27.6 MMOL/L — HIGH (ref 22–26)
CO2 SERPL-SCNC: 25 MMOL/L — SIGNIFICANT CHANGE UP (ref 22–31)
COLOR SPEC: ABNORMAL
CREAT SERPL-MCNC: 1.21 MG/DL — SIGNIFICANT CHANGE UP (ref 0.5–1.3)
DIFF PNL FLD: ABNORMAL
EOSINOPHIL # BLD AUTO: 0 K/UL — SIGNIFICANT CHANGE UP (ref 0–0.5)
EOSINOPHIL NFR BLD AUTO: 0 % — SIGNIFICANT CHANGE UP (ref 0–6)
EPI CELLS # UR: 1 /HPF — SIGNIFICANT CHANGE UP (ref 0–5)
FLUAV SUBTYP SPEC NAA+PROBE: SIGNIFICANT CHANGE UP
FLUBV RNA SPEC QL NAA+PROBE: SIGNIFICANT CHANGE UP
GAS PNL BLDV: 134 MMOL/L — LOW (ref 136–145)
GAS PNL BLDV: 135 MMOL/L — LOW (ref 136–145)
GAS PNL BLDV: SIGNIFICANT CHANGE UP
GAS PNL BLDV: SIGNIFICANT CHANGE UP
GLUCOSE BLDV-MCNC: 81 MG/DL — SIGNIFICANT CHANGE UP (ref 70–99)
GLUCOSE BLDV-MCNC: 97 MG/DL — SIGNIFICANT CHANGE UP (ref 70–99)
GLUCOSE SERPL-MCNC: 89 MG/DL — SIGNIFICANT CHANGE UP (ref 70–99)
GLUCOSE UR QL: NEGATIVE — SIGNIFICANT CHANGE UP
GRAM STN FLD: SIGNIFICANT CHANGE UP
HADV DNA SPEC QL NAA+PROBE: SIGNIFICANT CHANGE UP
HCO3 BLDV-SCNC: 23 MMOL/L — SIGNIFICANT CHANGE UP (ref 22–29)
HCO3 BLDV-SCNC: 26 MMOL/L — SIGNIFICANT CHANGE UP (ref 22–29)
HCOV 229E RNA SPEC QL NAA+PROBE: SIGNIFICANT CHANGE UP
HCOV HKU1 RNA SPEC QL NAA+PROBE: SIGNIFICANT CHANGE UP
HCOV NL63 RNA SPEC QL NAA+PROBE: SIGNIFICANT CHANGE UP
HCOV OC43 RNA SPEC QL NAA+PROBE: SIGNIFICANT CHANGE UP
HCT VFR BLD CALC: 39.4 % — SIGNIFICANT CHANGE UP (ref 39–50)
HCT VFR BLDA CALC: 35 % — LOW (ref 39–51)
HCT VFR BLDA CALC: 38 % — LOW (ref 39–51)
HGB BLD CALC-MCNC: 11.7 G/DL — LOW (ref 13–17)
HGB BLD CALC-MCNC: 12.8 G/DL — LOW (ref 13–17)
HGB BLD-MCNC: 13.1 G/DL — SIGNIFICANT CHANGE UP (ref 13–17)
HMPV RNA SPEC QL NAA+PROBE: SIGNIFICANT CHANGE UP
HPIV1 RNA SPEC QL NAA+PROBE: SIGNIFICANT CHANGE UP
HPIV2 RNA SPEC QL NAA+PROBE: SIGNIFICANT CHANGE UP
HPIV3 RNA SPEC QL NAA+PROBE: SIGNIFICANT CHANGE UP
HPIV4 RNA SPEC QL NAA+PROBE: SIGNIFICANT CHANGE UP
HYALINE CASTS # UR AUTO: 4 /LPF — SIGNIFICANT CHANGE UP (ref 0–7)
IANC: 5.43 K/UL — SIGNIFICANT CHANGE UP (ref 1.5–8.5)
INR BLD: 1.07 RATIO — SIGNIFICANT CHANGE UP (ref 0.88–1.16)
KETONES UR-MCNC: NEGATIVE — SIGNIFICANT CHANGE UP
LACTATE BLDV-MCNC: 1.4 MMOL/L — SIGNIFICANT CHANGE UP (ref 0.5–2)
LACTATE BLDV-MCNC: 2.7 MMOL/L — HIGH (ref 0.5–2)
LEUKOCYTE ESTERASE UR-ACNC: ABNORMAL
LYMPHOCYTES # BLD AUTO: 0 % — LOW (ref 13–44)
LYMPHOCYTES # BLD AUTO: 0 K/UL — LOW (ref 1–3.3)
M PNEUMO DNA SPEC QL NAA+PROBE: SIGNIFICANT CHANGE UP
MACROCYTES BLD QL: SLIGHT — SIGNIFICANT CHANGE UP
MCHC RBC-ENTMCNC: 30.8 PG — SIGNIFICANT CHANGE UP (ref 27–34)
MCHC RBC-ENTMCNC: 33.2 GM/DL — SIGNIFICANT CHANGE UP (ref 32–36)
MCV RBC AUTO: 92.7 FL — SIGNIFICANT CHANGE UP (ref 80–100)
MONOCYTES # BLD AUTO: 0 K/UL — SIGNIFICANT CHANGE UP (ref 0–0.9)
MONOCYTES NFR BLD AUTO: 0 % — LOW (ref 2–14)
NEUTROPHILS # BLD AUTO: 5.7 K/UL — SIGNIFICANT CHANGE UP (ref 1.8–7.4)
NEUTROPHILS NFR BLD AUTO: 97.4 % — HIGH (ref 43–77)
NEUTS BAND # BLD: 2.6 % — SIGNIFICANT CHANGE UP (ref 0–6)
NITRITE UR-MCNC: POSITIVE
OVALOCYTES BLD QL SMEAR: SLIGHT — SIGNIFICANT CHANGE UP
PCO2 BLDV: 32 MMHG — LOW (ref 42–55)
PCO2 BLDV: 38 MMHG — LOW (ref 42–55)
PH BLDV: 7.45 — HIGH (ref 7.32–7.43)
PH BLDV: 7.46 — HIGH (ref 7.32–7.43)
PH UR: 5.5 — SIGNIFICANT CHANGE UP (ref 5–8)
PLAT MORPH BLD: NORMAL — SIGNIFICANT CHANGE UP
PLATELET # BLD AUTO: 172 K/UL — SIGNIFICANT CHANGE UP (ref 150–400)
PLATELET COUNT - ESTIMATE: NORMAL — SIGNIFICANT CHANGE UP
PO2 BLDV: 25 MMHG — SIGNIFICANT CHANGE UP
PO2 BLDV: 60 MMHG — SIGNIFICANT CHANGE UP
POIKILOCYTOSIS BLD QL AUTO: SLIGHT — SIGNIFICANT CHANGE UP
POLYCHROMASIA BLD QL SMEAR: SLIGHT — SIGNIFICANT CHANGE UP
POTASSIUM BLDV-SCNC: 3.2 MMOL/L — LOW (ref 3.5–5.1)
POTASSIUM BLDV-SCNC: 3.4 MMOL/L — LOW (ref 3.5–5.1)
POTASSIUM SERPL-MCNC: 3.3 MMOL/L — LOW (ref 3.5–5.3)
POTASSIUM SERPL-SCNC: 3.3 MMOL/L — LOW (ref 3.5–5.3)
PROT SERPL-MCNC: 6.8 G/DL — SIGNIFICANT CHANGE UP (ref 6–8.3)
PROT UR-MCNC: ABNORMAL
PROTHROM AB SERPL-ACNC: 12.2 SEC — SIGNIFICANT CHANGE UP (ref 10.6–13.6)
RAPID RVP RESULT: SIGNIFICANT CHANGE UP
RBC # BLD: 4.25 M/UL — SIGNIFICANT CHANGE UP (ref 4.2–5.8)
RBC # FLD: 12.5 % — SIGNIFICANT CHANGE UP (ref 10.3–14.5)
RBC BLD AUTO: ABNORMAL
RBC CASTS # UR COMP ASSIST: 164 /HPF — HIGH (ref 0–4)
RSV RNA SPEC QL NAA+PROBE: SIGNIFICANT CHANGE UP
RV+EV RNA SPEC QL NAA+PROBE: SIGNIFICANT CHANGE UP
SAO2 % BLDV: 36 % — SIGNIFICANT CHANGE UP
SAO2 % BLDV: 91.4 % — SIGNIFICANT CHANGE UP
SARS-COV-2 RNA SPEC QL NAA+PROBE: SIGNIFICANT CHANGE UP
SODIUM SERPL-SCNC: 138 MMOL/L — SIGNIFICANT CHANGE UP (ref 135–145)
SP GR SPEC: 1.01 — SIGNIFICANT CHANGE UP (ref 1–1.05)
SPECIMEN SOURCE: SIGNIFICANT CHANGE UP
UROBILINOGEN FLD QL: ABNORMAL
WBC # BLD: 5.7 K/UL — SIGNIFICANT CHANGE UP (ref 3.8–10.5)
WBC # FLD AUTO: 5.7 K/UL — SIGNIFICANT CHANGE UP (ref 3.8–10.5)
WBC UR QL: >720 /HPF — HIGH (ref 0–5)

## 2021-12-25 PROCEDURE — 99285 EMERGENCY DEPT VISIT HI MDM: CPT

## 2021-12-25 PROCEDURE — 99223 1ST HOSP IP/OBS HIGH 75: CPT

## 2021-12-25 PROCEDURE — 71045 X-RAY EXAM CHEST 1 VIEW: CPT | Mod: 26

## 2021-12-25 RX ORDER — ACETAMINOPHEN 500 MG
650 TABLET ORAL EVERY 6 HOURS
Refills: 0 | Status: DISCONTINUED | OUTPATIENT
Start: 2021-12-25 | End: 2021-12-29

## 2021-12-25 RX ORDER — POTASSIUM CHLORIDE 20 MEQ
10 PACKET (EA) ORAL
Refills: 0 | Status: COMPLETED | OUTPATIENT
Start: 2021-12-25 | End: 2021-12-25

## 2021-12-25 RX ORDER — SODIUM CHLORIDE 9 MG/ML
1000 INJECTION INTRAMUSCULAR; INTRAVENOUS; SUBCUTANEOUS ONCE
Refills: 0 | Status: COMPLETED | OUTPATIENT
Start: 2021-12-25 | End: 2021-12-25

## 2021-12-25 RX ORDER — SODIUM CHLORIDE 9 MG/ML
1000 INJECTION INTRAMUSCULAR; INTRAVENOUS; SUBCUTANEOUS
Refills: 0 | Status: DISCONTINUED | OUTPATIENT
Start: 2021-12-25 | End: 2021-12-29

## 2021-12-25 RX ORDER — ONDANSETRON 8 MG/1
4 TABLET, FILM COATED ORAL EVERY 8 HOURS
Refills: 0 | Status: DISCONTINUED | OUTPATIENT
Start: 2021-12-25 | End: 2021-12-29

## 2021-12-25 RX ORDER — METOPROLOL TARTRATE 50 MG
25 TABLET ORAL DAILY
Refills: 0 | Status: DISCONTINUED | OUTPATIENT
Start: 2021-12-25 | End: 2021-12-29

## 2021-12-25 RX ORDER — HEPARIN SODIUM 5000 [USP'U]/ML
5000 INJECTION INTRAVENOUS; SUBCUTANEOUS EVERY 8 HOURS
Refills: 0 | Status: DISCONTINUED | OUTPATIENT
Start: 2021-12-25 | End: 2021-12-29

## 2021-12-25 RX ORDER — PIPERACILLIN AND TAZOBACTAM 4; .5 G/20ML; G/20ML
3.38 INJECTION, POWDER, LYOPHILIZED, FOR SOLUTION INTRAVENOUS ONCE
Refills: 0 | Status: COMPLETED | OUTPATIENT
Start: 2021-12-25 | End: 2021-12-25

## 2021-12-25 RX ORDER — VANCOMYCIN HCL 1 G
1000 VIAL (EA) INTRAVENOUS ONCE
Refills: 0 | Status: COMPLETED | OUTPATIENT
Start: 2021-12-25 | End: 2021-12-25

## 2021-12-25 RX ORDER — LISINOPRIL 2.5 MG/1
10 TABLET ORAL DAILY
Refills: 0 | Status: DISCONTINUED | OUTPATIENT
Start: 2021-12-25 | End: 2021-12-29

## 2021-12-25 RX ORDER — ISOSORBIDE MONONITRATE 60 MG/1
30 TABLET, EXTENDED RELEASE ORAL DAILY
Refills: 0 | Status: DISCONTINUED | OUTPATIENT
Start: 2021-12-25 | End: 2021-12-29

## 2021-12-25 RX ORDER — LANOLIN ALCOHOL/MO/W.PET/CERES
3 CREAM (GRAM) TOPICAL AT BEDTIME
Refills: 0 | Status: DISCONTINUED | OUTPATIENT
Start: 2021-12-25 | End: 2021-12-29

## 2021-12-25 RX ORDER — ASPIRIN/CALCIUM CARB/MAGNESIUM 324 MG
81 TABLET ORAL DAILY
Refills: 0 | Status: DISCONTINUED | OUTPATIENT
Start: 2021-12-25 | End: 2021-12-29

## 2021-12-25 RX ORDER — ATORVASTATIN CALCIUM 80 MG/1
40 TABLET, FILM COATED ORAL AT BEDTIME
Refills: 0 | Status: DISCONTINUED | OUTPATIENT
Start: 2021-12-25 | End: 2021-12-29

## 2021-12-25 RX ORDER — DEXTROSE 50 % IN WATER 50 %
50 SYRINGE (ML) INTRAVENOUS ONCE
Refills: 0 | Status: COMPLETED | OUTPATIENT
Start: 2021-12-25 | End: 2021-12-25

## 2021-12-25 RX ORDER — PIPERACILLIN AND TAZOBACTAM 4; .5 G/20ML; G/20ML
3.38 INJECTION, POWDER, LYOPHILIZED, FOR SOLUTION INTRAVENOUS EVERY 8 HOURS
Refills: 0 | Status: DISCONTINUED | OUTPATIENT
Start: 2021-12-25 | End: 2021-12-26

## 2021-12-25 RX ADMIN — HEPARIN SODIUM 5000 UNIT(S): 5000 INJECTION INTRAVENOUS; SUBCUTANEOUS at 22:01

## 2021-12-25 RX ADMIN — Medication 100 MILLIEQUIVALENT(S): at 14:30

## 2021-12-25 RX ADMIN — Medication 250 MILLIGRAM(S): at 11:58

## 2021-12-25 RX ADMIN — PIPERACILLIN AND TAZOBACTAM 25 GRAM(S): 4; .5 INJECTION, POWDER, LYOPHILIZED, FOR SOLUTION INTRAVENOUS at 20:33

## 2021-12-25 RX ADMIN — SODIUM CHLORIDE 1000 MILLILITER(S): 9 INJECTION INTRAMUSCULAR; INTRAVENOUS; SUBCUTANEOUS at 12:59

## 2021-12-25 RX ADMIN — PIPERACILLIN AND TAZOBACTAM 200 GRAM(S): 4; .5 INJECTION, POWDER, LYOPHILIZED, FOR SOLUTION INTRAVENOUS at 11:33

## 2021-12-25 RX ADMIN — Medication 100 MILLIEQUIVALENT(S): at 12:59

## 2021-12-25 RX ADMIN — SODIUM CHLORIDE 1000 MILLILITER(S): 9 INJECTION INTRAMUSCULAR; INTRAVENOUS; SUBCUTANEOUS at 11:33

## 2021-12-25 RX ADMIN — SODIUM CHLORIDE 75 MILLILITER(S): 9 INJECTION INTRAMUSCULAR; INTRAVENOUS; SUBCUTANEOUS at 16:12

## 2021-12-25 RX ADMIN — Medication 100 MILLIEQUIVALENT(S): at 16:05

## 2021-12-25 NOTE — H&P ADULT - ASSESSMENT
81yoM with PMHx of CVA with RT sided weakness, dementia, HTN, CAD s/p stent 10 years ago, PPM, asthma BIBEMS for hematuria. (+) Fver of 104 in ED. UA gross positive, suggestive of UTI

## 2021-12-25 NOTE — ED ADULT NURSE NOTE - OBJECTIVE STATEMENT
received pt in 17, 81 yr/o male A+Ox0 minimally verbal at this time, bedbound complete care. pt was brought in by EMS form home for blood in his urine.  as per EMS, PMHx of CVA, RT sided weakness noted. pt is unable to verbalize story and history at this time. pt is febrile, cooling blanket applied, SpO2 is equal to or >95 on RA. abdomen is flat nondistended, incontinent x2. 14 Cymro, coude Arreola catheter placed, urine is cloudy and clots of blood noted. skin is clean dry and intact. left AC 20g placed, labs and cultures drawn and sent. medications given as ordered. will continue to monitor.

## 2021-12-25 NOTE — ED ADULT NURSE REASSESSMENT NOTE - NS ED NURSE REASSESS COMMENT FT1
Break coverage RN. Vital signs reassessed as noted, cooling blanket readjusted d/t temperature. Pt medicated as per MD order. FS reassessed. Pt repositioned for comfort.

## 2021-12-25 NOTE — ED PROVIDER NOTE - PHYSICAL EXAMINATION
Dr Michelle  elderly male, non verbal. FS 90  no work of breathing noted. no retractions.   soft nondistended abdomen. no grimace to touch. warm to touch.  no leg swelling.   contracted upper ext. Dr Michelle  elderly male, non verbal. FS 90  no work of breathing noted. no retractions.   soft nondistended abdomen. no grimace to touch. warm to touch.  no leg swelling.   contracted upper ext.    PHYSICAL EXAM:  GENERAL: Lethargic with chills, groaning, elderly male. Warm to the touch  HEAD: NC/AT  EYES: PERRL, conjunctiva and sclera clear  ENMT: Dry mucous membranes  NECK: Supple  CHEST/LUNG: Decreased breath sounds in b/l bases, mildly tachypneic  HEART: Regular rate and rhythm; S1 and S2; No murmurs, rubs, or gallops  ABDOMEN: Soft, Nontender, Nondistended: Bowel sounds present  EXTREMITIES: No edema  MSK: no joint swelling or erythema  NEURO: Lethargic, eyes closed, not speaking/groaning, not following commands, Right sided deficits (chronic)

## 2021-12-25 NOTE — H&P ADULT - GASTROINTESTINAL DETAILS
Final Anesthesia Post-op Assessment    Patient: Carlitos Rae  Procedure(s) Performed: SEDATION IN ER FOR CLOSED REDUCTION OF LEFT FORARM  Anesthesia type: Monitor Anesthesia Care    Vitals Value Taken Time   Temp    6/26/2019 10:27 PM   Pulse 113 6/26/2019 10:26 PM   Resp 26 6/26/2019 10:27 PM   /113 6/26/2019 10:25 PM   SpO2 100 % 6/26/2019 10:26 PM   Vitals shown include unvalidated device data.    Last 24 I/O:     Intake/Output Summary (Last 24 hours) at 6/26/2019 2227  Last data filed at 6/26/2019 2211  Gross per 24 hour   Intake 400 ml   Output --   Net 400 ml       PATIENT LOCATION: Emergency Department  POST-OP VITAL SIGNS: stable  LEVEL OF CONSCIOUSNESS: participates in exam, awake, oriented, answers questions appropriately and alert  RESPIRATORY STATUS: spontaneous ventilation and unassisted  CARDIOVASCULAR: blood pressure returned to baseline  HYDRATION: euvolemic    PAIN MANAGEMENT: adequately controlled  NAUSEA: None  AIRWAY PATENCY: patent  POST-OP ASSESSMENT: no complications, patient tolerated procedure well with no complications, no evidence of recall and sufficiently recovered from acute administration of anesthesia effects and able to participate in evaluation  COMPLICATIONS: none       soft/nontender/no distention/no masses palpable/bowel sounds normal/no organomegaly

## 2021-12-25 NOTE — H&P ADULT - PROBLEM SELECTOR PLAN 1
Clinically septic. likely due to UTI. Pt was given IVF and Zosyn in ED  -F/U Blood cx, urine cx  -Cont Zosyn  -IVF hydration NS at 75cc?H X 12H then re-evaluate   Consider ID consult in AM

## 2021-12-25 NOTE — H&P ADULT - HISTORY OF PRESENT ILLNESS
81yoM with PMHx of CVA with RT sided weakness, dementia, HTN, CAD s/p stent 10 years ago, PPM, asthma BIBEMS for hematuria. Pt unable to give hx, called wife initially no answer. Wife provided the information. EMS states wife called for hematuria since this morning at 7am. Per wife, patient having chills, bleeding from penis, lethargic and breathing heavily since this AM. Also had diarrhea for 1 week, unable to quantify amount, stopped on Wednesday. Baseline AOx1, bedridden, has HHA 7 days wk/24 hrs day, unable to do ADLs for himself. Per wife, was at baseline yesterday. Pt was admitted to [facility] from 12-16-21 to 12-21-21, co then was hematuria. Labs wnl, attempted a ct urogram on 12-20-21 but pt did not stay still. CT A/P showed stereocolitis, RLL consolidation and multiple bladder stones, was monitored off antibiotics and discharged with outpatient follow-up. Currently, pt is lethargic, arousable. Denies any pain. Pt spiked a fever of 104 in ED. Currently, pt has a Arreola with coudy urine in bag, no signs of hematuria.  81yoM with PMHx of CVA with RT sided weakness, dementia, HTN, CAD s/p stent 10 years ago, PPM, asthma BIBEMS for hematuria. Pt unable to give hx, called wife initially no answer. Wife provided the information. EMS states wife called for hematuria since this morning at 7am. Per wife, patient having chills, bleeding from penis, lethargic and breathing heavily since this AM. Also had diarrhea for 1 week, unable to quantify amount, stopped on Wednesday. Baseline AOx1, bedridden, has HHA 7 days wk/24 hrs day, unable to do ADLs for himself. Per wife, was at baseline yesterday. Pt was admitted to [facility] from 12-16-21 to 12-21-21, co then was hematuria. Labs wnl, attempted a ct urogram on 12-20-21 but pt did not stay still. CT A/P showed stereocolitis, RLL consolidation and multiple bladder stones, was monitored off antibiotics and discharged with outpatient follow-up. Currently, pt is lethargic, arousable. Denies any pain. Pt spiked a fever of 104 in ED. Currently, pt has a Arreola with coudy urine in bag, no signs of hematuria.   Per wife, pt was fully vaccinated for Covid but not got booster yet.

## 2021-12-25 NOTE — ED ADULT NURSE REASSESSMENT NOTE - NS ED NURSE REASSESS COMMENT FT1
pt resting comfortably in stretcher. pt is less lethargic, more alert able to verbalize needs at this time. will continue to monitor.

## 2021-12-25 NOTE — ED PROVIDER NOTE - OBJECTIVE STATEMENT
Dr Michelle  81yoM pmhx of CVA with RT sided weakness, dementia, HTN, CAD s/p stent, PPM, asthma BIBEMS for  hematuria. Pt unable to give hx, called wife no answer.   EMS states wife called for hematuria since this morning at 7am. no pain. no fever. no AC. States wife w "poor historian"   pt was admitted to [facility] from 12-16-21 to 12-21-21, co then was hematuria. Labs wnl, attempted a ct urogram on 12-20-21 but pt did not stay still. was discharged. Dr Michelle  81yoM pmhx of CVA with RT sided weakness, dementia, HTN, CAD s/p stent, PPM, asthma BIBEMS for  hematuria. Pt unable to give hx, called wife no answer.   EMS states wife called for hematuria since this morning at 7am. no pain. no fever. no AC. States wife (Cher [phone]) w "poor historian"   pt was admitted to [facility] from 12-16-21 to 12-21-21, co then was hematuria. Labs wnl, attempted a ct urogram on 12-20-21 but pt did not stay still. was discharged. Today, patient lethargic and warm to the touch. Dr Michelle  81yoM PMHx of CVA with RT sided weakness, dementia, HTN, CAD s/p stent, PPM, asthma BIBEMS for hematuria. Pt unable to give hx, called wife initially no answer. Wife (Cher [phone]) called back with collateral. EMS states wife called for hematuria since this morning at 7am. Per wife, patient having chills, bleeding from penis, lethargic and breathing heavily since this AM. Also had diarrhea for 1 week, unable to quantify amount, stopped on Wednesday. Baseline AOx1, bedridden, has HHA 7 days wk/24 hrs day, unable to do ADLs for himself. Per wife, was at baseline yesterday. Pt was admitted to [facility] from 12-16-21 to 12-21-21, co then was hematuria. Labs wnl, attempted a ct urogram on 12-20-21 but pt did not stay still. CT A/P showed stereocolitis, RLL consolidation and multiple stones, was monitored off antibiotics and discharged with outpatient follow-up.

## 2021-12-25 NOTE — ED PROVIDER NOTE - CLINICAL SUMMARY MEDICAL DECISION MAKING FREE TEXT BOX
plan labs, ua, rectal temp, obtain collateral from wife, re asses 82 yo M with pmhx of CVA with RT sided weakness, dementia, HTN, CAD s/p stent, PPM, asthma presents with hematuria, currently altered, warm on exam and hypoglycemic to 70. Recently hospitalized at [facility] with hematuria, found to have CT A/P with stercoral colitis, hyperdense layering in the bladder consistent with bladder stones without hydroureteronephrosis and RLL consolidation. Was monitored off antibiotics and discharged with outpatient urology follow-up. Will order UA/UCx, CMP, CBC, VBG with lactate, CXR, rectal temp, BCx, EKG. Will give amp of D50. Will re-asses.

## 2021-12-25 NOTE — ED ADULT NURSE NOTE - NSIMPLEMENTINTERV_GEN_ALL_ED
Implemented All Fall with Harm Risk Interventions:  [place] to call system. Call bell, personal items and telephone within reach. Instruct patient to call for assistance. Room bathroom lighting operational. Non-slip footwear when patient is off stretcher. Physically safe environment: no spills, clutter or unnecessary equipment. Stretcher in lowest position, wheels locked, appropriate side rails in place. Provide visual cue, wrist band, yellow gown, etc. Monitor gait and stability. Monitor for mental status changes and reorient to person, place, and time. Review medications for side effects contributing to fall risk. Reinforce activity limits and safety measures with patient and family. Provide visual clues: red socks.

## 2021-12-25 NOTE — H&P ADULT - PROBLEM SELECTOR PLAN 7
Chronic.  Outpatient f/u Chronic. More lethargic now in the setting of urosepsis   -NPO except meds  -Swallow evaluation  -Aspiration precaution   -Outpatient f/u

## 2021-12-25 NOTE — H&P ADULT - NSHPPHYSICALEXAM_GEN_ALL_CORE
T(C): 38.7 (25 Dec 2021 13:00), Max: 40.1 (25 Dec 2021 10:15)  T(F): 101.7 (25 Dec 2021 13:00), Max: 104.1 (25 Dec 2021 10:15)  HR: 83 (25 Dec 2021 13:51) (83 - 102)  BP: 112/76 (25 Dec 2021 13:51) (109/78 - 137/81)  BP(mean): --  ABP: --  ABP(mean): --  RR: 18 (25 Dec 2021 13:51) (16 - 18)  SpO2: 100% (25 Dec 2021 13:51) (95% - 100%)

## 2021-12-25 NOTE — ED ADULT NURSE NOTE - TEMPLATE LIST FOR HEAD TO TOE ASSESSMENT
She has continued to try to take her anti-hypertensive medications, including HCTZ, however is unsure if anything is able to stay down. K of 2.9 and Magnesion 1.3 on arrival to ED. Repleted in ED and on floor.    - K improved, patient with central line, can receive IV repletion  - Replace PRN   General

## 2021-12-25 NOTE — ED ADULT TRIAGE NOTE - CHIEF COMPLAINT QUOTE
right side contractures  f/s in field 109   may have  had a valencia recently questionable hx from family fully vaccinated as per wife  f/s 69 at triage time

## 2021-12-25 NOTE — ED PROVIDER NOTE - PROGRESS NOTE DETAILS
Called wife, no answer. wife states is full code, "what will be will be" "will wait for now" UA very positive with LE, nitrites, bacteria. Treated with vanc/zosyn. Elevated lactate, given fluids bolus. K runs given for low K. Admit to Dr. Rice.

## 2021-12-25 NOTE — H&P ADULT - NSHPADDITIONALINFOADULT_GEN_ALL_CORE
Spoke to Dr Rice on the phone about the above plan. He will assume the care for this pt in AM tomorrow.  Spoke to wife on the phone about more history and plan of care     D/W ACP

## 2021-12-26 LAB
ACANTHOCYTES BLD QL SMEAR: SIGNIFICANT CHANGE UP
ANION GAP SERPL CALC-SCNC: 12 MMOL/L — SIGNIFICANT CHANGE UP (ref 7–14)
ANISOCYTOSIS BLD QL: SLIGHT — SIGNIFICANT CHANGE UP
BASOPHILS # BLD AUTO: 0 K/UL — SIGNIFICANT CHANGE UP (ref 0–0.2)
BASOPHILS NFR BLD AUTO: 0 % — SIGNIFICANT CHANGE UP (ref 0–2)
BUN SERPL-MCNC: 16 MG/DL — SIGNIFICANT CHANGE UP (ref 7–23)
BURR CELLS BLD QL SMEAR: PRESENT — SIGNIFICANT CHANGE UP
CALCIUM SERPL-MCNC: 9 MG/DL — SIGNIFICANT CHANGE UP (ref 8.4–10.5)
CHLORIDE SERPL-SCNC: 108 MMOL/L — HIGH (ref 98–107)
CO2 SERPL-SCNC: 22 MMOL/L — SIGNIFICANT CHANGE UP (ref 22–31)
CREAT SERPL-MCNC: 1.01 MG/DL — SIGNIFICANT CHANGE UP (ref 0.5–1.3)
E COLI DNA BLD POS QL NAA+NON-PROBE: SIGNIFICANT CHANGE UP
EOSINOPHIL # BLD AUTO: 0 K/UL — SIGNIFICANT CHANGE UP (ref 0–0.5)
EOSINOPHIL NFR BLD AUTO: 0 % — SIGNIFICANT CHANGE UP (ref 0–6)
GIANT PLATELETS BLD QL SMEAR: PRESENT — SIGNIFICANT CHANGE UP
GLUCOSE SERPL-MCNC: 80 MG/DL — SIGNIFICANT CHANGE UP (ref 70–99)
GRAM STN FLD: SIGNIFICANT CHANGE UP
GRAM STN FLD: SIGNIFICANT CHANGE UP
HCT VFR BLD CALC: 35.2 % — LOW (ref 39–50)
HGB BLD-MCNC: 11.9 G/DL — LOW (ref 13–17)
IANC: 15.85 K/UL — HIGH (ref 1.5–8.5)
LYMPHOCYTES # BLD AUTO: 1.3 K/UL — SIGNIFICANT CHANGE UP (ref 1–3.3)
LYMPHOCYTES # BLD AUTO: 7.1 % — LOW (ref 13–44)
MACROCYTES BLD QL: SLIGHT — SIGNIFICANT CHANGE UP
MAGNESIUM SERPL-MCNC: 1.8 MG/DL — SIGNIFICANT CHANGE UP (ref 1.6–2.6)
MANUAL SMEAR VERIFICATION: SIGNIFICANT CHANGE UP
MCHC RBC-ENTMCNC: 30.7 PG — SIGNIFICANT CHANGE UP (ref 27–34)
MCHC RBC-ENTMCNC: 33.8 GM/DL — SIGNIFICANT CHANGE UP (ref 32–36)
MCV RBC AUTO: 90.7 FL — SIGNIFICANT CHANGE UP (ref 80–100)
METHOD TYPE: SIGNIFICANT CHANGE UP
METHOD TYPE: SIGNIFICANT CHANGE UP
MONOCYTES # BLD AUTO: 0.8 K/UL — SIGNIFICANT CHANGE UP (ref 0–0.9)
MONOCYTES NFR BLD AUTO: 4.4 % — SIGNIFICANT CHANGE UP (ref 2–14)
MRSA SPEC QL CULT: SIGNIFICANT CHANGE UP
NEUTROPHILS # BLD AUTO: 15.81 K/UL — HIGH (ref 1.8–7.4)
NEUTROPHILS NFR BLD AUTO: 84.1 % — HIGH (ref 43–77)
NEUTS BAND # BLD: 2.6 % — SIGNIFICANT CHANGE UP (ref 0–6)
NRBC # BLD: 1 /100 — HIGH (ref 0–0)
PHOSPHATE SERPL-MCNC: 2.3 MG/DL — LOW (ref 2.5–4.5)
PLAT MORPH BLD: NORMAL — SIGNIFICANT CHANGE UP
PLATELET # BLD AUTO: 159 K/UL — SIGNIFICANT CHANGE UP (ref 150–400)
PLATELET COUNT - ESTIMATE: NORMAL — SIGNIFICANT CHANGE UP
POIKILOCYTOSIS BLD QL AUTO: SLIGHT — SIGNIFICANT CHANGE UP
POTASSIUM SERPL-MCNC: 3.5 MMOL/L — SIGNIFICANT CHANGE UP (ref 3.5–5.3)
POTASSIUM SERPL-SCNC: 3.5 MMOL/L — SIGNIFICANT CHANGE UP (ref 3.5–5.3)
RBC # BLD: 3.88 M/UL — LOW (ref 4.2–5.8)
RBC # FLD: 12.8 % — SIGNIFICANT CHANGE UP (ref 10.3–14.5)
RBC BLD AUTO: ABNORMAL
SODIUM SERPL-SCNC: 142 MMOL/L — SIGNIFICANT CHANGE UP (ref 135–145)
SPECIMEN SOURCE: SIGNIFICANT CHANGE UP
VARIANT LYMPHS # BLD: 1.8 % — SIGNIFICANT CHANGE UP (ref 0–6)
WBC # BLD: 18.24 K/UL — HIGH (ref 3.8–10.5)
WBC # FLD AUTO: 18.24 K/UL — HIGH (ref 3.8–10.5)

## 2021-12-26 PROCEDURE — 99223 1ST HOSP IP/OBS HIGH 75: CPT

## 2021-12-26 RX ORDER — CEFTRIAXONE 500 MG/1
1000 INJECTION, POWDER, FOR SOLUTION INTRAMUSCULAR; INTRAVENOUS EVERY 24 HOURS
Refills: 0 | Status: DISCONTINUED | OUTPATIENT
Start: 2021-12-27 | End: 2021-12-29

## 2021-12-26 RX ORDER — VANCOMYCIN HCL 1 G
1250 VIAL (EA) INTRAVENOUS EVERY 12 HOURS
Refills: 0 | Status: DISCONTINUED | OUTPATIENT
Start: 2021-12-27 | End: 2021-12-29

## 2021-12-26 RX ORDER — VANCOMYCIN HCL 1 G
VIAL (EA) INTRAVENOUS
Refills: 0 | Status: DISCONTINUED | OUTPATIENT
Start: 2021-12-27 | End: 2021-12-29

## 2021-12-26 RX ORDER — VANCOMYCIN HCL 1 G
1250 VIAL (EA) INTRAVENOUS ONCE
Refills: 0 | Status: COMPLETED | OUTPATIENT
Start: 2021-12-26 | End: 2021-12-27

## 2021-12-26 RX ORDER — POTASSIUM PHOSPHATE, MONOBASIC POTASSIUM PHOSPHATE, DIBASIC 236; 224 MG/ML; MG/ML
15 INJECTION, SOLUTION INTRAVENOUS ONCE
Refills: 0 | Status: COMPLETED | OUTPATIENT
Start: 2021-12-26 | End: 2021-12-26

## 2021-12-26 RX ADMIN — Medication 25 MILLIGRAM(S): at 07:27

## 2021-12-26 RX ADMIN — HEPARIN SODIUM 5000 UNIT(S): 5000 INJECTION INTRAVENOUS; SUBCUTANEOUS at 22:43

## 2021-12-26 RX ADMIN — PIPERACILLIN AND TAZOBACTAM 25 GRAM(S): 4; .5 INJECTION, POWDER, LYOPHILIZED, FOR SOLUTION INTRAVENOUS at 04:28

## 2021-12-26 RX ADMIN — Medication 81 MILLIGRAM(S): at 14:25

## 2021-12-26 RX ADMIN — HEPARIN SODIUM 5000 UNIT(S): 5000 INJECTION INTRAVENOUS; SUBCUTANEOUS at 05:49

## 2021-12-26 RX ADMIN — HEPARIN SODIUM 5000 UNIT(S): 5000 INJECTION INTRAVENOUS; SUBCUTANEOUS at 14:24

## 2021-12-26 RX ADMIN — PIPERACILLIN AND TAZOBACTAM 25 GRAM(S): 4; .5 INJECTION, POWDER, LYOPHILIZED, FOR SOLUTION INTRAVENOUS at 14:19

## 2021-12-26 RX ADMIN — ATORVASTATIN CALCIUM 40 MILLIGRAM(S): 80 TABLET, FILM COATED ORAL at 22:43

## 2021-12-26 RX ADMIN — POTASSIUM PHOSPHATE, MONOBASIC POTASSIUM PHOSPHATE, DIBASIC 62.5 MILLIMOLE(S): 236; 224 INJECTION, SOLUTION INTRAVENOUS at 19:11

## 2021-12-26 RX ADMIN — LISINOPRIL 10 MILLIGRAM(S): 2.5 TABLET ORAL at 07:27

## 2021-12-26 RX ADMIN — ISOSORBIDE MONONITRATE 30 MILLIGRAM(S): 60 TABLET, EXTENDED RELEASE ORAL at 14:25

## 2021-12-26 NOTE — SWALLOW BEDSIDE ASSESSMENT ADULT - COMMENTS
As per H&P 12/25/21: "81yoM with PMHx of CVA with RT sided weakness, dementia, HTN, CAD s/p stent 10 years ago, PPM, asthma BIBEMS for hematuria."    WBC elevated. XR Chest 12/25/21: 'Clear Lungs'     Patient received upright in bed, AAOx1. Patient communicative and noted with confusion and inconsistent ability to follow 1-step directives from SLP.

## 2021-12-26 NOTE — SWALLOW BEDSIDE ASSESSMENT ADULT - SWALLOW EVAL: DIAGNOSIS
1. Mild/Moderate oral stage deficits given thin liquids, mildly thick liquids and puree marked by adequate bolus containment, prolonged bolus manipulation and prolonged anterior 1. Mild/Moderate oral stage deficits given thin liquids, mildly thick liquids and puree marked by adequate bolus containment, prolonged bolus manipulation marked by inappropriate mastication of puree texture and prolonged anterior-posterior transport. Adequate oral clearance noted. 2. Pharyngeal stage marked by observed initiation of the pharyngeal swallow trigger and hyolaryngeal excursion judged via laryngeal palpation. Patient noted with delayed cough response following thin liquid trials, likely indicative of aspiration. No clinically overt s/sx of aspiration observed following puree and mildly thick liquid trials.

## 2021-12-26 NOTE — PROVIDER CONTACT NOTE (CRITICAL VALUE NOTIFICATION) - ACTION/TREATMENT ORDERED:
Will evaluate to determine treatment.
cont to monitor at this time
no new interventions at this time

## 2021-12-26 NOTE — CONSULT NOTE ADULT - ASSESSMENT
81 year old male with CVA with rt sided weakness, dementia, HTN, CAD s/p stent 10 years ago, PPM, Asthma presenting with hematuria. Remains with valencia, no hematuria currently. In the ED with fever 104.1F, leukocytosis, tachycardia, found to have E. coli bacteremia (CXT-M gene neg). On last admission 12/16 - 12/21/21 patient found to have RLL atelectasis, hematuria, and stercoral colitis.     Recommend:  #Sepsis (Fever, leukocytosis, tachycardia) secondary to E. coli bacteremia from GI vs  source  -Continue zosyn 3.375 grams IV q 8 hours  -Check CT A/P with contrast - evaluate for GI vs  source  -CXR clear  -Valencia without hematuria currently    #Fever  -Monitor fever curve  -Continue abx    #Leukocytosis  -High WBC continue to trend  -continue abx    Richard Holley MD  Pager [phone]  After 5pm/weekends call [phone]   81 year old male with CVA with rt sided weakness, dementia, HTN, CAD s/p stent 10 years ago, PPM, Asthma presenting with hematuria. Remains with valencia, no hematuria currently. In the ED with fever 104.1F, leukocytosis, tachycardia, found to have E. coli bacteremia (CXT-M gene neg). On last admission 12/16 - 12/21/21 patient found to have RLL atelectasis, hematuria, and stercoral colitis.     Recommend:  #Sepsis (Fever, leukocytosis, tachycardia) secondary to E. coli bacteremia from GI vs  source  -Continue zosyn 3.375 grams IV q 8 hours  -Check CT A/P with contrast - evaluate for GI vs  source  -CXR clear  -Valencia without hematuria currently  -F/U blood and urine cultures  -Repeat blood cultures x 2 sets in the am    #Fever  -Monitor fever curve  -Continue abx    #Leukocytosis  -High WBC continue to trend  -continue abx    Richard Holley MD  Pager [phone]  After 5pm/weekends call [phone]

## 2021-12-26 NOTE — CHART NOTE - NSCHARTNOTEFT_GEN_A_CORE
Called by primary team in regard to positive blood culture. Blood culture is positive for E. coli and MRSA. Will add IV vancomycin 1250mg q12hrs and change Zosyn to Rocephin 1gm q24hrs. Plan discussed with primary team.    Discussed with ID attending.      Nicole Lindsey, DO  PGY4 ID fellow  Pager: [phone]  [facility] pager ID: [phone]  Please contact me via page or text through [employer] Teams  If after 5PM or on weekends, please call [phone]
Called by ID Dr. Lindsey ( [phone]), recommend to start pt on Vancomycin 1250 mg IV q 12hrs, rpt blood cx in am, echo - ordered.
ID consulted, will follow up recs

## 2021-12-26 NOTE — PROVIDER CONTACT NOTE (CRITICAL VALUE NOTIFICATION) - SITUATION
Preliminary blood cultures drawn on 12/25 has Gram Negative Rods in Aerobic bottle
anaerobic blood culture bottle has + cocci and clusters

## 2021-12-26 NOTE — PROVIDER CONTACT NOTE (CRITICAL VALUE NOTIFICATION) - BACKGROUND
s/p sepsis from suggestive UTI
Patient currently on Zosyn IVPB.
pt admitted for sepsis, currently on Zosyn IVPB

## 2021-12-27 LAB
-  AMIKACIN: SIGNIFICANT CHANGE UP
-  AMIKACIN: SIGNIFICANT CHANGE UP
-  AMOXICILLIN/CLAVULANIC ACID: SIGNIFICANT CHANGE UP
-  AMPICILLIN/SULBACTAM: SIGNIFICANT CHANGE UP
-  AMPICILLIN: SIGNIFICANT CHANGE UP
-  AMPICILLIN: SIGNIFICANT CHANGE UP
-  AZTREONAM: SIGNIFICANT CHANGE UP
-  AZTREONAM: SIGNIFICANT CHANGE UP
-  CEFAZOLIN: SIGNIFICANT CHANGE UP
-  CEFEPIME: SIGNIFICANT CHANGE UP
-  CEFEPIME: SIGNIFICANT CHANGE UP
-  CEFOXITIN: SIGNIFICANT CHANGE UP
-  CEFOXITIN: SIGNIFICANT CHANGE UP
-  CEFTRIAXONE: SIGNIFICANT CHANGE UP
-  CEFTRIAXONE: SIGNIFICANT CHANGE UP
-  CIPROFLOXACIN: SIGNIFICANT CHANGE UP
-  CIPROFLOXACIN: SIGNIFICANT CHANGE UP
-  CLINDAMYCIN: SIGNIFICANT CHANGE UP
-  DAPTOMYCIN: SIGNIFICANT CHANGE UP
-  ERTAPENEM: SIGNIFICANT CHANGE UP
-  ERTAPENEM: SIGNIFICANT CHANGE UP
-  ERYTHROMYCIN: SIGNIFICANT CHANGE UP
-  GENTAMICIN: SIGNIFICANT CHANGE UP
-  IMIPENEM: SIGNIFICANT CHANGE UP
-  IMIPENEM: SIGNIFICANT CHANGE UP
-  LEVOFLOXACIN: SIGNIFICANT CHANGE UP
-  LEVOFLOXACIN: SIGNIFICANT CHANGE UP
-  LINEZOLID: SIGNIFICANT CHANGE UP
-  MEROPENEM: SIGNIFICANT CHANGE UP
-  MEROPENEM: SIGNIFICANT CHANGE UP
-  NITROFURANTOIN: SIGNIFICANT CHANGE UP
-  OXACILLIN: SIGNIFICANT CHANGE UP
-  PENICILLIN: SIGNIFICANT CHANGE UP
-  PIPERACILLIN/TAZOBACTAM: SIGNIFICANT CHANGE UP
-  PIPERACILLIN/TAZOBACTAM: SIGNIFICANT CHANGE UP
-  RIFAMPIN: SIGNIFICANT CHANGE UP
-  TETRACYCLINE: SIGNIFICANT CHANGE UP
-  TIGECYCLINE: SIGNIFICANT CHANGE UP
-  TOBRAMYCIN: SIGNIFICANT CHANGE UP
-  TOBRAMYCIN: SIGNIFICANT CHANGE UP
-  TRIMETHOPRIM/SULFAMETHOXAZOLE: SIGNIFICANT CHANGE UP
-  VANCOMYCIN: SIGNIFICANT CHANGE UP
ANION GAP SERPL CALC-SCNC: 11 MMOL/L — SIGNIFICANT CHANGE UP (ref 7–14)
BUN SERPL-MCNC: 12 MG/DL — SIGNIFICANT CHANGE UP (ref 7–23)
CALCIUM SERPL-MCNC: 9 MG/DL — SIGNIFICANT CHANGE UP (ref 8.4–10.5)
CHLORIDE SERPL-SCNC: 107 MMOL/L — SIGNIFICANT CHANGE UP (ref 98–107)
CO2 SERPL-SCNC: 21 MMOL/L — LOW (ref 22–31)
CREAT SERPL-MCNC: 0.85 MG/DL — SIGNIFICANT CHANGE UP (ref 0.5–1.3)
CULTURE RESULTS: SIGNIFICANT CHANGE UP
CULTURE RESULTS: SIGNIFICANT CHANGE UP
GLUCOSE SERPL-MCNC: 79 MG/DL — SIGNIFICANT CHANGE UP (ref 70–99)
HCT VFR BLD CALC: 37 % — LOW (ref 39–50)
HGB BLD-MCNC: 12.2 G/DL — LOW (ref 13–17)
MAGNESIUM SERPL-MCNC: 1.8 MG/DL — SIGNIFICANT CHANGE UP (ref 1.6–2.6)
MCHC RBC-ENTMCNC: 30.6 PG — SIGNIFICANT CHANGE UP (ref 27–34)
MCHC RBC-ENTMCNC: 33 GM/DL — SIGNIFICANT CHANGE UP (ref 32–36)
MCV RBC AUTO: 92.7 FL — SIGNIFICANT CHANGE UP (ref 80–100)
METHOD TYPE: SIGNIFICANT CHANGE UP
NRBC # BLD: 0 /100 WBCS — SIGNIFICANT CHANGE UP
NRBC # FLD: 0 K/UL — SIGNIFICANT CHANGE UP
ORGANISM # SPEC MICROSCOPIC CNT: SIGNIFICANT CHANGE UP
PHOSPHATE SERPL-MCNC: 2.1 MG/DL — LOW (ref 2.5–4.5)
PLATELET # BLD AUTO: 150 K/UL — SIGNIFICANT CHANGE UP (ref 150–400)
POTASSIUM SERPL-MCNC: 3.3 MMOL/L — LOW (ref 3.5–5.3)
POTASSIUM SERPL-SCNC: 3.3 MMOL/L — LOW (ref 3.5–5.3)
RBC # BLD: 3.99 M/UL — LOW (ref 4.2–5.8)
RBC # FLD: 12.4 % — SIGNIFICANT CHANGE UP (ref 10.3–14.5)
SODIUM SERPL-SCNC: 139 MMOL/L — SIGNIFICANT CHANGE UP (ref 135–145)
SPECIMEN SOURCE: SIGNIFICANT CHANGE UP
SPECIMEN SOURCE: SIGNIFICANT CHANGE UP
WBC # BLD: 11.89 K/UL — HIGH (ref 3.8–10.5)
WBC # FLD AUTO: 11.89 K/UL — HIGH (ref 3.8–10.5)

## 2021-12-27 PROCEDURE — 99233 SBSQ HOSP IP/OBS HIGH 50: CPT

## 2021-12-27 RX ORDER — SODIUM,POTASSIUM PHOSPHATES 278-250MG
1 POWDER IN PACKET (EA) ORAL ONCE
Refills: 0 | Status: COMPLETED | OUTPATIENT
Start: 2021-12-27 | End: 2021-12-28

## 2021-12-27 RX ORDER — HALOPERIDOL DECANOATE 100 MG/ML
1 INJECTION INTRAMUSCULAR ONCE
Refills: 0 | Status: COMPLETED | OUTPATIENT
Start: 2021-12-27 | End: 2021-12-27

## 2021-12-27 RX ORDER — POTASSIUM CHLORIDE 20 MEQ
20 PACKET (EA) ORAL ONCE
Refills: 0 | Status: COMPLETED | OUTPATIENT
Start: 2021-12-27 | End: 2021-12-28

## 2021-12-27 RX ADMIN — Medication 166.67 MILLIGRAM(S): at 00:51

## 2021-12-27 RX ADMIN — CEFTRIAXONE 100 MILLIGRAM(S): 500 INJECTION, POWDER, FOR SOLUTION INTRAMUSCULAR; INTRAVENOUS at 05:18

## 2021-12-27 RX ADMIN — ISOSORBIDE MONONITRATE 30 MILLIGRAM(S): 60 TABLET, EXTENDED RELEASE ORAL at 13:47

## 2021-12-27 RX ADMIN — HALOPERIDOL DECANOATE 1 MILLIGRAM(S): 100 INJECTION INTRAMUSCULAR at 12:18

## 2021-12-27 RX ADMIN — HEPARIN SODIUM 5000 UNIT(S): 5000 INJECTION INTRAVENOUS; SUBCUTANEOUS at 22:09

## 2021-12-27 RX ADMIN — HEPARIN SODIUM 5000 UNIT(S): 5000 INJECTION INTRAVENOUS; SUBCUTANEOUS at 05:18

## 2021-12-27 RX ADMIN — ATORVASTATIN CALCIUM 40 MILLIGRAM(S): 80 TABLET, FILM COATED ORAL at 22:09

## 2021-12-27 RX ADMIN — HEPARIN SODIUM 5000 UNIT(S): 5000 INJECTION INTRAVENOUS; SUBCUTANEOUS at 13:48

## 2021-12-27 RX ADMIN — Medication 166.67 MILLIGRAM(S): at 13:47

## 2021-12-27 RX ADMIN — Medication 81 MILLIGRAM(S): at 13:47

## 2021-12-27 NOTE — PROGRESS NOTE ADULT - ASSESSMENT
81 year old male with CVA with rt sided weakness, dementia, HTN, CAD s/p stent 10 years ago, PPM, Asthma presenting with hematuria. Remains with valencia, no hematuria currently. In the ED with fever 104.1F, leukocytosis, tachycardia, found to have E. coli  (CXT-M gene neg) and MRSA bacteremia. On last admission 12/16 - 12/21/21 patient found to have RLL atelectasis, hematuria, and stercoral colitis.     Recommend:  #Sepsis (Fever, leukocytosis, tachycardia) secondary to E. coli/ MRSA bacteremia from GI vs  source, unclear source of MRSA bacteremia  -Continue vanco/ ceftriaxone  -Monitor vanco trough closely given his older age  -Check CT A/P with contrast - evaluate for GI vs  source  -CXR clear  -Valencia without hematuria currently  -F/U repeat blood cultures  -EP evaluation in setting of PPM and MRSA bacteremia    #Fever  -Monitor fever curve  -Continue abx    #Leukocytosis  -High WBC now improving - continue to trend  -continue abx    Richard Holley MD  Pager [phone]  After 5pm/weekends call [phone]   81 year old male with CVA with rt sided weakness, dementia, HTN, CAD s/p stent 10 years ago, PPM, Asthma presenting with hematuria. Remains with valencia, no hematuria currently. In the ED with fever 104.1F, leukocytosis, tachycardia, found to have E. coli  (CXT-M gene neg) and MRSA bacteremia. On last admission 12/16 - 12/21/21 patient found to have RLL atelectasis, hematuria, and stercoral colitis.     Recommend:  #Sepsis (Fever, leukocytosis, tachycardia) secondary to E. coli/ MRSA bacteremia from GI vs  source, unclear source of MRSA bacteremia  -Continue vanco/ ceftriaxone  -Monitor vanco trough closely given his older age  -Check CT A/P with contrast - evaluate for GI vs  source  -CXR clear  -Valencia without hematuria currently  -F/U repeat blood cultures  -EP evaluation in setting of PPM and MRSA bacteremia  -TTE    #Fever  -Monitor fever curve  -Continue abx    #Leukocytosis  -High WBC now improving - continue to trend  -continue abx    Richard Holley MD  Pager [phone]  After 5pm/weekends call [phone]

## 2021-12-27 NOTE — PATIENT PROFILE ADULT - FALL HARM RISK - HARM RISK INTERVENTIONS

## 2021-12-28 LAB
-  AMPICILLIN/SULBACTAM: SIGNIFICANT CHANGE UP
-  CEFAZOLIN: SIGNIFICANT CHANGE UP
-  DAPTOMYCIN: SIGNIFICANT CHANGE UP
-  GENTAMICIN: SIGNIFICANT CHANGE UP
-  LINEZOLID: SIGNIFICANT CHANGE UP
-  OXACILLIN: SIGNIFICANT CHANGE UP
-  PENICILLIN: SIGNIFICANT CHANGE UP
-  RIFAMPIN: SIGNIFICANT CHANGE UP
-  TETRACYCLINE: SIGNIFICANT CHANGE UP
-  TRIMETHOPRIM/SULFAMETHOXAZOLE: SIGNIFICANT CHANGE UP
-  VANCOMYCIN: SIGNIFICANT CHANGE UP
ANION GAP SERPL CALC-SCNC: 9 MMOL/L — SIGNIFICANT CHANGE UP (ref 7–14)
BLD GP AB SCN SERPL QL: NEGATIVE — SIGNIFICANT CHANGE UP
BUN SERPL-MCNC: 8 MG/DL — SIGNIFICANT CHANGE UP (ref 7–23)
CALCIUM SERPL-MCNC: 8.9 MG/DL — SIGNIFICANT CHANGE UP (ref 8.4–10.5)
CHLORIDE SERPL-SCNC: 105 MMOL/L — SIGNIFICANT CHANGE UP (ref 98–107)
CO2 SERPL-SCNC: 22 MMOL/L — SIGNIFICANT CHANGE UP (ref 22–31)
CREAT SERPL-MCNC: 0.78 MG/DL — SIGNIFICANT CHANGE UP (ref 0.5–1.3)
CULTURE RESULTS: SIGNIFICANT CHANGE UP
GLUCOSE SERPL-MCNC: 98 MG/DL — SIGNIFICANT CHANGE UP (ref 70–99)
GRAM STN FLD: SIGNIFICANT CHANGE UP
HCT VFR BLD CALC: 34.6 % — LOW (ref 39–50)
HGB BLD-MCNC: 11.6 G/DL — LOW (ref 13–17)
MAGNESIUM SERPL-MCNC: 1.7 MG/DL — SIGNIFICANT CHANGE UP (ref 1.6–2.6)
MCHC RBC-ENTMCNC: 30.4 PG — SIGNIFICANT CHANGE UP (ref 27–34)
MCHC RBC-ENTMCNC: 33.5 GM/DL — SIGNIFICANT CHANGE UP (ref 32–36)
MCV RBC AUTO: 90.8 FL — SIGNIFICANT CHANGE UP (ref 80–100)
METHOD TYPE: SIGNIFICANT CHANGE UP
NRBC # BLD: 0 /100 WBCS — SIGNIFICANT CHANGE UP
NRBC # FLD: 0 K/UL — SIGNIFICANT CHANGE UP
ORGANISM # SPEC MICROSCOPIC CNT: SIGNIFICANT CHANGE UP
PHOSPHATE SERPL-MCNC: 2.3 MG/DL — LOW (ref 2.5–4.5)
PLATELET # BLD AUTO: 157 K/UL — SIGNIFICANT CHANGE UP (ref 150–400)
POTASSIUM SERPL-MCNC: 3.3 MMOL/L — LOW (ref 3.5–5.3)
POTASSIUM SERPL-SCNC: 3.3 MMOL/L — LOW (ref 3.5–5.3)
RBC # BLD: 3.81 M/UL — LOW (ref 4.2–5.8)
RBC # FLD: 12.4 % — SIGNIFICANT CHANGE UP (ref 10.3–14.5)
RH IG SCN BLD-IMP: POSITIVE — SIGNIFICANT CHANGE UP
SARS-COV-2 RNA SPEC QL NAA+PROBE: SIGNIFICANT CHANGE UP
SODIUM SERPL-SCNC: 136 MMOL/L — SIGNIFICANT CHANGE UP (ref 135–145)
SPECIMEN SOURCE: SIGNIFICANT CHANGE UP
SPECIMEN SOURCE: SIGNIFICANT CHANGE UP
VANCOMYCIN TROUGH SERPL-MCNC: 16.4 UG/ML — SIGNIFICANT CHANGE UP (ref 10–20)
WBC # BLD: 7.44 K/UL — SIGNIFICANT CHANGE UP (ref 3.8–10.5)
WBC # FLD AUTO: 7.44 K/UL — SIGNIFICANT CHANGE UP (ref 3.8–10.5)

## 2021-12-28 PROCEDURE — 74177 CT ABD & PELVIS W/CONTRAST: CPT | Mod: 26

## 2021-12-28 PROCEDURE — 99233 SBSQ HOSP IP/OBS HIGH 50: CPT

## 2021-12-28 PROCEDURE — 93280 PM DEVICE PROGR EVAL DUAL: CPT | Mod: 26

## 2021-12-28 PROCEDURE — 93306 TTE W/DOPPLER COMPLETE: CPT | Mod: 26

## 2021-12-28 RX ORDER — POTASSIUM CHLORIDE 20 MEQ
20 PACKET (EA) ORAL ONCE
Refills: 0 | Status: COMPLETED | OUTPATIENT
Start: 2021-12-28 | End: 2021-12-28

## 2021-12-28 RX ADMIN — CEFTRIAXONE 100 MILLIGRAM(S): 500 INJECTION, POWDER, FOR SOLUTION INTRAMUSCULAR; INTRAVENOUS at 04:47

## 2021-12-28 RX ADMIN — Medication 166.67 MILLIGRAM(S): at 16:13

## 2021-12-28 RX ADMIN — HEPARIN SODIUM 5000 UNIT(S): 5000 INJECTION INTRAVENOUS; SUBCUTANEOUS at 16:12

## 2021-12-28 RX ADMIN — Medication 81 MILLIGRAM(S): at 11:36

## 2021-12-28 RX ADMIN — ISOSORBIDE MONONITRATE 30 MILLIGRAM(S): 60 TABLET, EXTENDED RELEASE ORAL at 11:44

## 2021-12-28 RX ADMIN — ATORVASTATIN CALCIUM 40 MILLIGRAM(S): 80 TABLET, FILM COATED ORAL at 22:10

## 2021-12-28 RX ADMIN — Medication 25 MILLIGRAM(S): at 05:02

## 2021-12-28 RX ADMIN — LISINOPRIL 10 MILLIGRAM(S): 2.5 TABLET ORAL at 05:02

## 2021-12-28 RX ADMIN — HEPARIN SODIUM 5000 UNIT(S): 5000 INJECTION INTRAVENOUS; SUBCUTANEOUS at 22:10

## 2021-12-28 RX ADMIN — Medication 166.67 MILLIGRAM(S): at 01:52

## 2021-12-28 RX ADMIN — Medication 1 PACKET(S): at 11:36

## 2021-12-28 RX ADMIN — HEPARIN SODIUM 5000 UNIT(S): 5000 INJECTION INTRAVENOUS; SUBCUTANEOUS at 05:02

## 2021-12-28 RX ADMIN — Medication 20 MILLIEQUIVALENT(S): at 11:37

## 2021-12-28 NOTE — PROVIDER CONTACT NOTE (CRITICAL VALUE NOTIFICATION) - TEST AND RESULT REPORTED:
Blood cultures drawn 12/25: Both aerobic and anaerobic bottle grew gram negative rods.
Gram + cocci in clusters
Blood culture growth in aerobic bottle
anaerobic blood culture bottle has + cocci and clusters

## 2021-12-28 NOTE — CONSULT NOTE ADULT - ASSESSMENT
81 year old male with a PMH HTN, HLD, DMT2, CVA with R sided hemiparesis, dementia, sinus node dysfunction s/p dual chamber PPM ([employer]) 12/2014 by Dr. Fernandes who presents with fever chills and hematuria found to have Sepsis +UTI and MRSA (E.Coli) bacteremia. Initially treated with Zosyn then Vanco and Ceftriaxone per ID.  PPM interrogation revealed underlying rhythm sinus jessie of 50's with A pacing up to 95% and V pacing <0.1%.  Patient is not pacer dependent.     In light of MRSA presence with a PPM, device (include wires) explant is warranted to ensure complete resolution of bacteremia.  Discussed with patient's spouse Nerissa who is aware of potential device explant.   -Will discuss with Dr. Kiser    -Continue treatement plan per ID  - Will follow up with ID/patient's family regarding explantation of PPM ( to be done at [facility])

## 2021-12-28 NOTE — PROGRESS NOTE ADULT - PROBLEM SELECTOR PLAN 7
Chronic. More lethargic now in the setting of urosepsis   -NPO except meds  -Swallow evaluation  -Aspiration precaution   -Outpatient f/u
Chronic. More lethargic now in the setting of urosepsis   -NPO except meds  -Swallow evaluation  -Aspiration precaution   -Outpatient f/u

## 2021-12-28 NOTE — PROGRESS NOTE ADULT - PROBLEM SELECTOR PLAN 5
S/P stent 10 years ago, asymptomatic.  -Cont ASA, Imdur, Metoprolol, Statin
S/P stent 10 years ago, asymptomatic.  -Cont ASA, Imdur, Metoprolol, Statin

## 2021-12-28 NOTE — PROGRESS NOTE ADULT - PROBLEM SELECTOR PLAN 2
Complicated UTI in the setting of bladder sones  -Zosyn as above    ID eval noted
Complicated UTI in the setting of bladder sones  -Zosyn as above    ID eval noted

## 2021-12-28 NOTE — PROGRESS NOTE ADULT - SUBJECTIVE AND OBJECTIVE BOX
CC: Patient is a 81y old  Male who presents with a chief complaint of     ID following for E.coli/ MRSA bacteremia    Interval History/ROS: Patient has no complaints. No fevers, no chills. Remains with valencia, no hematuria. No abd pain.    Rest of ROS negative.    Allergies  No Known Allergies    ANTIMICROBIALS:  cefTRIAXone   IVPB 1000 every 24 hours  vancomycin  IVPB 1250 every 12 hours  vancomycin  IVPB      OTHER MEDS:  acetaminophen     Tablet .. 650 milliGRAM(s) Oral every 6 hours PRN  aluminum hydroxide/magnesium hydroxide/simethicone Suspension 30 milliLiter(s) Oral every 4 hours PRN  aspirin enteric coated 81 milliGRAM(s) Oral daily  atorvastatin 40 milliGRAM(s) Oral at bedtime  heparin   Injectable 5000 Unit(s) SubCutaneous every 8 hours  isosorbide   mononitrate ER Tablet (IMDUR) 30 milliGRAM(s) Oral daily  lisinopril 10 milliGRAM(s) Oral daily  melatonin 3 milliGRAM(s) Oral at bedtime PRN  metoprolol succinate ER 25 milliGRAM(s) Oral daily  ondansetron Injectable 4 milliGRAM(s) IV Push every 8 hours PRN  potassium chloride   Powder 20 milliEquivalent(s) Oral once  potassium chloride   Powder 20 milliEquivalent(s) Oral once  potassium phosphate / sodium phosphate Powder (PHOS-NaK) 1 Packet(s) Oral once  sodium chloride 0.9%. 1000 milliLiter(s) IV Continuous <Continuous>    PE:    Vital Signs Last 24 Hrs  T(C): 36.9 (28 Dec 2021 05:00), Max: 37 (27 Dec 2021 14:03)  T(F): 98.4 (28 Dec 2021 05:00), Max: 98.6 (27 Dec 2021 14:03)  HR: 61 (28 Dec 2021 06:00) (61 - 73)  BP: 154/85 (28 Dec 2021 06:00) (154/85 - 169/97)  BP(mean): --  RR: 18 (28 Dec 2021 05:00) (17 - 18)  SpO2: 99% (28 Dec 2021 05:00) (99% - 100%)    Gen: Awake, alert, NAD  CV: S1+S2 normal, no murmurs, left chest PPM intact  Resp: Clear bilat, no resp distress  Abd: Soft, nontender, +BS  Ext: No LE edema, no wounds  : Valencia  IV/Skin: No thrombophlebitis  Neuro: no focal deficits    LABS:                          11.6   7.44  )-----------( 157      ( 28 Dec 2021 04:53 )             34.6       12-28    136  |  105  |  8   ----------------------------<  98  3.3<L>   |  22  |  0.78    Ca    8.9      28 Dec 2021 04:53  Phos  2.3     12-28  Mg     1.70     12-28            MICROBIOLOGY:  v  .Blood Blood-Peripheral  12-27-21   Growth in aerobic bottle: Gram Positive Cocci in Clusters  --    Growth in aerobic bottle: Gram Positive Cocci in Clusters      Clean Catch Clean Catch (Midstream)  12-25-21   >100,000 CFU/ml Escherichia coli  50,000 - 99,000 CFU/mL Staphylococcus aureus  --  Escherichia coli      .Blood Blood-Peripheral  12-25-21   Growth in aerobic bottle: Escherichia coli See previous culture  [id number]  Growth in aerobic and anaerobic bottles: Methicillin Resistant  Staphylococcus aureus  ***Blood Panel PCR results on this specimen are available  approximately 3 hours after the Gram stain result.***  Gram stain, PCR, and/or culture results may not always  correspond due to difference in methodologies.  ************************************************************  This PCR assay was performed by multiplex PCR. This  Assay tests for 66 bacterial and resistance gene targets.  Please refer to the [facility] test directory  at [email or web address] for details.  --  Blood Culture PCR  Methicillin resistant Staphylococcus aureus      .Blood Blood-Peripheral  12-25-21   Growth in aerobic and anaerobic bottles: Escherichia coli  ***Blood Panel PCR results on this specimen are available  approximately 3 hours after the Gram stain result.***  Gram stain, PCR, and/or culture results may not always  correspond due to difference in methodologies.  ************************************************************  This PCR assay was performed by multiplex PCR. This  Assay tests for 66 bacterial and resistance gene targets.  Please refer to the [facility] test directory  at [email or web address] for details.  --  Blood Culture PCR  Escherichia coli      Catheterized Catheterized  12-16-21   No growth  --  --    Rapid RVP Result: NotDetec (12-25 @ 10:42)    RADIOLOGY:    < from: Xray Chest 1 View AP/PA (12.25.21 @ 11:30) >  IMPRESSION:  Clear lungs.    < end of copied text >  
CC: Patient is a 81y old  Male who presents with a chief complaint of     ID following for bacteremia    Interval History/ROS: Patient with E.coli/ MRSA bacteremia. Has no new complaints. No hematuria. No fevers. Leukocytosis improving.    Rest of ROS negative.    Allergies  No Known Allergies    ANTIMICROBIALS:  cefTRIAXone   IVPB 1000 every 24 hours  vancomycin  IVPB 1250 every 12 hours  vancomycin  IVPB      OTHER MEDS:  acetaminophen     Tablet .. 650 milliGRAM(s) Oral every 6 hours PRN  aluminum hydroxide/magnesium hydroxide/simethicone Suspension 30 milliLiter(s) Oral every 4 hours PRN  aspirin enteric coated 81 milliGRAM(s) Oral daily  atorvastatin 40 milliGRAM(s) Oral at bedtime  haloperidol    Injectable 1 milliGRAM(s) IntraMuscular once  heparin   Injectable 5000 Unit(s) SubCutaneous every 8 hours  isosorbide   mononitrate ER Tablet (IMDUR) 30 milliGRAM(s) Oral daily  lisinopril 10 milliGRAM(s) Oral daily  melatonin 3 milliGRAM(s) Oral at bedtime PRN  metoprolol succinate ER 25 milliGRAM(s) Oral daily  ondansetron Injectable 4 milliGRAM(s) IV Push every 8 hours PRN  potassium chloride   Powder 20 milliEquivalent(s) Oral once  potassium phosphate / sodium phosphate Powder (PHOS-NaK) 1 Packet(s) Oral once  sodium chloride 0.9%. 1000 milliLiter(s) IV Continuous <Continuous>    PE:    Vital Signs Last 24 Hrs  T(C): 36.9 (27 Dec 2021 05:22), Max: 37 (26 Dec 2021 18:51)  T(F): 98.4 (27 Dec 2021 05:22), Max: 98.6 (26 Dec 2021 18:51)  HR: 60 (27 Dec 2021 05:22) (60 - 68)  BP: 108/72 (27 Dec 2021 05:22) (108/72 - 152/88)  BP(mean): --  RR: 18 (27 Dec 2021 05:22) (17 - 19)  SpO2: 100% (27 Dec 2021 05:22) (97% - 100%)    Gen: Awake, alert  CV: S1+S2 normal, no murmurs  Resp: Clear bilat, no resp distress  Abd: Soft, nontender, +BS  Ext: No LE edema, no wounds  : Arreola  IV/Skin: No thrombophlebitis  Neuro: no focal deficits    LABS:                          12.2   11.89 )-----------( 150      ( 27 Dec 2021 07:28 )             37.0       12-27    139  |  107  |  12  ----------------------------<  79  3.3<L>   |  21<L>  |  0.85    Ca    9.0      27 Dec 2021 07:28  Phos  2.1     12-27  Mg     1.80     12-27            MICROBIOLOGY:  v  Clean Catch Clean Catch (Midstream)  12-25-21   >100,000 CFU/ml Escherichia coli  --  --      .Blood Blood-Peripheral  12-25-21   Growth in aerobic and anaerobic bottles: Escherichia coli  ***Blood Panel PCR results on this specimen are available  approximately 3 hours after the Gram stain result.***  Gram stain, PCR, and/or culture results may not always  correspond due to difference in methodologies.  ************************************************************  This PCR assay was performed by multiplex PCR. This  Assay tests for 66 bacterial and resistance gene targets.  Please refer to the [facility] test directory  at [email or web address] for details.  --  Blood Culture PCR      Catheterized Catheterized  12-16-21   No growth  --  --    Rapid RVP Result: NotDetec (12-25 @ 10:42)    RADIOLOGY:    < from: Xray Chest 1 View AP/PA (12.25.21 @ 11:30) >  IMPRESSION:  Clear lungs.    < end of copied text >  
Patient is a 81y old  Male who presents with a chief complaint of     HPI:  Afebrile, no distress.    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    HTN (hypertension)    Diabetes    Hyperlipidemia    Dementia    Cardiac pacemaker  Medtronic (SN: [id number]; Model: [id number])        Review of Systems:   CONSTITUTIONAL: No fever, weight loss, or fatigue  EYES: No eye pain, visual disturbances, or discharge  ENMT:  No difficulty hearing, tinnitus, vertigo; No sinus or throat pain  NECK: No pain or stiffness  BREASTS: No pain, masses, or nipple discharge  RESPIRATORY: No cough, wheezing, chills or hemoptysis; No shortness of breath  CARDIOVASCULAR: No chest pain, palpitations, dizziness, or leg swelling  GASTROINTESTINAL: No abdominal or epigastric pain. No nausea, vomiting, or hematemesis; No diarrhea or constipation. No melena or hematochezia.  GENITOURINARY: No dysuria, frequency, hematuria, or incontinence  NEUROLOGICAL: No headaches, memory loss, loss of strength, numbness, or tremors  SKIN: No itching, burning, rashes, or lesions   LYMPH NODES: No enlarged glands  ENDOCRINE: No heat or cold intolerance; No hair loss  MUSCULOSKELETAL: No joint pain or swelling; No muscle, back, or extremity pain  PSYCHIATRIC: No depression, anxiety, mood swings, or difficulty sleeping  HEME/LYMPH: No easy bruising, or bleeding gums  ALLERY AND IMMUNOLOGIC: No hives or eczema    Allergies    No Known Allergies    Intolerances        Social History:     FAMILY HISTORY:  FH: diabetes mellitus  Mother, Brother    FHx: dementia  Father        MEDICATIONS  (STANDING):  aspirin enteric coated 81 milliGRAM(s) Oral daily  atorvastatin 40 milliGRAM(s) Oral at bedtime  cefTRIAXone   IVPB 1000 milliGRAM(s) IV Intermittent every 24 hours  heparin   Injectable 5000 Unit(s) SubCutaneous every 8 hours  isosorbide   mononitrate ER Tablet (IMDUR) 30 milliGRAM(s) Oral daily  lisinopril 10 milliGRAM(s) Oral daily  metoprolol succinate ER 25 milliGRAM(s) Oral daily  potassium chloride   Powder 20 milliEquivalent(s) Oral once  potassium phosphate / sodium phosphate Powder (PHOS-NaK) 1 Packet(s) Oral once  sodium chloride 0.9%. 1000 milliLiter(s) (75 mL/Hr) IV Continuous <Continuous>  vancomycin  IVPB 1250 milliGRAM(s) IV Intermittent every 12 hours  vancomycin  IVPB        MEDICATIONS  (PRN):  acetaminophen     Tablet .. 650 milliGRAM(s) Oral every 6 hours PRN Temp greater or equal to 38C (100.4F), Mild Pain (1 - 3)  aluminum hydroxide/magnesium hydroxide/simethicone Suspension 30 milliLiter(s) Oral every 4 hours PRN Dyspepsia  melatonin 3 milliGRAM(s) Oral at bedtime PRN Insomnia  ondansetron Injectable 4 milliGRAM(s) IV Push every 8 hours PRN Nausea and/or Vomiting        CAPILLARY BLOOD GLUCOSE      POCT Blood Glucose.: 105 mg/dL (27 Dec 2021 16:58)  POCT Blood Glucose.: 91 mg/dL (27 Dec 2021 12:17)  POCT Blood Glucose.: 71 mg/dL (27 Dec 2021 08:50)    I&O's Summary    26 Dec 2021 07:01  -  27 Dec 2021 07:00  --------------------------------------------------------  IN: 600 mL / OUT: 800 mL / NET: -200 mL    27 Dec 2021 07:01  -  27 Dec 2021 18:53  --------------------------------------------------------  IN: 0 mL / OUT: 700 mL / NET: -700 mL        PHYSICAL EXAM:  Vital Signs Last 24 Hrs  T(C): 37 (27 Dec 2021 14:03), Max: 37 (27 Dec 2021 01:18)  T(F): 98.6 (27 Dec 2021 14:03), Max: 98.6 (27 Dec 2021 01:18)  HR: 73 (27 Dec 2021 14:03) (60 - 73)  BP: 161/90 (27 Dec 2021 14:03) (108/72 - 161/90)  BP(mean): --  RR: 18 (27 Dec 2021 14:03) (18 - 19)  SpO2: 100% (27 Dec 2021 14:03) (98% - 100%)    GENERAL: NAD, well-developed  HEAD:  Atraumatic, Normocephalic  EYES: EOMI, PERRLA, conjunctiva and sclera clear  NECK: Supple, No JVD  CHEST/LUNG: Clear to auscultation bilaterally; No wheeze  HEART: Regular rate and rhythm; No murmurs, rubs, or gallops  ABDOMEN: Soft, Nontender, Nondistended; Bowel sounds present  EXTREMITIES:  2+ Peripheral Pulses, No clubbing, cyanosis, or edema  PSYCH: AAOx3  NEUROLOGY: non-focal  SKIN: No rashes or lesions    LABS:                        12.2   11.89 )-----------( 150      ( 27 Dec 2021 07:28 )             37.0     12-27    139  |  107  |  12  ----------------------------<  79  3.3<L>   |  21<L>  |  0.85    Ca    9.0      27 Dec 2021 07:28  Phos  2.1     12-27  Mg     1.80     12-27                RADIOLOGY & ADDITIONAL TESTS:    Imaging Personally Reviewed:    Consultant(s) Notes Reviewed:      Care Discussed with Consultants/Other Providers:  
Patient is a 81y old  Male who presents with a chief complaint of     HPI:  Afebrile, no distress.  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    HTN (hypertension)    Diabetes    Hyperlipidemia    Dementia    Cardiac pacemaker  Medtronic (SN: [id number]; Model: [id number])        Review of Systems:   CONSTITUTIONAL: No fever, weight loss, or fatigue  EYES: No eye pain, visual disturbances, or discharge  ENMT:  No difficulty hearing, tinnitus, vertigo; No sinus or throat pain  NECK: No pain or stiffness  BREASTS: No pain, masses, or nipple discharge  RESPIRATORY: No cough, wheezing, chills or hemoptysis; No shortness of breath  CARDIOVASCULAR: No chest pain, palpitations, dizziness, or leg swelling  GASTROINTESTINAL: No abdominal or epigastric pain. No nausea, vomiting, or hematemesis; No diarrhea or constipation. No melena or hematochezia.  GENITOURINARY: No dysuria, frequency, hematuria, or incontinence  NEUROLOGICAL: No headaches, memory loss, loss of strength, numbness, or tremors  SKIN: No itching, burning, rashes, or lesions   LYMPH NODES: No enlarged glands  ENDOCRINE: No heat or cold intolerance; No hair loss  MUSCULOSKELETAL: No joint pain or swelling; No muscle, back, or extremity pain  PSYCHIATRIC: No depression, anxiety, mood swings, or difficulty sleeping  HEME/LYMPH: No easy bruising, or bleeding gums  ALLERY AND IMMUNOLOGIC: No hives or eczema    Allergies    No Known Allergies    Intolerances        Social History:     FAMILY HISTORY:  FH: diabetes mellitus  Mother, Brother    FHx: dementia  Father        MEDICATIONS  (STANDING):  aspirin enteric coated 81 milliGRAM(s) Oral daily  atorvastatin 40 milliGRAM(s) Oral at bedtime  cefTRIAXone   IVPB 1000 milliGRAM(s) IV Intermittent every 24 hours  heparin   Injectable 5000 Unit(s) SubCutaneous every 8 hours  isosorbide   mononitrate ER Tablet (IMDUR) 30 milliGRAM(s) Oral daily  lisinopril 10 milliGRAM(s) Oral daily  metoprolol succinate ER 25 milliGRAM(s) Oral daily  potassium chloride   Powder 20 milliEquivalent(s) Oral once  potassium phosphate / sodium phosphate Powder (PHOS-NaK) 1 Packet(s) Oral once  sodium chloride 0.9%. 1000 milliLiter(s) (75 mL/Hr) IV Continuous <Continuous>  vancomycin  IVPB 1250 milliGRAM(s) IV Intermittent every 12 hours  vancomycin  IVPB        MEDICATIONS  (PRN):  acetaminophen     Tablet .. 650 milliGRAM(s) Oral every 6 hours PRN Temp greater or equal to 38C (100.4F), Mild Pain (1 - 3)  aluminum hydroxide/magnesium hydroxide/simethicone Suspension 30 milliLiter(s) Oral every 4 hours PRN Dyspepsia  melatonin 3 milliGRAM(s) Oral at bedtime PRN Insomnia  ondansetron Injectable 4 milliGRAM(s) IV Push every 8 hours PRN Nausea and/or Vomiting        CAPILLARY BLOOD GLUCOSE      POCT Blood Glucose.: 105 mg/dL (27 Dec 2021 16:58)  POCT Blood Glucose.: 91 mg/dL (27 Dec 2021 12:17)  POCT Blood Glucose.: 71 mg/dL (27 Dec 2021 08:50)    I&O's Summary    26 Dec 2021 07:01  -  27 Dec 2021 07:00  --------------------------------------------------------  IN: 600 mL / OUT: 800 mL / NET: -200 mL    27 Dec 2021 07:01  -  27 Dec 2021 18:53  --------------------------------------------------------  IN: 0 mL / OUT: 700 mL / NET: -700 mL        PHYSICAL EXAM:  Vital Signs Last 24 Hrs  T(C): 37 (27 Dec 2021 14:03), Max: 37 (27 Dec 2021 01:18)  T(F): 98.6 (27 Dec 2021 14:03), Max: 98.6 (27 Dec 2021 01:18)  HR: 73 (27 Dec 2021 14:03) (60 - 73)  BP: 161/90 (27 Dec 2021 14:03) (108/72 - 161/90)  BP(mean): --  RR: 18 (27 Dec 2021 14:03) (18 - 19)  SpO2: 100% (27 Dec 2021 14:03) (98% - 100%)    GENERAL: NAD, well-developed  HEAD:  Atraumatic, Normocephalic  EYES: EOMI, PERRLA, conjunctiva and sclera clear  NECK: Supple, No JVD  CHEST/LUNG: Clear to auscultation bilaterally; No wheeze  HEART: Regular rate and rhythm; No murmurs, rubs, or gallops  ABDOMEN: Soft, Nontender, Nondistended; Bowel sounds present  EXTREMITIES:  2+ Peripheral Pulses, No clubbing, cyanosis, or edema  PSYCH: AAOx3  NEUROLOGY: non-focal  SKIN: No rashes or lesions    LABS:                        12.2   11.89 )-----------( 150      ( 27 Dec 2021 07:28 )             37.0     12-27    139  |  107  |  12  ----------------------------<  79  3.3<L>   |  21<L>  |  0.85    Ca    9.0      27 Dec 2021 07:28  Phos  2.1     12-27  Mg     1.80     12-27                RADIOLOGY & ADDITIONAL TESTS:    Imaging Personally Reviewed:    Consultant(s) Notes Reviewed:      Care Discussed with Consultants/Other Providers:

## 2021-12-28 NOTE — PROGRESS NOTE ADULT - PROBLEM/PLAN-8
drugs.    Review of Systems:  · Constitutional: there has been no unanticipated weight loss. There's been No change in energy level, No change in activity level. · Eyes: No visual changes or diplopia. No scleral icterus. · ENT: No Headaches, hearing loss or vertigo. No mouth sores or sore throat. · Cardiovascular: As above. · Respiratory: No SOB, cough or hemoptysis. · Gastrointestinal: No abdominal pain, appetite loss, blood in stools. No change in bowel or bladder habits. · Genitourinary: No dysuria, trouble voiding, or hematuria. · Musculoskeletal:  No gait disturbance, No weakness or joint complaints. · Integumentary: No rash or pruritis. · Psychiatric: No anxiety, or depression. · Hematologic/Lymphatic: No abnormal bruising or bleeding, blood clots or swollen lymph nodes. · Allergic/Immunologic: No nasal congestion or hives. Physical Exam:  /82   Pulse 60   Wt 180 lb (81.6 kg)   BMI 27.17 kg/m²     Constitutional and General Appearance: alert, cooperative, no distress and appears stated age  [de-identified]: PERRL, no cervical lymphadenopathy. No masses palpable. Normal oral mucosa  Respiratory:  · Normal excursion and expansion without use of accessory muscles  · Resp Auscultation: Good respiratory effort. No for increased work of breathing. On auscultation: clear to auscultation bilaterally  Cardiovascular:  · The apical impulse is not displaced  · Heart tones are crisp and normal. regular S1 and S2.  · Jugular venous pulsation Normal  · The carotid upstroke is normal in amplitude and contour without delay or bruit  · Peripheral pulses are symmetrical and full   Abdomen:   · No masses or tenderness  · Bowel sounds present  Extremities:  ·  No Cyanosis or Clubbing  ·  Lower extremity edema: No  ·  Skin: Warm and dry    Cardiac Data:  EKG: NSR T inversion lead 3    Labs:     CBC: No results for input(s): WBC, HGB, HCT, PLT in the last 72 hours.   BMP: No results for input(s): NA, K, CO2, BUN,
DISPLAY PLAN FREE TEXT
DISPLAY PLAN FREE TEXT

## 2021-12-28 NOTE — PROVIDER CONTACT NOTE (OTHER) - RECOMMENDATIONS
PA made aware. PA informed to continue monitoring pt and will give BP medications based on morning BP.

## 2021-12-28 NOTE — CONSULT NOTE ADULT - SUBJECTIVE AND OBJECTIVE BOX
Patient is a 81y old  Male who presents with a chief complaint of     Asked by ID to evaluate for CIED infection in the setting of MRSA bacteremia.     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 Fernandes who presents with fever chills and hematuria found to have Sepsis +UTI and MRSA (E.Coli) bacteremia. Initially treated with Zosyn then Vanco and Ceftriaxone per ID.  PPM interrogation revealed underlying rhythm sinus jessie of 50's with A pacing up to 95% and V pacing <0.1%.  Patient is not pacer dependent.     Patient undergoing a repeat 2D TTE today.  Last echo from 6/12/2021 with LVEF 52%.  Of note, patient was recently hospitalized from 12/16 to 12/21 with hematuria /stercoral colitis and RLL atelectasis. Hematuria resolved now.          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    HTN (hypertension)    Diabetes    Hyperlipidemia    Dementia    Cardiac pacemaker  Medtronic (SN: [id number]; Model: 29926)        MEDICATIONS  (STANDING):  aspirin enteric coated 81 milliGRAM(s) Oral daily  atorvastatin 40 milliGRAM(s) Oral at bedtime  cefTRIAXone   IVPB 1000 milliGRAM(s) IV Intermittent every 24 hours  heparin   Injectable 5000 Unit(s) SubCutaneous every 8 hours  isosorbide   mononitrate ER Tablet (IMDUR) 30 milliGRAM(s) Oral daily  lisinopril 10 milliGRAM(s) Oral daily  metoprolol succinate ER 25 milliGRAM(s) Oral daily  sodium chloride 0.9%. 1000 milliLiter(s) (75 mL/Hr) IV Continuous <Continuous>  vancomycin  IVPB 1250 milliGRAM(s) IV Intermittent every 12 hours  vancomycin  IVPB        MEDICATIONS  (PRN):  acetaminophen     Tablet .. 650 milliGRAM(s) Oral every 6 hours PRN Temp greater or equal to 38C (100.4F), Mild Pain (1 - 3)  aluminum hydroxide/magnesium hydroxide/simethicone Suspension 30 milliLiter(s) Oral every 4 hours PRN Dyspepsia  melatonin 3 milliGRAM(s) Oral at bedtime PRN Insomnia  ondansetron Injectable 4 milliGRAM(s) IV Push every 8 hours PRN Nausea and/or Vomiting    Allergies    No Known Allergies    Intolerances      FAMILY HISTORY:  FH: diabetes mellitus  Mother, Brother    FHx: dementia  Father        SOCIAL HISTORY:  Denies smoking; no   Alcohol  or  Drug abuse     REVIEW OF SYSTEMS:    CONSTITUTIONAL: No weight loss,  shakes, or fatigue +chills, +fever  EYES: No eye pain, visual disturbances, or discharge  ENMT:  No difficulty hearing, tinnitus, vertigo; No sinus or throat pain  NECK: No pain or stiffness  RESPIRATORY: No cough, wheezing, hemoptysis, or shortness of breath  CARDIOVASCULAR: No chest pain, dyspnea, palpitations, dizziness, syncope, paroxysmal nocturnal dyspnea, orthopnea, or arm or leg swelling  GASTROINTESTINAL: No abdominal  or epigastric pain, nausea, vomiting, hematemesis, diarrhea, constipation, melena or bright red blood.  GENITOURINARY: No dysuria, nocturia, or urinary incontinence +hematuria   NEUROLOGICAL: No headaches, memory loss, slurred speech, limb weakness, loss of strength, numbness, or tremors  +dementia  SKIN: No itching, burning, rashes, or lesions   LYMPH NODES: No enlarged glands  ENDOCRINE: No heat or cold intolerance, or hair loss  MUSCULOSKELETAL: No joint pain or swelling, muscle, back, or extremity pain  PSYCHIATRIC: No depression, anxiety, or difficulty sleeping  HEME/LYMPH: No easy bruising or bleeding gums  ALLERY AND IMMUNOLOGIC: No hives or rash.      Vital Signs Last 24 Hrs  T(C): 36.6 (28 Dec 2021 11:42), Max: 37 (27 Dec 2021 14:03)  T(F): 97.9 (28 Dec 2021 11:42), Max: 98.6 (27 Dec 2021 14:03)  HR: 63 (28 Dec 2021 11:42) (61 - 73)  BP: 154/83 (28 Dec 2021 11:42) (154/83 - 169/97)  BP(mean): --  RR: 18 (28 Dec 2021 11:42) (17 - 18)  SpO2: 100% (28 Dec 2021 11:42) (99% - 100%)    PHYSICAL EXAM:    GENERAL: In no apparent distress, well nourished, and hydrated.  HEAD:  Atraumatic, Normocephalic  NECK: Supple . No JVD or carotid bruit or thyroidmegaly.  Carotid pulse is 2+ bilaterally.  PULMONARY: Clear to auscultation and perfusion.  No rales, wheezing, or rhonchi bilaterally.  HEART: Regular rate and rhythm; No murmurs, rubs, or gallops.  L PPM site no swelling   ABDOMEN: Soft, Nontender, Nondistended; Bowel sounds present  EXTREMITIES: No clubbing, cyanosis, or edema  RUE contracted   NEUROLOGICAL: Alert oriented to person,  Speech slurred  +R sided hemiparesis.  Skin: Dry intact, no rashes or lesions.          INTERPRETATION OF TELEMETRY:  not on telemetry    ECG: NSR, RBBB, LAFB, bifascicular block        LABS:                        11.6   7.44  )-----------( 157      ( 28 Dec 2021 04:53 )             34.6     12-28    136  |  105  |  8   ----------------------------<  98  3.3<L>   |  22  |  0.78    Ca    8.9      28 Dec 2021 04:53  Phos  2.3     12-28  Mg     1.70     12-28  Gram Stain:   Growth in aerobic bottle: Gram Positive Cocci in Clusters (12.27.21 @ 11:27)  Culture - Blood (12.25.21 @ 12:10)    -  Vancomycin: S 1    -  Trimethoprim/Sulfamethoxazole: S <=0.5/9.5    -  Methicillin resistant Staphylococcus aureus (MRSA): Detec    Gram Stain:   Growth in aerobic bottle: Gram Negative Rods  Growth in anaerobic bottle: Gram Positive Cocci in Clusters    -  Ampicillin/Sulbactam: R <=8/4    -  Cefazolin: R 8    -  Rifampin: S <=1 Should not be used as monotherapy    -  Tetra/Doxy: S <=1    -  Linezolid: S 2    -  Oxacillin: R >2    -  Penicillin: R >8    -  Erythromycin: R >4    -  Gentamicin: S <=1 Should not be used as monotherapy    -  Clindamycin: S <=0.25    -  Daptomycin: S 0.5    Specimen Source: .Blood Blood-Peripheral    Organism: Blood Culture PCR    Organism: Methicillin resistant Staphylococcus aureus    Culture Results:   Growth in aerobic bottle: Escherichia coli See previous culture  [id number]  Growth in aerobic and anaerobic bottles: Methicillin Resistant  Staphylococcus aureus  ***Blood Panel PCR results on this specimen are available  approximately 3 hours after the Gram stain result.***  Gram stain, PCR, and/or culture results may not always  correspond due to difference in methodologies.  ************************************************************  This PCR assay was performed by multiplex PCR. This  Assay tests for 66 bacterial and resistance gene targets.  Please refer to the [facility] Labs test directory  at [email or web address] for details.    Organism Identification: Blood Culture PCR  Methicillin resistant Staphylococcus aureus    Method Type: PCR    Method Type: ZAIN            BNP  RADIOLOGY & ADDITIONAL STUDIES:  PROCEDURE DATE:  12/25/2021          INTERPRETATION:  EXAMINATION: XR CHEST    CLINICAL INDICATION: Chills, lethargy, hematuria, CVA with right-sided   weakness    TECHNIQUE: Singlefrontal, portable view of the chest was obtained.    COMPARISON: Chest x-ray 6/23/2021.    FINDINGS:  Chest wall dual-lead pacemaker with leads unchanged in position.  The cardiomediastinal silhouette is unchanged.  The lungs are clear.  There is no pneumothorax or pleural effusion.  Patient's contracted right hand partially superimposes right shoulder    IMPRESSION:  Clear lungs.    --- End of Report ---          RYLEY BRADSHAW MD; Resident Radiologist  This document has been electronically signed.  ABIGAIL SNIDER MD; Attending Radiologist  This document has been electronically signed. Dec 25 2021 11:55AM      PREVIOUS DIAGNOSTIC TESTING:      ECHO FINDINGS: 6/12/2021  CONCLUSIONS:  1. Mitral annular calcification, otherwise normal mitral  valve.  2. Calcified trileaflet aortic valve with normal opening.  Minimal aortic regurgitation.  3. Normal left ventricular internal dimensions and wall  thicknesses.  4. Low normal left ventricular systolic function. No  segmental wall motion abnormalities.  5. Normal right ventricular size with decreased right  ventricular systolic function. Device wire is noted in the  right heart.  ------------------------------------------------------------------------      STRESS FINDINGS:    CATHETERIZATION FINDINGS:      
HPI:  81yoM with PMHx of CVA with RT sided weakness, dementia, HTN, CAD s/p stent 10 years ago, PPM, asthma BIBEMS for hematuria. Pt unable to give hx, called wife initially no answer. Wife provided the information. EMS states wife called for hematuria since this morning at 7am. Per wife, patient having chills, bleeding from penis, lethargic and breathing heavily since this AM. Also had diarrhea for 1 week, unable to quantify amount, stopped on Wednesday. Baseline AOx1, bedridden, has HHA 7 days wk/24 hrs day, unable to do ADLs for himself. Per wife, was at baseline yesterday. Pt was admitted to [facility] from 21 to 21, co then was hematuria. Labs wnl, attempted a ct urogram on 21 but pt did not stay still. CT A/P showed stereocolitis, RLL consolidation and multiple bladder stones, was monitored off antibiotics and discharged with outpatient follow-up. Pt spiked a fever of 104 in ED. Currently, pt has a Valencia with coudy urine in bag, no signs of hematuria.   Per wife, pt was fully vaccinated for Covid but not got booster yet.      Patient seen and examined at bedside. States has no complaints. Remains with valencia, no hematuria. No abd pain. No cough. Blood culture with E. coli.    PAST MEDICAL & SURGICAL HISTORY:  CVA (cerebral infarction)   with residual right sided weakness    HTN (hypertension)    Diabetes    Hyperlipidemia    Dementia    Cardiac pacemaker  Medtronic (SN: [id number]; Model: [id number])      Allergies    No Known Allergies    Intolerances    ANTIMICROBIALS:  piperacillin/tazobactam IVPB.. 3.375 every 8 hours    OTHER MEDS:  acetaminophen     Tablet .. 650 milliGRAM(s) Oral every 6 hours PRN  aluminum hydroxide/magnesium hydroxide/simethicone Suspension 30 milliLiter(s) Oral every 4 hours PRN  aspirin enteric coated 81 milliGRAM(s) Oral daily  atorvastatin 40 milliGRAM(s) Oral at bedtime  heparin   Injectable 5000 Unit(s) SubCutaneous every 8 hours  isosorbide   mononitrate ER Tablet (IMDUR) 30 milliGRAM(s) Oral daily  lisinopril 10 milliGRAM(s) Oral daily  melatonin 3 milliGRAM(s) Oral at bedtime PRN  metoprolol succinate ER 25 milliGRAM(s) Oral daily  ondansetron Injectable 4 milliGRAM(s) IV Push every 8 hours PRN  sodium chloride 0.9%. 1000 milliLiter(s) IV Continuous <Continuous>    SOCIAL HISTORY: Denies smoking, alcohol, drug use.    FAMILY HISTORY:  FH: diabetes mellitus  Mother, Brother    FHx: dementia  Father    Drug Dosing Weight  Height (cm): 167.6 (25 Dec 2021 09:36)  Weight (kg): 81.4 (25 Dec 2021 20:32)  BMI (kg/m2): 29 (25 Dec 2021 20:32)  BSA (m2): 1.91 (25 Dec 2021 20:32)    PE:    Vital Signs Last 24 Hrs  T(C): 36.8 (26 Dec 2021 10:28), Max: 38.7 (25 Dec 2021 13:00)  T(F): 98.3 (26 Dec 2021 10:28), Max: 101.7 (25 Dec 2021 13:00)  HR: 61 (26 Dec 2021 10:28) (61 - 96)  BP: 145/85 (26 Dec 2021 10:28) (109/78 - 145/85)  BP(mean): --  RR: 17 (26 Dec 2021 10:28) (16 - 18)  SpO2: 97% (26 Dec 2021 10:28) (95% - 100%)    Gen: AOx3, NAD, non-toxic, pleasant  CV: S1+S2 normal, no murmurs, left chest cardiac device  Resp: Clear bilat, no resp distress  Abd: Soft, nontender, +BS  Ext: No LE edema, no wounds  : Valencia no hematuria  IV/Skin: No thrombophlebitis  Msk: No low back pain, no arthralgias, no joint swelling  Neuro: No sensory deficits, no motor deficits    LABS:                          11.9   18.24 )-----------( 159      ( 26 Dec 2021 08:01 )             35.2       12-    142  |  108<H>  |  16  ----------------------------<  80  3.5   |  22  |  1.01    Ca    9.0      26 Dec 2021 08:01  Phos  2.3       Mg     1.80     12-26    TPro  6.8  /  Alb  3.5  /  TBili  1.5<H>  /  DBili  x   /  AST  35  /  ALT  24  /  AlkPhos  200<H>        Urinalysis Basic - ( 25 Dec 2021 10:41 )    Color: Light Orange / Appearance: Turbid / S.015 / pH: x  Gluc: x / Ketone: Negative  / Bili: Negative / Urobili: 3 mg/dL   Blood: x / Protein: 30 mg/dL / Nitrite: Positive   Leuk Esterase: Large / RBC: 164 /HPF / WBC >720 /HPF   Sq Epi: x / Non Sq Epi: 1 /HPF / Bacteria: Many    MICROBIOLOGY:  v  .Blood Blood-Peripheral  21   Aerobic and Anaerobic Bottle: Gram Negative Rods  ***Blood Panel PCR results on this specimen are available  approximately 3 hours after the Gram stain result.***  Gram stain, PCR, and/or culture results may not always  correspond due to differencein methodologies.  ************************************************************  This PCR assay was performed by multiplex PCR. This  Assay tests for 66 bacterial and resistance gene targets.  Please refer to the [facility] Labs test directory  at [email or web address] for details.  --  Blood Culture PCR    Catheterized Catheterized  21   No growth  --  --    Rapid RVP Result: NotDetec ( @ 10:42)    RADIOLOGY:    < from: Xray Chest 1 View AP/PA (21 @ 11:30) >  IMPRESSION:  Clear lungs.    < end of copied text >

## 2021-12-28 NOTE — PROGRESS NOTE ADULT - PROBLEM SELECTOR PLAN 4
Seen by Urology during last admission  -F/U with  as an outpatient
Seen by Urology during last admission  -F/U with  as an outpatient

## 2021-12-28 NOTE — PROCEDURE NOTE - INTERROGATION NOTE: COMMENTS
Normal sensing pacing via iterative testing, excellent threshold capture, no reprogramming.  2 episodes of PAT with RVR lasted 1-2 seconds on 12/27.

## 2021-12-28 NOTE — PROVIDER CONTACT NOTE (OTHER) - RECOMMENDATIONS
HPI Comments: Patient presents with continued pain in the throat radiating to the right ear. Patient was seen yesterday for same discharge diagnosis viral pharyngitis prescription for Motrin 600 mg. Patient denies any fever complains of pain with swallowing review of systems otherwise negative he has taken nothing for the pain today other than some Zyrtec    Patient is a 27 y.o. male presenting with sore throat. The history is provided by the patient. Sore Throat    This is a new problem. The current episode started 2 days ago. The problem has not changed since onset. There has been no fever. Associated symptoms include ear pain. Pertinent negatives include no vomiting, no congestion, no drooling, no ear discharge, no headaches, no plugged ear sensation, no shortness of breath, no stridor, no swollen glands, no trouble swallowing, no stiff neck and no cough. He has had no exposure to strep. He has tried nothing for the symptoms. The treatment provided no relief. History reviewed. No pertinent past medical history. History reviewed. No pertinent surgical history. History reviewed. No pertinent family history. Social History     Social History    Marital status: SINGLE     Spouse name: N/A    Number of children: N/A    Years of education: N/A     Occupational History    Not on file. Social History Main Topics    Smoking status: Never Smoker    Smokeless tobacco: Never Used    Alcohol use Yes      Comment: occa    Drug use: No    Sexual activity: Not on file     Other Topics Concern    Not on file     Social History Narrative         ALLERGIES: Review of patient's allergies indicates no known allergies. Review of Systems   HENT: Positive for ear pain and sore throat. Negative for congestion, drooling, ear discharge and trouble swallowing. Respiratory: Negative for cough, shortness of breath and stridor. Gastrointestinal: Negative for vomiting.    Neurological: Negative for headaches. All other systems reviewed and are negative. Vitals:    04/27/17 1840   BP: 100/63   Pulse: 88   Resp: 18   Temp: 99.1 °F (37.3 °C)   SpO2: 99%   Weight: 54.4 kg (120 lb)   Height: 5' 10\" (1.778 m)            Physical Exam   Constitutional: He is oriented to person, place, and time. He appears well-developed and well-nourished. No distress. HENT:   Head: Normocephalic and atraumatic. Right Ear: External ear normal.   Left Ear: External ear normal.   Mouth/Throat: No oropharyngeal exudate. Inflammations noted to the right side of the pharynx on the anterior tonsillar pillar no evidence of abscess or cellulitis is noted however   Eyes: Conjunctivae and EOM are normal. Pupils are equal, round, and reactive to light. Neck: Normal range of motion. Neck supple. Cardiovascular: Normal rate. Musculoskeletal: Normal range of motion. Lymphadenopathy:     He has no cervical adenopathy. Neurological: He is alert and oriented to person, place, and time. Skin: Skin is warm and dry. Psychiatric: He has a normal mood and affect. His behavior is normal.   Nursing note and vitals reviewed.        MDM  Number of Diagnoses or Management Options  Viral pharyngitis:      Amount and/or Complexity of Data Reviewed  Clinical lab tests: reviewed    Risk of Complications, Morbidity, and/or Mortality  Presenting problems: low  Diagnostic procedures: minimal  Management options: low    Patient Progress  Patient progress: stable    ED Course       Procedures Team made aware. recommended some IVF; IV medications due to poor PO intake. Also suggested I check BP after oral meds given.

## 2021-12-28 NOTE — CONSULT NOTE ADULT - ATTENDING COMMENTS
81 year old male with a PMHx HTN, HLD, DM2, CVA with R sided hemiparesis, dementia, sinus node dysfunction s/p dual chamber PPM (Medtronic) 12/2014 by Dr. Fernandes who presents with fever chills and hematuria found to have Sepsis +UTI and MRSA (E.Coli) bacteremia. He will need extraction of his PPM. Plan discussed with his wife who is agreeable. Plan to transfer the patient to [facility] in the AM for extraction tomorrow afternoon. Underlying sinus rhythm in the high 50s.

## 2021-12-28 NOTE — PROGRESS NOTE ADULT - PROBLEM SELECTOR PLAN 1
E Coli sepsis is improving. Repeat blood culture to look for clearance of bacteremia.
E Coli sepsis is improving. Repeat blood culture to look for clearance of bacteremia.  PPM needs to be extracted in view of bacteremia. To be transferred to [facility]

## 2021-12-28 NOTE — PROGRESS NOTE ADULT - ASSESSMENT
81 year old male with CVA with rt sided weakness, dementia, HTN, CAD s/p stent 10 years ago, PPM, Asthma presenting with hematuria. Remains with valencia, no hematuria currently. In the ED with fever 104.1F, leukocytosis, tachycardia, found to have E. coli  (CXT-M gene neg) and MRSA bacteremia. On last admission 12/16 - 12/21/21 patient found to have RLL atelectasis, hematuria, and stercoral colitis.     Blood cxs:  12/25 - E. coli  12/25 - E. coli and MRSA  12/27 - GPC clusters    Recommend:  #Sepsis (Fever, leukocytosis, tachycardia) secondary to E. coli/ MRSA bacteremia from GI vs  source, unclear source of MRSA bacteremia  -Continue vanco/ ceftriaxone  -Monitor vanco trough closely given his older age  -Check CT A/P with contrast - evaluate for GI vs  source  -CXR clear  -Valencia without hematuria currently  -F/U repeat blood cultures in lab  -F/U ID of 12/27 GPC clusters  -EP evaluation in setting of PPM and MRSA bacteremia  -TTE    #Fever  -Monitor fever curve  -Continue abx    #Leukocytosis  -High WBC now resolved  -continue abx    Richard Holley MD  Pager [phone]  After 5pm/weekends call [phone]

## 2021-12-28 NOTE — PROVIDER CONTACT NOTE (OTHER) - ASSESSMENT
Pt remained stable;  no s/s of distress/discomfort/ or pain. Pt has been sleeping throughout shift. Very poor intake at this time. Moving in bed at times; no issues trying to get up; gets agitated at times. Remained calm and comfortable.
Pt remains stable. Agitated at times. No sign of distress or pain.

## 2021-12-29 ENCOUNTER — INPATIENT (INPATIENT)
Facility: HOSPITAL | Age: 81
LOS: 6 days | Discharge: HOME HEALTH | DRG: 260 | End: 2022-01-05
Attending: INTERNAL MEDICINE | Admitting: INTERNAL MEDICINE
Payer: COMMERCIAL

## 2021-12-29 ENCOUNTER — TRANSCRIPTION ENCOUNTER (OUTPATIENT)
Age: 81
End: 2021-12-29

## 2021-12-29 VITALS
RESPIRATION RATE: 18 BRPM | SYSTOLIC BLOOD PRESSURE: 175 MMHG | OXYGEN SATURATION: 98 % | DIASTOLIC BLOOD PRESSURE: 97 MMHG | TEMPERATURE: 98 F | HEART RATE: 61 BPM

## 2021-12-29 VITALS — HEIGHT: 65.98 IN | WEIGHT: 179.46 LBS

## 2021-12-29 DIAGNOSIS — Z95.0 PRESENCE OF CARDIAC PACEMAKER: Chronic | ICD-10-CM

## 2021-12-29 DIAGNOSIS — R78.81 BACTEREMIA: ICD-10-CM

## 2021-12-29 DIAGNOSIS — A41.02 SEPSIS DUE TO METHICILLIN RESISTANT STAPHYLOCOCCUS AUREUS: ICD-10-CM

## 2021-12-29 DIAGNOSIS — N39.0 URINARY TRACT INFECTION, SITE NOT SPECIFIED: ICD-10-CM

## 2021-12-29 DIAGNOSIS — Z95.5 PRESENCE OF CORONARY ANGIOPLASTY IMPLANT AND GRAFT: Chronic | ICD-10-CM

## 2021-12-29 LAB
ANION GAP SERPL CALC-SCNC: 10 MMOL/L — SIGNIFICANT CHANGE UP (ref 7–14)
BLD GP AB SCN SERPL QL: NEGATIVE — SIGNIFICANT CHANGE UP
BLD GP AB SCN SERPL QL: POSITIVE — SIGNIFICANT CHANGE UP
BUN SERPL-MCNC: 6 MG/DL — LOW (ref 7–23)
CALCIUM SERPL-MCNC: 9.4 MG/DL — SIGNIFICANT CHANGE UP (ref 8.4–10.5)
CHLORIDE SERPL-SCNC: 106 MMOL/L — SIGNIFICANT CHANGE UP (ref 98–107)
CO2 SERPL-SCNC: 24 MMOL/L — SIGNIFICANT CHANGE UP (ref 22–31)
CREAT SERPL-MCNC: 0.79 MG/DL — SIGNIFICANT CHANGE UP (ref 0.5–1.3)
CULTURE RESULTS: SIGNIFICANT CHANGE UP
GLUCOSE BLDC GLUCOMTR-MCNC: 84 MG/DL — SIGNIFICANT CHANGE UP (ref 70–99)
GLUCOSE SERPL-MCNC: 73 MG/DL — SIGNIFICANT CHANGE UP (ref 70–99)
HCT VFR BLD CALC: 35.8 % — LOW (ref 39–50)
HGB BLD-MCNC: 12.5 G/DL — LOW (ref 13–17)
MAGNESIUM SERPL-MCNC: 1.8 MG/DL — SIGNIFICANT CHANGE UP (ref 1.6–2.6)
MCHC RBC-ENTMCNC: 30.7 PG — SIGNIFICANT CHANGE UP (ref 27–34)
MCHC RBC-ENTMCNC: 34.9 GM/DL — SIGNIFICANT CHANGE UP (ref 32–36)
MCV RBC AUTO: 88 FL — SIGNIFICANT CHANGE UP (ref 80–100)
NRBC # BLD: 0 /100 WBCS — SIGNIFICANT CHANGE UP
NRBC # FLD: 0 K/UL — SIGNIFICANT CHANGE UP
PHOSPHATE SERPL-MCNC: 2.8 MG/DL — SIGNIFICANT CHANGE UP (ref 2.5–4.5)
PLATELET # BLD AUTO: 191 K/UL — SIGNIFICANT CHANGE UP (ref 150–400)
POTASSIUM SERPL-MCNC: 3.3 MMOL/L — LOW (ref 3.5–5.3)
POTASSIUM SERPL-SCNC: 3.3 MMOL/L — LOW (ref 3.5–5.3)
RBC # BLD: 4.07 M/UL — LOW (ref 4.2–5.8)
RBC # FLD: 12.5 % — SIGNIFICANT CHANGE UP (ref 10.3–14.5)
RH IG SCN BLD-IMP: POSITIVE — SIGNIFICANT CHANGE UP
RH IG SCN BLD-IMP: POSITIVE — SIGNIFICANT CHANGE UP
SODIUM SERPL-SCNC: 140 MMOL/L — SIGNIFICANT CHANGE UP (ref 135–145)
SPECIMEN SOURCE: SIGNIFICANT CHANGE UP
VANCOMYCIN TROUGH SERPL-MCNC: 20.1 UG/ML — HIGH (ref 10–20)
WBC # BLD: 5.39 K/UL — SIGNIFICANT CHANGE UP (ref 3.8–10.5)
WBC # FLD AUTO: 5.39 K/UL — SIGNIFICANT CHANGE UP (ref 3.8–10.5)

## 2021-12-29 PROCEDURE — 99232 SBSQ HOSP IP/OBS MODERATE 35: CPT

## 2021-12-29 PROCEDURE — 33235 REMOVAL PACEMAKER ELECTRODE: CPT

## 2021-12-29 PROCEDURE — 93010 ELECTROCARDIOGRAM REPORT: CPT | Mod: 77

## 2021-12-29 PROCEDURE — 99222 1ST HOSP IP/OBS MODERATE 55: CPT

## 2021-12-29 PROCEDURE — 93010 ELECTROCARDIOGRAM REPORT: CPT

## 2021-12-29 PROCEDURE — 33233 REMOVAL OF PM GENERATOR: CPT

## 2021-12-29 RX ORDER — ATORVASTATIN CALCIUM 80 MG/1
40 TABLET, FILM COATED ORAL AT BEDTIME
Refills: 0 | Status: DISCONTINUED | OUTPATIENT
Start: 2021-12-29 | End: 2022-01-05

## 2021-12-29 RX ORDER — ONDANSETRON 8 MG/1
4 TABLET, FILM COATED ORAL ONCE
Refills: 0 | Status: DISCONTINUED | OUTPATIENT
Start: 2021-12-29 | End: 2021-12-29

## 2021-12-29 RX ORDER — ISOSORBIDE MONONITRATE 60 MG/1
1 TABLET, EXTENDED RELEASE ORAL
Qty: 0 | Refills: 0 | DISCHARGE
Start: 2021-12-29

## 2021-12-29 RX ORDER — LABETALOL HCL 100 MG
10 TABLET ORAL ONCE
Refills: 0 | Status: COMPLETED | OUTPATIENT
Start: 2021-12-29 | End: 2021-12-29

## 2021-12-29 RX ORDER — ACETAMINOPHEN 500 MG
650 TABLET ORAL EVERY 6 HOURS
Refills: 0 | Status: DISCONTINUED | OUTPATIENT
Start: 2021-12-29 | End: 2022-01-05

## 2021-12-29 RX ORDER — SODIUM CHLORIDE 9 MG/ML
75 INJECTION INTRAMUSCULAR; INTRAVENOUS; SUBCUTANEOUS
Qty: 0 | Refills: 0 | DISCHARGE
Start: 2021-12-29

## 2021-12-29 RX ORDER — LISINOPRIL 2.5 MG/1
10 TABLET ORAL DAILY
Refills: 0 | Status: DISCONTINUED | OUTPATIENT
Start: 2021-12-29 | End: 2022-01-05

## 2021-12-29 RX ORDER — CEFTRIAXONE 500 MG/1
1000 INJECTION, POWDER, FOR SOLUTION INTRAMUSCULAR; INTRAVENOUS EVERY 24 HOURS
Refills: 0 | Status: DISCONTINUED | OUTPATIENT
Start: 2021-12-29 | End: 2021-12-29

## 2021-12-29 RX ORDER — HEPARIN SODIUM 5000 [USP'U]/ML
5000 INJECTION INTRAVENOUS; SUBCUTANEOUS
Qty: 0 | Refills: 0 | DISCHARGE
Start: 2021-12-29

## 2021-12-29 RX ORDER — CEFTRIAXONE 500 MG/1
2000 INJECTION, POWDER, FOR SOLUTION INTRAMUSCULAR; INTRAVENOUS EVERY 24 HOURS
Refills: 0 | Status: DISCONTINUED | OUTPATIENT
Start: 2021-12-30 | End: 2022-01-04

## 2021-12-29 RX ORDER — VANCOMYCIN HCL 1 G
1250 VIAL (EA) INTRAVENOUS
Qty: 0 | Refills: 0 | DISCHARGE
Start: 2021-12-29

## 2021-12-29 RX ORDER — METOPROLOL TARTRATE 50 MG
1 TABLET ORAL
Qty: 0 | Refills: 0 | DISCHARGE

## 2021-12-29 RX ORDER — ACETAMINOPHEN 500 MG
1000 TABLET ORAL ONCE
Refills: 0 | Status: DISCONTINUED | OUTPATIENT
Start: 2021-12-29 | End: 2022-01-05

## 2021-12-29 RX ORDER — ATORVASTATIN CALCIUM 80 MG/1
1 TABLET, FILM COATED ORAL
Qty: 0 | Refills: 0 | DISCHARGE
Start: 2021-12-29

## 2021-12-29 RX ORDER — CEFTRIAXONE 500 MG/1
1000 INJECTION, POWDER, FOR SOLUTION INTRAMUSCULAR; INTRAVENOUS EVERY 24 HOURS
Refills: 0 | Status: COMPLETED | OUTPATIENT
Start: 2021-12-29 | End: 2021-12-29

## 2021-12-29 RX ORDER — LANOLIN ALCOHOL/MO/W.PET/CERES
1 CREAM (GRAM) TOPICAL
Qty: 0 | Refills: 0 | DISCHARGE
Start: 2021-12-29

## 2021-12-29 RX ORDER — SODIUM CHLORIDE 9 MG/ML
3 INJECTION INTRAMUSCULAR; INTRAVENOUS; SUBCUTANEOUS EVERY 8 HOURS
Refills: 0 | Status: DISCONTINUED | OUTPATIENT
Start: 2021-12-29 | End: 2022-01-05

## 2021-12-29 RX ORDER — FENTANYL CITRATE 50 UG/ML
25 INJECTION INTRAVENOUS
Refills: 0 | Status: DISCONTINUED | OUTPATIENT
Start: 2021-12-29 | End: 2021-12-29

## 2021-12-29 RX ORDER — VANCOMYCIN HCL 1 G
1000 VIAL (EA) INTRAVENOUS EVERY 12 HOURS
Refills: 0 | Status: DISCONTINUED | OUTPATIENT
Start: 2021-12-29 | End: 2021-12-30

## 2021-12-29 RX ORDER — VANCOMYCIN HCL 1 G
1000 VIAL (EA) INTRAVENOUS ONCE
Refills: 0 | Status: DISCONTINUED | OUTPATIENT
Start: 2021-12-29 | End: 2021-12-29

## 2021-12-29 RX ORDER — HYDROMORPHONE HYDROCHLORIDE 2 MG/ML
0.25 INJECTION INTRAMUSCULAR; INTRAVENOUS; SUBCUTANEOUS
Refills: 0 | Status: DISCONTINUED | OUTPATIENT
Start: 2021-12-29 | End: 2021-12-29

## 2021-12-29 RX ORDER — ISOSORBIDE MONONITRATE 60 MG/1
30 TABLET, EXTENDED RELEASE ORAL DAILY
Refills: 0 | Status: DISCONTINUED | OUTPATIENT
Start: 2021-12-29 | End: 2022-01-05

## 2021-12-29 RX ORDER — METOPROLOL TARTRATE 50 MG
1 TABLET ORAL
Qty: 0 | Refills: 0 | DISCHARGE
Start: 2021-12-29

## 2021-12-29 RX ORDER — BENAZEPRIL HYDROCHLORIDE 40 MG/1
1 TABLET ORAL
Qty: 0 | Refills: 0 | DISCHARGE

## 2021-12-29 RX ORDER — POTASSIUM CHLORIDE 20 MEQ
10 PACKET (EA) ORAL
Refills: 0 | Status: COMPLETED | OUTPATIENT
Start: 2021-12-29 | End: 2021-12-29

## 2021-12-29 RX ORDER — ASPIRIN/CALCIUM CARB/MAGNESIUM 324 MG
81 TABLET ORAL DAILY
Refills: 0 | Status: DISCONTINUED | OUTPATIENT
Start: 2021-12-29 | End: 2022-01-05

## 2021-12-29 RX ORDER — METOPROLOL TARTRATE 50 MG
25 TABLET ORAL DAILY
Refills: 0 | Status: DISCONTINUED | OUTPATIENT
Start: 2021-12-29 | End: 2022-01-05

## 2021-12-29 RX ORDER — LANOLIN ALCOHOL/MO/W.PET/CERES
3 CREAM (GRAM) TOPICAL AT BEDTIME
Refills: 0 | Status: DISCONTINUED | OUTPATIENT
Start: 2021-12-29 | End: 2022-01-05

## 2021-12-29 RX ORDER — ACETAMINOPHEN 500 MG
2 TABLET ORAL
Qty: 0 | Refills: 0 | DISCHARGE
Start: 2021-12-29

## 2021-12-29 RX ORDER — CEFTRIAXONE 500 MG/1
1000 INJECTION, POWDER, FOR SOLUTION INTRAMUSCULAR; INTRAVENOUS
Qty: 0 | Refills: 0 | DISCHARGE
Start: 2021-12-29

## 2021-12-29 RX ORDER — ASPIRIN/CALCIUM CARB/MAGNESIUM 324 MG
1 TABLET ORAL
Qty: 0 | Refills: 0 | DISCHARGE
Start: 2021-12-29

## 2021-12-29 RX ORDER — LISINOPRIL 2.5 MG/1
1 TABLET ORAL
Qty: 0 | Refills: 0 | DISCHARGE
Start: 2021-12-29

## 2021-12-29 RX ORDER — ONDANSETRON 8 MG/1
4 TABLET, FILM COATED ORAL
Qty: 0 | Refills: 0 | DISCHARGE
Start: 2021-12-29

## 2021-12-29 RX ORDER — POTASSIUM CHLORIDE 20 MEQ
40 PACKET (EA) ORAL ONCE
Refills: 0 | Status: DISCONTINUED | OUTPATIENT
Start: 2021-12-29 | End: 2021-12-29

## 2021-12-29 RX ORDER — VANCOMYCIN HCL 1 G
1000 VIAL (EA) INTRAVENOUS EVERY 12 HOURS
Refills: 0 | Status: DISCONTINUED | OUTPATIENT
Start: 2021-12-29 | End: 2021-12-29

## 2021-12-29 RX ADMIN — SODIUM CHLORIDE 3 MILLILITER(S): 9 INJECTION INTRAMUSCULAR; INTRAVENOUS; SUBCUTANEOUS at 22:02

## 2021-12-29 RX ADMIN — HYDROMORPHONE HYDROCHLORIDE 0.25 MILLIGRAM(S): 2 INJECTION INTRAMUSCULAR; INTRAVENOUS; SUBCUTANEOUS at 20:40

## 2021-12-29 RX ADMIN — LISINOPRIL 10 MILLIGRAM(S): 2.5 TABLET ORAL at 05:15

## 2021-12-29 RX ADMIN — FENTANYL CITRATE 25 MICROGRAM(S): 50 INJECTION INTRAVENOUS at 20:49

## 2021-12-29 RX ADMIN — SODIUM CHLORIDE 3 MILLILITER(S): 9 INJECTION INTRAMUSCULAR; INTRAVENOUS; SUBCUTANEOUS at 13:08

## 2021-12-29 RX ADMIN — FENTANYL CITRATE 25 MICROGRAM(S): 50 INJECTION INTRAVENOUS at 20:09

## 2021-12-29 RX ADMIN — Medication 100 MILLIEQUIVALENT(S): at 09:46

## 2021-12-29 RX ADMIN — Medication 10 MILLIGRAM(S): at 20:03

## 2021-12-29 RX ADMIN — CEFTRIAXONE 100 MILLIGRAM(S): 500 INJECTION, POWDER, FOR SOLUTION INTRAMUSCULAR; INTRAVENOUS at 23:09

## 2021-12-29 RX ADMIN — CEFTRIAXONE 100 MILLIGRAM(S): 500 INJECTION, POWDER, FOR SOLUTION INTRAMUSCULAR; INTRAVENOUS at 05:16

## 2021-12-29 RX ADMIN — Medication 100 MILLIEQUIVALENT(S): at 13:32

## 2021-12-29 RX ADMIN — Medication 100 MILLIEQUIVALENT(S): at 11:29

## 2021-12-29 RX ADMIN — Medication 25 MILLIGRAM(S): at 05:15

## 2021-12-29 RX ADMIN — Medication 250 MILLIGRAM(S): at 20:12

## 2021-12-29 RX ADMIN — HEPARIN SODIUM 5000 UNIT(S): 5000 INJECTION INTRAVENOUS; SUBCUTANEOUS at 05:15

## 2021-12-29 NOTE — CONSULT NOTE ADULT - EXTERNAL EAR L
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   Patient is present today in the office at the request of Dr. Del Angel for evaluation and consultation of Left nasal papilloma  normal

## 2021-12-29 NOTE — CONSULT NOTE ADULT - PROBLEM SELECTOR RECOMMENDATION 2
-repeat bcx  -MRSA/E. coli in cx  -cont vanco/CTX  -cont CTX 2 gm iv q24 + vancomycin1  gm iv q12  -likely  source  -picc

## 2021-12-29 NOTE — DISCHARGE NOTE NURSING/CASE MANAGEMENT/SOCIAL WORK - PATIENT PORTAL LINK FT
You can access the FollowMyHealth Patient Portal offered by [facility] by registering at the following website: [email or web address]. By joining [employer]’s FollowMyHealth portal, you will also be able to view your health information using other applications (apps) compatible with our system.

## 2021-12-29 NOTE — H&P CARDIOLOGY - NSICDXPASTMEDICALHX_GEN_ALL_CORE_FT
PAST MEDICAL HISTORY:  Asthma     CAD (coronary artery disease) s/p stent placement    CVA (cerebral infarction) Residual right sided weakness, 1998    Dementia     DM (diabetes mellitus)     HLD (hyperlipidemia)     HTN (hypertension)     Sinus node dysfunction s/p Medtronic PPM

## 2021-12-29 NOTE — DISCHARGE NOTE NURSING/CASE MANAGEMENT/SOCIAL WORK - NSDCPEFALRISK_GEN_ALL_CORE
For information on Fall & Injury Prevention, visit: [email or web address] OR  [email or web address] OR  https://www.cdc.gov/steadi/patient.html

## 2021-12-29 NOTE — ASU PREOP CHECKLIST - ANTIBIOTIC
· Presented with LUQ abdominal pain, nausea and vomiting x 2 weeks associated with 10 lb weight loss   · CT abdomen/pelvis revealed left-sided UPJ obstructing stone, moderate hiatal hernia with findings suggestive of reflux, diverticulosis without diverticulitis, cholelithiasis without cholecystitis   · Appreciate GI consult and recommendations   · Deferred EGD at this time and will arrange for outpatient follow-up   · Resolved s/p urologic intervention, plan to follow-up with Urology and GI after discharge pt on antibiotics for sepsis pt on antibiotics for sepsis/n/a

## 2021-12-29 NOTE — DISCHARGE NOTE PROVIDER - NSDCMRMEDTOKEN_GEN_ALL_CORE_FT
acetaminophen 325 mg oral tablet: 2 tab(s) orally every 6 hours, As needed, Temp greater or equal to 38C (100.4F), Mild Pain (1 - 3)  aluminum hydroxide-magnesium hydroxide 200 mg-200 mg/5 mL oral suspension: 30 milliliter(s) orally every 4 hours, As needed, Dyspepsia  aspirin 81 mg oral delayed release tablet: 1 tab(s) orally once a day  atorvastatin 40 mg oral tablet: 1 tab(s) orally once a day (at bedtime)  cefTRIAXone: 1000 milligram(s) intravenous once a day  heparin: 5000 unit(s) subcutaneous every 8 hours  isosorbide mononitrate 30 mg oral tablet, extended release: 1 tab(s) orally once a day  lisinopril 10 mg oral tablet: 1 tab(s) orally once a day  melatonin 3 mg oral tablet: 1 tab(s) orally once a day (at bedtime), As needed, Insomnia  metoprolol succinate 25 mg oral tablet, extended release: 1 tab(s) orally once a day  ondansetron 2 mg/mL injectable solution: 4 milligram(s) injectable every 8 hours, As Needed for nausea  sodium chloride 0.9% injectable solution: 75 milliliter(s) intravenous once a day  vancomycin: 1250 milligram(s) intravenous every 12 hours

## 2021-12-29 NOTE — PRE-ANESTHESIA EVALUATION ADULT - NSANTHPMHFT_GEN_ALL_CORE
80 y/o male with a PMHx of CAD s/p stent placement, CVA with residual right sided weakness and right upper extremity contracture, sinus node dysfunction s/p PPM, HTN, HLD, DM, asthma and dementia presents as a transfer from [facility] for pacemaker extraction. Pt is a poor historian and wife did not answer phone call. Pt initially presented to [facility] with hematuria, chills and lethargy. Pt was found to be febrile with leukocytosis in the setting of sepsis. Subsequent blood cultures revealing E. coli and MRSA. Pt was seen by ID and started on Ceftriaxone and Vancomycin for likely GI vs  source. Pt was seen by EP and pacemaker interrogation revealed sinus bradycardia; patient is not pacer dependent. In light of MRSA presence with a pacemaker, a device explant (including wires) is warranted to ensure complete resolution of bacteremia. Pt is now transferred to [facility] for pacemaker explant. Pt only admits to mild suprapubic pain at this time and otherwise denies headache, dizziness, visual deficits, chest pain, shortness of breath, orthopnea, palpitations, N/V/D/C, hematochezia, melena, syncope, peripheral edema.

## 2021-12-29 NOTE — CHART NOTE - NSCHARTNOTEFT_GEN_A_CORE
DESHAWN TIWARI  [id number]    PROCEDURE:  Pacemaker and Lead Extraction      INDICATION:  Endocarditis      ELECTROPHYSIOLOGIST(S):  Dr. Altman  Fellow Dr. Banegas      ANESTHESIOLOGY:  GA, local, HARIKA by Anesthesia service      FINDINGS:  - RFV access x1, Brdige balloon advanced over wire to SVC and tested then withdrawn down to IVC while wire remained in Rt. IJ  - LFV access x1, TVP advanced to RVOT for backup pacing  - pocket opened, did not look infected, screws retracted, leads cut and with manual traction they were withdrawn out of the RA and RV but there was significant lead-lead binding and the insulation started to break while lead distal segments are in the SVC. We then used 16 Fr. laser sheath to go over both leads and then using VIZI sheath over the laser sheath we were able to remove all the leads. No hemodynamic changes, No pericardial effusion. Hemostasis for vascular access site was achieved by retention suture.   - We used Surgicel and VISTASEAL to achieve hemostasis in the pocket then the pocket was closed with Nylon 3-0 and pressure dressing applied.      COMPLICATIONS:  none      RECOMMENDATIONS:  - CXR, ECG  - continue Abx per ID  - monitor b/l groins  - NO anti-coagulation, NO Lovenox, NO Heparin

## 2021-12-29 NOTE — DISCHARGE NOTE PROVIDER - HOSPITAL COURSE
81yoM with PMHx of CVA with RT sided weakness, dementia, HTN, CAD s/p stent 10 years ago, PPM, asthma BIBEMS for hematuria. (+) Fver of 104 in ED. UA gross positive, suggestive of UTI    Sepsis, unspecified organism.   E Coli sepsis is improving. Repeat blood culture to look for clearance of bacteremia.  PPM needs to be extracted in view of bacteremia. To be transferred to [facility].     Acute UTI.   Complicated UTI in the setting of bladder sones  -Zosyn as above    ID eval noted.    Gross hematuria.   Resolved.  Cont to monitor.    Bladder stone.   Seen by Urology during last admission  -F/U with  as an outpatient.    CAD (coronary artery disease).   S/P stent 10 years ago, asymptomatic.  -Cont ASA, Imdur, Metoprolol, Statin.    Benign essential HTN.    Cont Metoprolol, ACE Inhibitor   Monitor BP.    Dementia.   Chronic. More lethargic now in the setting of urosepsis   -NPO except meds  -Swallow evaluation  -Aspiration precaution   -Outpatient f/u.    H/O: CVA (cerebrovascular accident).   With right hemiparesis   -Cont. ASA, Statin.    DVT prophylaxis.   SQ heparin.    On 12/29/21, case was discussed with , patient is being transferred to [facility] for continued management.

## 2021-12-29 NOTE — H&P CARDIOLOGY - RADIOLOGY RESULTS AND INTERPRETATION
12/28 CT abdomen and pelvis: Moderate rectal stool burden and findings suggestive of stercoral colitis, mildly improved from the prior exam. Diffuse bladder wall thickening, which may related to underdistention. Bladder calculi.

## 2021-12-29 NOTE — H&P CARDIOLOGY - NSICDXFAMILYHX_GEN_ALL_CORE_FT
Viral pneumonia 2/2 COVID- see plan above  -Reswab in 03/31/2020, pt is from [facility]  -d/c CTX as low prob of CAP given low procal and elevated markers for COVID now with COVID + PCR    #Acute hypoxic respiratory failure- currently requiring   - supplemental O2, goal of 95% spo2 and above    #SARS-Coronavirus- COVID PCR positive   -see plan above FAMILY HISTORY:  FH: diabetes mellitus, Mother, Brother  FHx: dementia, Father

## 2021-12-29 NOTE — H&P CARDIOLOGY - NSICDXPASTSURGICALHX_GEN_ALL_CORE_FT
PAST SURGICAL HISTORY:  Cardiac pacemaker Medtronic (SN: [id number]; Model: 45994)    S/P coronary artery stent placement

## 2021-12-29 NOTE — H&P CARDIOLOGY - NEGATIVE NEUROLOGICAL SYMPTOMS
no transient paralysis/no paresthesias/no generalized seizures/no focal seizures/no syncope/no vertigo/no loss of sensation/no difficulty walking/no headache/no loss of consciousness/no hemiparesis/no confusion/no facial palsy

## 2021-12-29 NOTE — DISCHARGE NOTE PROVIDER - NSDCCPCAREPLAN_GEN_ALL_CORE_FT
PRINCIPAL DISCHARGE DIAGNOSIS  Diagnosis: Severe sepsis  Assessment and Plan of Treatment: You are being treated with antibiotics for your sepsis. You are being transfered to [facility] for possible removal of your pacemaker.      SECONDARY DISCHARGE DIAGNOSES  Diagnosis: Acute UTI  Assessment and Plan of Treatment: You were started on antibiotics for a urinary infection. To help prevent future infections maintain healthy hygiene and avoid taking long baths. Wipe from front to back and empty your bladder frequently by keeping yourself properly hydrated. Monitor for signs/symptoms of infection, such as, fever/chills, burning/pain with urination, urinary frequency/hesitancy, cloudy urine, or blood in urine and follow-up with your primary care provider as outpatient for further care.    Diagnosis: Benign essential HTN  Assessment and Plan of Treatment: Continue current blood pressure medication regimen as directed. Monitor for any visual changes, headaches or dizziness.  Monitor blood pressure regularly.  Follow up with your PCP for further management for high blood pressure, please call to make appointment within 1 week of discharge    Diagnosis: CAD (coronary artery disease)  Assessment and Plan of Treatment: Continue medications as ordered, do not stop unless instructed by your physician.  Continue low salt, fat, cholesterol and carbohydrate diet. Follow up with cardiologist and primary care physician'safety checks completed every hour. patient safety maintained. routine appointment.

## 2021-12-29 NOTE — CONSULT NOTE ADULT - PROBLEM SELECTOR RECOMMENDATION 9
-likely from UTI  -cont vancomycin 1 gm iv q12  -cont CTX for E. coli   -improving  -for PPM extraction today  -possible IE

## 2021-12-29 NOTE — PATIENT PROFILE ADULT - DOES PATIENT HAVE ADVANCE DIRECTIVE
unable to determine at this time: wife Nerissa number in chart as main contact unable to determine at this time: wife Nerissa number in chart as main contact/Yes

## 2021-12-29 NOTE — H&P CARDIOLOGY - OTHER
12/28 Echo: Endocardium not well visualized; grossly normal left ventricular systolic function. A device wire is noted in the right heart. Normal right ventricular size and function. Compared with echocardiogram of 6/12/2021, no significant changes noted.

## 2021-12-29 NOTE — PATIENT PROFILE ADULT - FALL HARM RISK - HARM RISK INTERVENTIONS

## 2021-12-29 NOTE — CONSULT NOTE ADULT - ASSESSMENT
81 year old male with CVA with rt sided weakness, dementia, HTN, CAD s/p stent 10 years ago, PPM, Asthma presenting with hematuria. Remains with valencia, no hematuria currently. In the ED with fever 104.1F, leukocytosis, tachycardia, found to have E. coli  (CXT-M gene neg) and MRSA bacteremia. On last admission 12/16 - 12/21/21 patient found to have RLL atelectasis, hematuria, and stercoral colitis.     Blood cxs:  12/25 - E. coli  12/25 - E. coli and MRSA  12/27 - GPC clusters    Shan Light  Attending Physician   Division of Infectious Disease  Pager #[phone]  Available on Microsoft Teams also  After 5pm/weekend or no response, call #[phone]

## 2021-12-29 NOTE — DISCHARGE NOTE PROVIDER - PROVIDER TOKENS
FREE:[LAST:[Transfer to [facility] 12/29],PHONE:[(   )    -],FAX:[(   )    -]],PROVIDER:[TOKEN:[[id number]],FOLLOWUP:[Routine]]

## 2021-12-29 NOTE — CONSULT NOTE ADULT - SUBJECTIVE AND OBJECTIVE BOX
DESHAWN TIWARI 81y Male  MRN-[id number]    Patient is a 81y old  Male who presents with a chief complaint of     HPI:  82 y/o male with a PMHx of CAD s/p stent placement, CVA with residual right sided weakness and right upper extremity contracture, sinus node dysfunction s/p PPM, HTN, HLD, DM, asthma and dementia presents as a transfer from [facility] for pacemaker extraction. Pt is a poor historian and wife did not answer phone call. Pt initially presented to [facility] with hematuria, chills and lethargy. Pt was found to be febrile with leukocytosis in the setting of sepsis. Subsequent blood cultures revealing E. coli and MRSA. Pt was seen by ID and started on Ceftriaxone and Vancomycin for likely GI vs  source. Pt was seen by EP and pacemaker interrogation revealed sinus bradycardia; patient is not pacer dependent. In light of MRSA presence with a pacemaker, a device explant (including wires) is warranted to ensure complete resolution of bacteremia. Pt is now transferred to [facility] for pacemaker explant. Pt only admits to mild suprapubic pain at this time and otherwise denies headache, dizziness, visual deficits, chest pain, shortness of breath, orthopnea, palpitations, N/V/D/C, hematochezia, melena, syncope, peripheral edema. (29 Dec 2021 07:53)    ID called for bacteremia    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, 1998    HTN (hypertension)    Dementia    HLD (hyperlipidemia)    DM (diabetes mellitus)    Asthma    CAD (coronary artery disease)  s/p stent placement    Sinus node dysfunction  s/p Medtronic PPM    Cardiac pacemaker  Medtronic (SN: [id number]; Model: 63654)    S/P coronary artery stent placement        Allergies    No Known Allergies    Intolerances        ANTIMICROBIALS:  cefTRIAXone   IVPB 1000 every 24 hours  vancomycin  IVPB 1000 every 12 hours      MEDICATIONS  (STANDING):  aspirin enteric coated 81 milliGRAM(s) Oral daily  atorvastatin 40 milliGRAM(s) Oral at bedtime  cefTRIAXone   IVPB 1000 milliGRAM(s) IV Intermittent every 24 hours  isosorbide   mononitrate ER Tablet (IMDUR) 30 milliGRAM(s) Oral daily  lisinopril 10 milliGRAM(s) Oral daily  metoprolol succinate ER 25 milliGRAM(s) Oral daily  potassium chloride  10 mEq/100 mL IVPB 10 milliEquivalent(s) IV Intermittent every 1 hour  sodium chloride 0.9% lock flush 3 milliLiter(s) IV Push every 8 hours  vancomycin  IVPB 1000 milliGRAM(s) IV Intermittent every 12 hours      Social History  Smoking:  Etoh:  Drug use:      FAMILY HISTORY:  FH: diabetes mellitus  Mother, Brother    FHx: dementia  Father        Vital Signs Last 24 Hrs  T(C): 36.9 (29 Dec 2021 08:45), Max: 36.9 (29 Dec 2021 04:49)  T(F): 98.5 (29 Dec 2021 08:45), Max: 98.5 (29 Dec 2021 08:45)  HR: 67 (29 Dec 2021 08:45) (60 - 67)  BP: 186/104 (29 Dec 2021 08:45) (140/100 - 186/104)  BP(mean): 126 (29 Dec 2021 08:45) (126 - 126)  RR: 19 (29 Dec 2021 08:45) (18 - 19)  SpO2: 100% (29 Dec 2021 08:45) (98% - 100%)    CBC Full  -  ( 29 Dec 2021 07:00 )  WBC Count : 5.39 K/uL  RBC Count : 4.07 M/uL  Hemoglobin : 12.5 g/dL  Hematocrit : 35.8 %  Platelet Count - Automated : 191 K/uL  Mean Cell Volume : 88.0 fL  Mean Cell Hemoglobin : 30.7 pg  Mean Cell Hemoglobin Concentration : 34.9 gm/dL  Auto Neutrophil # : x  Auto Lymphocyte # : x  Auto Monocyte # : x  Auto Eosinophil # : x  Auto Basophil # : x  Auto Neutrophil % : x  Auto Lymphocyte % : x  Auto Monocyte % : x  Auto Eosinophil % : x  Auto Basophil % : x    12-29    140  |  106  |  6<L>  ----------------------------<  73  3.3<L>   |  24  |  0.79    Ca    9.4      29 Dec 2021 07:00  Phos  2.8     12-29  Mg     1.80     12-29            MICROBIOLOGY:  .Blood Blood-Peripheral  12-27-21   Growth in aerobic bottle: Methicillin Resistant Staphylococcus aureus  See previous culture [id number]  --    Growth in aerobic bottle: Gram Positive Cocci in Clusters      .Blood Blood-Peripheral  12-25-21   Growth in aerobic bottle: Escherichia coli See previous culture  [id number]  Growth in aerobic and anaerobic bottles: Methicillin Resistant  Staphylococcus aureus  ***Blood Panel PCR results on this specimen are available  approximately 3 hours after the Gram stain result.***  Gram stain, PCR, and/or culture results may not always  correspond due to difference in methodologies.  ************************************************************  This PCR assay was performed by multiplex PCR. This  Assay tests for 66 bacterial and resistance gene targets.  Please refer to the [facility] test directory  at [email or web address] for details.  --  Blood Culture PCR  Methicillin resistant Staphylococcus aureus      Clean Catch Clean Catch (Midstream)  12-25-21   >100,000 CFU/ml Escherichia coli  50,000 - 99,000 CFU/mL Methicillin Resistant Staphylococcus aureus  --  Escherichia coli  Methicillin resistant Staphylococcus aureus      .Blood Blood-Peripheral  12-25-21   Growth in aerobic and anaerobic bottles: Escherichia coli  ***Blood Panel PCR results on this specimen are available  approximately 3 hours after the Gram stain result.***  Gram stain, PCR, and/or culture results may not always  correspond due to difference in methodologies.  ************************************************************  This PCR assay was performed by multiplex PCR. This  Assay tests for 66 bacterial and resistance gene targets.  Please refer to the [facility] test directory  at [email or web address] for details.  --  Blood Culture PCR  Escherichia coli      Catheterized Catheterized  12-16-21   No growth  --  --              Vancomycin Level, Trough: 20.1 ug/mL (12-29-21 @ 03:43)  Vancomycin Level, Trough: 16.4 ug/mL (12-28-21 @ 14:44)  v    Rapid RVP Result: NotDetec (12-25 @ 10:42)            RADIOLOGY  < from: CT Abdomen and Pelvis w/ IV Cont (12.28.21 @ 17:54) >  Moderate rectal stool burden and findings suggestive of stercoral   colitis, mildly improved from the prior exam.    Diffuse bladder wall thickening, which may related to underdistention.   Correlate with urinalysis if clinical concern for cystitis.    Bladder calculi.    < end of copied text >  < from: Xray Chest 1 View AP/PA (12.25.21 @ 11:30) >  Clear lungs.    < end of copied text >   DESHAWN TIWARI 81y Male  MRN-[id number]    Patient is a 81y old  Male who presents with a chief complaint of     HPI:  82 y/o male with a PMHx of CAD s/p stent placement, CVA with residual right sided weakness and right upper extremity contracture, sinus node dysfunction s/p PPM, HTN, HLD, DM, asthma and dementia presents as a transfer from [facility] for pacemaker extraction. Pt is a poor historian and wife did not answer phone call. Pt initially presented to [facility] with hematuria, chills and lethargy. Pt was found to be febrile with leukocytosis in the setting of sepsis. Subsequent blood cultures revealing E. coli and MRSA. Pt was seen by ID and started on Ceftriaxone and Vancomycin for likely GI vs  source. Pt was seen by EP and pacemaker interrogation revealed sinus bradycardia; patient is not pacer dependent. In light of MRSA presence with a pacemaker, a device explant (including wires) is warranted to ensure complete resolution of bacteremia. Pt is now transferred to [facility] for pacemaker explant. Pt only admits to mild suprapubic pain at this time and otherwise denies headache, dizziness, visual deficits, chest pain, shortness of breath, orthopnea, palpitations, N/V/D/C, hematochezia, melena, syncope, peripheral edema. (29 Dec 2021 07:53)    ID called for bacteremia. Pt planned for PPM extraction today     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, 1998    HTN (hypertension)    Dementia    HLD (hyperlipidemia)    DM (diabetes mellitus)    Asthma    CAD (coronary artery disease)  s/p stent placement    Sinus node dysfunction  s/p Medtronic PPM    Cardiac pacemaker  Medtronic (SN: [id number]; Model: [id number])    S/P coronary artery stent placement        Allergies    No Known Allergies    Intolerances        ANTIMICROBIALS:  cefTRIAXone   IVPB 1000 every 24 hours  vancomycin  IVPB 1000 every 12 hours      MEDICATIONS  (STANDING):  aspirin enteric coated 81 milliGRAM(s) Oral daily  atorvastatin 40 milliGRAM(s) Oral at bedtime  cefTRIAXone   IVPB 1000 milliGRAM(s) IV Intermittent every 24 hours  isosorbide   mononitrate ER Tablet (IMDUR) 30 milliGRAM(s) Oral daily  lisinopril 10 milliGRAM(s) Oral daily  metoprolol succinate ER 25 milliGRAM(s) Oral daily  potassium chloride  10 mEq/100 mL IVPB 10 milliEquivalent(s) IV Intermittent every 1 hour  sodium chloride 0.9% lock flush 3 milliLiter(s) IV Push every 8 hours  vancomycin  IVPB 1000 milliGRAM(s) IV Intermittent every 12 hours      Social History  Smoking: no  Etoh: no  Drug use: no      FAMILY HISTORY:  FH: diabetes mellitus  Mother, Brother    FHx: dementia  Father        Vital Signs Last 24 Hrs  T(C): 36.9 (29 Dec 2021 08:45), Max: 36.9 (29 Dec 2021 04:49)  T(F): 98.5 (29 Dec 2021 08:45), Max: 98.5 (29 Dec 2021 08:45)  HR: 67 (29 Dec 2021 08:45) (60 - 67)  BP: 186/104 (29 Dec 2021 08:45) (140/100 - 186/104)  BP(mean): 126 (29 Dec 2021 08:45) (126 - 126)  RR: 19 (29 Dec 2021 08:45) (18 - 19)  SpO2: 100% (29 Dec 2021 08:45) (98% - 100%)    CBC Full  -  ( 29 Dec 2021 07:00 )  WBC Count : 5.39 K/uL  RBC Count : 4.07 M/uL  Hemoglobin : 12.5 g/dL  Hematocrit : 35.8 %  Platelet Count - Automated : 191 K/uL  Mean Cell Volume : 88.0 fL  Mean Cell Hemoglobin : 30.7 pg  Mean Cell Hemoglobin Concentration : 34.9 gm/dL  Auto Neutrophil # : x  Auto Lymphocyte # : x  Auto Monocyte # : x  Auto Eosinophil # : x  Auto Basophil # : x  Auto Neutrophil % : x  Auto Lymphocyte % : x  Auto Monocyte % : x  Auto Eosinophil % : x  Auto Basophil % : x    12-29    140  |  106  |  6<L>  ----------------------------<  73  3.3<L>   |  24  |  0.79    Ca    9.4      29 Dec 2021 07:00  Phos  2.8     12-29  Mg     1.80     12-29            MICROBIOLOGY:  .Blood Blood-Peripheral  12-27-21   Growth in aerobic bottle: Methicillin Resistant Staphylococcus aureus  See previous culture [id number]  --    Growth in aerobic bottle: Gram Positive Cocci in Clusters      .Blood Blood-Peripheral  12-25-21   Growth in aerobic bottle: Escherichia coli See previous culture  [id number]  Growth in aerobic and anaerobic bottles: Methicillin Resistant  Staphylococcus aureus  ***Blood Panel PCR results on this specimen are available  approximately 3 hours after the Gram stain result.***  Gram stain, PCR, and/or culture results may not always  correspond due to difference in methodologies.  ************************************************************  This PCR assay was performed by multiplex PCR. This  Assay tests for 66 bacterial and resistance gene targets.  Please refer to the [facility] test directory  at [email or web address] for details.  --  Blood Culture PCR  Methicillin resistant Staphylococcus aureus      Clean Catch Clean Catch (Midstream)  12-25-21   >100,000 CFU/ml Escherichia coli  50,000 - 99,000 CFU/mL Methicillin Resistant Staphylococcus aureus  --  Escherichia coli  Methicillin resistant Staphylococcus aureus      .Blood Blood-Peripheral  12-25-21   Growth in aerobic and anaerobic bottles: Escherichia coli  ***Blood Panel PCR results on this specimen are available  approximately 3 hours after the Gram stain result.***  Gram stain, PCR, and/or culture results may not always  correspond due to difference in methodologies.  ************************************************************  This PCR assay was performed by multiplex PCR. This  Assay tests for 66 bacterial and resistance gene targets.  Please refer to the [facility] test directory  at [email or web address] for details.  --  Blood Culture PCR  Escherichia coli      Catheterized Catheterized  12-16-21   No growth  --  --              Vancomycin Level, Trough: 20.1 ug/mL (12-29-21 @ 03:43)  Vancomycin Level, Trough: 16.4 ug/mL (12-28-21 @ 14:44)  v    Rapid RVP Result: NotDetec (12-25 @ 10:42)            RADIOLOGY  < from: CT Abdomen and Pelvis w/ IV Cont (12.28.21 @ 17:54) >  Moderate rectal stool burden and findings suggestive of stercoral   colitis, mildly improved from the prior exam.    Diffuse bladder wall thickening, which may related to underdistention.   Correlate with urinalysis if clinical concern for cystitis.    Bladder calculi.    < end of copied text >  < from: Xray Chest 1 View AP/PA (12.25.21 @ 11:30) >  Clear lungs.    < end of copied text >

## 2021-12-30 PROBLEM — J45.909 UNSPECIFIED ASTHMA, UNCOMPLICATED: Chronic | Status: ACTIVE | Noted: 2021-12-29

## 2021-12-30 PROBLEM — I25.10 ATHEROSCLEROTIC HEART DISEASE OF NATIVE CORONARY ARTERY WITHOUT ANGINA PECTORIS: Chronic | Status: ACTIVE | Noted: 2021-12-29

## 2021-12-30 PROBLEM — I49.5 SICK SINUS SYNDROME: Chronic | Status: ACTIVE | Noted: 2021-12-29

## 2021-12-30 PROBLEM — E11.9 TYPE 2 DIABETES MELLITUS WITHOUT COMPLICATIONS: Chronic | Status: ACTIVE | Noted: 2021-12-29

## 2021-12-30 PROBLEM — E78.5 HYPERLIPIDEMIA, UNSPECIFIED: Chronic | Status: ACTIVE | Noted: 2021-12-29

## 2021-12-30 LAB
ALBUMIN SERPL ELPH-MCNC: 3 G/DL — LOW (ref 3.3–5)
ALP SERPL-CCNC: 65 U/L — SIGNIFICANT CHANGE UP (ref 40–120)
ALT FLD-CCNC: 20 U/L — SIGNIFICANT CHANGE UP (ref 10–45)
ANION GAP SERPL CALC-SCNC: 10 MMOL/L — SIGNIFICANT CHANGE UP (ref 5–17)
AST SERPL-CCNC: 25 U/L — SIGNIFICANT CHANGE UP (ref 10–40)
BASOPHILS # BLD AUTO: 0.05 K/UL — SIGNIFICANT CHANGE UP (ref 0–0.2)
BASOPHILS NFR BLD AUTO: 0.8 % — SIGNIFICANT CHANGE UP (ref 0–2)
BILIRUB SERPL-MCNC: 1 MG/DL — SIGNIFICANT CHANGE UP (ref 0.2–1.2)
BUN SERPL-MCNC: 7 MG/DL — SIGNIFICANT CHANGE UP (ref 7–23)
CALCIUM SERPL-MCNC: 9.3 MG/DL — SIGNIFICANT CHANGE UP (ref 8.4–10.5)
CHLORIDE SERPL-SCNC: 105 MMOL/L — SIGNIFICANT CHANGE UP (ref 96–108)
CO2 SERPL-SCNC: 23 MMOL/L — SIGNIFICANT CHANGE UP (ref 22–31)
CREAT SERPL-MCNC: 0.94 MG/DL — SIGNIFICANT CHANGE UP (ref 0.5–1.3)
EOSINOPHIL # BLD AUTO: 0.23 K/UL — SIGNIFICANT CHANGE UP (ref 0–0.5)
EOSINOPHIL NFR BLD AUTO: 3.8 % — SIGNIFICANT CHANGE UP (ref 0–6)
GLUCOSE SERPL-MCNC: 132 MG/DL — HIGH (ref 70–99)
HCT VFR BLD CALC: 35.2 % — LOW (ref 39–50)
HGB BLD-MCNC: 11.6 G/DL — LOW (ref 13–17)
IMM GRANULOCYTES NFR BLD AUTO: 1.8 % — HIGH (ref 0–1.5)
LYMPHOCYTES # BLD AUTO: 1.38 K/UL — SIGNIFICANT CHANGE UP (ref 1–3.3)
LYMPHOCYTES # BLD AUTO: 22.8 % — SIGNIFICANT CHANGE UP (ref 13–44)
MAGNESIUM SERPL-MCNC: 1.9 MG/DL — SIGNIFICANT CHANGE UP (ref 1.6–2.6)
MCHC RBC-ENTMCNC: 30.3 PG — SIGNIFICANT CHANGE UP (ref 27–34)
MCHC RBC-ENTMCNC: 33 GM/DL — SIGNIFICANT CHANGE UP (ref 32–36)
MCV RBC AUTO: 91.9 FL — SIGNIFICANT CHANGE UP (ref 80–100)
MONOCYTES # BLD AUTO: 0.66 K/UL — SIGNIFICANT CHANGE UP (ref 0–0.9)
MONOCYTES NFR BLD AUTO: 10.9 % — SIGNIFICANT CHANGE UP (ref 2–14)
NEUTROPHILS # BLD AUTO: 3.63 K/UL — SIGNIFICANT CHANGE UP (ref 1.8–7.4)
NEUTROPHILS NFR BLD AUTO: 59.9 % — SIGNIFICANT CHANGE UP (ref 43–77)
NIGHT BLUE STAIN TISS: SIGNIFICANT CHANGE UP
NRBC # BLD: 0 /100 WBCS — SIGNIFICANT CHANGE UP (ref 0–0)
PHOSPHATE SERPL-MCNC: 2.8 MG/DL — SIGNIFICANT CHANGE UP (ref 2.5–4.5)
PLATELET # BLD AUTO: 209 K/UL — SIGNIFICANT CHANGE UP (ref 150–400)
POTASSIUM SERPL-MCNC: 3.7 MMOL/L — SIGNIFICANT CHANGE UP (ref 3.5–5.3)
POTASSIUM SERPL-SCNC: 3.7 MMOL/L — SIGNIFICANT CHANGE UP (ref 3.5–5.3)
PROT SERPL-MCNC: 6.1 G/DL — SIGNIFICANT CHANGE UP (ref 6–8.3)
RBC # BLD: 3.83 M/UL — LOW (ref 4.2–5.8)
RBC # FLD: 12.7 % — SIGNIFICANT CHANGE UP (ref 10.3–14.5)
SODIUM SERPL-SCNC: 138 MMOL/L — SIGNIFICANT CHANGE UP (ref 135–145)
SPECIMEN SOURCE: SIGNIFICANT CHANGE UP
VANCOMYCIN TROUGH SERPL-MCNC: 29.2 UG/ML — CRITICAL HIGH (ref 10–20)
WBC # BLD: 6.06 K/UL — SIGNIFICANT CHANGE UP (ref 3.8–10.5)
WBC # FLD AUTO: 6.06 K/UL — SIGNIFICANT CHANGE UP (ref 3.8–10.5)

## 2021-12-30 PROCEDURE — 71045 X-RAY EXAM CHEST 1 VIEW: CPT | Mod: 26

## 2021-12-30 PROCEDURE — 99232 SBSQ HOSP IP/OBS MODERATE 35: CPT

## 2021-12-30 PROCEDURE — 93010 ELECTROCARDIOGRAM REPORT: CPT

## 2021-12-30 RX ORDER — VANCOMYCIN HCL 1 G
1000 VIAL (EA) INTRAVENOUS EVERY 12 HOURS
Refills: 0 | Status: DISCONTINUED | OUTPATIENT
Start: 2021-12-30 | End: 2022-01-04

## 2021-12-30 RX ADMIN — SODIUM CHLORIDE 3 MILLILITER(S): 9 INJECTION INTRAMUSCULAR; INTRAVENOUS; SUBCUTANEOUS at 13:52

## 2021-12-30 RX ADMIN — Medication 250 MILLIGRAM(S): at 09:20

## 2021-12-30 RX ADMIN — LISINOPRIL 10 MILLIGRAM(S): 2.5 TABLET ORAL at 06:19

## 2021-12-30 RX ADMIN — Medication 81 MILLIGRAM(S): at 13:55

## 2021-12-30 RX ADMIN — SODIUM CHLORIDE 3 MILLILITER(S): 9 INJECTION INTRAMUSCULAR; INTRAVENOUS; SUBCUTANEOUS at 22:54

## 2021-12-30 RX ADMIN — ISOSORBIDE MONONITRATE 30 MILLIGRAM(S): 60 TABLET, EXTENDED RELEASE ORAL at 13:57

## 2021-12-30 RX ADMIN — ATORVASTATIN CALCIUM 40 MILLIGRAM(S): 80 TABLET, FILM COATED ORAL at 22:49

## 2021-12-30 RX ADMIN — CEFTRIAXONE 100 MILLIGRAM(S): 500 INJECTION, POWDER, FOR SOLUTION INTRAMUSCULAR; INTRAVENOUS at 06:19

## 2021-12-30 RX ADMIN — SODIUM CHLORIDE 3 MILLILITER(S): 9 INJECTION INTRAMUSCULAR; INTRAVENOUS; SUBCUTANEOUS at 06:13

## 2021-12-30 NOTE — PROGRESS NOTE ADULT - ASSESSMENT
81 year old male with CVA with rt sided weakness, dementia, HTN, CAD s/p stent 10 years ago, PPM, Asthma presenting with hematuria. Remains with valencia, no hematuria currently. In the ED with fever 104.1F, leukocytosis, tachycardia, found to have E. coli  (CXT-M gene neg) and MRSA bacteremia. On last admission 12/16 - 12/21/21 patient found to have RLL atelectasis, hematuria, and stercoral colitis.     Blood cxs:  12/25 - E. coli  12/25 - E. coli and MRSA  12/27 - GPC clusters    Shan Light  Attending Physician   Division of Infectious Disease  Pager #[phone]  Available on [employer] Teams also  After 5pm/weekend or no response, call #[phone]

## 2021-12-30 NOTE — PROGRESS NOTE ADULT - SUBJECTIVE AND OBJECTIVE BOX
DESHAWN TIWARI 81y [id number]    Patient is a 81y old  Male who presents with a chief complaint of     Follow Up/CC:  ID following for bacteremia    Interval History/ROS: no fever, s/p PPM extraction     Allergies    No Known Allergies    Intolerances        ANTIMICROBIALS:  cefTRIAXone   IVPB 2000 every 24 hours      MEDICATIONS  (STANDING):  aspirin enteric coated 81 milliGRAM(s) Oral daily  atorvastatin 40 milliGRAM(s) Oral at bedtime  cefTRIAXone   IVPB 2000 milliGRAM(s) IV Intermittent every 24 hours  isosorbide   mononitrate ER Tablet (IMDUR) 30 milliGRAM(s) Oral daily  lisinopril 10 milliGRAM(s) Oral daily  metoprolol succinate ER 25 milliGRAM(s) Oral daily  sodium chloride 0.9% lock flush 3 milliLiter(s) IV Push every 8 hours    MEDICATIONS  (PRN):  acetaminophen     Tablet .. 650 milliGRAM(s) Oral every 6 hours PRN Temp greater or equal to 38C (100.4F), Mild Pain (1 - 3), Moderate Pain (4 - 6)  acetaminophen   IVPB .. 1000 milliGRAM(s) IV Intermittent once PRN Mild Pain (1 - 3)  melatonin 3 milliGRAM(s) Oral at bedtime PRN Insomnia        Vital Signs Last 24 Hrs  T(C): 36.6 (30 Dec 2021 12:00), Max: 36.7 (29 Dec 2021 22:01)  T(F): 97.8 (30 Dec 2021 12:00), Max: 98 (29 Dec 2021 22:01)  HR: 55 (30 Dec 2021 12:00) (52 - 79)  BP: 136/78 (30 Dec 2021 12:00) (133/75 - 203/127)  BP(mean): 101 (29 Dec 2021 21:15) (100 - 153)  RR: 18 (30 Dec 2021 12:00) (12 - 18)  SpO2: 100% (30 Dec 2021 12:00) (57% - 100%)    CBC Full  -  ( 30 Dec 2021 12:56 )  WBC Count : 6.06 K/uL  RBC Count : 3.83 M/uL  Hemoglobin : 11.6 g/dL  Hematocrit : 35.2 %  Platelet Count - Automated : 209 K/uL  Mean Cell Volume : 91.9 fl  Mean Cell Hemoglobin : 30.3 pg  Mean Cell Hemoglobin Concentration : 33.0 gm/dL  Auto Neutrophil # : 3.63 K/uL  Auto Lymphocyte # : 1.38 K/uL  Auto Monocyte # : 0.66 K/uL  Auto Eosinophil # : 0.23 K/uL  Auto Basophil # : 0.05 K/uL  Auto Neutrophil % : 59.9 %  Auto Lymphocyte % : 22.8 %  Auto Monocyte % : 10.9 %  Auto Eosinophil % : 3.8 %  Auto Basophil % : 0.8 %    12-30    138  |  105  |  7   ----------------------------<  132<H>  3.7   |  23  |  0.94    Ca    9.3      30 Dec 2021 12:56  Phos  2.8     12-30  Mg     1.9     12-30    TPro  6.1  /  Alb  3.0<L>  /  TBili  1.0  /  DBili  x   /  AST  25  /  ALT  20  /  AlkPhos  65  12-30    LIVER FUNCTIONS - ( 30 Dec 2021 12:56 )  Alb: 3.0 g/dL / Pro: 6.1 g/dL / ALK PHOS: 65 U/L / ALT: 20 U/L / AST: 25 U/L / GGT: x               MICROBIOLOGY:  .Other Other  12-30-21   Testing in progress  --  --      .Blood Blood-Peripheral  12-28-21   No growth to date.  --  --      .Blood Blood-Peripheral  12-27-21   Growth in aerobic bottle: Methicillin Resistant Staphylococcus aureus  See previous culture [id number]  --    Growth in aerobic bottle: Gram Positive Cocci in Clusters      .Blood Blood-Peripheral  12-25-21   Growth in aerobic bottle: Escherichia coli See previous culture  [id number]  Growth in aerobic and anaerobic bottles: Methicillin Resistant  Staphylococcus aureus  ***Blood Panel PCR results on this specimen are available  approximately 3 hours after the Gram stain result.***  Gram stain, PCR, and/or culture results may not always  correspond due to difference in methodologies.  ************************************************************  This PCR assay was performed by multiplex PCR. This  Assay tests for 66 bacterial and resistance gene targets.  Please refer to the [facility] [employer] test directory  at [email or web address] for details.  --  Blood Culture PCR  Methicillin resistant Staphylococcus aureus      Clean Catch Clean Catch (Midstream)  12-25-21   >100,000 CFU/ml Escherichia coli  50,000 - 99,000 CFU/mL Methicillin Resistant Staphylococcus aureus  --  Escherichia coli  Methicillin resistant Staphylococcus aureus      .Blood Blood-Peripheral  12-25-21   Growth in aerobic and anaerobic bottles: Escherichia coli  ***Blood Panel PCR results on this specimen are available  approximately 3 hours after the Gram stain result.***  Gram stain, PCR, and/or culture results may not always  correspond due to difference in methodologies.  ************************************************************  This PCR assay was performed by multiplex PCR. This  Assay tests for 66 bacterial and resistance gene targets.  Please refer to the [facility] [employer] test directory  at [email or web address] for details.  --  Blood Culture PCR  Escherichia coli      Catheterized Catheterized  12-16-21   No growth  --  --              Vancomycin Level, Trough: 29.2 ug/mL (12-30-21 @ 12:56)  v    Rapid RVP Result: NotDetec (12-25 @ 10:42)          RADIOLOGY

## 2021-12-30 NOTE — GOALS OF CARE CONVERSATION - ADVANCED CARE PLANNING - WHAT MATTERS MOST
Spouse expressed she does not want to cause unnecessary pain or harm to her .  She will make the choice when the time arrives for them.  She was made aware they is no option to call before performing needed emergency medical care.  TLT was explained.

## 2021-12-30 NOTE — PROGRESS NOTE ADULT - ASSESSMENT
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 Pt is a poor historian and wife did not answer phone call. Pt initially presented to [facility] with hematuria, chills and lethargy. Pt was found to be febrile with leukocytosis in the setting of sepsis. Subsequent blood cultures revealing E. coli and MRSA. Pt is now transferred to [facility] for pacemaker explant. EP following.     1. Bacteremia    - Continue to monitor on tele  - now s/p PPM extraction on 12/29  - post-op instructions discussed in detail with patient.   - Pocket press on left pectoral site, to be removed afternoon today.   - Replete K+ and Mg++, keep K>4.0 and Mg>2.0  - Follow-up for wound check and suture removal in 7-10 days.  - Discussed with primary team and EP attending.      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 Pt is a poor historian and wife did not answer phone call. Pt initially presented to [facility] with hematuria, chills and lethargy. Pt was found to be febrile with leukocytosis in the setting of sepsis. Subsequent blood cultures revealing E. coli and MRSA. Pt is now transferred to [facility] for pacemaker explant. EP following.     1. Bacteremia    - Continue to monitor on tele  - now s/p PPM extraction on 12/29  - post-op instructions discussed in detail with patient.   - Pocket press on left pectoral site, to be removed afternoon today.   - Replete K+ and Mg++, keep K>4.0 and Mg>2.0  - Follow-up for wound check and suture removal in [facility] on 1/7@1120  - Discussed with primary team and EP attending.    ADDENDUM  - Attempted pocket press removal. Patient refused. To be removed today by primary team when pt able to tolerate.   - Will look at device site again tomorrow. EP to sign off.    SMOOTH Liang PA-C  [id number]

## 2021-12-30 NOTE — PROGRESS NOTE ADULT - SUBJECTIVE AND OBJECTIVE BOX
24H hour events: Pt seen and examined at bedside. No acute complaints. Denies CP, palpitations, SOB, dizziness. Overnight tele: SR 45-60s.     MEDICATIONS:  aspirin enteric coated 81 milliGRAM(s) Oral daily  isosorbide   mononitrate ER Tablet (IMDUR) 30 milliGRAM(s) Oral daily  lisinopril 10 milliGRAM(s) Oral daily  metoprolol succinate ER 25 milliGRAM(s) Oral daily  cefTRIAXone   IVPB 2000 milliGRAM(s) IV Intermittent every 24 hours  vancomycin  IVPB 1000 milliGRAM(s) IV Intermittent every 12 hours  acetaminophen     Tablet .. 650 milliGRAM(s) Oral every 6 hours PRN  acetaminophen   IVPB .. 1000 milliGRAM(s) IV Intermittent once PRN  melatonin 3 milliGRAM(s) Oral at bedtime PRN  atorvastatin 40 milliGRAM(s) Oral at bedtime  sodium chloride 0.9% lock flush 3 milliLiter(s) IV Push every 8 hours    REVIEW OF SYSTEMS:  See HPI, otherwise ROS negative.    PHYSICAL EXAM:  T(C): 36.4 (12-30-21 @ 04:39), Max: 36.7 (12-29-21 @ 22:01)  HR: 57 (12-30-21 @ 09:15) (52 - 79)  BP: 133/75 (12-30-21 @ 09:15) (133/75 - 203/127)  RR: 18 (12-30-21 @ 04:39) (12 - 19)  SpO2: 100% (12-30-21 @ 04:39) (57% - 100%)  Wt(kg): --  I&O's Summary    29 Dec 2021 07:01  -  30 Dec 2021 07:00  --------------------------------------------------------  IN: 0 mL / OUT: 400 mL / NET: -400 mL    30 Dec 2021 07:01  -  30 Dec 2021 11:34  --------------------------------------------------------  IN: 120 mL / OUT: 0 mL / NET: 120 mL    Appearance: Alert. NAD	  Cardiovascular: +S1S2 RRR no m/g/r  Respiratory: CTA B/L, diminished at bases	  Psychiatry: A & O x 3, Mood & affect appropriate  Gastrointestinal:  Soft, NT. ND. +BS	  Skin: B/L groin soft, NT, no bleeding or hematoma. Left pectoral device site soft, mild TTP, pocket press in place.   Extremities: No edema BLE, SCDs present, right arm contracture   Vascular: Peripheral pulses palpable 2+ bilaterally      LABS:	 	    CBC Full  -  ( 29 Dec 2021 07:00 )  WBC Count : 5.39 K/uL  Hemoglobin : 12.5 g/dL  Hematocrit : 35.8 %  Platelet Count - Automated : 191 K/uL  Mean Cell Volume : 88.0 fL  Mean Cell Hemoglobin : 30.7 pg  Mean Cell Hemoglobin Concentration : 34.9 gm/dL  Auto Neutrophil # : x  Auto Lymphocyte # : x  Auto Monocyte # : x  Auto Eosinophil # : x  Auto Basophil # : x  Auto Neutrophil % : x  Auto Lymphocyte % : x  Auto Monocyte % : x  Auto Eosinophil % : x  Auto Basophil % : x    12-29    140  |  106  |  6<L>  ----------------------------<  73  3.3<L>   |  24  |  0.79    Ca    9.4      29 Dec 2021 07:00  Phos  2.8     12-29  Mg     1.80     12-29    TELEMETRY: SR 45-60  	    ECG: reviewed 	    RADIOLOGY:  CXR - PPM and leads removed, no PTX

## 2021-12-30 NOTE — PHYSICAL THERAPY INITIAL EVALUATION ADULT - ADDITIONAL COMMENTS
Patient is unable to provide social and functional history. as per care coordination notes. Pt is bedridden non ambulatory.

## 2021-12-30 NOTE — PHYSICAL THERAPY INITIAL EVALUATION ADULT - PERTINENT HX OF CURRENT PROBLEM, REHAB EVAL
81yoM with PMHx of CVA with RT sided weakness, dementia, HTN, CAD s/p stent 10 years ago, PPM, asthma BIBEMS for hematuria. (+) Fver of 104 in ED. UA gross positive, suggestive of UTI, Subsequent blood cultures revealing E. coli and MRSA. Pt is now transferred to [facility] for pacemaker explant. EP following.

## 2021-12-30 NOTE — PROGRESS NOTE ADULT - SUBJECTIVE AND OBJECTIVE BOX
Patient is a 81y old  Male who presents with a chief complaint of     HPI:  S/p extraction of PPM  FU Cultures    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    HTN (hypertension)    Diabetes    Hyperlipidemia    Dementia    Cardiac pacemaker  Medtronic (SN: [id number]; Model: [id number])        Review of Systems:   CONSTITUTIONAL: No fever, weight loss, or fatigue  EYES: No eye pain, visual disturbances, or discharge  ENMT:  No difficulty hearing, tinnitus, vertigo; No sinus or throat pain  NECK: No pain or stiffness  BREASTS: No pain, masses, or nipple discharge  RESPIRATORY: No cough, wheezing, chills or hemoptysis; No shortness of breath  CARDIOVASCULAR: No chest pain, palpitations, dizziness, or leg swelling  GASTROINTESTINAL: No abdominal or epigastric pain. No nausea, vomiting, or hematemesis; No diarrhea or constipation. No melena or hematochezia.  GENITOURINARY: No dysuria, frequency, hematuria, or incontinence  NEUROLOGICAL: No headaches, memory loss, loss of strength, numbness, or tremors  SKIN: No itching, burning, rashes, or lesions   LYMPH NODES: No enlarged glands  ENDOCRINE: No heat or cold intolerance; No hair loss  MUSCULOSKELETAL: No joint pain or swelling; No muscle, back, or extremity pain  PSYCHIATRIC: No depression, anxiety, mood swings, or difficulty sleeping  HEME/LYMPH: No easy bruising, or bleeding gums  ALLERY AND IMMUNOLOGIC: No hives or eczema    Allergies    No Known Allergies    Intolerances        Social History:     FAMILY HISTORY:  FH: diabetes mellitus  Mother, Brother    FHx: dementia  Father        MEDICATIONS  (STANDING):  aspirin enteric coated 81 milliGRAM(s) Oral daily  atorvastatin 40 milliGRAM(s) Oral at bedtime  cefTRIAXone   IVPB 1000 milliGRAM(s) IV Intermittent every 24 hours  heparin   Injectable 5000 Unit(s) SubCutaneous every 8 hours  isosorbide   mononitrate ER Tablet (IMDUR) 30 milliGRAM(s) Oral daily  lisinopril 10 milliGRAM(s) Oral daily  metoprolol succinate ER 25 milliGRAM(s) Oral daily  potassium chloride   Powder 20 milliEquivalent(s) Oral once  potassium phosphate / sodium phosphate Powder (PHOS-NaK) 1 Packet(s) Oral once  sodium chloride 0.9%. 1000 milliLiter(s) (75 mL/Hr) IV Continuous <Continuous>  vancomycin  IVPB 1250 milliGRAM(s) IV Intermittent every 12 hours  vancomycin  IVPB        MEDICATIONS  (PRN):  acetaminophen     Tablet .. 650 milliGRAM(s) Oral every 6 hours PRN Temp greater or equal to 38C (100.4F), Mild Pain (1 - 3)  aluminum hydroxide/magnesium hydroxide/simethicone Suspension 30 milliLiter(s) Oral every 4 hours PRN Dyspepsia  melatonin 3 milliGRAM(s) Oral at bedtime PRN Insomnia  ondansetron Injectable 4 milliGRAM(s) IV Push every 8 hours PRN Nausea and/or Vomiting        CAPILLARY BLOOD GLUCOSE      POCT Blood Glucose.: 105 mg/dL (27 Dec 2021 16:58)  POCT Blood Glucose.: 91 mg/dL (27 Dec 2021 12:17)  POCT Blood Glucose.: 71 mg/dL (27 Dec 2021 08:50)    I&O's Summary    26 Dec 2021 07:01  -  27 Dec 2021 07:00  --------------------------------------------------------  IN: 600 mL / OUT: 800 mL / NET: -200 mL    27 Dec 2021 07:01  -  27 Dec 2021 18:53  --------------------------------------------------------  IN: 0 mL / OUT: 700 mL / NET: -700 mL        PHYSICAL EXAM:  Vital Signs Last 24 Hrs  T(C): 37 (27 Dec 2021 14:03), Max: 37 (27 Dec 2021 01:18)  T(F): 98.6 (27 Dec 2021 14:03), Max: 98.6 (27 Dec 2021 01:18)  HR: 73 (27 Dec 2021 14:03) (60 - 73)  BP: 161/90 (27 Dec 2021 14:03) (108/72 - 161/90)  BP(mean): --  RR: 18 (27 Dec 2021 14:03) (18 - 19)  SpO2: 100% (27 Dec 2021 14:03) (98% - 100%)    GENERAL: NAD, well-developed  HEAD:  Atraumatic, Normocephalic  EYES: EOMI, PERRLA, conjunctiva and sclera clear  NECK: Supple, No JVD  CHEST/LUNG: Clear to auscultation bilaterally; No wheeze  HEART: Regular rate and rhythm; No murmurs, rubs, or gallops  ABDOMEN: Soft, Nontender, Nondistended; Bowel sounds present  EXTREMITIES:  2+ Peripheral Pulses, No clubbing, cyanosis, or edema  PSYCH: AAOx3  NEUROLOGY: non-focal  SKIN: No rashes or lesions    LABS:                        12.2   11.89 )-----------( 150      ( 27 Dec 2021 07:28 )             37.0     12-27    139  |  107  |  12  ----------------------------<  79  3.3<L>   |  21<L>  |  0.85    Ca    9.0      27 Dec 2021 07:28  Phos  2.1     12-27  Mg     1.80     12-27                RADIOLOGY & ADDITIONAL TESTS:    Imaging Personally Reviewed:    Consultant(s) Notes Reviewed:      Care Discussed with Consultants/Other Providers:

## 2021-12-30 NOTE — PROGRESS NOTE ADULT - SUBJECTIVE AND OBJECTIVE BOX
Patient is a 81y old  Male who presents with a chief complaint of     HPI:  S/p extraction of PPM  Patient has had negative blood cultures in [facility] 12/28/2021.    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    HTN (hypertension)    Diabetes    Hyperlipidemia    Dementia    Cardiac pacemaker  Medtronic (SN: [id number]; Model: [id number])        Review of Systems:   CONSTITUTIONAL: No fever, weight loss, or fatigue  EYES: No eye pain, visual disturbances, or discharge  ENMT:  No difficulty hearing, tinnitus, vertigo; No sinus or throat pain  NECK: No pain or stiffness  BREASTS: No pain, masses, or nipple discharge  RESPIRATORY: No cough, wheezing, chills or hemoptysis; No shortness of breath  CARDIOVASCULAR: No chest pain, palpitations, dizziness, or leg swelling  GASTROINTESTINAL: No abdominal or epigastric pain. No nausea, vomiting, or hematemesis; No diarrhea or constipation. No melena or hematochezia.  GENITOURINARY: No dysuria, frequency, hematuria, or incontinence  NEUROLOGICAL: No headaches, memory loss, loss of strength, numbness, or tremors  SKIN: No itching, burning, rashes, or lesions   LYMPH NODES: No enlarged glands  ENDOCRINE: No heat or cold intolerance; No hair loss  MUSCULOSKELETAL: No joint pain or swelling; No muscle, back, or extremity pain  PSYCHIATRIC: No depression, anxiety, mood swings, or difficulty sleeping  HEME/LYMPH: No easy bruising, or bleeding gums  ALLERY AND IMMUNOLOGIC: No hives or eczema    Allergies    No Known Allergies    Intolerances        Social History:     FAMILY HISTORY:  FH: diabetes mellitus  Mother, Brother    FHx: dementia  Father        MEDICATIONS  (STANDING):  aspirin enteric coated 81 milliGRAM(s) Oral daily  atorvastatin 40 milliGRAM(s) Oral at bedtime  cefTRIAXone   IVPB 1000 milliGRAM(s) IV Intermittent every 24 hours  heparin   Injectable 5000 Unit(s) SubCutaneous every 8 hours  isosorbide   mononitrate ER Tablet (IMDUR) 30 milliGRAM(s) Oral daily  lisinopril 10 milliGRAM(s) Oral daily  metoprolol succinate ER 25 milliGRAM(s) Oral daily  potassium chloride   Powder 20 milliEquivalent(s) Oral once  potassium phosphate / sodium phosphate Powder (PHOS-NaK) 1 Packet(s) Oral once  sodium chloride 0.9%. 1000 milliLiter(s) (75 mL/Hr) IV Continuous <Continuous>  vancomycin  IVPB 1250 milliGRAM(s) IV Intermittent every 12 hours  vancomycin  IVPB        MEDICATIONS  (PRN):  acetaminophen     Tablet .. 650 milliGRAM(s) Oral every 6 hours PRN Temp greater or equal to 38C (100.4F), Mild Pain (1 - 3)  aluminum hydroxide/magnesium hydroxide/simethicone Suspension 30 milliLiter(s) Oral every 4 hours PRN Dyspepsia  melatonin 3 milliGRAM(s) Oral at bedtime PRN Insomnia  ondansetron Injectable 4 milliGRAM(s) IV Push every 8 hours PRN Nausea and/or Vomiting        CAPILLARY BLOOD GLUCOSE      POCT Blood Glucose.: 105 mg/dL (27 Dec 2021 16:58)  POCT Blood Glucose.: 91 mg/dL (27 Dec 2021 12:17)  POCT Blood Glucose.: 71 mg/dL (27 Dec 2021 08:50)    I&O's Summary    26 Dec 2021 07:01  -  27 Dec 2021 07:00  --------------------------------------------------------  IN: 600 mL / OUT: 800 mL / NET: -200 mL    27 Dec 2021 07:01  -  27 Dec 2021 18:53  --------------------------------------------------------  IN: 0 mL / OUT: 700 mL / NET: -700 mL        PHYSICAL EXAM:  Vital Signs Last 24 Hrs  T(C): 37 (27 Dec 2021 14:03), Max: 37 (27 Dec 2021 01:18)  T(F): 98.6 (27 Dec 2021 14:03), Max: 98.6 (27 Dec 2021 01:18)  HR: 73 (27 Dec 2021 14:03) (60 - 73)  BP: 161/90 (27 Dec 2021 14:03) (108/72 - 161/90)  BP(mean): --  RR: 18 (27 Dec 2021 14:03) (18 - 19)  SpO2: 100% (27 Dec 2021 14:03) (98% - 100%)    GENERAL: NAD, well-developed  HEAD:  Atraumatic, Normocephalic  EYES: EOMI, PERRLA, conjunctiva and sclera clear  NECK: Supple, No JVD  CHEST/LUNG: Clear to auscultation bilaterally; No wheeze  HEART: Regular rate and rhythm; No murmurs, rubs, or gallops  ABDOMEN: Soft, Nontender, Nondistended; Bowel sounds present  EXTREMITIES:  2+ Peripheral Pulses, No clubbing, cyanosis, or edema  PSYCH: AAOx3  NEUROLOGY: non-focal  SKIN: No rashes or lesions    LABS:                        12.2   11.89 )-----------( 150      ( 27 Dec 2021 07:28 )             37.0     12-27    139  |  107  |  12  ----------------------------<  79  3.3<L>   |  21<L>  |  0.85    Ca    9.0      27 Dec 2021 07:28  Phos  2.1     12-27  Mg     1.80     12-27                RADIOLOGY & ADDITIONAL TESTS:    Imaging Personally Reviewed:    Consultant(s) Notes Reviewed:      Care Discussed with Consultants/Other Providers:

## 2021-12-30 NOTE — PROGRESS NOTE ADULT - ATTENDING COMMENTS
Due to his waxing and waning mental status from dementia and stroke, he did not want the pocket press removed. It would be fine to leave it on for 24-48 hours. The incision needs to be covered so that he does not scratch it.

## 2021-12-30 NOTE — GOALS OF CARE CONVERSATION - ADVANCED CARE PLANNING - CONVERSATION DETAILS
Due to capacity spoke with HCP Nerissa Garcia spouse, alpha copy located.  Family wishes to make decision at the time if he should need CPR or intubation.  He does not want him to suffer needlessly or cause any pain.  Code XMSE was given via text and will discuss medical choice for the future. Spouse expressed she does not want to cause unnecessary pain or harm to her .  She will make the choice when the time arrives for them.  She was made aware they is no option to call before performing needed emergency medical care.  TLT was explained.     Video seen: no  Code status: full code  Code received: XMSE  Forms received: none  Denominational exemptions: none    Stephanie Burnett RN  Palliative Care Educator  Cell:  [phone]  Email: [email or web address] Due to capacity spoke with HCP Nerissa Garcia spouse, alpha copy located.  Family wishes to make decision at the time if he should need CPR or intubation.  He does not want him to suffer needlessly or cause any pain.  Code XMSE was given via text and will discuss medical choice for the future. Spouse expressed she does not want to cause unnecessary pain or harm to her .  She will make the choice when the time arrives for them.  She was made aware they is no option to call before performing needed emergency medical care.  TLT was explained. MAXINE Hines notified that family would like an update in pts status.    Video seen: no  Code status: full code  Code received: XMSE  Forms received: none  Mosque exemptions: none    Stephanie Burnett RN  Palliative Care Educator  Cell:  [phone]  Email: [email or web address]

## 2021-12-31 DIAGNOSIS — Z79.2 LONG TERM (CURRENT) USE OF ANTIBIOTICS: ICD-10-CM

## 2021-12-31 LAB
ANION GAP SERPL CALC-SCNC: 9 MMOL/L — SIGNIFICANT CHANGE UP (ref 5–17)
BUN SERPL-MCNC: 9 MG/DL — SIGNIFICANT CHANGE UP (ref 7–23)
CALCIUM SERPL-MCNC: 9.4 MG/DL — SIGNIFICANT CHANGE UP (ref 8.4–10.5)
CHLORIDE SERPL-SCNC: 107 MMOL/L — SIGNIFICANT CHANGE UP (ref 96–108)
CO2 SERPL-SCNC: 23 MMOL/L — SIGNIFICANT CHANGE UP (ref 22–31)
CREAT SERPL-MCNC: 0.93 MG/DL — SIGNIFICANT CHANGE UP (ref 0.5–1.3)
GLUCOSE SERPL-MCNC: 86 MG/DL — SIGNIFICANT CHANGE UP (ref 70–99)
HCT VFR BLD CALC: 35.3 % — LOW (ref 39–50)
HGB BLD-MCNC: 11.5 G/DL — LOW (ref 13–17)
MCHC RBC-ENTMCNC: 29.9 PG — SIGNIFICANT CHANGE UP (ref 27–34)
MCHC RBC-ENTMCNC: 32.6 GM/DL — SIGNIFICANT CHANGE UP (ref 32–36)
MCV RBC AUTO: 91.9 FL — SIGNIFICANT CHANGE UP (ref 80–100)
NRBC # BLD: 0 /100 WBCS — SIGNIFICANT CHANGE UP (ref 0–0)
PLATELET # BLD AUTO: 185 K/UL — SIGNIFICANT CHANGE UP (ref 150–400)
POTASSIUM SERPL-MCNC: 4 MMOL/L — SIGNIFICANT CHANGE UP (ref 3.5–5.3)
POTASSIUM SERPL-SCNC: 4 MMOL/L — SIGNIFICANT CHANGE UP (ref 3.5–5.3)
RBC # BLD: 3.84 M/UL — LOW (ref 4.2–5.8)
RBC # FLD: 12.7 % — SIGNIFICANT CHANGE UP (ref 10.3–14.5)
SODIUM SERPL-SCNC: 139 MMOL/L — SIGNIFICANT CHANGE UP (ref 135–145)
WBC # BLD: 5.63 K/UL — SIGNIFICANT CHANGE UP (ref 3.8–10.5)
WBC # FLD AUTO: 5.63 K/UL — SIGNIFICANT CHANGE UP (ref 3.8–10.5)

## 2021-12-31 PROCEDURE — 99232 SBSQ HOSP IP/OBS MODERATE 35: CPT

## 2021-12-31 RX ADMIN — SODIUM CHLORIDE 3 MILLILITER(S): 9 INJECTION INTRAMUSCULAR; INTRAVENOUS; SUBCUTANEOUS at 21:47

## 2021-12-31 RX ADMIN — Medication 81 MILLIGRAM(S): at 11:25

## 2021-12-31 RX ADMIN — ATORVASTATIN CALCIUM 40 MILLIGRAM(S): 80 TABLET, FILM COATED ORAL at 22:02

## 2021-12-31 RX ADMIN — Medication 250 MILLIGRAM(S): at 06:44

## 2021-12-31 RX ADMIN — Medication 250 MILLIGRAM(S): at 17:05

## 2021-12-31 RX ADMIN — Medication 25 MILLIGRAM(S): at 06:00

## 2021-12-31 RX ADMIN — Medication 3 MILLIGRAM(S): at 22:02

## 2021-12-31 RX ADMIN — CEFTRIAXONE 100 MILLIGRAM(S): 500 INJECTION, POWDER, FOR SOLUTION INTRAMUSCULAR; INTRAVENOUS at 06:00

## 2021-12-31 RX ADMIN — LISINOPRIL 10 MILLIGRAM(S): 2.5 TABLET ORAL at 06:00

## 2021-12-31 RX ADMIN — SODIUM CHLORIDE 3 MILLILITER(S): 9 INJECTION INTRAMUSCULAR; INTRAVENOUS; SUBCUTANEOUS at 14:00

## 2021-12-31 RX ADMIN — ISOSORBIDE MONONITRATE 30 MILLIGRAM(S): 60 TABLET, EXTENDED RELEASE ORAL at 11:25

## 2021-12-31 RX ADMIN — SODIUM CHLORIDE 3 MILLILITER(S): 9 INJECTION INTRAMUSCULAR; INTRAVENOUS; SUBCUTANEOUS at 07:09

## 2021-12-31 NOTE — PROVIDER CONTACT NOTE (CHANGE IN STATUS NOTIFICATION) - ACTION/TREATMENT ORDERED:
Provider made aware with continuous pulse ox ordered with patient maintaining saturation over 93% on room air.
Provider made aware of vital signs with orders to give scheduled metoprolol 25mg PO and lisinopril 10mg PO.  Patient to be continuously monitored.

## 2021-12-31 NOTE — PROVIDER CONTACT NOTE (CHANGE IN STATUS NOTIFICATION) - ASSESSMENT
Patient A&Ox0-1,non verbal and combative. Patient with no signs or symptoms of chest pain or shortness of breath.

## 2021-12-31 NOTE — PROVIDER CONTACT NOTE (CHANGE IN STATUS NOTIFICATION) - BACKGROUND
Patient with baseline A&Ox0-1 with confusion and non verbal.
Patient admitted for endocarditis bacteremia and PPM lead extraction.

## 2021-12-31 NOTE — PROVIDER CONTACT NOTE (CHANGE IN STATUS NOTIFICATION) - ASSESSMENT
Patient with no signs of distress or shortness of breath. Patient assessed with oxygen saturation of 94% on room air.

## 2021-12-31 NOTE — PROGRESS NOTE ADULT - ASSESSMENT
81 year old male with CVA with rt sided weakness, dementia, HTN, CAD s/p stent 10 years ago, PPM, Asthma presenting with hematuria. Remains with valencia, no hematuria currently. In the ED with fever 104.1F, leukocytosis, tachycardia, found to have E. coli  (CXT-M gene neg) and MRSA bacteremia. On last admission 12/16 - 12/21/21 patient found to have RLL atelectasis, hematuria, and stercoral colitis.     Blood cxs:  12/25 - E. coli  12/25 - E. coli and MRSA  12/27 - GPC clusters    Shan Light  Attending Physician   Division of Infectious Disease  Pager #[phone]  Available on [employer] Teams also  After 5pm/weekend or no response, call #[phone]

## 2021-12-31 NOTE — PROGRESS NOTE ADULT - SUBJECTIVE AND OBJECTIVE BOX
Patient is a 81y old  Male who presents with a chief complaint of     12/31/2021  HPI:  S/p extraction of PPM  Patient has had negative blood cultures in [facility] 12/28/2021.  No new symptoms, confused at times.    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    HTN (hypertension)    Diabetes    Hyperlipidemia    Dementia    Cardiac pacemaker  Medtronic (SN: [id number]; Model: [id number])        Review of Systems:   CONSTITUTIONAL: No fever, weight loss, or fatigue  EYES: No eye pain, visual disturbances, or discharge  ENMT:  No difficulty hearing, tinnitus, vertigo; No sinus or throat pain  NECK: No pain or stiffness  BREASTS: No pain, masses, or nipple discharge  RESPIRATORY: No cough, wheezing, chills or hemoptysis; No shortness of breath  CARDIOVASCULAR: No chest pain, palpitations, dizziness, or leg swelling  GASTROINTESTINAL: No abdominal or epigastric pain. No nausea, vomiting, or hematemesis; No diarrhea or constipation. No melena or hematochezia.  GENITOURINARY: No dysuria, frequency, hematuria, or incontinence  NEUROLOGICAL: No headaches, memory loss, loss of strength, numbness, or tremors  SKIN: No itching, burning, rashes, or lesions   LYMPH NODES: No enlarged glands  ENDOCRINE: No heat or cold intolerance; No hair loss  MUSCULOSKELETAL: No joint pain or swelling; No muscle, back, or extremity pain  PSYCHIATRIC: No depression, anxiety, mood swings, or difficulty sleeping  HEME/LYMPH: No easy bruising, or bleeding gums  ALLERY AND IMMUNOLOGIC: No hives or eczema    Allergies    No Known Allergies    Intolerances        Social History:     FAMILY HISTORY:  FH: diabetes mellitus  Mother, Brother    FHx: dementia  Father        MEDICATIONS  (STANDING):  aspirin enteric coated 81 milliGRAM(s) Oral daily  atorvastatin 40 milliGRAM(s) Oral at bedtime  cefTRIAXone   IVPB 1000 milliGRAM(s) IV Intermittent every 24 hours  heparin   Injectable 5000 Unit(s) SubCutaneous every 8 hours  isosorbide   mononitrate ER Tablet (IMDUR) 30 milliGRAM(s) Oral daily  lisinopril 10 milliGRAM(s) Oral daily  metoprolol succinate ER 25 milliGRAM(s) Oral daily  potassium chloride   Powder 20 milliEquivalent(s) Oral once  potassium phosphate / sodium phosphate Powder (PHOS-NaK) 1 Packet(s) Oral once  sodium chloride 0.9%. 1000 milliLiter(s) (75 mL/Hr) IV Continuous <Continuous>  vancomycin  IVPB 1250 milliGRAM(s) IV Intermittent every 12 hours  vancomycin  IVPB        MEDICATIONS  (PRN):  acetaminophen     Tablet .. 650 milliGRAM(s) Oral every 6 hours PRN Temp greater or equal to 38C (100.4F), Mild Pain (1 - 3)  aluminum hydroxide/magnesium hydroxide/simethicone Suspension 30 milliLiter(s) Oral every 4 hours PRN Dyspepsia  melatonin 3 milliGRAM(s) Oral at bedtime PRN Insomnia  ondansetron Injectable 4 milliGRAM(s) IV Push every 8 hours PRN Nausea and/or Vomiting        CAPILLARY BLOOD GLUCOSE      POCT Blood Glucose.: 105 mg/dL (27 Dec 2021 16:58)  POCT Blood Glucose.: 91 mg/dL (27 Dec 2021 12:17)  POCT Blood Glucose.: 71 mg/dL (27 Dec 2021 08:50)    I&O's Summary    26 Dec 2021 07:01  -  27 Dec 2021 07:00  --------------------------------------------------------  IN: 600 mL / OUT: 800 mL / NET: -200 mL    27 Dec 2021 07:01  -  27 Dec 2021 18:53  --------------------------------------------------------  IN: 0 mL / OUT: 700 mL / NET: -700 mL        PHYSICAL EXAM:  Vital Signs Last 24 Hrs  T(C): 37 (27 Dec 2021 14:03), Max: 37 (27 Dec 2021 01:18)  T(F): 98.6 (27 Dec 2021 14:03), Max: 98.6 (27 Dec 2021 01:18)  HR: 73 (27 Dec 2021 14:03) (60 - 73)  BP: 161/90 (27 Dec 2021 14:03) (108/72 - 161/90)  BP(mean): --  RR: 18 (27 Dec 2021 14:03) (18 - 19)  SpO2: 100% (27 Dec 2021 14:03) (98% - 100%)    GENERAL: NAD, well-developed  HEAD:  Atraumatic, Normocephalic  EYES: EOMI, PERRLA, conjunctiva and sclera clear  NECK: Supple, No JVD  CHEST/LUNG: Clear to auscultation bilaterally; No wheeze  HEART: Regular rate and rhythm; No murmurs, rubs, or gallops  ABDOMEN: Soft, Nontender, Nondistended; Bowel sounds present  EXTREMITIES:  2+ Peripheral Pulses, No clubbing, cyanosis, or edema  PSYCH: AAOx3  NEUROLOGY: non-focal  SKIN: No rashes or lesions    LABS:                        12.2   11.89 )-----------( 150      ( 27 Dec 2021 07:28 )             37.0     12-27    139  |  107  |  12  ----------------------------<  79  3.3<L>   |  21<L>  |  0.85    Ca    9.0      27 Dec 2021 07:28  Phos  2.1     12-27  Mg     1.80     12-27                RADIOLOGY & ADDITIONAL TESTS:    Imaging Personally Reviewed:    Consultant(s) Notes Reviewed:      Care Discussed with Consultants/Other Providers:

## 2021-12-31 NOTE — PROGRESS NOTE ADULT - ASSESSMENT
80 y/o male with a PMHx of CAD s/p stent placement, CVA with residual right sided weakness and right upper extremity contracture, sinus node dysfunction s/p PPM, HTN, HLD, DM, asthma and dementia presents as a transfer from [facility] for pacemaker extraction. Pt is a poor historian and wife did not answer phone call. Pt initially presented to [facility] with hematuria, chills and lethargy. Pt was found to be febrile with leukocytosis in the setting of sepsis. Subsequent blood cultures revealing E. coli and MRSA. Pt is now transferred to [facility] for pacemaker explant. EP following.     1. Bacteremia  2. s/p PPM extraction 12/29/21    - PM extraction site flat, no hematoma; pocket press removed today  - post-op instructions reviewed with patient.   - Replete K+ and Mg++, keep K>4.0 and Mg>2.0  - Follow-up for wound check and suture removal in [facility] on 1/7/21 @1120  - Will sign off, please reconsult as needed    #[phone]

## 2021-12-31 NOTE — PROGRESS NOTE ADULT - SUBJECTIVE AND OBJECTIVE BOX
24H hour events:   Resting comfortably in bed; no complaints presented at this time; tele overnight sinus bradycardia/NSR 50-60's      MEDICATIONS:  aspirin enteric coated 81 milliGRAM(s) Oral daily  isosorbide   mononitrate ER Tablet (IMDUR) 30 milliGRAM(s) Oral daily  lisinopril 10 milliGRAM(s) Oral daily  metoprolol succinate ER 25 milliGRAM(s) Oral daily  cefTRIAXone   IVPB 2000 milliGRAM(s) IV Intermittent every 24 hours  vancomycin  IVPB 1000 milliGRAM(s) IV Intermittent every 12 hours  acetaminophen     Tablet .. 650 milliGRAM(s) Oral every 6 hours PRN  acetaminophen   IVPB .. 1000 milliGRAM(s) IV Intermittent once PRN  melatonin 3 milliGRAM(s) Oral at bedtime PRN  atorvastatin 40 milliGRAM(s) Oral at bedtime  sodium chloride 0.9% lock flush 3 milliLiter(s) IV Push every 8 hours      REVIEW OF SYSTEMS:  Complete 10point ROS negative except as noted above      PHYSICAL EXAM:  T(C): 36.6 (12-31-21 @ 06:38), Max: 36.6 (12-30-21 @ 12:00)  HR: 55 (12-31-21 @ 06:38) (52 - 55)  BP: 181/109 (12-31-21 @ 06:38) (136/78 - 181/109)  RR: 18 (12-31-21 @ 06:38) (18 - 18)  SpO2: 96% (12-31-21 @ 06:38) (96% - 100%)  Wt(kg): --  I&O's Summary    30 Dec 2021 07:01  -  31 Dec 2021 07:00  --------------------------------------------------------  IN: 417 mL / OUT: 0 mL / NET: 417 mL    31 Dec 2021 07:01  -  31 Dec 2021 09:22  --------------------------------------------------------  IN: 120 mL / OUT: 0 mL / NET: 120 mL        Appearance: Normal, in NAD  Cardiovascular: Normal S1 S2, No JVD, No m/r/g  Respiratory: Lungs clear to auscultation	  Psychiatry: A & O x 3, Mood & affect appropriate  Gastrointestinal:  Soft, Non-tender, + BS	  Skin: No rashes, No ecchymoses, No cyanosis; left pacemaker extraction site flat, no hematoma or drainage	  Extremities: Normal range of motion, No c/c/e  Vascular: Peripheral pulses palpable 2+ bilaterally        LABS:	 	    CBC Full  -  ( 31 Dec 2021 06:15 )  WBC Count : 5.63 K/uL  Hemoglobin : 11.5 g/dL  Hematocrit : 35.3 %  Platelet Count - Automated : 185 K/uL  Mean Cell Volume : 91.9 fl  Mean Cell Hemoglobin : 29.9 pg  Mean Cell Hemoglobin Concentration : 32.6 gm/dL  Auto Neutrophil # : x  Auto Lymphocyte # : x  Auto Monocyte # : x  Auto Eosinophil # : x  Auto Basophil # : x  Auto Neutrophil % : x  Auto Lymphocyte % : x  Auto Monocyte % : x  Auto Eosinophil % : x  Auto Basophil % : x    12-31    139  |  107  |  9   ----------------------------<  86  4.0   |  23  |  0.93  12-30    138  |  105  |  7   ----------------------------<  132<H>  3.7   |  23  |  0.94    Ca    9.4      31 Dec 2021 06:15  Ca    9.3      30 Dec 2021 12:56  Phos  2.8     12-30  Mg     1.9     12-30    TPro  6.1  /  Alb  3.0<L>  /  TBili  1.0  /  DBili  x   /  AST  25  /  ALT  20  /  AlkPhos  65  12-30      proBNP:   Lipid Profile:   HgA1c:   TSH:       CARDIAC MARKERS:      TELEMETRY: Sinus Bradycardia/NSR 50-60's

## 2021-12-31 NOTE — PROGRESS NOTE ADULT - SUBJECTIVE AND OBJECTIVE BOX
DESHAWN TIWARI 81y [id number]    Patient is a 81y old  Male who presents with a chief complaint of     Follow Up/CC:  ID following for bacteremia    Interval History/ROS: no fever    Allergies    No Known Allergies    Intolerances        ANTIMICROBIALS:  cefTRIAXone   IVPB 2000 every 24 hours  vancomycin  IVPB 1000 every 12 hours      MEDICATIONS  (STANDING):  aspirin enteric coated 81 milliGRAM(s) Oral daily  atorvastatin 40 milliGRAM(s) Oral at bedtime  cefTRIAXone   IVPB 2000 milliGRAM(s) IV Intermittent every 24 hours  isosorbide   mononitrate ER Tablet (IMDUR) 30 milliGRAM(s) Oral daily  lisinopril 10 milliGRAM(s) Oral daily  metoprolol succinate ER 25 milliGRAM(s) Oral daily  sodium chloride 0.9% lock flush 3 milliLiter(s) IV Push every 8 hours  vancomycin  IVPB 1000 milliGRAM(s) IV Intermittent every 12 hours    MEDICATIONS  (PRN):  acetaminophen     Tablet .. 650 milliGRAM(s) Oral every 6 hours PRN Temp greater or equal to 38C (100.4F), Mild Pain (1 - 3), Moderate Pain (4 - 6)  acetaminophen   IVPB .. 1000 milliGRAM(s) IV Intermittent once PRN Mild Pain (1 - 3)  melatonin 3 milliGRAM(s) Oral at bedtime PRN Insomnia        Vital Signs Last 24 Hrs  T(C): 36.6 (31 Dec 2021 06:38), Max: 36.6 (30 Dec 2021 12:00)  T(F): 97.8 (31 Dec 2021 06:38), Max: 97.8 (30 Dec 2021 12:00)  HR: 55 (31 Dec 2021 06:38) (52 - 57)  BP: 181/109 (31 Dec 2021 06:38) (133/75 - 181/109)  BP(mean): --  RR: 18 (31 Dec 2021 06:38) (18 - 18)  SpO2: 96% (31 Dec 2021 06:38) (96% - 100%)    CBC Full  -  ( 31 Dec 2021 06:15 )  WBC Count : 5.63 K/uL  RBC Count : 3.84 M/uL  Hemoglobin : 11.5 g/dL  Hematocrit : 35.3 %  Platelet Count - Automated : 185 K/uL  Mean Cell Volume : 91.9 fl  Mean Cell Hemoglobin : 29.9 pg  Mean Cell Hemoglobin Concentration : 32.6 gm/dL  Auto Neutrophil # : x  Auto Lymphocyte # : x  Auto Monocyte # : x  Auto Eosinophil # : x  Auto Basophil # : x  Auto Neutrophil % : x  Auto Lymphocyte % : x  Auto Monocyte % : x  Auto Eosinophil % : x  Auto Basophil % : x    12-31    139  |  107  |  9   ----------------------------<  86  4.0   |  23  |  0.93    Ca    9.4      31 Dec 2021 06:15  Phos  2.8     12-30  Mg     1.9     12-30    TPro  6.1  /  Alb  3.0<L>  /  TBili  1.0  /  DBili  x   /  AST  25  /  ALT  20  /  AlkPhos  65  12-30    LIVER FUNCTIONS - ( 30 Dec 2021 12:56 )  Alb: 3.0 g/dL / Pro: 6.1 g/dL / ALK PHOS: 65 U/L / ALT: 20 U/L / AST: 25 U/L / GGT: x               MICROBIOLOGY:  .Other Other  12-30-21   Testing in progress  --  --      .Surgical Swab extracted leads  12-30-21   No growth  --  --      .Blood Blood-Peripheral  12-28-21   No growth to date.  --  --      .Blood Blood-Peripheral  12-27-21   Growth in aerobic bottle: Methicillin Resistant Staphylococcus aureus  See previous culture [id number]  --    Growth in aerobic bottle: Gram Positive Cocci in Clusters      .Blood Blood-Peripheral  12-25-21   Growth in aerobic bottle: Escherichia coli See previous culture  [id number]  Growth in aerobic and anaerobic bottles: Methicillin Resistant  Staphylococcus aureus  ***Blood Panel PCR results on this specimen are available  approximately 3 hours after the Gram stain result.***  Gram stain, PCR, and/or culture results may not always  correspond due to difference in methodologies.  ************************************************************  This PCR assay was performed by multiplex PCR. This  Assay tests for 66 bacterial and resistance gene targets.  Please refer to the [facility] test directory  at [email or web address] for details.  --  Blood Culture PCR  Methicillin resistant Staphylococcus aureus      Clean Catch Clean Catch (Midstream)  12-25-21   >100,000 CFU/ml Escherichia coli  50,000 - 99,000 CFU/mL Methicillin Resistant Staphylococcus aureus  --  Escherichia coli  Methicillin resistant Staphylococcus aureus      .Blood Blood-Peripheral  12-25-21   Growth in aerobic and anaerobic bottles: Escherichia coli  ***Blood Panel PCR results on this specimen are available  approximately 3 hours after the Gram stain result.***  Gram stain, PCR, and/or culture results may not always  correspond due to difference in methodologies.  ************************************************************  This PCR assay was performed by multiplex PCR. This  Assay tests for 66 bacterial and resistance gene targets.  Please refer to the [facility] test directory  at [email or web address] for details.  --  Blood Culture PCR  Escherichia coli      Catheterized Catheterized  12-16-21   No growth  --  --              Vancomycin Level, Trough: 29.2 ug/mL (12-30-21 @ 12:56)  v    Rapid RVP Result: NotDetec (12-25 @ 10:42)          RADIOLOGY

## 2021-12-31 NOTE — PROVIDER CONTACT NOTE (CHANGE IN STATUS NOTIFICATION) - SITUATION
Patient with elevated blood pressure of 181/109 and heart rate of 55.
Patient increasingly combative and removing 2L nasal cannula oxygen.

## 2022-01-01 LAB
ANION GAP SERPL CALC-SCNC: 11 MMOL/L — SIGNIFICANT CHANGE UP (ref 5–17)
BUN SERPL-MCNC: 7 MG/DL — SIGNIFICANT CHANGE UP (ref 7–23)
CALCIUM SERPL-MCNC: 9.6 MG/DL — SIGNIFICANT CHANGE UP (ref 8.4–10.5)
CHLORIDE SERPL-SCNC: 104 MMOL/L — SIGNIFICANT CHANGE UP (ref 96–108)
CO2 SERPL-SCNC: 24 MMOL/L — SIGNIFICANT CHANGE UP (ref 22–31)
CREAT SERPL-MCNC: 0.82 MG/DL — SIGNIFICANT CHANGE UP (ref 0.5–1.3)
GLUCOSE SERPL-MCNC: 84 MG/DL — SIGNIFICANT CHANGE UP (ref 70–99)
HCT VFR BLD CALC: 33.7 % — LOW (ref 39–50)
HGB BLD-MCNC: 11.3 G/DL — LOW (ref 13–17)
MCHC RBC-ENTMCNC: 30.4 PG — SIGNIFICANT CHANGE UP (ref 27–34)
MCHC RBC-ENTMCNC: 33.5 GM/DL — SIGNIFICANT CHANGE UP (ref 32–36)
MCV RBC AUTO: 90.6 FL — SIGNIFICANT CHANGE UP (ref 80–100)
NRBC # BLD: 0 /100 WBCS — SIGNIFICANT CHANGE UP (ref 0–0)
PLATELET # BLD AUTO: 220 K/UL — SIGNIFICANT CHANGE UP (ref 150–400)
POTASSIUM SERPL-MCNC: 3.6 MMOL/L — SIGNIFICANT CHANGE UP (ref 3.5–5.3)
POTASSIUM SERPL-SCNC: 3.6 MMOL/L — SIGNIFICANT CHANGE UP (ref 3.5–5.3)
RBC # BLD: 3.72 M/UL — LOW (ref 4.2–5.8)
RBC # FLD: 12.7 % — SIGNIFICANT CHANGE UP (ref 10.3–14.5)
SODIUM SERPL-SCNC: 139 MMOL/L — SIGNIFICANT CHANGE UP (ref 135–145)
VANCOMYCIN TROUGH SERPL-MCNC: 18.8 UG/ML — SIGNIFICANT CHANGE UP (ref 10–20)
WBC # BLD: 4.53 K/UL — SIGNIFICANT CHANGE UP (ref 3.8–10.5)
WBC # FLD AUTO: 4.53 K/UL — SIGNIFICANT CHANGE UP (ref 3.8–10.5)

## 2022-01-01 RX ORDER — AMLODIPINE BESYLATE 2.5 MG/1
5 TABLET ORAL DAILY
Refills: 0 | Status: DISCONTINUED | OUTPATIENT
Start: 2022-01-01 | End: 2022-01-04

## 2022-01-01 RX ADMIN — SODIUM CHLORIDE 3 MILLILITER(S): 9 INJECTION INTRAMUSCULAR; INTRAVENOUS; SUBCUTANEOUS at 21:40

## 2022-01-01 RX ADMIN — AMLODIPINE BESYLATE 5 MILLIGRAM(S): 2.5 TABLET ORAL at 21:53

## 2022-01-01 RX ADMIN — CEFTRIAXONE 100 MILLIGRAM(S): 500 INJECTION, POWDER, FOR SOLUTION INTRAMUSCULAR; INTRAVENOUS at 05:10

## 2022-01-01 RX ADMIN — Medication 81 MILLIGRAM(S): at 13:10

## 2022-01-01 RX ADMIN — ISOSORBIDE MONONITRATE 30 MILLIGRAM(S): 60 TABLET, EXTENDED RELEASE ORAL at 13:10

## 2022-01-01 RX ADMIN — Medication 250 MILLIGRAM(S): at 05:24

## 2022-01-01 RX ADMIN — ATORVASTATIN CALCIUM 40 MILLIGRAM(S): 80 TABLET, FILM COATED ORAL at 21:47

## 2022-01-01 RX ADMIN — Medication 3 MILLIGRAM(S): at 21:47

## 2022-01-01 RX ADMIN — SODIUM CHLORIDE 3 MILLILITER(S): 9 INJECTION INTRAMUSCULAR; INTRAVENOUS; SUBCUTANEOUS at 07:05

## 2022-01-01 RX ADMIN — LISINOPRIL 10 MILLIGRAM(S): 2.5 TABLET ORAL at 05:17

## 2022-01-01 RX ADMIN — Medication 250 MILLIGRAM(S): at 18:50

## 2022-01-01 RX ADMIN — SODIUM CHLORIDE 3 MILLILITER(S): 9 INJECTION INTRAMUSCULAR; INTRAVENOUS; SUBCUTANEOUS at 05:04

## 2022-01-01 NOTE — PROGRESS NOTE ADULT - SUBJECTIVE AND OBJECTIVE BOX
Patient is a 81y old  Male who presents with a chief complaint of     1/1/2022  HPI:  Afebrile  No new symptoms, confused at times.    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    HTN (hypertension)    Diabetes    Hyperlipidemia    Dementia    Cardiac pacemaker  Medtronic (SN: [id number]; Model: [id number])        Review of Systems:   CONSTITUTIONAL: No fever, weight loss, or fatigue  EYES: No eye pain, visual disturbances, or discharge  ENMT:  No difficulty hearing, tinnitus, vertigo; No sinus or throat pain  NECK: No pain or stiffness  BREASTS: No pain, masses, or nipple discharge  RESPIRATORY: No cough, wheezing, chills or hemoptysis; No shortness of breath  CARDIOVASCULAR: No chest pain, palpitations, dizziness, or leg swelling  GASTROINTESTINAL: No abdominal or epigastric pain. No nausea, vomiting, or hematemesis; No diarrhea or constipation. No melena or hematochezia.  GENITOURINARY: No dysuria, frequency, hematuria, or incontinence  NEUROLOGICAL: No headaches, memory loss, loss of strength, numbness, or tremors  SKIN: No itching, burning, rashes, or lesions   LYMPH NODES: No enlarged glands  ENDOCRINE: No heat or cold intolerance; No hair loss  MUSCULOSKELETAL: No joint pain or swelling; No muscle, back, or extremity pain  PSYCHIATRIC: No depression, anxiety, mood swings, or difficulty sleeping  HEME/LYMPH: No easy bruising, or bleeding gums  ALLERY AND IMMUNOLOGIC: No hives or eczema    Allergies    No Known Allergies    Intolerances        Social History:     FAMILY HISTORY:  FH: diabetes mellitus  Mother, Brother    FHx: dementia  Father        MEDICATIONS  (STANDING):  aspirin enteric coated 81 milliGRAM(s) Oral daily  atorvastatin 40 milliGRAM(s) Oral at bedtime  cefTRIAXone   IVPB 1000 milliGRAM(s) IV Intermittent every 24 hours  heparin   Injectable 5000 Unit(s) SubCutaneous every 8 hours  isosorbide   mononitrate ER Tablet (IMDUR) 30 milliGRAM(s) Oral daily  lisinopril 10 milliGRAM(s) Oral daily  metoprolol succinate ER 25 milliGRAM(s) Oral daily  potassium chloride   Powder 20 milliEquivalent(s) Oral once  potassium phosphate / sodium phosphate Powder (PHOS-NaK) 1 Packet(s) Oral once  sodium chloride 0.9%. 1000 milliLiter(s) (75 mL/Hr) IV Continuous <Continuous>  vancomycin  IVPB 1250 milliGRAM(s) IV Intermittent every 12 hours  vancomycin  IVPB        MEDICATIONS  (PRN):  acetaminophen     Tablet .. 650 milliGRAM(s) Oral every 6 hours PRN Temp greater or equal to 38C (100.4F), Mild Pain (1 - 3)  aluminum hydroxide/magnesium hydroxide/simethicone Suspension 30 milliLiter(s) Oral every 4 hours PRN Dyspepsia  melatonin 3 milliGRAM(s) Oral at bedtime PRN Insomnia  ondansetron Injectable 4 milliGRAM(s) IV Push every 8 hours PRN Nausea and/or Vomiting        CAPILLARY BLOOD GLUCOSE      POCT Blood Glucose.: 105 mg/dL (27 Dec 2021 16:58)  POCT Blood Glucose.: 91 mg/dL (27 Dec 2021 12:17)  POCT Blood Glucose.: 71 mg/dL (27 Dec 2021 08:50)    I&O's Summary    26 Dec 2021 07:01  -  27 Dec 2021 07:00  --------------------------------------------------------  IN: 600 mL / OUT: 800 mL / NET: -200 mL    27 Dec 2021 07:01  -  27 Dec 2021 18:53  --------------------------------------------------------  IN: 0 mL / OUT: 700 mL / NET: -700 mL        PHYSICAL EXAM:  Vital Signs Last 24 Hrs  T(C): 37 (27 Dec 2021 14:03), Max: 37 (27 Dec 2021 01:18)  T(F): 98.6 (27 Dec 2021 14:03), Max: 98.6 (27 Dec 2021 01:18)  HR: 73 (27 Dec 2021 14:03) (60 - 73)  BP: 161/90 (27 Dec 2021 14:03) (108/72 - 161/90)  BP(mean): --  RR: 18 (27 Dec 2021 14:03) (18 - 19)  SpO2: 100% (27 Dec 2021 14:03) (98% - 100%)    GENERAL: NAD, well-developed  HEAD:  Atraumatic, Normocephalic  EYES: EOMI, PERRLA, conjunctiva and sclera clear  NECK: Supple, No JVD  CHEST/LUNG: Clear to auscultation bilaterally; No wheeze  HEART: Regular rate and rhythm; No murmurs, rubs, or gallops  ABDOMEN: Soft, Nontender, Nondistended; Bowel sounds present  EXTREMITIES:  2+ Peripheral Pulses, No clubbing, cyanosis, or edema  PSYCH: AAOx3  NEUROLOGY: non-focal  SKIN: No rashes or lesions    LABS:                        12.2   11.89 )-----------( 150      ( 27 Dec 2021 07:28 )             37.0     12-27    139  |  107  |  12  ----------------------------<  79  3.3<L>   |  21<L>  |  0.85    Ca    9.0      27 Dec 2021 07:28  Phos  2.1     12-27  Mg     1.80     12-27                RADIOLOGY & ADDITIONAL TESTS:    Imaging Personally Reviewed:    Consultant(s) Notes Reviewed:      Care Discussed with Consultants/Other Providers:

## 2022-01-02 LAB
-  AMPICILLIN/SULBACTAM: SIGNIFICANT CHANGE UP
-  CEFAZOLIN: SIGNIFICANT CHANGE UP
-  CLINDAMYCIN: SIGNIFICANT CHANGE UP
-  ERYTHROMYCIN: SIGNIFICANT CHANGE UP
-  GENTAMICIN: SIGNIFICANT CHANGE UP
-  OXACILLIN: SIGNIFICANT CHANGE UP
-  RIFAMPIN: SIGNIFICANT CHANGE UP
-  TETRACYCLINE: SIGNIFICANT CHANGE UP
-  TRIMETHOPRIM/SULFAMETHOXAZOLE: SIGNIFICANT CHANGE UP
-  VANCOMYCIN: SIGNIFICANT CHANGE UP
ALBUMIN SERPL ELPH-MCNC: 3.4 G/DL — SIGNIFICANT CHANGE UP (ref 3.3–5)
ALP SERPL-CCNC: 71 U/L — SIGNIFICANT CHANGE UP (ref 40–120)
ALT FLD-CCNC: 21 U/L — SIGNIFICANT CHANGE UP (ref 10–45)
ANION GAP SERPL CALC-SCNC: 10 MMOL/L — SIGNIFICANT CHANGE UP (ref 5–17)
AST SERPL-CCNC: 20 U/L — SIGNIFICANT CHANGE UP (ref 10–40)
BILIRUB SERPL-MCNC: 1 MG/DL — SIGNIFICANT CHANGE UP (ref 0.2–1.2)
BUN SERPL-MCNC: 10 MG/DL — SIGNIFICANT CHANGE UP (ref 7–23)
CALCIUM SERPL-MCNC: 9 MG/DL — SIGNIFICANT CHANGE UP (ref 8.4–10.5)
CHLORIDE SERPL-SCNC: 103 MMOL/L — SIGNIFICANT CHANGE UP (ref 96–108)
CO2 SERPL-SCNC: 25 MMOL/L — SIGNIFICANT CHANGE UP (ref 22–31)
CREAT SERPL-MCNC: 0.92 MG/DL — SIGNIFICANT CHANGE UP (ref 0.5–1.3)
CULTURE RESULTS: SIGNIFICANT CHANGE UP
CULTURE RESULTS: SIGNIFICANT CHANGE UP
GLUCOSE SERPL-MCNC: 96 MG/DL — SIGNIFICANT CHANGE UP (ref 70–99)
HCT VFR BLD CALC: 34.5 % — LOW (ref 39–50)
HGB BLD-MCNC: 11.6 G/DL — LOW (ref 13–17)
MCHC RBC-ENTMCNC: 30.3 PG — SIGNIFICANT CHANGE UP (ref 27–34)
MCHC RBC-ENTMCNC: 33.6 GM/DL — SIGNIFICANT CHANGE UP (ref 32–36)
MCV RBC AUTO: 90.1 FL — SIGNIFICANT CHANGE UP (ref 80–100)
METHOD TYPE: SIGNIFICANT CHANGE UP
NRBC # BLD: 0 /100 WBCS — SIGNIFICANT CHANGE UP (ref 0–0)
PLATELET # BLD AUTO: 248 K/UL — SIGNIFICANT CHANGE UP (ref 150–400)
POTASSIUM SERPL-MCNC: 3.5 MMOL/L — SIGNIFICANT CHANGE UP (ref 3.5–5.3)
POTASSIUM SERPL-SCNC: 3.5 MMOL/L — SIGNIFICANT CHANGE UP (ref 3.5–5.3)
PROT SERPL-MCNC: 6.4 G/DL — SIGNIFICANT CHANGE UP (ref 6–8.3)
RBC # BLD: 3.83 M/UL — LOW (ref 4.2–5.8)
RBC # FLD: 13 % — SIGNIFICANT CHANGE UP (ref 10.3–14.5)
SODIUM SERPL-SCNC: 138 MMOL/L — SIGNIFICANT CHANGE UP (ref 135–145)
SPECIMEN SOURCE: SIGNIFICANT CHANGE UP
SPECIMEN SOURCE: SIGNIFICANT CHANGE UP
WBC # BLD: 5.61 K/UL — SIGNIFICANT CHANGE UP (ref 3.8–10.5)
WBC # FLD AUTO: 5.61 K/UL — SIGNIFICANT CHANGE UP (ref 3.8–10.5)

## 2022-01-02 PROCEDURE — 71045 X-RAY EXAM CHEST 1 VIEW: CPT | Mod: 26

## 2022-01-02 RX ADMIN — Medication 250 MILLIGRAM(S): at 04:45

## 2022-01-02 RX ADMIN — ATORVASTATIN CALCIUM 40 MILLIGRAM(S): 80 TABLET, FILM COATED ORAL at 21:10

## 2022-01-02 RX ADMIN — SODIUM CHLORIDE 3 MILLILITER(S): 9 INJECTION INTRAMUSCULAR; INTRAVENOUS; SUBCUTANEOUS at 16:31

## 2022-01-02 RX ADMIN — Medication 81 MILLIGRAM(S): at 13:12

## 2022-01-02 RX ADMIN — Medication 250 MILLIGRAM(S): at 19:09

## 2022-01-02 RX ADMIN — LISINOPRIL 10 MILLIGRAM(S): 2.5 TABLET ORAL at 10:22

## 2022-01-02 RX ADMIN — Medication 3 MILLIGRAM(S): at 21:10

## 2022-01-02 RX ADMIN — ISOSORBIDE MONONITRATE 30 MILLIGRAM(S): 60 TABLET, EXTENDED RELEASE ORAL at 13:12

## 2022-01-02 RX ADMIN — Medication 650 MILLIGRAM(S): at 13:12

## 2022-01-02 RX ADMIN — Medication 1 MILLIGRAM(S): at 11:52

## 2022-01-02 RX ADMIN — SODIUM CHLORIDE 3 MILLILITER(S): 9 INJECTION INTRAMUSCULAR; INTRAVENOUS; SUBCUTANEOUS at 04:48

## 2022-01-02 RX ADMIN — SODIUM CHLORIDE 3 MILLILITER(S): 9 INJECTION INTRAMUSCULAR; INTRAVENOUS; SUBCUTANEOUS at 22:00

## 2022-01-02 RX ADMIN — CEFTRIAXONE 100 MILLIGRAM(S): 500 INJECTION, POWDER, FOR SOLUTION INTRAMUSCULAR; INTRAVENOUS at 04:45

## 2022-01-02 RX ADMIN — AMLODIPINE BESYLATE 5 MILLIGRAM(S): 2.5 TABLET ORAL at 21:10

## 2022-01-02 RX ADMIN — Medication 650 MILLIGRAM(S): at 15:04

## 2022-01-02 NOTE — CONSULT NOTE ADULT - SUBJECTIVE AND OBJECTIVE BOX
Vascular & Interventional Radiology    HPI: 81y Male with PMH of CAD s/p stent placement, CVA with residual right sided weakness and right upper extremity contracture, sinus node dysfunction s/p PPM, HTN, HLD, DM, asthma and dementia presented as a transfer from [facility] for pacemaker extraction. Pt initially presented to [facility] with hematuria, chills and lethargy. Pt was found to be febrile with leukocytosis in the setting of sepsis. Subsequent blood cultures revealing E. coli and MRSA. Pt was seen by ID and started on Ceftriaxone and Vancomycin for likely GI vs  source. Pt was seen by EP and pacemaker interrogation revealed sinus bradycardia; patient is not pacer dependent. In light of MRSA presence with a pacemaker, a device explant (including wires) is warranted to ensure complete resolution of bacteremia. Pt is now transferred to [facility] for pacemaker explant.    Planned for 6 weeks antibiotics. LUE PICC placed by PICC team in the L subclavian vein. Patient reportedly agitated/combative during placement.      Allergies:   Medications (Abx/Cardiac/Anticoagulation/Blood Products)  amLODIPine   Tablet: 5 milliGRAM(s) Oral (01-01 @ 21:53)  aspirin enteric coated: 81 milliGRAM(s) Oral (01-02 @ 13:12)  cefTRIAXone   IVPB: 100 mL/Hr IV Intermittent (01-02 @ 04:45)  isosorbide   mononitrate ER Tablet (IMDUR): 30 milliGRAM(s) Oral (01-02 @ 13:12)  lisinopril: 10 milliGRAM(s) Oral (01-02 @ 10:22)  vancomycin  IVPB: 250 mL/Hr IV Intermittent (01-02 @ 04:45)    Data:    T(C): 36.9  HR: 51  BP: 175/68  RR: 18  SpO2: 97%    -WBC 5.61 / HgB 11.6 / Hct 34.5 / Plt 248  -Na 138 / Cl 103 / BUN 10 / Glucose 96  -K 3.5 / CO2 25 / Cr 0.92  -ALT 21 / Alk Phos 71 / T.Bili 1.0    Imaging: [email or web address]    Assessment:   81y Male w/ bacteremia requiring prolonged abx.    Plan:   - Leave LUE PICC line in place.  - Will plan for PICC exchange, possible new PICC line placement Monday 1/3.  - Place IR procedure order under Dr. Macdonald.

## 2022-01-02 NOTE — PROGRESS NOTE ADULT - SUBJECTIVE AND OBJECTIVE BOX
Patient is a 81y old  Male who presents with a chief complaint of     1/2/2022.    HPI:  Afebrile. He denies dizziness or lightheadedness.  No new symptoms, confused at times.    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    HTN (hypertension)    Diabetes    Hyperlipidemia    Dementia    Cardiac pacemaker  Medtronic (SN: [id number]; Model: [id number])        Review of Systems:   CONSTITUTIONAL: No fever, weight loss, or fatigue  EYES: No eye pain, visual disturbances, or discharge  ENMT:  No difficulty hearing, tinnitus, vertigo; No sinus or throat pain  NECK: No pain or stiffness  BREASTS: No pain, masses, or nipple discharge  RESPIRATORY: No cough, wheezing, chills or hemoptysis; No shortness of breath  CARDIOVASCULAR: No chest pain, palpitations, dizziness, or leg swelling  GASTROINTESTINAL: No abdominal or epigastric pain. No nausea, vomiting, or hematemesis; No diarrhea or constipation. No melena or hematochezia.  GENITOURINARY: No dysuria, frequency, hematuria, or incontinence  NEUROLOGICAL: No headaches, memory loss, loss of strength, numbness, or tremors  SKIN: No itching, burning, rashes, or lesions   LYMPH NODES: No enlarged glands  ENDOCRINE: No heat or cold intolerance; No hair loss  MUSCULOSKELETAL: No joint pain or swelling; No muscle, back, or extremity pain  PSYCHIATRIC: No depression, anxiety, mood swings, or difficulty sleeping  HEME/LYMPH: No easy bruising, or bleeding gums  ALLERY AND IMMUNOLOGIC: No hives or eczema    Allergies    No Known Allergies    Intolerances        Social History:     FAMILY HISTORY:  FH: diabetes mellitus  Mother, Brother    FHx: dementia  Father        MEDICATIONS  (STANDING):  aspirin enteric coated 81 milliGRAM(s) Oral daily  atorvastatin 40 milliGRAM(s) Oral at bedtime  cefTRIAXone   IVPB 1000 milliGRAM(s) IV Intermittent every 24 hours  heparin   Injectable 5000 Unit(s) SubCutaneous every 8 hours  isosorbide   mononitrate ER Tablet (IMDUR) 30 milliGRAM(s) Oral daily  lisinopril 10 milliGRAM(s) Oral daily  metoprolol succinate ER 25 milliGRAM(s) Oral daily  potassium chloride   Powder 20 milliEquivalent(s) Oral once  potassium phosphate / sodium phosphate Powder (PHOS-NaK) 1 Packet(s) Oral once  sodium chloride 0.9%. 1000 milliLiter(s) (75 mL/Hr) IV Continuous <Continuous>  vancomycin  IVPB 1250 milliGRAM(s) IV Intermittent every 12 hours  vancomycin  IVPB        MEDICATIONS  (PRN):  acetaminophen     Tablet .. 650 milliGRAM(s) Oral every 6 hours PRN Temp greater or equal to 38C (100.4F), Mild Pain (1 - 3)  aluminum hydroxide/magnesium hydroxide/simethicone Suspension 30 milliLiter(s) Oral every 4 hours PRN Dyspepsia  melatonin 3 milliGRAM(s) Oral at bedtime PRN Insomnia  ondansetron Injectable 4 milliGRAM(s) IV Push every 8 hours PRN Nausea and/or Vomiting        CAPILLARY BLOOD GLUCOSE      POCT Blood Glucose.: 105 mg/dL (27 Dec 2021 16:58)  POCT Blood Glucose.: 91 mg/dL (27 Dec 2021 12:17)  POCT Blood Glucose.: 71 mg/dL (27 Dec 2021 08:50)    I&O's Summary    26 Dec 2021 07:01  -  27 Dec 2021 07:00  --------------------------------------------------------  IN: 600 mL / OUT: 800 mL / NET: -200 mL    27 Dec 2021 07:01  -  27 Dec 2021 18:53  --------------------------------------------------------  IN: 0 mL / OUT: 700 mL / NET: -700 mL        PHYSICAL EXAM:  Vital Signs Last 24 Hrs  T(C): 37 (27 Dec 2021 14:03), Max: 37 (27 Dec 2021 01:18)  T(F): 98.6 (27 Dec 2021 14:03), Max: 98.6 (27 Dec 2021 01:18)  HR: 73 (27 Dec 2021 14:03) (60 - 73)  BP: 161/90 (27 Dec 2021 14:03) (108/72 - 161/90)  BP(mean): --  RR: 18 (27 Dec 2021 14:03) (18 - 19)  SpO2: 100% (27 Dec 2021 14:03) (98% - 100%)    GENERAL: NAD, well-developed  HEAD:  Atraumatic, Normocephalic  EYES: EOMI, PERRLA, conjunctiva and sclera clear  NECK: Supple, No JVD  CHEST/LUNG: Clear to auscultation bilaterally; No wheeze  HEART: Regular rate and rhythm; No murmurs, rubs, or gallops  ABDOMEN: Soft, Nontender, Nondistended; Bowel sounds present  EXTREMITIES:  2+ Peripheral Pulses, No clubbing, cyanosis, or edema  PSYCH: AAOx3  NEUROLOGY: non-focal  SKIN: No rashes or lesions    LABS:                        12.2   11.89 )-----------( 150      ( 27 Dec 2021 07:28 )             37.0     12-27    139  |  107  |  12  ----------------------------<  79  3.3<L>   |  21<L>  |  0.85    Ca    9.0      27 Dec 2021 07:28  Phos  2.1     12-27  Mg     1.80     12-27                RADIOLOGY & ADDITIONAL TESTS:    Imaging Personally Reviewed:    Consultant(s) Notes Reviewed:      Care Discussed with Consultants/Other Providers:

## 2022-01-02 NOTE — PROVIDER CONTACT NOTE (OTHER) - ACTION/TREATMENT ORDERED:
PA aware, R2 pads placed on pt, cardiology to f/u as per PA
Metoprolol held, Lisinopril administered
Dressing: pressure dressing with telfa

## 2022-01-03 ENCOUNTER — TRANSCRIPTION ENCOUNTER (OUTPATIENT)
Age: 82
End: 2022-01-03

## 2022-01-03 LAB
ANION GAP SERPL CALC-SCNC: 13 MMOL/L — SIGNIFICANT CHANGE UP (ref 5–17)
BUN SERPL-MCNC: 8 MG/DL — SIGNIFICANT CHANGE UP (ref 7–23)
CALCIUM SERPL-MCNC: 9.1 MG/DL — SIGNIFICANT CHANGE UP (ref 8.4–10.5)
CHLORIDE SERPL-SCNC: 104 MMOL/L — SIGNIFICANT CHANGE UP (ref 96–108)
CO2 SERPL-SCNC: 22 MMOL/L — SIGNIFICANT CHANGE UP (ref 22–31)
CREAT SERPL-MCNC: 0.75 MG/DL — SIGNIFICANT CHANGE UP (ref 0.5–1.3)
CULTURE RESULTS: SIGNIFICANT CHANGE UP
GLUCOSE SERPL-MCNC: 88 MG/DL — SIGNIFICANT CHANGE UP (ref 70–99)
HCT VFR BLD CALC: 34.3 % — LOW (ref 39–50)
HGB BLD-MCNC: 11.6 G/DL — LOW (ref 13–17)
MCHC RBC-ENTMCNC: 30.1 PG — SIGNIFICANT CHANGE UP (ref 27–34)
MCHC RBC-ENTMCNC: 33.8 GM/DL — SIGNIFICANT CHANGE UP (ref 32–36)
MCV RBC AUTO: 88.9 FL — SIGNIFICANT CHANGE UP (ref 80–100)
NRBC # BLD: 0 /100 WBCS — SIGNIFICANT CHANGE UP (ref 0–0)
ORGANISM # SPEC MICROSCOPIC CNT: SIGNIFICANT CHANGE UP
ORGANISM # SPEC MICROSCOPIC CNT: SIGNIFICANT CHANGE UP
PLATELET # BLD AUTO: 250 K/UL — SIGNIFICANT CHANGE UP (ref 150–400)
POTASSIUM SERPL-MCNC: 3.6 MMOL/L — SIGNIFICANT CHANGE UP (ref 3.5–5.3)
POTASSIUM SERPL-SCNC: 3.6 MMOL/L — SIGNIFICANT CHANGE UP (ref 3.5–5.3)
RBC # BLD: 3.86 M/UL — LOW (ref 4.2–5.8)
RBC # FLD: 12.9 % — SIGNIFICANT CHANGE UP (ref 10.3–14.5)
SARS-COV-2 RNA SPEC QL NAA+PROBE: SIGNIFICANT CHANGE UP
SODIUM SERPL-SCNC: 139 MMOL/L — SIGNIFICANT CHANGE UP (ref 135–145)
SPECIMEN SOURCE: SIGNIFICANT CHANGE UP
VANCOMYCIN TROUGH SERPL-MCNC: 15.9 UG/ML — SIGNIFICANT CHANGE UP (ref 10–20)
WBC # BLD: 5.45 K/UL — SIGNIFICANT CHANGE UP (ref 3.8–10.5)
WBC # FLD AUTO: 5.45 K/UL — SIGNIFICANT CHANGE UP (ref 3.8–10.5)

## 2022-01-03 PROCEDURE — 99232 SBSQ HOSP IP/OBS MODERATE 35: CPT

## 2022-01-03 PROCEDURE — 36584 COMPL RPLCMT PICC RS&I: CPT

## 2022-01-03 RX ORDER — CHLORHEXIDINE GLUCONATE 213 G/1000ML
1 SOLUTION TOPICAL
Refills: 0 | Status: DISCONTINUED | OUTPATIENT
Start: 2022-01-03 | End: 2022-01-05

## 2022-01-03 RX ORDER — CEFTRIAXONE 500 MG/1
2 INJECTION, POWDER, FOR SOLUTION INTRAMUSCULAR; INTRAVENOUS
Qty: 35 | Refills: 0
Start: 2022-01-03 | End: 2022-02-06

## 2022-01-03 RX ORDER — VANCOMYCIN HCL 1 G
1 VIAL (EA) INTRAVENOUS
Qty: 70 | Refills: 0
Start: 2022-01-03 | End: 2022-02-06

## 2022-01-03 RX ADMIN — ATORVASTATIN CALCIUM 40 MILLIGRAM(S): 80 TABLET, FILM COATED ORAL at 22:16

## 2022-01-03 RX ADMIN — SODIUM CHLORIDE 3 MILLILITER(S): 9 INJECTION INTRAMUSCULAR; INTRAVENOUS; SUBCUTANEOUS at 13:29

## 2022-01-03 RX ADMIN — SODIUM CHLORIDE 3 MILLILITER(S): 9 INJECTION INTRAMUSCULAR; INTRAVENOUS; SUBCUTANEOUS at 05:14

## 2022-01-03 RX ADMIN — Medication 250 MILLIGRAM(S): at 05:16

## 2022-01-03 RX ADMIN — ISOSORBIDE MONONITRATE 30 MILLIGRAM(S): 60 TABLET, EXTENDED RELEASE ORAL at 11:34

## 2022-01-03 RX ADMIN — AMLODIPINE BESYLATE 5 MILLIGRAM(S): 2.5 TABLET ORAL at 22:16

## 2022-01-03 RX ADMIN — LISINOPRIL 10 MILLIGRAM(S): 2.5 TABLET ORAL at 05:16

## 2022-01-03 RX ADMIN — Medication 3 MILLIGRAM(S): at 22:15

## 2022-01-03 RX ADMIN — CEFTRIAXONE 100 MILLIGRAM(S): 500 INJECTION, POWDER, FOR SOLUTION INTRAMUSCULAR; INTRAVENOUS at 05:20

## 2022-01-03 RX ADMIN — Medication 250 MILLIGRAM(S): at 22:15

## 2022-01-03 RX ADMIN — SODIUM CHLORIDE 3 MILLILITER(S): 9 INJECTION INTRAMUSCULAR; INTRAVENOUS; SUBCUTANEOUS at 21:34

## 2022-01-03 RX ADMIN — Medication 81 MILLIGRAM(S): at 11:34

## 2022-01-03 NOTE — PROGRESS NOTE ADULT - SUBJECTIVE AND OBJECTIVE BOX
DESHAWN TIWARI 81y [id number]    Patient is a 81y old  Male who presents with a chief complaint of     Follow Up/CC:  ID following for bacteremia    Interval History/ROS: no fever    Allergies    No Known Allergies    Intolerances        ANTIMICROBIALS:  cefTRIAXone   IVPB 2000 every 24 hours  vancomycin  IVPB 1000 every 12 hours      MEDICATIONS  (STANDING):  amLODIPine   Tablet 5 milliGRAM(s) Oral daily  aspirin enteric coated 81 milliGRAM(s) Oral daily  atorvastatin 40 milliGRAM(s) Oral at bedtime  cefTRIAXone   IVPB 2000 milliGRAM(s) IV Intermittent every 24 hours  chlorhexidine 2% Cloths 1 Application(s) Topical <User Schedule>  isosorbide   mononitrate ER Tablet (IMDUR) 30 milliGRAM(s) Oral daily  lisinopril 10 milliGRAM(s) Oral daily  metoprolol succinate ER 25 milliGRAM(s) Oral daily  sodium chloride 0.9% lock flush 3 milliLiter(s) IV Push every 8 hours  vancomycin  IVPB 1000 milliGRAM(s) IV Intermittent every 12 hours    MEDICATIONS  (PRN):  acetaminophen     Tablet .. 650 milliGRAM(s) Oral every 6 hours PRN Temp greater or equal to 38C (100.4F), Mild Pain (1 - 3), Moderate Pain (4 - 6)  acetaminophen   IVPB .. 1000 milliGRAM(s) IV Intermittent once PRN Mild Pain (1 - 3)  melatonin 3 milliGRAM(s) Oral at bedtime PRN Insomnia        Vital Signs Last 24 Hrs  T(C): 36.5 (03 Jan 2022 12:02), Max: 37.4 (02 Jan 2022 21:55)  T(F): 97.7 (03 Jan 2022 12:02), Max: 99.3 (02 Jan 2022 21:55)  HR: 58 (03 Jan 2022 12:02) (51 - 58)  BP: 164/75 (03 Jan 2022 12:02) (151/64 - 175/68)  BP(mean): --  RR: 18 (03 Jan 2022 04:33) (18 - 18)  SpO2: 98% (03 Jan 2022 04:33) (94% - 98%)    CBC Full  -  ( 03 Jan 2022 09:49 )  WBC Count : 5.45 K/uL  RBC Count : 3.86 M/uL  Hemoglobin : 11.6 g/dL  Hematocrit : 34.3 %  Platelet Count - Automated : 250 K/uL  Mean Cell Volume : 88.9 fl  Mean Cell Hemoglobin : 30.1 pg  Mean Cell Hemoglobin Concentration : 33.8 gm/dL  Auto Neutrophil # : x  Auto Lymphocyte # : x  Auto Monocyte # : x  Auto Eosinophil # : x  Auto Basophil # : x  Auto Neutrophil % : x  Auto Lymphocyte % : x  Auto Monocyte % : x  Auto Eosinophil % : x  Auto Basophil % : x    01-03    139  |  104  |  8   ----------------------------<  88  3.6   |  22  |  0.75    Ca    9.1      03 Jan 2022 09:49    TPro  6.4  /  Alb  3.4  /  TBili  1.0  /  DBili  x   /  AST  20  /  ALT  21  /  AlkPhos  71  01-02    LIVER FUNCTIONS - ( 02 Jan 2022 09:49 )  Alb: 3.4 g/dL / Pro: 6.4 g/dL / ALK PHOS: 71 U/L / ALT: 21 U/L / AST: 20 U/L / GGT: x               MICROBIOLOGY:  .Blood Blood-Peripheral  12-30-21   No growth to date.  --  --      .Other Other  12-30-21   Culture is being performed.  --  --      .Surgical Swab extracted leads  12-30-21   Growth in fluid media only Staphylococcus epidermidis  --  Staphylococcus epidermidis      .Blood Blood-Peripheral  12-28-21   No Growth Final  --  --      .Blood Blood-Peripheral  12-27-21   Growth in aerobic bottle: Methicillin Resistant Staphylococcus aureus  See previous culture [id number]  --    Growth in aerobic bottle: Gram Positive Cocci in Clusters      .Blood Blood-Peripheral  12-25-21   Growth in aerobic bottle: Escherichia coli See previous culture  [id number]  Growth in aerobic and anaerobic bottles: Methicillin Resistant  Staphylococcus aureus  ***Blood Panel PCR results on this specimen are available  approximately 3 hours after the Gram stain result.***  Gram stain, PCR, and/or culture results may not always  correspond due to difference in methodologies.  ************************************************************  This PCR assay was performed by multiplex PCR. This  Assay tests for 66 bacterial and resistance gene targets.  Please refer to the [facility] test directory  at [email or web address] for details.  --  Blood Culture PCR  Methicillin resistant Staphylococcus aureus      Clean Catch Clean Catch (Midstream)  12-25-21   >100,000 CFU/ml Escherichia coli  50,000 - 99,000 CFU/mL Methicillin Resistant Staphylococcus aureus  --  Escherichia coli  Methicillin resistant Staphylococcus aureus      .Blood Blood-Peripheral  12-25-21   Growth in aerobic and anaerobic bottles: Escherichia coli  ***Blood Panel PCR results on this specimen are available  approximately 3 hours after the Gram stain result.***  Gram stain, PCR, and/or culture results may not always  correspond due to difference in methodologies.  ************************************************************  This PCR assay was performed by multiplex PCR. This  Assay tests for 66 bacterial and resistance gene targets.  Please refer to the [facility] test directory  at [email or web address] for details.  --  Blood Culture PCR  Escherichia coli      Catheterized Catheterized  12-16-21   No growth  --  --          RADIOLOGY    < from: Xray Chest 1 View- PORTABLE-Urgent (Xray Chest 1 View- PORTABLE-Urgent .) (01.02.22 @ 12:58) >  INTERPRETATION:  left PICC line terminates in the region of the left   subclavian vein. recommend repositioning.    < end of copied text >

## 2022-01-03 NOTE — DISCHARGE NOTE PROVIDER - NSDCCPTREATMENT_GEN_ALL_CORE_FT
PRINCIPAL PROCEDURE  Procedure: Blood culture w Staph aureus and MRSA PCR  Findings and Treatment:       SECONDARY PROCEDURE  Procedure: Urinary tract infection (UTI) assessment  Findings and Treatment:     Procedure: Debridement, skin, for infection  Findings and Treatment:     Procedure: Extraction of electrode lead of cardiac pacemaker with fluoroscopic guidance  Findings and Treatment:

## 2022-01-03 NOTE — DISCHARGE NOTE PROVIDER - CARE PROVIDER_API CALL
Shan Light; MBBS)  Infectious Disease; Internal Medicine  [address] [place] [address]  Phone: [phone]  Fax: [phone]  Established Patient  Follow Up Time: 1 week

## 2022-01-03 NOTE — PROGRESS NOTE ADULT - SUBJECTIVE AND OBJECTIVE BOX
Patient is a 81y old  Male who presents with a chief complaint of     1/3/2022.    HPI:  Afebrile. He denies dizziness or lightheadedness.  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    HTN (hypertension)    Diabetes    Hyperlipidemia    Dementia    Cardiac pacemaker  Medtronic (SN: [id number]; Model: [id number])        Review of Systems:   CONSTITUTIONAL: No fever, weight loss, or fatigue  EYES: No eye pain, visual disturbances, or discharge  ENMT:  No difficulty hearing, tinnitus, vertigo; No sinus or throat pain  NECK: No pain or stiffness  BREASTS: No pain, masses, or nipple discharge  RESPIRATORY: No cough, wheezing, chills or hemoptysis; No shortness of breath  CARDIOVASCULAR: No chest pain, palpitations, dizziness, or leg swelling  GASTROINTESTINAL: No abdominal or epigastric pain. No nausea, vomiting, or hematemesis; No diarrhea or constipation. No melena or hematochezia.  GENITOURINARY: No dysuria, frequency, hematuria, or incontinence  NEUROLOGICAL: No headaches, memory loss, loss of strength, numbness, or tremors  SKIN: No itching, burning, rashes, or lesions   LYMPH NODES: No enlarged glands  ENDOCRINE: No heat or cold intolerance; No hair loss  MUSCULOSKELETAL: No joint pain or swelling; No muscle, back, or extremity pain  PSYCHIATRIC: No depression, anxiety, mood swings, or difficulty sleeping  HEME/LYMPH: No easy bruising, or bleeding gums  ALLERY AND IMMUNOLOGIC: No hives or eczema    Allergies    No Known Allergies    Intolerances        Social History:     FAMILY HISTORY:  FH: diabetes mellitus  Mother, Brother    FHx: dementia  Father        MEDICATIONS  (STANDING):  aspirin enteric coated 81 milliGRAM(s) Oral daily  atorvastatin 40 milliGRAM(s) Oral at bedtime  cefTRIAXone   IVPB 1000 milliGRAM(s) IV Intermittent every 24 hours  heparin   Injectable 5000 Unit(s) SubCutaneous every 8 hours  isosorbide   mononitrate ER Tablet (IMDUR) 30 milliGRAM(s) Oral daily  lisinopril 10 milliGRAM(s) Oral daily  metoprolol succinate ER 25 milliGRAM(s) Oral daily  potassium chloride   Powder 20 milliEquivalent(s) Oral once  potassium phosphate / sodium phosphate Powder (PHOS-NaK) 1 Packet(s) Oral once  sodium chloride 0.9%. 1000 milliLiter(s) (75 mL/Hr) IV Continuous <Continuous>  vancomycin  IVPB 1250 milliGRAM(s) IV Intermittent every 12 hours  vancomycin  IVPB        MEDICATIONS  (PRN):  acetaminophen     Tablet .. 650 milliGRAM(s) Oral every 6 hours PRN Temp greater or equal to 38C (100.4F), Mild Pain (1 - 3)  aluminum hydroxide/magnesium hydroxide/simethicone Suspension 30 milliLiter(s) Oral every 4 hours PRN Dyspepsia  melatonin 3 milliGRAM(s) Oral at bedtime PRN Insomnia  ondansetron Injectable 4 milliGRAM(s) IV Push every 8 hours PRN Nausea and/or Vomiting        CAPILLARY BLOOD GLUCOSE      POCT Blood Glucose.: 105 mg/dL (27 Dec 2021 16:58)  POCT Blood Glucose.: 91 mg/dL (27 Dec 2021 12:17)  POCT Blood Glucose.: 71 mg/dL (27 Dec 2021 08:50)    I&O's Summary    26 Dec 2021 07:01  -  27 Dec 2021 07:00  --------------------------------------------------------  IN: 600 mL / OUT: 800 mL / NET: -200 mL    27 Dec 2021 07:01  -  27 Dec 2021 18:53  --------------------------------------------------------  IN: 0 mL / OUT: 700 mL / NET: -700 mL        PHYSICAL EXAM:  Vital Signs Last 24 Hrs  T(C): 37 (27 Dec 2021 14:03), Max: 37 (27 Dec 2021 01:18)  T(F): 98.6 (27 Dec 2021 14:03), Max: 98.6 (27 Dec 2021 01:18)  HR: 73 (27 Dec 2021 14:03) (60 - 73)  BP: 161/90 (27 Dec 2021 14:03) (108/72 - 161/90)  BP(mean): --  RR: 18 (27 Dec 2021 14:03) (18 - 19)  SpO2: 100% (27 Dec 2021 14:03) (98% - 100%)    GENERAL: NAD, well-developed  HEAD:  Atraumatic, Normocephalic  EYES: EOMI, PERRLA, conjunctiva and sclera clear  NECK: Supple, No JVD  CHEST/LUNG: Clear to auscultation bilaterally; No wheeze  HEART: Regular rate and rhythm; No murmurs, rubs, or gallops  ABDOMEN: Soft, Nontender, Nondistended; Bowel sounds present  EXTREMITIES:  2+ Peripheral Pulses, No clubbing, cyanosis, or edema  PSYCH: AAOx3  NEUROLOGY: non-focal  SKIN: No rashes or lesions    LABS:                        12.2   11.89 )-----------( 150      ( 27 Dec 2021 07:28 )             37.0     12-27    139  |  107  |  12  ----------------------------<  79  3.3<L>   |  21<L>  |  0.85    Ca    9.0      27 Dec 2021 07:28  Phos  2.1     12-27  Mg     1.80     12-27                RADIOLOGY & ADDITIONAL TESTS:    Imaging Personally Reviewed:    Consultant(s) Notes Reviewed:      Care Discussed with Consultants/Other Providers:

## 2022-01-03 NOTE — DISCHARGE NOTE PROVIDER - NSDCCPCAREPLAN_GEN_ALL_CORE_FT
PRINCIPAL DISCHARGE DIAGNOSIS  Diagnosis: Mild HTN  Assessment and Plan of Treatment: Your blood pressure will be controlled.   Continue with your blood pressure medications; eat a heart healthy diet with low salt diet; exercise regularly (consult with your physician or cardiologist first); maintain a heart healthy weight; if you smoke - quit (A resource to help you stop smoking is the [facility] – phone number [phone].); include healthy ways to manage stress. Continue to follow with your primary care physician or cardiologist.

## 2022-01-03 NOTE — PROGRESS NOTE ADULT - NSPROGADDITIONALINFOA_GEN_ALL_CORE
ID coverage available over New Year 3-day weekend (Dec 31-Jan 2) if needed. Call #[phone] for questions/concerns.
D/w GOODMAN (Yoon)
ID coverage available over New Year 3-day weekend (Dec 31-Jan 2) if needed. Call #[phone] for questions/concerns.

## 2022-01-03 NOTE — DISCHARGE NOTE PROVIDER - NSDCMRMEDTOKEN_GEN_ALL_CORE_FT
aspirin 81 mg oral delayed release tablet: 1 tab(s) orally once a day  atorvastatin 40 mg oral tablet: 1 tab(s) orally once a day (at bedtime)  benazepril 10 mg oral tablet: 1 tab(s) orally once a day  isosorbide mononitrate 30 mg oral tablet, extended release: 1 tab(s) orally once a day  metoprolol succinate 25 mg oral tablet, extended release: 1 tab(s) orally once a day   aspirin 81 mg oral delayed release tablet: 1 tab(s) orally once a day  atorvastatin 40 mg oral tablet: 1 tab(s) orally once a day (at bedtime)  benazepril 10 mg oral tablet: 1 tab(s) orally once a day  cefTRIAXone 2 g/50 mL-iso-osmotic dextrose intravenous solution: 2 gram(s) intravenously once a day from 12/26/21 to 2/6/22  isosorbide mononitrate 30 mg oral tablet, extended release: 1 tab(s) orally once a day  metoprolol succinate 25 mg oral tablet, extended release: 1 tab(s) orally once a day  normal saline 10ml flush  before and after iv antibiotics: 10cc normal saline flush before and after antibiotic   vancomycin 1 g/250 mL-D5% intravenous solution: 1 gram(s) intravenously every 12 hours from 12/26/21 to 2/6/22.  weekly CBC with diff and CMP to Dr. Light.  fax number [phone]:    amLODIPine 10 mg oral tablet: 1 tab(s) orally once a day  aspirin 81 mg oral delayed release tablet: 1 tab(s) orally once a day  atorvastatin 40 mg oral tablet: 1 tab(s) orally once a day (at bedtime)  benazepril 10 mg oral tablet: 1 tab(s) orally once a day  cefTRIAXone 2 g/50 mL-iso-osmotic dextrose intravenous solution: 2 gram(s) intravenously once a day from 12/26/21 to 2/6/22  isosorbide mononitrate 30 mg oral tablet, extended release: 1 tab(s) orally once a day  metoprolol succinate 25 mg oral tablet, extended release: 1 tab(s) orally once a day  vancomycin 1 g/250 mL-D5% intravenous solution: 1 gram(s) intravenously every 12 hours from 12/26/21 to 2/6/22.  weekly CBC with diff and CMP to Dr. Light.  fax number [phone]:

## 2022-01-03 NOTE — DISCHARGE NOTE PROVIDER - HOSPITAL COURSE
81yoM with PMHx of CVA with RT sided weakness, dementia, HTN, CAD s/p stent 10 years ago, PPM, asthma BIBEMS for hematuria.  In the ED with fever 104.1F, leukocytosis, tachycardia, found to have E. coli  (CXT-M gene neg) and MRSA bacteremia. On last admission 12/16 - 12/21/21 patient found to have RLL atelectasis, hematuria, and stercoral colitis. UA gross positive. Likely complicated UTI in the setting of bladder stones. gross hematuria resolved and H/H stable. Seen by Urology during last admission for bladder stones. outpt uro f/u. c/w vanco & CTX for total 6 wks. s/p PICC by IR.  pt remains stable for DC and will f/u with PCP in 1 week, EP and ID as routine. 81yoM with PMHx of CVA with RT sided weakness, dementia, HTN, CAD s/p stent 10 years ago, PPM, asthma BIBEMS for hematuria.  In the ED with fever 104.1F, leukocytosis, tachycardia, found to have E. coli  (CXT-M gene neg) and MRSA bacteremia. On last admission 12/16 - 12/21/21 patient found to have RLL atelectasis, hematuria, and stercoral colitis. UA gross positive. Likely complicated UTI in the setting of bladder stones. gross hematuria resolved and H/H stable. Seen by Urology during last admission for bladder stones. outpt uro f/u. c/w vanco & CTX for total 6 wks. s/p PICC by IR.  E Coli sepsis is resolved, patient will need 6 weeks of IV antibiotics. PICC placed by IR on 1/3/22. Problem: Sepsis, unspecified organism. E Coli sepsis is resolved, will need  6 weeks of IV antibiotics. PICC placed by IR on 1/3/22.  Gross hematuria, now resolved. Patient will follow up with Urology, outpatient for kidney stone. History of CAD (coronary artery disease), S/P stent 10 years ago, asymptomatic. Cont ASA, Imdur, Metoprolol, Statin. Bradycardia noted. S/P Bacteremia;  s/p PPM extraction 12/29/21. PM extraction site flat, no hematoma. For BP control Norvasc 5 mg, increased to 10Mg. History of CVA (cerebrovascular accident), with right hemiparesis, Cont. ASA, Statin.

## 2022-01-03 NOTE — PRE PROCEDURE NOTE - PRE PROCEDURE EVALUATION
Interventional Radiology    HPI:81y Male w/ bacteremia requiring prolonged abx referred to IR for picc placement.    Allergies: NKDA    Medications (Abx/Cardiac/Anticoagulation/Blood Products)  amLODIPine   Tablet: 5 milliGRAM(s) Oral (01-02 @ 21:10)  aspirin enteric coated: 81 milliGRAM(s) Oral (01-03 @ 11:34)  cefTRIAXone   IVPB: 100 mL/Hr IV Intermittent (01-03 @ 05:20)  isosorbide   mononitrate ER Tablet (IMDUR): 30 milliGRAM(s) Oral (01-03 @ 11:34)  lisinopril: 10 milliGRAM(s) Oral (01-03 @ 05:16)  vancomycin  IVPB: 250 mL/Hr IV Intermittent (01-03 @ 05:16)    Data:    T(C): 36.5  HR: 58  BP: 164/75  RR: 18  SpO2: 98%    Exam  General: No acute distress  Chest: Non labored breathing  Abdomen: Non-distended  Extremities: No swelling, warm    -WBC 5.45 / HgB 11.6 / Hct 34.3 / Plt 250  -Na 139 / Cl 104 / BUN 8 / Glucose 88  -K 3.6 / CO2 22 / Cr 0.75  -ALT -- / Alk Phos -- / T.Bili --    Plan: 81y Male presents for picc placement  -Risks/Benefits/alternatives explained with the wife/ healthcare proxy and witnessed informed consent obtained.

## 2022-01-03 NOTE — PROGRESS NOTE ADULT - ASSESSMENT
81 year old male with CVA with rt sided weakness, dementia, HTN, CAD s/p stent 10 years ago, PPM, Asthma presenting with hematuria. Remains with valencia, no hematuria currently. In the ED with fever 104.1F, leukocytosis, tachycardia, found to have E. coli  (CXT-M gene neg) and MRSA bacteremia. On last admission 12/16 - 12/21/21 patient found to have RLL atelectasis, hematuria, and stercoral colitis.     Blood cxs:  12/25 - E. coli  12/25 - E. coli and MRSA  12/27 - GPC clusters    Shan Light  Attending Physician   Division of Infectious Disease  Pager #[phone]  Available on Microsoft Teams also  After 5pm/weekend or no response, call #[phone]

## 2022-01-03 NOTE — PROCEDURE NOTE - NSINFORMCONSENT_GEN_A_CORE
by patient's family/Benefits, risks, and possible complications of procedure explained to patient/caregiver who verbalized understanding and gave verbal consent.

## 2022-01-03 NOTE — PROCEDURE NOTE - PROCEDURE FINDINGS AND DETAILS
Successful Left PICC Exchange for a 47 cm SL 5 Macedonian PICC with the tip located at the superior cavoatrial junction.

## 2022-01-03 NOTE — PRE-OP CHECKLIST - SURGICAL CONSENT
done Cheiloplasty (Less Than 50%) Text: A decision was made to reconstruct the defect with a  cheiloplasty.  The defect was undermined extensively.  Additional obicularis oris muscle was excised with a 15c blade scalpel.  The defect was converted into a full thickness wedge, of less than 50% of the vertical height of the lip, to facilite a better cosmetic result.  Small vessels were then tied off with 5-0 monocyrl. The obicularis oris, superficial fascia, adipose and dermis were then reapproximated.  After the deeper layers were approximated the epidermis was reapproximated with particular care given to realign the vermilion border.

## 2022-01-04 LAB
ANION GAP SERPL CALC-SCNC: 10 MMOL/L — SIGNIFICANT CHANGE UP (ref 5–17)
BUN SERPL-MCNC: 8 MG/DL — SIGNIFICANT CHANGE UP (ref 7–23)
CALCIUM SERPL-MCNC: 8.6 MG/DL — SIGNIFICANT CHANGE UP (ref 8.4–10.5)
CHLORIDE SERPL-SCNC: 105 MMOL/L — SIGNIFICANT CHANGE UP (ref 96–108)
CO2 SERPL-SCNC: 24 MMOL/L — SIGNIFICANT CHANGE UP (ref 22–31)
CREAT SERPL-MCNC: 0.78 MG/DL — SIGNIFICANT CHANGE UP (ref 0.5–1.3)
CULTURE RESULTS: SIGNIFICANT CHANGE UP
CULTURE RESULTS: SIGNIFICANT CHANGE UP
GLUCOSE SERPL-MCNC: 85 MG/DL — SIGNIFICANT CHANGE UP (ref 70–99)
HCT VFR BLD CALC: 29 % — LOW (ref 39–50)
HGB BLD-MCNC: 9.6 G/DL — LOW (ref 13–17)
MCHC RBC-ENTMCNC: 30.3 PG — SIGNIFICANT CHANGE UP (ref 27–34)
MCHC RBC-ENTMCNC: 33.1 GM/DL — SIGNIFICANT CHANGE UP (ref 32–36)
MCV RBC AUTO: 91.5 FL — SIGNIFICANT CHANGE UP (ref 80–100)
NRBC # BLD: 0 /100 WBCS — SIGNIFICANT CHANGE UP (ref 0–0)
PLATELET # BLD AUTO: 234 K/UL — SIGNIFICANT CHANGE UP (ref 150–400)
POTASSIUM SERPL-MCNC: 3.2 MMOL/L — LOW (ref 3.5–5.3)
POTASSIUM SERPL-SCNC: 3.2 MMOL/L — LOW (ref 3.5–5.3)
RBC # BLD: 3.17 M/UL — LOW (ref 4.2–5.8)
RBC # FLD: 12.9 % — SIGNIFICANT CHANGE UP (ref 10.3–14.5)
SODIUM SERPL-SCNC: 139 MMOL/L — SIGNIFICANT CHANGE UP (ref 135–145)
SPECIMEN SOURCE: SIGNIFICANT CHANGE UP
SPECIMEN SOURCE: SIGNIFICANT CHANGE UP
WBC # BLD: 4.64 K/UL — SIGNIFICANT CHANGE UP (ref 3.8–10.5)
WBC # FLD AUTO: 4.64 K/UL — SIGNIFICANT CHANGE UP (ref 3.8–10.5)

## 2022-01-04 PROCEDURE — 99232 SBSQ HOSP IP/OBS MODERATE 35: CPT

## 2022-01-04 RX ORDER — AMLODIPINE BESYLATE 2.5 MG/1
1 TABLET ORAL
Qty: 0 | Refills: 0 | DISCHARGE
Start: 2022-01-04

## 2022-01-04 RX ORDER — CEFTRIAXONE 500 MG/1
2000 INJECTION, POWDER, FOR SOLUTION INTRAMUSCULAR; INTRAVENOUS
Refills: 0 | Status: DISCONTINUED | OUTPATIENT
Start: 2022-01-04 | End: 2022-01-05

## 2022-01-04 RX ORDER — AMLODIPINE BESYLATE 2.5 MG/1
10 TABLET ORAL DAILY
Refills: 0 | Status: DISCONTINUED | OUTPATIENT
Start: 2022-01-04 | End: 2022-01-05

## 2022-01-04 RX ORDER — VANCOMYCIN HCL 1 G
1000 VIAL (EA) INTRAVENOUS EVERY 12 HOURS
Refills: 0 | Status: DISCONTINUED | OUTPATIENT
Start: 2022-01-04 | End: 2022-01-05

## 2022-01-04 RX ORDER — HEPARIN SODIUM 5000 [USP'U]/ML
5000 INJECTION INTRAVENOUS; SUBCUTANEOUS EVERY 12 HOURS
Refills: 0 | Status: DISCONTINUED | OUTPATIENT
Start: 2022-01-04 | End: 2022-01-05

## 2022-01-04 RX ORDER — POTASSIUM CHLORIDE 20 MEQ
20 PACKET (EA) ORAL
Refills: 0 | Status: COMPLETED | OUTPATIENT
Start: 2022-01-04 | End: 2022-01-04

## 2022-01-04 RX ADMIN — ATORVASTATIN CALCIUM 40 MILLIGRAM(S): 80 TABLET, FILM COATED ORAL at 21:03

## 2022-01-04 RX ADMIN — Medication 250 MILLIGRAM(S): at 13:45

## 2022-01-04 RX ADMIN — Medication 20 MILLIEQUIVALENT(S): at 16:02

## 2022-01-04 RX ADMIN — Medication 3 MILLIGRAM(S): at 21:03

## 2022-01-04 RX ADMIN — CEFTRIAXONE 100 MILLIGRAM(S): 500 INJECTION, POWDER, FOR SOLUTION INTRAMUSCULAR; INTRAVENOUS at 05:05

## 2022-01-04 RX ADMIN — CHLORHEXIDINE GLUCONATE 1 APPLICATION(S): 213 SOLUTION TOPICAL at 10:58

## 2022-01-04 RX ADMIN — SODIUM CHLORIDE 3 MILLILITER(S): 9 INJECTION INTRAMUSCULAR; INTRAVENOUS; SUBCUTANEOUS at 05:02

## 2022-01-04 RX ADMIN — Medication 250 MILLIGRAM(S): at 21:03

## 2022-01-04 RX ADMIN — Medication 20 MILLIEQUIVALENT(S): at 13:46

## 2022-01-04 RX ADMIN — LISINOPRIL 10 MILLIGRAM(S): 2.5 TABLET ORAL at 05:03

## 2022-01-04 RX ADMIN — HEPARIN SODIUM 5000 UNIT(S): 5000 INJECTION INTRAVENOUS; SUBCUTANEOUS at 17:45

## 2022-01-04 RX ADMIN — SODIUM CHLORIDE 3 MILLILITER(S): 9 INJECTION INTRAMUSCULAR; INTRAVENOUS; SUBCUTANEOUS at 12:00

## 2022-01-04 RX ADMIN — ISOSORBIDE MONONITRATE 30 MILLIGRAM(S): 60 TABLET, EXTENDED RELEASE ORAL at 13:42

## 2022-01-04 RX ADMIN — Medication 81 MILLIGRAM(S): at 13:47

## 2022-01-04 RX ADMIN — SODIUM CHLORIDE 3 MILLILITER(S): 9 INJECTION INTRAMUSCULAR; INTRAVENOUS; SUBCUTANEOUS at 21:01

## 2022-01-04 NOTE — PROGRESS NOTE ADULT - PROBLEM SELECTOR PLAN 7
Chronic. More lethargic now in the setting of urosepsis   -NPO except meds  -Swallow evaluation  -Aspiration precaution   -Outpatient f/u
Confused at times.
Chronic. More lethargic now in the setting of urosepsis   -NPO except meds  -Swallow evaluation  -Aspiration precaution   -Outpatient f/u
Confused at times.
Confused at times.

## 2022-01-04 NOTE — PROGRESS NOTE ADULT - ASSESSMENT
81 year old male with CVA with rt sided weakness, dementia, HTN, CAD s/p stent 10 years ago, PPM, Asthma presenting with hematuria. Remains with valencia, no hematuria currently. In the ED with fever 104.1F, leukocytosis, tachycardia, found to have E. coli  (CXT-M gene neg) and MRSA bacteremia. On last admission 12/16 - 12/21/21 patient found to have RLL atelectasis, hematuria, and stercoral colitis.     Blood cxs:  12/25 - E. coli  12/25 - E. coli and MRSA  12/27 - GPC clusters    Shan Light  Attending Physician   Division of Infectious Disease  Pager #[phone]  Available on Microsoft Teams also  After 5pm/weekend or no response, call #[phone]

## 2022-01-04 NOTE — PROGRESS NOTE ADULT - PROBLEM SELECTOR PROBLEM 6
Benign essential HTN

## 2022-01-04 NOTE — PROGRESS NOTE ADULT - PROBLEM SELECTOR PROBLEM 8
H/O: CVA (cerebrovascular accident)

## 2022-01-04 NOTE — PROGRESS NOTE ADULT - SUBJECTIVE AND OBJECTIVE BOX
DESHAWN TIWARI 81y [id number]    Patient is a 81y old  Male who presents with a chief complaint of     Follow Up/CC:  ID following for bacteremia    Interval History/ROS: no fever, no acute issues    Allergies    No Known Allergies    Intolerances        ANTIMICROBIALS:  cefTRIAXone   IVPB 2000 every 24 hours  vancomycin  IVPB 1000 every 12 hours      MEDICATIONS  (STANDING):  amLODIPine   Tablet 5 milliGRAM(s) Oral daily  aspirin enteric coated 81 milliGRAM(s) Oral daily  atorvastatin 40 milliGRAM(s) Oral at bedtime  cefTRIAXone   IVPB 2000 milliGRAM(s) IV Intermittent every 24 hours  chlorhexidine 2% Cloths 1 Application(s) Topical <User Schedule>  chlorhexidine 4% Liquid 1 Application(s) Topical <User Schedule>  isosorbide   mononitrate ER Tablet (IMDUR) 30 milliGRAM(s) Oral daily  lisinopril 10 milliGRAM(s) Oral daily  metoprolol succinate ER 25 milliGRAM(s) Oral daily  sodium chloride 0.9% lock flush 3 milliLiter(s) IV Push every 8 hours  vancomycin  IVPB 1000 milliGRAM(s) IV Intermittent every 12 hours    MEDICATIONS  (PRN):  acetaminophen     Tablet .. 650 milliGRAM(s) Oral every 6 hours PRN Temp greater or equal to 38C (100.4F), Mild Pain (1 - 3), Moderate Pain (4 - 6)  acetaminophen   IVPB .. 1000 milliGRAM(s) IV Intermittent once PRN Mild Pain (1 - 3)  melatonin 3 milliGRAM(s) Oral at bedtime PRN Insomnia        Vital Signs Last 24 Hrs  T(C): 36.7 (04 Jan 2022 05:00), Max: 36.7 (03 Jan 2022 17:02)  T(F): 98 (04 Jan 2022 05:00), Max: 98 (03 Jan 2022 17:02)  HR: 52 (04 Jan 2022 05:00) (52 - 66)  BP: 115/74 (04 Jan 2022 05:00) (115/74 - 168/98)  BP(mean): --  RR: 18 (04 Jan 2022 05:00) (18 - 18)  SpO2: 98% (04 Jan 2022 05:00) (98% - 100%)    CBC Full  -  ( 04 Jan 2022 05:53 )  WBC Count : 4.64 K/uL  RBC Count : 3.17 M/uL  Hemoglobin : 9.6 g/dL  Hematocrit : 29.0 %  Platelet Count - Automated : 234 K/uL  Mean Cell Volume : 91.5 fl  Mean Cell Hemoglobin : 30.3 pg  Mean Cell Hemoglobin Concentration : 33.1 gm/dL  Auto Neutrophil # : x  Auto Lymphocyte # : x  Auto Monocyte # : x  Auto Eosinophil # : x  Auto Basophil # : x  Auto Neutrophil % : x  Auto Lymphocyte % : x  Auto Monocyte % : x  Auto Eosinophil % : x  Auto Basophil % : x    01-04    139  |  105  |  8   ----------------------------<  85  3.2<L>   |  24  |  0.78    Ca    8.6      04 Jan 2022 05:53      MICROBIOLOGY:  .Blood Blood-Peripheral  12-30-21   No growth to date.  --  --      .Other Other  12-30-21   Culture is being performed.  --  --      .Surgical Swab extracted leads  12-30-21   Growth in fluid media only Staphylococcus epidermidis  --  Staphylococcus epidermidis      .Blood Blood-Peripheral  12-28-21   No Growth Final  --  --      .Blood Blood-Peripheral  12-27-21   Growth in aerobic bottle: Methicillin Resistant Staphylococcus aureus  See previous culture [id number]  --    Growth in aerobic bottle: Gram Positive Cocci in Clusters      .Blood Blood-Peripheral  12-25-21   Growth in aerobic bottle: Escherichia coli See previous culture  [id number]  Growth in aerobic and anaerobic bottles: Methicillin Resistant  Staphylococcus aureus  ***Blood Panel PCR results on this specimen are available  approximately 3 hours after the Gram stain result.***  Gram stain, PCR, and/or culture results may not always  correspond due to difference in methodologies.  ************************************************************  This PCR assay was performed by multiplex PCR. This  Assay tests for 66 bacterial and resistance gene targets.  Please refer to the [facility] test directory  at [email or web address] for details.  --  Blood Culture PCR  Methicillin resistant Staphylococcus aureus      Clean Catch Clean Catch (Midstream)  12-25-21   >100,000 CFU/ml Escherichia coli  50,000 - 99,000 CFU/mL Methicillin Resistant Staphylococcus aureus  --  Escherichia coli  Methicillin resistant Staphylococcus aureus      .Blood Blood-Peripheral  12-25-21   Growth in aerobic and anaerobic bottles: Escherichia coli  ***Blood Panel PCR results on this specimen are available  approximately 3 hours after the Gram stain result.***  Gram stain, PCR, and/or culture results may not always  correspond due to difference in methodologies.  ************************************************************  This PCR assay was performed by multiplex PCR. This  Assay tests for 66 bacterial and resistance gene targets.  Please refer to the [facility] test directory  at [email or web address] for details.  --  Blood Culture PCR  Escherichia coli      Catheterized Catheterized  12-16-21   No growth  --  --      Vancomycin Level, Trough: 15.9 ug/mL (01-03-22 @ 21:28)      RADIOLOGY    < from: IR Procedure (01.03.22 @ 19:10) >  IMPRESSION: SUCCESSFUL EXCHANGE OF A LEFT PICC LINE WITH A 5 [place]   SINGLE-LUMEN PICC WITH TIP SEEN AT THE SUPERIOR CAVOATRIAL JUNCTION.    < end of copied text >

## 2022-01-04 NOTE — PROGRESS NOTE ADULT - PROBLEM SELECTOR PLAN 5
S/P stent 10 years ago, asymptomatic.  -Cont ASA, Imdur, Metoprolol, Statin  Bradycardia noted. EP to FU
S/P stent 10 years ago, asymptomatic.  -Cont ASA, Imdur, Metoprolol, Statin
S/P stent 10 years ago, asymptomatic.  -Cont ASA, Imdur, Metoprolol, Statin
S/P stent 10 years ago, asymptomatic.  -Cont ASA, Imdur, Metoprolol, Statin  Bradycardia noted. EP to FU
S/P stent 10 years ago, asymptomatic.  -Cont ASA, Imdur, Metoprolol, Statin  Bradycardia noted. EP to FU
S/P stent 10 years ago, asymptomatic.  -Cont ASA, Imdur, Metoprolol, Statin
S/P stent 10 years ago, asymptomatic.  -Cont ASA, Imdur, Metoprolol, Statin  Bradycardia noted. EP to FU

## 2022-01-04 NOTE — PROGRESS NOTE ADULT - SUBJECTIVE AND OBJECTIVE BOX
Patient is a 81y old  Male who presents with a chief complaint of     1/4/2022.    HPI:  Afebrile. He denies dizziness or lightheadedness.  PICC placed   BP is elevated    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    HTN (hypertension)    Diabetes    Hyperlipidemia    Dementia    Cardiac pacemaker  Medtronic (SN: [id number]; Model: [id number])        Review of Systems:   CONSTITUTIONAL: No fever, weight loss, or fatigue  EYES: No eye pain, visual disturbances, or discharge  ENMT:  No difficulty hearing, tinnitus, vertigo; No sinus or throat pain  NECK: No pain or stiffness  BREASTS: No pain, masses, or nipple discharge  RESPIRATORY: No cough, wheezing, chills or hemoptysis; No shortness of breath  CARDIOVASCULAR: No chest pain, palpitations, dizziness, or leg swelling  GASTROINTESTINAL: No abdominal or epigastric pain. No nausea, vomiting, or hematemesis; No diarrhea or constipation. No melena or hematochezia.  GENITOURINARY: No dysuria, frequency, hematuria, or incontinence  NEUROLOGICAL: No headaches, memory loss, loss of strength, numbness, or tremors  SKIN: No itching, burning, rashes, or lesions   LYMPH NODES: No enlarged glands  ENDOCRINE: No heat or cold intolerance; No hair loss  MUSCULOSKELETAL: No joint pain or swelling; No muscle, back, or extremity pain  PSYCHIATRIC: No depression, anxiety, mood swings, or difficulty sleeping  HEME/LYMPH: No easy bruising, or bleeding gums  ALLERY AND IMMUNOLOGIC: No hives or eczema    Allergies    No Known Allergies    Intolerances        Social History:     FAMILY HISTORY:  FH: diabetes mellitus  Mother, Brother    FHx: dementia  Father        MEDICATIONS  (STANDING):  aspirin enteric coated 81 milliGRAM(s) Oral daily  atorvastatin 40 milliGRAM(s) Oral at bedtime  cefTRIAXone   IVPB 1000 milliGRAM(s) IV Intermittent every 24 hours  heparin   Injectable 5000 Unit(s) SubCutaneous every 8 hours  isosorbide   mononitrate ER Tablet (IMDUR) 30 milliGRAM(s) Oral daily  lisinopril 10 milliGRAM(s) Oral daily  metoprolol succinate ER 25 milliGRAM(s) Oral daily  potassium chloride   Powder 20 milliEquivalent(s) Oral once  potassium phosphate / sodium phosphate Powder (PHOS-NaK) 1 Packet(s) Oral once  sodium chloride 0.9%. 1000 milliLiter(s) (75 mL/Hr) IV Continuous <Continuous>  vancomycin  IVPB 1250 milliGRAM(s) IV Intermittent every 12 hours  vancomycin  IVPB        MEDICATIONS  (PRN):  acetaminophen     Tablet .. 650 milliGRAM(s) Oral every 6 hours PRN Temp greater or equal to 38C (100.4F), Mild Pain (1 - 3)  aluminum hydroxide/magnesium hydroxide/simethicone Suspension 30 milliLiter(s) Oral every 4 hours PRN Dyspepsia  melatonin 3 milliGRAM(s) Oral at bedtime PRN Insomnia  ondansetron Injectable 4 milliGRAM(s) IV Push every 8 hours PRN Nausea and/or Vomiting        CAPILLARY BLOOD GLUCOSE      POCT Blood Glucose.: 105 mg/dL (27 Dec 2021 16:58)  POCT Blood Glucose.: 91 mg/dL (27 Dec 2021 12:17)  POCT Blood Glucose.: 71 mg/dL (27 Dec 2021 08:50)    I&O's Summary    26 Dec 2021 07:01  -  27 Dec 2021 07:00  --------------------------------------------------------  IN: 600 mL / OUT: 800 mL / NET: -200 mL    27 Dec 2021 07:01  -  27 Dec 2021 18:53  --------------------------------------------------------  IN: 0 mL / OUT: 700 mL / NET: -700 mL        PHYSICAL EXAM:  Vital Signs Last 24 Hrs  T(C): 37 (27 Dec 2021 14:03), Max: 37 (27 Dec 2021 01:18)  T(F): 98.6 (27 Dec 2021 14:03), Max: 98.6 (27 Dec 2021 01:18)  HR: 73 (27 Dec 2021 14:03) (60 - 73)  BP: 161/90 (27 Dec 2021 14:03) (108/72 - 161/90)  BP(mean): --  RR: 18 (27 Dec 2021 14:03) (18 - 19)  SpO2: 100% (27 Dec 2021 14:03) (98% - 100%)    GENERAL: NAD, well-developed  HEAD:  Atraumatic, Normocephalic  EYES: EOMI, PERRLA, conjunctiva and sclera clear  NECK: Supple, No JVD  CHEST/LUNG: Clear to auscultation bilaterally; No wheeze  HEART: Regular rate and rhythm; No murmurs, rubs, or gallops  ABDOMEN: Soft, Nontender, Nondistended; Bowel sounds present  EXTREMITIES:  2+ Peripheral Pulses, No clubbing, cyanosis, or edema  PSYCH: AAOx3  NEUROLOGY: non-focal  SKIN: No rashes or lesions    LABS:                        12.2   11.89 )-----------( 150      ( 27 Dec 2021 07:28 )             37.0     12-27    139  |  107  |  12  ----------------------------<  79  3.3<L>   |  21<L>  |  0.85    Ca    9.0      27 Dec 2021 07:28  Phos  2.1     12-27  Mg     1.80     12-27                RADIOLOGY & ADDITIONAL TESTS:    Imaging Personally Reviewed:    Consultant(s) Notes Reviewed:      Care Discussed with Consultants/Other Providers:

## 2022-01-04 NOTE — PROGRESS NOTE ADULT - PROBLEM SELECTOR PLAN 8
With right hemiparesis   -Cont. ASA, Statin

## 2022-01-04 NOTE — PROGRESS NOTE ADULT - PROBLEM SELECTOR PLAN 6
Add Norvasc 5 mg   Monitor BP
Cont Metoprolol, ACE Inhibitor   Monitor BP
Norvasc 5 mg , increase to 10 Mg  Monitor BP
Add Norvasc 5 mg   Monitor BP
Cont Metoprolol, ACE Inhibitor   Monitor BP
Cont Metoprolol, ACE Inhibitor   Monitor BP
Add Norvasc 5 mg   Monitor BP

## 2022-01-05 ENCOUNTER — TRANSCRIPTION ENCOUNTER (OUTPATIENT)
Age: 82
End: 2022-01-05

## 2022-01-05 VITALS — WEIGHT: 156.75 LBS

## 2022-01-05 LAB
ANION GAP SERPL CALC-SCNC: 13 MMOL/L — SIGNIFICANT CHANGE UP (ref 5–17)
BUN SERPL-MCNC: 8 MG/DL — SIGNIFICANT CHANGE UP (ref 7–23)
CALCIUM SERPL-MCNC: 9.1 MG/DL — SIGNIFICANT CHANGE UP (ref 8.4–10.5)
CHLORIDE SERPL-SCNC: 105 MMOL/L — SIGNIFICANT CHANGE UP (ref 96–108)
CO2 SERPL-SCNC: 21 MMOL/L — LOW (ref 22–31)
CREAT SERPL-MCNC: 0.78 MG/DL — SIGNIFICANT CHANGE UP (ref 0.5–1.3)
GLUCOSE SERPL-MCNC: 75 MG/DL — SIGNIFICANT CHANGE UP (ref 70–99)
HCT VFR BLD CALC: 28.3 % — LOW (ref 39–50)
HGB BLD-MCNC: 9.5 G/DL — LOW (ref 13–17)
MCHC RBC-ENTMCNC: 30.4 PG — SIGNIFICANT CHANGE UP (ref 27–34)
MCHC RBC-ENTMCNC: 33.6 GM/DL — SIGNIFICANT CHANGE UP (ref 32–36)
MCV RBC AUTO: 90.4 FL — SIGNIFICANT CHANGE UP (ref 80–100)
NRBC # BLD: 0 /100 WBCS — SIGNIFICANT CHANGE UP (ref 0–0)
PLATELET # BLD AUTO: 233 K/UL — SIGNIFICANT CHANGE UP (ref 150–400)
POTASSIUM SERPL-MCNC: 3.7 MMOL/L — SIGNIFICANT CHANGE UP (ref 3.5–5.3)
POTASSIUM SERPL-SCNC: 3.7 MMOL/L — SIGNIFICANT CHANGE UP (ref 3.5–5.3)
RBC # BLD: 3.13 M/UL — LOW (ref 4.2–5.8)
RBC # FLD: 13.2 % — SIGNIFICANT CHANGE UP (ref 10.3–14.5)
SODIUM SERPL-SCNC: 139 MMOL/L — SIGNIFICANT CHANGE UP (ref 135–145)
WBC # BLD: 4.66 K/UL — SIGNIFICANT CHANGE UP (ref 3.8–10.5)
WBC # FLD AUTO: 4.66 K/UL — SIGNIFICANT CHANGE UP (ref 3.8–10.5)

## 2022-01-05 PROCEDURE — 36584 COMPL RPLCMT PICC RS&I: CPT

## 2022-01-05 PROCEDURE — C2629: CPT

## 2022-01-05 PROCEDURE — 87186 SC STD MICRODIL/AGAR DIL: CPT

## 2022-01-05 PROCEDURE — 76000 FLUOROSCOPY <1 HR PHYS/QHP: CPT

## 2022-01-05 PROCEDURE — 97162 PT EVAL MOD COMPLEX 30 MIN: CPT

## 2022-01-05 PROCEDURE — 99232 SBSQ HOSP IP/OBS MODERATE 35: CPT

## 2022-01-05 PROCEDURE — C1751: CPT

## 2022-01-05 PROCEDURE — C1893: CPT

## 2022-01-05 PROCEDURE — 80053 COMPREHEN METABOLIC PANEL: CPT

## 2022-01-05 PROCEDURE — C2628: CPT

## 2022-01-05 PROCEDURE — 93005 ELECTROCARDIOGRAM TRACING: CPT

## 2022-01-05 PROCEDURE — 85027 COMPLETE CBC AUTOMATED: CPT

## 2022-01-05 PROCEDURE — 86901 BLOOD TYPING SEROLOGIC RH(D): CPT

## 2022-01-05 PROCEDURE — 82962 GLUCOSE BLOOD TEST: CPT

## 2022-01-05 PROCEDURE — 86900 BLOOD TYPING SEROLOGIC ABO: CPT

## 2022-01-05 PROCEDURE — U0005: CPT

## 2022-01-05 PROCEDURE — 86850 RBC ANTIBODY SCREEN: CPT

## 2022-01-05 PROCEDURE — 80202 ASSAY OF VANCOMYCIN: CPT

## 2022-01-05 PROCEDURE — 87102 FUNGUS ISOLATION CULTURE: CPT

## 2022-01-05 PROCEDURE — 83735 ASSAY OF MAGNESIUM: CPT

## 2022-01-05 PROCEDURE — 71045 X-RAY EXAM CHEST 1 VIEW: CPT

## 2022-01-05 PROCEDURE — C1773: CPT

## 2022-01-05 PROCEDURE — 36569 INSJ PICC 5 YR+ W/O IMAGING: CPT

## 2022-01-05 PROCEDURE — 84100 ASSAY OF PHOSPHORUS: CPT

## 2022-01-05 PROCEDURE — 36415 COLL VENOUS BLD VENIPUNCTURE: CPT

## 2022-01-05 PROCEDURE — U0003: CPT

## 2022-01-05 PROCEDURE — 80048 BASIC METABOLIC PNL TOTAL CA: CPT

## 2022-01-05 PROCEDURE — 87040 BLOOD CULTURE FOR BACTERIA: CPT

## 2022-01-05 PROCEDURE — 87206 SMEAR FLUORESCENT/ACID STAI: CPT

## 2022-01-05 PROCEDURE — C1889: CPT

## 2022-01-05 PROCEDURE — C1769: CPT

## 2022-01-05 PROCEDURE — C1887: CPT

## 2022-01-05 PROCEDURE — C1894: CPT

## 2022-01-05 PROCEDURE — 87070 CULTURE OTHR SPECIMN AEROBIC: CPT

## 2022-01-05 PROCEDURE — 87116 MYCOBACTERIA CULTURE: CPT

## 2022-01-05 PROCEDURE — 87077 CULTURE AEROBIC IDENTIFY: CPT

## 2022-01-05 PROCEDURE — 85025 COMPLETE CBC W/AUTO DIFF WBC: CPT

## 2022-01-05 PROCEDURE — 87075 CULTR BACTERIA EXCEPT BLOOD: CPT

## 2022-01-05 PROCEDURE — 86923 COMPATIBILITY TEST ELECTRIC: CPT

## 2022-01-05 RX ADMIN — CHLORHEXIDINE GLUCONATE 1 APPLICATION(S): 213 SOLUTION TOPICAL at 10:05

## 2022-01-05 RX ADMIN — CEFTRIAXONE 100 MILLIGRAM(S): 500 INJECTION, POWDER, FOR SOLUTION INTRAMUSCULAR; INTRAVENOUS at 05:00

## 2022-01-05 RX ADMIN — Medication 25 MILLIGRAM(S): at 05:07

## 2022-01-05 RX ADMIN — ISOSORBIDE MONONITRATE 30 MILLIGRAM(S): 60 TABLET, EXTENDED RELEASE ORAL at 11:34

## 2022-01-05 RX ADMIN — Medication 250 MILLIGRAM(S): at 08:40

## 2022-01-05 RX ADMIN — HEPARIN SODIUM 5000 UNIT(S): 5000 INJECTION INTRAVENOUS; SUBCUTANEOUS at 05:07

## 2022-01-05 RX ADMIN — Medication 81 MILLIGRAM(S): at 11:34

## 2022-01-05 RX ADMIN — LISINOPRIL 10 MILLIGRAM(S): 2.5 TABLET ORAL at 05:07

## 2022-01-05 RX ADMIN — SODIUM CHLORIDE 3 MILLILITER(S): 9 INJECTION INTRAMUSCULAR; INTRAVENOUS; SUBCUTANEOUS at 06:36

## 2022-01-05 RX ADMIN — AMLODIPINE BESYLATE 10 MILLIGRAM(S): 2.5 TABLET ORAL at 05:07

## 2022-01-05 NOTE — PROGRESS NOTE ADULT - COMMENTS
pt demented, cannot get ROS
pt demented, cannot get ROS
pt demented, denies complaints, cannot get proper ROS
pt demented, denies complaints, cannot get proper ROS
pt demented, cannot get ROS

## 2022-01-05 NOTE — DISCHARGE NOTE NURSING/CASE MANAGEMENT/SOCIAL WORK - NSSCTYPOFSERV_GEN_ALL_CORE
[facility] Nurse will instruct about IV antibiotic administration and provide PICC care/labs  [facility] - Nursing and Physical therapy

## 2022-01-05 NOTE — PROGRESS NOTE ADULT - MS EXT PE MLT D E PC
no cyanosis/no pedal edema
no cyanosis
no cyanosis/no pedal edema
no cyanosis
no cyanosis/no pedal edema

## 2022-01-05 NOTE — PROGRESS NOTE ADULT - CONSTITUTIONAL
What Type Of Note Output Would You Prefer (Optional)?: Standard Output
How Severe Is Your Skin Lesion?: moderate
detailed exam
Has Your Skin Lesion Been Treated?: been treated
Is This A New Presentation, Or A Follow-Up?: Skin Lesion
detailed exam

## 2022-01-05 NOTE — PROGRESS NOTE ADULT - PROBLEM SELECTOR PLAN 2
Complicated UTI in the setting of bladder sones  - antibiotics as per ID
Complicated UTI in the setting of bladder sones  - antibiotics as per ID for  weeks
Complicated UTI in the setting of bladder sones  -Zosyn as per ID eval noted
-better  -s/p PPM extraction  -likely  source  -cont abx  -repeat bcx negative  -OR cx - negative so far  -plan 6 weeks iv abx from PPM extraction
-better  -s/p PPM extraction  -likely  source  -cont abx  -repeat bcx negative  -OR cx - negative so far  -plan 6 weeks iv abx from PPM extraction - day 6 of 42 of abx  -f/u in 4 weeks in ID office @ [phone]
Complicated UTI in the setting of bladder sones  - antibiotics as per ID for  weeks
Complicated UTI in the setting of bladder sones  -Zosyn as per ID eval noted
-better  -s/p PPM extraction  -likely  source  -cont abx  -repeat bcx negative  -OR cx - negative so far  -plan 6 weeks iv abx from PPM extraction - day 5 of 42 of abx
Complicated UTI in the setting of bladder sones  - antibiotics as per ID
Complicated UTI in the setting of bladder sones  -Zosyn as per ID
-better  -s/p PPM extraction  -likely  source  -cont abx  -repeat bcx negative  -f/u OR cx
-better  -s/p PPM extraction  -likely  source  -cont abx  -repeat bcx negative  -OR cx - negative so far  -plan 6 weeks iv abx from PPM extraction - day 4 of 42 of abx

## 2022-01-05 NOTE — PROGRESS NOTE ADULT - GASTROINTESTINAL DETAILS
soft/nontender/no distention/no guarding/no rigidity
soft/nontender/no distention/no guarding/no rigidity
soft/nontender/no distention/no rebound tenderness/no guarding
soft/nontender/no distention/no rebound tenderness/no guarding
soft/nontender/no distention/no rebound tenderness/no guarding/no rigidity

## 2022-01-05 NOTE — PROGRESS NOTE ADULT - PROBLEM SELECTOR PROBLEM 3
Bacterial UTI
Gross hematuria
Bacterial UTI
Gross hematuria
Bacterial UTI

## 2022-01-05 NOTE — PROGRESS NOTE ADULT - ASSESSMENT
81 year old male with CVA with rt sided weakness, dementia, HTN, CAD s/p stent 10 years ago, PPM, Asthma presenting with hematuria. Remains with valencia, no hematuria currently. In the ED with fever 104.1F, leukocytosis, tachycardia, found to have E. coli  (CXT-M gene neg) and MRSA bacteremia. On last admission 12/16 - 12/21/21 patient found to have RLL atelectasis, hematuria, and stercoral colitis.     Blood cxs:  12/25 - E. coli  12/25 - E. coli and MRSA  12/27 - GPC clusters    Shan Light  Attending Physician   Division of Infectious Disease  Pager #[phone]  Available on [employer] Teams also  After 5pm/weekend or no response, call #[phone]

## 2022-01-05 NOTE — PROGRESS NOTE ADULT - PROBLEM SELECTOR PROBLEM 2
Acute UTI
Acute UTI
Bacteremia
Acute UTI
Bacteremia
Bacteremia
Acute UTI
Acute UTI
Bacteremia
Acute UTI
Acute UTI
Bacteremia

## 2022-01-05 NOTE — PROGRESS NOTE ADULT - SUBJECTIVE AND OBJECTIVE BOX
DESHAWN TIWARI 81y [id number]    Patient is a 81y old  Male who presents with a chief complaint of bacteremia (03 Jan 2022 10:33)      Follow Up/CC:  ID following for    Interval History/ROS:    Allergies    No Known Allergies    Intolerances        ANTIMICROBIALS:  cefTRIAXone   IVPB 2000 <User Schedule>  vancomycin  IVPB 1000 every 12 hours      MEDICATIONS  (STANDING):  amLODIPine   Tablet 10 milliGRAM(s) Oral daily  aspirin enteric coated 81 milliGRAM(s) Oral daily  atorvastatin 40 milliGRAM(s) Oral at bedtime  cefTRIAXone   IVPB 2000 milliGRAM(s) IV Intermittent <User Schedule>  chlorhexidine 2% Cloths 1 Application(s) Topical <User Schedule>  chlorhexidine 4% Liquid 1 Application(s) Topical <User Schedule>  heparin   Injectable 5000 Unit(s) SubCutaneous every 12 hours  isosorbide   mononitrate ER Tablet (IMDUR) 30 milliGRAM(s) Oral daily  lisinopril 10 milliGRAM(s) Oral daily  metoprolol succinate ER 25 milliGRAM(s) Oral daily  sodium chloride 0.9% lock flush 3 milliLiter(s) IV Push every 8 hours  vancomycin  IVPB 1000 milliGRAM(s) IV Intermittent every 12 hours    MEDICATIONS  (PRN):  acetaminophen     Tablet .. 650 milliGRAM(s) Oral every 6 hours PRN Temp greater or equal to 38C (100.4F), Mild Pain (1 - 3), Moderate Pain (4 - 6)  acetaminophen   IVPB .. 1000 milliGRAM(s) IV Intermittent once PRN Mild Pain (1 - 3)  melatonin 3 milliGRAM(s) Oral at bedtime PRN Insomnia        Vital Signs Last 24 Hrs  T(C): 37.1 (05 Jan 2022 04:53), Max: 37.1 (05 Jan 2022 04:53)  T(F): 98.8 (05 Jan 2022 04:53), Max: 98.8 (05 Jan 2022 04:53)  HR: 63 (05 Jan 2022 04:53) (54 - 63)  BP: 164/66 (05 Jan 2022 06:41) (164/66 - 194/80)  BP(mean): --  RR: 19 (05 Jan 2022 04:53) (18 - 19)  SpO2: 95% (05 Jan 2022 04:53) (95% - 99%)    CBC Full  -  ( 05 Jan 2022 06:54 )  WBC Count : 4.66 K/uL  RBC Count : 3.13 M/uL  Hemoglobin : 9.5 g/dL  Hematocrit : 28.3 %  Platelet Count - Automated : 233 K/uL  Mean Cell Volume : 90.4 fl  Mean Cell Hemoglobin : 30.4 pg  Mean Cell Hemoglobin Concentration : 33.6 gm/dL  Auto Neutrophil # : x  Auto Lymphocyte # : x  Auto Monocyte # : x  Auto Eosinophil # : x  Auto Basophil # : x  Auto Neutrophil % : x  Auto Lymphocyte % : x  Auto Monocyte % : x  Auto Eosinophil % : x  Auto Basophil % : x    01-05    139  |  105  |  8   ----------------------------<  75  3.7   |  21<L>  |  0.78    Ca    9.1      05 Jan 2022 06:54            MICROBIOLOGY:  .Blood Blood-Peripheral  12-30-21   No Growth Final  --  --      .Other Other  12-30-21   Culture is being performed.  --  --      .Surgical Swab extracted leads  12-30-21   Growth in fluid media only Staphylococcus epidermidis  --  Staphylococcus epidermidis      .Blood Blood-Peripheral  12-28-21   No Growth Final  --  --      .Blood Blood-Peripheral  12-27-21   Growth in aerobic bottle: Methicillin Resistant Staphylococcus aureus  See previous culture [id number]  --    Growth in aerobic bottle: Gram Positive Cocci in Clusters      .Blood Blood-Peripheral  12-25-21   Growth in aerobic bottle: Escherichia coli See previous culture  [id number]  Growth in aerobic and anaerobic bottles: Methicillin Resistant  Staphylococcus aureus  ***Blood Panel PCR results on this specimen are available  approximately 3 hours after the Gram stain result.***  Gram stain, PCR, and/or culture results may not always  correspond due to difference in methodologies.  ************************************************************  This PCR assay was performed by multiplex PCR. This  Assay tests for 66 bacterial and resistance gene targets.  Please refer to the [facility] test directory  at [email or web address] for details.  --  Blood Culture PCR  Methicillin resistant Staphylococcus aureus      Clean Catch Clean Catch (Midstream)  12-25-21   >100,000 CFU/ml Escherichia coli  50,000 - 99,000 CFU/mL Methicillin Resistant Staphylococcus aureus  --  Escherichia coli  Methicillin resistant Staphylococcus aureus      .Blood Blood-Peripheral  12-25-21   Growth in aerobic and anaerobic bottles: Escherichia coli  ***Blood Panel PCR results on this specimen are available  approximately 3 hours after the Gram stain result.***  Gram stain, PCR, and/or culture results may not always  correspond due to difference in methodologies.  ************************************************************  This PCR assay was performed by multiplex PCR. This  Assay tests for 66 bacterial and resistance gene targets.  Please refer to the [facility] test directory  at [email or web address] for details.  --  Blood Culture PCR  Escherichia coli      Catheterized Catheterized  12-16-21   No growth  --  --              v            RADIOLOGY     DESHAWN TIWARI 81y [id number]    Patient is a 81y old  Male who presents with a chief complaint of bacteremia (03 Jan 2022 10:33)      Follow Up/CC:  ID following for bacteremia    Interval History/ROS: no fever, planned for DC    Allergies    No Known Allergies    Intolerances        ANTIMICROBIALS:  cefTRIAXone   IVPB 2000 <User Schedule>  vancomycin  IVPB 1000 every 12 hours      MEDICATIONS  (STANDING):  amLODIPine   Tablet 10 milliGRAM(s) Oral daily  aspirin enteric coated 81 milliGRAM(s) Oral daily  atorvastatin 40 milliGRAM(s) Oral at bedtime  cefTRIAXone   IVPB 2000 milliGRAM(s) IV Intermittent <User Schedule>  chlorhexidine 2% Cloths 1 Application(s) Topical <User Schedule>  chlorhexidine 4% Liquid 1 Application(s) Topical <User Schedule>  heparin   Injectable 5000 Unit(s) SubCutaneous every 12 hours  isosorbide   mononitrate ER Tablet (IMDUR) 30 milliGRAM(s) Oral daily  lisinopril 10 milliGRAM(s) Oral daily  metoprolol succinate ER 25 milliGRAM(s) Oral daily  sodium chloride 0.9% lock flush 3 milliLiter(s) IV Push every 8 hours  vancomycin  IVPB 1000 milliGRAM(s) IV Intermittent every 12 hours    MEDICATIONS  (PRN):  acetaminophen     Tablet .. 650 milliGRAM(s) Oral every 6 hours PRN Temp greater or equal to 38C (100.4F), Mild Pain (1 - 3), Moderate Pain (4 - 6)  acetaminophen   IVPB .. 1000 milliGRAM(s) IV Intermittent once PRN Mild Pain (1 - 3)  melatonin 3 milliGRAM(s) Oral at bedtime PRN Insomnia        Vital Signs Last 24 Hrs  T(C): 37.1 (05 Jan 2022 04:53), Max: 37.1 (05 Jan 2022 04:53)  T(F): 98.8 (05 Jan 2022 04:53), Max: 98.8 (05 Jan 2022 04:53)  HR: 63 (05 Jan 2022 04:53) (54 - 63)  BP: 164/66 (05 Jan 2022 06:41) (164/66 - 194/80)  BP(mean): --  RR: 19 (05 Jan 2022 04:53) (18 - 19)  SpO2: 95% (05 Jan 2022 04:53) (95% - 99%)    CBC Full  -  ( 05 Jan 2022 06:54 )  WBC Count : 4.66 K/uL  RBC Count : 3.13 M/uL  Hemoglobin : 9.5 g/dL  Hematocrit : 28.3 %  Platelet Count - Automated : 233 K/uL  Mean Cell Volume : 90.4 fl  Mean Cell Hemoglobin : 30.4 pg  Mean Cell Hemoglobin Concentration : 33.6 gm/dL  Auto Neutrophil # : x  Auto Lymphocyte # : x  Auto Monocyte # : x  Auto Eosinophil # : x  Auto Basophil # : x  Auto Neutrophil % : x  Auto Lymphocyte % : x  Auto Monocyte % : x  Auto Eosinophil % : x  Auto Basophil % : x    01-05    139  |  105  |  8   ----------------------------<  75  3.7   |  21<L>  |  0.78    Ca    9.1      05 Jan 2022 06:54            MICROBIOLOGY:  .Blood Blood-Peripheral  12-30-21   No Growth Final  --  --      .Other Other  12-30-21   Culture is being performed.  --  --      .Surgical Swab extracted leads  12-30-21   Growth in fluid media only Staphylococcus epidermidis  --  Staphylococcus epidermidis      .Blood Blood-Peripheral  12-28-21   No Growth Final  --  --      .Blood Blood-Peripheral  12-27-21   Growth in aerobic bottle: Methicillin Resistant Staphylococcus aureus  See previous culture [id number]  --    Growth in aerobic bottle: Gram Positive Cocci in Clusters      .Blood Blood-Peripheral  12-25-21   Growth in aerobic bottle: Escherichia coli See previous culture  [id number]  Growth in aerobic and anaerobic bottles: Methicillin Resistant  Staphylococcus aureus  ***Blood Panel PCR results on this specimen are available  approximately 3 hours after the Gram stain result.***  Gram stain, PCR, and/or culture results may not always  correspond due to difference in methodologies.  ************************************************************  This PCR assay was performed by multiplex PCR. This  Assay tests for 66 bacterial and resistance gene targets.  Please refer to the [facility] test directory  at [email or web address] for details.  --  Blood Culture PCR  Methicillin resistant Staphylococcus aureus      Clean Catch Clean Catch (Midstream)  12-25-21   >100,000 CFU/ml Escherichia coli  50,000 - 99,000 CFU/mL Methicillin Resistant Staphylococcus aureus  --  Escherichia coli  Methicillin resistant Staphylococcus aureus      .Blood Blood-Peripheral  12-25-21   Growth in aerobic and anaerobic bottles: Escherichia coli  ***Blood Panel PCR results on this specimen are available  approximately 3 hours after the Gram stain result.***  Gram stain, PCR, and/or culture results may not always  correspond due to difference in methodologies.  ************************************************************  This PCR assay was performed by multiplex PCR. This  Assay tests for 66 bacterial and resistance gene targets.  Please refer to the [facility] test directory  at [email or web address] for details.  --  Blood Culture PCR  Escherichia coli      Catheterized Catheterized  12-16-21   No growth  --  --        RADIOLOGY    < from: Xray Chest 1 View- PORTABLE-Urgent (Xray Chest 1 View- PORTABLE-Urgent .) (01.02.22 @ 12:58) >  A PICC line is seen on the left with its tip coiled back in the left   subclavian vein. Repositioning is recommended.    Clear lungs      < end of copied text >

## 2022-01-05 NOTE — PROGRESS NOTE ADULT - PROBLEM SELECTOR PROBLEM 1
Sepsis, unspecified organism
Sepsis, unspecified organism
Sepsis due to methicillin resistant Staphylococcus aureus
Sepsis due to methicillin resistant Staphylococcus aureus
Sepsis, unspecified organism
Sepsis, unspecified organism
Sepsis due to methicillin resistant Staphylococcus aureus
Sepsis, unspecified organism
Sepsis due to methicillin resistant Staphylococcus aureus
Sepsis due to methicillin resistant Staphylococcus aureus

## 2022-01-05 NOTE — DISCHARGE NOTE NURSING/CASE MANAGEMENT/SOCIAL WORK - PATIENT PORTAL LINK FT
You can access the FollowMyHealth Patient Portal offered by [facility] by registering at the following website: [email or web address]. By joining [employer]’s FollowMyHealth portal, you will also be able to view your health information using other applications (apps) compatible with our system.

## 2022-01-05 NOTE — PROGRESS NOTE ADULT - RS GEN PE MLT RESP DETAILS PC
respirations non-labored/clear to auscultation bilaterally/no wheezes
clear to auscultation bilaterally/no wheezes
respirations non-labored/clear to auscultation bilaterally/no wheezes

## 2022-01-05 NOTE — PROGRESS NOTE ADULT - PROBLEM SELECTOR PLAN 3
Resolved.  Cont to monitor
Resolved.  Cont to monitor
-on abx
Resolved.  Cont to monitor
Resolved.  Cont to monitor
-on abx
Resolved.  Cont to monitor
Resolved.  Cont to monitor
-cont abx
Resolved.  Cont to monitor
-cont abx
-cont abx

## 2022-01-05 NOTE — PROGRESS NOTE ADULT - PROVIDER SPECIALTY LIST ADULT
Infectious Disease
Electrophysiology
Electrophysiology
Internal Medicine
Infectious Disease
Infectious Disease
Internal Medicine
Internal Medicine

## 2022-01-05 NOTE — PROGRESS NOTE ADULT - PROBLEM SELECTOR PLAN 4
Seen by Urology during last admission  -F/U with  as an outpatient
-6 weeks iv abx  -picc  -weekly cbc, bun/creatinine, vanco trough while on abx
Seen by Urology during last admission  -F/U with  as an outpatient
-6 weeks iv abx  -picc  -weekly cbc, bun/creatinine, vanco trough while on abx
Seen by Urology during last admission  -F/U with  as an outpatient
Seen by Urology during last admission  -F/U with  as an outpatient
-6 weeks iv abx  -picc
Seen by Urology during last admission  -F/U with  as an outpatient
-6 weeks iv abx  -picc  -weekly cbc, bun/creatinine, vanco trough while on abx

## 2022-01-05 NOTE — DISCHARGE NOTE NURSING/CASE MANAGEMENT/SOCIAL WORK - NSDCPEFALRISK_GEN_ALL_CORE
For information on Fall & Injury Prevention, visit: [email or web address] OR  [email or web address] OR  https://www.cdc.gov/steadi/patient.html

## 2022-01-05 NOTE — PROGRESS NOTE ADULT - PROBLEM SELECTOR PLAN 1
E Coli sepsis is resolved  ID FU noted. needs 6 weeks of IV antibiotics.
E Coli sepsis is resolved  ID FU noted. needs 6 weeks of IV antibiotics. PICC placed by IR.
-cont vanco, CTX  -better  -s/p PPM extraction  -likely  source
E Coli sepsis is resolved  ID FU noted. needs 6 weeks of IV antibiotics. PICC placed by IR.
-cont vanco, CTX  -better  -s/p PPM extraction  -likely  source
-cont vanco, CTX  -better  -s/p PPM extraction  -likely  source
E Coli sepsis is improving. Repeat blood culture to look for clearance of bacteremia.  FU repeat cultures
E Coli sepsis is resolved
E Coli sepsis is resolved  ID FU
E Coli sepsis is resolved  ID FU noted. needs 6 weeks of IV antibiotics. PICC placed by IR.
-cont vanco, CTX  -better  -s/p PPM extraction  -likely  source
-cont vanco, CTX  -better  -s/p PPM extraction  -likely  source

## 2022-01-05 NOTE — PROGRESS NOTE ADULT - PROBLEM SELECTOR PROBLEM 4
Bladder stone
Long term current use of antibiotics
Long term current use of antibiotics
Bladder stone
Long term current use of antibiotics
Bladder stone
Bladder stone
Long term current use of antibiotics
Bladder stone

## 2022-01-07 ENCOUNTER — APPOINTMENT (OUTPATIENT)
Dept: ELECTROPHYSIOLOGY | Facility: CLINIC | Age: 82
End: 2022-01-07

## 2022-01-16 ENCOUNTER — NON-APPOINTMENT (OUTPATIENT)
Age: 82
End: 2022-01-16

## 2022-01-25 ENCOUNTER — EMERGENCY (EMERGENCY)
Facility: HOSPITAL | Age: 82
LOS: 0 days | Discharge: HOME | End: 2022-01-25
Attending: STUDENT IN AN ORGANIZED HEALTH CARE EDUCATION/TRAINING PROGRAM
Payer: COMMERCIAL

## 2022-01-25 VITALS
RESPIRATION RATE: 20 BRPM | WEIGHT: 199.96 LBS | HEART RATE: 54 BPM | SYSTOLIC BLOOD PRESSURE: 143 MMHG | TEMPERATURE: 98 F | OXYGEN SATURATION: 98 % | HEIGHT: 66 IN | DIASTOLIC BLOOD PRESSURE: 75 MMHG

## 2022-01-25 VITALS
OXYGEN SATURATION: 99 % | RESPIRATION RATE: 20 BRPM | HEART RATE: 57 BPM | SYSTOLIC BLOOD PRESSURE: 139 MMHG | TEMPERATURE: 98 F | DIASTOLIC BLOOD PRESSURE: 67 MMHG
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
## 2022-01-25 DIAGNOSIS — F03.90 UNSPECIFIED DEMENTIA, UNSPECIFIED SEVERITY, WITHOUT BEHAVIORAL DISTURBANCE, PSYCHOTIC DISTURBANCE, MOOD DISTURBANCE, AND ANXIETY: ICD-10-CM

## 2022-01-25 DIAGNOSIS — I10 ESSENTIAL (PRIMARY) HYPERTENSION: ICD-10-CM

## 2022-01-25 DIAGNOSIS — Z00.00 ENCOUNTER FOR GENERAL ADULT MEDICAL EXAMINATION WITHOUT ABNORMAL FINDINGS: ICD-10-CM

## 2022-01-25 DIAGNOSIS — Z95.0 PRESENCE OF CARDIAC PACEMAKER: Chronic | ICD-10-CM

## 2022-01-25 DIAGNOSIS — Z79.82 LONG TERM (CURRENT) USE OF ASPIRIN: ICD-10-CM
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
## 2022-01-25 DIAGNOSIS — Z95.5 PRESENCE OF CORONARY ANGIOPLASTY IMPLANT AND GRAFT: Chronic | ICD-10-CM

## 2022-01-25 DIAGNOSIS — I25.10 ATHEROSCLEROTIC HEART DISEASE OF NATIVE CORONARY ARTERY WITHOUT ANGINA PECTORIS: ICD-10-CM

## 2022-01-25 DIAGNOSIS — I69.951 HEMIPLEGIA AND HEMIPARESIS FOLLOWING UNSPECIFIED CEREBROVASCULAR DISEASE AFFECTING RIGHT DOMINANT SIDE: ICD-10-CM

## 2022-01-25 DIAGNOSIS — T74.91XA UNSPECIFIED ADULT MALTREATMENT, CONFIRMED, INITIAL ENCOUNTER: ICD-10-CM

## 2022-01-25 PROCEDURE — 93010 ELECTROCARDIOGRAM REPORT: CPT

## 2022-01-25 PROCEDURE — 99284 EMERGENCY DEPT VISIT MOD MDM: CPT

## 2022-01-25 NOTE — ED ADULT NURSE NOTE - OBJECTIVE STATEMENT
Pt alert and confused sent from home for bradycardia by visiting nurse. EKG done upon arrival to ED, awaiting further evaluation, safety and fall precautions maintained

## 2022-01-25 NOTE — ED PROVIDER NOTE - PATIENT PORTAL LINK FT
You can access the FollowMyHealth Patient Portal offered by [facility] by registering at the following website: [email or web address]. By joining [employer]’s FollowMyHealth portal, you will also be able to view your health information using other applications (apps) compatible with our system.

## 2022-01-25 NOTE — ED ADULT TRIAGE NOTE - CHIEF COMPLAINT QUOTE
CVA, Dementia ,Pace Maker infection, seen by visiting nurse today for infection, at home on vancomycin left PICC line bradycardiac by nurse, according to  family that's how he's been, takes Metroprolol

## 2022-01-25 NOTE — ED PROVIDER NOTE - OBJECTIVE STATEMENT
CVA with RT sided weakness, dementia, HTN, CAD, currently with left arm picc line receiving iv abx for recent e coli bacteremia bibems for bradycardia. VNS went to adminsiter picc line care/abx today and noted heart rate to be in 50s, sent bryn to ed for eval. discussed with family, bryn has been feeling well and acting at his abseline since his discharge from the hospital. he takes metoprolol daily

## 2022-01-25 NOTE — ED PROVIDER NOTE - PHYSICAL EXAMINATION
General: Awake, alert. No acute distress. Well developed, hydrated and nourished. Appears stated age.   Skin: Skin in warm, dry and intact without rashes or lesions. Appropriate color for ethnicity  HENMT: head normocephalic and atraumatic; bilateral external ears without swelling. no nasal discharge. moist oral mucosa. supple neck, trachea midline  EYES: Conjunctiva clear. nonicteric sclera. EOM intact, Eyelids are normal in appearance without swelling or lesions.  Cardiac: well perfused, s1, s2  Respiratory: breathing comfortably on room air. no audible wheezing or stridor  Abdominal: nondistended, soft, nontender  MSK: Neck and back are without deformity, visible external skin changes, or signs of trauma. Curvature of the cervical, thoracic, and lumbar spine are within normal limits. no external signs of trauma. no apparent deficits in ROM of any extremity. left UE picc line in paoce with no surrounding erythema or discharge  Neurological: The patient is awake, alert and oriented to person, place with normal speech. CN 2-12 grossly intact. no apparent deficits. Memory is normal and thought process is intact. No gait abnormalities are appreciated.   Psychiatric: Appropriate mood and affect. Good judgement and insight. No visual or auditory hallucinations.

## 2022-01-25 NOTE — ED PROVIDER NOTE - CLINICAL SUMMARY MEDICAL DECISION MAKING FREE TEXT BOX
patinet as baselime mental status. well appearing. minimally bradycardic with normal BP. patinet on metoprolol. no clinial signs of aute weakness. ekg afib. stable for dc and outpatinet follow up. HR controlled by metoprolol which islikely cause for relative bradycardia with no symptoms and normal BP

## 2022-01-27 ENCOUNTER — APPOINTMENT (OUTPATIENT)
Dept: INFECTIOUS DISEASE | Facility: CLINIC | Age: 82
End: 2022-01-27
Payer: COMMERCIAL

## 2022-01-27 VITALS
SYSTOLIC BLOOD PRESSURE: 148 MMHG | HEART RATE: 100 BPM | HEIGHT: 68 IN | DIASTOLIC BLOOD PRESSURE: 79 MMHG | TEMPERATURE: 96.9 F

## 2022-01-27 DIAGNOSIS — R78.81 BACTEREMIA: ICD-10-CM

## 2022-01-27 DIAGNOSIS — Z09 ENCOUNTER FOR FOLLOW-UP EXAMINATION AFTER COMPLETED TREATMENT FOR CONDITIONS OTHER THAN MALIGNANT NEOPLASM: ICD-10-CM

## 2022-01-27 PROCEDURE — 99212 OFFICE O/P EST SF 10 MIN: CPT

## 2022-01-29 LAB
CULTURE RESULTS: SIGNIFICANT CHANGE UP
SPECIMEN SOURCE: SIGNIFICANT CHANGE UP

## 2022-02-02 ENCOUNTER — NON-APPOINTMENT (OUTPATIENT)
Age: 82
End: 2022-02-02

## 2022-02-02 ENCOUNTER — APPOINTMENT (OUTPATIENT)
Dept: ELECTROPHYSIOLOGY | Facility: CLINIC | Age: 82
End: 2022-02-02
Payer: MEDICARE

## 2022-02-02 VITALS — HEART RATE: 86 BPM | HEIGHT: 68 IN | OXYGEN SATURATION: 97 %

## 2022-02-02 VITALS — SYSTOLIC BLOOD PRESSURE: 152 MMHG | DIASTOLIC BLOOD PRESSURE: 81 MMHG

## 2022-02-02 PROCEDURE — 93000 ELECTROCARDIOGRAM COMPLETE: CPT

## 2022-02-02 PROCEDURE — 99024 POSTOP FOLLOW-UP VISIT: CPT

## 2022-02-04 NOTE — ASSESSMENT
[FreeTextEntry1] : 80 y/o male with MRSA/E. coli bacteremia, s/p PPm extraction comes for visit.\par \par He is stable.\par \par Complete abx as scheduled will 2/6.\par \par Advised f/u with EP/\par \par D/w wife over phone

## 2022-02-04 NOTE — HISTORY OF PRESENT ILLNESS
[FreeTextEntry1] : 82 y/o male comes fro followup for bacteremia.\par \par He was at [facility] with MRSA/e. coil bacteremia and s/p PPm removal. DC with picc and IV vanco and CTX. \par \par Doing ok. Pt is demented and givens no hx. \par \par No issues with line per aide.\par \par Had an issues of bradycardia and was in ER a week ago. Missed f/u with EP.\par \par \par \par

## 2022-02-04 NOTE — PHYSICAL EXAM
[General Appearance - Alert] : alert [General Appearance - In No Acute Distress] : in no acute distress [Sclera] : the sclera and conjunctiva were normal [PERRL With Normal Accommodation] : pupils were equal in size, round, reactive to light [Extraocular Movements] : extraocular movements were intact [Outer Ear] : the ears and nose were normal in appearance [Oropharynx] : the oropharynx was normal with no thrush [Neck Appearance] : the appearance of the neck was normal [Neck Cervical Mass (___cm)] : no neck mass was observed [Jugular Venous Distention Increased] : there was no jugular-venous distention [Thyroid Diffuse Enlargement] : the thyroid was not enlarged [Auscultation Breath Sounds / Voice Sounds] : lungs were clear to auscultation bilaterally [Heart Sounds] : normal S1 and S2 [Full Pulse] : the pedal pulses are present [Edema] : there was no peripheral edema [Bowel Sounds] : normal bowel sounds [Abdomen Soft] : soft [Abdomen Tenderness] : non-tender [Abdomen Mass (___ Cm)] : no abdominal mass palpated [Costovertebral Angle Tenderness] : no CVA tenderness [Skin Color & Pigmentation] : normal skin color and pigmentation [] : no rash [FreeTextEntry1] : demented, flat affect

## 2022-02-08 ENCOUNTER — INPATIENT (INPATIENT)
Facility: HOSPITAL | Age: 82
LOS: 23 days | Discharge: HOME HEALTH | End: 2022-03-04
Attending: INTERNAL MEDICINE | Admitting: INTERNAL MEDICINE
Payer: COMMERCIAL

## 2022-02-08 VITALS
DIASTOLIC BLOOD PRESSURE: 77 MMHG | HEIGHT: 66 IN | RESPIRATION RATE: 84 BRPM | OXYGEN SATURATION: 99 % | SYSTOLIC BLOOD PRESSURE: 161 MMHG | TEMPERATURE: 98 F

## 2022-02-08 DIAGNOSIS — Z95.0 PRESENCE OF CARDIAC PACEMAKER: Chronic | ICD-10-CM

## 2022-02-08 DIAGNOSIS — Z95.5 PRESENCE OF CORONARY ANGIOPLASTY IMPLANT AND GRAFT: Chronic | ICD-10-CM

## 2022-02-08 LAB
ALBUMIN SERPL ELPH-MCNC: 3.4 G/DL — SIGNIFICANT CHANGE UP (ref 3.3–5)
ALP SERPL-CCNC: 81 U/L — SIGNIFICANT CHANGE UP (ref 40–120)
ALT FLD-CCNC: 14 U/L — SIGNIFICANT CHANGE UP (ref 4–41)
ANION GAP SERPL CALC-SCNC: 9 MMOL/L — SIGNIFICANT CHANGE UP (ref 7–14)
APPEARANCE UR: CLEAR — SIGNIFICANT CHANGE UP
APTT BLD: 31.9 SEC — SIGNIFICANT CHANGE UP (ref 27–36.3)
AST SERPL-CCNC: 17 U/L — SIGNIFICANT CHANGE UP (ref 4–40)
BACTERIA # UR AUTO: ABNORMAL
BASOPHILS # BLD AUTO: 0.06 K/UL — SIGNIFICANT CHANGE UP (ref 0–0.2)
BASOPHILS NFR BLD AUTO: 1.4 % — SIGNIFICANT CHANGE UP (ref 0–2)
BILIRUB SERPL-MCNC: 1.5 MG/DL — HIGH (ref 0.2–1.2)
BILIRUB UR-MCNC: NEGATIVE — SIGNIFICANT CHANGE UP
BLOOD GAS VENOUS COMPREHENSIVE RESULT: SIGNIFICANT CHANGE UP
BUN SERPL-MCNC: 11 MG/DL — SIGNIFICANT CHANGE UP (ref 7–23)
CALCIUM SERPL-MCNC: 9.3 MG/DL — SIGNIFICANT CHANGE UP (ref 8.4–10.5)
CHLORIDE SERPL-SCNC: 100 MMOL/L — SIGNIFICANT CHANGE UP (ref 98–107)
CO2 SERPL-SCNC: 29 MMOL/L — SIGNIFICANT CHANGE UP (ref 22–31)
COLOR SPEC: SIGNIFICANT CHANGE UP
CREAT ?TM UR-MCNC: 104 MG/DL — SIGNIFICANT CHANGE UP
CREAT SERPL-MCNC: 1.28 MG/DL — SIGNIFICANT CHANGE UP (ref 0.5–1.3)
DIFF PNL FLD: ABNORMAL
EOSINOPHIL # BLD AUTO: 0.14 K/UL — SIGNIFICANT CHANGE UP (ref 0–0.5)
EOSINOPHIL NFR BLD AUTO: 3.3 % — SIGNIFICANT CHANGE UP (ref 0–6)
EPI CELLS # UR: 5 /HPF — SIGNIFICANT CHANGE UP (ref 0–5)
GLUCOSE SERPL-MCNC: 89 MG/DL — SIGNIFICANT CHANGE UP (ref 70–99)
GLUCOSE UR QL: NEGATIVE — SIGNIFICANT CHANGE UP
HCT VFR BLD CALC: 31.7 % — LOW (ref 39–50)
HGB BLD-MCNC: 10.4 G/DL — LOW (ref 13–17)
IANC: 2.54 K/UL — SIGNIFICANT CHANGE UP (ref 1.5–8.5)
IMM GRANULOCYTES NFR BLD AUTO: 0.5 % — SIGNIFICANT CHANGE UP (ref 0–1.5)
INR BLD: 1.13 RATIO — SIGNIFICANT CHANGE UP (ref 0.88–1.16)
KETONES UR-MCNC: NEGATIVE — SIGNIFICANT CHANGE UP
LEUKOCYTE ESTERASE UR-ACNC: ABNORMAL
LYMPHOCYTES # BLD AUTO: 0.89 K/UL — LOW (ref 1–3.3)
LYMPHOCYTES # BLD AUTO: 21.3 % — SIGNIFICANT CHANGE UP (ref 13–44)
MAGNESIUM SERPL-MCNC: 1.9 MG/DL — SIGNIFICANT CHANGE UP (ref 1.6–2.6)
MCHC RBC-ENTMCNC: 29.5 PG — SIGNIFICANT CHANGE UP (ref 27–34)
MCHC RBC-ENTMCNC: 32.8 GM/DL — SIGNIFICANT CHANGE UP (ref 32–36)
MCV RBC AUTO: 90.1 FL — SIGNIFICANT CHANGE UP (ref 80–100)
MONOCYTES # BLD AUTO: 0.53 K/UL — SIGNIFICANT CHANGE UP (ref 0–0.9)
MONOCYTES NFR BLD AUTO: 12.7 % — SIGNIFICANT CHANGE UP (ref 2–14)
NEUTROPHILS # BLD AUTO: 2.54 K/UL — SIGNIFICANT CHANGE UP (ref 1.8–7.4)
NEUTROPHILS NFR BLD AUTO: 60.8 % — SIGNIFICANT CHANGE UP (ref 43–77)
NITRITE UR-MCNC: NEGATIVE — SIGNIFICANT CHANGE UP
NRBC # BLD: 0 /100 WBCS — SIGNIFICANT CHANGE UP
NRBC # FLD: 0 K/UL — SIGNIFICANT CHANGE UP
PH UR: 6.5 — SIGNIFICANT CHANGE UP (ref 5–8)
PHOSPHATE SERPL-MCNC: 2.5 MG/DL — SIGNIFICANT CHANGE UP (ref 2.5–4.5)
PLATELET # BLD AUTO: 199 K/UL — SIGNIFICANT CHANGE UP (ref 150–400)
POTASSIUM SERPL-MCNC: 3.3 MMOL/L — LOW (ref 3.5–5.3)
POTASSIUM SERPL-SCNC: 3.3 MMOL/L — LOW (ref 3.5–5.3)
PROT SERPL-MCNC: 6.4 G/DL — SIGNIFICANT CHANGE UP (ref 6–8.3)
PROT UR-MCNC: ABNORMAL
PROTHROM AB SERPL-ACNC: 12.8 SEC — SIGNIFICANT CHANGE UP (ref 10.6–13.6)
RBC # BLD: 3.52 M/UL — LOW (ref 4.2–5.8)
RBC # FLD: 13.5 % — SIGNIFICANT CHANGE UP (ref 10.3–14.5)
RBC CASTS # UR COMP ASSIST: 4 /HPF — SIGNIFICANT CHANGE UP (ref 0–4)
SODIUM SERPL-SCNC: 138 MMOL/L — SIGNIFICANT CHANGE UP (ref 135–145)
SODIUM UR-SCNC: 93 MMOL/L — SIGNIFICANT CHANGE UP
SP GR SPEC: 1.01 — SIGNIFICANT CHANGE UP (ref 1–1.05)
UROBILINOGEN FLD QL: SIGNIFICANT CHANGE UP
UUN UR-MCNC: 354 MG/DL — SIGNIFICANT CHANGE UP
WBC # BLD: 4.18 K/UL — SIGNIFICANT CHANGE UP (ref 3.8–10.5)
WBC # FLD AUTO: 4.18 K/UL — SIGNIFICANT CHANGE UP (ref 3.8–10.5)
WBC UR QL: 10 /HPF — HIGH (ref 0–5)

## 2022-02-08 PROCEDURE — G1004: CPT

## 2022-02-08 PROCEDURE — 74177 CT ABD & PELVIS W/CONTRAST: CPT | Mod: 26,ME

## 2022-02-08 PROCEDURE — 99285 EMERGENCY DEPT VISIT HI MDM: CPT

## 2022-02-08 PROCEDURE — 70450 CT HEAD/BRAIN W/O DYE: CPT | Mod: 26,ME

## 2022-02-08 RX ORDER — SODIUM CHLORIDE 9 MG/ML
500 INJECTION, SOLUTION INTRAVENOUS ONCE
Refills: 0 | Status: COMPLETED | OUTPATIENT
Start: 2022-02-08 | End: 2022-02-08

## 2022-02-08 RX ORDER — POTASSIUM CHLORIDE 20 MEQ
10 PACKET (EA) ORAL
Refills: 0 | Status: COMPLETED | OUTPATIENT
Start: 2022-02-08 | End: 2022-02-09

## 2022-02-08 RX ADMIN — Medication 0.5 MILLIGRAM(S): at 21:06

## 2022-02-08 RX ADMIN — Medication 100 MILLIEQUIVALENT(S): at 23:48

## 2022-02-08 NOTE — ED PROVIDER NOTE - PHYSICAL EXAMINATION
PHYSICAL EXAM  GENERAL: NAD,thin  HEAD:  Atraumatic, Normocephalic  EYES: EOMI b/l, PERRLA b/l, conjunctiva and sclera clear  NECK: Supple, No JVD, No LAD   CHEST/LUNG: Clear to auscultation bilaterally; No wheeze or ronchi  HEART: Regular rate and rhythm; S1 and S2 present, No murmurs, rubs, or gallops  ABDOMEN: Soft, Nontender, Nondistended; Bowel sounds present  EXTREMITIES:  2+ Peripheral Pulses, No clubbing, cyanosis, or edema,   NEURO: AAOx1, right arm contracted and weak  SKIN: No rashes or lesions, dti sacrum  rectal exam pending

## 2022-02-08 NOTE — ED PROVIDER NOTE - CLINICAL SUMMARY MEDICAL DECISION MAKING FREE TEXT BOX
80 yo M with hx of HTN, HLD CVA with Rt sided residual weakness, dementia, bedbound, HTN, CAD s/p stent, PPM, asthma, stercocolitis, and recent completion of IV abx for Ecoli and MRSA Bacteremia brought in by wife for constipation for three weeks and sacral wound eval pending, rectal exam with possible disimpaction, labs, imaging and likely admission for wound care set up if workup remains negative.

## 2022-02-08 NOTE — ED ADULT NURSE NOTE - NSIMPLEMENTINTERV_GEN_ALL_ED
Implemented All Fall with Harm Risk Interventions:  [place] to call system. Call bell, personal items and telephone within reach. Instruct patient to call for assistance. Room bathroom lighting operational. Non-slip footwear when patient is off stretcher. Physically safe environment: no spills, clutter or unnecessary equipment. Stretcher in lowest position, wheels locked, appropriate side rails in place. Provide visual cue, wrist band, yellow gown, etc. Monitor gait and stability. Monitor for mental status changes and reorient to person, place, and time. Review medications for side effects contributing to fall risk. Reinforce activity limits and safety measures with patient and family. Provide visual clues: red socks.

## 2022-02-08 NOTE — ED PROVIDER NOTE - PROGRESS NOTE DETAILS
rectal exam performed, no stool in palpable vault, no blood. sacral wound is on left glute, stage 2 almost healed, no signs of infection reviewed labwork and scans, will need to be admitted, called and left VM to Dr. Gonzalez, will continue to monitor

## 2022-02-08 NOTE — ED ADULT TRIAGE NOTE - CHIEF COMPLAINT QUOTE
Pt complaining of constipation x 3 weeks. Wife wanted him to come to ER for eval, as per EMS "pt has wound to sacral area that he has been scratching." pt bedbound, hx of dementia, stroke 10 years ago with residual deficits. Wife Nerissa Garcia [phone].

## 2022-02-08 NOTE — ED PROVIDER NOTE - NSICDXPASTSURGICALHX_GEN_ALL_CORE_FT
PAST SURGICAL HISTORY:  Cardiac pacemaker [employer] (SN: [id number]; Model: 69826)    S/P coronary artery stent placement

## 2022-02-08 NOTE — ED PROVIDER NOTE - ATTENDING CONTRIBUTION TO CARE
DR. LINDSAY, ATTENDING MD-  I performed a face to face bedside interview with the patient regarding history of present illness, review of symptoms and past medical history. I completed an independent physical exam.  I have discussed the patient's plan of care with the resident.   Documentation as above in the note.    80 y/o male h/o dementia cva resid right sided weakness cad here with no bm x3 wks, dec po intake.  Eval for sbo, lbo, stercolitis.  Obtain cbc cmp ua ucx ct a/p cxr covid swab will need admission for further care and evaluation.

## 2022-02-08 NOTE — ED PROVIDER NOTE - CARE PLAN
Assessment and plan of treatment:	80 yo M with hx of HTN, HLD CVA with Rt sided residual weakness, dementia, bedbound, HTN, CAD s/p stent, PPM, asthma, stercocolitis, and recent completion of IV abx for Ecoli and MRSA Bacteremia brought in by wife for constipation for three weeks and sacral wound eval pending, rectal exam with possible disimpaction, labs, imaging and likely admission for wound care set up if workup remains negative.   1 Principal Discharge DX:	Constipation   Principal Discharge DX:	Constipation  Assessment and plan of treatment:	82 yo M with hx of HTN, HLD CVA with Rt sided residual weakness, dementia, bedbound, HTN, CAD s/p stent, PPM, asthma, stercocolitis, and recent completion of IV abx for Ecoli and MRSA Bacteremia brought in by wife for constipation for three weeks found to have stercolitis, TAE and ?UTI

## 2022-02-08 NOTE — ED PROVIDER NOTE - OBJECTIVE STATEMENT
82 yo M with hx of HTN, HLD CVA with Rt sided residual weakness, dementia, bedbound, HTN, CAD s/p stent, PPM, asthma, stercocolitis, and recent completion of IV abx for Ecoli and MRSA Bacteremia brought in by wife for constipation for three weeks , inability to tolerate senna due to abdominal pain, no nausea, or vomiting, fevers or chills. wife would also like sacral wound evaluated, which she has been cleaning with salt water. Patient's wife is primary care taker and history is obtained from wife Nerissa Garcia [phone].

## 2022-02-08 NOTE — ED ADULT NURSE NOTE - CHIEF COMPLAINT QUOTE
Pt complaining of constipation x 3 weeks. Wife wanted him to come to ER for eval, as per EMS "pt has wound to sacral area that he has been scratching." pt bedbound, hx of dementia, stroke 10 years ago with residual deficits. Wife Nerissa Garcia [phone].

## 2022-02-08 NOTE — ED PROVIDER NOTE - PLAN OF CARE
82 yo M with hx of HTN, HLD CVA with Rt sided residual weakness, dementia, bedbound, HTN, CAD s/p stent, PPM, asthma, stercocolitis, and recent completion of IV abx for Ecoli and MRSA Bacteremia brought in by wife for constipation for three weeks and sacral wound eval pending, rectal exam with possible disimpaction, labs, imaging and likely admission for wound care set up if workup remains negative. 80 yo M with hx of HTN, HLD CVA with Rt sided residual weakness, dementia, bedbound, HTN, CAD s/p stent, PPM, asthma, stercocolitis, and recent completion of IV abx for Ecoli and MRSA Bacteremia brought in by wife for constipation for three weeks found to have stercolitis, TAE and ?UTI

## 2022-02-08 NOTE — ED ADULT NURSE REASSESSMENT NOTE - NS ED NURSE REASSESS COMMENT FT1
Patient is restless at this time, uncooperative trying to pull out lines. Medicated as ordered. Respirations even and unlabored, in NAD. Stage 2, 5cm by 2cm pressure injury noted to sacrum, dressing placed. Vitals as per flowsheet. WIll continue to monitor

## 2022-02-09 DIAGNOSIS — F03.90 UNSPECIFIED DEMENTIA WITHOUT BEHAVIORAL DISTURBANCE: ICD-10-CM

## 2022-02-09 DIAGNOSIS — K59.00 CONSTIPATION, UNSPECIFIED: ICD-10-CM

## 2022-02-09 DIAGNOSIS — K52.89 OTHER SPECIFIED NONINFECTIVE GASTROENTERITIS AND COLITIS: ICD-10-CM

## 2022-02-09 DIAGNOSIS — K62.9 DISEASE OF ANUS AND RECTUM, UNSPECIFIED: ICD-10-CM

## 2022-02-09 DIAGNOSIS — L89.159 PRESSURE ULCER OF SACRAL REGION, UNSPECIFIED STAGE: ICD-10-CM

## 2022-02-09 DIAGNOSIS — Z29.9 ENCOUNTER FOR PROPHYLACTIC MEASURES, UNSPECIFIED: ICD-10-CM

## 2022-02-09 DIAGNOSIS — N17.9 ACUTE KIDNEY FAILURE, UNSPECIFIED: ICD-10-CM

## 2022-02-09 DIAGNOSIS — N30.00 ACUTE CYSTITIS WITHOUT HEMATURIA: ICD-10-CM

## 2022-02-09 DIAGNOSIS — I25.10 ATHEROSCLEROTIC HEART DISEASE OF NATIVE CORONARY ARTERY WITHOUT ANGINA PECTORIS: ICD-10-CM

## 2022-02-09 LAB
ANION GAP SERPL CALC-SCNC: 11 MMOL/L — SIGNIFICANT CHANGE UP (ref 7–14)
BUN SERPL-MCNC: 11 MG/DL — SIGNIFICANT CHANGE UP (ref 7–23)
CALCIUM SERPL-MCNC: 9.2 MG/DL — SIGNIFICANT CHANGE UP (ref 8.4–10.5)
CHLORIDE SERPL-SCNC: 101 MMOL/L — SIGNIFICANT CHANGE UP (ref 98–107)
CO2 SERPL-SCNC: 27 MMOL/L — SIGNIFICANT CHANGE UP (ref 22–31)
CREAT SERPL-MCNC: 1.29 MG/DL — SIGNIFICANT CHANGE UP (ref 0.5–1.3)
FLUAV AG NPH QL: SIGNIFICANT CHANGE UP
FLUBV AG NPH QL: SIGNIFICANT CHANGE UP
GLUCOSE BLDC GLUCOMTR-MCNC: 112 MG/DL — HIGH (ref 70–99)
GLUCOSE SERPL-MCNC: 82 MG/DL — SIGNIFICANT CHANGE UP (ref 70–99)
HCT VFR BLD CALC: 29.3 % — LOW (ref 39–50)
HGB BLD-MCNC: 9.9 G/DL — LOW (ref 13–17)
MAGNESIUM SERPL-MCNC: 1.9 MG/DL — SIGNIFICANT CHANGE UP (ref 1.6–2.6)
MCHC RBC-ENTMCNC: 30.1 PG — SIGNIFICANT CHANGE UP (ref 27–34)
MCHC RBC-ENTMCNC: 33.8 GM/DL — SIGNIFICANT CHANGE UP (ref 32–36)
MCV RBC AUTO: 89.1 FL — SIGNIFICANT CHANGE UP (ref 80–100)
NRBC # BLD: 0 /100 WBCS — SIGNIFICANT CHANGE UP
NRBC # FLD: 0 K/UL — SIGNIFICANT CHANGE UP
PHOSPHATE SERPL-MCNC: 2.6 MG/DL — SIGNIFICANT CHANGE UP (ref 2.5–4.5)
PLATELET # BLD AUTO: 193 K/UL — SIGNIFICANT CHANGE UP (ref 150–400)
POTASSIUM SERPL-MCNC: 3.4 MMOL/L — LOW (ref 3.5–5.3)
POTASSIUM SERPL-SCNC: 3.4 MMOL/L — LOW (ref 3.5–5.3)
RBC # BLD: 3.29 M/UL — LOW (ref 4.2–5.8)
RBC # FLD: 13.6 % — SIGNIFICANT CHANGE UP (ref 10.3–14.5)
RSV RNA NPH QL NAA+NON-PROBE: SIGNIFICANT CHANGE UP
SARS-COV-2 RNA SPEC QL NAA+PROBE: SIGNIFICANT CHANGE UP
SARS-COV-2 RNA SPEC QL NAA+PROBE: SIGNIFICANT CHANGE UP
SODIUM SERPL-SCNC: 139 MMOL/L — SIGNIFICANT CHANGE UP (ref 135–145)
WBC # BLD: 4.81 K/UL — SIGNIFICANT CHANGE UP (ref 3.8–10.5)
WBC # FLD AUTO: 4.81 K/UL — SIGNIFICANT CHANGE UP (ref 3.8–10.5)

## 2022-02-09 PROCEDURE — 99223 1ST HOSP IP/OBS HIGH 75: CPT

## 2022-02-09 PROCEDURE — 99222 1ST HOSP IP/OBS MODERATE 55: CPT | Mod: GC

## 2022-02-09 RX ORDER — LANOLIN ALCOHOL/MO/W.PET/CERES
3 CREAM (GRAM) TOPICAL AT BEDTIME
Refills: 0 | Status: DISCONTINUED | OUTPATIENT
Start: 2022-02-09 | End: 2022-03-04

## 2022-02-09 RX ORDER — OLANZAPINE 15 MG/1
1.25 TABLET, FILM COATED ORAL EVERY 6 HOURS
Refills: 0 | Status: DISCONTINUED | OUTPATIENT
Start: 2022-02-09 | End: 2022-03-04

## 2022-02-09 RX ORDER — POLYETHYLENE GLYCOL 3350 17 G/17G
17 POWDER, FOR SOLUTION ORAL
Refills: 0 | Status: DISCONTINUED | OUTPATIENT
Start: 2022-02-09 | End: 2022-03-04

## 2022-02-09 RX ORDER — LISINOPRIL 2.5 MG/1
10 TABLET ORAL DAILY
Refills: 0 | Status: DISCONTINUED | OUTPATIENT
Start: 2022-02-09 | End: 2022-03-04

## 2022-02-09 RX ORDER — POLYETHYLENE GLYCOL 3350 17 G/17G
17 POWDER, FOR SOLUTION ORAL DAILY
Refills: 0 | Status: DISCONTINUED | OUTPATIENT
Start: 2022-02-09 | End: 2022-02-09

## 2022-02-09 RX ORDER — POLYETHYLENE GLYCOL 3350 17 G/17G
17 POWDER, FOR SOLUTION ORAL ONCE
Refills: 0 | Status: COMPLETED | OUTPATIENT
Start: 2022-02-09 | End: 2022-02-09

## 2022-02-09 RX ORDER — MINERAL OIL
133 OIL (ML) MISCELLANEOUS ONCE
Refills: 0 | Status: COMPLETED | OUTPATIENT
Start: 2022-02-09 | End: 2022-02-09

## 2022-02-09 RX ORDER — ONDANSETRON 8 MG/1
4 TABLET, FILM COATED ORAL EVERY 8 HOURS
Refills: 0 | Status: DISCONTINUED | OUTPATIENT
Start: 2022-02-09 | End: 2022-03-04

## 2022-02-09 RX ORDER — HYDRALAZINE HCL 50 MG
5 TABLET ORAL ONCE
Refills: 0 | Status: COMPLETED | OUTPATIENT
Start: 2022-02-09 | End: 2022-02-09

## 2022-02-09 RX ORDER — METOPROLOL TARTRATE 50 MG
25 TABLET ORAL DAILY
Refills: 0 | Status: DISCONTINUED | OUTPATIENT
Start: 2022-02-09 | End: 2022-02-12

## 2022-02-09 RX ORDER — LACTULOSE 10 G/15ML
20 SOLUTION ORAL ONCE
Refills: 0 | Status: COMPLETED | OUTPATIENT
Start: 2022-02-09 | End: 2022-02-09

## 2022-02-09 RX ORDER — LACTULOSE 10 G/15ML
10 SOLUTION ORAL EVERY 8 HOURS
Refills: 0 | Status: DISCONTINUED | OUTPATIENT
Start: 2022-02-09 | End: 2022-02-14

## 2022-02-09 RX ORDER — SENNA PLUS 8.6 MG/1
2 TABLET ORAL AT BEDTIME
Refills: 0 | Status: DISCONTINUED | OUTPATIENT
Start: 2022-02-09 | End: 2022-02-09

## 2022-02-09 RX ORDER — CEFTRIAXONE 500 MG/1
1000 INJECTION, POWDER, FOR SOLUTION INTRAMUSCULAR; INTRAVENOUS ONCE
Refills: 0 | Status: COMPLETED | OUTPATIENT
Start: 2022-02-09 | End: 2022-02-09

## 2022-02-09 RX ORDER — VANCOMYCIN HCL 1 G
1000 VIAL (EA) INTRAVENOUS ONCE
Refills: 0 | Status: COMPLETED | OUTPATIENT
Start: 2022-02-09 | End: 2022-02-09

## 2022-02-09 RX ORDER — ISOSORBIDE MONONITRATE 60 MG/1
30 TABLET, EXTENDED RELEASE ORAL DAILY
Refills: 0 | Status: DISCONTINUED | OUTPATIENT
Start: 2022-02-09 | End: 2022-03-04

## 2022-02-09 RX ORDER — CEFTRIAXONE 500 MG/1
1000 INJECTION, POWDER, FOR SOLUTION INTRAMUSCULAR; INTRAVENOUS EVERY 24 HOURS
Refills: 0 | Status: DISCONTINUED | OUTPATIENT
Start: 2022-02-09 | End: 2022-02-10

## 2022-02-09 RX ORDER — ASPIRIN/CALCIUM CARB/MAGNESIUM 324 MG
81 TABLET ORAL DAILY
Refills: 0 | Status: DISCONTINUED | OUTPATIENT
Start: 2022-02-09 | End: 2022-03-04

## 2022-02-09 RX ORDER — MINERAL OIL
30 OIL (ML) MISCELLANEOUS ONCE
Refills: 0 | Status: COMPLETED | OUTPATIENT
Start: 2022-02-09 | End: 2022-02-09

## 2022-02-09 RX ORDER — ATORVASTATIN CALCIUM 80 MG/1
40 TABLET, FILM COATED ORAL AT BEDTIME
Refills: 0 | Status: DISCONTINUED | OUTPATIENT
Start: 2022-02-09 | End: 2022-03-04

## 2022-02-09 RX ORDER — SODIUM CHLORIDE 9 MG/ML
1000 INJECTION, SOLUTION INTRAVENOUS
Refills: 0 | Status: DISCONTINUED | OUTPATIENT
Start: 2022-02-09 | End: 2022-02-12

## 2022-02-09 RX ORDER — AMLODIPINE BESYLATE 2.5 MG/1
10 TABLET ORAL DAILY
Refills: 0 | Status: DISCONTINUED | OUTPATIENT
Start: 2022-02-09 | End: 2022-03-04

## 2022-02-09 RX ADMIN — CEFTRIAXONE 100 MILLIGRAM(S): 500 INJECTION, POWDER, FOR SOLUTION INTRAMUSCULAR; INTRAVENOUS at 02:32

## 2022-02-09 RX ADMIN — LISINOPRIL 10 MILLIGRAM(S): 2.5 TABLET ORAL at 18:15

## 2022-02-09 RX ADMIN — Medication 81 MILLIGRAM(S): at 15:41

## 2022-02-09 RX ADMIN — Medication 133 MILLILITER(S): at 06:06

## 2022-02-09 RX ADMIN — SODIUM CHLORIDE 500 MILLILITER(S): 9 INJECTION, SOLUTION INTRAVENOUS at 00:03

## 2022-02-09 RX ADMIN — Medication 250 MILLIGRAM(S): at 05:26

## 2022-02-09 RX ADMIN — SODIUM CHLORIDE 100 MILLILITER(S): 9 INJECTION, SOLUTION INTRAVENOUS at 08:56

## 2022-02-09 RX ADMIN — Medication 100 MILLIEQUIVALENT(S): at 00:56

## 2022-02-09 RX ADMIN — LACTULOSE 20 GRAM(S): 10 SOLUTION ORAL at 14:51

## 2022-02-09 RX ADMIN — OLANZAPINE 1.25 MILLIGRAM(S): 15 TABLET, FILM COATED ORAL at 16:48

## 2022-02-09 RX ADMIN — POLYETHYLENE GLYCOL 3350 17 GRAM(S): 17 POWDER, FOR SOLUTION ORAL at 18:15

## 2022-02-09 RX ADMIN — AMLODIPINE BESYLATE 10 MILLIGRAM(S): 2.5 TABLET ORAL at 15:42

## 2022-02-09 RX ADMIN — Medication 25 MILLIGRAM(S): at 18:15

## 2022-02-09 RX ADMIN — ISOSORBIDE MONONITRATE 30 MILLIGRAM(S): 60 TABLET, EXTENDED RELEASE ORAL at 15:42

## 2022-02-09 RX ADMIN — ATORVASTATIN CALCIUM 40 MILLIGRAM(S): 80 TABLET, FILM COATED ORAL at 21:41

## 2022-02-09 RX ADMIN — Medication 5 MILLIGRAM(S): at 13:52

## 2022-02-09 NOTE — H&P ADULT - PROBLEM SELECTOR PLAN 3
CT A/P: Asymmetric irregular posterior rectal wall soft tissue thickening. Differential includes neoplasm, sequelae of infectious or inflammatory process, versus muscular hypertrophy. Recommend scope with tissue sampling.  -GI c/s requested CT A/P: Asymmetric irregular posterior rectal wall soft tissue thickening. Differential includes neoplasm, sequelae of infectious or inflammatory process, versus muscular hypertrophy.  -Radiology recommends scope with tissue sampling  -GI c/s for possible sigmoidoscopy requested

## 2022-02-09 NOTE — H&P ADULT - NSHPLABSRESULTS_GEN_ALL_CORE
---Personally reviewed the labs below:                          10.4   4.18  )-----------( 199      ( 08 Feb 2022 20:17 )             31.7   02-08    138  |  100  |  11  ----------------------------<  89  3.3<L>   |  29  |  1.28    Ca    9.3      08 Feb 2022 20:17  Phos  2.5     02-08  Mg     1.90     02-08    TPro  6.4  /  Alb  3.4  /  TBili  1.5<H>  /  DBili  x   /  AST  17  /  ALT  14  /  AlkPhos  81  02-08    ---Personally reviewed the radiological imaging below:    CT ABDOMEN AND PELVIS IC  PROCEDURE DATE: 02/08/2022  INTERPRETATION: CLINICAL INFORMATION: Acute nonlocalized abdominal pain. 3 weeks of constipation. History of stercoral colitis  COMPARISON: CT abdomen and pelvis from 12/28/2021  IMPRESSION:  Stool distended rectum with wall thickening and perirectal fat stranding consistent with stercoral colitis.  Asymmetric irregular posterior rectal wall soft tissue thickening. Differential includes neoplasm, sequelae of infectious or inflammatory process, versus muscular hypertrophy. Recommend scope with tissue sampling.  Foci of air within the bladder. Correlate for recent instrumentation.    CT BRAIN  PROCEDURE DATE: 02/08/2022  INTERPRETATION: HISTORY: Mental status change.  COMPARISON: None.  IMPRESSION:  No acute intracranial bleeding.  Advanced central volume loss, extensive chronic microvascular ischemic changes, and chronic left greater than right lacunar infarctions. ---Personally interpreted EKG: NSR, 61bpm, DCu=131, no acute Tw or ST changes     ---Personally reviewed the labs below:                       10.4   4.18  )-----------( 199      ( 08 Feb 2022 20:17 )             31.7   02-08    138  |  100  |  11  ----------------------------<  89  3.3<L>   |  29  |  1.28    Ca    9.3      08 Feb 2022 20:17  Phos  2.5     02-08  Mg     1.90     02-08    TPro  6.4  /  Alb  3.4  /  TBili  1.5<H>  /  DBili  x   /  AST  17  /  ALT  14  /  AlkPhos  81  02-08    ---Personally reviewed the radiological imaging below:    CT ABDOMEN AND PELVIS IC  PROCEDURE DATE: 02/08/2022  INTERPRETATION: CLINICAL INFORMATION: Acute nonlocalized abdominal pain. 3 weeks of constipation. History of stercoral colitis  COMPARISON: CT abdomen and pelvis from 12/28/2021  IMPRESSION:  Stool distended rectum with wall thickening and perirectal fat stranding consistent with stercoral colitis.  Asymmetric irregular posterior rectal wall soft tissue thickening. Differential includes neoplasm, sequelae of infectious or inflammatory process, versus muscular hypertrophy. Recommend scope with tissue sampling.  Foci of air within the bladder. Correlate for recent instrumentation.    CT BRAIN  PROCEDURE DATE: 02/08/2022  INTERPRETATION: HISTORY: Mental status change.  COMPARISON: None.  IMPRESSION:  No acute intracranial bleeding.  Advanced central volume loss, extensive chronic microvascular ischemic changes, and chronic left greater than right lacunar infarctions.

## 2022-02-09 NOTE — H&P ADULT - RECTAL EXAM
Ashley Potts was in the clinic today to see Rowan Dong MD for   Chief Complaint   Patient presents with   • Blood Pressure   .    Please note, her blood pressures were elevated x2 while in the clinic.    BP Readings from Last 1 Encounters:   11/20/19 0810 180/82   11/20/19 0757 (!) 179/91       Please review with PCP and follow up with patient as appropriate.    
Have her come in for a bp check here  
Spoke with pt.  Instructed her to come in for BP check with MA.  Pt will call ahead to schedule appt for BP check.  
performed in ED, not repeated

## 2022-02-09 NOTE — CONSULT NOTE ADULT - SUBJECTIVE AND OBJECTIVE BOX
Chief Complaint:  Patient is a 81y old  Male who presents with a chief complaint of Constipation x 3 weeks (2022 04:27)      HPI: 82 yo Male with hx of HTN, HLD CVA with Rt sided residual weakness, dementia, bedbound c/b sacral decubitus ulcer, HTN, CAD s/p stent, s/p PPM extraction, asthma, stercoral colitis, recent completion of IV abx for Ecoli and MRSA Bacteremia via PICC; Pt brought in by wife for constipation of three weeks duration with associated abd distention and discomfort. History obtained from pt's wife who is also his caretaker who was present at bedside. Per pt's wife, has had intermittent episodes of constipation at baseline. Recently she tried enemas, senna but these caused cramping and abd discomfort and were also ineffective.  No associated nausea, vomiting, fevers or chills. Here, VSS, Hgb 10 (BL 12-13). CT A/P notable for stool in distal rectum with wall thickening and perirectal fat stranding c/w stercoral colitis and asymmetric irregular posterior rectal wall soft tissue thickening c/f neoplasm vs infectious or inflammatory etiology. GI consulted for stercoral colitis and posterior rectal wall thickening.         Allergies:  No Known Allergies      Home Medications:    Hospital Medications:  amLODIPine   Tablet 10 milliGRAM(s) Oral daily  aspirin enteric coated 81 milliGRAM(s) Oral daily  atorvastatin 40 milliGRAM(s) Oral at bedtime  cefTRIAXone   IVPB 1000 milliGRAM(s) IV Intermittent every 24 hours  isosorbide   mononitrate ER Tablet (IMDUR) 30 milliGRAM(s) Oral daily  lactated ringers. 1000 milliLiter(s) IV Continuous <Continuous>  lactulose Syrup 10 Gram(s) Oral every 8 hours  lisinopril 10 milliGRAM(s) Oral daily  melatonin 3 milliGRAM(s) Oral at bedtime PRN  metoprolol succinate ER 25 milliGRAM(s) Oral daily  OLANZapine Injectable 1.25 milliGRAM(s) IntraMuscular every 6 hours PRN  ondansetron Injectable 4 milliGRAM(s) IV Push every 8 hours PRN  polyethylene glycol 3350 17 Gram(s) Oral daily  senna 2 Tablet(s) Oral at bedtime      PMHX/PSHX:  CVA (cerebral infarction)    HTN (hypertension)    Diabetes    Hyperlipidemia    Dementia    HLD (hyperlipidemia)    DM (diabetes mellitus)    Asthma    CAD (coronary artery disease)    Sinus node dysfunction    No significant past surgical history    Cardiac pacemaker    S/P coronary artery stent placement        Family history:  unable to obtain due to pt's dementia    Social History:  unable to obtain due to pt's dementia    ROS:  unable to obtain due to pt's dementia      Vital Signs:  Vital Signs Last 24 Hrs  T(C): 37.2 (2022 18:06), Max: 37.2 (2022 18:06)  T(F): 98.9 (2022 18:06), Max: 98.9 (2022 18:06)  HR: 66 (2022 18:06) (61 - 76)  BP: 177/94 (2022 18:06) (159/85 - 182/92)  BP(mean): --  RR: 18 (2022 18:06) (15 - 18)  SpO2: 98% (2022 18:06) (97% - 100%)  Daily     Daily     PHYSICAL EXAM:     GENERAL:  Appears stated age, well-groomed, well-nourished, no distress  HEENT:  NC/AT,  conjunctivae clear and pink  CHEST:  Full & symmetric excursion, no increased effort  HEART:  Regular rhythm, S1, S2, no murmur/rub/S3/S4  ABDOMEN:  Soft, non-tender, non-distended  RECTAL: chaperoned; no hemorrhoids; no palpable stool in rectal vault, neg for melena or hematochezia  EXTREMITIES:  no cyanosis,clubbing or edema  SKIN:  No rash/erythema/ecchymoses/petechiae/wounds/abscess/warm/dry  NEURO:  Alert, orientedx1      LABS:                        9.9    4.81  )-----------( 193      ( 2022 10:10 )             29.3     02-09    139  |  101  |  11  ----------------------------<  82  3.4<L>   |  27  |  1.29    Ca    9.2      2022 10:10  Phos  2.6     02-09  Mg     1.90     02-09    TPro  6.4  /  Alb  3.4  /  TBili  1.5<H>  /  DBili  x   /  AST  17  /  ALT  14  /  AlkPhos  81  02-08    LIVER FUNCTIONS - ( 2022 20:17 )  Alb: 3.4 g/dL / Pro: 6.4 g/dL / ALK PHOS: 81 U/L / ALT: 14 U/L / AST: 17 U/L / GGT: x           PT/INR - ( 2022 20:17 )   PT: 12.8 sec;   INR: 1.13 ratio         PTT - ( 2022 20:17 )  PTT:31.9 sec  Urinalysis Basic - ( 2022 22:16 )    Color: Light Yellow / Appearance: Clear / S.014 / pH: x  Gluc: x / Ketone: Negative  / Bili: Negative / Urobili: <2 mg/dL   Blood: x / Protein: Trace / Nitrite: Negative   Leuk Esterase: Moderate / RBC: 4 /HPF / WBC 10 /HPF   Sq Epi: x / Non Sq Epi: 5 /HPF / Bacteria: Few          Imaging:         ACC: [id number] EXAM:  CT ABDOMEN AND PELVIS IC                          PROCEDURE DATE:  2022          INTERPRETATION:  CLINICAL INFORMATION: Acute nonlocalized abdominal pain.   3 weeks of constipation. History of stercoral colitis    COMPARISON: CT abdomen and pelvis from 2021    CONTRAST/COMPLICATIONS:  IV Contrast: Omnipaque 350  90 cc administered   10 cc discarded  Oral Contrast: NONE  Complications: None reported at time of study completion    PROCEDURE:  CT of the Abdomen andPelvis was performed.  Sagittal and coronal reformats were performed.    FINDINGS:    LOWER CHEST: Right lung basilar atelectasis. Coronary artery   calcifications..  LIVER: Within normal limits.  BILE DUCTS: Normal caliber.  GALLBLADDER: Within normal limits.  SPLEEN: Within normal limits.  PANCREAS: Atrophic.  ADRENALS: Redemonstrated indeterminate left adrenal nodule, unchanged.  KIDNEYS/URETERS: Symmetric enhancement. No hydronephrosis. Bilateral   renal cysts and hypodensities too small to characterize.    BLADDER: Suggestion of asymmetric thickening of the posterior bladder   wall, however not well evaluated secondary to underdistention. Foci of   air seen within the bladder.  REPRODUCTIVE ORGANS: Prostate is enlarged.    BOWEL: Moderate stool burden throughout the colon. Large rectal stool   burden. There is asymmetric soft tissue thickening of the posterior wall   of the distal rectum. Mild perirectal fat stranding. No bowel obstruction.  PERITONEUM: No free air.  VESSELS: Atherosclerotic changes.  RETROPERITONEUM/LYMPH NODES: No lymphadenopathy.  ABDOMINAL WALL: Within normal limits.  BONES: Degenerative changes.    IMPRESSION:    Stool distended rectum with wall thickening and perirectal fat stranding   consistent with stercoral colitis.    Asymmetric irregular posterior rectal wall soft tissue thickening.   Differential includes neoplasm, sequelae of infectious or inflammatory   process, versus muscular hypertrophy. Recommend scope with tissue   sampling.    Foci of airwithin the bladder. Correlate for recent instrumentation.

## 2022-02-09 NOTE — H&P ADULT - HISTORY OF PRESENT ILLNESS
82 yo Male with hx of HTN, HLD CVA with Rt sided residual weakness, dementia, bedbound, HTN, CAD s/p stent, PPM, asthma, stercoral colitis, recent completion of IV abx for Ecoli and MRSA Bacteremia; Pt brought in by wife for constipation of three weeks duration with associated Abd distention and discomfort. Pt unable to take senna due to abdominal crampy pain. No associated nausea, vomiting, fevers or chills. Wife, the primary care taker, reports also a sacral wound and wishes that it be evaluated. States that she has been cleaning with "salt water".  82 yo Male with hx of HTN, HLD CVA with Rt sided residual weakness, dementia, bedbound, HTN, CAD s/p stent, s/p PPM extraction, asthma, stercoral colitis, recent completion of IV abx for Ecoli and MRSA Bacteremia via PICC; Pt brought in by wife for constipation of three weeks duration with associated Abd distention and discomfort. Pt unable to take senna due to abdominal crampy pain. No associated nausea, vomiting, fevers or chills. Wife, the primary care taker, reports also a sacral wound and wishes that it be evaluated. States that she has been cleaning with "salt water".     ED course: Rectal exam performed with no stool in the palpable vault, no blood;  82 yo Male with hx of HTN, HLD CVA with Rt sided residual weakness, dementia, bedbound, HTN, CAD s/p stent, s/p PPM extraction, asthma, stercoral colitis, recent completion of IV abx for Ecoli and MRSA Bacteremia via PICC; Pt brought in by wife for constipation of three weeks duration with associated Abd distention and discomfort. Pt unable to take senna due to abdominal crampy pain. No associated nausea, vomiting, fevers or chills. Wife, the primary care taker, reports also a sacral wound and wishes that it be evaluated. States that she has been cleaning with "salt water".     ED course: Rectal exam performed with no stool in the palpable vault, no blood; Ativan 0.5mg IVP x 1 dose;

## 2022-02-09 NOTE — CONSULT NOTE ADULT - ASSESSMENT
82 yo Male with hx of HTN, HLD CVA with Rt sided residual weakness, dementia, bedbound c/b sacral decubitus ulcer, HTN, CAD s/p stent, s/p PPM extraction, asthma, stercoral colitis, recent completion of IV abx for Ecoli and MRSA Bacteremia via PICC; Pt brought in by wife for constipation of three weeks duration with associated abd distention and discomfort. History obtained from pt's wife who is also his caretaker who was present at bedside. Per pt's wife, has had intermittent episodes of constipation at baseline. Recently she tried enemas, senna but these caused cramping and abd discomfort and were also ineffective.  No associated nausea, vomiting, fevers or chills. Here, VSS, Hgb 10 (BL 12-13). CT A/P notable for stool in distal rectum with wall thickening and perirectal fat stranding c/w stercoral colitis and asymmetric irregular posterior rectal wall soft tissue thickening c/f neoplasm vs infectious or inflammatory etiology. GI consulted for stercoral colitis and posterior rectal wall thickening.     #stercoral colitis  #chronic constipation  #rectal wall thickening  Suspect clinical picture is likely related to known chronic constipation causing stercoral colitis; however given age cannot exclude malignancy with posterior rectal wall thickening.     Recommendations:  - BID enemas  - Miralax TID  - can also try Moviprep  - would avoid stimulant laxatives (senna) or rectal suppositories  - consider flex sig for eval of rectal thickening once constipation has improved and if aligned with pt's GOC (discussed option of flex sig with pt's wife who would like to defer for right now and focus on alleviating pt's constipation)      Dasia Awad MD  GI Fellow, PGY-4  Available via [employer] Teams    NON-URGENT CONSULTS:  Please email [email or web address]  AT NIGHT AND ON WEEKENDS:  Contact on-call GI fellow via answering service ([phone]) from 5pm-8am and on weekends/holidays  MONDAY-FRIDAY 8AM-5PM:  Pager# [phone] ([facility]) or [phone] ([facility])  GI Phone# [phone] ([facility])

## 2022-02-09 NOTE — H&P ADULT - ASSESSMENT
82 yo Male with hx of HTN, HLD CVA with Rt sided residual weakness, dementia, bedbound, HTN, CAD s/p stent, s/p PPM extraction, asthma, stercoral colitis, recent completion of IV abx for Ecoli and MRSA Bacteremia via PICC; Pt a/w TAE, stercoral colitis and posterior rectal wall lesion; Found to have UTI;     Stool distended rectum with wall thickening and perirectal fat stranding consistent with stercoral colitis.  Asymmetric irregular posterior rectal wall soft tissue thickening. Differential includes neoplasm, sequelae of infectious or inflammatory process, versus muscular hypertrophy. Recommend scope with tissue sampling.  Foci of air within the bladder. Correlate for recent instrumentation. 80 yo Male with hx of HTN, HLD CVA with Rt sided residual weakness, dementia, bedbound, HTN, CAD s/p stent, s/p PPM extraction, asthma, stercoral colitis, recent completion of IV abx for Ecoli and MRSA Bacteremia via PICC; Pt a/w TAE, stercoral colitis and posterior rectal wall lesion; Found to have UTI and sacral decubitus; ;

## 2022-02-09 NOTE — H&P ADULT - NSICDXPASTSURGICALHX_GEN_ALL_CORE_FT
PAST SURGICAL HISTORY:  Cardiac pacemaker [employer] (SN: [id number]; Model: [id number])    S/P coronary artery stent placement

## 2022-02-09 NOTE — ED ADULT NURSE REASSESSMENT NOTE - NS ED NURSE REASSESS COMMENT FT1
Pt failed dysphagia screening at this time due to lethargy. Pt closing his eyes and missing his mouth with water cup. ACP team made aware and will attempt again in one hour. ACP team made aware of hypertension. Awaiting further orders.

## 2022-02-09 NOTE — H&P ADULT - PROBLEM SELECTOR PLAN 2
Baseline Scr=0.8 per EMR review; Admitted with Scr=1.28; Likely prerenal etiology due to dehydration;   -check PVR r/o urinary retention   -f/u US Bladder scan for PVR   -IV hydration ordered  -Monitor renal function and response to IV fluids

## 2022-02-09 NOTE — H&P ADULT - PROBLEM SELECTOR PLAN 1
CT A/P Stool distended rectum with wall thickening and perirectal fat stranding consistent with stercoral colitis; CT A/P Stool distended rectum with wall thickening and perirectal fat stranding consistent with stercoral colitis;  Rectal exam performed in ED with no stool in the palpable vault, no blood; s/p Miralax in ED  -Ordered mineral oil enema and oral dose   -Hold Senna (see HPI)  -Monitor for BM  -IV hydration  -Daily Miralax 17g  -If no BM consider repeating enema and administering Lactulose orally

## 2022-02-09 NOTE — H&P ADULT - PROBLEM SELECTOR PLAN 7
Holding Heparin sq for possible sigmoidoscopy with a biopsy  SCD ordered -Fall, aspiration precautions  -Diet, mech soft

## 2022-02-09 NOTE — PROVIDER CONTACT NOTE (MEDICATION) - SITUATION
Pt is a/ox1, only to name, have increased agitation swinging at staff, didn't have BM and currently won't take any meds at this time, wont allow v/s.

## 2022-02-09 NOTE — H&P ADULT - NSHPPHYSICALEXAM_GEN_ALL_CORE
Vital Signs Last 24 Hrs  T(C): 36.5 (09 Feb 2022 01:28), Max: 36.9 (08 Feb 2022 17:57)  T(F): 97.7 (09 Feb 2022 01:28), Max: 98.4 (08 Feb 2022 17:57)  HR: 61 (09 Feb 2022 01:28) (61 - 84)  BP: 174/90 (09 Feb 2022 01:28) (140/67 - 174/90)  BP(mean): --  RR: 17 (09 Feb 2022 01:28) (17 - 84)  SpO2: 97% (09 Feb 2022 01:28) (97% - 99%)

## 2022-02-09 NOTE — H&P ADULT - PROBLEM SELECTOR PLAN 6
-Fall, aspiration precautions  -Diet, mech soft Stable  -EKG screening: no acute changes  -c/w ASA, BB, statin  -c/w HTN control

## 2022-02-09 NOTE — H&P ADULT - NSHPSOCIALHISTORY_GEN_ALL_CORE
Lives with wife who is primary care taker   Lives with wife who is primary care taker  Retired  Use of alcohol and tobacco: none   Bedbound

## 2022-02-10 LAB
ANION GAP SERPL CALC-SCNC: 9 MMOL/L — SIGNIFICANT CHANGE UP (ref 7–14)
BUN SERPL-MCNC: 10 MG/DL — SIGNIFICANT CHANGE UP (ref 7–23)
CALCIUM SERPL-MCNC: 9.5 MG/DL — SIGNIFICANT CHANGE UP (ref 8.4–10.5)
CHLORIDE SERPL-SCNC: 101 MMOL/L — SIGNIFICANT CHANGE UP (ref 98–107)
CO2 SERPL-SCNC: 31 MMOL/L — SIGNIFICANT CHANGE UP (ref 22–31)
CREAT SERPL-MCNC: 1.24 MG/DL — SIGNIFICANT CHANGE UP (ref 0.5–1.3)
CULTURE RESULTS: SIGNIFICANT CHANGE UP
GLUCOSE BLDC GLUCOMTR-MCNC: 89 MG/DL — SIGNIFICANT CHANGE UP (ref 70–99)
GLUCOSE BLDC GLUCOMTR-MCNC: 90 MG/DL — SIGNIFICANT CHANGE UP (ref 70–99)
GLUCOSE SERPL-MCNC: 80 MG/DL — SIGNIFICANT CHANGE UP (ref 70–99)
HCT VFR BLD CALC: 31.4 % — LOW (ref 39–50)
HGB BLD-MCNC: 10.8 G/DL — LOW (ref 13–17)
MAGNESIUM SERPL-MCNC: 1.9 MG/DL — SIGNIFICANT CHANGE UP (ref 1.6–2.6)
MCHC RBC-ENTMCNC: 30 PG — SIGNIFICANT CHANGE UP (ref 27–34)
MCHC RBC-ENTMCNC: 34.4 GM/DL — SIGNIFICANT CHANGE UP (ref 32–36)
MCV RBC AUTO: 87.2 FL — SIGNIFICANT CHANGE UP (ref 80–100)
NRBC # BLD: 0 /100 WBCS — SIGNIFICANT CHANGE UP
NRBC # FLD: 0 K/UL — SIGNIFICANT CHANGE UP
PHOSPHATE SERPL-MCNC: 2.4 MG/DL — LOW (ref 2.5–4.5)
PLATELET # BLD AUTO: 198 K/UL — SIGNIFICANT CHANGE UP (ref 150–400)
POTASSIUM SERPL-MCNC: 3.3 MMOL/L — LOW (ref 3.5–5.3)
POTASSIUM SERPL-SCNC: 3.3 MMOL/L — LOW (ref 3.5–5.3)
RBC # BLD: 3.6 M/UL — LOW (ref 4.2–5.8)
RBC # FLD: 13.5 % — SIGNIFICANT CHANGE UP (ref 10.3–14.5)
SODIUM SERPL-SCNC: 141 MMOL/L — SIGNIFICANT CHANGE UP (ref 135–145)
SPECIMEN SOURCE: SIGNIFICANT CHANGE UP
WBC # BLD: 4.97 K/UL — SIGNIFICANT CHANGE UP (ref 3.8–10.5)
WBC # FLD AUTO: 4.97 K/UL — SIGNIFICANT CHANGE UP (ref 3.8–10.5)

## 2022-02-10 PROCEDURE — 99221 1ST HOSP IP/OBS SF/LOW 40: CPT

## 2022-02-10 PROCEDURE — 99232 SBSQ HOSP IP/OBS MODERATE 35: CPT | Mod: GC

## 2022-02-10 RX ORDER — POTASSIUM CHLORIDE 20 MEQ
40 PACKET (EA) ORAL ONCE
Refills: 0 | Status: COMPLETED | OUTPATIENT
Start: 2022-02-10 | End: 2022-02-10

## 2022-02-10 RX ADMIN — LISINOPRIL 10 MILLIGRAM(S): 2.5 TABLET ORAL at 06:31

## 2022-02-10 RX ADMIN — LACTULOSE 10 GRAM(S): 10 SOLUTION ORAL at 22:25

## 2022-02-10 RX ADMIN — CEFTRIAXONE 100 MILLIGRAM(S): 500 INJECTION, POWDER, FOR SOLUTION INTRAMUSCULAR; INTRAVENOUS at 01:44

## 2022-02-10 RX ADMIN — Medication 25 MILLIGRAM(S): at 06:31

## 2022-02-10 RX ADMIN — ATORVASTATIN CALCIUM 40 MILLIGRAM(S): 80 TABLET, FILM COATED ORAL at 22:26

## 2022-02-10 RX ADMIN — AMLODIPINE BESYLATE 10 MILLIGRAM(S): 2.5 TABLET ORAL at 06:31

## 2022-02-10 RX ADMIN — Medication 81 MILLIGRAM(S): at 12:22

## 2022-02-10 RX ADMIN — POLYETHYLENE GLYCOL 3350 17 GRAM(S): 17 POWDER, FOR SOLUTION ORAL at 22:30

## 2022-02-10 RX ADMIN — ISOSORBIDE MONONITRATE 30 MILLIGRAM(S): 60 TABLET, EXTENDED RELEASE ORAL at 12:22

## 2022-02-10 RX ADMIN — Medication 40 MILLIEQUIVALENT(S): at 12:21

## 2022-02-10 RX ADMIN — Medication 63.75 MILLIMOLE(S): at 12:45

## 2022-02-10 NOTE — PROGRESS NOTE ADULT - SUBJECTIVE AND OBJECTIVE BOX
Chief Complaint:  Patient is a 81y old  Male who presents with a chief complaint of Constipation x 3 weeks (10 Feb 2022 17:25)      Interval Events:   s/p 9 BMs since admission per pt's nurse.  Hgb improving 9.9 -> 10.8 w/o blood transfusion.    Hospital Medications:  amLODIPine   Tablet 10 milliGRAM(s) Oral daily  aspirin enteric coated 81 milliGRAM(s) Oral daily  atorvastatin 40 milliGRAM(s) Oral at bedtime  isosorbide   mononitrate ER Tablet (IMDUR) 30 milliGRAM(s) Oral daily  lactated ringers. 1000 milliLiter(s) IV Continuous <Continuous>  lactulose Syrup 10 Gram(s) Oral every 8 hours  lisinopril 10 milliGRAM(s) Oral daily  melatonin 3 milliGRAM(s) Oral at bedtime PRN  metoprolol succinate ER 25 milliGRAM(s) Oral daily  OLANZapine Injectable 1.25 milliGRAM(s) IntraMuscular every 6 hours PRN  ondansetron Injectable 4 milliGRAM(s) IV Push every 8 hours PRN  polyethylene glycol 3350 17 Gram(s) Oral <User Schedule>      PMHX/PSHX:  CVA (cerebral infarction)    HTN (hypertension)    Diabetes    Hyperlipidemia    Dementia    HLD (hyperlipidemia)    DM (diabetes mellitus)    Asthma    CAD (coronary artery disease)    Sinus node dysfunction    No significant past surgical history    Cardiac pacemaker    S/P coronary artery stent placement            ROS: unable to obtain as pt has dementia      PHYSICAL EXAM:     GENERAL:  Well developed, no distress  HEENT:  NC/AT,  conjunctivae clear, sclera anicteric  CHEST:  Full & symmetric excursion, no increased effort w/ respirations  HEART:  Regular rhythm & rate  ABDOMEN:  Soft, non-tender, non-distended  EXTREMITIES:  no LE  edema  SKIN:  No rash/erythema/ecchymoses/petechiae/wounds/jaundice  NEURO:  Alert, orientedx1    Vital Signs:  Vital Signs Last 24 Hrs  T(C): 36.5 (10 Feb 2022 12:54), Max: 37 (10 Feb 2022 00:25)  T(F): 97.7 (10 Feb 2022 12:54), Max: 98.6 (10 Feb 2022 00:25)  HR: 65 (10 Feb 2022 12:54) (60 - 68)  BP: 130/67 (10 Feb 2022 12:54) (130/67 - 153/69)  BP(mean): --  RR: 16 (10 Feb 2022 12:54) (16 - 18)  SpO2: 100% (10 Feb 2022 12:54) (99% - 100%)  Daily     Daily     LABS:                        10.8   4.97  )-----------( 198      ( 10 Feb 2022 07:16 )             31.4     02-10    141  |  101  |  10  ----------------------------<  80  3.3<L>   |  31  |  1.24    Ca    9.5      10 Feb 2022 07:11  Phos  2.4     02-10  Mg     1.90     02-10    TPro  6.4  /  Alb  3.4  /  TBili  1.5<H>  /  DBili  x   /  AST  17  /  ALT  14  /  AlkPhos  81  02-08    LIVER FUNCTIONS - ( 2022 20:17 )  Alb: 3.4 g/dL / Pro: 6.4 g/dL / ALK PHOS: 81 U/L / ALT: 14 U/L / AST: 17 U/L / GGT: x           PT/INR - ( 2022 20:17 )   PT: 12.8 sec;   INR: 1.13 ratio         PTT - ( 2022 20:17 )  PTT:31.9 sec  Urinalysis Basic - ( 2022 22:16 )    Color: Light Yellow / Appearance: Clear / S.014 / pH: x  Gluc: x / Ketone: Negative  / Bili: Negative / Urobili: <2 mg/dL   Blood: x / Protein: Trace / Nitrite: Negative   Leuk Esterase: Moderate / RBC: 4 /HPF / WBC 10 /HPF   Sq Epi: x / Non Sq Epi: 5 /HPF / Bacteria: Few          Imaging: No new abdominal imaging

## 2022-02-10 NOTE — PATIENT PROFILE ADULT - HAVE YOU HAD A FIRST COVID-19 BOOSTER?
No
Continue DASH, consistent carbohydrate diet.  Pt requests d/c lactose restriction (RD explained lactose can exacerbate diarrhea, defer to medical team).

## 2022-02-10 NOTE — PATIENT PROFILE ADULT - FALL HARM RISK - HARM RISK INTERVENTIONS
Assistance with ambulation/Assistance OOB with selected safe patient handling equipment/Communicate Risk of Fall with Harm to all staff/Discuss with provider need for PT consult/Monitor for mental status changes/Monitor gait and stability/Move patient closer to nurses' station/Provide patient with walking aids - walker, cane, crutches/Reinforce activity limits and safety measures with patient and family/Reorient to person, place and time as needed/Tailored Fall Risk Interventions/Toileting schedule using arm’s reach rule for commode and bathroom/Use of alarms - bed, chair and/or voice tab/Visual Cue: Yellow wristband and red socks/Bed in lowest position, wheels locked, appropriate side rails in place/Call bell, personal items and telephone in reach/Instruct patient to call for assistance before getting out of bed or chair/Non-slip footwear when patient is out of bed/[place] to call system/Physically safe environment - no spills, clutter or unnecessary equipment/Purposeful Proactive Rounding/Room/bathroom lighting operational, light cord in reach

## 2022-02-10 NOTE — PROGRESS NOTE ADULT - ASSESSMENT
80 yo Male with hx of HTN, HLD CVA with Rt sided residual weakness, dementia, bedbound c/b sacral decubitus ulcer, HTN, CAD s/p stent, s/p PPM extraction, asthma, stercoral colitis, recent completion of IV abx for Ecoli and MRSA Bacteremia via PICC; Pt brought in by wife for constipation of three weeks duration with associated abd distention and discomfort. History obtained from pt's wife who is also his caretaker who was present at bedside. Per pt's wife, has had intermittent episodes of constipation at baseline. Recently she tried enemas, senna but these caused cramping and abd discomfort and were also ineffective.  No associated nausea, vomiting, fevers or chills. Here, VSS, Hgb 10 (BL 12-13). CT A/P notable for stool in distal rectum with wall thickening and perirectal fat stranding c/w stercoral colitis and asymmetric irregular posterior rectal wall soft tissue thickening c/f neoplasm vs infectious or inflammatory etiology. GI consulted for stercoral colitis and posterior rectal wall thickening.     #stercoral colitis  #chronic constipation  #rectal wall thickening  Suspect clinical picture is likely related to known chronic constipation causing stercoral colitis; however given age cannot exclude malignancy with posterior rectal wall thickening. Responding well to BID enemas and miralax TID- had 9+ BMs since admission.    Recommendations:  - BID enemas  - Miralax TID (recommend dc on miralax TID as pt's wife states she has been unsuccessful with him having BMs once dc'ed from hospital)  - can also try Moviprep  - would avoid stimulant laxatives (senna) or rectal suppositories  - consider flex sig for eval of rectal thickening once constipation has improved and if aligned with pt's GOC (discussed option of flex sig with pt's wife who would like to defer for right now and focus on alleviating pt's constipation)      Dasia Awad MD  GI Fellow, PGY-4  Available via [employer] Teams    NON-URGENT CONSULTS:  Please email [email or web address]  [email or web address]  AT NIGHT AND ON WEEKENDS:  Contact on-call GI fellow via answering service ([phone]) from 5pm-8am and on weekends/holidays  MONDAY-FRIDAY 8AM-5PM:  Pager# [phone] ([facility]) or [phone] ([facility])  GI Phone# [phone] ([facility])

## 2022-02-10 NOTE — PROGRESS NOTE ADULT - SUBJECTIVE AND OBJECTIVE BOX
Patient is a 81y old  Male who presents with a chief complaint of Constipation x 3 weeks (10 Feb 2022 14:14)      HPI:  Diarrhea reported after laxatives were used.      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,     HTN (hypertension)    Dementia    HLD (hyperlipidemia)    DM (diabetes mellitus)    Asthma    CAD (coronary artery disease)  s/p stent placement    Sinus node dysfunction  s/p Medtronic PPM    Cardiac pacemaker  Medtronic (SN: [id number]; Model: [id number])    S/P coronary artery stent placement        Review of Systems:   CONSTITUTIONAL: No fever, weight loss, or fatigue  EYES: No eye pain, visual disturbances, or discharge  ENMT:  No difficulty hearing, tinnitus, vertigo; No sinus or throat pain  NECK: No pain or stiffness  BREASTS: No pain, masses, or nipple discharge  RESPIRATORY: No cough, wheezing, chills or hemoptysis; No shortness of breath  CARDIOVASCULAR: No chest pain, palpitations, dizziness, or leg swelling  GASTROINTESTINAL: No abdominal or epigastric pain. No nausea, vomiting, or hematemesis; GENITOURINARY: No dysuria, frequency, hematuria, or incontinence  NEUROLOGICAL: No headaches, memory loss, Right sided weakness +  SKIN: No itching, burning, rashes, or lesions   LYMPH NODES: No enlarged glands  ENDOCRINE: No heat or cold intolerance; No hair loss  MUSCULOSKELETAL: No joint pain or swelling; No muscle, back, or extremity pain  PSYCHIATRIC: No depression, anxiety, mood swings, or difficulty sleeping  HEME/LYMPH: No easy bruising, or bleeding gums  ALLERY AND IMMUNOLOGIC: No hives or eczema    Allergies    No Known Allergies    Intolerances        Social History:     FAMILY HISTORY:  FH: diabetes mellitus  Mother, Brother    FHx: dementia  Father        MEDICATIONS  (STANDING):  amLODIPine   Tablet 10 milliGRAM(s) Oral daily  aspirin enteric coated 81 milliGRAM(s) Oral daily  atorvastatin 40 milliGRAM(s) Oral at bedtime  isosorbide   mononitrate ER Tablet (IMDUR) 30 milliGRAM(s) Oral daily  lactated ringers. 1000 milliLiter(s) (100 mL/Hr) IV Continuous <Continuous>  lactulose Syrup 10 Gram(s) Oral every 8 hours  lisinopril 10 milliGRAM(s) Oral daily  metoprolol succinate ER 25 milliGRAM(s) Oral daily  polyethylene glycol 3350 17 Gram(s) Oral <User Schedule>    MEDICATIONS  (PRN):  melatonin 3 milliGRAM(s) Oral at bedtime PRN Insomnia  OLANZapine Injectable 1.25 milliGRAM(s) IntraMuscular every 6 hours PRN Severe agitation  ondansetron Injectable 4 milliGRAM(s) IV Push every 8 hours PRN Nausea and/or Vomiting        CAPILLARY BLOOD GLUCOSE      POCT Blood Glucose.: 90 mg/dL (10 Feb 2022 08:07)  POCT Blood Glucose.: 89 mg/dL (10 Feb 2022 01:45)  POCT Blood Glucose.: 112 mg/dL (2022 22:05)    I&O's Summary    2022 07:01  -  10 Feb 2022 07:00  --------------------------------------------------------  IN: 0 mL / OUT: 2 mL / NET: -2 mL        PHYSICAL EXAM:  Vital Signs Last 24 Hrs  T(C): 36.5 (10 Feb 2022 12:54), Max: 37.2 (2022 18:06)  T(F): 97.7 (10 Feb 2022 12:54), Max: 98.9 (2022 18:06)  HR: 65 (10 Feb 2022 12:54) (60 - 68)  BP: 130/67 (10 Feb 2022 12:54) (130/67 - 177/94)  BP(mean): --  RR: 16 (10 Feb 2022 12:54) (16 - 18)  SpO2: 100% (10 Feb 2022 12:54) (98% - 100%)    GENERAL: NAD, well-developed  HEAD:  Atraumatic, Normocephalic  EYES: EOMI, PERRLA, conjunctiva and sclera clear  NECK: Supple, No JVD  CHEST/LUNG: Clear to auscultation bilaterally; No wheeze  HEART: Regular rate and rhythm; No murmurs, rubs, or gallops  ABDOMEN: Soft, Nontender, Nondistended; Bowel sounds present  EXTREMITIES:  2+ Peripheral Pulses, No clubbing, cyanosis, or edema  PSYCH: AAOx3  NEUROLOGY: Right side is weak.  SKIN: No rashes or lesions    LABS:                        10.8   4.97  )-----------( 198      ( 10 Feb 2022 07:16 )             31.4     02-10    141  |  101  |  10  ----------------------------<  80  3.3<L>   |  31  |  1.24    Ca    9.5      10 Feb 2022 07:11  Phos  2.4     02-10  Mg     1.90     02-10    TPro  6.4  /  Alb  3.4  /  TBili  1.5<H>  /  DBili  x   /  AST  17  /  ALT  14  /  AlkPhos  81  02-08    PT/INR - ( 2022 20:17 )   PT: 12.8 sec;   INR: 1.13 ratio         PTT - ( 2022 20:17 )  PTT:31.9 sec      Urinalysis Basic - ( 2022 22:16 )    Color: Light Yellow / Appearance: Clear / S.014 / pH: x  Gluc: x / Ketone: Negative  / Bili: Negative / Urobili: <2 mg/dL   Blood: x / Protein: Trace / Nitrite: Negative   Leuk Esterase: Moderate / RBC: 4 /HPF / WBC 10 /HPF   Sq Epi: x / Non Sq Epi: 5 /HPF / Bacteria: Few        RADIOLOGY & ADDITIONAL TESTS:    Imaging Personally Reviewed:    Consultant(s) Notes Reviewed:      Care Discussed with Consultants/Other Providers:

## 2022-02-10 NOTE — CONSULT NOTE ADULT - SUBJECTIVE AND OBJECTIVE BOX
Wound SURGERY CONSULT NOTE    HPI:  82 yo Male with hx of HTN, HLD CVA with Rt sided residual weakness, dementia, bedbound, HTN, CAD s/p stent, s/p PPM extraction, asthma, stercoral colitis, recent completion of IV abx for Ecoli and MRSA Bacteremia via PICC; Pt brought in by wife for constipation of three weeks duration with associated Abd distention and discomfort. Pt unable to take senna due to abdominal crampy pain. No associated nausea, vomiting, fevers or chills. Wife, the primary care taker, reports also a sacral wound and wishes that it be evaluated. States that she has been cleaning with "salt water".     ED course: Rectal exam performed with no stool in the palpable vault, no blood; Ativan 0.5mg IVP x 1 dose; (09 Feb 2022 04:27)    Wound consult requested to assist w/ management of right buttock pressure injury. Patient unable to provider history of wound, nonverbal.      Current Diet: DASH /TLC minced and moist    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, 1998    HTN (hypertension)    Dementia    HLD (hyperlipidemia)    DM (diabetes mellitus)    Asthma    CAD (coronary artery disease)  s/p stent placement    Sinus node dysfunction  s/p Medtronic PPM    Cardiac pacemaker  Medtronic (SN: [id number]; Model: [id number])    S/P coronary artery stent placement        REVIEW OF SYSTEMS: Pt unable to offer    MEDICATIONS  (STANDING):  amLODIPine   Tablet 10 milliGRAM(s) Oral daily  aspirin enteric coated 81 milliGRAM(s) Oral daily  atorvastatin 40 milliGRAM(s) Oral at bedtime  cefTRIAXone   IVPB 1000 milliGRAM(s) IV Intermittent every 24 hours  isosorbide   mononitrate ER Tablet (IMDUR) 30 milliGRAM(s) Oral daily  lactated ringers. 1000 milliLiter(s) (100 mL/Hr) IV Continuous <Continuous>  lactulose Syrup 10 Gram(s) Oral every 8 hours  lisinopril 10 milliGRAM(s) Oral daily  metoprolol succinate ER 25 milliGRAM(s) Oral daily  polyethylene glycol 3350 17 Gram(s) Oral <User Schedule>    MEDICATIONS  (PRN):  melatonin 3 milliGRAM(s) Oral at bedtime PRN Insomnia  OLANZapine Injectable 1.25 milliGRAM(s) IntraMuscular every 6 hours PRN Severe agitation  ondansetron Injectable 4 milliGRAM(s) IV Push every 8 hours PRN Nausea and/or Vomiting      Allergies    No Known Allergies    Intolerances      SOCIAL HISTORY:  . Lives with wife who is primary care taker. Retired  Bedbound  Use of alcohol and tobacco: none      FAMILY HISTORY:  FH: diabetes mellitus  Mother, Brother    FHx: dementia  Father        PHYSICAL EXAM:  Vital Signs Last 24 Hrs  T(C): 36.5 (10 Feb 2022 12:54), Max: 37.2 (09 Feb 2022 18:06)  T(F): 97.7 (10 Feb 2022 12:54), Max: 98.9 (09 Feb 2022 18:06)  HR: 65 (10 Feb 2022 12:54) (60 - 76)  BP: 130/67 (10 Feb 2022 12:54) (130/67 - 177/94)  BP(mean): --  RR: 16 (10 Feb 2022 12:54) (16 - 18)  SpO2: 100% (10 Feb 2022 12:54) (98% - 100%)    Constitutional: NAD, does not open eyes, grimaces to painful stimuli.  Grimaces when t&P, otherwise nonresponsive to verbal or tactile stimuli  (+) low airloss support surface, (+) fluidized positioning devices, (+) complete cair boots  HEENT:  NC/AT, dry mucosa  Cardiovascular: rate regular   Respiratory: nonlabored, room air  Gastrointestinal: soft NT/ND,  : urinary incontinence  Neurology:  weakened strength & sensation, right sided hemiparesis.   Does not follow commands  non-verbal.  Musculoskeletal:  limited, Right upper extremity contracted at elbow, hand fixed in fist-like  position. Kerlix roll put in patients hand to , to prevent nail trauma to palm.  b/l le in extension  Vascular: BLE equally warm, no edema noted. +2 dp/pt pulses. thickened fungal toenails to   bilateral 1st and 2cnd toenail.  Dry-flaky plantar heels. No deep tissue pressure injury noted.  Skin:  poor skin turgor, frail. Left upper chest with linear surgical scar s/p PPM extraction  Right buttock- healing full thickness pressure injury- area of hypopigmentation measuring 2pes0tfn9.1cm, within hypopigmented/re-epithelialized area there is < 10% scattered pink-minimally moist agranular base without drainage. No induration, no fluctuance, no palpable drainable collection.  Liquid barrier film applied.    LABS/ CULTURES/ RADIOLOGY:                        10.8   4.97  )-----------( 198      ( 10 Feb 2022 07:16 )             31.4       141  |  101  |  10  ----------------------------<  80      [02-10-22 @ 07:11]  3.3   |  31  |  1.24        Ca     9.5     [02-10-22 @ 07:11]      Mg     1.90     [02-10-22 @ 07:11]      Phos  2.4     [02-10-22 @ 07:11]    TPro  6.4  /  Alb  3.4  /  TBili  1.5  /  DBili  x   /  AST  17  /  ALT  14  /  AlkPhos  81  [02-08-22 @ 20:17]    PT/INR: PT 12.8 , INR 1.13       [02-08-22 @ 20:17]  PTT: 31.9       [02-08-22 @ 20:17]      Culture - Blood (collected 02-09-22 @ 08:17)  Source: .Blood Blood-Venous  Preliminary Report (02-10-22 @ 09:01):    No growth to date.    Culture - Blood (collected 02-09-22 @ 08:17)  Source: .Blood Blood-Peripheral  Preliminary Report (02-10-22 @ 09:01):    No growth to date.    Culture - Urine (02.08.22 @ 22:07)   Specimen Source: Catheterized Catheterized   Culture Results:   <10,000 CFU/mL Normal Urogenital Bozena     Urinalysis + Microscopic Examination (02.08.22 @ 22:16)   Leukocyte Esterase Concentration: Moderate   pH Urine: 6.5   Nitrite: Negative   Urine Appearance: Clear   Urobilinogen: <2 mg/dL   Specific Gravity: 1.014   Protein, Urine: Trace   Ketone - Urine: Negative   Bilirubin: Negative   Color: Light Yellow   Glucose Qualitative, Urine: Negative   Blood, Urine: Trace   Red Blood Cell - Urine: 4 /HPF   White Blood Cell - Urine: 10 /HPF   Epithelial Cells: 5 /HPF   Bacteria: Few

## 2022-02-10 NOTE — PATIENT PROFILE ADULT - FUNCTIONAL SCREEN CURRENT LEVEL: COMMUNICATION, MLM
hair removal not indicated
2 = difficulty understanding and speaking (not related to language barrier)

## 2022-02-10 NOTE — PATIENT PROFILE ADULT - FALL HARM RISK - FACTORS
Pt is A&Ox1, unable to answer questions or obtain information due to cognitive limitations/Weakness/Other

## 2022-02-10 NOTE — CONSULT NOTE ADULT - ASSESSMENT
Assessment: 80 yo Male with hx of HTN, HLD CVA with Rt sided residual weakness, dementia, bedbound, HTN, CAD s/p stent, s/p PPM extraction, asthma, stercoral colitis, recent completion of IV abx for Ecoli and MRSA Bacteremia via PICC; Pt a/w TAE, stercoral colitis and posterior rectal wall lesion. Found to have UTI, receiving Ceftriaxone. Patient contracted RUE, bedbound, incontinent of urine and stool, reportedly constipated.   Wound surgery consult requested to assist with management of right buttock Stage 3 pressure injury and right heel DTI.  On exam patient with Right buttock healing full thickness pressure injury 90% re-epithelialized, <10% pink-moist scattered agranular base. Right heel, no noted DTI, patient with dry-flaky plantar feet.    Plan:  Right buttock healing full thickness pressure injury  -Cleanse with NS daily. Apply Critic-aid moisture barrier ointment every shift and prn.  -Continue to offload pressure; low airloss support surface, t&P per protocol with use of z-alberto fluidized positioning device.  -Continue perineal care per protocol, continue use of single breathable incontinence pad    Additional recommendations:  Moisturize intact skin w/ SWEEN cream BID including plantar feet, avoid between toes.  Nutrition Consult for optimization as tolerated  Right hand contracted- apply Kerlix wrap for patient to hold to avoid trauma of fingernails to palm of hand    TAE  -management per primary team    UTI  -management per primary team    stercoral colitis and posterior rectal wall lesion  -management per primary team    Care as per primary team  Please reconsult as needed.    May f/u as outpatient at [facility] [address] [place], [place] [phone] upon discharge if needed  Seen w/ Dr. Buchanan and D/w team    Thank you for this consult  LESLEE Cerna, RENETTA (pager #[phone])      If after 4PM or before 7:30AM on Mon-Friday or weekend/holiday please contact general surgery for urgent matters.   Team A- [id number]   Team B- [id number]  For non-urgent matters e-mail [email or web address]    We spent 50 minutes face to face with this patient of which more than 50% of the time was spent counseling & coordinating care of this pt

## 2022-02-10 NOTE — PROGRESS NOTE ADULT - ASSESSMENT
82 yo Male with hx of HTN, HLD CVA with Rt sided residual weakness, dementia, bedbound, HTN, CAD s/p stent, s/p PPM extraction, asthma, stercoral colitis, recent completion of IV abx for Ecoli and MRSA Bacteremia via PICC; Pt a/w TAE, stercoral colitis and posterior rectal wall lesion; Found to have UTI and sacral decubitus; ;

## 2022-02-11 LAB
ANION GAP SERPL CALC-SCNC: 10 MMOL/L — SIGNIFICANT CHANGE UP (ref 7–14)
ANION GAP SERPL CALC-SCNC: 8 MMOL/L — SIGNIFICANT CHANGE UP (ref 7–14)
BUN SERPL-MCNC: 8 MG/DL — SIGNIFICANT CHANGE UP (ref 7–23)
BUN SERPL-MCNC: 9 MG/DL — SIGNIFICANT CHANGE UP (ref 7–23)
CALCIUM SERPL-MCNC: 9.3 MG/DL — SIGNIFICANT CHANGE UP (ref 8.4–10.5)
CALCIUM SERPL-MCNC: 9.3 MG/DL — SIGNIFICANT CHANGE UP (ref 8.4–10.5)
CHLORIDE SERPL-SCNC: 102 MMOL/L — SIGNIFICANT CHANGE UP (ref 98–107)
CHLORIDE SERPL-SCNC: 102 MMOL/L — SIGNIFICANT CHANGE UP (ref 98–107)
CO2 SERPL-SCNC: 28 MMOL/L — SIGNIFICANT CHANGE UP (ref 22–31)
CO2 SERPL-SCNC: 29 MMOL/L — SIGNIFICANT CHANGE UP (ref 22–31)
CREAT SERPL-MCNC: 1.24 MG/DL — SIGNIFICANT CHANGE UP (ref 0.5–1.3)
CREAT SERPL-MCNC: 1.28 MG/DL — SIGNIFICANT CHANGE UP (ref 0.5–1.3)
GLUCOSE SERPL-MCNC: 103 MG/DL — HIGH (ref 70–99)
GLUCOSE SERPL-MCNC: 81 MG/DL — SIGNIFICANT CHANGE UP (ref 70–99)
HCT VFR BLD CALC: 32.6 % — LOW (ref 39–50)
HGB BLD-MCNC: 10.6 G/DL — LOW (ref 13–17)
MAGNESIUM SERPL-MCNC: 1.9 MG/DL — SIGNIFICANT CHANGE UP (ref 1.6–2.6)
MAGNESIUM SERPL-MCNC: 2.2 MG/DL — SIGNIFICANT CHANGE UP (ref 1.6–2.6)
MCHC RBC-ENTMCNC: 29.1 PG — SIGNIFICANT CHANGE UP (ref 27–34)
MCHC RBC-ENTMCNC: 32.5 GM/DL — SIGNIFICANT CHANGE UP (ref 32–36)
MCV RBC AUTO: 89.6 FL — SIGNIFICANT CHANGE UP (ref 80–100)
NRBC # BLD: 0 /100 WBCS — SIGNIFICANT CHANGE UP
NRBC # FLD: 0 K/UL — SIGNIFICANT CHANGE UP
PHOSPHATE SERPL-MCNC: 2.3 MG/DL — LOW (ref 2.5–4.5)
PHOSPHATE SERPL-MCNC: 2.7 MG/DL — SIGNIFICANT CHANGE UP (ref 2.5–4.5)
PLATELET # BLD AUTO: 175 K/UL — SIGNIFICANT CHANGE UP (ref 150–400)
POTASSIUM SERPL-MCNC: 3 MMOL/L — LOW (ref 3.5–5.3)
POTASSIUM SERPL-MCNC: 3.6 MMOL/L — SIGNIFICANT CHANGE UP (ref 3.5–5.3)
POTASSIUM SERPL-SCNC: 3 MMOL/L — LOW (ref 3.5–5.3)
POTASSIUM SERPL-SCNC: 3.6 MMOL/L — SIGNIFICANT CHANGE UP (ref 3.5–5.3)
RBC # BLD: 3.64 M/UL — LOW (ref 4.2–5.8)
RBC # FLD: 13.9 % — SIGNIFICANT CHANGE UP (ref 10.3–14.5)
SODIUM SERPL-SCNC: 139 MMOL/L — SIGNIFICANT CHANGE UP (ref 135–145)
SODIUM SERPL-SCNC: 140 MMOL/L — SIGNIFICANT CHANGE UP (ref 135–145)
WBC # BLD: 3.2 K/UL — LOW (ref 3.8–10.5)
WBC # FLD AUTO: 3.2 K/UL — LOW (ref 3.8–10.5)

## 2022-02-11 PROCEDURE — 99233 SBSQ HOSP IP/OBS HIGH 50: CPT

## 2022-02-11 PROCEDURE — 93010 ELECTROCARDIOGRAM REPORT: CPT

## 2022-02-11 PROCEDURE — 99232 SBSQ HOSP IP/OBS MODERATE 35: CPT | Mod: GC

## 2022-02-11 RX ORDER — POTASSIUM CHLORIDE 20 MEQ
40 PACKET (EA) ORAL EVERY 4 HOURS
Refills: 0 | Status: DISCONTINUED | OUTPATIENT
Start: 2022-02-11 | End: 2022-02-11

## 2022-02-11 RX ORDER — POTASSIUM CHLORIDE 20 MEQ
10 PACKET (EA) ORAL
Refills: 0 | Status: COMPLETED | OUTPATIENT
Start: 2022-02-11 | End: 2022-02-11

## 2022-02-11 RX ORDER — MAGNESIUM OXIDE 400 MG ORAL TABLET 241.3 MG
400 TABLET ORAL
Refills: 0 | Status: DISCONTINUED | OUTPATIENT
Start: 2022-02-11 | End: 2022-02-11

## 2022-02-11 RX ORDER — MAGNESIUM SULFATE 500 MG/ML
2 VIAL (ML) INJECTION ONCE
Refills: 0 | Status: COMPLETED | OUTPATIENT
Start: 2022-02-11 | End: 2022-02-11

## 2022-02-11 RX ADMIN — LACTULOSE 10 GRAM(S): 10 SOLUTION ORAL at 11:56

## 2022-02-11 RX ADMIN — ISOSORBIDE MONONITRATE 30 MILLIGRAM(S): 60 TABLET, EXTENDED RELEASE ORAL at 15:43

## 2022-02-11 RX ADMIN — Medication 25 GRAM(S): at 13:58

## 2022-02-11 RX ADMIN — LACTULOSE 10 GRAM(S): 10 SOLUTION ORAL at 22:23

## 2022-02-11 RX ADMIN — ATORVASTATIN CALCIUM 40 MILLIGRAM(S): 80 TABLET, FILM COATED ORAL at 22:24

## 2022-02-11 RX ADMIN — Medication 81 MILLIGRAM(S): at 11:57

## 2022-02-11 RX ADMIN — OLANZAPINE 1.25 MILLIGRAM(S): 15 TABLET, FILM COATED ORAL at 05:52

## 2022-02-11 RX ADMIN — Medication 100 MILLIEQUIVALENT(S): at 10:37

## 2022-02-11 RX ADMIN — POLYETHYLENE GLYCOL 3350 17 GRAM(S): 17 POWDER, FOR SOLUTION ORAL at 22:24

## 2022-02-11 RX ADMIN — Medication 100 MILLIEQUIVALENT(S): at 13:16

## 2022-02-11 RX ADMIN — POLYETHYLENE GLYCOL 3350 17 GRAM(S): 17 POWDER, FOR SOLUTION ORAL at 11:57

## 2022-02-11 RX ADMIN — Medication 100 MILLIEQUIVALENT(S): at 11:52

## 2022-02-11 NOTE — CONSULT NOTE ADULT - SUBJECTIVE AND OBJECTIVE BOX
Source: patient and Chart    HPI:  This is a 81 year old man with a pmhx significant for dementia, Sinus node dysfunction s/p Medtronic PPM (12/2021) s/p PPM extraction (12/29/2021) for E.Coli and MRSA bacteremia at [facility] and recently completion of IV antibiotics through PICC line, bedbound, CVA with right hemiparesis, CAD s/p stent, HTN, HLD, and asthma who presented to [facility] for constipation x 3weeks after inability to tolerate senna due to abdominal pain.    Patient is a poor historian. Patient spoke to ED Team very minimally. Patient only stated his name and answered my question with yes or no answers. As a result major of the information obtained was through chart review. Patient admitted to constipation but stated that it has improved since his admission. Patient denied fever, chills, lightheadedness, dizziness, CP, SOB, nausea, vomiting hematuria, and melena. According to the chart, patient also had abdominal distension.     ED course was significant for T=98F,   /77mmHg RR 18 spo2 98% on RA. Labs were significant for Na138, K 3.3, BUN 11, sCr 1.28, phos 2.5, mg 1.9, LFT wln, WBC 4.18, Hgb 10.4, Hgb 31.7, , covid-19 negative, and UA with trace protein and blood and moderate leukocyte esterase. EKG showed SR HR 91    LAD. CTAP showed  stool in distal rectum with wall thickening and perirectal fat stranding c/w stercoral colitis and asymmetric irregular posterior rectal wall soft tissue thickening possible due to neoplasm vs infectious or inflammatory etiology. GI consulted for stercoral colitis and posterior rectal wall thickening. GI recommended Felx sig and miralax. EP was consulted for Bradycardia.       PAST MEDICAL & SURGICAL HISTORY:  CVA (cerebral infarction) Residual right sided weakness, 1998  HTN (hypertension)  Dementia  HLD (hyperlipidemia)  DM (diabetes mellitus)  Asthma  CAD (coronary artery disease)s/p stent placement  Sinus node dysfunctions/p Medtronic PPM  Cardiac pacemakerMedtronic (SN: [id number]; Model: [id number])  S/P coronary artery stent placement    MEDICATIONS  (STANDING):  amLODIPine   Tablet 10 milliGRAM(s) Oral daily  aspirin enteric coated 81 milliGRAM(s) Oral daily  atorvastatin 40 milliGRAM(s) Oral at bedtime  isosorbide   mononitrate ER Tablet (IMDUR) 30 milliGRAM(s) Oral daily  lactated ringers. 1000 milliLiter(s) (100 mL/Hr) IV Continuous <Continuous>  lactulose Syrup 10 Gram(s) Oral every 8 hours  lisinopril 10 milliGRAM(s) Oral daily  magnesium sulfate  IVPB 2 Gram(s) IV Intermittent once  metoprolol succinate ER 25 milliGRAM(s) Oral daily  polyethylene glycol 3350 17 Gram(s) Oral <User Schedule>  potassium chloride  10 mEq/100 mL IVPB 10 milliEquivalent(s) IV Intermittent every 1 hour    MEDICATIONS  (PRN):  melatonin 3 milliGRAM(s) Oral at bedtime PRN Insomnia  OLANZapine Injectable 1.25 milliGRAM(s) IntraMuscular every 6 hours PRN Severe agitation  ondansetron Injectable 4 milliGRAM(s) IV Push every 8 hours PRN Nausea and/or Vomiting      FAMILY HISTORY: Obtained through chart review   Mother hx of T2DM  Brother hx of T2DM  Father hx of T2DM      SOCIAL HISTORY: Obtained through chart review  LIVING SITUATION:Lives with his wife who is his primary care taker   CIGARETTES: Denied  ALCOHOL: denied   ILLICIT DRUG USES: denied    REVIEW OF SYSTEMS:  CONSTITUTIONAL: No fever  RESPIRATORY: No cough, wheezing, hemoptysis, or shortness of breath  CARDIOVASCULAR: No chest pain, dyspnea, palpitations, dizziness, syncope  GASTROINTESTINAL: nausea, vomiting, hematemesis, melena or bright red blood. Admitted to abdominal pain and constipation Through chart view appears to have loose BM now due to bowel regimen       Vital Signs Last 24 Hrs  T(C): 36.4 (11 Feb 2022 10:28), Max: 36.9 (10 Feb 2022 20:15)  T(F): 97.6 (11 Feb 2022 10:28), Max: 98.4 (10 Feb 2022 20:15)  HR: 41 (11 Feb 2022 10:28) (41 - 65)  BP: 151/70 (11 Feb 2022 10:28) (130/67 - 154/66)  BP(mean): --  RR: 17 (11 Feb 2022 10:28) (16 - 18)  SpO2: 100% (11 Feb 2022 10:28) (99% - 100%)    PHYSICAL EXAM:  GENERAL: Sitting in bed, NAD  HEART: S1S2 bradycardiac   PULMONARY:CTABL anteriorly, normal respiratory effort  ABDOMEN: Bowel sounds present, soft  EXTREMITIES:  Warm, well -perfused, no pedal edema, distal pulses present  NEUROLOGICAL:AOx1 person    INTERPRETATION OF TELEMETRY: SB HR 37-50s    ECG:KG showed SR HR 91    LAD.    I&O's Detail      LABS:                        10.6   3.20  )-----------( 175      ( 11 Feb 2022 07:44 )             32.6     02-11    140  |  102  |  9   ----------------------------<  81  3.0<L>   |  28  |  1.28    Ca    9.3      11 Feb 2022 07:44  Phos  2.7     02-11  Mg     1.90     02-11        BNP  I&O's Detail    Daily     Daily     RADIOLOGY & ADDITIONAL STUDIES:  < from: CT Head No Cont (02.08.22 @ 22:26) >  IMPRESSION:  No acute intracranial bleeding.  Advanced central volume loss, extensive chronic microvascular ischemic  changes, and chronic left greater than right lacunar infarctions.    < from: CT Abdomen and Pelvis w/ IV Cont (02.08.22 @ 22:28) >  IMPRESSION:  Stool distended rectum with wall thickening and perirectal fat stranding consistent with stercoral colitis.  Asymmetric irregular posterior rectal wall soft tissue thickening.   Differential includes neoplasm, sequelae of infectious or inflammatory  process, versus muscular hypertrophy. Recommend scope with tissue  sampling.  Foci of airwithin the bladder. Correlate for recent instrumentation.     Source: patient and Chart    HPI:  This is a 81 year old man with a pmhx significant for dementia, Sinus node dysfunction s/p Medtronic PPM (12/2021) s/p PPM extraction (12/29/2021) for E.Coli and MRSA bacteremia at [facility] and recently completion of IV antibiotics through PICC line, bedbound, CVA with right hemiparesis, CAD s/p stent, HTN, HLD, and asthma who presented to [facility] for constipation x 3weeks after inability to tolerate senna due to abdominal pain.    Patient is a poor historian. Patient spoke to ED Team very minimally. Patient only stated his name and answered my question with yes or no answers. As a result major of the information obtained was through chart review. Patient admitted to constipation but stated that it has improved since his admission. Patient denied fever, chills, lightheadedness, dizziness, CP, SOB, nausea, vomiting hematuria, and melena. According to the chart, patient also had abdominal distension.     ED course was significant for T=98F,   /77mmHg RR 18 spo2 98% on RA. Labs were significant for Na138, K 3.3, BUN 11, sCr 1.28, phos 2.5, mg 1.9, LFT wln, WBC 4.18, Hgb 10.4, Hgb 31.7, , covid-19 negative, and UA with trace protein and blood and moderate leukocyte esterase. EKG showed SR HR 91    LAD. CTAP showed  stool in distal rectum with wall thickening and perirectal fat stranding c/w stercoral colitis and asymmetric irregular posterior rectal wall soft tissue thickening possible due to neoplasm vs infectious or inflammatory etiology. GI consulted for stercoral colitis and posterior rectal wall thickening. GI recommended Felx sig and miralax. EP was consulted for Bradycardia.       PAST MEDICAL & SURGICAL HISTORY:  CVA (cerebral infarction) Residual right sided weakness, 1998  HTN (hypertension)  Dementia  HLD (hyperlipidemia)  DM (diabetes mellitus)  Asthma  CAD (coronary artery disease)s/p stent placement  Sinus node dysfunctions/p Medtronic PPM  Cardiac pacemakerMedtronic (SN: [id number]; Model: [id number])  S/P coronary artery stent placement    MEDICATIONS  (STANDING):  amLODIPine   Tablet 10 milliGRAM(s) Oral daily  aspirin enteric coated 81 milliGRAM(s) Oral daily  atorvastatin 40 milliGRAM(s) Oral at bedtime  isosorbide   mononitrate ER Tablet (IMDUR) 30 milliGRAM(s) Oral daily  lactated ringers. 1000 milliLiter(s) (100 mL/Hr) IV Continuous <Continuous>  lactulose Syrup 10 Gram(s) Oral every 8 hours  lisinopril 10 milliGRAM(s) Oral daily  magnesium sulfate  IVPB 2 Gram(s) IV Intermittent once  metoprolol succinate ER 25 milliGRAM(s) Oral daily  polyethylene glycol 3350 17 Gram(s) Oral <User Schedule>  potassium chloride  10 mEq/100 mL IVPB 10 milliEquivalent(s) IV Intermittent every 1 hour    MEDICATIONS  (PRN):  melatonin 3 milliGRAM(s) Oral at bedtime PRN Insomnia  OLANZapine Injectable 1.25 milliGRAM(s) IntraMuscular every 6 hours PRN Severe agitation  ondansetron Injectable 4 milliGRAM(s) IV Push every 8 hours PRN Nausea and/or Vomiting      FAMILY HISTORY: Obtained through chart review   Mother hx of T2DM  Brother hx of T2DM  Father hx of Dementia    SOCIAL HISTORY: Obtained through chart review  LIVING SITUATION:Lives with his wife who is his primary care taker   CIGARETTES: Denied  ALCOHOL: denied   ILLICIT DRUG USES: denied    REVIEW OF SYSTEMS:  CONSTITUTIONAL: No fever  RESPIRATORY: No cough, wheezing, hemoptysis, or shortness of breath  CARDIOVASCULAR: No chest pain, dyspnea, palpitations, dizziness, syncope  GASTROINTESTINAL: nausea, vomiting, hematemesis, melena or bright red blood. Admitted to abdominal pain and constipation Through chart view appears to have loose BM now due to bowel regimen       Vital Signs Last 24 Hrs  T(C): 36.4 (11 Feb 2022 10:28), Max: 36.9 (10 Feb 2022 20:15)  T(F): 97.6 (11 Feb 2022 10:28), Max: 98.4 (10 Feb 2022 20:15)  HR: 41 (11 Feb 2022 10:28) (41 - 65)  BP: 151/70 (11 Feb 2022 10:28) (130/67 - 154/66)  BP(mean): --  RR: 17 (11 Feb 2022 10:28) (16 - 18)  SpO2: 100% (11 Feb 2022 10:28) (99% - 100%)    PHYSICAL EXAM:  GENERAL: Sitting in bed, NAD  HEART: S1S2 bradycardiac   PULMONARY:CTABL anteriorly, normal respiratory effort  ABDOMEN: Bowel sounds present, soft  EXTREMITIES:  Warm, well -perfused, no pedal edema, distal pulses present  NEUROLOGICAL:AOx1 person    INTERPRETATION OF TELEMETRY: SB HR 37-50s    ECG: SR HR 91    LAD.    I&O's Detail      LABS:                        10.6   3.20  )-----------( 175      ( 11 Feb 2022 07:44 )             32.6     02-11    140  |  102  |  9   ----------------------------<  81  3.0<L>   |  28  |  1.28    Ca    9.3      11 Feb 2022 07:44  Phos  2.7     02-11  Mg     1.90     02-11        BNP  I&O's Detail    Daily     Daily     RADIOLOGY & ADDITIONAL STUDIES:  < from: CT Head No Cont (02.08.22 @ 22:26) >  IMPRESSION:  No acute intracranial bleeding.  Advanced central volume loss, extensive chronic microvascular ischemic  changes, and chronic left greater than right lacunar infarctions.    < from: CT Abdomen and Pelvis w/ IV Cont (02.08.22 @ 22:28) >  IMPRESSION:  Stool distended rectum with wall thickening and perirectal fat stranding consistent with stercoral colitis.  Asymmetric irregular posterior rectal wall soft tissue thickening.   Differential includes neoplasm, sequelae of infectious or inflammatory  process, versus muscular hypertrophy. Recommend scope with tissue  sampling.  Foci of airwithin the bladder. Correlate for recent instrumentation.

## 2022-02-11 NOTE — DIETITIAN INITIAL EVALUATION ADULT. - PROBLEM SELECTOR PLAN 3
CT A/P: Asymmetric irregular posterior rectal wall soft tissue thickening. Differential includes neoplasm, sequelae of infectious or inflammatory process, versus muscular hypertrophy.  -Radiology recommends scope with tissue sampling  -GI c/s for possible sigmoidoscopy requested

## 2022-02-11 NOTE — PROGRESS NOTE ADULT - ASSESSMENT
80 yo Male with hx of HTN, HLD CVA with Rt sided residual weakness, dementia, bedbound c/b sacral decubitus ulcer, HTN, CAD s/p stent, s/p PPM extraction, asthma, stercoral colitis, recent completion of IV abx for Ecoli and MRSA Bacteremia via PICC; Pt brought in by wife for constipation of three weeks duration with associated abd distention and discomfort. History obtained from pt's wife who is also his caretaker who was present at bedside. Per pt's wife, has had intermittent episodes of constipation at baseline. Recently she tried enemas, senna but these caused cramping and abd discomfort and were also ineffective.  No associated nausea, vomiting, fevers or chills. Here, VSS, Hgb 10 (BL 12-13). CT A/P notable for stool in distal rectum with wall thickening and perirectal fat stranding c/w stercoral colitis and asymmetric irregular posterior rectal wall soft tissue thickening c/f neoplasm vs infectious or inflammatory etiology. GI consulted for stercoral colitis and posterior rectal wall thickening.     #stercoral colitis  #chronic constipation  #rectal wall thickening  Suspect clinical picture is likely related to known chronic constipation causing stercoral colitis; however given age cannot exclude malignancy with posterior rectal wall thickening. Responding well to BID enemas and miralax TID- had 10+ BMs since admission.    Recommendations:  - BID enemas  - Miralax TID  - can also try Moviprep  - would avoid stimulant laxatives (senna) or rectal suppositories  - consider flex sig for eval of rectal thickening once constipation has improved and if aligned with pt's GOC (discussed option of flex sig with pt's wife who would like to think about it)      Dasia Awad MD  GI Fellow, PGY-4  Available via [employer] Teams    NON-URGENT CONSULTS:  Please email [email or web address]  [email or web address]  AT NIGHT AND ON WEEKENDS:  Contact on-call GI fellow via answering service ([phone]) from 5pm-8am and on weekends/holidays  MONDAY-FRIDAY 8AM-5PM:  Pager# [phone] ([facility]) or [phone] ([facility])  GI Phone# [phone] ([facility])

## 2022-02-11 NOTE — PROGRESS NOTE ADULT - SUBJECTIVE AND OBJECTIVE BOX
Chief Complaint:  Patient is a 81y old  Male who presents with a chief complaint of Constipation x 3 weeks (11 Feb 2022 13:02)      Interval Events:   Had 5 BMs yesterday, no melena or hematochezia. Intermittently refusing Miralax.   Spoke with pt's wife/HC proxy Nerissa Garcia ([phone]) who stated she would like to think about flex sig next week for w/u posterior rectal thickening seen on CT.    Hospital Medications:  amLODIPine   Tablet 10 milliGRAM(s) Oral daily  aspirin enteric coated 81 milliGRAM(s) Oral daily  atorvastatin 40 milliGRAM(s) Oral at bedtime  isosorbide   mononitrate ER Tablet (IMDUR) 30 milliGRAM(s) Oral daily  lactated ringers. 1000 milliLiter(s) IV Continuous <Continuous>  lactulose Syrup 10 Gram(s) Oral every 8 hours  lisinopril 10 milliGRAM(s) Oral daily  melatonin 3 milliGRAM(s) Oral at bedtime PRN  metoprolol succinate ER 25 milliGRAM(s) Oral daily  OLANZapine Injectable 1.25 milliGRAM(s) IntraMuscular every 6 hours PRN  ondansetron Injectable 4 milliGRAM(s) IV Push every 8 hours PRN  polyethylene glycol 3350 17 Gram(s) Oral <User Schedule>      PMHX/PSHX:  CVA (cerebral infarction)    HTN (hypertension)    Diabetes    Hyperlipidemia    Dementia    HLD (hyperlipidemia)    DM (diabetes mellitus)    Asthma    CAD (coronary artery disease)    Sinus node dysfunction    No significant past surgical history    Cardiac pacemaker    S/P coronary artery stent placement              ROS: unable to obtain as pt has dementia      PHYSICAL EXAM:     GENERAL:  Well developed, no distress  HEENT:  NC/AT,  conjunctivae clear, sclera anicteric  CHEST:  Full & symmetric excursion, no increased effort w/ respirations  HEART:  Regular rhythm & rate  ABDOMEN:  Soft, non-tender, non-distended  EXTREMITIES:  no LE  edema  SKIN:  No rash/erythema/ecchymoses/petechiae/wounds/jaundice  NEURO:  Alert, orientedx1    Vital Signs:  Vital Signs Last 24 Hrs  T(C): 36.4 (11 Feb 2022 10:28), Max: 36.9 (10 Feb 2022 20:15)  T(F): 97.6 (11 Feb 2022 10:28), Max: 98.4 (10 Feb 2022 20:15)  HR: 58 (11 Feb 2022 15:40) (41 - 58)  BP: 152/74 (11 Feb 2022 15:40) (147/74 - 154/66)  BP(mean): --  RR: 17 (11 Feb 2022 10:28) (17 - 18)  SpO2: 100% (11 Feb 2022 10:28) (99% - 100%)  Daily     Daily     LABS:                        10.6   3.20  )-----------( 175      ( 11 Feb 2022 07:44 )             32.6     02-11    140  |  102  |  9   ----------------------------<  81  3.0<L>   |  28  |  1.28    Ca    9.3      11 Feb 2022 07:44  Phos  2.7     02-11  Mg     1.90     02-11                Imaging: No new abdominal imaging

## 2022-02-11 NOTE — CHART NOTE - NSCHARTNOTEFT_GEN_A_CORE
Upon review of patient chart, low HR in 40's noted.  12 lead EKG ordered, Sinus Gera 38 BPM. Patient seen and evaluated at bedside.  Lethargic, arousable to tactile stimuli, other VSS, afebrile, Lungs clear, S1,S2.  EP consulted, f/u final recs.  Pads on patient and Zole at bedside. Potassium and Magnesium repleted. Will continue to monitor closely.

## 2022-02-11 NOTE — DIETITIAN NUTRITION RISK NOTIFICATION - TREATMENT: THE FOLLOWING DIET HAS BEEN RECOMMENDED
Diet, Minced and Moist:   DASH/TLC {Sodium & Cholesterol Restricted} (DASH)  Low Sodium (02-09-22 @ 05:56) [Active]

## 2022-02-11 NOTE — DIETITIAN INITIAL EVALUATION ADULT. - PERTINENT MEDS FT
MEDICATIONS  (STANDING):  amLODIPine   Tablet 10 milliGRAM(s) Oral daily  aspirin enteric coated 81 milliGRAM(s) Oral daily  atorvastatin 40 milliGRAM(s) Oral at bedtime  isosorbide   mononitrate ER Tablet (IMDUR) 30 milliGRAM(s) Oral daily  lactated ringers. 1000 milliLiter(s) (100 mL/Hr) IV Continuous <Continuous>  lactulose Syrup 10 Gram(s) Oral every 8 hours  lisinopril 10 milliGRAM(s) Oral daily  magnesium sulfate  IVPB 2 Gram(s) IV Intermittent once  metoprolol succinate ER 25 milliGRAM(s) Oral daily  polyethylene glycol 3350 17 Gram(s) Oral <User Schedule>  potassium chloride  10 mEq/100 mL IVPB 10 milliEquivalent(s) IV Intermittent every 1 hour

## 2022-02-11 NOTE — PROGRESS NOTE ADULT - SUBJECTIVE AND OBJECTIVE BOX
Patient is a 81y old  Male who presents with a chief complaint of Constipation x 3 weeks (10 Feb 2022 14:14)      HPI:  Bradycardia noted, now on tele monitoring.  Seen by EP      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,     HTN (hypertension)    Dementia    HLD (hyperlipidemia)    DM (diabetes mellitus)    Asthma    CAD (coronary artery disease)  s/p stent placement    Sinus node dysfunction  s/p Medtronic PPM    Cardiac pacemaker  Medtronic (SN: [id number]; Model: [id number])    S/P coronary artery stent placement        Review of Systems:   CONSTITUTIONAL: No fever, weight loss, or fatigue  EYES: No eye pain, visual disturbances, or discharge  ENMT:  No difficulty hearing, tinnitus, vertigo; No sinus or throat pain  NECK: No pain or stiffness  BREASTS: No pain, masses, or nipple discharge  RESPIRATORY: No cough, wheezing, chills or hemoptysis; No shortness of breath  CARDIOVASCULAR: No chest pain, palpitations, dizziness, or leg swelling  GASTROINTESTINAL: No abdominal or epigastric pain. No nausea, vomiting, or hematemesis; GENITOURINARY: No dysuria, frequency, hematuria, or incontinence  NEUROLOGICAL: No headaches, memory loss, Right sided weakness +  SKIN: No itching, burning, rashes, or lesions   LYMPH NODES: No enlarged glands  ENDOCRINE: No heat or cold intolerance; No hair loss  MUSCULOSKELETAL: No joint pain or swelling; No muscle, back, or extremity pain  PSYCHIATRIC: No depression, anxiety, mood swings, or difficulty sleeping  HEME/LYMPH: No easy bruising, or bleeding gums  ALLERY AND IMMUNOLOGIC: No hives or eczema    Allergies    No Known Allergies    Intolerances        Social History:     FAMILY HISTORY:  FH: diabetes mellitus  Mother, Brother    FHx: dementia  Father        MEDICATIONS  (STANDING):  amLODIPine   Tablet 10 milliGRAM(s) Oral daily  aspirin enteric coated 81 milliGRAM(s) Oral daily  atorvastatin 40 milliGRAM(s) Oral at bedtime  isosorbide   mononitrate ER Tablet (IMDUR) 30 milliGRAM(s) Oral daily  lactated ringers. 1000 milliLiter(s) (100 mL/Hr) IV Continuous <Continuous>  lactulose Syrup 10 Gram(s) Oral every 8 hours  lisinopril 10 milliGRAM(s) Oral daily  metoprolol succinate ER 25 milliGRAM(s) Oral daily  polyethylene glycol 3350 17 Gram(s) Oral <User Schedule>    MEDICATIONS  (PRN):  melatonin 3 milliGRAM(s) Oral at bedtime PRN Insomnia  OLANZapine Injectable 1.25 milliGRAM(s) IntraMuscular every 6 hours PRN Severe agitation  ondansetron Injectable 4 milliGRAM(s) IV Push every 8 hours PRN Nausea and/or Vomiting        CAPILLARY BLOOD GLUCOSE      POCT Blood Glucose.: 90 mg/dL (10 Feb 2022 08:07)  POCT Blood Glucose.: 89 mg/dL (10 Feb 2022 01:45)  POCT Blood Glucose.: 112 mg/dL (2022 22:05)    I&O's Summary    2022 07:01  -  10 Feb 2022 07:00  --------------------------------------------------------  IN: 0 mL / OUT: 2 mL / NET: -2 mL        PHYSICAL EXAM:  Vital Signs Last 24 Hrs  T(C): 36.5 (10 Feb 2022 12:54), Max: 37.2 (2022 18:06)  T(F): 97.7 (10 Feb 2022 12:54), Max: 98.9 (2022 18:06)  HR: 65 (10 Feb 2022 12:54) (60 - 68)  BP: 130/67 (10 Feb 2022 12:54) (130/67 - 177/94)  BP(mean): --  RR: 16 (10 Feb 2022 12:54) (16 - 18)  SpO2: 100% (10 Feb 2022 12:54) (98% - 100%)    GENERAL: NAD, well-developed  HEAD:  Atraumatic, Normocephalic  EYES: EOMI, PERRLA, conjunctiva and sclera clear  NECK: Supple, No JVD  CHEST/LUNG: Clear to auscultation bilaterally; No wheeze  HEART: Regular rate and rhythm; No murmurs, rubs, or gallops  ABDOMEN: Soft, Nontender, Nondistended; Bowel sounds present  EXTREMITIES:  2+ Peripheral Pulses, No clubbing, cyanosis, or edema  PSYCH: AAOx3  NEUROLOGY: Right side is weak.  SKIN: No rashes or lesions    LABS:                        10.8   4.97  )-----------( 198      ( 10 Feb 2022 07:16 )             31.4     02-10    141  |  101  |  10  ----------------------------<  80  3.3<L>   |  31  |  1.24    Ca    9.5      10 Feb 2022 07:11  Phos  2.4     02-10  Mg     1.90     02-10    TPro  6.4  /  Alb  3.4  /  TBili  1.5<H>  /  DBili  x   /  AST  17  /  ALT  14  /  AlkPhos  81  02-08    PT/INR - ( 2022 20:17 )   PT: 12.8 sec;   INR: 1.13 ratio         PTT - ( 2022 20:17 )  PTT:31.9 sec      Urinalysis Basic - ( 2022 22:16 )    Color: Light Yellow / Appearance: Clear / S.014 / pH: x  Gluc: x / Ketone: Negative  / Bili: Negative / Urobili: <2 mg/dL   Blood: x / Protein: Trace / Nitrite: Negative   Leuk Esterase: Moderate / RBC: 4 /HPF / WBC 10 /HPF   Sq Epi: x / Non Sq Epi: 5 /HPF / Bacteria: Few        RADIOLOGY & ADDITIONAL TESTS:    Imaging Personally Reviewed:    Consultant(s) Notes Reviewed:      Care Discussed with Consultants/Other Providers:

## 2022-02-11 NOTE — CONSULT NOTE ADULT - ATTENDING COMMENTS
FMLA or Disability Forms were received and faxed to the Forms Completion Department today at [phone].  (Please include all appropriate authorization forms with your fax)    Did you have the patient complete (in full) and sign the \"Authorization for Disclosure of Health Information Forms Completion\" form?  NO ________  (If no, please give reason) Received form via fax    Have you communicated that the Forms Completion Process may take up to 14 days?  NO _________    If you have questions about this encounter, please contact the Forms Completion Dept. at [phone].      
Form completed and sent to Physician's Office electronically via in basket messaging for review and signature.  
Form received at [place] Form Completion Office on 6/29/2021.  Please note that it takes 7-10 business days for completion.    
Form signed and faxed back to Forms Completion Department.   
I messaged the PSR to re fax these forms as I believe there toner needs to be replaced 6/28/21  
No authorization    I emailed the patient an authorization    
Received signed and completed form from Physician's Office.  Complete form was mailed to the patient per his authorization .     
Sent a message to the PSR regarding these forms. The 3rd page of \"stretched\" out as well so it's no readable  
Patient seen/examined. History/PE as above. Wife at bedside. Limited exam- in ER hallway. Long-standing chronic constipation reported by wife. CT finding as to question of possible rectal neoplastic process versus inflammatory changes from stercoral proctitis discussed. Bowel regimen as outlined above-MiraLax 17 g to 3 times daily in conjunction with b.i.d. tap water enemas. Discussed option of sigmoidoscopy with wife will consider.
81 year old man with a pmhx significant for dementia, Sinus node dysfunction s/p Medtronic PPM (12/2021) s/p PPM extraction (12/29/2021) for E.Coli and MRSA bacteremia at [facility] and recently completion of IV antibiotics through PICC line, bedbound, CVA with right hemiparesis, CAD s/p stent, HTN, HLD, and asthma who presented to [facility] for constipation x 3weeks after inability to tolerate senna due to abdominal pain. CTAP showed  stool in distal rectum with wall thickening and perirectal fat stranding c/w stercoral colitis and asymmetric irregular posterior rectal wall soft tissue thickening possible due to neoplasm vs infectious or inflammatory etiology. GI consulted for stercoral colitis and posterior rectal wall thickening. GI recommended Flex sig and Miralax. EP was consulted for Bradycardia. Intermittent, average heart rate acceptable in 50s-60s. Will follow on tele.

## 2022-02-11 NOTE — DIETITIAN NUTRITION RISK NOTIFICATION - OTHER RISK FACTORS
Not applicable You can access the FollowMyHealth Patient Portal offered by [facility] by registering at the following website: [email or web address]. By joining [employer]’s FollowMyHealth portal, you will also be able to view your health information using other applications (apps) compatible with our system.

## 2022-02-11 NOTE — CONSULT NOTE ADULT - ASSESSMENT
This is a 81 year old man with a pmhx significant for dementia, Sinus node dysfunction s/p Medtronic PPM (12/2021) s/p PPM extraction (12/29/2021) for E.Coli and MRSA bacteremia at [facility] and recently completion of IV antibiotics through PICC line, bedbound, CVA with right hemiparesis, CAD s/p stent, HTN, HLD, and asthma who presented to [facility] for constipation x 3weeks after inability to tolerate senna due to abdominal pain. CTAP showed  stool in distal rectum with wall thickening and perirectal fat stranding c/w stercoral colitis and asymmetric irregular posterior rectal wall soft tissue thickening possible due to neoplasm vs infectious or inflammatory etiology. GI consulted for stercoral colitis and posterior rectal wall thickening. GI recommended Flex sig and Miralax. EP was consulted for Bradycardia.     Plan:  - Continuous telemetric monitoring  - Monitor electrolytes and replete K to 4 and Mg to 2  - Hold AV arpita blockers   - Pads on patient and defibrillator at the bedside incase patient becomes hemodynamically unstable and needs transcutaneous pacing  - Appreciate GI recs  - Continue care per primary team    Gilmar Delgadillo PA-C  Patient to be staffed with attending. Please await attending addendum This is a 81 year old man with a pmhx significant for dementia, Sinus node dysfunction s/p Medtronic PPM (12/2021) s/p PPM extraction (12/29/2021) for E.Coli and MRSA bacteremia at [facility] and recently completion of IV antibiotics through PICC line, bedbound, CVA with right hemiparesis, CAD s/p stent, HTN, HLD, and asthma who presented to [facility] for constipation x 3weeks after inability to tolerate senna due to abdominal pain. CTAP showed  stool in distal rectum with wall thickening and perirectal fat stranding c/w stercoral colitis and asymmetric irregular posterior rectal wall soft tissue thickening possible due to neoplasm vs infectious or inflammatory etiology. GI consulted for stercoral colitis and posterior rectal wall thickening. GI recommended Flex sig and Miralax. EP was consulted for Bradycardia.     Plan:  - Continuous telemetric monitoring  - Monitor electrolytes and replete K to 4 and Mg to 2  - Hold AV arpita blockers   - Pads on patient and defibrillator at the bedside incase patient becomes hemodynamically unstable and needs transcutaneous pacing  - Appreciate GI recs  - Continue care per primary team    Gilmar Delgadillo PA-C  Patient to be staffed with attending. Please await attending addendum This is a 81 year old man with a pmhx significant for dementia, Sinus node dysfunction s/p Medtronic PPM (12/2021) s/p PPM extraction (12/29/2021) for E.Coli and MRSA bacteremia at [facility] and recently completion of IV antibiotics through PICC line, bedbound, CVA with right hemiparesis, CAD s/p stent, HTN, HLD, and asthma who presented to [facility] for constipation x 3weeks after inability to tolerate senna due to abdominal pain. CTAP showed  stool in distal rectum with wall thickening and perirectal fat stranding c/w stercoral colitis and asymmetric irregular posterior rectal wall soft tissue thickening possible due to neoplasm vs infectious or inflammatory etiology. GI consulted for stercoral colitis and posterior rectal wall thickening. GI recommended Flex sig and Miralax. EP was consulted for Bradycardia.     Plan:  - Continuous telemetric monitoring  - Monitor electrolytes and replete K to 4 and Mg to 2  - Hold AV arpita blockers   - Pads on patient and defibrillator at the bedside incase patient becomes hemodynamically unstable and needs transcutaneous pacing  - Appreciate GI recs  - Continue care per primary team    Gilmar Delgadillo PA-C  Patient to be staffed with attending. Please await attending addendum    ADDENDUM  I spoke to the patient's wife Nerissa Garcia at [phone] this afternoon at around 16:00 which was witness by NATHALY Tyler. Discussed permanent pacemaker and micra PPM with the wife. The process of the procedures along with the risks and benefits for each procedure were explained in detail which included but not limited to bleeding requiring transfusion, infection, stroke, plural effusion, esophageal injury, pericardial effusion, cardiac tamponade requiring chest tube, intubation, and death. I explained to the patient's wife that he is high risk for re-infection with a permanent pacemaker and recommended micra pacemaker. Patient's wife stated that during his extraction in 12/2021 she was told that he would not need a PPM. Patient's wife currently does not want a pacemaker  due to the risk involve with the procedure and prefer to wait to see if his heart rate increases.     Gilmar Delgadillo PA-C

## 2022-02-11 NOTE — DIETITIAN INITIAL EVALUATION ADULT. - OTHER INFO
Per chart---- 80 yo Male with hx of HTN, HLD CVA with Rt sided residual weakness, dementia, bedbound, HTN, CAD s/p stent, s/p PPM extraction, asthma, stercoral colitis, recent completion of IV abx for Ecoli and MRSA Bacteremia via PICC; Pt a/w TAE, stercoral colitis and posterior rectal wall lesion; Found to have UTI and sacral decubitus.    Pt with Dementia, RD attempted to reach listed contact Nerissa Garcia [phone] without success. No current weight noted. LaSt RD assessment reviewed, 12/2021--wt was 161#. Pt continues on Minced and Moist diet, was hand fed this am by PCA and 1/4 of oatmeal completed then started to push the food out with tongue. Had BM's per GI note, continues on bowel elimination regimen. Observed with visible severe clavicle and moderate bi-temporal muscle wasting along with mild buccal fat wasting. Remains with R Buttock suspected Stage 3 and R Heel suspected DTI. Per chart---- 82 yo Male with hx of HTN, HLD CVA with Rt sided residual weakness, dementia, bedbound, HTN, CAD s/p stent, s/p PPM extraction, asthma, stercoral colitis, recent completion of IV abx for Ecoli and MRSA Bacteremia via PICC; Pt a/w TAE, stercoral colitis and posterior rectal wall lesion; Found to have UTI and sacral decubitus.    Pt with Dementia, RD attempted to reach listed contact Nerissa Garcia [phone] without success. No current weight noted. Pt continues on Minced and Moist diet, was hand fed this am by PCA and 1/4 of oatmeal completed then started to push food out with tongue. Had BM's per GI note, continues on bowel elimination regimen. Observed with visible severe clavicle and moderate bi-temporal muscle wasting along with mild buccal fat wasting. Remains with R Buttock suspected Stage 3 and R Heel suspected DTI.

## 2022-02-11 NOTE — DIETITIAN INITIAL EVALUATION ADULT. - PROBLEM SELECTOR PLAN 1
CT A/P Stool distended rectum with wall thickening and perirectal fat stranding consistent with stercoral colitis;  Rectal exam performed in ED with no stool in the palpable vault, no blood; s/p Miralax in ED  -Ordered mineral oil enema and oral dose   -Hold Senna (see HPI)  -Monitor for BM  -IV hydration  -Daily Miralax 17g  -If no BM consider repeating enema and administering Lactulose orally

## 2022-02-11 NOTE — DIETITIAN INITIAL EVALUATION ADULT. - PROBLEM SELECTOR PROBLEM 7
CHIEF COMPLAINT(S):   Chief Complaint   Patient presents with   • Right Shoulder - Pain       HISTORY OF PRESENT ILLNESS:  Genia Mercedes is a 38 year old right-handed female returning to the clinic for a recheck of right shoulder, and neck pain. She has previously received a bicep tendon sheath injection at her last office visit on 7/12/21, she was given home exercises that day, and was referred to spine care for cervical spine pain and trapezius pain. She reports significant improvement with the interventions. She has had OMT that have given her a lot of relief, as well as physical therapy, daily home exercises, and injection to her neck.   Accompanied by self   Location:right shoulder  Date of Onset: mid- May  Context: see above  Did this injury occur at work: No  Severity:4-6/10   Timing: intermittent  Quality: sharp, sore  Associated signs and symptoms: none  Aggravating factors: long periods of activity, flexion of the shoulder, abduction   Alleviating factors: cervical spine injection, OMT, physical therapy  Patient feels: 75-80/100  Patient feels significantly improved.    Review of Systems   Constitutional: Negative for activity change, fatigue and fever.   HENT: Negative for hearing loss, sore throat, tinnitus and trouble swallowing.    Eyes: Negative for photophobia, redness and visual disturbance.   Respiratory: Negative for chest tightness, shortness of breath and wheezing.    Cardiovascular: Negative for chest pain and palpitations.   Gastrointestinal: Negative for abdominal pain, constipation, diarrhea and nausea.   Genitourinary: Negative for difficulty urinating, frequency and hematuria.   Musculoskeletal: Positive for arthralgias, myalgias, neck pain and neck stiffness. Negative for back pain, gait problem and joint swelling.   Neurological: Negative for dizziness, seizures, syncope, numbness and headaches.   Psychiatric/Behavioral: Negative for sleep disturbance. The patient is not nervous/anxious.         The patient was instructed to follow up with PCP regarding all positive ROS that were not directly pertinent with the chief complaint.    Roomed By: SAMIA Delgado    Past Medical History Updated: Yes  PAST MEDICAL HISTORY:  Past Medical History:   Diagnosis Date   • ASCUS with positive high risk HPV cervical 05/21/2014   • Cervical high risk HPV (human papillomavirus) test positive 03/18/2016    genotype 16   • Colonic diverticular abscess 12/7/2013   • Diverticulitis     s/p partial colectomy   • Vitamin D deficiency 7/21/2015       Past Surgical History Updated: Yes  PAST SURGICAL HISTORY:  Past Surgical History:   Procedure Laterality Date   • -----------abdomen-----------  10/2013    abdominalplasty   • Anes laparo partial colectomy  12/18/2013   • Colposcopy  2014, 2016   • Iud insertion      paragard iud   • Leep  2016       Past Family History Updated: Yes  FAMILY HISTORY:  Family History   Problem Relation Age of Onset   • Other Father         covid   • Diabetes Mother      Reviewed and non-contributory to patient's illness unless otherwise stated above    Past Social History Updated: Yes  SOCIAL HISTORY:  Social History     Socioeconomic History   • Marital status: Single     Spouse name: Not on file   • Number of children: Not on file   • Years of education: Not on file   • Highest education level: Not on file   Occupational History   • Not on file   Tobacco Use   • Smoking status: Never Smoker   • Smokeless tobacco: Never Used   Substance and Sexual Activity   • Alcohol use: Yes   • Drug use: Never   • Sexual activity: Yes     Partners: Male     Birth control/protection: I.U.D.     Comment: paragard iud   Other Topics Concern   • Not on file   Social History Narrative   • Not on file     Social Determinants of Health     Financial Resource Strain:    • Social Determinants: Financial Resource Strain:    Food Insecurity:    • Social Determinants: Food Insecurity:    Transportation Needs:    •  Lack of Transportation (Medical):    • Lack of Transportation (Non-Medical):    Physical Activity:    • Days of Exercise per Week:    • Minutes of Exercise per Session:    Stress:    • Social Determinants: Stress:    Social Connections:    • Social Determinants: Social Connections:    Intimate Partner Violence:    • Social Determinants: Intimate Partner Violence Past Fear:    • Social Determinants: Intimate Partner Violence Current Fear:         MEDICATIONS:  Current Outpatient Medications   Medication Sig Dispense Refill   • nabumetone (RELAFEN) 750 MG tablet Take 1 tablet by mouth 2 times daily for 14 days. 28 tablet 0   • phentermine (ADIPEX-P) 37.5 MG tablet Take 1 tablet by mouth daily (before breakfast). 30 tablet 0   • ergocalciferol (DRISDOL) 1.25 mg (50,000 units) capsule Take 1 capsule by mouth 1 day a week. 4 capsule 3     No current facility-administered medications for this visit.     ALLERGIES:     ALLERGIES:   Allergen Reactions   • Latex RASH       VITAL SIGNS:  Visit Vitals  Ht 5' 2\" (1.575 m)   Wt 86.2 kg (190 lb)   LMP 06/18/2021   BMI 34.75 kg/m²     Body mass index is 34.75 kg/m².    EXAM:  This is a 38 year old female, awake, alert, and cooperative. She is well nourished, well developed, and in no apparent distress.  Pulmonary - No increased work of breathing.  Lymphatics - There is no evidence of generalized adenopathy.    Right shoulder examination:  No reported tenderness palpation  Range of motion is full  Motor activity is intact  Sensation intact throughout  Negative speeds, Cheyanne's  Patient with continued cervical spine pain that is reproduced with terminal extension and left side bend  Sensation intact in bilateral upper extremities      ASSESSMENT & PLAN:  Genia Mercedes is a 38 year old female who presents for repeat evaluation regarding right trapezius strain and rotator cuff tendinitis.  Patient had injection to the right shoulder and had significant improvement.  Patient continues  to have some aggravating factors regarding her cervical spine.    Plan as follows:  1.  Patient to continue with therapy regarding her neck  2.  Patient to continue to follow through with her home exercise routine on a daily basis.  3.  No restrictions regarding the patient's shoulder.  Note provided for the patient today.  Return to work in 2 days without restriction.  4.  Follow-up with me as needed      Total time was 22 minutes. This includes chart review before the visit, actual time spent with patient, and time spent on documentation after the visit.    Electronically Signed by:    Cosme Vang MD 8/9/2021    This note was dictated by speech recognition software. Minor errors in transcription may be present.     Dementia

## 2022-02-12 LAB
ANION GAP SERPL CALC-SCNC: 10 MMOL/L — SIGNIFICANT CHANGE UP (ref 7–14)
BASOPHILS # BLD AUTO: 0.03 K/UL — SIGNIFICANT CHANGE UP (ref 0–0.2)
BASOPHILS NFR BLD AUTO: 0.8 % — SIGNIFICANT CHANGE UP (ref 0–2)
BUN SERPL-MCNC: 6 MG/DL — LOW (ref 7–23)
CALCIUM SERPL-MCNC: 9.6 MG/DL — SIGNIFICANT CHANGE UP (ref 8.4–10.5)
CHLORIDE SERPL-SCNC: 103 MMOL/L — SIGNIFICANT CHANGE UP (ref 98–107)
CO2 SERPL-SCNC: 28 MMOL/L — SIGNIFICANT CHANGE UP (ref 22–31)
CREAT SERPL-MCNC: 1.22 MG/DL — SIGNIFICANT CHANGE UP (ref 0.5–1.3)
EOSINOPHIL # BLD AUTO: 0.2 K/UL — SIGNIFICANT CHANGE UP (ref 0–0.5)
EOSINOPHIL NFR BLD AUTO: 5.1 % — SIGNIFICANT CHANGE UP (ref 0–6)
GLUCOSE SERPL-MCNC: 79 MG/DL — SIGNIFICANT CHANGE UP (ref 70–99)
HCT VFR BLD CALC: 32.9 % — LOW (ref 39–50)
HGB BLD-MCNC: 10.9 G/DL — LOW (ref 13–17)
IANC: 2.1 K/UL — SIGNIFICANT CHANGE UP (ref 1.5–8.5)
IMM GRANULOCYTES NFR BLD AUTO: 0.8 % — SIGNIFICANT CHANGE UP (ref 0–1.5)
LYMPHOCYTES # BLD AUTO: 1.02 K/UL — SIGNIFICANT CHANGE UP (ref 1–3.3)
LYMPHOCYTES # BLD AUTO: 25.9 % — SIGNIFICANT CHANGE UP (ref 13–44)
MAGNESIUM SERPL-MCNC: 2.2 MG/DL — SIGNIFICANT CHANGE UP (ref 1.6–2.6)
MCHC RBC-ENTMCNC: 30.2 PG — SIGNIFICANT CHANGE UP (ref 27–34)
MCHC RBC-ENTMCNC: 33.1 GM/DL — SIGNIFICANT CHANGE UP (ref 32–36)
MCV RBC AUTO: 91.1 FL — SIGNIFICANT CHANGE UP (ref 80–100)
MONOCYTES # BLD AUTO: 0.56 K/UL — SIGNIFICANT CHANGE UP (ref 0–0.9)
MONOCYTES NFR BLD AUTO: 14.2 % — HIGH (ref 2–14)
NEUTROPHILS # BLD AUTO: 2.1 K/UL — SIGNIFICANT CHANGE UP (ref 1.8–7.4)
NEUTROPHILS NFR BLD AUTO: 53.2 % — SIGNIFICANT CHANGE UP (ref 43–77)
NRBC # BLD: 0 /100 WBCS — SIGNIFICANT CHANGE UP
NRBC # FLD: 0 K/UL — SIGNIFICANT CHANGE UP
PHOSPHATE SERPL-MCNC: 2.6 MG/DL — SIGNIFICANT CHANGE UP (ref 2.5–4.5)
PLATELET # BLD AUTO: 179 K/UL — SIGNIFICANT CHANGE UP (ref 150–400)
POTASSIUM SERPL-MCNC: 3.9 MMOL/L — SIGNIFICANT CHANGE UP (ref 3.5–5.3)
POTASSIUM SERPL-SCNC: 3.9 MMOL/L — SIGNIFICANT CHANGE UP (ref 3.5–5.3)
RBC # BLD: 3.61 M/UL — LOW (ref 4.2–5.8)
RBC # FLD: 13.7 % — SIGNIFICANT CHANGE UP (ref 10.3–14.5)
SODIUM SERPL-SCNC: 141 MMOL/L — SIGNIFICANT CHANGE UP (ref 135–145)
WBC # BLD: 3.94 K/UL — SIGNIFICANT CHANGE UP (ref 3.8–10.5)
WBC # FLD AUTO: 3.94 K/UL — SIGNIFICANT CHANGE UP (ref 3.8–10.5)

## 2022-02-12 RX ADMIN — Medication 81 MILLIGRAM(S): at 12:06

## 2022-02-12 RX ADMIN — POLYETHYLENE GLYCOL 3350 17 GRAM(S): 17 POWDER, FOR SOLUTION ORAL at 05:30

## 2022-02-12 RX ADMIN — ATORVASTATIN CALCIUM 40 MILLIGRAM(S): 80 TABLET, FILM COATED ORAL at 23:01

## 2022-02-12 RX ADMIN — LACTULOSE 10 GRAM(S): 10 SOLUTION ORAL at 23:00

## 2022-02-12 RX ADMIN — AMLODIPINE BESYLATE 10 MILLIGRAM(S): 2.5 TABLET ORAL at 05:30

## 2022-02-12 RX ADMIN — ISOSORBIDE MONONITRATE 30 MILLIGRAM(S): 60 TABLET, EXTENDED RELEASE ORAL at 12:06

## 2022-02-12 RX ADMIN — POLYETHYLENE GLYCOL 3350 17 GRAM(S): 17 POWDER, FOR SOLUTION ORAL at 12:06

## 2022-02-12 RX ADMIN — LACTULOSE 10 GRAM(S): 10 SOLUTION ORAL at 12:06

## 2022-02-12 RX ADMIN — LISINOPRIL 10 MILLIGRAM(S): 2.5 TABLET ORAL at 05:31

## 2022-02-12 RX ADMIN — LACTULOSE 10 GRAM(S): 10 SOLUTION ORAL at 05:30

## 2022-02-12 RX ADMIN — POLYETHYLENE GLYCOL 3350 17 GRAM(S): 17 POWDER, FOR SOLUTION ORAL at 23:01

## 2022-02-12 NOTE — PROVIDER CONTACT NOTE (CHANGE IN STATUS NOTIFICATION) - BACKGROUND
Patient is an 81 year old male admitted for constipation. History of UTI, CVA, hypertension, diabetes, dementia.

## 2022-02-12 NOTE — CHART NOTE - NSCHARTNOTEFT_GEN_A_CORE
Spoke with pt's wife regarding her decision about pursing flex sig for eval of rectal thickening seen on CT A/P. Explained that it is likely 2/2 stercoral colitis given chronic constipation however malignancy cannot be ruled out. Pt's wife stated she would like for him to have the flex sig because she does not want to wonder the rest of her life whether or not he may have cancer. Explained the risks vs benefits of the flex sig including bleeding, perforation, infection, CV risk a/w anesthesia and pt's wife stated she would like to go forward. Explained that given his ongoing bradycardia, he would need to be evaluated and cleared by cardiology as sedation may worsen his bradycardia. Pt's wife expressed understanding.    Recommend cardiology eval for flex sig.

## 2022-02-12 NOTE — PROVIDER CONTACT NOTE (CHANGE IN STATUS NOTIFICATION) - ASSESSMENT
Patient's cardiac monitor displayed heart rate of 37-39 for several minutes. Currently sustaining at 39-41. Patient is sleeping, asymptomatic. Aroused by touch. All other vital signs stable.

## 2022-02-13 RX ADMIN — POLYETHYLENE GLYCOL 3350 17 GRAM(S): 17 POWDER, FOR SOLUTION ORAL at 21:51

## 2022-02-13 RX ADMIN — POLYETHYLENE GLYCOL 3350 17 GRAM(S): 17 POWDER, FOR SOLUTION ORAL at 06:01

## 2022-02-13 RX ADMIN — POLYETHYLENE GLYCOL 3350 17 GRAM(S): 17 POWDER, FOR SOLUTION ORAL at 11:39

## 2022-02-13 RX ADMIN — LACTULOSE 10 GRAM(S): 10 SOLUTION ORAL at 21:51

## 2022-02-13 RX ADMIN — LACTULOSE 10 GRAM(S): 10 SOLUTION ORAL at 11:39

## 2022-02-13 RX ADMIN — AMLODIPINE BESYLATE 10 MILLIGRAM(S): 2.5 TABLET ORAL at 06:00

## 2022-02-13 RX ADMIN — LACTULOSE 10 GRAM(S): 10 SOLUTION ORAL at 06:01

## 2022-02-13 RX ADMIN — Medication 81 MILLIGRAM(S): at 11:39

## 2022-02-13 RX ADMIN — ISOSORBIDE MONONITRATE 30 MILLIGRAM(S): 60 TABLET, EXTENDED RELEASE ORAL at 11:39

## 2022-02-13 RX ADMIN — LISINOPRIL 10 MILLIGRAM(S): 2.5 TABLET ORAL at 06:00

## 2022-02-13 RX ADMIN — ATORVASTATIN CALCIUM 40 MILLIGRAM(S): 80 TABLET, FILM COATED ORAL at 21:52

## 2022-02-13 NOTE — PROGRESS NOTE ADULT - SUBJECTIVE AND OBJECTIVE BOX
Patient is a 81y old  Male who presents with a chief complaint of Constipation x 3 weeks (10 Feb 2022 14:14)    2022  HPI:  Bradycardia persits, asymptomatic      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,     HTN (hypertension)    Dementia    HLD (hyperlipidemia)    DM (diabetes mellitus)    Asthma    CAD (coronary artery disease)  s/p stent placement    Sinus node dysfunction  s/p Medtronic PPM    Cardiac pacemaker  Medtronic (SN: [id number]; Model: [id number])    S/P coronary artery stent placement        Review of Systems:   CONSTITUTIONAL: No fever, weight loss, or fatigue  EYES: No eye pain, visual disturbances, or discharge  ENMT:  No difficulty hearing, tinnitus, vertigo; No sinus or throat pain  NECK: No pain or stiffness  BREASTS: No pain, masses, or nipple discharge  RESPIRATORY: No cough, wheezing, chills or hemoptysis; No shortness of breath  CARDIOVASCULAR: No chest pain, palpitations, dizziness, or leg swelling  GASTROINTESTINAL: No abdominal or epigastric pain. No nausea, vomiting, or hematemesis; GENITOURINARY: No dysuria, frequency, hematuria, or incontinence  NEUROLOGICAL: No headaches, memory loss, Right sided weakness +  SKIN: No itching, burning, rashes, or lesions   LYMPH NODES: No enlarged glands  ENDOCRINE: No heat or cold intolerance; No hair loss  MUSCULOSKELETAL: No joint pain or swelling; No muscle, back, or extremity pain  PSYCHIATRIC: No depression, anxiety, mood swings, or difficulty sleeping  HEME/LYMPH: No easy bruising, or bleeding gums  ALLERY AND IMMUNOLOGIC: No hives or eczema    Allergies    No Known Allergies    Intolerances        Social History:     FAMILY HISTORY:  FH: diabetes mellitus  Mother, Brother    FHx: dementia  Father        MEDICATIONS  (STANDING):  amLODIPine   Tablet 10 milliGRAM(s) Oral daily  aspirin enteric coated 81 milliGRAM(s) Oral daily  atorvastatin 40 milliGRAM(s) Oral at bedtime  isosorbide   mononitrate ER Tablet (IMDUR) 30 milliGRAM(s) Oral daily  lactated ringers. 1000 milliLiter(s) (100 mL/Hr) IV Continuous <Continuous>  lactulose Syrup 10 Gram(s) Oral every 8 hours  lisinopril 10 milliGRAM(s) Oral daily  metoprolol succinate ER 25 milliGRAM(s) Oral daily  polyethylene glycol 3350 17 Gram(s) Oral <User Schedule>    MEDICATIONS  (PRN):  melatonin 3 milliGRAM(s) Oral at bedtime PRN Insomnia  OLANZapine Injectable 1.25 milliGRAM(s) IntraMuscular every 6 hours PRN Severe agitation  ondansetron Injectable 4 milliGRAM(s) IV Push every 8 hours PRN Nausea and/or Vomiting        CAPILLARY BLOOD GLUCOSE      POCT Blood Glucose.: 90 mg/dL (10 Feb 2022 08:07)  POCT Blood Glucose.: 89 mg/dL (10 Feb 2022 01:45)  POCT Blood Glucose.: 112 mg/dL (2022 22:05)    I&O's Summary    2022 07:01  -  10 Feb 2022 07:00  --------------------------------------------------------  IN: 0 mL / OUT: 2 mL / NET: -2 mL        PHYSICAL EXAM:  Vital Signs Last 24 Hrs  T(C): 36.5 (10 Feb 2022 12:54), Max: 37.2 (2022 18:06)  T(F): 97.7 (10 Feb 2022 12:54), Max: 98.9 (2022 18:06)  HR: 65 (10 Feb 2022 12:54) (60 - 68)  BP: 130/67 (10 Feb 2022 12:54) (130/67 - 177/94)  BP(mean): --  RR: 16 (10 Feb 2022 12:54) (16 - 18)  SpO2: 100% (10 Feb 2022 12:54) (98% - 100%)    GENERAL: NAD, well-developed  HEAD:  Atraumatic, Normocephalic  EYES: EOMI, PERRLA, conjunctiva and sclera clear  NECK: Supple, No JVD  CHEST/LUNG: Clear to auscultation bilaterally; No wheeze  HEART: Regular rate and rhythm; No murmurs, rubs, or gallops  ABDOMEN: Soft, Nontender, Nondistended; Bowel sounds present  EXTREMITIES:  2+ Peripheral Pulses, No clubbing, cyanosis, or edema  PSYCH: AAOx3  NEUROLOGY: Right side is weak.  SKIN: No rashes or lesions    LABS:                        10.8   4.97  )-----------( 198      ( 10 Feb 2022 07:16 )             31.4     02-10    141  |  101  |  10  ----------------------------<  80  3.3<L>   |  31  |  1.24    Ca    9.5      10 Feb 2022 07:11  Phos  2.4     02-10  Mg     1.90     02-10    TPro  6.4  /  Alb  3.4  /  TBili  1.5<H>  /  DBili  x   /  AST  17  /  ALT  14  /  AlkPhos  81  02-08    PT/INR - ( 2022 20:17 )   PT: 12.8 sec;   INR: 1.13 ratio         PTT - ( 2022 20:17 )  PTT:31.9 sec      Urinalysis Basic - ( 2022 22:16 )    Color: Light Yellow / Appearance: Clear / S.014 / pH: x  Gluc: x / Ketone: Negative  / Bili: Negative / Urobili: <2 mg/dL   Blood: x / Protein: Trace / Nitrite: Negative   Leuk Esterase: Moderate / RBC: 4 /HPF / WBC 10 /HPF   Sq Epi: x / Non Sq Epi: 5 /HPF / Bacteria: Few        RADIOLOGY & ADDITIONAL TESTS:    Imaging Personally Reviewed:    Consultant(s) Notes Reviewed:      Care Discussed with Consultants/Other Providers:

## 2022-02-13 NOTE — PROGRESS NOTE ADULT - ASSESSMENT
80 yo Male with hx of HTN, HLD CVA with Rt sided residual weakness, dementia, bedbound, HTN, CAD s/p stent, s/p PPM extraction, asthma, stercoral colitis, recent completion of IV abx for Ecoli and MRSA Bacteremia via PICC; Pt a/w TAE, stercoral colitis and posterior rectal wall lesion; Found to have UTI and sacral decubitus; ;

## 2022-02-14 LAB
ANION GAP SERPL CALC-SCNC: 11 MMOL/L — SIGNIFICANT CHANGE UP (ref 7–14)
BASOPHILS # BLD AUTO: 0.04 K/UL — SIGNIFICANT CHANGE UP (ref 0–0.2)
BASOPHILS NFR BLD AUTO: 1.1 % — SIGNIFICANT CHANGE UP (ref 0–2)
BUN SERPL-MCNC: 10 MG/DL — SIGNIFICANT CHANGE UP (ref 7–23)
CALCIUM SERPL-MCNC: 10.1 MG/DL — SIGNIFICANT CHANGE UP (ref 8.4–10.5)
CHLORIDE SERPL-SCNC: 104 MMOL/L — SIGNIFICANT CHANGE UP (ref 98–107)
CO2 SERPL-SCNC: 27 MMOL/L — SIGNIFICANT CHANGE UP (ref 22–31)
CREAT SERPL-MCNC: 1.29 MG/DL — SIGNIFICANT CHANGE UP (ref 0.5–1.3)
CULTURE RESULTS: SIGNIFICANT CHANGE UP
CULTURE RESULTS: SIGNIFICANT CHANGE UP
EOSINOPHIL # BLD AUTO: 0.2 K/UL — SIGNIFICANT CHANGE UP (ref 0–0.5)
EOSINOPHIL NFR BLD AUTO: 5.3 % — SIGNIFICANT CHANGE UP (ref 0–6)
GLUCOSE SERPL-MCNC: 79 MG/DL — SIGNIFICANT CHANGE UP (ref 70–99)
HCT VFR BLD CALC: 33.3 % — LOW (ref 39–50)
HGB BLD-MCNC: 10.8 G/DL — LOW (ref 13–17)
IANC: 2.02 K/UL — SIGNIFICANT CHANGE UP (ref 1.5–8.5)
IMM GRANULOCYTES NFR BLD AUTO: 0.5 % — SIGNIFICANT CHANGE UP (ref 0–1.5)
LYMPHOCYTES # BLD AUTO: 0.99 K/UL — LOW (ref 1–3.3)
LYMPHOCYTES # BLD AUTO: 26.3 % — SIGNIFICANT CHANGE UP (ref 13–44)
MAGNESIUM SERPL-MCNC: 2.1 MG/DL — SIGNIFICANT CHANGE UP (ref 1.6–2.6)
MCHC RBC-ENTMCNC: 29.9 PG — SIGNIFICANT CHANGE UP (ref 27–34)
MCHC RBC-ENTMCNC: 32.4 GM/DL — SIGNIFICANT CHANGE UP (ref 32–36)
MCV RBC AUTO: 92.2 FL — SIGNIFICANT CHANGE UP (ref 80–100)
MONOCYTES # BLD AUTO: 0.49 K/UL — SIGNIFICANT CHANGE UP (ref 0–0.9)
MONOCYTES NFR BLD AUTO: 13 % — SIGNIFICANT CHANGE UP (ref 2–14)
NEUTROPHILS # BLD AUTO: 2.02 K/UL — SIGNIFICANT CHANGE UP (ref 1.8–7.4)
NEUTROPHILS NFR BLD AUTO: 53.8 % — SIGNIFICANT CHANGE UP (ref 43–77)
NRBC # BLD: 0 /100 WBCS — SIGNIFICANT CHANGE UP
NRBC # FLD: 0 K/UL — SIGNIFICANT CHANGE UP
PHOSPHATE SERPL-MCNC: 2.7 MG/DL — SIGNIFICANT CHANGE UP (ref 2.5–4.5)
PLATELET # BLD AUTO: 175 K/UL — SIGNIFICANT CHANGE UP (ref 150–400)
POTASSIUM SERPL-MCNC: 5.2 MMOL/L — SIGNIFICANT CHANGE UP (ref 3.5–5.3)
POTASSIUM SERPL-SCNC: 5.2 MMOL/L — SIGNIFICANT CHANGE UP (ref 3.5–5.3)
RBC # BLD: 3.61 M/UL — LOW (ref 4.2–5.8)
RBC # FLD: 13.7 % — SIGNIFICANT CHANGE UP (ref 10.3–14.5)
SARS-COV-2 RNA SPEC QL NAA+PROBE: SIGNIFICANT CHANGE UP
SODIUM SERPL-SCNC: 142 MMOL/L — SIGNIFICANT CHANGE UP (ref 135–145)
SPECIMEN SOURCE: SIGNIFICANT CHANGE UP
SPECIMEN SOURCE: SIGNIFICANT CHANGE UP
WBC # BLD: 3.76 K/UL — LOW (ref 3.8–10.5)
WBC # FLD AUTO: 3.76 K/UL — LOW (ref 3.8–10.5)

## 2022-02-14 PROCEDURE — 99232 SBSQ HOSP IP/OBS MODERATE 35: CPT

## 2022-02-14 PROCEDURE — 99232 SBSQ HOSP IP/OBS MODERATE 35: CPT | Mod: GC

## 2022-02-14 RX ADMIN — AMLODIPINE BESYLATE 10 MILLIGRAM(S): 2.5 TABLET ORAL at 04:48

## 2022-02-14 RX ADMIN — LACTULOSE 10 GRAM(S): 10 SOLUTION ORAL at 11:51

## 2022-02-14 RX ADMIN — LACTULOSE 10 GRAM(S): 10 SOLUTION ORAL at 04:48

## 2022-02-14 RX ADMIN — ISOSORBIDE MONONITRATE 30 MILLIGRAM(S): 60 TABLET, EXTENDED RELEASE ORAL at 11:51

## 2022-02-14 RX ADMIN — POLYETHYLENE GLYCOL 3350 17 GRAM(S): 17 POWDER, FOR SOLUTION ORAL at 11:51

## 2022-02-14 RX ADMIN — POLYETHYLENE GLYCOL 3350 17 GRAM(S): 17 POWDER, FOR SOLUTION ORAL at 22:32

## 2022-02-14 RX ADMIN — POLYETHYLENE GLYCOL 3350 17 GRAM(S): 17 POWDER, FOR SOLUTION ORAL at 04:49

## 2022-02-14 RX ADMIN — ATORVASTATIN CALCIUM 40 MILLIGRAM(S): 80 TABLET, FILM COATED ORAL at 22:32

## 2022-02-14 RX ADMIN — Medication 81 MILLIGRAM(S): at 11:51

## 2022-02-14 RX ADMIN — LISINOPRIL 10 MILLIGRAM(S): 2.5 TABLET ORAL at 04:49

## 2022-02-14 NOTE — PROGRESS NOTE ADULT - SUBJECTIVE AND OBJECTIVE BOX
Interval History:  Episodes of bradycardia VR 30's noted on telemetry    MEDICATIONS  (STANDING):  amLODIPine   Tablet 10 milliGRAM(s) Oral daily  aspirin enteric coated 81 milliGRAM(s) Oral daily  atorvastatin 40 milliGRAM(s) Oral at bedtime  isosorbide   mononitrate ER Tablet (IMDUR) 30 milliGRAM(s) Oral daily  lactulose Syrup 10 Gram(s) Oral every 8 hours  lisinopril 10 milliGRAM(s) Oral daily  polyethylene glycol 3350 17 Gram(s) Oral <User Schedule>    MEDICATIONS  (PRN):  melatonin 3 milliGRAM(s) Oral at bedtime PRN Insomnia  OLANZapine Injectable 1.25 milliGRAM(s) IntraMuscular every 6 hours PRN Severe agitation  ondansetron Injectable 4 milliGRAM(s) IV Push every 8 hours PRN Nausea and/or Vomiting    Vital Signs Last 24 Hrs  T(C): 36.8 (14 Feb 2022 04:42), Max: 36.8 (14 Feb 2022 04:42)  T(F): 98.2 (14 Feb 2022 04:42), Max: 98.2 (14 Feb 2022 04:42)  HR: 98 (14 Feb 2022 04:42) (71 - 99)  BP: 158/78 (14 Feb 2022 04:42) (146/74 - 158/78)  BP(mean): --  RR: 18 (14 Feb 2022 04:42) (17 - 18)  SpO2: 96% (14 Feb 2022 04:42) (95% - 99%)    LABS:	 	    CBC Full  -  ( 14 Feb 2022 06:26 )  WBC Count : 3.76 K/uL  Hemoglobin : 10.8 g/dL  Hematocrit : 33.3 %  Platelet Count - Automated : 175 K/uL  Mean Cell Volume : 92.2 fL  Mean Cell Hemoglobin : 29.9 pg  Mean Cell Hemoglobin Concentration : 32.4 gm/dL  Auto Neutrophil # : 2.02 K/uL  Auto Lymphocyte # : 0.99 K/uL  Auto Monocyte # : 0.49 K/uL  Auto Eosinophil # : 0.20 K/uL  Auto Basophil # : 0.04 K/uL  Auto Neutrophil % : 53.8 %  Auto Lymphocyte % : 26.3 %  Auto Monocyte % : 13.0 %  Auto Eosinophil % : 5.3 %  Auto Basophil % : 1.1 %    02-14    142  |  104  |  10  ----------------------------<  79  5.2   |  27  |  1.29    Ca    10.1      14 Feb 2022 06:26  Phos  2.7     02-14  Mg     2.10     02-14          proBNP:   Lipid Profile:   HgA1c:   TSH:       CARDIAC MARKERS:

## 2022-02-14 NOTE — PROGRESS NOTE ADULT - SUBJECTIVE AND OBJECTIVE BOX
Chief Complaint:  Patient is a 81y old  Male who presents with a chief complaint of Constipation x 3 weeks (14 Feb 2022 09:52)      Interval Events:   Still having regular BMs, no melena or hematochezia.   Still having asx bradycardia while asleep overnight with HR 30-40s.    Hospital Medications:  amLODIPine   Tablet 10 milliGRAM(s) Oral daily  aspirin enteric coated 81 milliGRAM(s) Oral daily  atorvastatin 40 milliGRAM(s) Oral at bedtime  isosorbide   mononitrate ER Tablet (IMDUR) 30 milliGRAM(s) Oral daily  lactulose Syrup 10 Gram(s) Oral every 8 hours  lisinopril 10 milliGRAM(s) Oral daily  melatonin 3 milliGRAM(s) Oral at bedtime PRN  OLANZapine Injectable 1.25 milliGRAM(s) IntraMuscular every 6 hours PRN  ondansetron Injectable 4 milliGRAM(s) IV Push every 8 hours PRN  polyethylene glycol 3350 17 Gram(s) Oral <User Schedule>      PMHX/PSHX:  CVA (cerebral infarction)    HTN (hypertension)    Diabetes    Hyperlipidemia    Dementia    HLD (hyperlipidemia)    DM (diabetes mellitus)    Asthma    CAD (coronary artery disease)    Sinus node dysfunction    No significant past surgical history    Cardiac pacemaker    S/P coronary artery stent placement        ROS: unable to obtain as pt has dementia      PHYSICAL EXAM:     GENERAL:  Thin, elderly, chronically ill appearing, no distress  HEENT:  NC/AT,  conjunctivae clear, sclera anicteric  CHEST:  Full & symmetric excursion, no increased effort w/ respirations  HEART:  Regular rhythm & rate  ABDOMEN:  +thin, soft, non-tender, non-distended  EXTREMITIES:  no LE  edema  NEURO:  Sleeping but arouseable    Vital Signs:  Vital Signs Last 24 Hrs  T(C): 36.8 (14 Feb 2022 04:42), Max: 36.8 (14 Feb 2022 04:42)  T(F): 98.2 (14 Feb 2022 04:42), Max: 98.2 (14 Feb 2022 04:42)  HR: 98 (14 Feb 2022 04:42) (71 - 99)  BP: 158/78 (14 Feb 2022 04:42) (146/74 - 158/78)  BP(mean): --  RR: 18 (14 Feb 2022 04:42) (17 - 18)  SpO2: 96% (14 Feb 2022 04:42) (95% - 99%)  Daily     Daily     LABS:                        10.8   3.76  )-----------( 175      ( 14 Feb 2022 06:26 )             33.3     02-14    142  |  104  |  10  ----------------------------<  79  5.2   |  27  |  1.29    Ca    10.1      14 Feb 2022 06:26  Phos  2.7     02-14  Mg     2.10     02-14                Imaging: No new abdominal imaging             Interval Events:   Still having regular BMs, no melena or hematochezia.   Still having asx bradycardia while asleep overnight with HR 30-40s.    Hospital Medications:  amLODIPine   Tablet 10 milliGRAM(s) Oral daily  aspirin enteric coated 81 milliGRAM(s) Oral daily  atorvastatin 40 milliGRAM(s) Oral at bedtime  isosorbide   mononitrate ER Tablet (IMDUR) 30 milliGRAM(s) Oral daily  lactulose Syrup 10 Gram(s) Oral every 8 hours  lisinopril 10 milliGRAM(s) Oral daily  melatonin 3 milliGRAM(s) Oral at bedtime PRN  OLANZapine Injectable 1.25 milliGRAM(s) IntraMuscular every 6 hours PRN  ondansetron Injectable 4 milliGRAM(s) IV Push every 8 hours PRN  polyethylene glycol 3350 17 Gram(s) Oral <User Schedule>      PMHX/PSHX:  CVA (cerebral infarction)    HTN (hypertension)    Diabetes    Hyperlipidemia    Dementia    HLD (hyperlipidemia)    DM (diabetes mellitus)    Asthma    CAD (coronary artery disease)    Sinus node dysfunction    No significant past surgical history    Cardiac pacemaker    S/P coronary artery stent placement        ROS: unable to obtain as pt has dementia      PHYSICAL EXAM:     GENERAL:  Thin, elderly, chronically ill appearing, no distress  HEENT:  NC/AT,  conjunctivae clear, sclera anicteric  CHEST:  Full & symmetric excursion, no increased effort w/ respirations  HEART:  Regular rhythm & rate  ABDOMEN:  +thin, soft, non-tender, non-distended  EXTREMITIES:  no LE  edema  NEURO:  Sleeping but arouseable    Vital Signs:  Vital Signs Last 24 Hrs  T(C): 36.8 (14 Feb 2022 04:42), Max: 36.8 (14 Feb 2022 04:42)  T(F): 98.2 (14 Feb 2022 04:42), Max: 98.2 (14 Feb 2022 04:42)  HR: 98 (14 Feb 2022 04:42) (71 - 99)  BP: 158/78 (14 Feb 2022 04:42) (146/74 - 158/78)  BP(mean): --  RR: 18 (14 Feb 2022 04:42) (17 - 18)  SpO2: 96% (14 Feb 2022 04:42) (95% - 99%)  Daily     Daily     LABS:                        10.8   3.76  )-----------( 175      ( 14 Feb 2022 06:26 )             33.3     02-14    142  |  104  |  10  ----------------------------<  79  5.2   |  27  |  1.29    Ca    10.1      14 Feb 2022 06:26  Phos  2.7     02-14  Mg     2.10     02-14                Imaging: No new abdominal imaging

## 2022-02-14 NOTE — PROGRESS NOTE ADULT - SUBJECTIVE AND OBJECTIVE BOX
Patient is a 81y old  Male who presents with a chief complaint of Constipation x 3 weeks (10 Feb 2022 14:14)    2022  HPI:  Bradycardia persits, asymptomatic  EP FU done      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,     HTN (hypertension)    Dementia    HLD (hyperlipidemia)    DM (diabetes mellitus)    Asthma    CAD (coronary artery disease)  s/p stent placement    Sinus node dysfunction  s/p Medtronic PPM    Cardiac pacemaker  Medtronic (SN: [id number]; Model: [id number])    S/P coronary artery stent placement        Review of Systems:   CONSTITUTIONAL: No fever, weight loss, or fatigue  EYES: No eye pain, visual disturbances, or discharge  ENMT:  No difficulty hearing, tinnitus, vertigo; No sinus or throat pain  NECK: No pain or stiffness  BREASTS: No pain, masses, or nipple discharge  RESPIRATORY: No cough, wheezing, chills or hemoptysis; No shortness of breath  CARDIOVASCULAR: No chest pain, palpitations, dizziness, or leg swelling  GASTROINTESTINAL: No abdominal or epigastric pain. No nausea, vomiting, or hematemesis; GENITOURINARY: No dysuria, frequency, hematuria, or incontinence  NEUROLOGICAL: No headaches, memory loss, Right sided weakness +  SKIN: No itching, burning, rashes, or lesions   LYMPH NODES: No enlarged glands  ENDOCRINE: No heat or cold intolerance; No hair loss  MUSCULOSKELETAL: No joint pain or swelling; No muscle, back, or extremity pain  PSYCHIATRIC: No depression, anxiety, mood swings, or difficulty sleeping  HEME/LYMPH: No easy bruising, or bleeding gums  ALLERY AND IMMUNOLOGIC: No hives or eczema    Allergies    No Known Allergies    Intolerances        Social History:     FAMILY HISTORY:  FH: diabetes mellitus  Mother, Brother    FHx: dementia  Father        MEDICATIONS  (STANDING):  amLODIPine   Tablet 10 milliGRAM(s) Oral daily  aspirin enteric coated 81 milliGRAM(s) Oral daily  atorvastatin 40 milliGRAM(s) Oral at bedtime  isosorbide   mononitrate ER Tablet (IMDUR) 30 milliGRAM(s) Oral daily  lactated ringers. 1000 milliLiter(s) (100 mL/Hr) IV Continuous <Continuous>  lactulose Syrup 10 Gram(s) Oral every 8 hours  lisinopril 10 milliGRAM(s) Oral daily  metoprolol succinate ER 25 milliGRAM(s) Oral daily  polyethylene glycol 3350 17 Gram(s) Oral <User Schedule>    MEDICATIONS  (PRN):  melatonin 3 milliGRAM(s) Oral at bedtime PRN Insomnia  OLANZapine Injectable 1.25 milliGRAM(s) IntraMuscular every 6 hours PRN Severe agitation  ondansetron Injectable 4 milliGRAM(s) IV Push every 8 hours PRN Nausea and/or Vomiting        CAPILLARY BLOOD GLUCOSE      POCT Blood Glucose.: 90 mg/dL (10 Feb 2022 08:07)  POCT Blood Glucose.: 89 mg/dL (10 Feb 2022 01:45)  POCT Blood Glucose.: 112 mg/dL (2022 22:05)    I&O's Summary    2022 07:01  -  10 Feb 2022 07:00  --------------------------------------------------------  IN: 0 mL / OUT: 2 mL / NET: -2 mL        PHYSICAL EXAM:  Vital Signs Last 24 Hrs  T(C): 36.5 (10 Feb 2022 12:54), Max: 37.2 (2022 18:06)  T(F): 97.7 (10 Feb 2022 12:54), Max: 98.9 (2022 18:06)  HR: 65 (10 Feb 2022 12:54) (60 - 68)  BP: 130/67 (10 Feb 2022 12:54) (130/67 - 177/94)  BP(mean): --  RR: 16 (10 Feb 2022 12:54) (16 - 18)  SpO2: 100% (10 Feb 2022 12:54) (98% - 100%)    GENERAL: NAD, well-developed  HEAD:  Atraumatic, Normocephalic  EYES: EOMI, PERRLA, conjunctiva and sclera clear  NECK: Supple, No JVD  CHEST/LUNG: Clear to auscultation bilaterally; No wheeze  HEART: Regular rate and rhythm; No murmurs, rubs, or gallops  ABDOMEN: Soft, Nontender, Nondistended; Bowel sounds present  EXTREMITIES:  2+ Peripheral Pulses, No clubbing, cyanosis, or edema  PSYCH: AAOx3  NEUROLOGY: Right side is weak.  SKIN: No rashes or lesions    LABS:                        10.8   4.97  )-----------( 198      ( 10 Feb 2022 07:16 )             31.4     02-10    141  |  101  |  10  ----------------------------<  80  3.3<L>   |  31  |  1.24    Ca    9.5      10 Feb 2022 07:11  Phos  2.4     02-10  Mg     1.90     02-10    TPro  6.4  /  Alb  3.4  /  TBili  1.5<H>  /  DBili  x   /  AST  17  /  ALT  14  /  AlkPhos  81  02-08    PT/INR - ( 2022 20:17 )   PT: 12.8 sec;   INR: 1.13 ratio         PTT - ( 2022 20:17 )  PTT:31.9 sec      Urinalysis Basic - ( 2022 22:16 )    Color: Light Yellow / Appearance: Clear / S.014 / pH: x  Gluc: x / Ketone: Negative  / Bili: Negative / Urobili: <2 mg/dL   Blood: x / Protein: Trace / Nitrite: Negative   Leuk Esterase: Moderate / RBC: 4 /HPF / WBC 10 /HPF   Sq Epi: x / Non Sq Epi: 5 /HPF / Bacteria: Few        RADIOLOGY & ADDITIONAL TESTS:    Imaging Personally Reviewed:    Consultant(s) Notes Reviewed:      Care Discussed with Consultants/Other Providers:

## 2022-02-14 NOTE — CHART NOTE - NSCHARTNOTEFT_GEN_A_CORE
Discussed with Dr. Rice in regards for cardiology clearance for flex sig by GI. House cardiology called for cardiology consult for flex sig. Awaiting recommendations from house cardiology for clearance for flex sig procedure.

## 2022-02-14 NOTE — PROGRESS NOTE ADULT - ASSESSMENT
This is a 81 year old man with a pmhx significant for dementia, Sinus node dysfunction s/p Medtronic PPM (12/2021) s/p PPM extraction (12/29/2021) for E.Coli and MRSA bacteremia at [facility] and recently completion of IV antibiotics through PICC line, bedbound, CVA with right hemiparesis, CAD s/p stent, HTN, HLD, and asthma who presented to [facility] for constipation x 3weeks after inability to tolerate senna due to abdominal pain. CTAP showed  stool in distal rectum with wall thickening and perirectal fat stranding c/w stercoral colitis and asymmetric irregular posterior rectal wall soft tissue thickening possible due to neoplasm vs infectious or inflammatory etiology. GI consulted for stercoral colitis and posterior rectal wall thickening. GI recommended Flex sig and Miralax. EP was consulted for Bradycardia.     Plan:  - Continuous telemetric monitoring  - Monitor electrolytes and replete K to 4 and Mg to 2  - Hold AV arpita blockers   - Pads on patient and defibrillator at the bedside incase patient becomes hemodynamically unstable and needs transcutaneous pacing  - Appreciate GI recs  - Continue care per primary team  -Family at this time declining any pacing devices- reconsult EP if change

## 2022-02-14 NOTE — PROGRESS NOTE ADULT - ASSESSMENT
80 yo Male with hx of HTN, HLD CVA with Rt sided residual weakness, dementia, bedbound c/b sacral decubitus ulcer, HTN, CAD s/p stent, s/p PPM extraction, asthma, stercoral colitis, recent completion of IV abx for Ecoli and MRSA Bacteremia via PICC; Pt brought in by wife for constipation of three weeks duration with associated abd distention and discomfort. History obtained from pt's wife who is also his caretaker who was present at bedside. Per pt's wife, has had intermittent episodes of constipation at baseline. Recently she tried enemas, senna but these caused cramping and abd discomfort and were also ineffective.  No associated nausea, vomiting, fevers or chills. Here, VSS, Hgb 10 (BL 12-13). CT A/P notable for stool in distal rectum with wall thickening and perirectal fat stranding c/w stercoral colitis and asymmetric irregular posterior rectal wall soft tissue thickening c/f neoplasm vs infectious or inflammatory etiology. GI consulted for stercoral colitis and posterior rectal wall thickening.     #stercoral colitis  #chronic constipation  #rectal wall thickening  Suspect clinical picture is likely related to known chronic constipation causing stercoral colitis; however given age cannot exclude malignancy with posterior rectal wall thickening. Responding well to BID enemas and miralax TID- had 10+ BMs since admission.    Recommendations:  - would stop lactulose as this can cause abd distension and discomfort  - c/w Miralax TID  - would avoid stimulant laxatives (senna) or rectal suppositories  - need EP to evaluation pt for safety of endoscopy/sedation in setting of asx bradycardia  - if cleared by EP for bradycardia, plan for flex sig tomorrow (per pt's wife's request)   - if cleared by EP, please order 2x tap water enemas for tomorrow (2/15) morning at 6 AM and 7 AM  - will need repeat COVID test ordered today (must have negative COVID test within 72 hours of endoscopy)  - please order labs (CBC, CMP, INR) for 3 AM so that results will be available early tomorrow morning; replete lytes and transfuse as needed  - NPO after midnight frantz Awad MD  GI Fellow, PGY-4  Available via [employer] Teams    NON-URGENT CONSULTS:  Please email [email or web address]  AT NIGHT AND ON WEEKENDS:  Contact on-call GI fellow via answering service ([phone]) from 5pm-8am and on weekends/holidays  MONDAY-FRIDAY 8AM-5PM:  Pager# [phone] ([facility]) or [phone] ([facility])  GI Phone# [phone] ([facility])     80 yo Male with hx of HTN, HLD CVA with Rt sided residual weakness, dementia, bedbound c/b sacral decubitus ulcer, HTN, CAD s/p stent, s/p PPM extraction, asthma, stercoral colitis, recent completion of IV abx for Ecoli and MRSA Bacteremia via PICC; Pt brought in by wife for constipation of three weeks duration with associated abd distention and discomfort. History obtained from pt's wife who is also his caretaker who was present at bedside. Per pt's wife, has had intermittent episodes of constipation at baseline. Recently she tried enemas, senna but these caused cramping and abd discomfort and were also ineffective.  No associated nausea, vomiting, fevers or chills. Here, VSS, Hgb 10 (BL 12-13). CT A/P notable for stool in distal rectum with wall thickening and perirectal fat stranding c/w stercoral colitis and asymmetric irregular posterior rectal wall soft tissue thickening c/f neoplasm vs infectious or inflammatory etiology. GI consulted for stercoral colitis and posterior rectal wall thickening.     #stercoral colitis  #chronic constipation  #rectal wall thickening  Suspect clinical picture is likely related to known chronic constipation causing stercoral colitis; however given age cannot exclude malignancy with posterior rectal wall thickening. Responding well to BID enemas and miralax TID- had 10+ BMs since admission.    Recommendations:  - would stop lactulose as this can cause abd distension and discomfort  - c/w Miralax TID  - would avoid stimulant laxatives (senna) or rectal suppositories  - need EP to evaluation pt for safety of endoscopy/sedation in setting of asx bradycardia  - if cleared by EP for bradycardia, plan for flex sig tomorrow (per pt's wife's request)   - if cleared by EP, please order 2x tap water enemas for tomorrow (2/15) morning at 6 AM and 7 AM  - will need repeat COVID test ordered today (must have negative COVID test within 72 hours of endoscopy)  - please order labs (CBC, CMP, INR) for 3 AM so that results will be available early tomorrow morning; replete lytes and transfuse as needed  - NPO after midnight frantz Awad MD  GI Fellow, PGY-4  Available via [employer] Teams    NON-URGENT CONSULTS:  Please email [email or web address]  AT NIGHT AND ON WEEKENDS:  Contact on-call GI fellow via answering service ([phone]) from 5pm-8am and on weekends/holidays  MONDAY-FRIDAY 8AM-5PM:  Pager# [phone] ([facility]) or [phone] ([facility])  GI Phone# [phone] ([facility])

## 2022-02-15 ENCOUNTER — TRANSCRIPTION ENCOUNTER (OUTPATIENT)
Age: 82
End: 2022-02-15

## 2022-02-15 LAB
ALBUMIN SERPL ELPH-MCNC: 3.6 G/DL — SIGNIFICANT CHANGE UP (ref 3.3–5)
ALP SERPL-CCNC: 80 U/L — SIGNIFICANT CHANGE UP (ref 40–120)
ALT FLD-CCNC: 12 U/L — SIGNIFICANT CHANGE UP (ref 4–41)
ANION GAP SERPL CALC-SCNC: 10 MMOL/L — SIGNIFICANT CHANGE UP (ref 7–14)
AST SERPL-CCNC: 22 U/L — SIGNIFICANT CHANGE UP (ref 4–40)
BASOPHILS # BLD AUTO: 0.05 K/UL — SIGNIFICANT CHANGE UP (ref 0–0.2)
BASOPHILS NFR BLD AUTO: 1.4 % — SIGNIFICANT CHANGE UP (ref 0–2)
BILIRUB SERPL-MCNC: 1.6 MG/DL — HIGH (ref 0.2–1.2)
BUN SERPL-MCNC: 10 MG/DL — SIGNIFICANT CHANGE UP (ref 7–23)
CALCIUM SERPL-MCNC: 10 MG/DL — SIGNIFICANT CHANGE UP (ref 8.4–10.5)
CHLORIDE SERPL-SCNC: 102 MMOL/L — SIGNIFICANT CHANGE UP (ref 98–107)
CO2 SERPL-SCNC: 24 MMOL/L — SIGNIFICANT CHANGE UP (ref 22–31)
CREAT SERPL-MCNC: 1.26 MG/DL — SIGNIFICANT CHANGE UP (ref 0.5–1.3)
EOSINOPHIL # BLD AUTO: 0.24 K/UL — SIGNIFICANT CHANGE UP (ref 0–0.5)
EOSINOPHIL NFR BLD AUTO: 6.8 % — HIGH (ref 0–6)
GLUCOSE SERPL-MCNC: 75 MG/DL — SIGNIFICANT CHANGE UP (ref 70–99)
HCT VFR BLD CALC: 34.3 % — LOW (ref 39–50)
HGB BLD-MCNC: 11.3 G/DL — LOW (ref 13–17)
IANC: 1.64 K/UL — SIGNIFICANT CHANGE UP (ref 1.5–8.5)
IMM GRANULOCYTES NFR BLD AUTO: 0.6 % — SIGNIFICANT CHANGE UP (ref 0–1.5)
INR BLD: 1.12 RATIO — SIGNIFICANT CHANGE UP (ref 0.88–1.16)
LYMPHOCYTES # BLD AUTO: 1.11 K/UL — SIGNIFICANT CHANGE UP (ref 1–3.3)
LYMPHOCYTES # BLD AUTO: 31.4 % — SIGNIFICANT CHANGE UP (ref 13–44)
MAGNESIUM SERPL-MCNC: 2.1 MG/DL — SIGNIFICANT CHANGE UP (ref 1.6–2.6)
MCHC RBC-ENTMCNC: 30 PG — SIGNIFICANT CHANGE UP (ref 27–34)
MCHC RBC-ENTMCNC: 32.9 GM/DL — SIGNIFICANT CHANGE UP (ref 32–36)
MCV RBC AUTO: 91 FL — SIGNIFICANT CHANGE UP (ref 80–100)
MONOCYTES # BLD AUTO: 0.47 K/UL — SIGNIFICANT CHANGE UP (ref 0–0.9)
MONOCYTES NFR BLD AUTO: 13.3 % — SIGNIFICANT CHANGE UP (ref 2–14)
NEUTROPHILS # BLD AUTO: 1.64 K/UL — LOW (ref 1.8–7.4)
NEUTROPHILS NFR BLD AUTO: 46.5 % — SIGNIFICANT CHANGE UP (ref 43–77)
NRBC # BLD: 0 /100 WBCS — SIGNIFICANT CHANGE UP
NRBC # FLD: 0 K/UL — SIGNIFICANT CHANGE UP
PHOSPHATE SERPL-MCNC: 2.9 MG/DL — SIGNIFICANT CHANGE UP (ref 2.5–4.5)
PLATELET # BLD AUTO: 196 K/UL — SIGNIFICANT CHANGE UP (ref 150–400)
POTASSIUM SERPL-MCNC: 4.3 MMOL/L — SIGNIFICANT CHANGE UP (ref 3.5–5.3)
POTASSIUM SERPL-SCNC: 4.3 MMOL/L — SIGNIFICANT CHANGE UP (ref 3.5–5.3)
PROT SERPL-MCNC: 7.1 G/DL — SIGNIFICANT CHANGE UP (ref 6–8.3)
PROTHROM AB SERPL-ACNC: 12.8 SEC — SIGNIFICANT CHANGE UP (ref 10.6–13.6)
RBC # BLD: 3.77 M/UL — LOW (ref 4.2–5.8)
RBC # FLD: 13.8 % — SIGNIFICANT CHANGE UP (ref 10.3–14.5)
SODIUM SERPL-SCNC: 136 MMOL/L — SIGNIFICANT CHANGE UP (ref 135–145)
WBC # BLD: 3.53 K/UL — LOW (ref 3.8–10.5)
WBC # FLD AUTO: 3.53 K/UL — LOW (ref 3.8–10.5)

## 2022-02-15 PROCEDURE — 45330 DIAGNOSTIC SIGMOIDOSCOPY: CPT | Mod: GC,53

## 2022-02-15 PROCEDURE — 99223 1ST HOSP IP/OBS HIGH 75: CPT

## 2022-02-15 RX ORDER — SOD SULF/SODIUM/NAHCO3/KCL/PEG
1000 SOLUTION, RECONSTITUTED, ORAL ORAL ONCE
Refills: 0 | Status: COMPLETED | OUTPATIENT
Start: 2022-02-15 | End: 2022-02-15

## 2022-02-15 RX ORDER — SOD SULF/SODIUM/NAHCO3/KCL/PEG
SOLUTION, RECONSTITUTED, ORAL ORAL
Refills: 0 | Status: DISCONTINUED | OUTPATIENT
Start: 2022-02-15 | End: 2022-02-17

## 2022-02-15 RX ORDER — SOD SULF/SODIUM/NAHCO3/KCL/PEG
1000 SOLUTION, RECONSTITUTED, ORAL ORAL ONCE
Refills: 0 | Status: DISCONTINUED | OUTPATIENT
Start: 2022-02-15 | End: 2022-02-17

## 2022-02-15 RX ADMIN — Medication 1 ENEMA: at 17:43

## 2022-02-15 RX ADMIN — POLYETHYLENE GLYCOL 3350 17 GRAM(S): 17 POWDER, FOR SOLUTION ORAL at 13:09

## 2022-02-15 RX ADMIN — Medication 1000 MILLILITER(S): at 15:55

## 2022-02-15 RX ADMIN — ATORVASTATIN CALCIUM 40 MILLIGRAM(S): 80 TABLET, FILM COATED ORAL at 22:52

## 2022-02-15 RX ADMIN — LISINOPRIL 10 MILLIGRAM(S): 2.5 TABLET ORAL at 06:11

## 2022-02-15 RX ADMIN — Medication 1 ENEMA: at 06:10

## 2022-02-15 RX ADMIN — ISOSORBIDE MONONITRATE 30 MILLIGRAM(S): 60 TABLET, EXTENDED RELEASE ORAL at 13:10

## 2022-02-15 RX ADMIN — AMLODIPINE BESYLATE 10 MILLIGRAM(S): 2.5 TABLET ORAL at 06:11

## 2022-02-15 RX ADMIN — Medication 1 ENEMA: at 06:58

## 2022-02-15 RX ADMIN — Medication 10 MILLIGRAM(S): at 18:56

## 2022-02-15 RX ADMIN — Medication 1 ENEMA: at 15:56

## 2022-02-15 RX ADMIN — Medication 81 MILLIGRAM(S): at 13:09

## 2022-02-15 RX ADMIN — POLYETHYLENE GLYCOL 3350 17 GRAM(S): 17 POWDER, FOR SOLUTION ORAL at 22:52

## 2022-02-15 NOTE — CONSULT NOTE ADULT - ASSESSMENT
Patient is an 80 yo man with:    1. Dementia, bedbound  2. HTN  3. Hyperlipidemia  4. CVA with Right-sided residual weakness  5. CAD s/p PCI  6. Status post prior PPM extraction  7. Asthma  8. Stercoral colitis. CT abdomen recently noting:  Asymmetric irregular posterior rectal wall soft tissue thickening.   Differential includes neoplasm, sequelae of infectious or inflammatory   process, versus muscular hypertrophy. Recommend scope with tissue   sampling.  9. Recent completion of IV abx for Ecoli and MRSA Bacteremia via PICC    Patient brought in by wife for constipation of three weeks duration with associated abdominal distention and discomfort.   Based on recent findings from CT abdomen, GI plans to perform flexible sigmoidoscopy procedure.    Due to dementia, we were unable to obtain a detailed cardiovascular history from patient (He states that he has no current chest pain, SOB, or palpitations).    Cardiac telemetry notes sinus bradycardia as low as the upper 30s bpm, but mostly 50-60s bpm.  With bedside maneuvers, his HR appears to respond appropriately with exertion.  No evidence of active cardiac ischemia, CHF, or arrhythmia.  My review of an echocardiogram performed in 12/28/21 noted normal biventricular systolic function, mild diastolic dysfunction.  He is otherwise hemodynamically and clinically stable.    From the cardiac perspective, the patient may proceed with GI procedure as outlined above, without the need for additional cardiac testing or risk stratification.  With recent episodes of sinus bradycardia, would recommend placement of pacing pads on the patient prior to procedure, and have atropine available in the event it is needed during the coy-procedural period.

## 2022-02-15 NOTE — PROGRESS NOTE ADULT - SUBJECTIVE AND OBJECTIVE BOX
Patient is a 81y old  Male who presents with a chief complaint of Constipation x 3 weeks (10 Feb 2022 14:14)    2/15/2022  HPI:  Bradycardia persists, asymptomatic  EP FU done  Flex sig attempted      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,     HTN (hypertension)    Dementia    HLD (hyperlipidemia)    DM (diabetes mellitus)    Asthma    CAD (coronary artery disease)  s/p stent placement    Sinus node dysfunction  s/p Medtronic PPM    Cardiac pacemaker  Medtronic (SN: [id number]; Model: [id number])    S/P coronary artery stent placement        Review of Systems:   CONSTITUTIONAL: No fever, weight loss, or fatigue  EYES: No eye pain, visual disturbances, or discharge  ENMT:  No difficulty hearing, tinnitus, vertigo; No sinus or throat pain  NECK: No pain or stiffness  BREASTS: No pain, masses, or nipple discharge  RESPIRATORY: No cough, wheezing, chills or hemoptysis; No shortness of breath  CARDIOVASCULAR: No chest pain, palpitations, dizziness, or leg swelling  GASTROINTESTINAL: No abdominal or epigastric pain. No nausea, vomiting, or hematemesis; GENITOURINARY: No dysuria, frequency, hematuria, or incontinence  NEUROLOGICAL: No headaches, memory loss, Right sided weakness +  SKIN: No itching, burning, rashes, or lesions   LYMPH NODES: No enlarged glands  ENDOCRINE: No heat or cold intolerance; No hair loss  MUSCULOSKELETAL: No joint pain or swelling; No muscle, back, or extremity pain  PSYCHIATRIC: No depression, anxiety, mood swings, or difficulty sleeping  HEME/LYMPH: No easy bruising, or bleeding gums  ALLERY AND IMMUNOLOGIC: No hives or eczema    Allergies    No Known Allergies    Intolerances        Social History:     FAMILY HISTORY:  FH: diabetes mellitus  Mother, Brother    FHx: dementia  Father        MEDICATIONS  (STANDING):  amLODIPine   Tablet 10 milliGRAM(s) Oral daily  aspirin enteric coated 81 milliGRAM(s) Oral daily  atorvastatin 40 milliGRAM(s) Oral at bedtime  isosorbide   mononitrate ER Tablet (IMDUR) 30 milliGRAM(s) Oral daily  lactated ringers. 1000 milliLiter(s) (100 mL/Hr) IV Continuous <Continuous>  lactulose Syrup 10 Gram(s) Oral every 8 hours  lisinopril 10 milliGRAM(s) Oral daily  metoprolol succinate ER 25 milliGRAM(s) Oral daily  polyethylene glycol 3350 17 Gram(s) Oral <User Schedule>    MEDICATIONS  (PRN):  melatonin 3 milliGRAM(s) Oral at bedtime PRN Insomnia  OLANZapine Injectable 1.25 milliGRAM(s) IntraMuscular every 6 hours PRN Severe agitation  ondansetron Injectable 4 milliGRAM(s) IV Push every 8 hours PRN Nausea and/or Vomiting        CAPILLARY BLOOD GLUCOSE      POCT Blood Glucose.: 90 mg/dL (10 Feb 2022 08:07)  POCT Blood Glucose.: 89 mg/dL (10 Feb 2022 01:45)  POCT Blood Glucose.: 112 mg/dL (2022 22:05)    I&O's Summary    2022 07:01  -  10 Feb 2022 07:00  --------------------------------------------------------  IN: 0 mL / OUT: 2 mL / NET: -2 mL        PHYSICAL EXAM:  Vital Signs Last 24 Hrs  T(C): 36.5 (10 Feb 2022 12:54), Max: 37.2 (2022 18:06)  T(F): 97.7 (10 Feb 2022 12:54), Max: 98.9 (2022 18:06)  HR: 65 (10 Feb 2022 12:54) (60 - 68)  BP: 130/67 (10 Feb 2022 12:54) (130/67 - 177/94)  BP(mean): --  RR: 16 (10 Feb 2022 12:54) (16 - 18)  SpO2: 100% (10 Feb 2022 12:54) (98% - 100%)    GENERAL: NAD, well-developed  HEAD:  Atraumatic, Normocephalic  EYES: EOMI, PERRLA, conjunctiva and sclera clear  NECK: Supple, No JVD  CHEST/LUNG: Clear to auscultation bilaterally; No wheeze  HEART: Regular rate and rhythm; No murmurs, rubs, or gallops  ABDOMEN: Soft, Nontender, Nondistended; Bowel sounds present  EXTREMITIES:  2+ Peripheral Pulses, No clubbing, cyanosis, or edema  PSYCH: AAOx3  NEUROLOGY: Right side is weak.  SKIN: No rashes or lesions    LABS:                        10.8   4.97  )-----------( 198      ( 10 Feb 2022 07:16 )             31.4     02-10    141  |  101  |  10  ----------------------------<  80  3.3<L>   |  31  |  1.24    Ca    9.5      10 Feb 2022 07:11  Phos  2.4     02-10  Mg     1.90     02-10    TPro  6.4  /  Alb  3.4  /  TBili  1.5<H>  /  DBili  x   /  AST  17  /  ALT  14  /  AlkPhos  81  02-08    PT/INR - ( 2022 20:17 )   PT: 12.8 sec;   INR: 1.13 ratio         PTT - ( 2022 20:17 )  PTT:31.9 sec      Urinalysis Basic - ( 2022 22:16 )    Color: Light Yellow / Appearance: Clear / S.014 / pH: x  Gluc: x / Ketone: Negative  / Bili: Negative / Urobili: <2 mg/dL   Blood: x / Protein: Trace / Nitrite: Negative   Leuk Esterase: Moderate / RBC: 4 /HPF / WBC 10 /HPF   Sq Epi: x / Non Sq Epi: 5 /HPF / Bacteria: Few        RADIOLOGY & ADDITIONAL TESTS:    Imaging Personally Reviewed:    Consultant(s) Notes Reviewed:      Care Discussed with Consultants/Other Providers:

## 2022-02-15 NOTE — ASU PATIENT PROFILE, ADULT - BLOOD AVOIDANCE/RESTRICTIONS, PROFILE
none Drysol Pregnancy And Lactation Text: This medication is considered safe during pregnancy and breast feeding.

## 2022-02-15 NOTE — DISCHARGE NOTE PROVIDER - NSDCCPCAREPLAN_GEN_ALL_CORE_FT
PRINCIPAL DISCHARGE DIAGNOSIS  Diagnosis: Stercoral colitis  Assessment and Plan of Treatment: You were admitted to the hospital with severe constipation due to stercoral colitis. You had a CT of your abdomen that showed a suspected mass on the sigmoid colon area. You have been followed by a gastroenterologist.  You have been scheduled for several flexible sigmoidscopies but it could not be done due to your colon not being prepped properly. Please follow up with your gastroenterologist as an out patient for further medical management.      SECONDARY DISCHARGE DIAGNOSES  Diagnosis: TAE (acute kidney injury)  Assessment and Plan of Treatment: While in the hospital you had an acute kidney injury which has now resolved.    Diagnosis: Rectal lesion  Assessment and Plan of Treatment: CT of your abdomen revealed a rectal lesion. Please follow up with your gastroenterologist for further medial management.    Diagnosis: Sacral decubitus ulcer  Assessment and Plan of Treatment: You have a wound on your back that has been treated by the wound care team in the hospital. Please continue to follow the recommendations givent to you by the wound care team to care for your wound and follow up as outpatient at [facility] [address] [place], [place] [id number].    Diagnosis: CAD (coronary artery disease)  Assessment and Plan of Treatment: You have a hisotory of coronary artery disease. You were evaluated by an electrophysiologist and a cardiologist. You had episodes of a slow heart rate but there is no need for a pacemaker at this time. Also, your family declined any intervention from electrophysiology at this time. Please follow up with your cardiologist for further medical management.

## 2022-02-15 NOTE — DISCHARGE NOTE PROVIDER - NSDCMRMEDTOKEN_GEN_ALL_CORE_FT
amLODIPine 10 mg oral tablet: 1 tab(s) orally once a day  aspirin 81 mg oral delayed release tablet: 1 tab(s) orally once a day  atorvastatin 40 mg oral tablet: 1 tab(s) orally once a day (at bedtime)  benazepril 10 mg oral tablet: 1 tab(s) orally once a day  isosorbide mononitrate 30 mg oral tablet, extended release: 1 tab(s) orally once a day  metoprolol succinate 25 mg oral tablet, extended release: 1 tab(s) orally once a day  weekly CBC with diff and CMP to Dr. Light.  fax number [phone]:    amLODIPine 10 mg oral tablet: 1 tab(s) orally once a day  aspirin 81 mg oral delayed release tablet: 1 tab(s) orally once a day  atorvastatin 40 mg oral tablet: 1 tab(s) orally once a day (at bedtime)  benazepril 10 mg oral tablet: 1 tab(s) orally once a day  isosorbide mononitrate 30 mg oral tablet, extended release: 1 tab(s) orally once a day  weekly CBC with diff and CMP to Dr. Light.  fax number [phone]:    amLODIPine 10 mg oral tablet: 1 tab(s) orally once a day  aspirin 81 mg oral delayed release tablet: 1 tab(s) orally once a day  atorvastatin 40 mg oral tablet: 1 tab(s) orally once a day (at bedtime)  benazepril 10 mg oral tablet: 1 tab(s) orally once a day  bisacodyl 5 mg oral delayed release tablet: 2 tab(s) orally once a day (at bedtime)  isosorbide mononitrate 30 mg oral tablet, extended release: 1 tab(s) orally once a day  Multiple Vitamins oral tablet: 1 tab(s) orally once a day  weekly CBC with diff and CMP to Dr. Light.  fax number [phone]:

## 2022-02-15 NOTE — CHART NOTE - NSCHARTNOTEFT_GEN_A_CORE
Attempted flex sig today but unable to complete examination due to inadequate prep. Copious stool was seen in the rectum. Procedure was aborted due to inadequate prep.    Will try again tomorrow.    Plan:  -we will order Moviprep  -please order labs (CBC, CMP, INR) for 3 AM so that results will be available early tomorrow morning; replete lytes and transfuse as needed  -keep on clear liquids  -please order 2 tap water enemas today and 2 tap water enemas for tomorrow morning (6 AM and 7 AM).  -NPO after midnight tonight    Recs conveyed to primary team. Pt's wife informed of aborted procedure.    Dasia Awad MD  GI Fellow, PGY-4  Available via [employer] Teams    NON-URGENT CONSULTS:  Please email [email or web address]  AT NIGHT AND ON WEEKENDS:  Contact on-call GI fellow via answering service ([phone]) from 5pm-8am and on weekends/holidays  MONDAY-FRIDAY 8AM-5PM:  Pager# [phone] ([facility]) or [phone] ([facility])  GI Phone# [phone] ([facility])

## 2022-02-15 NOTE — DISCHARGE NOTE PROVIDER - PROVIDER TOKENS
FREE:[LAST:[pcp],PHONE:[(   )    -],FAX:[(   )    -]] FREE:[LAST:[pcp],PHONE:[(   )    -],FAX:[(   )    -]],PROVIDER:[TOKEN:[[id number]]]

## 2022-02-15 NOTE — DISCHARGE NOTE PROVIDER - CARE PROVIDER_API CALL
pcp,   Phone: (   )    -  Fax: (   )    -  Follow Up Time:    pcp,   Phone: (   )    -  Fax: (   )    -  Follow Up Time:     Ariel Perez [facility]  [address] [place] [address]  Phone: [phone]  Fax: [phone]  Follow Up Time:

## 2022-02-15 NOTE — DISCHARGE NOTE PROVIDER - DETAILS OF MALNUTRITION DIAGNOSIS/DIAGNOSES
This patient has been assessed with a concern for Malnutrition and was treated during this hospitalization for the following Nutrition diagnosis/diagnoses:     -  02/11/2022: Moderate protein-calorie malnutrition

## 2022-02-15 NOTE — CONSULT NOTE ADULT - CONSULT REASON
Bradycardia
stercoral colitis, rectal thickening
Pre-procedural cardiovascular evaluation
Right buttock pressure injury

## 2022-02-15 NOTE — CONSULT NOTE ADULT - SUBJECTIVE AND OBJECTIVE BOX
Cardiology/Vascular Medicine Inpatient Consultation Note      HISTORY OF PRESENT ILLNESS:  Patient is an 82 yo man with:    1. Dementia, bedbound  2. HTN  3. Hyperlipidemia  4. CVA with Right-sided residual weakness  5. CAD s/p PCI  6. Status post prior PPM extraction  7. Asthma  8. Stercoral colitis  9. Recent completion of IV abx for Ecoli and MRSA Bacteremia via PICC    Patient brought in by wife for constipation of three weeks duration with associated abdominal distention and discomfort.   Plan by GI to perform flex sig procedure.              Allergies    No Known Allergies    Intolerances    	    MEDICATIONS:  amLODIPine   Tablet 10 milliGRAM(s) Oral daily  aspirin enteric coated 81 milliGRAM(s) Oral daily  isosorbide   mononitrate ER Tablet (IMDUR) 30 milliGRAM(s) Oral daily  lisinopril 10 milliGRAM(s) Oral daily        melatonin 3 milliGRAM(s) Oral at bedtime PRN  OLANZapine Injectable 1.25 milliGRAM(s) IntraMuscular every 6 hours PRN  ondansetron Injectable 4 milliGRAM(s) IV Push every 8 hours PRN    polyethylene glycol 3350 17 Gram(s) Oral <User Schedule>    atorvastatin 40 milliGRAM(s) Oral at bedtime        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, 1998    HTN (hypertension)    Dementia    HLD (hyperlipidemia)    DM (diabetes mellitus)    Asthma    CAD (coronary artery disease)  s/p stent placement    Sinus node dysfunction  s/p Medtronic PPM    Cardiac pacemaker  Medtronic (SN: [id number]; Model: [id number])    S/P coronary artery stent placement        FAMILY HISTORY:  FH: diabetes mellitus  Mother, Brother    FHx: dementia  Father        SOCIAL HISTORY:    [ ] Non-smoker  [ ] Smoker  [ ] Alcohol    REVIEW OF SYSTEMS:  CONSTITUTIONAL: No fever, weight loss, or fatigue  EYES: No eye pain, visual disturbances, or discharge  ENMT:  No difficulty hearing, tinnitus, vertigo; No sinus or throat pain  NECK: No pain or stiffness  RESPIRATORY: No cough, wheezing, chills or hemoptysis; No Shortness of Breath  CARDIOVASCULAR: No chest pain, palpitations, passing out, dizziness, or leg swelling  GASTROINTESTINAL: No abdominal or epigastric pain. No nausea, vomiting, or hematemesis; No diarrhea or constipation. No melena or hematochezia.  GENITOURINARY: No dysuria, frequency, hematuria, or incontinence  NEUROLOGICAL: No headaches, memory loss, loss of strength, numbness, or tremors  SKIN: No itching, burning, rashes, or lesions   LYMPH Nodes: No enlarged glands  ENDOCRINE: No heat or cold intolerance; No hair loss  MUSCULOSKELETAL: No joint pain or swelling; No muscle, back, or extremity pain  PSYCHIATRIC: No depression, anxiety, mood swings, or difficulty sleeping  HEME/LYMPH: No easy bruising, or bleeding gums  ALLERY AND IMMUNOLOGIC: No hives or eczema	    [ ] All others negative	  [ ] Unable to obtain    PHYSICAL EXAM:  T(C): 36.2 (02-15-22 @ 04:52), Max: 36.8 (02-14-22 @ 21:00)  HR: 50 (02-15-22 @ 04:52) (50 - 65)  BP: 157/79 (02-15-22 @ 04:52) (137/90 - 157/79)  RR: 17 (02-15-22 @ 04:52) (17 - 19)  SpO2: 98% (02-15-22 @ 04:52) (97% - 98%)  Wt(kg): --  I&O's Summary      Appearance: Normal	  HEENT:   Normal oral mucosa, PERRL, EOMI	  Lymphatic: No lymphadenopathy  Cardiovascular: Normal S1 S2, No JVD, No murmurs, No edema  Respiratory: Lungs clear to auscultation	  Psychiatry: A & O x 3, Mood & affect appropriate  Gastrointestinal:  Soft, Non-tender, + BS	  Skin: No rashes, No ecchymoses, No cyanosis	  Neurologic: Non-focal  Extremities: Normal range of motion, No clubbing, cyanosis or edema  Vascular: Peripheral pulses palpable 2+ bilaterally      LABS:	 	    CBC Full  -  ( 15 Feb 2022 02:49 )  WBC Count : 3.53 K/uL  Hemoglobin : 11.3 g/dL  Hematocrit : 34.3 %  Platelet Count - Automated : 196 K/uL  Mean Cell Volume : 91.0 fL  Mean Cell Hemoglobin : 30.0 pg  Mean Cell Hemoglobin Concentration : 32.9 gm/dL  Auto Neutrophil # : 1.64 K/uL  Auto Lymphocyte # : 1.11 K/uL  Auto Monocyte # : 0.47 K/uL  Auto Eosinophil # : 0.24 K/uL  Auto Basophil # : 0.05 K/uL  Auto Neutrophil % : 46.5 %  Auto Lymphocyte % : 31.4 %  Auto Monocyte % : 13.3 %  Auto Eosinophil % : 6.8 %  Auto Basophil % : 1.4 %    02-15    136  |  102  |  10  ----------------------------<  75  4.3   |  24  |  1.26  02-14    142  |  104  |  10  ----------------------------<  79  5.2   |  27  |  1.29    Ca    10.0      15 Feb 2022 02:49  Ca    10.1      14 Feb 2022 06:26  Phos  2.9     02-15  Phos  2.7     02-14  Mg     2.10     02-15  Mg     2.10     02-14    TPro  7.1  /  Alb  3.6  /  TBili  1.6<H>  /  DBili  x   /  AST  22  /  ALT  12  /  AlkPhos  80  02-15      proBNP:   Lipid Profile:   HgA1c:   TSH:       CARDIAC MARKERS:            TELEMETRY: 	    ECG:   	  RADIOLOGY:  OTHER: 	      PREVIOUS DIAGNOSTIC CARDIOVASCULAR TESTING:      [ ]  Echocardiogram:  [ ]  Catheterization:  [ ]  Stress test:    [ ]  Vascular studies:  	  	     Cardiology/Vascular Medicine Inpatient Consultation Note      HISTORY OF PRESENT ILLNESS:  Patient is an 82 yo man with:    1. Dementia, bedbound  2. HTN  3. Hyperlipidemia  4. CVA with Right-sided residual weakness  5. CAD s/p PCI  6. Status post prior PPM extraction  7. Asthma  8. Stercoral colitis. CT abdomen recently noting:  Asymmetric irregular posterior rectal wall soft tissue thickening.   Differential includes neoplasm, sequelae of infectious or inflammatory   process, versus muscular hypertrophy. Recommend scope with tissue   sampling.  9. Recent completion of IV abx for Ecoli and MRSA Bacteremia via PICC    Patient brought in by wife for constipation of three weeks duration with associated abdominal distention and discomfort.   Based on recent findings from CT abdomen, GI plans to perform flexible sigmoidoscopy procedure.    Due to dementia, we were unable to obtain a detailed cardiovascular history from patient (He states that he has no current chest pain, SOB, or palpitations).    Cardiac telemetry notes sinus bradycardia as low as the upper 30s bpm, but mostly 50-60s bpm.  With bedside maneuvers, his HR appears to respond appropriately with exertion.  No evidence of active cardiac ischemia, CHF, or arrhythmia.  My review of an echocardiogram performed in 21 noted normal biventricular systolic function, mild diastolic dysfunction.  He is otherwise hemodynamically and clinically stable.    From the cardiac perspective, the patient may proceed with GI procedure as outlined above, without the need for additional cardiac testing or risk stratification.  With recent episodes of sinus bradycardia, would recommend placement of pacing pads on the patient prior to procedure, and have atropine available in the event it is needed during the coy-procedural period.    Allergies  No Known Allergies  	  MEDICATIONS:  amLODIPine   Tablet 10 milliGRAM(s) Oral daily  aspirin enteric coated 81 milliGRAM(s) Oral daily  isosorbide   mononitrate ER Tablet (IMDUR) 30 milliGRAM(s) Oral daily  lisinopril 10 milliGRAM(s) Oral daily  melatonin 3 milliGRAM(s) Oral at bedtime PRN  OLANZapine Injectable 1.25 milliGRAM(s) IntraMuscular every 6 hours PRN  ondansetron Injectable 4 milliGRAM(s) IV Push every 8 hours PRN  polyethylene glycol 3350 17 Gram(s) Oral <User Schedule>  atorvastatin 40 milliGRAM(s) Oral at bedtime    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,   HTN (hypertension)  Dementia  HLD (hyperlipidemia)  DM (diabetes mellitus)  Asthma  CAD (coronary artery disease) s/p stent placement  Sinus node dysfunction s/p Medtronic PPM  Cardiac pacemaker Medtronic (SN: [id number]; Model: [id number])  S/P coronary artery stent placement      FAMILY HISTORY:  FH: diabetes mellitus  Mother, Brother    FHx: dementia  Father    SOCIAL HISTORY:    As above, as per chart notes    REVIEW OF SYSTEMS:  As above, as per chart notes    PHYSICAL EXAM:  T(C): 36.2 (02-15-22 @ 04:52), Max: 36.8 (22 @ 21:00)  HR: 50 (02-15-22 @ 04:52) (50 - 65)  BP: 157/79 (02-15-22 @ 04:52) (137/90 - 157/79)  RR: 17 (02-15-22 @ 04:52) (17 - 19)  SpO2: 98% (02-15-22 @ 04:52) (97% - 98%)    Appearance: NAD, laying flat in bed  HEENT:   No JVD  Cardiovascular: Normal S1 S2, 2/6 SM  Respiratory: Lungs clear to auscultation	  Psychiatry: Awake, intermittently answers questions appropriately  Gastrointestinal:  Soft, Non-tender, + BS	  Neurologic: Right-sided weakness  Extremities: No edema      LABS:	 	    CBC Full  -  ( 15 Feb 2022 02:49 )  WBC Count : 3.53 K/uL  Hemoglobin : 11.3 g/dL  Hematocrit : 34.3 %  Platelet Count - Automated : 196 K/uL  Mean Cell Volume : 91.0 fL  Mean Cell Hemoglobin : 30.0 pg  Mean Cell Hemoglobin Concentration : 32.9 gm/dL  Auto Neutrophil # : 1.64 K/uL  Auto Lymphocyte # : 1.11 K/uL  Auto Monocyte # : 0.47 K/uL  Auto Eosinophil # : 0.24 K/uL  Auto Basophil # : 0.05 K/uL  Auto Neutrophil % : 46.5 %  Auto Lymphocyte % : 31.4 %  Auto Monocyte % : 13.3 %  Auto Eosinophil % : 6.8 %  Auto Basophil % : 1.4 %    02-15    136  |  102  |  10  ----------------------------<  75  4.3   |  24  |  1.26      142  |  104  |  10  ----------------------------<  79  5.2   |  27  |  1.29    Ca    10.0      15 2022 02:49  Ca    10.1      2022 06:26  Phos  2.9     02-15  Phos  2.7     02-14  Mg     2.10     02-15  Mg     2.10     02-14    TPro  7.1  /  Alb  3.6  /  TBili  1.6<H>  /  DBili  x   /  AST  22  /  ALT  12  /  AlkPhos  80  02-15    < from: CT Abdomen and Pelvis w/ IV Cont (22 @ 22:28) >    ACC: [id number] EXAM:  CT ABDOMEN AND PELVIS IC                          PROCEDURE DATE:  2022          INTERPRETATION:  CLINICAL INFORMATION: Acute nonlocalized abdominal pain.   3 weeks of constipation. History of stercoral colitis    COMPARISON: CT abdomen and pelvis from 2021    CONTRAST/COMPLICATIONS:  IV Contrast: Omnipaque 350  90 cc administered   10 cc discarded  Oral Contrast: NONE  Complications: None reported at time of study completion    PROCEDURE:  CT of the Abdomen andPelvis was performed.  Sagittal and coronal reformats were performed.    FINDINGS:    LOWER CHEST: Right lung basilar atelectasis. Coronary artery   calcifications..  LIVER: Within normal limits.  BILE DUCTS: Normal caliber.  GALLBLADDER: Within normal limits.  SPLEEN: Within normal limits.  PANCREAS: Atrophic.  ADRENALS: Redemonstrated indeterminate left adrenal nodule, unchanged.  KIDNEYS/URETERS: Symmetric enhancement. No hydronephrosis. Bilateral   renal cysts and hypodensities too small to characterize.    BLADDER: Suggestion of asymmetric thickening of the posterior bladder   wall, however not well evaluated secondary to underdistention. Foci of   air seen within the bladder.  REPRODUCTIVE ORGANS: Prostate is enlarged.    BOWEL: Moderate stool burden throughout the colon. Large rectal stool   burden. There is asymmetric soft tissue thickening of the posterior wall   of the distal rectum. Mild perirectal fat stranding. No bowel obstruction.  PERITONEUM: No free air.  VESSELS: Atherosclerotic changes.  RETROPERITONEUM/LYMPH NODES: No lymphadenopathy.  ABDOMINAL WALL: Within normal limits.  BONES: Degenerative changes.    IMPRESSION:    Stool distended rectum with wall thickening and perirectal fat stranding   consistent with stercoral colitis.    Asymmetric irregular posterior rectal wall soft tissue thickening.   Differential includes neoplasm, sequelae of infectious or inflammatory   process, versus muscular hypertrophy. Recommend scope with tissue   sampling.    Foci of airwithin the bladder. Correlate for recent instrumentation.    --- End of Report ---          ALEJANDRO LOONEY MD; Resident Radiologist  This document has been electronically signed.  JAG CAMPOS MD; Attending Radiologist  This document has been electronically signed. 2022  1:01AM    < end of copied text >  < from: Transthoracic Echocardiogram (21 @ 12:55) >    Patient name: DESHAWN TIWARI  YOB: 1940   Age: 81 (M)   MR#: [id number]  Study Date: 2021  Location: [address] [place], [place] [address]Sonographer: Sinai Chavez RDCS  2nd Sonographer: Josefa Hinds RDCS  Study quality: Technically Difficult/Limited  Referring Physician: Ophelia Rice MD  Blood Pressure: 154/83 mmHg  Height: 167 cm  Weight: 81 kg  BSA: 1.9 m2  ------------------------------------------------------------------------  PROCEDURE: Transthoracic echocardiogram with 2-D, M-Mode  and complete spectral and color flow Doppler.  INDICATION: Nonrheumatic mitral valve disorder, unspecified  (I34.9)  ------------------------------------------------------------------------  OBSERVATIONS:  Mitral Valve: Mitral valve not well visualized, probably  normal.  Aortic Root: Normal aortic root.  Aortic Valve: Aortic valve not well visualized.  Left Atrium: Normal left atrium.  Left Ventricle: Endocardium not well visualized; grossly  normal left ventricular systolic function. Normal left  ventricular internal dimensions and wall thicknesses. Mild  diastolic dysfunction (Stage I).  Right Heart: A device wire is noted in the right heart.  Normal right ventricular size and function. Normal  tricuspid valve. Normal pulmonic valve.  Pericardium/PleuraNormal pericardium with no pericardial  effusion.  ------------------------------------------------------------------------  CONCLUSIONS:  1. Endocardium not well visualized; grossly normal left  ventricular systolic function.  2. Mild diastolic dysfunction (Stage I).  3. A device wire is noted in the right heart.  4. Normal right ventricular size and function.  *** Compared with echocardiogram of 2021, no  significant changes noted.  ------------------------------------------------------------------------  Confirmed on  2021 - 14:16:38 by Sadaf Peace MD  ------------------------------------------------------------------------    < end of copied text >  < from: Cardiac Cath Lab - Adult (14 @ 12:25) >    [place] [id number] [phone]  Cath Lab Report -- Comprehensive Report  Patient: DESHAWN TIWARI  Study date: 2014  Account number: [id number]  MR number: [id number]  : 1940  Gender: Male  Race: B  Case Physician(s):  Manohar Ellington M.D.  Fellow:  Huang Glasgow M.D.  Referring Physician:  Ravi Tay M.D.  INDICATIONS: Angina/MI: stable angina, CCS class II. Cardiac: arrhythmia.  HISTORY: There was no prior cardiac history. The patient has hypertension  and oral hypoglycemic-treated diabetes.  PRIOR DIAGNOSTIC TEST RESULTS: Nuclear stress test was unavailable.  PROCEDURE:  --  Left coronary angiography.  --  Right coronary angiography.  TECHNIQUE: The risks and alternatives of the procedures and conscious  sedation were explained to the patient and informed consent was obtained.  Cardiac catheterization performed electively.  Local anesthetic given. Right femoral artery access. Left coronary artery  angiography. The vessel was injected utilizing a catheter. Right coronary  artery angiography. The vessel was injected utilizing a catheter.  RADIATION EXPOSURE: 6 min.  CONTRAST GIVEN: 49 ml Optiray.  MEDICATIONS GIVEN: Midazolam, 0.5 mg, IV. Fentanyl, 25 mcg, IV.2%  Lidocaine, 10 ml, subcutaneously. 0.9% Normal saline, 30 ml, IV.  CORONARY VESSELS: The coronary circulation is right dominant.  LM:   --  LM: Angiography showed minor luminal irregularities with no flow  limiting lesions.  LAD:   --  Distal LAD:There was a 100 % stenosis.  CX:   --  OM3: There was a 100 % stenosis.  RCA:   --  Distal RCA: There was a 100 % stenosis.  COMPLICATIONS: There were no complications. No complications occurred  during the cath lab visit.  DIAGNOSTIC RECOMMENDATIONS: Will get EP to evaluate patient for symptomatic  bradycardia.  INTERVENTIONAL RECOMMENDATIONS: Will get EP to evaluate patient for  symptomatic bradycardia.  Prepared and signed by  Manohar Ellington M.D.  Signed 2014 13:07:25  HEMODYNAMIC TABLES  Pressures:  Baseline  Pressures:  - HR: 66  Pressures:  - Rhythm:  Pressures:  -- Aortic Pressure (S/D/M): 189/93/126  Outputs:  Baseline  Outputs:  -- CALCULATIONS[age]  Outputs:  -- CALCULATIONS: Body Surface Area: 2.05  Outputs:  -- CALCULATIONS: Height in cm: 173.00  Outputs:  -- CALCULATIONS: Sex: Male  Outputs:  -- CALCULATIONS: Weight in k.70  Outputs:  -- OUTPUTS: O2 consumption: 255.77  Outputs:  -- OUTPUTS: Vo2 Indexed: 125.00    < end of copied text >

## 2022-02-15 NOTE — DISCHARGE NOTE PROVIDER - NSDCFUADDAPPT_GEN_ALL_CORE_FT
Follow up as outpatient at [facility] [address] [facility] [phone]

## 2022-02-15 NOTE — DISCHARGE NOTE PROVIDER - CARE PROVIDERS DIRECT ADDRESSES
,DirectAddress_Unknown ,DirectAddress_Unknown,[email or web address]

## 2022-02-15 NOTE — DISCHARGE NOTE PROVIDER - HOSPITAL COURSE
82 yo Male with hx of HTN, HLD CVA with Rt sided residual weakness, dementia, bedbound, HTN, CAD s/p stent, s/p PPM extraction, asthma, stercoral colitis, recent completion of IV abx for Ecoli and MRSA Bacteremia via PICC; Pt a/w TAE, stercoral colitis and posterior rectal wall lesion; Found to have UTI and sacral decubitus.          Stercoral colitis.   -  Improved with laxatives,   GI is following, for colonoscopy today.     TAE (acute kidney injury).   ·  improving, follow up with PCP    Rectal lesion.   - Stercoral colitis from severe constipation, having BMs.    Acute cystitis.   Cultures unremarkable, no need for antibiotics.     Problem/Plan - 5:  ·  Problem: Sacral decubitus ulcer.   ·  Plan: -Wound care c/s requested.     Problem/Plan - 6:  ·  Problem: CAD (coronary artery disease).   ·  Plan: Bradycardia noted. EP eval done. Monitor on tele. No need for PM at this time.  EP FU noted.     Problem/Plan - 7:  ·  Problem: Dementia.   ·  Plan: -Fall, aspiration precautions  -Diet, mech soft.     Problem/Plan - 8:  ·  Problem: DVT prophylaxis.   ·  Plan: SCD.     82 yo Male with hx of HTN, HLD CVA with Rt sided residual weakness, dementia, bedbound, HTN, CAD s/p stent, s/p PPM extraction, asthma, stercoral colitis, recent completion of IV abx for Ecoli and MRSA Bacteremia via PICC; Pt a/w TAE, stercoral colitis and posterior rectal wall lesion; Found to have UTI and sacral decubitus; ;     Hospital Course:    Stercoral colitis.   Constipation persists CT abdomen shows a suspected mass on sigmoid colon area. GI FU noted. Flex sig to be reattempted several times but unsuccessful. Pt. to follow up as out pt.     TAE (acute kidney injury).   No resolved, creat is stable.    Rectal lesion.   MRI of abdomen canceled, does not help make a diagnosis. GI following for stercoral colitis from severe constipation. Pt. to follow up as out pt.     Acute cystitis.   Cultures unremarkable, no need for antibiotics.    Sacral decubitus ulcer.   Wound care following and recommendations noted.     CAD (coronary artery disease).   Cards and EP team following. Patient with jessie episodes fo the 30s. Asymptomatic. No indication for pacing at this time. Family also declining any intervention from EP stand point at this time.     Dementia.   Fall, aspiration precautions    On ___ this case was reviewed with  ____, the patient is medically stable and optimized for discharge. All medications were reviewed and prescriptions were sent to mutually agreed upon pharmacy.      82 yo Male with hx of HTN, HLD CVA with Rt sided residual weakness, dementia, bedbound, HTN, CAD s/p stent, s/p PPM extraction, asthma, stercoral colitis, recent completion of IV abx for Ecoli and MRSA Bacteremia via PICC; Pt a/w TAE, stercoral colitis and posterior rectal wall lesion; Found to have UTI and sacral decubitus; ;     Hospital Course:    Stercoral colitis.   Constipation persists CT abdomen shows a suspected mass on sigmoid colon area. GI FU noted. Flex sig to be reattempted several times but unsuccessful. Pt. to follow up as out pt.     TAE (acute kidney injury).   No resolved, creat is stable.      Rectal lesion.   MRI of abdomen canceled, does not help make a diagnosis. GI following for stercoral colitis from severe constipation. Pt. to follow up as out pt.     Sacral decubitus ulcer.   Wound care consulted and following while patient was in the hospital.  Pt will follow up outpatient fo further management.       On March 4, 2022, this case was reviewed with Dr. Gonzalez , the patient is medically stable and optimized for discharge. All medications were reviewed and prescriptions were sent to mutually agreed upon pharmacy.

## 2022-02-15 NOTE — DISCHARGE NOTE PROVIDER - NSFOLLOWUPCLINICS_GEN_ALL_ED_FT
[facility] General Internal Medicine  General Internal Medicine  [address] [place] [address]  Phone: [phone]  Fax:

## 2022-02-16 LAB
ALBUMIN SERPL ELPH-MCNC: 3.6 G/DL — SIGNIFICANT CHANGE UP (ref 3.3–5)
ALP SERPL-CCNC: 78 U/L — SIGNIFICANT CHANGE UP (ref 40–120)
ALT FLD-CCNC: 13 U/L — SIGNIFICANT CHANGE UP (ref 4–41)
ANION GAP SERPL CALC-SCNC: 14 MMOL/L — SIGNIFICANT CHANGE UP (ref 7–14)
AST SERPL-CCNC: 21 U/L — SIGNIFICANT CHANGE UP (ref 4–40)
BASOPHILS # BLD AUTO: 0.04 K/UL — SIGNIFICANT CHANGE UP (ref 0–0.2)
BASOPHILS NFR BLD AUTO: 0.9 % — SIGNIFICANT CHANGE UP (ref 0–2)
BILIRUB SERPL-MCNC: 1.5 MG/DL — HIGH (ref 0.2–1.2)
BUN SERPL-MCNC: 13 MG/DL — SIGNIFICANT CHANGE UP (ref 7–23)
CALCIUM SERPL-MCNC: 9.6 MG/DL — SIGNIFICANT CHANGE UP (ref 8.4–10.5)
CHLORIDE SERPL-SCNC: 104 MMOL/L — SIGNIFICANT CHANGE UP (ref 98–107)
CO2 SERPL-SCNC: 23 MMOL/L — SIGNIFICANT CHANGE UP (ref 22–31)
CREAT SERPL-MCNC: 1.52 MG/DL — HIGH (ref 0.5–1.3)
EOSINOPHIL # BLD AUTO: 0.16 K/UL — SIGNIFICANT CHANGE UP (ref 0–0.5)
EOSINOPHIL NFR BLD AUTO: 3.5 % — SIGNIFICANT CHANGE UP (ref 0–6)
GLUCOSE BLDC GLUCOMTR-MCNC: 108 MG/DL — HIGH (ref 70–99)
GLUCOSE SERPL-MCNC: 94 MG/DL — SIGNIFICANT CHANGE UP (ref 70–99)
HCT VFR BLD CALC: 33 % — LOW (ref 39–50)
HGB BLD-MCNC: 10.8 G/DL — LOW (ref 13–17)
IANC: 2.58 K/UL — SIGNIFICANT CHANGE UP (ref 1.5–8.5)
IMM GRANULOCYTES NFR BLD AUTO: 0.9 % — SIGNIFICANT CHANGE UP (ref 0–1.5)
INR BLD: 1.1 RATIO — SIGNIFICANT CHANGE UP (ref 0.88–1.16)
LYMPHOCYTES # BLD AUTO: 1.18 K/UL — SIGNIFICANT CHANGE UP (ref 1–3.3)
LYMPHOCYTES # BLD AUTO: 25.9 % — SIGNIFICANT CHANGE UP (ref 13–44)
MAGNESIUM SERPL-MCNC: 2.1 MG/DL — SIGNIFICANT CHANGE UP (ref 1.6–2.6)
MCHC RBC-ENTMCNC: 29.4 PG — SIGNIFICANT CHANGE UP (ref 27–34)
MCHC RBC-ENTMCNC: 32.7 GM/DL — SIGNIFICANT CHANGE UP (ref 32–36)
MCV RBC AUTO: 89.9 FL — SIGNIFICANT CHANGE UP (ref 80–100)
MONOCYTES # BLD AUTO: 0.56 K/UL — SIGNIFICANT CHANGE UP (ref 0–0.9)
MONOCYTES NFR BLD AUTO: 12.3 % — SIGNIFICANT CHANGE UP (ref 2–14)
NEUTROPHILS # BLD AUTO: 2.58 K/UL — SIGNIFICANT CHANGE UP (ref 1.8–7.4)
NEUTROPHILS NFR BLD AUTO: 56.5 % — SIGNIFICANT CHANGE UP (ref 43–77)
NRBC # BLD: 0 /100 WBCS — SIGNIFICANT CHANGE UP
NRBC # FLD: 0 K/UL — SIGNIFICANT CHANGE UP
PHOSPHATE SERPL-MCNC: 3.5 MG/DL — SIGNIFICANT CHANGE UP (ref 2.5–4.5)
PLATELET # BLD AUTO: 233 K/UL — SIGNIFICANT CHANGE UP (ref 150–400)
POTASSIUM SERPL-MCNC: 3.9 MMOL/L — SIGNIFICANT CHANGE UP (ref 3.5–5.3)
POTASSIUM SERPL-SCNC: 3.9 MMOL/L — SIGNIFICANT CHANGE UP (ref 3.5–5.3)
PROT SERPL-MCNC: 7.1 G/DL — SIGNIFICANT CHANGE UP (ref 6–8.3)
PROTHROM AB SERPL-ACNC: 12.5 SEC — SIGNIFICANT CHANGE UP (ref 10.6–13.6)
RBC # BLD: 3.67 M/UL — LOW (ref 4.2–5.8)
RBC # FLD: 13.9 % — SIGNIFICANT CHANGE UP (ref 10.3–14.5)
SODIUM SERPL-SCNC: 141 MMOL/L — SIGNIFICANT CHANGE UP (ref 135–145)
WBC # BLD: 4.56 K/UL — SIGNIFICANT CHANGE UP (ref 3.8–10.5)
WBC # FLD AUTO: 4.56 K/UL — SIGNIFICANT CHANGE UP (ref 3.8–10.5)

## 2022-02-16 PROCEDURE — 99232 SBSQ HOSP IP/OBS MODERATE 35: CPT

## 2022-02-16 PROCEDURE — 45330 DIAGNOSTIC SIGMOIDOSCOPY: CPT | Mod: GC,53

## 2022-02-16 RX ADMIN — ISOSORBIDE MONONITRATE 30 MILLIGRAM(S): 60 TABLET, EXTENDED RELEASE ORAL at 13:32

## 2022-02-16 RX ADMIN — Medication 81 MILLIGRAM(S): at 13:32

## 2022-02-16 RX ADMIN — Medication 1 ENEMA: at 07:00

## 2022-02-16 RX ADMIN — Medication 1 ENEMA: at 06:00

## 2022-02-16 RX ADMIN — POLYETHYLENE GLYCOL 3350 17 GRAM(S): 17 POWDER, FOR SOLUTION ORAL at 13:33

## 2022-02-16 NOTE — PROGRESS NOTE ADULT - ASSESSMENT
Patient is an 82 yo man with:    1. Dementia, bedbound  2. HTN  3. Hyperlipidemia  4. CVA with Right-sided residual weakness  5. CAD s/p PCI  6. Status post prior PPM extraction  7. Asthma  8. Stercoral colitis. CT abdomen recently noting:  Asymmetric irregular posterior rectal wall soft tissue thickening.   Differential includes neoplasm, sequelae of infectious or inflammatory   process, versus muscular hypertrophy. Recommend scope with tissue   sampling.  9. Recent completion of IV abx for Ecoli and MRSA Bacteremia via PICC    Patient brought in by wife for constipation of three weeks duration with associated abdominal distention and discomfort.   Based on recent findings from CT abdomen, GI plans to perform flexible sigmoidoscopy procedure.    Due to dementia, we were unable to obtain a detailed cardiovascular history from patient (He states that he has no current chest pain, SOB, or palpitations).    Cardiac telemetry notes sinus bradycardia as low as the upper 30s bpm, but mostly 50-60s bpm.  With bedside maneuvers, his HR appears to respond appropriately with exertion.  No evidence of active cardiac ischemia, CHF, or arrhythmia.  My review of an echocardiogram performed in 12/28/21 noted normal biventricular systolic function, mild diastolic dysfunction.  He is otherwise hemodynamically and clinically stable.    From the cardiac perspective, the patient may proceed with GI procedure as outlined above, without the need for additional cardiac testing or risk stratification.  With recent episodes of sinus bradycardia, would recommend placement of pacing pads on the patient prior to procedure, and have atropine available in the event it is needed during the coy-procedural period.

## 2022-02-16 NOTE — PROGRESS NOTE ADULT - SUBJECTIVE AND OBJECTIVE BOX
Patient is a 81y old  Male who presents with a chief complaint of Constipation x 3 weeks (10 Feb 2022 14:14)    2022  HPI:  EP FU done  Colonoscopy scheduled for today      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,     HTN (hypertension)    Dementia    HLD (hyperlipidemia)    DM (diabetes mellitus)    Asthma    CAD (coronary artery disease)  s/p stent placement    Sinus node dysfunction  s/p Medtronic PPM    Cardiac pacemaker  Medtronic (SN: [id number]; Model: [id number])    S/P coronary artery stent placement        Review of Systems:   CONSTITUTIONAL: No fever, weight loss, or fatigue  EYES: No eye pain, visual disturbances, or discharge  ENMT:  No difficulty hearing, tinnitus, vertigo; No sinus or throat pain  NECK: No pain or stiffness  BREASTS: No pain, masses, or nipple discharge  RESPIRATORY: No cough, wheezing, chills or hemoptysis; No shortness of breath  CARDIOVASCULAR: No chest pain, palpitations, dizziness, or leg swelling  GASTROINTESTINAL: No abdominal or epigastric pain. No nausea, vomiting, or hematemesis; GENITOURINARY: No dysuria, frequency, hematuria, or incontinence  NEUROLOGICAL: No headaches, memory loss, Right sided weakness +  SKIN: No itching, burning, rashes, or lesions   LYMPH NODES: No enlarged glands  ENDOCRINE: No heat or cold intolerance; No hair loss  MUSCULOSKELETAL: No joint pain or swelling; No muscle, back, or extremity pain  PSYCHIATRIC: No depression, anxiety, mood swings, or difficulty sleeping  HEME/LYMPH: No easy bruising, or bleeding gums  ALLERY AND IMMUNOLOGIC: No hives or eczema    Allergies    No Known Allergies    Intolerances        Social History:     FAMILY HISTORY:  FH: diabetes mellitus  Mother, Brother    FHx: dementia  Father        MEDICATIONS  (STANDING):  amLODIPine   Tablet 10 milliGRAM(s) Oral daily  aspirin enteric coated 81 milliGRAM(s) Oral daily  atorvastatin 40 milliGRAM(s) Oral at bedtime  isosorbide   mononitrate ER Tablet (IMDUR) 30 milliGRAM(s) Oral daily  lactated ringers. 1000 milliLiter(s) (100 mL/Hr) IV Continuous <Continuous>  lactulose Syrup 10 Gram(s) Oral every 8 hours  lisinopril 10 milliGRAM(s) Oral daily  metoprolol succinate ER 25 milliGRAM(s) Oral daily  polyethylene glycol 3350 17 Gram(s) Oral <User Schedule>    MEDICATIONS  (PRN):  melatonin 3 milliGRAM(s) Oral at bedtime PRN Insomnia  OLANZapine Injectable 1.25 milliGRAM(s) IntraMuscular every 6 hours PRN Severe agitation  ondansetron Injectable 4 milliGRAM(s) IV Push every 8 hours PRN Nausea and/or Vomiting        CAPILLARY BLOOD GLUCOSE      POCT Blood Glucose.: 90 mg/dL (10 Feb 2022 08:07)  POCT Blood Glucose.: 89 mg/dL (10 Feb 2022 01:45)  POCT Blood Glucose.: 112 mg/dL (2022 22:05)    I&O's Summary    2022 07:01  -  10 Feb 2022 07:00  --------------------------------------------------------  IN: 0 mL / OUT: 2 mL / NET: -2 mL        PHYSICAL EXAM:  Vital Signs Last 24 Hrs  T(C): 36.5 (10 Feb 2022 12:54), Max: 37.2 (2022 18:06)  T(F): 97.7 (10 Feb 2022 12:54), Max: 98.9 (2022 18:06)  HR: 65 (10 Feb 2022 12:54) (60 - 68)  BP: 130/67 (10 Feb 2022 12:54) (130/67 - 177/94)  BP(mean): --  RR: 16 (10 Feb 2022 12:54) (16 - 18)  SpO2: 100% (10 Feb 2022 12:54) (98% - 100%)    GENERAL: NAD, well-developed  HEAD:  Atraumatic, Normocephalic  EYES: EOMI, PERRLA, conjunctiva and sclera clear  NECK: Supple, No JVD  CHEST/LUNG: Clear to auscultation bilaterally; No wheeze  HEART: Regular rate and rhythm; No murmurs, rubs, or gallops  ABDOMEN: Soft, Nontender, Nondistended; Bowel sounds present  EXTREMITIES:  2+ Peripheral Pulses, No clubbing, cyanosis, or edema  PSYCH: AAOx3  NEUROLOGY: Right side is weak.  SKIN: No rashes or lesions    LABS:                        10.8   4.97  )-----------( 198      ( 10 Feb 2022 07:16 )             31.4     02-10    141  |  101  |  10  ----------------------------<  80  3.3<L>   |  31  |  1.24    Ca    9.5      10 Feb 2022 07:11  Phos  2.4     02-10  Mg     1.90     02-10    TPro  6.4  /  Alb  3.4  /  TBili  1.5<H>  /  DBili  x   /  AST  17  /  ALT  14  /  AlkPhos  81  02-08    PT/INR - ( 2022 20:17 )   PT: 12.8 sec;   INR: 1.13 ratio         PTT - ( 2022 20:17 )  PTT:31.9 sec      Urinalysis Basic - ( 2022 22:16 )    Color: Light Yellow / Appearance: Clear / S.014 / pH: x  Gluc: x / Ketone: Negative  / Bili: Negative / Urobili: <2 mg/dL   Blood: x / Protein: Trace / Nitrite: Negative   Leuk Esterase: Moderate / RBC: 4 /HPF / WBC 10 /HPF   Sq Epi: x / Non Sq Epi: 5 /HPF / Bacteria: Few        RADIOLOGY & ADDITIONAL TESTS:    Imaging Personally Reviewed:    Consultant(s) Notes Reviewed:      Care Discussed with Consultants/Other Providers:

## 2022-02-16 NOTE — PROGRESS NOTE ADULT - SUBJECTIVE AND OBJECTIVE BOX
Cardiology/Vascular Medicine Inpatient Progress Note        Vital Signs Last 24 Hrs  T(C): 36.4 (2022 05:38), Max: 36.6 (15 Feb 2022 13:02)  T(F): 97.6 (2022 05:38), Max: 97.9 (15 Feb 2022 13:02)  HR: 70 (2022 05:38) (66 - 88)  BP: 140/80 (2022 05:38) (137/72 - 182/102)  BP(mean): --  RR: 17 (2022 05:38) (14 - 20)  SpO2: 99% (2022 05:38) (97% - 100%)    Appearance: NAD, laying flat in bed  HEENT:   No JVD  Cardiovascular: Normal S1 S2, 2/6 SM  Respiratory: Lungs clear to auscultation	  Psychiatry: Awake, intermittently answers questions appropriately  Gastrointestinal:  Soft, Non-tender, + BS	  Neurologic: Right-sided weakness  Extremities: No edema    MEDICATIONS  (STANDING):  amLODIPine   Tablet 10 milliGRAM(s) Oral daily  aspirin enteric coated 81 milliGRAM(s) Oral daily  atorvastatin 40 milliGRAM(s) Oral at bedtime  isosorbide   mononitrate ER Tablet (IMDUR) 30 milliGRAM(s) Oral daily  lisinopril 10 milliGRAM(s) Oral daily  polyethylene glycol 3350 17 Gram(s) Oral <User Schedule>  polyethylene glycol/electrolyte Solution 1000 milliLiter(s) Oral once  polyethylene glycol/electrolyte Solution        MEDICATIONS  (PRN):  melatonin 3 milliGRAM(s) Oral at bedtime PRN Insomnia  OLANZapine Injectable 1.25 milliGRAM(s) IntraMuscular every 6 hours PRN Severe agitation  ondansetron Injectable 4 milliGRAM(s) IV Push every 8 hours PRN Nausea and/or Vomiting      LABS:	 	                          10.8   4.56  )-----------( 233      ( 2022 06:36 )             33.0   02-16    141  |  104  |  13  ----------------------------<  94  3.9   |  23  |  1.52<H>    Ca    9.6      2022 06:36  Phos  3.5     02-16  Mg     2.10         TPro  7.1  /  Alb  3.6  /  TBili  1.5<H>  /  DBili  x   /  AST  21  /  ALT  13  /  AlkPhos  78        < from: CT Abdomen and Pelvis w/ IV Cont (22 @ 22:28) >    ACC: [id number] EXAM:  CT ABDOMEN AND PELVIS IC                          PROCEDURE DATE:  2022          INTERPRETATION:  CLINICAL INFORMATION: Acute nonlocalized abdominal pain.   3 weeks of constipation. History of stercoral colitis    COMPARISON: CT abdomen and pelvis from 2021    CONTRAST/COMPLICATIONS:  IV Contrast: Omnipaque 350  90 cc administered   10 cc discarded  Oral Contrast: NONE  Complications: None reported at time of study completion    PROCEDURE:  CT of the Abdomen andPelvis was performed.  Sagittal and coronal reformats were performed.    FINDINGS:    LOWER CHEST: Right lung basilar atelectasis. Coronary artery   calcifications..  LIVER: Within normal limits.  BILE DUCTS: Normal caliber.  GALLBLADDER: Within normal limits.  SPLEEN: Within normal limits.  PANCREAS: Atrophic.  ADRENALS: Redemonstrated indeterminate left adrenal nodule, unchanged.  KIDNEYS/URETERS: Symmetric enhancement. No hydronephrosis. Bilateral   renal cysts and hypodensities too small to characterize.    BLADDER: Suggestion of asymmetric thickening of the posterior bladder   wall, however not well evaluated secondary to underdistention. Foci of   air seen within the bladder.  REPRODUCTIVE ORGANS: Prostate is enlarged.    BOWEL: Moderate stool burden throughout the colon. Large rectal stool   burden. There is asymmetric soft tissue thickening of the posterior wall   of the distal rectum. Mild perirectal fat stranding. No bowel obstruction.  PERITONEUM: No free air.  VESSELS: Atherosclerotic changes.  RETROPERITONEUM/LYMPH NODES: No lymphadenopathy.  ABDOMINAL WALL: Within normal limits.  BONES: Degenerative changes.    IMPRESSION:    Stool distended rectum with wall thickening and perirectal fat stranding   consistent with stercoral colitis.    Asymmetric irregular posterior rectal wall soft tissue thickening.   Differential includes neoplasm, sequelae of infectious or inflammatory   process, versus muscular hypertrophy. Recommend scope with tissue   sampling.    Foci of airwithin the bladder. Correlate for recent instrumentation.    --- End of Report ---          ALEJANDRO LOONEY MD; Resident Radiologist  This document has been electronically signed.  JAG CAMPOS MD; Attending Radiologist  This document has been electronically signed. 2022  1:01AM    < end of copied text >  < from: Transthoracic Echocardiogram (21 @ 12:55) >    Patient name: DESHAWN TIWARI  YOB: 1940   Age: 81 (M)   MR#: [id number]  Study Date: 2021  Location: [address] [place], [place] [address]Sonographer: Sinai Chavez [facility]  2nd Sonographer: Josefa Hinds KOFI  Study quality: Technically Difficult/Limited  Referring Physician: Ophelia Rice MD  Blood Pressure: 154/83 mmHg  Height: 167 cm  Weight: 81 kg  BSA: 1.9 m2  ------------------------------------------------------------------------  PROCEDURE: Transthoracic echocardiogram with 2-D, M-Mode  and complete spectral and color flow Doppler.  INDICATION: Nonrheumatic mitral valve disorder, unspecified  (I34.9)  ------------------------------------------------------------------------  OBSERVATIONS:  Mitral Valve: Mitral valve not well visualized, probably  normal.  Aortic Root: Normal aortic root.  Aortic Valve: Aortic valve not well visualized.  Left Atrium: Normal left atrium.  Left Ventricle: Endocardium not well visualized; grossly  normal left ventricular systolic function. Normal left  ventricular internal dimensions and wall thicknesses. Mild  diastolic dysfunction (Stage I).  Right Heart: A device wire is noted in the right heart.  Normal right ventricular size and function. Normal  tricuspid valve. Normal pulmonic valve.  Pericardium/PleuraNormal pericardium with no pericardial  effusion.  ------------------------------------------------------------------------  CONCLUSIONS:  1. Endocardium not well visualized; grossly normal left  ventricular systolic function.  2. Mild diastolic dysfunction (Stage I).  3. A device wire is noted in the right heart.  4. Normal right ventricular size and function.  *** Compared with echocardiogram of 2021, no  significant changes noted.  ------------------------------------------------------------------------  Confirmed on  2021 - 14:16:38 by Sadaf Peace MD  ------------------------------------------------------------------------    < end of copied text >  < from: Cardiac Cath Lab - Adult (14 @ 12:25) >    [place] [address]  [phone]  Cath Lab Report -- Comprehensive Report  Patient: DESHAWN TIWARI  Study date: 2014  Account number: [id number]  MR number: [id number]  : 1940  Gender: Male  Race: B  Case Physician(s):  Manohar Ellington M.D.  Fellow:  Huang Glasgow M.D.  Referring Physician:  Ravi Tay M.D.  INDICATIONS: Angina/MI: stable angina, CCS class II. Cardiac: arrhythmia.  HISTORY: There was no prior cardiac history. The patient has hypertension  and oral hypoglycemic-treated diabetes.  PRIOR DIAGNOSTIC TEST RESULTS: Nuclear stress test was unavailable.  PROCEDURE:  --  Left coronary angiography.  --  Right coronary angiography.  TECHNIQUE: The risks and alternatives of the procedures and conscious  sedation were explained to the patient and informed consent was obtained.  Cardiac catheterization performed electively.  Local anesthetic given. Right femoral artery access. Left coronary artery  angiography. The vessel was injected utilizing a catheter. Right coronary  artery angiography. The vessel was injected utilizing a catheter.  RADIATION EXPOSURE: 6 min.  CONTRAST GIVEN: 49 ml Optiray.  MEDICATIONS GIVEN: Midazolam, 0.5 mg, IV. Fentanyl, 25 mcg, IV.2%  Lidocaine, 10 ml, subcutaneously. 0.9% Normal saline, 30 ml, IV.  CORONARY VESSELS: The coronary circulation is right dominant.  LM:   --  LM: Angiography showed minor luminal irregularities with no flow  limiting lesions.  LAD:   --  Distal LAD:There was a 100 % stenosis.  CX:   --  OM3: There was a 100 % stenosis.  RCA:   --  Distal RCA: There was a 100 % stenosis.  COMPLICATIONS: There were no complications. No complications occurred  during the cath lab visit.  DIAGNOSTIC RECOMMENDATIONS: Will get EP to evaluate patient for symptomatic  bradycardia.  INTERVENTIONAL RECOMMENDATIONS: Will get EP to evaluate patient for  symptomatic bradycardia.  Prepared and signed by  Manohar Ellington M.D.  Signed 2014 13:07:25  HEMODYNAMIC TABLES  Pressures:  Baseline  Pressures:  - HR: 66  Pressures:  - Rhythm:  Pressures:  -- Aortic Pressure (S/D/M): 189/93/126  Outputs:  Baseline  Outputs:  -- CALCULATIONS[age]  Outputs:  -- CALCULATIONS: Body Surface Area: 2.05  Outputs:  -- CALCULATIONS: Height in cm: 173.00  Outputs:  -- CALCULATIONS: Sex: Male  Outputs:  -- CALCULATIONS: Weight in k.70  Outputs:  -- OUTPUTS: O2 consumption: 255.77  Outputs:  -- OUTPUTS: Vo2 Indexed: 125.00    < end of copied text >   Cardiology/Vascular Medicine Inpatient Progress Note    No complaints.  Per bedside aide, patient has been agitated although he appeared calm during my evaluation during morning rounds.    Vital Signs Last 24 Hrs  T(C): 36.4 (2022 05:38), Max: 36.6 (15 Feb 2022 13:02)  T(F): 97.6 (2022 05:38), Max: 97.9 (15 Feb 2022 13:02)  HR: 70 (2022 05:38) (66 - 88)  BP: 140/80 (2022 05:38) (137/72 - 182/102)  BP(mean): --  RR: 17 (2022 05:38) (14 - 20)  SpO2: 99% (2022 05:38) (97% - 100%)    Appearance: NAD, laying flat in bed  HEENT:   No JVD  Cardiovascular: Normal S1 S2, 2/6 SM  Respiratory: Lungs clear to auscultation	  Psychiatry: Awake, intermittently answers questions appropriately  Gastrointestinal:  Soft, Non-tender, + BS	  Neurologic: Right-sided weakness  Extremities: No edema    MEDICATIONS  (STANDING):  amLODIPine   Tablet 10 milliGRAM(s) Oral daily  aspirin enteric coated 81 milliGRAM(s) Oral daily  atorvastatin 40 milliGRAM(s) Oral at bedtime  isosorbide   mononitrate ER Tablet (IMDUR) 30 milliGRAM(s) Oral daily  lisinopril 10 milliGRAM(s) Oral daily  polyethylene glycol 3350 17 Gram(s) Oral <User Schedule>  polyethylene glycol/electrolyte Solution 1000 milliLiter(s) Oral once  polyethylene glycol/electrolyte Solution        MEDICATIONS  (PRN):  melatonin 3 milliGRAM(s) Oral at bedtime PRN Insomnia  OLANZapine Injectable 1.25 milliGRAM(s) IntraMuscular every 6 hours PRN Severe agitation  ondansetron Injectable 4 milliGRAM(s) IV Push every 8 hours PRN Nausea and/or Vomiting      LABS:	 	                          10.8   4.56  )-----------( 233      ( 2022 06:36 )             33.0   02-16    141  |  104  |  13  ----------------------------<  94  3.9   |  23  |  1.52<H>    Ca    9.6      2022 06:36  Phos  3.5     -  Mg     2.10     -    TPro  7.1  /  Alb  3.6  /  TBili  1.5<H>  /  DBili  x   /  AST  21  /  ALT  13  /  AlkPhos  78  -      < from: CT Abdomen and Pelvis w/ IV Cont (22 @ 22:28) >    ACC: [id number] EXAM:  CT ABDOMEN AND PELVIS IC                          PROCEDURE DATE:  2022          INTERPRETATION:  CLINICAL INFORMATION: Acute nonlocalized abdominal pain.   3 weeks of constipation. History of stercoral colitis    COMPARISON: CT abdomen and pelvis from 2021    CONTRAST/COMPLICATIONS:  IV Contrast: Omnipaque 350  90 cc administered   10 cc discarded  Oral Contrast: NONE  Complications: None reported at time of study completion    PROCEDURE:  CT of the Abdomen andPelvis was performed.  Sagittal and coronal reformats were performed.    FINDINGS:    LOWER CHEST: Right lung basilar atelectasis. Coronary artery   calcifications..  LIVER: Within normal limits.  BILE DUCTS: Normal caliber.  GALLBLADDER: Within normal limits.  SPLEEN: Within normal limits.  PANCREAS: Atrophic.  ADRENALS: Redemonstrated indeterminate left adrenal nodule, unchanged.  KIDNEYS/URETERS: Symmetric enhancement. No hydronephrosis. Bilateral   renal cysts and hypodensities too small to characterize.    BLADDER: Suggestion of asymmetric thickening of the posterior bladder   wall, however not well evaluated secondary to underdistention. Foci of   air seen within the bladder.  REPRODUCTIVE ORGANS: Prostate is enlarged.    BOWEL: Moderate stool burden throughout the colon. Large rectal stool   burden. There is asymmetric soft tissue thickening of the posterior wall   of the distal rectum. Mild perirectal fat stranding. No bowel obstruction.  PERITONEUM: No free air.  VESSELS: Atherosclerotic changes.  RETROPERITONEUM/LYMPH NODES: No lymphadenopathy.  ABDOMINAL WALL: Within normal limits.  BONES: Degenerative changes.    IMPRESSION:    Stool distended rectum with wall thickening and perirectal fat stranding   consistent with stercoral colitis.    Asymmetric irregular posterior rectal wall soft tissue thickening.   Differential includes neoplasm, sequelae of infectious or inflammatory   process, versus muscular hypertrophy. Recommend scope with tissue   sampling.    Foci of airwithin the bladder. Correlate for recent instrumentation.    --- End of Report ---          ALEJANDRO LOONEY MD; Resident Radiologist  This document has been electronically signed.  JAG CAMPOS MD; Attending Radiologist  This document has been electronically signed. 2022  1:01AM    < end of copied text >  < from: Transthoracic Echocardiogram (21 @ 12:55) >    Patient name: DESHAWN TIWARI  YOB: 1940   Age: 81 (M)   MR#: [id number]  Study Date: 2021  Location: [address] [place], [place] [address]Sonographer: Sinai Chavez RDCS  [facility] Sonographer: Josefa Hinds RDCS  Study quality: Technically Difficult/Limited  Referring Physician: Ophelia Rice MD  Blood Pressure: 154/83 mmHg  Height: 167 cm  Weight: 81 kg  BSA: 1.9 m2  ------------------------------------------------------------------------  PROCEDURE: Transthoracic echocardiogram with 2-D, M-Mode  and complete spectral and color flow Doppler.  INDICATION: Nonrheumatic mitral valve disorder, unspecified  (I34.9)  ------------------------------------------------------------------------  OBSERVATIONS:  Mitral Valve: Mitral valve not well visualized, probably  normal.  Aortic Root: Normal aortic root.  Aortic Valve: Aortic valve not well visualized.  Left Atrium: Normal left atrium.  Left Ventricle: Endocardium not well visualized; grossly  normal left ventricular systolic function. Normal left  ventricular internal dimensions and wall thicknesses. Mild  diastolic dysfunction (Stage I).  Right Heart: A device wire is noted in the right heart.  Normal right ventricular size and function. Normal  tricuspid valve. Normal pulmonic valve.  Pericardium/PleuraNormal pericardium with no pericardial  effusion.  ------------------------------------------------------------------------  CONCLUSIONS:  1. Endocardium not well visualized; grossly normal left  ventricular systolic function.  2. Mild diastolic dysfunction (Stage I).  3. A device wire is noted in the right heart.  4. Normal right ventricular size and function.  *** Compared with echocardiogram of 2021, no  significant changes noted.  ------------------------------------------------------------------------  Confirmed on  2021 - 14:16:38 by Sadaf Peace MD  ------------------------------------------------------------------------    < end of copied text >  < from: Cardiac Cath Lab - Adult (14 @ 12:25) >    [address] [address] [phone]  Cath Lab Report -- Comprehensive Report  Patient: DESHAWN TIWARI  Study date: 2014  Account number: [id number]  MR number: [id number]  : 1940  Gender: Male  Race: B  Case Physician(s):  Manohar Ellington M.D.  Fellow:  Huang Glasgow M.D.  Referring Physician:  Ravi Tay M.D.  INDICATIONS: Angina/MI: stable angina, CCS class II. Cardiac: arrhythmia.  HISTORY: There was no prior cardiac history. The patient has hypertension  and oral hypoglycemic-treated diabetes.  PRIOR DIAGNOSTIC TEST RESULTS: Nuclear stress test was unavailable.  PROCEDURE:  --  Left coronary angiography.  --  Right coronary angiography.  TECHNIQUE: The risks and alternatives of the procedures and conscious  sedation were explained to the patient and informed consent was obtained.  Cardiac catheterization performed electively.  Local anesthetic given. Right femoral artery access. Left coronary artery  angiography. The vessel was injected utilizing a catheter. Right coronary  artery angiography. The vessel was injected utilizing a catheter.  RADIATION EXPOSURE: 6 min.  CONTRAST GIVEN: 49 ml Optiray.  MEDICATIONS GIVEN: Midazolam, 0.5 mg, IV. Fentanyl, 25 mcg, IV.2%  Lidocaine, 10 ml, subcutaneously. 0.9% Normal saline, 30 ml, IV.  CORONARY VESSELS: The coronary circulation is right dominant.  LM:   --  LM: Angiography showed minor luminal irregularities with no flow  limiting lesions.  LAD:   --  Distal LAD:There was a 100 % stenosis.  CX:   --  OM3: There was a 100 % stenosis.  RCA:   --  Distal RCA: There was a 100 % stenosis.  COMPLICATIONS: There were no complications. No complications occurred  during the cath lab visit.  DIAGNOSTIC RECOMMENDATIONS: Will get EP to evaluate patient for symptomatic  bradycardia.  INTERVENTIONAL RECOMMENDATIONS: Will get EP to evaluate patient for  symptomatic bradycardia.  Prepared and signed by  Manohar Ellington M.D.  Signed 2014 13:07:25  HEMODYNAMIC TABLES  Pressures:  Baseline  Pressures:  - HR: 66  Pressures:  - Rhythm:  Pressures:  -- Aortic Pressure (S/D/M): 189/93/126  Outputs:  Baseline  Outputs:  -- CALCULATIONS[age]  Outputs:  -- CALCULATIONS: Body Surface Area: 2.05  Outputs:  -- CALCULATIONS: Height in cm: 173.00  Outputs:  -- CALCULATIONS: Sex: Male  Outputs:  -- CALCULATIONS: Weight in k.70  Outputs:  -- OUTPUTS: O2 consumption: 255.77  Outputs:  -- OUTPUTS: Vo2 Indexed: 125.00    < end of copied text >

## 2022-02-17 ENCOUNTER — TRANSCRIPTION ENCOUNTER (OUTPATIENT)
Age: 82
End: 2022-02-17

## 2022-02-17 LAB
ANION GAP SERPL CALC-SCNC: 11 MMOL/L — SIGNIFICANT CHANGE UP (ref 7–14)
BASOPHILS # BLD AUTO: 0.05 K/UL — SIGNIFICANT CHANGE UP (ref 0–0.2)
BASOPHILS NFR BLD AUTO: 1.1 % — SIGNIFICANT CHANGE UP (ref 0–2)
BUN SERPL-MCNC: 14 MG/DL — SIGNIFICANT CHANGE UP (ref 7–23)
CALCIUM SERPL-MCNC: 10.1 MG/DL — SIGNIFICANT CHANGE UP (ref 8.4–10.5)
CHLORIDE SERPL-SCNC: 104 MMOL/L — SIGNIFICANT CHANGE UP (ref 98–107)
CO2 SERPL-SCNC: 26 MMOL/L — SIGNIFICANT CHANGE UP (ref 22–31)
CREAT SERPL-MCNC: 1.45 MG/DL — HIGH (ref 0.5–1.3)
EOSINOPHIL # BLD AUTO: 0.21 K/UL — SIGNIFICANT CHANGE UP (ref 0–0.5)
EOSINOPHIL NFR BLD AUTO: 4.7 % — SIGNIFICANT CHANGE UP (ref 0–6)
GLUCOSE SERPL-MCNC: 80 MG/DL — SIGNIFICANT CHANGE UP (ref 70–99)
HCT VFR BLD CALC: 31.3 % — LOW (ref 39–50)
HGB BLD-MCNC: 10.5 G/DL — LOW (ref 13–17)
IANC: 2.03 K/UL — SIGNIFICANT CHANGE UP (ref 1.5–8.5)
IMM GRANULOCYTES NFR BLD AUTO: 0.9 % — SIGNIFICANT CHANGE UP (ref 0–1.5)
LYMPHOCYTES # BLD AUTO: 1.61 K/UL — SIGNIFICANT CHANGE UP (ref 1–3.3)
LYMPHOCYTES # BLD AUTO: 36 % — SIGNIFICANT CHANGE UP (ref 13–44)
MAGNESIUM SERPL-MCNC: 2.2 MG/DL — SIGNIFICANT CHANGE UP (ref 1.6–2.6)
MCHC RBC-ENTMCNC: 30.2 PG — SIGNIFICANT CHANGE UP (ref 27–34)
MCHC RBC-ENTMCNC: 33.5 GM/DL — SIGNIFICANT CHANGE UP (ref 32–36)
MCV RBC AUTO: 89.9 FL — SIGNIFICANT CHANGE UP (ref 80–100)
MONOCYTES # BLD AUTO: 0.53 K/UL — SIGNIFICANT CHANGE UP (ref 0–0.9)
MONOCYTES NFR BLD AUTO: 11.9 % — SIGNIFICANT CHANGE UP (ref 2–14)
NEUTROPHILS # BLD AUTO: 2.03 K/UL — SIGNIFICANT CHANGE UP (ref 1.8–7.4)
NEUTROPHILS NFR BLD AUTO: 45.4 % — SIGNIFICANT CHANGE UP (ref 43–77)
NRBC # BLD: 0 /100 WBCS — SIGNIFICANT CHANGE UP
NRBC # FLD: 0 K/UL — SIGNIFICANT CHANGE UP
PHOSPHATE SERPL-MCNC: 2.7 MG/DL — SIGNIFICANT CHANGE UP (ref 2.5–4.5)
PLATELET # BLD AUTO: 238 K/UL — SIGNIFICANT CHANGE UP (ref 150–400)
POTASSIUM SERPL-MCNC: 3.9 MMOL/L — SIGNIFICANT CHANGE UP (ref 3.5–5.3)
POTASSIUM SERPL-SCNC: 3.9 MMOL/L — SIGNIFICANT CHANGE UP (ref 3.5–5.3)
RBC # BLD: 3.48 M/UL — LOW (ref 4.2–5.8)
RBC # FLD: 14 % — SIGNIFICANT CHANGE UP (ref 10.3–14.5)
SODIUM SERPL-SCNC: 141 MMOL/L — SIGNIFICANT CHANGE UP (ref 135–145)
WBC # BLD: 4.47 K/UL — SIGNIFICANT CHANGE UP (ref 3.8–10.5)
WBC # FLD AUTO: 4.47 K/UL — SIGNIFICANT CHANGE UP (ref 3.8–10.5)

## 2022-02-17 PROCEDURE — 99232 SBSQ HOSP IP/OBS MODERATE 35: CPT

## 2022-02-17 RX ORDER — METOPROLOL TARTRATE 50 MG
1 TABLET ORAL
Qty: 0 | Refills: 0 | DISCHARGE

## 2022-02-17 RX ADMIN — Medication 81 MILLIGRAM(S): at 13:27

## 2022-02-17 RX ADMIN — ISOSORBIDE MONONITRATE 30 MILLIGRAM(S): 60 TABLET, EXTENDED RELEASE ORAL at 13:28

## 2022-02-17 RX ADMIN — AMLODIPINE BESYLATE 10 MILLIGRAM(S): 2.5 TABLET ORAL at 05:04

## 2022-02-17 RX ADMIN — LISINOPRIL 10 MILLIGRAM(S): 2.5 TABLET ORAL at 05:03

## 2022-02-17 NOTE — PROGRESS NOTE ADULT - SUBJECTIVE AND OBJECTIVE BOX
SUBJECTIVE: Pt seen, chart reviewed.  F/U visit for 81 yo diabetic male with prior h/o right buttock wound    Allergies    No Known Allergies    Intolerances        aspirin enteric coated 81 milliGRAM(s) Oral daily      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, 1998    HTN (hypertension)    Dementia    HLD (hyperlipidemia)    DM (diabetes mellitus)    Asthma    CAD (coronary artery disease)  s/p stent placement    Sinus node dysfunction  s/p Medtronic PPM    Cardiac pacemaker  Medtronic (SN: [id number]; Model: [id number])    S/P coronary artery stent placement        HEALTH ISSUES - PROBLEM Dx:  Dementia    Acute cystitis    Stercoral colitis    DVT prophylaxis    Rectal lesion    TAE (acute kidney injury)    Sacral decubitus ulcer    CAD (coronary artery disease)            FAMILY HISTORY:  FH: diabetes mellitus  Mother, Brother    FHx: dementia  Father        Physical Exam:  Vital Signs Last 24 Hrs  T(C): 36.2 (17 Feb 2022 05:00), Max: 36.9 (16 Feb 2022 13:00)  T(F): 97.2 (17 Feb 2022 05:00), Max: 98.4 (16 Feb 2022 13:00)  HR: 48 (17 Feb 2022 05:00) (48 - 72)  BP: 151/83 (17 Feb 2022 05:00) (125/76 - 152/62)  BP(mean): --  RR: 18 (17 Feb 2022 05:00) (16 - 18)  SpO2: 100% (17 Feb 2022 05:00) (97% - 100%)  Patient is not responsive and appears somnolent  His upper extremities are not mobile   he is at bedrest  He is not obese  The right buttock wound previously noted has closed    LABS:                        10.5   4.47  )-----------( 238      ( 17 Feb 2022 07:04 )             31.3     PT/INR - ( 16 Feb 2022 06:36 )   PT: 12.5 sec;   INR: 1.10 ratio         Advise - offload per protocol

## 2022-02-17 NOTE — DISCHARGE NOTE NURSING/CASE MANAGEMENT/SOCIAL WORK - NSDCFUADDAPPT_GEN_ALL_CORE_FT
Follow up as outpatient at [facility] [address] [facility] [phone]

## 2022-02-17 NOTE — PROGRESS NOTE ADULT - ASSESSMENT
Patient is an 82 yo man with:    1. Dementia, bedbound  2. HTN  3. Hyperlipidemia  4. CVA with Right-sided residual weakness  5. CAD s/p PCI  6. Status post prior PPM extraction  7. Asthma  8. Stercoral colitis. CT abdomen recently noting:  Asymmetric irregular posterior rectal wall soft tissue thickening.   Differential includes neoplasm, sequelae of infectious or inflammatory   process, versus muscular hypertrophy. Recommend scope with tissue   sampling.  9. Recent completion of IV abx for Ecoli and MRSA Bacteremia via PICC    Patient brought in by wife for constipation of three weeks duration with associated abdominal distention and discomfort.   Based on recent findings from CT abdomen, GI performed flex sig last night -- no significant findings noted by the GI team.    Cardiac telemetry notes sinus bradycardia as low as the upper 30s bpm, but mostly 50-60s bpm.  With bedside maneuvers, his HR appears to respond appropriately with exertion.  No evidence of active cardiac ischemia, CHF, or arrhythmia.  My review of an echocardiogram performed in 12/28/21 noted normal biventricular systolic function, mild diastolic dysfunction.  No additional inpatient cardiac testing needed at this time.

## 2022-02-17 NOTE — DISCHARGE NOTE NURSING/CASE MANAGEMENT/SOCIAL WORK - NSSCNAMETXT_GEN_ALL_CORE
[facility] for REGISTERED NURSE as scheduled by agency [facility] for REGISTERED NURSE as scheduled by agency.  HHA 9.5 hours/7 day from [facility] [phone]. Agency [facility].

## 2022-02-17 NOTE — DISCHARGE NOTE NURSING/CASE MANAGEMENT/SOCIAL WORK - PATIENT PORTAL LINK FT
You can access the FollowMyHealth Patient Portal offered by [facility] by registering at the following website: [email or web address]. By joining [employer]’s FollowMyHealth portal, you will also be able to view your health information using other applications (apps) compatible with our system.

## 2022-02-17 NOTE — DISCHARGE NOTE NURSING/CASE MANAGEMENT/SOCIAL WORK - NSDCPEFALRISK_GEN_ALL_CORE
For information on Fall & Injury Prevention, visit: [email or web address] OR  [email or web address] OR  https://www.cdc.gov/steadi/patient.html

## 2022-02-17 NOTE — PROGRESS NOTE ADULT - SUBJECTIVE AND OBJECTIVE BOX
Cardiology/Vascular Medicine Inpatient Progress Note    Status post flex sig performed last night -- no significant findings noted by the GI team.    Vital Signs Last 24 Hrs  T(C): 36.2 (2022 05:00), Max: 36.9 (2022 13:00)  T(F): 97.2 (2022 05:00), Max: 98.4 (2022 13:00)  HR: 48 (2022 05:00) (47 - 72)  BP: 151/83 (2022 05:00) (125/76 - 154/64)  BP(mean): --  RR: 18 (2022 05:00) (15 - 18)  SpO2: 100% (2022 05:00) (93% - 100%)    I&O's Detail    2022 07:01  -  2022 07:00  --------------------------------------------------------  IN:  Total IN: 0 mL    OUT:    Blood Loss (mL): 1 mL  Total OUT: 1 mL    Total NET: -1 mL    Appearance: NAD, laying flat in bed  HEENT:   No JVD  Cardiovascular: Normal S1 S2, 2/6 SM  Respiratory: Lungs clear to auscultation	  Psychiatry: Awake, intermittently answers questions appropriately  Gastrointestinal:  Soft, Non-tender, + BS	  Neurologic: Right-sided weakness  Extremities: No edema    MEDICATIONS  (STANDING):  amLODIPine   Tablet 10 milliGRAM(s) Oral daily  aspirin enteric coated 81 milliGRAM(s) Oral daily  atorvastatin 40 milliGRAM(s) Oral at bedtime  isosorbide   mononitrate ER Tablet (IMDUR) 30 milliGRAM(s) Oral daily  lisinopril 10 milliGRAM(s) Oral daily  polyethylene glycol 3350 17 Gram(s) Oral <User Schedule>  polyethylene glycol/electrolyte Solution 1000 milliLiter(s) Oral once  polyethylene glycol/electrolyte Solution        MEDICATIONS  (PRN):  melatonin 3 milliGRAM(s) Oral at bedtime PRN Insomnia  OLANZapine Injectable 1.25 milliGRAM(s) IntraMuscular every 6 hours PRN Severe agitation  ondansetron Injectable 4 milliGRAM(s) IV Push every 8 hours PRN Nausea and/or Vomiting      LABS:	 	                          10.8   4.56  )-----------( 233      ( 2022 06:36 )             33.0   02-16    141  |  104  |  13  ----------------------------<  94  3.9   |  23  |  1.52<H>    Ca    9.6      2022 06:36  Phos  3.5     -  Mg     2.10         TPro  7.1  /  Alb  3.6  /  TBili  1.5<H>  /  DBili  x   /  AST  21  /  ALT  13  /  AlkPhos  78        < from: CT Abdomen and Pelvis w/ IV Cont (22 @ 22:28) >    ACC: [id number] EXAM:  CT ABDOMEN AND PELVIS IC                          PROCEDURE DATE:  2022          INTERPRETATION:  CLINICAL INFORMATION: Acute nonlocalized abdominal pain.   3 weeks of constipation. History of stercoral colitis    COMPARISON: CT abdomen and pelvis from 2021    CONTRAST/COMPLICATIONS:  IV Contrast: Omnipaque 350  90 cc administered   10 cc discarded  Oral Contrast: NONE  Complications: None reported at time of study completion    PROCEDURE:  CT of the Abdomen andPelvis was performed.  Sagittal and coronal reformats were performed.    FINDINGS:    LOWER CHEST: Right lung basilar atelectasis. Coronary artery   calcifications..  LIVER: Within normal limits.  BILE DUCTS: Normal caliber.  GALLBLADDER: Within normal limits.  SPLEEN: Within normal limits.  PANCREAS: Atrophic.  ADRENALS: Redemonstrated indeterminate left adrenal nodule, unchanged.  KIDNEYS/URETERS: Symmetric enhancement. No hydronephrosis. Bilateral   renal cysts and hypodensities too small to characterize.    BLADDER: Suggestion of asymmetric thickening of the posterior bladder   wall, however not well evaluated secondary to underdistention. Foci of   air seen within the bladder.  REPRODUCTIVE ORGANS: Prostate is enlarged.    BOWEL: Moderate stool burden throughout the colon. Large rectal stool   burden. There is asymmetric soft tissue thickening of the posterior wall   of the distal rectum. Mild perirectal fat stranding. No bowel obstruction.  PERITONEUM: No free air.  VESSELS: Atherosclerotic changes.  RETROPERITONEUM/LYMPH NODES: No lymphadenopathy.  ABDOMINAL WALL: Within normal limits.  BONES: Degenerative changes.    IMPRESSION:    Stool distended rectum with wall thickening and perirectal fat stranding   consistent with stercoral colitis.    Asymmetric irregular posterior rectal wall soft tissue thickening.   Differential includes neoplasm, sequelae of infectious or inflammatory   process, versus muscular hypertrophy. Recommend scope with tissue   sampling.    Foci of airwithin the bladder. Correlate for recent instrumentation.    --- End of Report ---          ALEJANDRO LOONEY MD; Resident Radiologist  This document has been electronically signed.  JAG CAMPOS MD; Attending Radiologist  This document has been electronically signed. 2022  1:01AM    < end of copied text >  < from: Transthoracic Echocardiogram (21 @ 12:55) >    Patient name: DESHAWN TIWARI  YOB: 1940   Age: 81 (M)   MR#: [id number]  Study Date: 2021  Location: [address] [place] [address]-Sonographer: Sinai Chavez RDCS  2nd Sonographer: Josefa Hinds RDCS  Study quality: Technically Difficult/Limited  Referring Physician: Ophelia Rice MD  Blood Pressure: 154/83 mmHg  Height: 167 cm  Weight: 81 kg  BSA: 1.9 m2  ------------------------------------------------------------------------  PROCEDURE: Transthoracic echocardiogram with 2-D, M-Mode  and complete spectral and color flow Doppler.  INDICATION: Nonrheumatic mitral valve disorder, unspecified  (I34.9)  ------------------------------------------------------------------------  OBSERVATIONS:  Mitral Valve: Mitral valve not well visualized, probably  normal.  Aortic Root: Normal aortic root.  Aortic Valve: Aortic valve not well visualized.  Left Atrium: Normal left atrium.  Left Ventricle: Endocardium not well visualized; grossly  normal left ventricular systolic function. Normal left  ventricular internal dimensions and wall thicknesses. Mild  diastolic dysfunction (Stage I).  Right Heart: A device wire is noted in the right heart.  Normal right ventricular size and function. Normal  tricuspid valve. Normal pulmonic valve.  Pericardium/PleuraNormal pericardium with no pericardial  effusion.  ------------------------------------------------------------------------  CONCLUSIONS:  1. Endocardium not well visualized; grossly normal left  ventricular systolic function.  2. Mild diastolic dysfunction (Stage I).  3. A device wire is noted in the right heart.  4. Normal right ventricular size and function.  *** Compared with echocardiogram of 2021, no  significant changes noted.  ------------------------------------------------------------------------  Confirmed on  2021 - 14:16:38 by Sadaf Peace MD  ------------------------------------------------------------------------    < end of copied text >  < from: Cardiac Cath Lab - Adult (14 @ 12:25) >    Health system  [address] [address] [place] [address] [id number] [phone]  Cath Lab Report -- Comprehensive Report  Patient: DESHAWN TIWARI  Study date: 2014  Account number: [id number]  MR number: [id number]  : 1940  Gender: Male  Race: B  Case Physician(s):  Manohar Ellington M.D.  Fellow:  Huang Glasgow M.D.  Referring Physician:  Ravi Tay M.D.  INDICATIONS: Angina/MI: stable angina, CCS class II. Cardiac: arrhythmia.  HISTORY: There was no prior cardiac history. The patient has hypertension  and oral hypoglycemic-treated diabetes.  PRIOR DIAGNOSTIC TEST RESULTS: Nuclear stress test was unavailable.  PROCEDURE:  --  Left coronary angiography.  --  Right coronary angiography.  TECHNIQUE: The risks and alternatives of the procedures and conscious  sedation were explained to the patient and informed consent was obtained.  Cardiac catheterization performed electively.  Local anesthetic given. Right femoral artery access. Left coronary artery  angiography. The vessel was injected utilizing a catheter. Right coronary  artery angiography. The vessel was injected utilizing a catheter.  RADIATION EXPOSURE: 6 min.  CONTRAST GIVEN: 49 ml Optiray.  MEDICATIONS GIVEN: Midazolam, 0.5 mg, IV. Fentanyl, 25 mcg, IV.2%  Lidocaine, 10 ml, subcutaneously. 0.9% Normal saline, 30 ml, IV.  CORONARY VESSELS: The coronary circulation is right dominant.  LM:   --  LM: Angiography showed minor luminal irregularities with no flow  limiting lesions.  LAD:   --  Distal LAD:There was a 100 % stenosis.  CX:   --  OM3: There was a 100 % stenosis.  RCA:   --  Distal RCA: There was a 100 % stenosis.  COMPLICATIONS: There were no complications. No complications occurred  during the cath lab visit.  DIAGNOSTIC RECOMMENDATIONS: Will get EP to evaluate patient for symptomatic  bradycardia.  INTERVENTIONAL RECOMMENDATIONS: Will get EP to evaluate patient for  symptomatic bradycardia.  Prepared and signed by  Manohar Ellington M.D.  Signed 2014 13:07:25  HEMODYNAMIC TABLES  Pressures:  Baseline  Pressures:  - HR: 66  Pressures:  - Rhythm:  Pressures:  -- Aortic Pressure (S/D/M): 189/93/126  Outputs:  Baseline  Outputs:  -- CALCULATIONS[age]  Outputs:  -- CALCULATIONS: Body Surface Area: 2.05  Outputs:  -- CALCULATIONS: Height in cm: 173.00  Outputs:  -- CALCULATIONS: Sex: Male  Outputs:  -- CALCULATIONS: Weight in k.70  Outputs:  -- OUTPUTS: O2 consumption: 255.77  Outputs:  -- OUTPUTS: Vo2 Indexed: 125.00    < end of copied text >

## 2022-02-17 NOTE — PROGRESS NOTE ADULT - SUBJECTIVE AND OBJECTIVE BOX
Patient is a 81y old  Male who presents with a chief complaint of Constipation x 3 weeks (10 Feb 2022 14:14)    2022  HPI:  No new symptoms. Constipation persists      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,     HTN (hypertension)    Dementia    HLD (hyperlipidemia)    DM (diabetes mellitus)    Asthma    CAD (coronary artery disease)  s/p stent placement    Sinus node dysfunction  s/p Medtronic PPM    Cardiac pacemaker  Medtronic (SN: [id number]; Model: [id number])    S/P coronary artery stent placement        Review of Systems:   CONSTITUTIONAL: No fever, weight loss, or fatigue  EYES: No eye pain, visual disturbances, or discharge  ENMT:  No difficulty hearing, tinnitus, vertigo; No sinus or throat pain  NECK: No pain or stiffness  BREASTS: No pain, masses, or nipple discharge  RESPIRATORY: No cough, wheezing, chills or hemoptysis; No shortness of breath  CARDIOVASCULAR: No chest pain, palpitations, dizziness, or leg swelling  GASTROINTESTINAL: No abdominal or epigastric pain. No nausea, vomiting, or hematemesis; GENITOURINARY: No dysuria, frequency, hematuria, or incontinence  NEUROLOGICAL: No headaches, memory loss, Right sided weakness +  SKIN: No itching, burning, rashes, or lesions   LYMPH NODES: No enlarged glands  ENDOCRINE: No heat or cold intolerance; No hair loss  MUSCULOSKELETAL: No joint pain or swelling; No muscle, back, or extremity pain  PSYCHIATRIC: No depression, anxiety, mood swings, or difficulty sleeping  HEME/LYMPH: No easy bruising, or bleeding gums  ALLERY AND IMMUNOLOGIC: No hives or eczema    Allergies    No Known Allergies    Intolerances        Social History:     FAMILY HISTORY:  FH: diabetes mellitus  Mother, Brother    FHx: dementia  Father        MEDICATIONS  (STANDING):  amLODIPine   Tablet 10 milliGRAM(s) Oral daily  aspirin enteric coated 81 milliGRAM(s) Oral daily  atorvastatin 40 milliGRAM(s) Oral at bedtime  isosorbide   mononitrate ER Tablet (IMDUR) 30 milliGRAM(s) Oral daily  lactated ringers. 1000 milliLiter(s) (100 mL/Hr) IV Continuous <Continuous>  lactulose Syrup 10 Gram(s) Oral every 8 hours  lisinopril 10 milliGRAM(s) Oral daily  metoprolol succinate ER 25 milliGRAM(s) Oral daily  polyethylene glycol 3350 17 Gram(s) Oral <User Schedule>    MEDICATIONS  (PRN):  melatonin 3 milliGRAM(s) Oral at bedtime PRN Insomnia  OLANZapine Injectable 1.25 milliGRAM(s) IntraMuscular every 6 hours PRN Severe agitation  ondansetron Injectable 4 milliGRAM(s) IV Push every 8 hours PRN Nausea and/or Vomiting        CAPILLARY BLOOD GLUCOSE      POCT Blood Glucose.: 90 mg/dL (10 Feb 2022 08:07)  POCT Blood Glucose.: 89 mg/dL (10 Feb 2022 01:45)  POCT Blood Glucose.: 112 mg/dL (2022 22:05)    I&O's Summary    2022 07:01  -  10 Feb 2022 07:00  --------------------------------------------------------  IN: 0 mL / OUT: 2 mL / NET: -2 mL        PHYSICAL EXAM:  Vital Signs Last 24 Hrs  T(C): 36.5 (10 Feb 2022 12:54), Max: 37.2 (2022 18:06)  T(F): 97.7 (10 Feb 2022 12:54), Max: 98.9 (2022 18:06)  HR: 65 (10 Feb 2022 12:54) (60 - 68)  BP: 130/67 (10 Feb 2022 12:54) (130/67 - 177/94)  BP(mean): --  RR: 16 (10 Feb 2022 12:54) (16 - 18)  SpO2: 100% (10 Feb 2022 12:54) (98% - 100%)    GENERAL: NAD, well-developed  HEAD:  Atraumatic, Normocephalic  EYES: EOMI, PERRLA, conjunctiva and sclera clear  NECK: Supple, No JVD  CHEST/LUNG: Clear to auscultation bilaterally; No wheeze  HEART: Regular rate and rhythm; No murmurs, rubs, or gallops  ABDOMEN: Soft, Nontender, Nondistended; Bowel sounds present  EXTREMITIES:  2+ Peripheral Pulses, No clubbing, cyanosis, or edema  PSYCH: AAOx3  NEUROLOGY: Right side is weak.  SKIN: No rashes or lesions    LABS:                        10.8   4.97  )-----------( 198      ( 10 Feb 2022 07:16 )             31.4     02-10    141  |  101  |  10  ----------------------------<  80  3.3<L>   |  31  |  1.24    Ca    9.5      10 Feb 2022 07:11  Phos  2.4     02-10  Mg     1.90     02-10    TPro  6.4  /  Alb  3.4  /  TBili  1.5<H>  /  DBili  x   /  AST  17  /  ALT  14  /  AlkPhos  81  02-08    PT/INR - ( 2022 20:17 )   PT: 12.8 sec;   INR: 1.13 ratio         PTT - ( 2022 20:17 )  PTT:31.9 sec      Urinalysis Basic - ( 2022 22:16 )    Color: Light Yellow / Appearance: Clear / S.014 / pH: x  Gluc: x / Ketone: Negative  / Bili: Negative / Urobili: <2 mg/dL   Blood: x / Protein: Trace / Nitrite: Negative   Leuk Esterase: Moderate / RBC: 4 /HPF / WBC 10 /HPF   Sq Epi: x / Non Sq Epi: 5 /HPF / Bacteria: Few        RADIOLOGY & ADDITIONAL TESTS:    Imaging Personally Reviewed:    Consultant(s) Notes Reviewed:      Care Discussed with Consultants/Other Providers:

## 2022-02-18 LAB
ANION GAP SERPL CALC-SCNC: 14 MMOL/L — SIGNIFICANT CHANGE UP (ref 7–14)
BASOPHILS # BLD AUTO: 0.04 K/UL — SIGNIFICANT CHANGE UP (ref 0–0.2)
BASOPHILS NFR BLD AUTO: 0.9 % — SIGNIFICANT CHANGE UP (ref 0–2)
BUN SERPL-MCNC: 13 MG/DL — SIGNIFICANT CHANGE UP (ref 7–23)
CALCIUM SERPL-MCNC: 9.9 MG/DL — SIGNIFICANT CHANGE UP (ref 8.4–10.5)
CHLORIDE SERPL-SCNC: 106 MMOL/L — SIGNIFICANT CHANGE UP (ref 98–107)
CO2 SERPL-SCNC: 24 MMOL/L — SIGNIFICANT CHANGE UP (ref 22–31)
CREAT SERPL-MCNC: 1.41 MG/DL — HIGH (ref 0.5–1.3)
EOSINOPHIL # BLD AUTO: 0.21 K/UL — SIGNIFICANT CHANGE UP (ref 0–0.5)
EOSINOPHIL NFR BLD AUTO: 4.9 % — SIGNIFICANT CHANGE UP (ref 0–6)
GLUCOSE SERPL-MCNC: 89 MG/DL — SIGNIFICANT CHANGE UP (ref 70–99)
HCT VFR BLD CALC: 29.8 % — LOW (ref 39–50)
HGB BLD-MCNC: 9.7 G/DL — LOW (ref 13–17)
IANC: 1.91 K/UL — SIGNIFICANT CHANGE UP (ref 1.5–8.5)
IMM GRANULOCYTES NFR BLD AUTO: 0.5 % — SIGNIFICANT CHANGE UP (ref 0–1.5)
LYMPHOCYTES # BLD AUTO: 1.48 K/UL — SIGNIFICANT CHANGE UP (ref 1–3.3)
LYMPHOCYTES # BLD AUTO: 34.7 % — SIGNIFICANT CHANGE UP (ref 13–44)
MAGNESIUM SERPL-MCNC: 2.1 MG/DL — SIGNIFICANT CHANGE UP (ref 1.6–2.6)
MCHC RBC-ENTMCNC: 29.6 PG — SIGNIFICANT CHANGE UP (ref 27–34)
MCHC RBC-ENTMCNC: 32.6 GM/DL — SIGNIFICANT CHANGE UP (ref 32–36)
MCV RBC AUTO: 90.9 FL — SIGNIFICANT CHANGE UP (ref 80–100)
MONOCYTES # BLD AUTO: 0.6 K/UL — SIGNIFICANT CHANGE UP (ref 0–0.9)
MONOCYTES NFR BLD AUTO: 14.1 % — HIGH (ref 2–14)
NEUTROPHILS # BLD AUTO: 1.91 K/UL — SIGNIFICANT CHANGE UP (ref 1.8–7.4)
NEUTROPHILS NFR BLD AUTO: 44.9 % — SIGNIFICANT CHANGE UP (ref 43–77)
NRBC # BLD: 0 /100 WBCS — SIGNIFICANT CHANGE UP
NRBC # FLD: 0 K/UL — SIGNIFICANT CHANGE UP
PHOSPHATE SERPL-MCNC: 2.1 MG/DL — LOW (ref 2.5–4.5)
PLATELET # BLD AUTO: 232 K/UL — SIGNIFICANT CHANGE UP (ref 150–400)
POTASSIUM SERPL-MCNC: 3.7 MMOL/L — SIGNIFICANT CHANGE UP (ref 3.5–5.3)
POTASSIUM SERPL-SCNC: 3.7 MMOL/L — SIGNIFICANT CHANGE UP (ref 3.5–5.3)
RBC # BLD: 3.28 M/UL — LOW (ref 4.2–5.8)
RBC # FLD: 13.8 % — SIGNIFICANT CHANGE UP (ref 10.3–14.5)
SODIUM SERPL-SCNC: 144 MMOL/L — SIGNIFICANT CHANGE UP (ref 135–145)
WBC # BLD: 4.26 K/UL — SIGNIFICANT CHANGE UP (ref 3.8–10.5)
WBC # FLD AUTO: 4.26 K/UL — SIGNIFICANT CHANGE UP (ref 3.8–10.5)

## 2022-02-18 RX ORDER — SODIUM,POTASSIUM PHOSPHATES 278-250MG
1 POWDER IN PACKET (EA) ORAL
Refills: 0 | Status: COMPLETED | OUTPATIENT
Start: 2022-02-18 | End: 2022-02-20

## 2022-02-18 RX ADMIN — ATORVASTATIN CALCIUM 40 MILLIGRAM(S): 80 TABLET, FILM COATED ORAL at 22:33

## 2022-02-18 RX ADMIN — ISOSORBIDE MONONITRATE 30 MILLIGRAM(S): 60 TABLET, EXTENDED RELEASE ORAL at 11:38

## 2022-02-18 RX ADMIN — Medication 1 TABLET(S): at 22:40

## 2022-02-18 RX ADMIN — AMLODIPINE BESYLATE 10 MILLIGRAM(S): 2.5 TABLET ORAL at 05:17

## 2022-02-18 RX ADMIN — POLYETHYLENE GLYCOL 3350 17 GRAM(S): 17 POWDER, FOR SOLUTION ORAL at 22:32

## 2022-02-18 RX ADMIN — Medication 1 TABLET(S): at 18:23

## 2022-02-18 RX ADMIN — LISINOPRIL 10 MILLIGRAM(S): 2.5 TABLET ORAL at 05:17

## 2022-02-18 RX ADMIN — Medication 81 MILLIGRAM(S): at 11:39

## 2022-02-18 RX ADMIN — POLYETHYLENE GLYCOL 3350 17 GRAM(S): 17 POWDER, FOR SOLUTION ORAL at 11:38

## 2022-02-18 NOTE — PROGRESS NOTE ADULT - SUBJECTIVE AND OBJECTIVE BOX
Patient is a 81y old  Male who presents with a chief complaint of Constipation x 3 weeks (10 Feb 2022 14:14)    2022  HPI:  No new symptoms.     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,     HTN (hypertension)    Dementia    HLD (hyperlipidemia)    DM (diabetes mellitus)    Asthma    CAD (coronary artery disease)  s/p stent placement    Sinus node dysfunction  s/p Medtronic PPM    Cardiac pacemaker  Medtronic (SN: [id number]; Model: [id number])    S/P coronary artery stent placement        Review of Systems:   CONSTITUTIONAL: No fever, weight loss, or fatigue  EYES: No eye pain, visual disturbances, or discharge  ENMT:  No difficulty hearing, tinnitus, vertigo; No sinus or throat pain  NECK: No pain or stiffness  BREASTS: No pain, masses, or nipple discharge  RESPIRATORY: No cough, wheezing, chills or hemoptysis; No shortness of breath  CARDIOVASCULAR: No chest pain, palpitations, dizziness, or leg swelling  GASTROINTESTINAL: No abdominal or epigastric pain. No nausea, vomiting, or hematemesis; GENITOURINARY: No dysuria, frequency, hematuria, or incontinence  NEUROLOGICAL: No headaches, memory loss, Right sided weakness +  SKIN: No itching, burning, rashes, or lesions   LYMPH NODES: No enlarged glands  ENDOCRINE: No heat or cold intolerance; No hair loss  MUSCULOSKELETAL: No joint pain or swelling; No muscle, back, or extremity pain  PSYCHIATRIC: No depression, anxiety, mood swings, or difficulty sleeping  HEME/LYMPH: No easy bruising, or bleeding gums  ALLERY AND IMMUNOLOGIC: No hives or eczema    Allergies    No Known Allergies    Intolerances        Social History:     FAMILY HISTORY:  FH: diabetes mellitus  Mother, Brother    FHx: dementia  Father        MEDICATIONS  (STANDING):  amLODIPine   Tablet 10 milliGRAM(s) Oral daily  aspirin enteric coated 81 milliGRAM(s) Oral daily  atorvastatin 40 milliGRAM(s) Oral at bedtime  isosorbide   mononitrate ER Tablet (IMDUR) 30 milliGRAM(s) Oral daily  lactated ringers. 1000 milliLiter(s) (100 mL/Hr) IV Continuous <Continuous>  lactulose Syrup 10 Gram(s) Oral every 8 hours  lisinopril 10 milliGRAM(s) Oral daily  metoprolol succinate ER 25 milliGRAM(s) Oral daily  polyethylene glycol 3350 17 Gram(s) Oral <User Schedule>    MEDICATIONS  (PRN):  melatonin 3 milliGRAM(s) Oral at bedtime PRN Insomnia  OLANZapine Injectable 1.25 milliGRAM(s) IntraMuscular every 6 hours PRN Severe agitation  ondansetron Injectable 4 milliGRAM(s) IV Push every 8 hours PRN Nausea and/or Vomiting        CAPILLARY BLOOD GLUCOSE      POCT Blood Glucose.: 90 mg/dL (10 Feb 2022 08:07)  POCT Blood Glucose.: 89 mg/dL (10 Feb 2022 01:45)  POCT Blood Glucose.: 112 mg/dL (2022 22:05)    I&O's Summary    2022 07:01  -  10 Feb 2022 07:00  --------------------------------------------------------  IN: 0 mL / OUT: 2 mL / NET: -2 mL        PHYSICAL EXAM:  Vital Signs Last 24 Hrs  T(C): 36.5 (10 Feb 2022 12:54), Max: 37.2 (2022 18:06)  T(F): 97.7 (10 Feb 2022 12:54), Max: 98.9 (2022 18:06)  HR: 65 (10 Feb 2022 12:54) (60 - 68)  BP: 130/67 (10 Feb 2022 12:54) (130/67 - 177/94)  BP(mean): --  RR: 16 (10 Feb 2022 12:54) (16 - 18)  SpO2: 100% (10 Feb 2022 12:54) (98% - 100%)    GENERAL: NAD, well-developed  HEAD:  Atraumatic, Normocephalic  EYES: EOMI, PERRLA, conjunctiva and sclera clear  NECK: Supple, No JVD  CHEST/LUNG: Clear to auscultation bilaterally; No wheeze  HEART: Regular rate and rhythm; No murmurs, rubs, or gallops  ABDOMEN: Soft, Nontender, Nondistended; Bowel sounds present  EXTREMITIES:  2+ Peripheral Pulses, No clubbing, cyanosis, or edema  PSYCH: AAOx3  NEUROLOGY: Right side is weak.  SKIN: No rashes or lesions    LABS:                        10.8   4.97  )-----------( 198      ( 10 Feb 2022 07:16 )             31.4     02-10    141  |  101  |  10  ----------------------------<  80  3.3<L>   |  31  |  1.24    Ca    9.5      10 Feb 2022 07:11  Phos  2.4     02-10  Mg     1.90     02-10    TPro  6.4  /  Alb  3.4  /  TBili  1.5<H>  /  DBili  x   /  AST  17  /  ALT  14  /  AlkPhos  81  02-08    PT/INR - ( 2022 20:17 )   PT: 12.8 sec;   INR: 1.13 ratio         PTT - ( 2022 20:17 )  PTT:31.9 sec      Urinalysis Basic - ( 2022 22:16 )    Color: Light Yellow / Appearance: Clear / S.014 / pH: x  Gluc: x / Ketone: Negative  / Bili: Negative / Urobili: <2 mg/dL   Blood: x / Protein: Trace / Nitrite: Negative   Leuk Esterase: Moderate / RBC: 4 /HPF / WBC 10 /HPF   Sq Epi: x / Non Sq Epi: 5 /HPF / Bacteria: Few        RADIOLOGY & ADDITIONAL TESTS:    Imaging Personally Reviewed:    Consultant(s) Notes Reviewed:      Care Discussed with Consultants/Other Providers:

## 2022-02-19 LAB
ANION GAP SERPL CALC-SCNC: 11 MMOL/L — SIGNIFICANT CHANGE UP (ref 7–14)
BUN SERPL-MCNC: 12 MG/DL — SIGNIFICANT CHANGE UP (ref 7–23)
CALCIUM SERPL-MCNC: 10 MG/DL — SIGNIFICANT CHANGE UP (ref 8.4–10.5)
CHLORIDE SERPL-SCNC: 107 MMOL/L — SIGNIFICANT CHANGE UP (ref 98–107)
CO2 SERPL-SCNC: 25 MMOL/L — SIGNIFICANT CHANGE UP (ref 22–31)
CREAT SERPL-MCNC: 1.49 MG/DL — HIGH (ref 0.5–1.3)
CULTURE RESULTS: SIGNIFICANT CHANGE UP
GLUCOSE SERPL-MCNC: 92 MG/DL — SIGNIFICANT CHANGE UP (ref 70–99)
MAGNESIUM SERPL-MCNC: 2 MG/DL — SIGNIFICANT CHANGE UP (ref 1.6–2.6)
PHOSPHATE SERPL-MCNC: 2.6 MG/DL — SIGNIFICANT CHANGE UP (ref 2.5–4.5)
POTASSIUM SERPL-MCNC: 4.6 MMOL/L — SIGNIFICANT CHANGE UP (ref 3.5–5.3)
POTASSIUM SERPL-SCNC: 4.6 MMOL/L — SIGNIFICANT CHANGE UP (ref 3.5–5.3)
SODIUM SERPL-SCNC: 143 MMOL/L — SIGNIFICANT CHANGE UP (ref 135–145)
SPECIMEN SOURCE: SIGNIFICANT CHANGE UP

## 2022-02-19 RX ADMIN — Medication 1 TABLET(S): at 09:00

## 2022-02-19 RX ADMIN — POLYETHYLENE GLYCOL 3350 17 GRAM(S): 17 POWDER, FOR SOLUTION ORAL at 21:37

## 2022-02-19 RX ADMIN — ISOSORBIDE MONONITRATE 30 MILLIGRAM(S): 60 TABLET, EXTENDED RELEASE ORAL at 11:42

## 2022-02-19 RX ADMIN — ATORVASTATIN CALCIUM 40 MILLIGRAM(S): 80 TABLET, FILM COATED ORAL at 21:37

## 2022-02-19 RX ADMIN — Medication 1 TABLET(S): at 21:37

## 2022-02-19 RX ADMIN — LISINOPRIL 10 MILLIGRAM(S): 2.5 TABLET ORAL at 06:35

## 2022-02-19 RX ADMIN — AMLODIPINE BESYLATE 10 MILLIGRAM(S): 2.5 TABLET ORAL at 06:35

## 2022-02-19 RX ADMIN — Medication 81 MILLIGRAM(S): at 11:42

## 2022-02-19 RX ADMIN — Medication 1 TABLET(S): at 11:42

## 2022-02-19 RX ADMIN — Medication 1 TABLET(S): at 17:32

## 2022-02-19 RX ADMIN — POLYETHYLENE GLYCOL 3350 17 GRAM(S): 17 POWDER, FOR SOLUTION ORAL at 06:35

## 2022-02-19 NOTE — PROGRESS NOTE ADULT - SUBJECTIVE AND OBJECTIVE BOX
Patient is a 81y old  Male who presents with a chief complaint of Constipation x 3 weeks (10 Feb 2022 14:14)    2022  HPI:  No new symptoms. Aswaiting MRI of abdomen.    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,     HTN (hypertension)    Dementia    HLD (hyperlipidemia)    DM (diabetes mellitus)    Asthma    CAD (coronary artery disease)  s/p stent placement    Sinus node dysfunction  s/p Medtronic PPM    Cardiac pacemaker  Medtronic (SN: [id number]; Model: [id number])    S/P coronary artery stent placement        Review of Systems:   CONSTITUTIONAL: No fever, weight loss, or fatigue  EYES: No eye pain, visual disturbances, or discharge  ENMT:  No difficulty hearing, tinnitus, vertigo; No sinus or throat pain  NECK: No pain or stiffness  BREASTS: No pain, masses, or nipple discharge  RESPIRATORY: No cough, wheezing, chills or hemoptysis; No shortness of breath  CARDIOVASCULAR: No chest pain, palpitations, dizziness, or leg swelling  GASTROINTESTINAL: No abdominal or epigastric pain. No nausea, vomiting, or hematemesis; GENITOURINARY: No dysuria, frequency, hematuria, or incontinence  NEUROLOGICAL: No headaches, memory loss, Right sided weakness +  SKIN: No itching, burning, rashes, or lesions   LYMPH NODES: No enlarged glands  ENDOCRINE: No heat or cold intolerance; No hair loss  MUSCULOSKELETAL: No joint pain or swelling; No muscle, back, or extremity pain  PSYCHIATRIC: No depression, anxiety, mood swings, or difficulty sleeping  HEME/LYMPH: No easy bruising, or bleeding gums  ALLERY AND IMMUNOLOGIC: No hives or eczema    Allergies    No Known Allergies    Intolerances        Social History:     FAMILY HISTORY:  FH: diabetes mellitus  Mother, Brother    FHx: dementia  Father        MEDICATIONS  (STANDING):  amLODIPine   Tablet 10 milliGRAM(s) Oral daily  aspirin enteric coated 81 milliGRAM(s) Oral daily  atorvastatin 40 milliGRAM(s) Oral at bedtime  isosorbide   mononitrate ER Tablet (IMDUR) 30 milliGRAM(s) Oral daily  lactated ringers. 1000 milliLiter(s) (100 mL/Hr) IV Continuous <Continuous>  lactulose Syrup 10 Gram(s) Oral every 8 hours  lisinopril 10 milliGRAM(s) Oral daily  metoprolol succinate ER 25 milliGRAM(s) Oral daily  polyethylene glycol 3350 17 Gram(s) Oral <User Schedule>    MEDICATIONS  (PRN):  melatonin 3 milliGRAM(s) Oral at bedtime PRN Insomnia  OLANZapine Injectable 1.25 milliGRAM(s) IntraMuscular every 6 hours PRN Severe agitation  ondansetron Injectable 4 milliGRAM(s) IV Push every 8 hours PRN Nausea and/or Vomiting        CAPILLARY BLOOD GLUCOSE      POCT Blood Glucose.: 90 mg/dL (10 Feb 2022 08:07)  POCT Blood Glucose.: 89 mg/dL (10 Feb 2022 01:45)  POCT Blood Glucose.: 112 mg/dL (2022 22:05)    I&O's Summary    2022 07:01  -  10 Feb 2022 07:00  --------------------------------------------------------  IN: 0 mL / OUT: 2 mL / NET: -2 mL        PHYSICAL EXAM:  Vital Signs Last 24 Hrs  T(C): 36.5 (10 Feb 2022 12:54), Max: 37.2 (2022 18:06)  T(F): 97.7 (10 Feb 2022 12:54), Max: 98.9 (2022 18:06)  HR: 65 (10 Feb 2022 12:54) (60 - 68)  BP: 130/67 (10 Feb 2022 12:54) (130/67 - 177/94)  BP(mean): --  RR: 16 (10 Feb 2022 12:54) (16 - 18)  SpO2: 100% (10 Feb 2022 12:54) (98% - 100%)    GENERAL: NAD, well-developed  HEAD:  Atraumatic, Normocephalic  EYES: EOMI, PERRLA, conjunctiva and sclera clear  NECK: Supple, No JVD  CHEST/LUNG: Clear to auscultation bilaterally; No wheeze  HEART: Regular rate and rhythm; No murmurs, rubs, or gallops  ABDOMEN: Soft, Nontender, Nondistended; Bowel sounds present  EXTREMITIES:  2+ Peripheral Pulses, No clubbing, cyanosis, or edema  PSYCH: AAOx3  NEUROLOGY: Right side is weak.  SKIN: No rashes or lesions    LABS:                        10.8   4.97  )-----------( 198      ( 10 Feb 2022 07:16 )             31.4     02-10    141  |  101  |  10  ----------------------------<  80  3.3<L>   |  31  |  1.24    Ca    9.5      10 Feb 2022 07:11  Phos  2.4     02-10  Mg     1.90     02-10    TPro  6.4  /  Alb  3.4  /  TBili  1.5<H>  /  DBili  x   /  AST  17  /  ALT  14  /  AlkPhos  81  02-08    PT/INR - ( 2022 20:17 )   PT: 12.8 sec;   INR: 1.13 ratio         PTT - ( 2022 20:17 )  PTT:31.9 sec      Urinalysis Basic - ( 2022 22:16 )    Color: Light Yellow / Appearance: Clear / S.014 / pH: x  Gluc: x / Ketone: Negative  / Bili: Negative / Urobili: <2 mg/dL   Blood: x / Protein: Trace / Nitrite: Negative   Leuk Esterase: Moderate / RBC: 4 /HPF / WBC 10 /HPF   Sq Epi: x / Non Sq Epi: 5 /HPF / Bacteria: Few        RADIOLOGY & ADDITIONAL TESTS:    Imaging Personally Reviewed:    Consultant(s) Notes Reviewed:      Care Discussed with Consultants/Other Providers:

## 2022-02-20 LAB
ANION GAP SERPL CALC-SCNC: 12 MMOL/L — SIGNIFICANT CHANGE UP (ref 7–14)
BUN SERPL-MCNC: 14 MG/DL — SIGNIFICANT CHANGE UP (ref 7–23)
CALCIUM SERPL-MCNC: 10.1 MG/DL — SIGNIFICANT CHANGE UP (ref 8.4–10.5)
CHLORIDE SERPL-SCNC: 104 MMOL/L — SIGNIFICANT CHANGE UP (ref 98–107)
CO2 SERPL-SCNC: 26 MMOL/L — SIGNIFICANT CHANGE UP (ref 22–31)
CREAT SERPL-MCNC: 1.36 MG/DL — HIGH (ref 0.5–1.3)
GLUCOSE SERPL-MCNC: 83 MG/DL — SIGNIFICANT CHANGE UP (ref 70–99)
HCT VFR BLD CALC: 33.6 % — LOW (ref 39–50)
HGB BLD-MCNC: 10.7 G/DL — LOW (ref 13–17)
MAGNESIUM SERPL-MCNC: 2.1 MG/DL — SIGNIFICANT CHANGE UP (ref 1.6–2.6)
MCHC RBC-ENTMCNC: 29.6 PG — SIGNIFICANT CHANGE UP (ref 27–34)
MCHC RBC-ENTMCNC: 31.8 GM/DL — LOW (ref 32–36)
MCV RBC AUTO: 93.1 FL — SIGNIFICANT CHANGE UP (ref 80–100)
NRBC # BLD: 0 /100 WBCS — SIGNIFICANT CHANGE UP
NRBC # FLD: 0 K/UL — SIGNIFICANT CHANGE UP
PHOSPHATE SERPL-MCNC: 2.3 MG/DL — LOW (ref 2.5–4.5)
PLATELET # BLD AUTO: 227 K/UL — SIGNIFICANT CHANGE UP (ref 150–400)
POTASSIUM SERPL-MCNC: 4.1 MMOL/L — SIGNIFICANT CHANGE UP (ref 3.5–5.3)
POTASSIUM SERPL-SCNC: 4.1 MMOL/L — SIGNIFICANT CHANGE UP (ref 3.5–5.3)
RBC # BLD: 3.61 M/UL — LOW (ref 4.2–5.8)
RBC # FLD: 13.9 % — SIGNIFICANT CHANGE UP (ref 10.3–14.5)
SODIUM SERPL-SCNC: 142 MMOL/L — SIGNIFICANT CHANGE UP (ref 135–145)
WBC # BLD: 7.06 K/UL — SIGNIFICANT CHANGE UP (ref 3.8–10.5)
WBC # FLD AUTO: 7.06 K/UL — SIGNIFICANT CHANGE UP (ref 3.8–10.5)

## 2022-02-20 RX ORDER — SODIUM,POTASSIUM PHOSPHATES 278-250MG
1 POWDER IN PACKET (EA) ORAL ONCE
Refills: 0 | Status: DISCONTINUED | OUTPATIENT
Start: 2022-02-20 | End: 2022-02-20

## 2022-02-20 RX ADMIN — POLYETHYLENE GLYCOL 3350 17 GRAM(S): 17 POWDER, FOR SOLUTION ORAL at 14:09

## 2022-02-20 RX ADMIN — Medication 1 TABLET(S): at 14:09

## 2022-02-20 RX ADMIN — POLYETHYLENE GLYCOL 3350 17 GRAM(S): 17 POWDER, FOR SOLUTION ORAL at 21:54

## 2022-02-20 RX ADMIN — ATORVASTATIN CALCIUM 40 MILLIGRAM(S): 80 TABLET, FILM COATED ORAL at 21:54

## 2022-02-20 RX ADMIN — POLYETHYLENE GLYCOL 3350 17 GRAM(S): 17 POWDER, FOR SOLUTION ORAL at 05:14

## 2022-02-20 RX ADMIN — Medication 1 TABLET(S): at 08:28

## 2022-02-20 RX ADMIN — ISOSORBIDE MONONITRATE 30 MILLIGRAM(S): 60 TABLET, EXTENDED RELEASE ORAL at 14:09

## 2022-02-20 RX ADMIN — Medication 81 MILLIGRAM(S): at 14:10

## 2022-02-20 RX ADMIN — AMLODIPINE BESYLATE 10 MILLIGRAM(S): 2.5 TABLET ORAL at 05:14

## 2022-02-20 RX ADMIN — LISINOPRIL 10 MILLIGRAM(S): 2.5 TABLET ORAL at 05:14

## 2022-02-20 NOTE — PROGRESS NOTE ADULT - SUBJECTIVE AND OBJECTIVE BOX
Patient is a 81y old  Male who presents with a chief complaint of Constipation x 3 weeks (10 Feb 2022 14:14)    2022  HPI:  No new symptoms. Awaiting MRI of abdomen.    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,     HTN (hypertension)    Dementia    HLD (hyperlipidemia)    DM (diabetes mellitus)    Asthma    CAD (coronary artery disease)  s/p stent placement    Sinus node dysfunction  s/p Medtronic PPM    Cardiac pacemaker  Medtronic (SN: [id number]; Model: [id number])    S/P coronary artery stent placement        Review of Systems:   CONSTITUTIONAL: No fever, weight loss, or fatigue  EYES: No eye pain, visual disturbances, or discharge  ENMT:  No difficulty hearing, tinnitus, vertigo; No sinus or throat pain  NECK: No pain or stiffness  BREASTS: No pain, masses, or nipple discharge  RESPIRATORY: No cough, wheezing, chills or hemoptysis; No shortness of breath  CARDIOVASCULAR: No chest pain, palpitations, dizziness, or leg swelling  GASTROINTESTINAL: No abdominal or epigastric pain. No nausea, vomiting, or hematemesis; GENITOURINARY: No dysuria, frequency, hematuria, or incontinence  NEUROLOGICAL: No headaches, memory loss, Right sided weakness +  SKIN: No itching, burning, rashes, or lesions   LYMPH NODES: No enlarged glands  ENDOCRINE: No heat or cold intolerance; No hair loss  MUSCULOSKELETAL: No joint pain or swelling; No muscle, back, or extremity pain  PSYCHIATRIC: No depression, anxiety, mood swings, or difficulty sleeping  HEME/LYMPH: No easy bruising, or bleeding gums  ALLERY AND IMMUNOLOGIC: No hives or eczema    Allergies    No Known Allergies    Intolerances        Social History:     FAMILY HISTORY:  FH: diabetes mellitus  Mother, Brother    FHx: dementia  Father        MEDICATIONS  (STANDING):  amLODIPine   Tablet 10 milliGRAM(s) Oral daily  aspirin enteric coated 81 milliGRAM(s) Oral daily  atorvastatin 40 milliGRAM(s) Oral at bedtime  isosorbide   mononitrate ER Tablet (IMDUR) 30 milliGRAM(s) Oral daily  lactated ringers. 1000 milliLiter(s) (100 mL/Hr) IV Continuous <Continuous>  lactulose Syrup 10 Gram(s) Oral every 8 hours  lisinopril 10 milliGRAM(s) Oral daily  metoprolol succinate ER 25 milliGRAM(s) Oral daily  polyethylene glycol 3350 17 Gram(s) Oral <User Schedule>    MEDICATIONS  (PRN):  melatonin 3 milliGRAM(s) Oral at bedtime PRN Insomnia  OLANZapine Injectable 1.25 milliGRAM(s) IntraMuscular every 6 hours PRN Severe agitation  ondansetron Injectable 4 milliGRAM(s) IV Push every 8 hours PRN Nausea and/or Vomiting        CAPILLARY BLOOD GLUCOSE      POCT Blood Glucose.: 90 mg/dL (10 Feb 2022 08:07)  POCT Blood Glucose.: 89 mg/dL (10 Feb 2022 01:45)  POCT Blood Glucose.: 112 mg/dL (2022 22:05)    I&O's Summary    2022 07:01  -  10 Feb 2022 07:00  --------------------------------------------------------  IN: 0 mL / OUT: 2 mL / NET: -2 mL        PHYSICAL EXAM:  Vital Signs Last 24 Hrs  T(C): 36.5 (10 Feb 2022 12:54), Max: 37.2 (2022 18:06)  T(F): 97.7 (10 Feb 2022 12:54), Max: 98.9 (2022 18:06)  HR: 65 (10 Feb 2022 12:54) (60 - 68)  BP: 130/67 (10 Feb 2022 12:54) (130/67 - 177/94)  BP(mean): --  RR: 16 (10 Feb 2022 12:54) (16 - 18)  SpO2: 100% (10 Feb 2022 12:54) (98% - 100%)    GENERAL: NAD, well-developed  HEAD:  Atraumatic, Normocephalic  EYES: EOMI, PERRLA, conjunctiva and sclera clear  NECK: Supple, No JVD  CHEST/LUNG: Clear to auscultation bilaterally; No wheeze  HEART: Regular rate and rhythm; No murmurs, rubs, or gallops  ABDOMEN: Soft, Nontender, Nondistended; Bowel sounds present  EXTREMITIES:  2+ Peripheral Pulses, No clubbing, cyanosis, or edema  PSYCH: AAOx3  NEUROLOGY: Right side is weak.  SKIN: No rashes or lesions    LABS:                        10.8   4.97  )-----------( 198      ( 10 Feb 2022 07:16 )             31.4     02-10    141  |  101  |  10  ----------------------------<  80  3.3<L>   |  31  |  1.24    Ca    9.5      10 Feb 2022 07:11  Phos  2.4     02-10  Mg     1.90     02-10    TPro  6.4  /  Alb  3.4  /  TBili  1.5<H>  /  DBili  x   /  AST  17  /  ALT  14  /  AlkPhos  81  02-08    PT/INR - ( 2022 20:17 )   PT: 12.8 sec;   INR: 1.13 ratio         PTT - ( 2022 20:17 )  PTT:31.9 sec      Urinalysis Basic - ( 2022 22:16 )    Color: Light Yellow / Appearance: Clear / S.014 / pH: x  Gluc: x / Ketone: Negative  / Bili: Negative / Urobili: <2 mg/dL   Blood: x / Protein: Trace / Nitrite: Negative   Leuk Esterase: Moderate / RBC: 4 /HPF / WBC 10 /HPF   Sq Epi: x / Non Sq Epi: 5 /HPF / Bacteria: Few        RADIOLOGY & ADDITIONAL TESTS:    Imaging Personally Reviewed:    Consultant(s) Notes Reviewed:      Care Discussed with Consultants/Other Providers:

## 2022-02-20 NOTE — SWALLOW BEDSIDE ASSESSMENT ADULT - COMMENTS
As per Internal Medicine Progress Note "80 yo Male with hx of HTN, HLD CVA with Rt sided residual weakness, dementia, bedbound, HTN, CAD s/p stent, s/p PPM extraction, asthma, stercoral colitis, recent completion of IV abx for Ecoli and MRSA Bacteremia via PICC; Pt a/w TAE, stercoral colitis and posterior rectal wall lesion; Found to have UTI and sacral decubitus"     There is no chest imaging available from this admission.     Of note, patient known to this service from previous admission seen 12/2021 for multiple bedside swallow assessments with most recent recommendations for puree with mildly thick liquids (See report for details).     As per RN, patient with ill-fitting dentures and it is therefore difficult for patient to chew minced and moist textures. Patient received upright in bed, in an awake state, however eyes remained closed. Patient required maximum multimodal cues in order to participate in assessment, and participation improved when RN provided PO trials.

## 2022-02-20 NOTE — SWALLOW BEDSIDE ASSESSMENT ADULT - SWALLOW EVAL: DIAGNOSIS
1. Moderate-severe oral dysphagia for thin and mildly thick liquids as well as pureed marked by reduced oral aperture, slow and labored bolus collection with part of bolus falling to lateral left lateral sulci. Patient with residue in left lateral sulci which cleared with cued reswallows. A-P transport was delayed in the setting of lingual pumping and bolus holding. 2. Mild pharyngeal dysphagia for thin liquids marked by suspected delay in initiation of the pharyngeal swallow with hyolaryngeal elevation and excursion upon digital palpation. There was an immediate throat clear after the swallow indicative of laryngeal penetration/aspiration. 3. Functional pharyngeal phase for mildly thick liquids and pureed marked by initiation of the pharyngeal swallow with hyolaryngeal elevation and excursion upon digital palpation without evidence of airway invasion.
(4) no impairment

## 2022-02-21 LAB
ANION GAP SERPL CALC-SCNC: 12 MMOL/L — SIGNIFICANT CHANGE UP (ref 7–14)
BUN SERPL-MCNC: 15 MG/DL — SIGNIFICANT CHANGE UP (ref 7–23)
CALCIUM SERPL-MCNC: 9.9 MG/DL — SIGNIFICANT CHANGE UP (ref 8.4–10.5)
CHLORIDE SERPL-SCNC: 105 MMOL/L — SIGNIFICANT CHANGE UP (ref 98–107)
CO2 SERPL-SCNC: 24 MMOL/L — SIGNIFICANT CHANGE UP (ref 22–31)
CREAT SERPL-MCNC: 1.38 MG/DL — HIGH (ref 0.5–1.3)
GLUCOSE SERPL-MCNC: 83 MG/DL — SIGNIFICANT CHANGE UP (ref 70–99)
HCT VFR BLD CALC: 31.7 % — LOW (ref 39–50)
HGB BLD-MCNC: 10 G/DL — LOW (ref 13–17)
MAGNESIUM SERPL-MCNC: 2 MG/DL — SIGNIFICANT CHANGE UP (ref 1.6–2.6)
MCHC RBC-ENTMCNC: 29.5 PG — SIGNIFICANT CHANGE UP (ref 27–34)
MCHC RBC-ENTMCNC: 31.5 GM/DL — LOW (ref 32–36)
MCV RBC AUTO: 93.5 FL — SIGNIFICANT CHANGE UP (ref 80–100)
NRBC # BLD: 0 /100 WBCS — SIGNIFICANT CHANGE UP
NRBC # FLD: 0 K/UL — SIGNIFICANT CHANGE UP
PHOSPHATE SERPL-MCNC: 2.6 MG/DL — SIGNIFICANT CHANGE UP (ref 2.5–4.5)
PLATELET # BLD AUTO: 234 K/UL — SIGNIFICANT CHANGE UP (ref 150–400)
POTASSIUM SERPL-MCNC: 4.1 MMOL/L — SIGNIFICANT CHANGE UP (ref 3.5–5.3)
POTASSIUM SERPL-SCNC: 4.1 MMOL/L — SIGNIFICANT CHANGE UP (ref 3.5–5.3)
RBC # BLD: 3.39 M/UL — LOW (ref 4.2–5.8)
RBC # FLD: 14.3 % — SIGNIFICANT CHANGE UP (ref 10.3–14.5)
SARS-COV-2 RNA SPEC QL NAA+PROBE: SIGNIFICANT CHANGE UP
SODIUM SERPL-SCNC: 141 MMOL/L — SIGNIFICANT CHANGE UP (ref 135–145)
WBC # BLD: 4.48 K/UL — SIGNIFICANT CHANGE UP (ref 3.8–10.5)
WBC # FLD AUTO: 4.48 K/UL — SIGNIFICANT CHANGE UP (ref 3.8–10.5)

## 2022-02-21 RX ADMIN — AMLODIPINE BESYLATE 10 MILLIGRAM(S): 2.5 TABLET ORAL at 05:38

## 2022-02-21 RX ADMIN — ISOSORBIDE MONONITRATE 30 MILLIGRAM(S): 60 TABLET, EXTENDED RELEASE ORAL at 12:17

## 2022-02-21 RX ADMIN — POLYETHYLENE GLYCOL 3350 17 GRAM(S): 17 POWDER, FOR SOLUTION ORAL at 21:26

## 2022-02-21 RX ADMIN — Medication 81 MILLIGRAM(S): at 12:17

## 2022-02-21 RX ADMIN — Medication 3 MILLIGRAM(S): at 21:25

## 2022-02-21 RX ADMIN — LISINOPRIL 10 MILLIGRAM(S): 2.5 TABLET ORAL at 05:38

## 2022-02-21 RX ADMIN — ATORVASTATIN CALCIUM 40 MILLIGRAM(S): 80 TABLET, FILM COATED ORAL at 21:25

## 2022-02-21 RX ADMIN — POLYETHYLENE GLYCOL 3350 17 GRAM(S): 17 POWDER, FOR SOLUTION ORAL at 13:04

## 2022-02-21 RX ADMIN — POLYETHYLENE GLYCOL 3350 17 GRAM(S): 17 POWDER, FOR SOLUTION ORAL at 05:38

## 2022-02-21 NOTE — PROGRESS NOTE ADULT - SUBJECTIVE AND OBJECTIVE BOX
Patient is a 81y old  Male who presents with a chief complaint of Constipation x 3 weeks (10 Feb 2022 14:14)    2022  HPI:  No new symptoms. Still awaiting MRI of abdomen.    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,     HTN (hypertension)    Dementia    HLD (hyperlipidemia)    DM (diabetes mellitus)    Asthma    CAD (coronary artery disease)  s/p stent placement    Sinus node dysfunction  s/p Medtronic PPM    Cardiac pacemaker  Medtronic (SN: [id number]; Model: [id number])    S/P coronary artery stent placement        Review of Systems:   CONSTITUTIONAL: No fever, weight loss, or fatigue  EYES: No eye pain, visual disturbances, or discharge  ENMT:  No difficulty hearing, tinnitus, vertigo; No sinus or throat pain  NECK: No pain or stiffness  BREASTS: No pain, masses, or nipple discharge  RESPIRATORY: No cough, wheezing, chills or hemoptysis; No shortness of breath  CARDIOVASCULAR: No chest pain, palpitations, dizziness, or leg swelling  GASTROINTESTINAL: No abdominal or epigastric pain. No nausea, vomiting, or hematemesis; GENITOURINARY: No dysuria, frequency, hematuria, or incontinence  NEUROLOGICAL: No headaches, memory loss, Right sided weakness +  SKIN: No itching, burning, rashes, or lesions   LYMPH NODES: No enlarged glands  ENDOCRINE: No heat or cold intolerance; No hair loss  MUSCULOSKELETAL: No joint pain or swelling; No muscle, back, or extremity pain  PSYCHIATRIC: No depression, anxiety, mood swings, or difficulty sleeping  HEME/LYMPH: No easy bruising, or bleeding gums  ALLERY AND IMMUNOLOGIC: No hives or eczema    Allergies    No Known Allergies    Intolerances        Social History:     FAMILY HISTORY:  FH: diabetes mellitus  Mother, Brother    FHx: dementia  Father        MEDICATIONS  (STANDING):  amLODIPine   Tablet 10 milliGRAM(s) Oral daily  aspirin enteric coated 81 milliGRAM(s) Oral daily  atorvastatin 40 milliGRAM(s) Oral at bedtime  isosorbide   mononitrate ER Tablet (IMDUR) 30 milliGRAM(s) Oral daily  lactated ringers. 1000 milliLiter(s) (100 mL/Hr) IV Continuous <Continuous>  lactulose Syrup 10 Gram(s) Oral every 8 hours  lisinopril 10 milliGRAM(s) Oral daily  metoprolol succinate ER 25 milliGRAM(s) Oral daily  polyethylene glycol 3350 17 Gram(s) Oral <User Schedule>    MEDICATIONS  (PRN):  melatonin 3 milliGRAM(s) Oral at bedtime PRN Insomnia  OLANZapine Injectable 1.25 milliGRAM(s) IntraMuscular every 6 hours PRN Severe agitation  ondansetron Injectable 4 milliGRAM(s) IV Push every 8 hours PRN Nausea and/or Vomiting        CAPILLARY BLOOD GLUCOSE      POCT Blood Glucose.: 90 mg/dL (10 Feb 2022 08:07)  POCT Blood Glucose.: 89 mg/dL (10 Feb 2022 01:45)  POCT Blood Glucose.: 112 mg/dL (2022 22:05)    I&O's Summary    2022 07:01  -  10 Feb 2022 07:00  --------------------------------------------------------  IN: 0 mL / OUT: 2 mL / NET: -2 mL        PHYSICAL EXAM:  Vital Signs Last 24 Hrs  T(C): 36.5 (10 Feb 2022 12:54), Max: 37.2 (2022 18:06)  T(F): 97.7 (10 Feb 2022 12:54), Max: 98.9 (2022 18:06)  HR: 65 (10 Feb 2022 12:54) (60 - 68)  BP: 130/67 (10 Feb 2022 12:54) (130/67 - 177/94)  BP(mean): --  RR: 16 (10 Feb 2022 12:54) (16 - 18)  SpO2: 100% (10 Feb 2022 12:54) (98% - 100%)    GENERAL: NAD, well-developed  HEAD:  Atraumatic, Normocephalic  EYES: EOMI, PERRLA, conjunctiva and sclera clear  NECK: Supple, No JVD  CHEST/LUNG: Clear to auscultation bilaterally; No wheeze  HEART: Regular rate and rhythm; No murmurs, rubs, or gallops  ABDOMEN: Soft, Nontender, Nondistended; Bowel sounds present  EXTREMITIES:  2+ Peripheral Pulses, No clubbing, cyanosis, or edema  PSYCH: AAOx3  NEUROLOGY: Right side is weak.  SKIN: No rashes or lesions    LABS:                        10.8   4.97  )-----------( 198      ( 10 Feb 2022 07:16 )             31.4     02-10    141  |  101  |  10  ----------------------------<  80  3.3<L>   |  31  |  1.24    Ca    9.5      10 Feb 2022 07:11  Phos  2.4     02-10  Mg     1.90     02-10    TPro  6.4  /  Alb  3.4  /  TBili  1.5<H>  /  DBili  x   /  AST  17  /  ALT  14  /  AlkPhos  81  02-08    PT/INR - ( 2022 20:17 )   PT: 12.8 sec;   INR: 1.13 ratio         PTT - ( 2022 20:17 )  PTT:31.9 sec      Urinalysis Basic - ( 2022 22:16 )    Color: Light Yellow / Appearance: Clear / S.014 / pH: x  Gluc: x / Ketone: Negative  / Bili: Negative / Urobili: <2 mg/dL   Blood: x / Protein: Trace / Nitrite: Negative   Leuk Esterase: Moderate / RBC: 4 /HPF / WBC 10 /HPF   Sq Epi: x / Non Sq Epi: 5 /HPF / Bacteria: Few        RADIOLOGY & ADDITIONAL TESTS:    Imaging Personally Reviewed:    Consultant(s) Notes Reviewed:      Care Discussed with Consultants/Other Providers:

## 2022-02-22 LAB
ANION GAP SERPL CALC-SCNC: 8 MMOL/L — SIGNIFICANT CHANGE UP (ref 7–14)
BUN SERPL-MCNC: 18 MG/DL — SIGNIFICANT CHANGE UP (ref 7–23)
CALCIUM SERPL-MCNC: 10 MG/DL — SIGNIFICANT CHANGE UP (ref 8.4–10.5)
CHLORIDE SERPL-SCNC: 105 MMOL/L — SIGNIFICANT CHANGE UP (ref 98–107)
CO2 SERPL-SCNC: 26 MMOL/L — SIGNIFICANT CHANGE UP (ref 22–31)
CREAT SERPL-MCNC: 1.43 MG/DL — HIGH (ref 0.5–1.3)
GLUCOSE SERPL-MCNC: 96 MG/DL — SIGNIFICANT CHANGE UP (ref 70–99)
HCT VFR BLD CALC: 29.9 % — LOW (ref 39–50)
HGB BLD-MCNC: 10 G/DL — LOW (ref 13–17)
MAGNESIUM SERPL-MCNC: 2.1 MG/DL — SIGNIFICANT CHANGE UP (ref 1.6–2.6)
MCHC RBC-ENTMCNC: 30.6 PG — SIGNIFICANT CHANGE UP (ref 27–34)
MCHC RBC-ENTMCNC: 33.4 GM/DL — SIGNIFICANT CHANGE UP (ref 32–36)
MCV RBC AUTO: 91.4 FL — SIGNIFICANT CHANGE UP (ref 80–100)
NRBC # BLD: 0 /100 WBCS — SIGNIFICANT CHANGE UP
NRBC # FLD: 0 K/UL — SIGNIFICANT CHANGE UP
PHOSPHATE SERPL-MCNC: 2.5 MG/DL — SIGNIFICANT CHANGE UP (ref 2.5–4.5)
PLATELET # BLD AUTO: 226 K/UL — SIGNIFICANT CHANGE UP (ref 150–400)
POTASSIUM SERPL-MCNC: 3.8 MMOL/L — SIGNIFICANT CHANGE UP (ref 3.5–5.3)
POTASSIUM SERPL-SCNC: 3.8 MMOL/L — SIGNIFICANT CHANGE UP (ref 3.5–5.3)
RBC # BLD: 3.27 M/UL — LOW (ref 4.2–5.8)
RBC # FLD: 14.3 % — SIGNIFICANT CHANGE UP (ref 10.3–14.5)
SODIUM SERPL-SCNC: 139 MMOL/L — SIGNIFICANT CHANGE UP (ref 135–145)
WBC # BLD: 3.81 K/UL — SIGNIFICANT CHANGE UP (ref 3.8–10.5)
WBC # FLD AUTO: 3.81 K/UL — SIGNIFICANT CHANGE UP (ref 3.8–10.5)

## 2022-02-22 PROCEDURE — 99232 SBSQ HOSP IP/OBS MODERATE 35: CPT | Mod: GC

## 2022-02-22 RX ADMIN — POLYETHYLENE GLYCOL 3350 17 GRAM(S): 17 POWDER, FOR SOLUTION ORAL at 21:36

## 2022-02-22 RX ADMIN — Medication 3 MILLIGRAM(S): at 21:37

## 2022-02-22 RX ADMIN — ISOSORBIDE MONONITRATE 30 MILLIGRAM(S): 60 TABLET, EXTENDED RELEASE ORAL at 13:32

## 2022-02-22 RX ADMIN — POLYETHYLENE GLYCOL 3350 17 GRAM(S): 17 POWDER, FOR SOLUTION ORAL at 05:16

## 2022-02-22 RX ADMIN — AMLODIPINE BESYLATE 10 MILLIGRAM(S): 2.5 TABLET ORAL at 05:16

## 2022-02-22 RX ADMIN — Medication 81 MILLIGRAM(S): at 13:32

## 2022-02-22 RX ADMIN — POLYETHYLENE GLYCOL 3350 17 GRAM(S): 17 POWDER, FOR SOLUTION ORAL at 13:31

## 2022-02-22 RX ADMIN — ATORVASTATIN CALCIUM 40 MILLIGRAM(S): 80 TABLET, FILM COATED ORAL at 21:36

## 2022-02-22 RX ADMIN — LISINOPRIL 10 MILLIGRAM(S): 2.5 TABLET ORAL at 05:16

## 2022-02-22 NOTE — PROGRESS NOTE ADULT - SUBJECTIVE AND OBJECTIVE BOX
Patient is a 81y old  Male who presents with a chief complaint of Constipation x 3 weeks (10 Feb 2022 14:14)    2022  HPI:  No new symptoms.  MRI of abdomen is canceled    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,     HTN (hypertension)    Dementia    HLD (hyperlipidemia)    DM (diabetes mellitus)    Asthma    CAD (coronary artery disease)  s/p stent placement    Sinus node dysfunction  s/p Medtronic PPM    Cardiac pacemaker  Medtronic (SN: [id number]; Model: [id number])    S/P coronary artery stent placement        Review of Systems:   CONSTITUTIONAL: No fever, weight loss, or fatigue  EYES: No eye pain, visual disturbances, or discharge  ENMT:  No difficulty hearing, tinnitus, vertigo; No sinus or throat pain  NECK: No pain or stiffness  BREASTS: No pain, masses, or nipple discharge  RESPIRATORY: No cough, wheezing, chills or hemoptysis; No shortness of breath  CARDIOVASCULAR: No chest pain, palpitations, dizziness, or leg swelling  GASTROINTESTINAL: No abdominal or epigastric pain. No nausea, vomiting, or hematemesis; GENITOURINARY: No dysuria, frequency, hematuria, or incontinence  NEUROLOGICAL: No headaches, memory loss, Right sided weakness +  SKIN: No itching, burning, rashes, or lesions   LYMPH NODES: No enlarged glands  ENDOCRINE: No heat or cold intolerance; No hair loss  MUSCULOSKELETAL: No joint pain or swelling; No muscle, back, or extremity pain  PSYCHIATRIC: No depression, anxiety, mood swings, or difficulty sleeping  HEME/LYMPH: No easy bruising, or bleeding gums  ALLERY AND IMMUNOLOGIC: No hives or eczema    Allergies    No Known Allergies    Intolerances        Social History:     FAMILY HISTORY:  FH: diabetes mellitus  Mother, Brother    FHx: dementia  Father        MEDICATIONS  (STANDING):  amLODIPine   Tablet 10 milliGRAM(s) Oral daily  aspirin enteric coated 81 milliGRAM(s) Oral daily  atorvastatin 40 milliGRAM(s) Oral at bedtime  isosorbide   mononitrate ER Tablet (IMDUR) 30 milliGRAM(s) Oral daily  lactated ringers. 1000 milliLiter(s) (100 mL/Hr) IV Continuous <Continuous>  lactulose Syrup 10 Gram(s) Oral every 8 hours  lisinopril 10 milliGRAM(s) Oral daily  metoprolol succinate ER 25 milliGRAM(s) Oral daily  polyethylene glycol 3350 17 Gram(s) Oral <User Schedule>    MEDICATIONS  (PRN):  melatonin 3 milliGRAM(s) Oral at bedtime PRN Insomnia  OLANZapine Injectable 1.25 milliGRAM(s) IntraMuscular every 6 hours PRN Severe agitation  ondansetron Injectable 4 milliGRAM(s) IV Push every 8 hours PRN Nausea and/or Vomiting        CAPILLARY BLOOD GLUCOSE      POCT Blood Glucose.: 90 mg/dL (10 Feb 2022 08:07)  POCT Blood Glucose.: 89 mg/dL (10 Feb 2022 01:45)  POCT Blood Glucose.: 112 mg/dL (2022 22:05)    I&O's Summary    2022 07:01  -  10 Feb 2022 07:00  --------------------------------------------------------  IN: 0 mL / OUT: 2 mL / NET: -2 mL        PHYSICAL EXAM:  Vital Signs Last 24 Hrs  T(C): 36.5 (10 Feb 2022 12:54), Max: 37.2 (2022 18:06)  T(F): 97.7 (10 Feb 2022 12:54), Max: 98.9 (2022 18:06)  HR: 65 (10 Feb 2022 12:54) (60 - 68)  BP: 130/67 (10 Feb 2022 12:54) (130/67 - 177/94)  BP(mean): --  RR: 16 (10 Feb 2022 12:54) (16 - 18)  SpO2: 100% (10 Feb 2022 12:54) (98% - 100%)    GENERAL: NAD, well-developed  HEAD:  Atraumatic, Normocephalic  EYES: EOMI, PERRLA, conjunctiva and sclera clear  NECK: Supple, No JVD  CHEST/LUNG: Clear to auscultation bilaterally; No wheeze  HEART: Regular rate and rhythm; No murmurs, rubs, or gallops  ABDOMEN: Soft, Nontender, Nondistended; Bowel sounds present  EXTREMITIES:  2+ Peripheral Pulses, No clubbing, cyanosis, or edema  PSYCH: AAOx3  NEUROLOGY: Right side is weak.  SKIN: No rashes or lesions    LABS:                        10.8   4.97  )-----------( 198      ( 10 Feb 2022 07:16 )             31.4     02-10    141  |  101  |  10  ----------------------------<  80  3.3<L>   |  31  |  1.24    Ca    9.5      10 Feb 2022 07:11  Phos  2.4     02-10  Mg     1.90     02-10    TPro  6.4  /  Alb  3.4  /  TBili  1.5<H>  /  DBili  x   /  AST  17  /  ALT  14  /  AlkPhos  81  02-08    PT/INR - ( 2022 20:17 )   PT: 12.8 sec;   INR: 1.13 ratio         PTT - ( 2022 20:17 )  PTT:31.9 sec      Urinalysis Basic - ( 2022 22:16 )    Color: Light Yellow / Appearance: Clear / S.014 / pH: x  Gluc: x / Ketone: Negative  / Bili: Negative / Urobili: <2 mg/dL   Blood: x / Protein: Trace / Nitrite: Negative   Leuk Esterase: Moderate / RBC: 4 /HPF / WBC 10 /HPF   Sq Epi: x / Non Sq Epi: 5 /HPF / Bacteria: Few        RADIOLOGY & ADDITIONAL TESTS:    Imaging Personally Reviewed:    Consultant(s) Notes Reviewed:      Care Discussed with Consultants/Other Providers:

## 2022-02-23 PROCEDURE — 99232 SBSQ HOSP IP/OBS MODERATE 35: CPT | Mod: GC

## 2022-02-23 RX ADMIN — POLYETHYLENE GLYCOL 3350 17 GRAM(S): 17 POWDER, FOR SOLUTION ORAL at 05:35

## 2022-02-23 RX ADMIN — LISINOPRIL 10 MILLIGRAM(S): 2.5 TABLET ORAL at 05:35

## 2022-02-23 RX ADMIN — ISOSORBIDE MONONITRATE 30 MILLIGRAM(S): 60 TABLET, EXTENDED RELEASE ORAL at 11:35

## 2022-02-23 RX ADMIN — Medication 81 MILLIGRAM(S): at 11:35

## 2022-02-23 RX ADMIN — ATORVASTATIN CALCIUM 40 MILLIGRAM(S): 80 TABLET, FILM COATED ORAL at 22:32

## 2022-02-23 RX ADMIN — POLYETHYLENE GLYCOL 3350 17 GRAM(S): 17 POWDER, FOR SOLUTION ORAL at 11:35

## 2022-02-23 RX ADMIN — AMLODIPINE BESYLATE 10 MILLIGRAM(S): 2.5 TABLET ORAL at 05:35

## 2022-02-23 RX ADMIN — POLYETHYLENE GLYCOL 3350 17 GRAM(S): 17 POWDER, FOR SOLUTION ORAL at 22:33

## 2022-02-23 NOTE — PROGRESS NOTE ADULT - SUBJECTIVE AND OBJECTIVE BOX
Patient is a 81y old  Male who presents with a chief complaint of Constipation x 3 weeks (10 Feb 2022 14:14)    2022  HPI:  No new symptoms.  MRI of abdomen is canceled    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,     HTN (hypertension)    Dementia    HLD (hyperlipidemia)    DM (diabetes mellitus)    Asthma    CAD (coronary artery disease)  s/p stent placement    Sinus node dysfunction  s/p Medtronic PPM    Cardiac pacemaker  Medtronic (SN: [id number]; Model: [id number])    S/P coronary artery stent placement        Review of Systems:   CONSTITUTIONAL: No fever, weight loss, or fatigue  EYES: No eye pain, visual disturbances, or discharge  ENMT:  No difficulty hearing, tinnitus, vertigo; No sinus or throat pain  NECK: No pain or stiffness  BREASTS: No pain, masses, or nipple discharge  RESPIRATORY: No cough, wheezing, chills or hemoptysis; No shortness of breath  CARDIOVASCULAR: No chest pain, palpitations, dizziness, or leg swelling  GASTROINTESTINAL: No abdominal or epigastric pain. No nausea, vomiting, or hematemesis; GENITOURINARY: No dysuria, frequency, hematuria, or incontinence  NEUROLOGICAL: No headaches, memory loss, Right sided weakness +  SKIN: No itching, burning, rashes, or lesions   LYMPH NODES: No enlarged glands  ENDOCRINE: No heat or cold intolerance; No hair loss  MUSCULOSKELETAL: No joint pain or swelling; No muscle, back, or extremity pain  PSYCHIATRIC: No depression, anxiety, mood swings, or difficulty sleeping  HEME/LYMPH: No easy bruising, or bleeding gums  ALLERY AND IMMUNOLOGIC: No hives or eczema    Allergies    No Known Allergies    Intolerances        Social History:     FAMILY HISTORY:  FH: diabetes mellitus  Mother, Brother    FHx: dementia  Father        MEDICATIONS  (STANDING):  amLODIPine   Tablet 10 milliGRAM(s) Oral daily  aspirin enteric coated 81 milliGRAM(s) Oral daily  atorvastatin 40 milliGRAM(s) Oral at bedtime  isosorbide   mononitrate ER Tablet (IMDUR) 30 milliGRAM(s) Oral daily  lactated ringers. 1000 milliLiter(s) (100 mL/Hr) IV Continuous <Continuous>  lactulose Syrup 10 Gram(s) Oral every 8 hours  lisinopril 10 milliGRAM(s) Oral daily  metoprolol succinate ER 25 milliGRAM(s) Oral daily  polyethylene glycol 3350 17 Gram(s) Oral <User Schedule>    MEDICATIONS  (PRN):  melatonin 3 milliGRAM(s) Oral at bedtime PRN Insomnia  OLANZapine Injectable 1.25 milliGRAM(s) IntraMuscular every 6 hours PRN Severe agitation  ondansetron Injectable 4 milliGRAM(s) IV Push every 8 hours PRN Nausea and/or Vomiting        CAPILLARY BLOOD GLUCOSE      POCT Blood Glucose.: 90 mg/dL (10 Feb 2022 08:07)  POCT Blood Glucose.: 89 mg/dL (10 Feb 2022 01:45)  POCT Blood Glucose.: 112 mg/dL (2022 22:05)    I&O's Summary    2022 07:01  -  10 Feb 2022 07:00  --------------------------------------------------------  IN: 0 mL / OUT: 2 mL / NET: -2 mL        PHYSICAL EXAM:  Vital Signs Last 24 Hrs  T(C): 36.5 (10 Feb 2022 12:54), Max: 37.2 (2022 18:06)  T(F): 97.7 (10 Feb 2022 12:54), Max: 98.9 (2022 18:06)  HR: 65 (10 Feb 2022 12:54) (60 - 68)  BP: 130/67 (10 Feb 2022 12:54) (130/67 - 177/94)  BP(mean): --  RR: 16 (10 Feb 2022 12:54) (16 - 18)  SpO2: 100% (10 Feb 2022 12:54) (98% - 100%)    GENERAL: NAD, well-developed  HEAD:  Atraumatic, Normocephalic  EYES: EOMI, PERRLA, conjunctiva and sclera clear  NECK: Supple, No JVD  CHEST/LUNG: Clear to auscultation bilaterally; No wheeze  HEART: Regular rate and rhythm; No murmurs, rubs, or gallops  ABDOMEN: Soft, Nontender, Nondistended; Bowel sounds present  EXTREMITIES:  2+ Peripheral Pulses, No clubbing, cyanosis, or edema  PSYCH: AAOx3  NEUROLOGY: Right side is weak.  SKIN: No rashes or lesions    LABS:                        10.8   4.97  )-----------( 198      ( 10 Feb 2022 07:16 )             31.4     02-10    141  |  101  |  10  ----------------------------<  80  3.3<L>   |  31  |  1.24    Ca    9.5      10 Feb 2022 07:11  Phos  2.4     02-10  Mg     1.90     02-10    TPro  6.4  /  Alb  3.4  /  TBili  1.5<H>  /  DBili  x   /  AST  17  /  ALT  14  /  AlkPhos  81  02-08    PT/INR - ( 2022 20:17 )   PT: 12.8 sec;   INR: 1.13 ratio         PTT - ( 2022 20:17 )  PTT:31.9 sec      Urinalysis Basic - ( 2022 22:16 )    Color: Light Yellow / Appearance: Clear / S.014 / pH: x  Gluc: x / Ketone: Negative  / Bili: Negative / Urobili: <2 mg/dL   Blood: x / Protein: Trace / Nitrite: Negative   Leuk Esterase: Moderate / RBC: 4 /HPF / WBC 10 /HPF   Sq Epi: x / Non Sq Epi: 5 /HPF / Bacteria: Few        RADIOLOGY & ADDITIONAL TESTS:    Imaging Personally Reviewed:    Consultant(s) Notes Reviewed:      Care Discussed with Consultants/Other Providers:

## 2022-02-23 NOTE — CHART NOTE - NSCHARTNOTEFT_GEN_A_CORE
Source:   other [x] nurse, medical chart   Diet rx: Pureed: Mildly Thick Liquids (MILDTHICKLIQS)  Low Sodium (02-22-22 @ 16:12) [Active]    Pt's height: 66" (2/8)   IBW: 142#+/-10%    Pt's weight: 66.4 Kg (2/16)   Pertinent Medications: Aspirin, Lipitor, Zofran (PRN), Miralax,   Pertinent Labs: (1/22) H/H 10.0/29.9 L, Creat 1.43 H;    (2/16) Albumin 3.6;   Skin: per flow sheet, pressure injury to r buttock - stage II    Estimated Needs: [x] no change since previous assessment  Previous Nutrition Diagnosis: [x] Malnutrition, moderate   Nutrition Diagnosis is [x] ongoing     Nutrition Interventions/Recommendations:   1. Add PO supplement: Ensure Enlive 8oz. 2x daily (~700 Kcal, ~40 gm Protein);   2. Monitor PO diet tolerance; Honor food preferences;  3. Add Multivitamins 1 tab daily for micronutrient coverage;   4. Monitor labs, weekly weights, hydration status; Source:   other [x] nurse, medical chart   Diet rx: Pureed: Mildly Thick Liquids (MILDTHICKLIQS)  Low Sodium (02-22-22 @ 16:12) [Active]    Pt 82 yo male with PMHx of HTN, HLD, CVA R residual weakness, dementia, bedbound, HTN, CAD s/p stent, s/p PPM extraction, asthma, stercoral colitis - per chart review.   At time of visit, Pt asleep. Per nurse, Pt with fair appetite/PO intake; Pt needs to be fed. Of note, Pt passed Swallow Bedside Assessment Adult, SLP rec: Puree with mildly thick liquids (2/20/22). Rec to add PO supplement: Ensure Enlive - 2x daily, mild thickened, to diet rx. No report of vomiting or diarrhea @ this time. Of note, Pt with Stercoral colitis; +constipation, CT abdomen shows a suspected mass on sigmoid colon area; GI to follow. Case discussed with nurse. RDN remains available.     Pt's height: 66" (2/8)   IBW: 142#+/-10%  Pt's weight: 66.4 Kg (2/16)   Pertinent Medications: Aspirin, Lipitor, Zofran (PRN), Miralax,   Pertinent Labs: (1/22) H/H 10.0/29.9 L, Creat 1.43 H;    (2/16) Albumin 3.6;   Skin: per flow sheet, pressure injury to r buttock - stage III  1+edema: r/l ankle    Estimated Needs: [x] no change since previous assessment  Previous Nutrition Diagnosis: [x] Malnutrition, moderate   Nutrition Diagnosis is [x] ongoing     Nutrition Interventions/Recommendations:   1. Add PO supplement: Ensure Enlive 8oz. 2x daily (~700 Kcal, ~40 gm Protein), mildly thickened;   2. Encourage & assist Pt with meals; Monitor PO diet tolerance;  3. Honor food preferences;  4. Add Multivitamins 1 tab daily for micronutrient coverage;   5. Monitor labs, weekly weights, hydration status; Source:   other [x] nurse, medical chart   Diet rx: Pureed: Mildly Thick Liquids (MILDTHICKLIQS)  Low Sodium (02-22-22 @ 16:12) [Active]    Pt 82 yo male with PMHx of HTN, HLD, CVA R residual weakness, dementia, bedbound, HTN, CAD s/p stent, s/p PPM extraction, asthma, stercoral colitis - per chart review.     At time of visit, Pt asleep. Per nurse, Pt with fair appetite/PO intake; Pt needs to be fed. Of note, Pt passed Swallow Bedside Assessment Adult, SLP rec: Puree with mildly thick liquids (2/20/22). Rec to add PO supplement: Ensure Enlive - 2x daily, mild thickened, to diet rx. No report of vomiting or diarrhea @ this time. Of note, Pt with Stercoral colitis; +constipation, CT abdomen shows a suspected mass on sigmoid colon area; GI to follow. Case discussed with nurse. RDN remains available.     Pt's height: 66" (2/8)   IBW: 142#+/-10%  Pt's weight: 66.4 Kg (2/16)   Pertinent Medications: Aspirin, Lipitor, Zofran (PRN), Miralax,   Pertinent Labs: (1/22) H/H 10.0/29.9 L, Creat 1.43 H;    (2/16) Albumin 3.6;   Skin: per flow sheet, pressure injury to r buttock - stage III  1+edema: r/l ankle    Estimated Needs: [x] no change since previous assessment  Previous Nutrition Diagnosis: [x] Malnutrition, moderate   Nutrition Diagnosis is [x] ongoing     Nutrition Interventions/Recommendations:   1. Add PO supplement: Ensure Enlive 8oz. 2x daily (~700 Kcal, ~40 gm Protein), mildly thickened;   2. Encourage & assist Pt with meals; Monitor PO diet tolerance;  3. Add appetite stimulant to boost Pt's PO intake/appetite;   4. Add Multivitamins 1 tab daily for micronutrient coverage;   5. Monitor labs, weekly weights, hydration status; Source:   other [x] nurse, medical chart   Diet rx: Pureed: Mildly Thick Liquids (MILDTHICKLIQS)  Low Sodium (02-22-22 @ 16:12) [Active]    Pt 82 yo male with PMHx of HTN, HLD, CVA R residual weakness, dementia, bedbound, HTN, CAD s/p stent, s/p PPM extraction, asthma, stercoral colitis - per chart review.     At time of visit, Pt asleep. Per nurse, Pt with fair appetite/PO intake; Pt needs to be fed. Of note, Pt passed Swallow Bedside Assessment Adult, SLP rec: Puree with mildly thick liquids (2/20/22). Rec to add PO supplement: Ensure Enlive - 2x daily, mild thickened, to diet rx. No report of vomiting or diarrhea @ this time. Of note, Pt with Stercoral colitis; +constipation, CT abdomen shows a suspected mass on sigmoid colon area; GI to follow. Case discussed with nurse. RDN remains available.     Pt's height: 66" (2/8)   IBW: 142#+/-10%  Pt's weight: 66.4 Kg (2/16)   Pertinent Medications: Aspirin, Lipitor, Zofran (PRN), Miralax,   Pertinent Labs: (1/22) H/H 10.0/29.9 L, Creat 1.43 H;    (2/16) Albumin 3.6;   Skin: per flow sheet, pressure injury to r buttock - stage III  1+edema: r/l ankle    Estimated Needs: [x] no change since previous assessment  Previous Nutrition Diagnosis: [x] Malnutrition, moderate   Nutrition Diagnosis is [x] ongoing     Nutrition Interventions/Recommendations:   1. Add PO supplement: Ensure Enlive 8oz. 2x daily (~700 Kcal, ~40 gm Protein), mildly thickened;   2. Encourage & assist Pt with meals; Monitor PO diet tolerance;  3. Add appetite stimulant to boost Pt's PO intake/appetite;   4. Add No Carb Prosource (1 pkg = 15 gms Protein) Qty per day: 2;  5. Add Multivitamins 1 tab daily for micronutrient coverage;   6. Monitor labs, weekly weights, hydration status;

## 2022-02-24 LAB
ANION GAP SERPL CALC-SCNC: 11 MMOL/L — SIGNIFICANT CHANGE UP (ref 7–14)
BUN SERPL-MCNC: 16 MG/DL — SIGNIFICANT CHANGE UP (ref 7–23)
CALCIUM SERPL-MCNC: 10.1 MG/DL — SIGNIFICANT CHANGE UP (ref 8.4–10.5)
CHLORIDE SERPL-SCNC: 103 MMOL/L — SIGNIFICANT CHANGE UP (ref 98–107)
CO2 SERPL-SCNC: 25 MMOL/L — SIGNIFICANT CHANGE UP (ref 22–31)
CREAT SERPL-MCNC: 1.29 MG/DL — SIGNIFICANT CHANGE UP (ref 0.5–1.3)
GLUCOSE SERPL-MCNC: 79 MG/DL — SIGNIFICANT CHANGE UP (ref 70–99)
HCT VFR BLD CALC: 32.1 % — LOW (ref 39–50)
HGB BLD-MCNC: 10.4 G/DL — LOW (ref 13–17)
MAGNESIUM SERPL-MCNC: 2 MG/DL — SIGNIFICANT CHANGE UP (ref 1.6–2.6)
MCHC RBC-ENTMCNC: 29.9 PG — SIGNIFICANT CHANGE UP (ref 27–34)
MCHC RBC-ENTMCNC: 32.4 GM/DL — SIGNIFICANT CHANGE UP (ref 32–36)
MCV RBC AUTO: 92.2 FL — SIGNIFICANT CHANGE UP (ref 80–100)
NRBC # BLD: 0 /100 WBCS — SIGNIFICANT CHANGE UP
NRBC # FLD: 0 K/UL — SIGNIFICANT CHANGE UP
PHOSPHATE SERPL-MCNC: 2.4 MG/DL — LOW (ref 2.5–4.5)
PLATELET # BLD AUTO: 156 K/UL — SIGNIFICANT CHANGE UP (ref 150–400)
POTASSIUM SERPL-MCNC: 4.4 MMOL/L — SIGNIFICANT CHANGE UP (ref 3.5–5.3)
POTASSIUM SERPL-SCNC: 4.4 MMOL/L — SIGNIFICANT CHANGE UP (ref 3.5–5.3)
RBC # BLD: 3.48 M/UL — LOW (ref 4.2–5.8)
RBC # FLD: 14.3 % — SIGNIFICANT CHANGE UP (ref 10.3–14.5)
SODIUM SERPL-SCNC: 139 MMOL/L — SIGNIFICANT CHANGE UP (ref 135–145)
WBC # BLD: 3.69 K/UL — LOW (ref 3.8–10.5)
WBC # FLD AUTO: 3.69 K/UL — LOW (ref 3.8–10.5)

## 2022-02-24 PROCEDURE — 99232 SBSQ HOSP IP/OBS MODERATE 35: CPT | Mod: GC

## 2022-02-24 RX ADMIN — ISOSORBIDE MONONITRATE 30 MILLIGRAM(S): 60 TABLET, EXTENDED RELEASE ORAL at 11:22

## 2022-02-24 RX ADMIN — ATORVASTATIN CALCIUM 40 MILLIGRAM(S): 80 TABLET, FILM COATED ORAL at 22:54

## 2022-02-24 RX ADMIN — POLYETHYLENE GLYCOL 3350 17 GRAM(S): 17 POWDER, FOR SOLUTION ORAL at 22:54

## 2022-02-24 RX ADMIN — POLYETHYLENE GLYCOL 3350 17 GRAM(S): 17 POWDER, FOR SOLUTION ORAL at 11:23

## 2022-02-24 RX ADMIN — Medication 1 TABLET(S): at 11:23

## 2022-02-24 RX ADMIN — AMLODIPINE BESYLATE 10 MILLIGRAM(S): 2.5 TABLET ORAL at 05:21

## 2022-02-24 RX ADMIN — LISINOPRIL 10 MILLIGRAM(S): 2.5 TABLET ORAL at 05:21

## 2022-02-24 RX ADMIN — POLYETHYLENE GLYCOL 3350 17 GRAM(S): 17 POWDER, FOR SOLUTION ORAL at 05:21

## 2022-02-24 RX ADMIN — Medication 81 MILLIGRAM(S): at 11:23

## 2022-02-24 NOTE — CHART NOTE - NSCHARTNOTEFT_GEN_A_CORE
Plan for repeat flex sig next week to eval rectal thickening. Unable to obtain MR pelvis per radiologist protocol.     Recs:  - keep on clear liquids x 3 days, plan for enema and golyetely prep Sunday  - plan for flex sig 2/28  - will need repeat COVID test ordered (must have negative COVID test within 72 hours of endoscopy)  - please order labs (CBC, CMP, INR) for 3 AM on 2/28 so that results will be available early to replete lytes and transfuse as needed  - NPO after midnight Sunday night

## 2022-02-24 NOTE — PROGRESS NOTE ADULT - SUBJECTIVE AND OBJECTIVE BOX
Patient is a 81y old  Male who presents with a chief complaint of Constipation x 3 weeks (10 Feb 2022 14:14)    2022  HPI:  No new symptoms.  Flex sig planned for next week    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,     HTN (hypertension)    Dementia    HLD (hyperlipidemia)    DM (diabetes mellitus)    Asthma    CAD (coronary artery disease)  s/p stent placement    Sinus node dysfunction  s/p Medtronic PPM    Cardiac pacemaker  Medtronic (SN: [id number]; Model: [id number])    S/P coronary artery stent placement        Review of Systems:   CONSTITUTIONAL: No fever, weight loss, or fatigue  EYES: No eye pain, visual disturbances, or discharge  ENMT:  No difficulty hearing, tinnitus, vertigo; No sinus or throat pain  NECK: No pain or stiffness  BREASTS: No pain, masses, or nipple discharge  RESPIRATORY: No cough, wheezing, chills or hemoptysis; No shortness of breath  CARDIOVASCULAR: No chest pain, palpitations, dizziness, or leg swelling  GASTROINTESTINAL: No abdominal or epigastric pain. No nausea, vomiting, or hematemesis; GENITOURINARY: No dysuria, frequency, hematuria, or incontinence  NEUROLOGICAL: No headaches, memory loss, Right sided weakness +  SKIN: No itching, burning, rashes, or lesions   LYMPH NODES: No enlarged glands  ENDOCRINE: No heat or cold intolerance; No hair loss  MUSCULOSKELETAL: No joint pain or swelling; No muscle, back, or extremity pain  PSYCHIATRIC: No depression, anxiety, mood swings, or difficulty sleeping  HEME/LYMPH: No easy bruising, or bleeding gums  ALLERY AND IMMUNOLOGIC: No hives or eczema    Allergies    No Known Allergies    Intolerances        Social History:     FAMILY HISTORY:  FH: diabetes mellitus  Mother, Brother    FHx: dementia  Father        MEDICATIONS  (STANDING):  amLODIPine   Tablet 10 milliGRAM(s) Oral daily  aspirin enteric coated 81 milliGRAM(s) Oral daily  atorvastatin 40 milliGRAM(s) Oral at bedtime  isosorbide   mononitrate ER Tablet (IMDUR) 30 milliGRAM(s) Oral daily  lactated ringers. 1000 milliLiter(s) (100 mL/Hr) IV Continuous <Continuous>  lactulose Syrup 10 Gram(s) Oral every 8 hours  lisinopril 10 milliGRAM(s) Oral daily  metoprolol succinate ER 25 milliGRAM(s) Oral daily  polyethylene glycol 3350 17 Gram(s) Oral <User Schedule>    MEDICATIONS  (PRN):  melatonin 3 milliGRAM(s) Oral at bedtime PRN Insomnia  OLANZapine Injectable 1.25 milliGRAM(s) IntraMuscular every 6 hours PRN Severe agitation  ondansetron Injectable 4 milliGRAM(s) IV Push every 8 hours PRN Nausea and/or Vomiting        CAPILLARY BLOOD GLUCOSE      POCT Blood Glucose.: 90 mg/dL (10 Feb 2022 08:07)  POCT Blood Glucose.: 89 mg/dL (10 Feb 2022 01:45)  POCT Blood Glucose.: 112 mg/dL (2022 22:05)    I&O's Summary    2022 07:01  -  10 Feb 2022 07:00  --------------------------------------------------------  IN: 0 mL / OUT: 2 mL / NET: -2 mL        PHYSICAL EXAM:  Vital Signs Last 24 Hrs  T(C): 36.5 (10 Feb 2022 12:54), Max: 37.2 (2022 18:06)  T(F): 97.7 (10 Feb 2022 12:54), Max: 98.9 (2022 18:06)  HR: 65 (10 Feb 2022 12:54) (60 - 68)  BP: 130/67 (10 Feb 2022 12:54) (130/67 - 177/94)  BP(mean): --  RR: 16 (10 Feb 2022 12:54) (16 - 18)  SpO2: 100% (10 Feb 2022 12:54) (98% - 100%)    GENERAL: NAD, well-developed  HEAD:  Atraumatic, Normocephalic  EYES: EOMI, PERRLA, conjunctiva and sclera clear  NECK: Supple, No JVD  CHEST/LUNG: Clear to auscultation bilaterally; No wheeze  HEART: Regular rate and rhythm; No murmurs, rubs, or gallops  ABDOMEN: Soft, Nontender, Nondistended; Bowel sounds present  EXTREMITIES:  2+ Peripheral Pulses, No clubbing, cyanosis, or edema  PSYCH: AAOx3  NEUROLOGY: Right side is weak.  SKIN: No rashes or lesions    LABS:                        10.8   4.97  )-----------( 198      ( 10 Feb 2022 07:16 )             31.4     02-10    141  |  101  |  10  ----------------------------<  80  3.3<L>   |  31  |  1.24    Ca    9.5      10 Feb 2022 07:11  Phos  2.4     02-10  Mg     1.90     02-10    TPro  6.4  /  Alb  3.4  /  TBili  1.5<H>  /  DBili  x   /  AST  17  /  ALT  14  /  AlkPhos  81  02-08    PT/INR - ( 2022 20:17 )   PT: 12.8 sec;   INR: 1.13 ratio         PTT - ( 2022 20:17 )  PTT:31.9 sec      Urinalysis Basic - ( 2022 22:16 )    Color: Light Yellow / Appearance: Clear / S.014 / pH: x  Gluc: x / Ketone: Negative  / Bili: Negative / Urobili: <2 mg/dL   Blood: x / Protein: Trace / Nitrite: Negative   Leuk Esterase: Moderate / RBC: 4 /HPF / WBC 10 /HPF   Sq Epi: x / Non Sq Epi: 5 /HPF / Bacteria: Few        RADIOLOGY & ADDITIONAL TESTS:    Imaging Personally Reviewed:    Consultant(s) Notes Reviewed:      Care Discussed with Consultants/Other Providers:

## 2022-02-24 NOTE — CHART NOTE - NSCHARTNOTEFT_GEN_A_CORE
Per GI will attempt Flex sig again Monday after being on clears starting tomorrow to ensure good prep

## 2022-02-25 LAB
ANION GAP SERPL CALC-SCNC: 9 MMOL/L — SIGNIFICANT CHANGE UP (ref 7–14)
BUN SERPL-MCNC: 15 MG/DL — SIGNIFICANT CHANGE UP (ref 7–23)
CALCIUM SERPL-MCNC: 10.5 MG/DL — SIGNIFICANT CHANGE UP (ref 8.4–10.5)
CHLORIDE SERPL-SCNC: 107 MMOL/L — SIGNIFICANT CHANGE UP (ref 98–107)
CO2 SERPL-SCNC: 24 MMOL/L — SIGNIFICANT CHANGE UP (ref 22–31)
CREAT SERPL-MCNC: 1.31 MG/DL — HIGH (ref 0.5–1.3)
GLUCOSE SERPL-MCNC: 77 MG/DL — SIGNIFICANT CHANGE UP (ref 70–99)
HCT VFR BLD CALC: 35.5 % — LOW (ref 39–50)
HGB BLD-MCNC: 11.4 G/DL — LOW (ref 13–17)
MAGNESIUM SERPL-MCNC: 2.2 MG/DL — SIGNIFICANT CHANGE UP (ref 1.6–2.6)
MCHC RBC-ENTMCNC: 29.6 PG — SIGNIFICANT CHANGE UP (ref 27–34)
MCHC RBC-ENTMCNC: 32.1 GM/DL — SIGNIFICANT CHANGE UP (ref 32–36)
MCV RBC AUTO: 92.2 FL — SIGNIFICANT CHANGE UP (ref 80–100)
NRBC # BLD: 0 /100 WBCS — SIGNIFICANT CHANGE UP
NRBC # FLD: 0 K/UL — SIGNIFICANT CHANGE UP
PHOSPHATE SERPL-MCNC: 2.8 MG/DL — SIGNIFICANT CHANGE UP (ref 2.5–4.5)
PLATELET # BLD AUTO: 208 K/UL — SIGNIFICANT CHANGE UP (ref 150–400)
POTASSIUM SERPL-MCNC: 5 MMOL/L — SIGNIFICANT CHANGE UP (ref 3.5–5.3)
POTASSIUM SERPL-SCNC: 5 MMOL/L — SIGNIFICANT CHANGE UP (ref 3.5–5.3)
RBC # BLD: 3.85 M/UL — LOW (ref 4.2–5.8)
RBC # FLD: 14.4 % — SIGNIFICANT CHANGE UP (ref 10.3–14.5)
SODIUM SERPL-SCNC: 140 MMOL/L — SIGNIFICANT CHANGE UP (ref 135–145)
WBC # BLD: 3.94 K/UL — SIGNIFICANT CHANGE UP (ref 3.8–10.5)
WBC # FLD AUTO: 3.94 K/UL — SIGNIFICANT CHANGE UP (ref 3.8–10.5)

## 2022-02-25 PROCEDURE — 99232 SBSQ HOSP IP/OBS MODERATE 35: CPT | Mod: GC

## 2022-02-25 RX ORDER — SOD SULF/SODIUM/NAHCO3/KCL/PEG
4000 SOLUTION, RECONSTITUTED, ORAL ORAL ONCE
Refills: 0 | Status: COMPLETED | OUTPATIENT
Start: 2022-02-27 | End: 2022-02-28

## 2022-02-25 RX ADMIN — AMLODIPINE BESYLATE 10 MILLIGRAM(S): 2.5 TABLET ORAL at 05:18

## 2022-02-25 RX ADMIN — Medication 1 TABLET(S): at 12:41

## 2022-02-25 RX ADMIN — Medication 81 MILLIGRAM(S): at 12:41

## 2022-02-25 RX ADMIN — POLYETHYLENE GLYCOL 3350 17 GRAM(S): 17 POWDER, FOR SOLUTION ORAL at 22:40

## 2022-02-25 RX ADMIN — ISOSORBIDE MONONITRATE 30 MILLIGRAM(S): 60 TABLET, EXTENDED RELEASE ORAL at 12:41

## 2022-02-25 RX ADMIN — LISINOPRIL 10 MILLIGRAM(S): 2.5 TABLET ORAL at 05:18

## 2022-02-25 RX ADMIN — ATORVASTATIN CALCIUM 40 MILLIGRAM(S): 80 TABLET, FILM COATED ORAL at 22:40

## 2022-02-25 RX ADMIN — POLYETHYLENE GLYCOL 3350 17 GRAM(S): 17 POWDER, FOR SOLUTION ORAL at 05:19

## 2022-02-25 RX ADMIN — POLYETHYLENE GLYCOL 3350 17 GRAM(S): 17 POWDER, FOR SOLUTION ORAL at 15:03

## 2022-02-25 NOTE — PROGRESS NOTE ADULT - SUBJECTIVE AND OBJECTIVE BOX
Patient is a 81y old  Male who presents with a chief complaint of Constipation x 3 weeks (10 Feb 2022 14:14)    2022  HPI:  No new symptoms.  Flex sig planned for next week    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,     HTN (hypertension)    Dementia    HLD (hyperlipidemia)    DM (diabetes mellitus)    Asthma    CAD (coronary artery disease)  s/p stent placement    Sinus node dysfunction  s/p Medtronic PPM    Cardiac pacemaker  Medtronic (SN: [id number]; Model: [id number])    S/P coronary artery stent placement        Review of Systems:   CONSTITUTIONAL: No fever, weight loss, or fatigue  EYES: No eye pain, visual disturbances, or discharge  ENMT:  No difficulty hearing, tinnitus, vertigo; No sinus or throat pain  NECK: No pain or stiffness  BREASTS: No pain, masses, or nipple discharge  RESPIRATORY: No cough, wheezing, chills or hemoptysis; No shortness of breath  CARDIOVASCULAR: No chest pain, palpitations, dizziness, or leg swelling  GASTROINTESTINAL: No abdominal or epigastric pain. No nausea, vomiting, or hematemesis; GENITOURINARY: No dysuria, frequency, hematuria, or incontinence  NEUROLOGICAL: No headaches, memory loss, Right sided weakness +  SKIN: No itching, burning, rashes, or lesions   LYMPH NODES: No enlarged glands  ENDOCRINE: No heat or cold intolerance; No hair loss  MUSCULOSKELETAL: No joint pain or swelling; No muscle, back, or extremity pain  PSYCHIATRIC: No depression, anxiety, mood swings, or difficulty sleeping  HEME/LYMPH: No easy bruising, or bleeding gums  ALLERY AND IMMUNOLOGIC: No hives or eczema    Allergies    No Known Allergies    Intolerances        Social History:     FAMILY HISTORY:  FH: diabetes mellitus  Mother, Brother    FHx: dementia  Father        MEDICATIONS  (STANDING):  amLODIPine   Tablet 10 milliGRAM(s) Oral daily  aspirin enteric coated 81 milliGRAM(s) Oral daily  atorvastatin 40 milliGRAM(s) Oral at bedtime  isosorbide   mononitrate ER Tablet (IMDUR) 30 milliGRAM(s) Oral daily  lactated ringers. 1000 milliLiter(s) (100 mL/Hr) IV Continuous <Continuous>  lactulose Syrup 10 Gram(s) Oral every 8 hours  lisinopril 10 milliGRAM(s) Oral daily  metoprolol succinate ER 25 milliGRAM(s) Oral daily  polyethylene glycol 3350 17 Gram(s) Oral <User Schedule>    MEDICATIONS  (PRN):  melatonin 3 milliGRAM(s) Oral at bedtime PRN Insomnia  OLANZapine Injectable 1.25 milliGRAM(s) IntraMuscular every 6 hours PRN Severe agitation  ondansetron Injectable 4 milliGRAM(s) IV Push every 8 hours PRN Nausea and/or Vomiting        CAPILLARY BLOOD GLUCOSE      POCT Blood Glucose.: 90 mg/dL (10 Feb 2022 08:07)  POCT Blood Glucose.: 89 mg/dL (10 Feb 2022 01:45)  POCT Blood Glucose.: 112 mg/dL (2022 22:05)    I&O's Summary    2022 07:01  -  10 Feb 2022 07:00  --------------------------------------------------------  IN: 0 mL / OUT: 2 mL / NET: -2 mL        PHYSICAL EXAM:  Vital Signs Last 24 Hrs  T(C): 36.5 (10 Feb 2022 12:54), Max: 37.2 (2022 18:06)  T(F): 97.7 (10 Feb 2022 12:54), Max: 98.9 (2022 18:06)  HR: 65 (10 Feb 2022 12:54) (60 - 68)  BP: 130/67 (10 Feb 2022 12:54) (130/67 - 177/94)  BP(mean): --  RR: 16 (10 Feb 2022 12:54) (16 - 18)  SpO2: 100% (10 Feb 2022 12:54) (98% - 100%)    GENERAL: NAD, well-developed  HEAD:  Atraumatic, Normocephalic  EYES: EOMI, PERRLA, conjunctiva and sclera clear  NECK: Supple, No JVD  CHEST/LUNG: Clear to auscultation bilaterally; No wheeze  HEART: Regular rate and rhythm; No murmurs, rubs, or gallops  ABDOMEN: Soft, Nontender, Nondistended; Bowel sounds present  EXTREMITIES:  2+ Peripheral Pulses, No clubbing, cyanosis, or edema  PSYCH: AAOx3  NEUROLOGY: Right side is weak.  SKIN: No rashes or lesions    LABS:                        10.8   4.97  )-----------( 198      ( 10 Feb 2022 07:16 )             31.4     02-10    141  |  101  |  10  ----------------------------<  80  3.3<L>   |  31  |  1.24    Ca    9.5      10 Feb 2022 07:11  Phos  2.4     02-10  Mg     1.90     02-10    TPro  6.4  /  Alb  3.4  /  TBili  1.5<H>  /  DBili  x   /  AST  17  /  ALT  14  /  AlkPhos  81  02-08    PT/INR - ( 2022 20:17 )   PT: 12.8 sec;   INR: 1.13 ratio         PTT - ( 2022 20:17 )  PTT:31.9 sec      Urinalysis Basic - ( 2022 22:16 )    Color: Light Yellow / Appearance: Clear / S.014 / pH: x  Gluc: x / Ketone: Negative  / Bili: Negative / Urobili: <2 mg/dL   Blood: x / Protein: Trace / Nitrite: Negative   Leuk Esterase: Moderate / RBC: 4 /HPF / WBC 10 /HPF   Sq Epi: x / Non Sq Epi: 5 /HPF / Bacteria: Few        RADIOLOGY & ADDITIONAL TESTS:    Imaging Personally Reviewed:    Consultant(s) Notes Reviewed:      Care Discussed with Consultants/Other Providers:

## 2022-02-25 NOTE — PROGRESS NOTE ADULT - ASSESSMENT
82 yo Male with hx of HTN, HLD CVA with Rt sided residual weakness, dementia, bedbound c/b sacral decubitus ulcer, HTN, CAD s/p stent, s/p PPM extraction, asthma, stercoral colitis, recent completion of IV abx for Ecoli and MRSA Bacteremia via PICC; Pt brought in by wife for constipation of three weeks duration with associated abd distention and discomfort. History obtained from pt's wife who is also his caretaker who was present at bedside. Per pt's wife, has had intermittent episodes of constipation at baseline. Recently she tried enemas, senna but these caused cramping and abd discomfort and were also ineffective.  No associated nausea, vomiting, fevers or chills. Here, VSS, Hgb 10 (BL 12-13). CT A/P notable for stool in distal rectum with wall thickening and perirectal fat stranding c/w stercoral colitis and asymmetric irregular posterior rectal wall soft tissue thickening c/f neoplasm vs infectious or inflammatory etiology. GI consulted for stercoral colitis and posterior rectal wall thickening.     #stercoral colitis  #chronic constipation  #rectal wall thickening  Suspect clinical picture is likely related to known chronic constipation causing stercoral colitis; however given age cannot exclude malignancy with posterior rectal wall thickening. Responding well to BID enemas and miralax TID- had 10+ BMs since admission.    Recommendations:  - c/w Miralax TID  - would avoid stimulant laxatives (senna) or rectal suppositories  - keep on clear liquid diet until procedure  - plan for flex sig 2/28; plan for enema and golyetely prep Sunday  - will need repeat COVID test ordered Sunday 2/27 (must have negative COVID test within 72 hours of endoscopy)  - please order labs (CBC, CMP, INR) for 3 AM on 2/28 so that results will be available early tomorrow morning; replete lytes and transfuse as needed  - NPO after midnight on 2/27 at 23:59    **THIS NOTE IS NOT FINALIZED UNTIL SIGNED BY THE ATTENDING**      Dasia Awad MD  GI Fellow, PGY-4  Available via [employer] Teams    NON-URGENT CONSULTS:  Please email [email or web address]  AT NIGHT AND ON WEEKENDS:  Contact on-call GI fellow via answering service ([phone]) from 5pm-8am and on weekends/holidays  MONDAY-FRIDAY 8AM-5PM:  Pager# [phone] ([facility]) or [phone] ([facility])  GI Phone# [phone] ([facility])

## 2022-02-25 NOTE — PROGRESS NOTE ADULT - SUBJECTIVE AND OBJECTIVE BOX
Chief Complaint:  Patient is a 81y old  Male who presents with a chief complaint of Constipation x 3 weeks (24 Feb 2022 23:36)      Interval Events:   Plan for repeat flex sig next week to eval rectal thickening. Unable to obtain MR pelvis per radiologist protocol.       Hospital Medications:  amLODIPine   Tablet 10 milliGRAM(s) Oral daily  aspirin enteric coated 81 milliGRAM(s) Oral daily  atorvastatin 40 milliGRAM(s) Oral at bedtime  isosorbide   mononitrate ER Tablet (IMDUR) 30 milliGRAM(s) Oral daily  lisinopril 10 milliGRAM(s) Oral daily  melatonin 3 milliGRAM(s) Oral at bedtime PRN  multivitamin 1 Tablet(s) Oral daily  OLANZapine Injectable 1.25 milliGRAM(s) IntraMuscular every 6 hours PRN  ondansetron Injectable 4 milliGRAM(s) IV Push every 8 hours PRN  polyethylene glycol 3350 17 Gram(s) Oral <User Schedule>      PMHX/PSHX:  CVA (cerebral infarction)    HTN (hypertension)    Diabetes    Hyperlipidemia    Dementia    HLD (hyperlipidemia)    DM (diabetes mellitus)    Asthma    CAD (coronary artery disease)    Sinus node dysfunction    No significant past surgical history    Cardiac pacemaker    S/P coronary artery stent placement            ROS: unable to obtain ROS due to pt's dementia        PHYSICAL EXAM:     GENERAL:  Thin, elderly, chronically ill appearing, no distress  HEENT:  NC/AT,  conjunctivae clear, sclera anicteric  CHEST:  Full & symmetric excursion, no increased effort w/ respirations  HEART:  Regular rhythm & rate  ABDOMEN:  +thin, soft, non-tender, non-distended  EXTREMITIES:  no LE  edema  NEURO:  Sleeping but arouseable    Vital Signs:  Vital Signs Last 24 Hrs  T(C): 36.5 (25 Feb 2022 05:05), Max: 36.8 (24 Feb 2022 21:26)  T(F): 97.7 (25 Feb 2022 05:05), Max: 98.2 (24 Feb 2022 21:26)  HR: 52 (25 Feb 2022 05:05) (50 - 72)  BP: 146/64 (25 Feb 2022 05:05) (124/63 - 146/64)  BP(mean): --  RR: 18 (25 Feb 2022 05:05) (17 - 18)  SpO2: 97% (25 Feb 2022 05:05) (96% - 100%)  Daily     Daily     LABS:                        11.4   3.94  )-----------( 208      ( 25 Feb 2022 07:07 )             35.5     02-25    140  |  107  |  15  ----------------------------<  77  5.0   |  24  |  1.31<H>    Ca    10.5      25 Feb 2022 07:07  Phos  2.8     02-25  Mg     2.20     02-25                Imaging: No new abdominal imaging

## 2022-02-26 RX ADMIN — POLYETHYLENE GLYCOL 3350 17 GRAM(S): 17 POWDER, FOR SOLUTION ORAL at 06:08

## 2022-02-26 RX ADMIN — Medication 1 TABLET(S): at 13:05

## 2022-02-26 RX ADMIN — POLYETHYLENE GLYCOL 3350 17 GRAM(S): 17 POWDER, FOR SOLUTION ORAL at 22:44

## 2022-02-26 RX ADMIN — AMLODIPINE BESYLATE 10 MILLIGRAM(S): 2.5 TABLET ORAL at 06:03

## 2022-02-26 RX ADMIN — ATORVASTATIN CALCIUM 40 MILLIGRAM(S): 80 TABLET, FILM COATED ORAL at 22:44

## 2022-02-26 RX ADMIN — LISINOPRIL 10 MILLIGRAM(S): 2.5 TABLET ORAL at 06:03

## 2022-02-26 RX ADMIN — POLYETHYLENE GLYCOL 3350 17 GRAM(S): 17 POWDER, FOR SOLUTION ORAL at 13:05

## 2022-02-26 RX ADMIN — ISOSORBIDE MONONITRATE 30 MILLIGRAM(S): 60 TABLET, EXTENDED RELEASE ORAL at 13:05

## 2022-02-26 RX ADMIN — Medication 81 MILLIGRAM(S): at 13:05

## 2022-02-26 NOTE — PROGRESS NOTE ADULT - SUBJECTIVE AND OBJECTIVE BOX
Patient is a 81y old  Male who presents with a chief complaint of Constipation x 3 weeks (10 Feb 2022 14:14)    2022  HPI:  No new symptoms.  Flex sig planned for next week    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,     HTN (hypertension)    Dementia    HLD (hyperlipidemia)    DM (diabetes mellitus)    Asthma    CAD (coronary artery disease)  s/p stent placement    Sinus node dysfunction  s/p Medtronic PPM    Cardiac pacemaker  Medtronic (SN: [id number]; Model: [id number])    S/P coronary artery stent placement        Review of Systems:   CONSTITUTIONAL: No fever, weight loss, or fatigue  EYES: No eye pain, visual disturbances, or discharge  ENMT:  No difficulty hearing, tinnitus, vertigo; No sinus or throat pain  NECK: No pain or stiffness  BREASTS: No pain, masses, or nipple discharge  RESPIRATORY: No cough, wheezing, chills or hemoptysis; No shortness of breath  CARDIOVASCULAR: No chest pain, palpitations, dizziness, or leg swelling  GASTROINTESTINAL: No abdominal or epigastric pain. No nausea, vomiting, or hematemesis; GENITOURINARY: No dysuria, frequency, hematuria, or incontinence  NEUROLOGICAL: No headaches, memory loss, Right sided weakness +  SKIN: No itching, burning, rashes, or lesions   LYMPH NODES: No enlarged glands  ENDOCRINE: No heat or cold intolerance; No hair loss  MUSCULOSKELETAL: No joint pain or swelling; No muscle, back, or extremity pain  PSYCHIATRIC: No depression, anxiety, mood swings, or difficulty sleeping  HEME/LYMPH: No easy bruising, or bleeding gums  ALLERY AND IMMUNOLOGIC: No hives or eczema    Allergies    No Known Allergies    Intolerances        Social History:     FAMILY HISTORY:  FH: diabetes mellitus  Mother, Brother    FHx: dementia  Father        MEDICATIONS  (STANDING):  amLODIPine   Tablet 10 milliGRAM(s) Oral daily  aspirin enteric coated 81 milliGRAM(s) Oral daily  atorvastatin 40 milliGRAM(s) Oral at bedtime  isosorbide   mononitrate ER Tablet (IMDUR) 30 milliGRAM(s) Oral daily  lactated ringers. 1000 milliLiter(s) (100 mL/Hr) IV Continuous <Continuous>  lactulose Syrup 10 Gram(s) Oral every 8 hours  lisinopril 10 milliGRAM(s) Oral daily  metoprolol succinate ER 25 milliGRAM(s) Oral daily  polyethylene glycol 3350 17 Gram(s) Oral <User Schedule>    MEDICATIONS  (PRN):  melatonin 3 milliGRAM(s) Oral at bedtime PRN Insomnia  OLANZapine Injectable 1.25 milliGRAM(s) IntraMuscular every 6 hours PRN Severe agitation  ondansetron Injectable 4 milliGRAM(s) IV Push every 8 hours PRN Nausea and/or Vomiting        CAPILLARY BLOOD GLUCOSE      POCT Blood Glucose.: 90 mg/dL (10 Feb 2022 08:07)  POCT Blood Glucose.: 89 mg/dL (10 Feb 2022 01:45)  POCT Blood Glucose.: 112 mg/dL (2022 22:05)    I&O's Summary    2022 07:01  -  10 Feb 2022 07:00  --------------------------------------------------------  IN: 0 mL / OUT: 2 mL / NET: -2 mL        PHYSICAL EXAM:  Vital Signs Last 24 Hrs  T(C): 36.5 (10 Feb 2022 12:54), Max: 37.2 (2022 18:06)  T(F): 97.7 (10 Feb 2022 12:54), Max: 98.9 (2022 18:06)  HR: 65 (10 Feb 2022 12:54) (60 - 68)  BP: 130/67 (10 Feb 2022 12:54) (130/67 - 177/94)  BP(mean): --  RR: 16 (10 Feb 2022 12:54) (16 - 18)  SpO2: 100% (10 Feb 2022 12:54) (98% - 100%)    GENERAL: NAD, well-developed  HEAD:  Atraumatic, Normocephalic  EYES: EOMI, PERRLA, conjunctiva and sclera clear  NECK: Supple, No JVD  CHEST/LUNG: Clear to auscultation bilaterally; No wheeze  HEART: Regular rate and rhythm; No murmurs, rubs, or gallops  ABDOMEN: Soft, Nontender, Nondistended; Bowel sounds present  EXTREMITIES:  2+ Peripheral Pulses, No clubbing, cyanosis, or edema  PSYCH: AAOx3  NEUROLOGY: Right side is weak.  SKIN: No rashes or lesions    LABS:                        10.8   4.97  )-----------( 198      ( 10 Feb 2022 07:16 )             31.4     02-10    141  |  101  |  10  ----------------------------<  80  3.3<L>   |  31  |  1.24    Ca    9.5      10 Feb 2022 07:11  Phos  2.4     02-10  Mg     1.90     02-10    TPro  6.4  /  Alb  3.4  /  TBili  1.5<H>  /  DBili  x   /  AST  17  /  ALT  14  /  AlkPhos  81  02-08    PT/INR - ( 2022 20:17 )   PT: 12.8 sec;   INR: 1.13 ratio         PTT - ( 2022 20:17 )  PTT:31.9 sec      Urinalysis Basic - ( 2022 22:16 )    Color: Light Yellow / Appearance: Clear / S.014 / pH: x  Gluc: x / Ketone: Negative  / Bili: Negative / Urobili: <2 mg/dL   Blood: x / Protein: Trace / Nitrite: Negative   Leuk Esterase: Moderate / RBC: 4 /HPF / WBC 10 /HPF   Sq Epi: x / Non Sq Epi: 5 /HPF / Bacteria: Few        RADIOLOGY & ADDITIONAL TESTS:    Imaging Personally Reviewed:    Consultant(s) Notes Reviewed:      Care Discussed with Consultants/Other Providers:

## 2022-02-27 LAB
ANION GAP SERPL CALC-SCNC: 11 MMOL/L — SIGNIFICANT CHANGE UP (ref 7–14)
B PERT DNA SPEC QL NAA+PROBE: SIGNIFICANT CHANGE UP
B PERT+PARAPERT DNA PNL SPEC NAA+PROBE: SIGNIFICANT CHANGE UP
BORDETELLA PARAPERTUSSIS (RAPRVP): SIGNIFICANT CHANGE UP
BUN SERPL-MCNC: 15 MG/DL — SIGNIFICANT CHANGE UP (ref 7–23)
C PNEUM DNA SPEC QL NAA+PROBE: SIGNIFICANT CHANGE UP
CALCIUM SERPL-MCNC: 10.7 MG/DL — HIGH (ref 8.4–10.5)
CHLORIDE SERPL-SCNC: 100 MMOL/L — SIGNIFICANT CHANGE UP (ref 98–107)
CO2 SERPL-SCNC: 25 MMOL/L — SIGNIFICANT CHANGE UP (ref 22–31)
CREAT SERPL-MCNC: 1.42 MG/DL — HIGH (ref 0.5–1.3)
FLUAV SUBTYP SPEC NAA+PROBE: SIGNIFICANT CHANGE UP
FLUBV RNA SPEC QL NAA+PROBE: SIGNIFICANT CHANGE UP
GLUCOSE SERPL-MCNC: 84 MG/DL — SIGNIFICANT CHANGE UP (ref 70–99)
HADV DNA SPEC QL NAA+PROBE: SIGNIFICANT CHANGE UP
HCOV 229E RNA SPEC QL NAA+PROBE: SIGNIFICANT CHANGE UP
HCOV HKU1 RNA SPEC QL NAA+PROBE: SIGNIFICANT CHANGE UP
HCOV NL63 RNA SPEC QL NAA+PROBE: SIGNIFICANT CHANGE UP
HCOV OC43 RNA SPEC QL NAA+PROBE: SIGNIFICANT CHANGE UP
HCT VFR BLD CALC: 35.2 % — LOW (ref 39–50)
HGB BLD-MCNC: 11.3 G/DL — LOW (ref 13–17)
HMPV RNA SPEC QL NAA+PROBE: SIGNIFICANT CHANGE UP
HPIV1 RNA SPEC QL NAA+PROBE: SIGNIFICANT CHANGE UP
HPIV2 RNA SPEC QL NAA+PROBE: SIGNIFICANT CHANGE UP
HPIV3 RNA SPEC QL NAA+PROBE: SIGNIFICANT CHANGE UP
HPIV4 RNA SPEC QL NAA+PROBE: SIGNIFICANT CHANGE UP
M PNEUMO DNA SPEC QL NAA+PROBE: SIGNIFICANT CHANGE UP
MAGNESIUM SERPL-MCNC: 2.2 MG/DL — SIGNIFICANT CHANGE UP (ref 1.6–2.6)
MCHC RBC-ENTMCNC: 29.7 PG — SIGNIFICANT CHANGE UP (ref 27–34)
MCHC RBC-ENTMCNC: 32.1 GM/DL — SIGNIFICANT CHANGE UP (ref 32–36)
MCV RBC AUTO: 92.6 FL — SIGNIFICANT CHANGE UP (ref 80–100)
NRBC # BLD: 0 /100 WBCS — SIGNIFICANT CHANGE UP
NRBC # FLD: 0 K/UL — SIGNIFICANT CHANGE UP
PHOSPHATE SERPL-MCNC: 3 MG/DL — SIGNIFICANT CHANGE UP (ref 2.5–4.5)
PLATELET # BLD AUTO: 231 K/UL — SIGNIFICANT CHANGE UP (ref 150–400)
POTASSIUM SERPL-MCNC: 4.2 MMOL/L — SIGNIFICANT CHANGE UP (ref 3.5–5.3)
POTASSIUM SERPL-SCNC: 4.2 MMOL/L — SIGNIFICANT CHANGE UP (ref 3.5–5.3)
RAPID RVP RESULT: SIGNIFICANT CHANGE UP
RBC # BLD: 3.8 M/UL — LOW (ref 4.2–5.8)
RBC # FLD: 14.5 % — SIGNIFICANT CHANGE UP (ref 10.3–14.5)
RSV RNA SPEC QL NAA+PROBE: SIGNIFICANT CHANGE UP
RV+EV RNA SPEC QL NAA+PROBE: SIGNIFICANT CHANGE UP
SARS-COV-2 RNA SPEC QL NAA+PROBE: SIGNIFICANT CHANGE UP
SARS-COV-2 RNA SPEC QL NAA+PROBE: SIGNIFICANT CHANGE UP
SODIUM SERPL-SCNC: 136 MMOL/L — SIGNIFICANT CHANGE UP (ref 135–145)
WBC # BLD: 3.6 K/UL — LOW (ref 3.8–10.5)
WBC # FLD AUTO: 3.6 K/UL — LOW (ref 3.8–10.5)

## 2022-02-27 RX ORDER — SODIUM CHLORIDE 9 MG/ML
1000 INJECTION INTRAMUSCULAR; INTRAVENOUS; SUBCUTANEOUS
Refills: 0 | Status: DISCONTINUED | OUTPATIENT
Start: 2022-02-27 | End: 2022-03-01

## 2022-02-27 RX ADMIN — Medication 1 TABLET(S): at 11:39

## 2022-02-27 RX ADMIN — Medication 81 MILLIGRAM(S): at 11:39

## 2022-02-27 RX ADMIN — ATORVASTATIN CALCIUM 40 MILLIGRAM(S): 80 TABLET, FILM COATED ORAL at 22:47

## 2022-02-27 RX ADMIN — SODIUM CHLORIDE 60 MILLILITER(S): 9 INJECTION INTRAMUSCULAR; INTRAVENOUS; SUBCUTANEOUS at 08:55

## 2022-02-27 RX ADMIN — Medication 10 MILLIGRAM(S): at 22:47

## 2022-02-27 RX ADMIN — POLYETHYLENE GLYCOL 3350 17 GRAM(S): 17 POWDER, FOR SOLUTION ORAL at 05:25

## 2022-02-27 RX ADMIN — POLYETHYLENE GLYCOL 3350 17 GRAM(S): 17 POWDER, FOR SOLUTION ORAL at 14:55

## 2022-02-27 RX ADMIN — POLYETHYLENE GLYCOL 3350 17 GRAM(S): 17 POWDER, FOR SOLUTION ORAL at 22:47

## 2022-02-27 RX ADMIN — ISOSORBIDE MONONITRATE 30 MILLIGRAM(S): 60 TABLET, EXTENDED RELEASE ORAL at 11:39

## 2022-02-27 RX ADMIN — AMLODIPINE BESYLATE 10 MILLIGRAM(S): 2.5 TABLET ORAL at 05:25

## 2022-02-27 RX ADMIN — LISINOPRIL 10 MILLIGRAM(S): 2.5 TABLET ORAL at 05:25

## 2022-02-27 NOTE — PROGRESS NOTE ADULT - SUBJECTIVE AND OBJECTIVE BOX
Patient is a 81y old  Male who presents with a chief complaint of Constipation x 3 weeks (10 Feb 2022 14:14)    2022  HPI:  No new symptoms.  Flex sig planned for next week    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,     HTN (hypertension)    Dementia    HLD (hyperlipidemia)    DM (diabetes mellitus)    Asthma    CAD (coronary artery disease)  s/p stent placement    Sinus node dysfunction  s/p Medtronic PPM    Cardiac pacemaker  Medtronic (SN: [id number]; Model: [id number])    S/P coronary artery stent placement        Review of Systems:   CONSTITUTIONAL: No fever, weight loss, or fatigue  EYES: No eye pain, visual disturbances, or discharge  ENMT:  No difficulty hearing, tinnitus, vertigo; No sinus or throat pain  NECK: No pain or stiffness  BREASTS: No pain, masses, or nipple discharge  RESPIRATORY: No cough, wheezing, chills or hemoptysis; No shortness of breath  CARDIOVASCULAR: No chest pain, palpitations, dizziness, or leg swelling  GASTROINTESTINAL: No abdominal or epigastric pain. No nausea, vomiting, or hematemesis; GENITOURINARY: No dysuria, frequency, hematuria, or incontinence  NEUROLOGICAL: No headaches, memory loss, Right sided weakness +  SKIN: No itching, burning, rashes, or lesions   LYMPH NODES: No enlarged glands  ENDOCRINE: No heat or cold intolerance; No hair loss  MUSCULOSKELETAL: No joint pain or swelling; No muscle, back, or extremity pain  PSYCHIATRIC: No depression, anxiety, mood swings, or difficulty sleeping  HEME/LYMPH: No easy bruising, or bleeding gums  ALLERY AND IMMUNOLOGIC: No hives or eczema    Allergies    No Known Allergies    Intolerances        Social History:     FAMILY HISTORY:  FH: diabetes mellitus  Mother, Brother    FHx: dementia  Father        MEDICATIONS  (STANDING):  amLODIPine   Tablet 10 milliGRAM(s) Oral daily  aspirin enteric coated 81 milliGRAM(s) Oral daily  atorvastatin 40 milliGRAM(s) Oral at bedtime  isosorbide   mononitrate ER Tablet (IMDUR) 30 milliGRAM(s) Oral daily  lactated ringers. 1000 milliLiter(s) (100 mL/Hr) IV Continuous <Continuous>  lactulose Syrup 10 Gram(s) Oral every 8 hours  lisinopril 10 milliGRAM(s) Oral daily  metoprolol succinate ER 25 milliGRAM(s) Oral daily  polyethylene glycol 3350 17 Gram(s) Oral <User Schedule>    MEDICATIONS  (PRN):  melatonin 3 milliGRAM(s) Oral at bedtime PRN Insomnia  OLANZapine Injectable 1.25 milliGRAM(s) IntraMuscular every 6 hours PRN Severe agitation  ondansetron Injectable 4 milliGRAM(s) IV Push every 8 hours PRN Nausea and/or Vomiting        CAPILLARY BLOOD GLUCOSE      POCT Blood Glucose.: 90 mg/dL (10 Feb 2022 08:07)  POCT Blood Glucose.: 89 mg/dL (10 Feb 2022 01:45)  POCT Blood Glucose.: 112 mg/dL (2022 22:05)    I&O's Summary    2022 07:01  -  10 Feb 2022 07:00  --------------------------------------------------------  IN: 0 mL / OUT: 2 mL / NET: -2 mL        PHYSICAL EXAM:  Vital Signs Last 24 Hrs  T(C): 36.5 (10 Feb 2022 12:54), Max: 37.2 (2022 18:06)  T(F): 97.7 (10 Feb 2022 12:54), Max: 98.9 (2022 18:06)  HR: 65 (10 Feb 2022 12:54) (60 - 68)  BP: 130/67 (10 Feb 2022 12:54) (130/67 - 177/94)  BP(mean): --  RR: 16 (10 Feb 2022 12:54) (16 - 18)  SpO2: 100% (10 Feb 2022 12:54) (98% - 100%)    GENERAL: NAD, well-developed  HEAD:  Atraumatic, Normocephalic  EYES: EOMI, PERRLA, conjunctiva and sclera clear  NECK: Supple, No JVD  CHEST/LUNG: Clear to auscultation bilaterally; No wheeze  HEART: Regular rate and rhythm; No murmurs, rubs, or gallops  ABDOMEN: Soft, Nontender, Nondistended; Bowel sounds present  EXTREMITIES:  2+ Peripheral Pulses, No clubbing, cyanosis, or edema  PSYCH: AAOx3  NEUROLOGY: Right side is weak.  SKIN: No rashes or lesions    LABS:                        10.8   4.97  )-----------( 198      ( 10 Feb 2022 07:16 )             31.4     02-10    141  |  101  |  10  ----------------------------<  80  3.3<L>   |  31  |  1.24    Ca    9.5      10 Feb 2022 07:11  Phos  2.4     02-10  Mg     1.90     02-10    TPro  6.4  /  Alb  3.4  /  TBili  1.5<H>  /  DBili  x   /  AST  17  /  ALT  14  /  AlkPhos  81  02-08    PT/INR - ( 2022 20:17 )   PT: 12.8 sec;   INR: 1.13 ratio         PTT - ( 2022 20:17 )  PTT:31.9 sec      Urinalysis Basic - ( 2022 22:16 )    Color: Light Yellow / Appearance: Clear / S.014 / pH: x  Gluc: x / Ketone: Negative  / Bili: Negative / Urobili: <2 mg/dL   Blood: x / Protein: Trace / Nitrite: Negative   Leuk Esterase: Moderate / RBC: 4 /HPF / WBC 10 /HPF   Sq Epi: x / Non Sq Epi: 5 /HPF / Bacteria: Few        RADIOLOGY & ADDITIONAL TESTS:    Imaging Personally Reviewed:    Consultant(s) Notes Reviewed:      Care Discussed with Consultants/Other Providers:

## 2022-02-28 LAB
ALBUMIN SERPL ELPH-MCNC: 3.6 G/DL — SIGNIFICANT CHANGE UP (ref 3.3–5)
ALP SERPL-CCNC: 79 U/L — SIGNIFICANT CHANGE UP (ref 40–120)
ALT FLD-CCNC: 12 U/L — SIGNIFICANT CHANGE UP (ref 4–41)
ANION GAP SERPL CALC-SCNC: 13 MMOL/L — SIGNIFICANT CHANGE UP (ref 7–14)
AST SERPL-CCNC: 19 U/L — SIGNIFICANT CHANGE UP (ref 4–40)
BILIRUB SERPL-MCNC: 1.6 MG/DL — HIGH (ref 0.2–1.2)
BUN SERPL-MCNC: 16 MG/DL — SIGNIFICANT CHANGE UP (ref 7–23)
CALCIUM SERPL-MCNC: 9.9 MG/DL — SIGNIFICANT CHANGE UP (ref 8.4–10.5)
CHLORIDE SERPL-SCNC: 104 MMOL/L — SIGNIFICANT CHANGE UP (ref 98–107)
CO2 SERPL-SCNC: 21 MMOL/L — LOW (ref 22–31)
CREAT SERPL-MCNC: 1.38 MG/DL — HIGH (ref 0.5–1.3)
GLUCOSE SERPL-MCNC: 90 MG/DL — SIGNIFICANT CHANGE UP (ref 70–99)
HCT VFR BLD CALC: 30.5 % — LOW (ref 39–50)
HGB BLD-MCNC: 9.9 G/DL — LOW (ref 13–17)
INR BLD: 1.09 RATIO — SIGNIFICANT CHANGE UP (ref 0.88–1.16)
MAGNESIUM SERPL-MCNC: 2.1 MG/DL — SIGNIFICANT CHANGE UP (ref 1.6–2.6)
MCHC RBC-ENTMCNC: 29.5 PG — SIGNIFICANT CHANGE UP (ref 27–34)
MCHC RBC-ENTMCNC: 32.5 GM/DL — SIGNIFICANT CHANGE UP (ref 32–36)
MCV RBC AUTO: 90.8 FL — SIGNIFICANT CHANGE UP (ref 80–100)
NRBC # BLD: 0 /100 WBCS — SIGNIFICANT CHANGE UP
NRBC # FLD: 0 K/UL — SIGNIFICANT CHANGE UP
PHOSPHATE SERPL-MCNC: 3.1 MG/DL — SIGNIFICANT CHANGE UP (ref 2.5–4.5)
PLATELET # BLD AUTO: 199 K/UL — SIGNIFICANT CHANGE UP (ref 150–400)
POTASSIUM SERPL-MCNC: 3.9 MMOL/L — SIGNIFICANT CHANGE UP (ref 3.5–5.3)
POTASSIUM SERPL-SCNC: 3.9 MMOL/L — SIGNIFICANT CHANGE UP (ref 3.5–5.3)
PROT SERPL-MCNC: 6.8 G/DL — SIGNIFICANT CHANGE UP (ref 6–8.3)
PROTHROM AB SERPL-ACNC: 12.7 SEC — SIGNIFICANT CHANGE UP (ref 10.5–13.4)
RBC # BLD: 3.36 M/UL — LOW (ref 4.2–5.8)
RBC # FLD: 14.2 % — SIGNIFICANT CHANGE UP (ref 10.3–14.5)
SODIUM SERPL-SCNC: 138 MMOL/L — SIGNIFICANT CHANGE UP (ref 135–145)
WBC # BLD: 3.89 K/UL — SIGNIFICANT CHANGE UP (ref 3.8–10.5)
WBC # FLD AUTO: 3.89 K/UL — SIGNIFICANT CHANGE UP (ref 3.8–10.5)

## 2022-02-28 RX ADMIN — LISINOPRIL 10 MILLIGRAM(S): 2.5 TABLET ORAL at 05:25

## 2022-02-28 RX ADMIN — AMLODIPINE BESYLATE 10 MILLIGRAM(S): 2.5 TABLET ORAL at 05:25

## 2022-02-28 RX ADMIN — POLYETHYLENE GLYCOL 3350 17 GRAM(S): 17 POWDER, FOR SOLUTION ORAL at 22:45

## 2022-02-28 RX ADMIN — ATORVASTATIN CALCIUM 40 MILLIGRAM(S): 80 TABLET, FILM COATED ORAL at 22:45

## 2022-02-28 RX ADMIN — POLYETHYLENE GLYCOL 3350 17 GRAM(S): 17 POWDER, FOR SOLUTION ORAL at 05:26

## 2022-02-28 RX ADMIN — Medication 1 TABLET(S): at 12:15

## 2022-02-28 RX ADMIN — Medication 10 MILLIGRAM(S): at 22:45

## 2022-02-28 RX ADMIN — POLYETHYLENE GLYCOL 3350 17 GRAM(S): 17 POWDER, FOR SOLUTION ORAL at 12:14

## 2022-02-28 RX ADMIN — Medication 1 ENEMA: at 15:36

## 2022-02-28 RX ADMIN — Medication 81 MILLIGRAM(S): at 12:14

## 2022-02-28 RX ADMIN — ISOSORBIDE MONONITRATE 30 MILLIGRAM(S): 60 TABLET, EXTENDED RELEASE ORAL at 12:14

## 2022-02-28 RX ADMIN — Medication 4000 MILLILITER(S): at 12:27

## 2022-02-28 NOTE — CHART NOTE - NSCHARTNOTEFT_GEN_A_CORE
Prep was not given by staff overnight.     Recs:  - Plan to prep today and repeat flex sig tomorrow.  - Please ensure pt receives bowel prep. Keep on clear liquids during the day.  - Please order labs (CBC, CMP, INR) for 3 AM so that results will be available early tomorrow morning; replete lytes and transfuse as needed.  - NPO after midnight tonight.    Dasia Awad MD  GI Fellow, PGY-4  Available via [employer] Teams    NON-URGENT CONSULTS:  Please email [email or web address]  AT NIGHT AND ON WEEKENDS:  Contact on-call GI fellow via answering service ([phone]) from 5pm-8am and on weekends/holidays  MONDAY-FRIDAY 8AM-5PM:  Pager# [phone] ([facility]) or [phone] ([facility])  GI Phone# [phone] ([facility])

## 2022-02-28 NOTE — PROGRESS NOTE ADULT - SUBJECTIVE AND OBJECTIVE BOX
Patient is a 81y old  Male who presents with a chief complaint of Constipation x 3 weeks (10 Feb 2022 14:14)    2022  HPI:  No new symptoms.  Flex sig planned   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,     HTN (hypertension)    Dementia    HLD (hyperlipidemia)    DM (diabetes mellitus)    Asthma    CAD (coronary artery disease)  s/p stent placement    Sinus node dysfunction  s/p Medtronic PPM    Cardiac pacemaker  Medtronic (SN: [id number]; Model: [id number])    S/P coronary artery stent placement        Review of Systems:   CONSTITUTIONAL: No fever, weight loss, or fatigue  EYES: No eye pain, visual disturbances, or discharge  ENMT:  No difficulty hearing, tinnitus, vertigo; No sinus or throat pain  NECK: No pain or stiffness  BREASTS: No pain, masses, or nipple discharge  RESPIRATORY: No cough, wheezing, chills or hemoptysis; No shortness of breath  CARDIOVASCULAR: No chest pain, palpitations, dizziness, or leg swelling  GASTROINTESTINAL: No abdominal or epigastric pain. No nausea, vomiting, or hematemesis; GENITOURINARY: No dysuria, frequency, hematuria, or incontinence  NEUROLOGICAL: No headaches, memory loss, Right sided weakness +  SKIN: No itching, burning, rashes, or lesions   LYMPH NODES: No enlarged glands  ENDOCRINE: No heat or cold intolerance; No hair loss  MUSCULOSKELETAL: No joint pain or swelling; No muscle, back, or extremity pain  PSYCHIATRIC: No depression, anxiety, mood swings, or difficulty sleeping  HEME/LYMPH: No easy bruising, or bleeding gums  ALLERY AND IMMUNOLOGIC: No hives or eczema    Allergies    No Known Allergies    Intolerances        Social History:     FAMILY HISTORY:  FH: diabetes mellitus  Mother, Brother    FHx: dementia  Father        MEDICATIONS  (STANDING):  amLODIPine   Tablet 10 milliGRAM(s) Oral daily  aspirin enteric coated 81 milliGRAM(s) Oral daily  atorvastatin 40 milliGRAM(s) Oral at bedtime  isosorbide   mononitrate ER Tablet (IMDUR) 30 milliGRAM(s) Oral daily  lactated ringers. 1000 milliLiter(s) (100 mL/Hr) IV Continuous <Continuous>  lactulose Syrup 10 Gram(s) Oral every 8 hours  lisinopril 10 milliGRAM(s) Oral daily  metoprolol succinate ER 25 milliGRAM(s) Oral daily  polyethylene glycol 3350 17 Gram(s) Oral <User Schedule>    MEDICATIONS  (PRN):  melatonin 3 milliGRAM(s) Oral at bedtime PRN Insomnia  OLANZapine Injectable 1.25 milliGRAM(s) IntraMuscular every 6 hours PRN Severe agitation  ondansetron Injectable 4 milliGRAM(s) IV Push every 8 hours PRN Nausea and/or Vomiting        CAPILLARY BLOOD GLUCOSE      POCT Blood Glucose.: 90 mg/dL (10 Feb 2022 08:07)  POCT Blood Glucose.: 89 mg/dL (10 Feb 2022 01:45)  POCT Blood Glucose.: 112 mg/dL (2022 22:05)    I&O's Summary    2022 07:01  -  10 Feb 2022 07:00  --------------------------------------------------------  IN: 0 mL / OUT: 2 mL / NET: -2 mL        PHYSICAL EXAM:  Vital Signs Last 24 Hrs  T(C): 36.5 (10 Feb 2022 12:54), Max: 37.2 (2022 18:06)  T(F): 97.7 (10 Feb 2022 12:54), Max: 98.9 (2022 18:06)  HR: 65 (10 Feb 2022 12:54) (60 - 68)  BP: 130/67 (10 Feb 2022 12:54) (130/67 - 177/94)  BP(mean): --  RR: 16 (10 Feb 2022 12:54) (16 - 18)  SpO2: 100% (10 Feb 2022 12:54) (98% - 100%)    GENERAL: NAD, well-developed  HEAD:  Atraumatic, Normocephalic  EYES: EOMI, PERRLA, conjunctiva and sclera clear  NECK: Supple, No JVD  CHEST/LUNG: Clear to auscultation bilaterally; No wheeze  HEART: Regular rate and rhythm; No murmurs, rubs, or gallops  ABDOMEN: Soft, Nontender, Nondistended; Bowel sounds present  EXTREMITIES:  2+ Peripheral Pulses, No clubbing, cyanosis, or edema  PSYCH: AAOx3  NEUROLOGY: Right side is weak.  SKIN: No rashes or lesions    LABS:                        10.8   4.97  )-----------( 198      ( 10 Feb 2022 07:16 )             31.4     02-10    141  |  101  |  10  ----------------------------<  80  3.3<L>   |  31  |  1.24    Ca    9.5      10 Feb 2022 07:11  Phos  2.4     02-10  Mg     1.90     02-10    TPro  6.4  /  Alb  3.4  /  TBili  1.5<H>  /  DBili  x   /  AST  17  /  ALT  14  /  AlkPhos  81  02-08    PT/INR - ( 2022 20:17 )   PT: 12.8 sec;   INR: 1.13 ratio         PTT - ( 2022 20:17 )  PTT:31.9 sec      Urinalysis Basic - ( 2022 22:16 )    Color: Light Yellow / Appearance: Clear / S.014 / pH: x  Gluc: x / Ketone: Negative  / Bili: Negative / Urobili: <2 mg/dL   Blood: x / Protein: Trace / Nitrite: Negative   Leuk Esterase: Moderate / RBC: 4 /HPF / WBC 10 /HPF   Sq Epi: x / Non Sq Epi: 5 /HPF / Bacteria: Few        RADIOLOGY & ADDITIONAL TESTS:    Imaging Personally Reviewed:    Consultant(s) Notes Reviewed:      Care Discussed with Consultants/Other Providers:

## 2022-02-28 NOTE — CHART NOTE - NSCHARTNOTEFT_GEN_A_CORE
Patient did not receive prep yesterday - Currently drinking and will add thickener to decrease coughing - NPO PMN for scope tomorrow

## 2022-03-01 LAB
ANION GAP SERPL CALC-SCNC: 15 MMOL/L — HIGH (ref 7–14)
APTT BLD: 29.3 SEC — SIGNIFICANT CHANGE UP (ref 27–36.3)
BUN SERPL-MCNC: 17 MG/DL — SIGNIFICANT CHANGE UP (ref 7–23)
CALCIUM SERPL-MCNC: 9.7 MG/DL — SIGNIFICANT CHANGE UP (ref 8.4–10.5)
CHLORIDE SERPL-SCNC: 107 MMOL/L — SIGNIFICANT CHANGE UP (ref 98–107)
CO2 SERPL-SCNC: 22 MMOL/L — SIGNIFICANT CHANGE UP (ref 22–31)
CREAT SERPL-MCNC: 1.42 MG/DL — HIGH (ref 0.5–1.3)
EGFR: 49 ML/MIN/1.73M2 — LOW
GLUCOSE SERPL-MCNC: 104 MG/DL — HIGH (ref 70–99)
HCT VFR BLD CALC: 32 % — LOW (ref 39–50)
HGB BLD-MCNC: 10.5 G/DL — LOW (ref 13–17)
INR BLD: 1.14 RATIO — SIGNIFICANT CHANGE UP (ref 0.88–1.16)
MAGNESIUM SERPL-MCNC: 2.1 MG/DL — SIGNIFICANT CHANGE UP (ref 1.6–2.6)
MCHC RBC-ENTMCNC: 29.8 PG — SIGNIFICANT CHANGE UP (ref 27–34)
MCHC RBC-ENTMCNC: 32.8 GM/DL — SIGNIFICANT CHANGE UP (ref 32–36)
MCV RBC AUTO: 90.9 FL — SIGNIFICANT CHANGE UP (ref 80–100)
NRBC # BLD: 0 /100 WBCS — SIGNIFICANT CHANGE UP
NRBC # FLD: 0 K/UL — SIGNIFICANT CHANGE UP
PHOSPHATE SERPL-MCNC: 2.6 MG/DL — SIGNIFICANT CHANGE UP (ref 2.5–4.5)
PLATELET # BLD AUTO: 186 K/UL — SIGNIFICANT CHANGE UP (ref 150–400)
POTASSIUM SERPL-MCNC: 3.7 MMOL/L — SIGNIFICANT CHANGE UP (ref 3.5–5.3)
POTASSIUM SERPL-SCNC: 3.7 MMOL/L — SIGNIFICANT CHANGE UP (ref 3.5–5.3)
PROTHROM AB SERPL-ACNC: 13.4 SEC — SIGNIFICANT CHANGE UP (ref 10.5–13.4)
RBC # BLD: 3.52 M/UL — LOW (ref 4.2–5.8)
RBC # FLD: 14.6 % — HIGH (ref 10.3–14.5)
SODIUM SERPL-SCNC: 144 MMOL/L — SIGNIFICANT CHANGE UP (ref 135–145)
WBC # BLD: 4.66 K/UL — SIGNIFICANT CHANGE UP (ref 3.8–10.5)
WBC # FLD AUTO: 4.66 K/UL — SIGNIFICANT CHANGE UP (ref 3.8–10.5)

## 2022-03-01 RX ORDER — SOD SULF/SODIUM/NAHCO3/KCL/PEG
4000 SOLUTION, RECONSTITUTED, ORAL ORAL ONCE
Refills: 0 | Status: DISCONTINUED | OUTPATIENT
Start: 2022-03-01 | End: 2022-03-04

## 2022-03-01 RX ADMIN — LISINOPRIL 10 MILLIGRAM(S): 2.5 TABLET ORAL at 05:15

## 2022-03-01 RX ADMIN — AMLODIPINE BESYLATE 10 MILLIGRAM(S): 2.5 TABLET ORAL at 05:15

## 2022-03-01 RX ADMIN — Medication 10 MILLIGRAM(S): at 21:32

## 2022-03-01 RX ADMIN — Medication 1 TABLET(S): at 16:58

## 2022-03-01 RX ADMIN — ATORVASTATIN CALCIUM 40 MILLIGRAM(S): 80 TABLET, FILM COATED ORAL at 21:33

## 2022-03-01 RX ADMIN — Medication 81 MILLIGRAM(S): at 16:57

## 2022-03-01 RX ADMIN — POLYETHYLENE GLYCOL 3350 17 GRAM(S): 17 POWDER, FOR SOLUTION ORAL at 21:32

## 2022-03-01 RX ADMIN — ISOSORBIDE MONONITRATE 30 MILLIGRAM(S): 60 TABLET, EXTENDED RELEASE ORAL at 16:58

## 2022-03-01 NOTE — CHART NOTE - NSCHARTNOTEFT_GEN_A_CORE
Attempted flex sig today but pt still having brown stools. Manual disimpaction was done but procedure was cancelled.  Spoke with pt's wife who is agreeable to multiday prep and keeping pt on clear liquids.  Will reassess and attempt flex sig again 3/3-3/4 pending BMs.  Recommendations conveyed to primary team.    Dasia Awad MD  GI Fellow, PGY-4  Available via [employer] Teams    NON-URGENT CONSULTS:  Please email [email or web address]  AT NIGHT AND ON WEEKENDS:  Contact on-call GI fellow via answering service ([phone]) from 5pm-8am and on weekends/holidays  MONDAY-FRIDAY 8AM-5PM:  Pager# [phone] ([facility]) or [phone] ([facility])  GI Phone# [phone] ([facility])

## 2022-03-01 NOTE — PROGRESS NOTE ADULT - SUBJECTIVE AND OBJECTIVE BOX
Patient is a 81y old  Male who presents with a chief complaint of Constipation x 3 weeks (10 Feb 2022 14:14)    3/1/2022  HPI:  No new symptoms.  Flex sig failed due to poor prep  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,     HTN (hypertension)    Dementia    HLD (hyperlipidemia)    DM (diabetes mellitus)    Asthma    CAD (coronary artery disease)  s/p stent placement    Sinus node dysfunction  s/p Medtronic PPM    Cardiac pacemaker  Medtronic (SN: [id number]; Model: [id number])    S/P coronary artery stent placement        Review of Systems:   CONSTITUTIONAL: No fever, weight loss, or fatigue  EYES: No eye pain, visual disturbances, or discharge  ENMT:  No difficulty hearing, tinnitus, vertigo; No sinus or throat pain  NECK: No pain or stiffness  BREASTS: No pain, masses, or nipple discharge  RESPIRATORY: No cough, wheezing, chills or hemoptysis; No shortness of breath  CARDIOVASCULAR: No chest pain, palpitations, dizziness, or leg swelling  GASTROINTESTINAL: No abdominal or epigastric pain. No nausea, vomiting, or hematemesis; GENITOURINARY: No dysuria, frequency, hematuria, or incontinence  NEUROLOGICAL: No headaches, memory loss, Right sided weakness +  SKIN: No itching, burning, rashes, or lesions   LYMPH NODES: No enlarged glands  ENDOCRINE: No heat or cold intolerance; No hair loss  MUSCULOSKELETAL: No joint pain or swelling; No muscle, back, or extremity pain  PSYCHIATRIC: No depression, anxiety, mood swings, or difficulty sleeping  HEME/LYMPH: No easy bruising, or bleeding gums  ALLERY AND IMMUNOLOGIC: No hives or eczema    Allergies    No Known Allergies    Intolerances        Social History:     FAMILY HISTORY:  FH: diabetes mellitus  Mother, Brother    FHx: dementia  Father        MEDICATIONS  (STANDING):  amLODIPine   Tablet 10 milliGRAM(s) Oral daily  aspirin enteric coated 81 milliGRAM(s) Oral daily  atorvastatin 40 milliGRAM(s) Oral at bedtime  isosorbide   mononitrate ER Tablet (IMDUR) 30 milliGRAM(s) Oral daily  lactated ringers. 1000 milliLiter(s) (100 mL/Hr) IV Continuous <Continuous>  lactulose Syrup 10 Gram(s) Oral every 8 hours  lisinopril 10 milliGRAM(s) Oral daily  metoprolol succinate ER 25 milliGRAM(s) Oral daily  polyethylene glycol 3350 17 Gram(s) Oral <User Schedule>    MEDICATIONS  (PRN):  melatonin 3 milliGRAM(s) Oral at bedtime PRN Insomnia  OLANZapine Injectable 1.25 milliGRAM(s) IntraMuscular every 6 hours PRN Severe agitation  ondansetron Injectable 4 milliGRAM(s) IV Push every 8 hours PRN Nausea and/or Vomiting        CAPILLARY BLOOD GLUCOSE      POCT Blood Glucose.: 90 mg/dL (10 Feb 2022 08:07)  POCT Blood Glucose.: 89 mg/dL (10 Feb 2022 01:45)  POCT Blood Glucose.: 112 mg/dL (2022 22:05)    I&O's Summary    2022 07:01  -  10 Feb 2022 07:00  --------------------------------------------------------  IN: 0 mL / OUT: 2 mL / NET: -2 mL        PHYSICAL EXAM:  Vital Signs Last 24 Hrs  T(C): 36.5 (10 Feb 2022 12:54), Max: 37.2 (2022 18:06)  T(F): 97.7 (10 Feb 2022 12:54), Max: 98.9 (2022 18:06)  HR: 65 (10 Feb 2022 12:54) (60 - 68)  BP: 130/67 (10 Feb 2022 12:54) (130/67 - 177/94)  BP(mean): --  RR: 16 (10 Feb 2022 12:54) (16 - 18)  SpO2: 100% (10 Feb 2022 12:54) (98% - 100%)    GENERAL: NAD, well-developed  HEAD:  Atraumatic, Normocephalic  EYES: EOMI, PERRLA, conjunctiva and sclera clear  NECK: Supple, No JVD  CHEST/LUNG: Clear to auscultation bilaterally; No wheeze  HEART: Regular rate and rhythm; No murmurs, rubs, or gallops  ABDOMEN: Soft, Nontender, Nondistended; Bowel sounds present  EXTREMITIES:  2+ Peripheral Pulses, No clubbing, cyanosis, or edema  PSYCH: AAOx3  NEUROLOGY: Right side is weak.  SKIN: No rashes or lesions    LABS:                        10.8   4.97  )-----------( 198      ( 10 Feb 2022 07:16 )             31.4     02-10    141  |  101  |  10  ----------------------------<  80  3.3<L>   |  31  |  1.24    Ca    9.5      10 Feb 2022 07:11  Phos  2.4     02-10  Mg     1.90     02-10    TPro  6.4  /  Alb  3.4  /  TBili  1.5<H>  /  DBili  x   /  AST  17  /  ALT  14  /  AlkPhos  81  02-08    PT/INR - ( 2022 20:17 )   PT: 12.8 sec;   INR: 1.13 ratio         PTT - ( 2022 20:17 )  PTT:31.9 sec      Urinalysis Basic - ( 2022 22:16 )    Color: Light Yellow / Appearance: Clear / S.014 / pH: x  Gluc: x / Ketone: Negative  / Bili: Negative / Urobili: <2 mg/dL   Blood: x / Protein: Trace / Nitrite: Negative   Leuk Esterase: Moderate / RBC: 4 /HPF / WBC 10 /HPF   Sq Epi: x / Non Sq Epi: 5 /HPF / Bacteria: Few        RADIOLOGY & ADDITIONAL TESTS:    Imaging Personally Reviewed:    Consultant(s) Notes Reviewed:      Care Discussed with Consultants/Other Providers:

## 2022-03-02 LAB
ANION GAP SERPL CALC-SCNC: 15 MMOL/L — HIGH (ref 7–14)
BUN SERPL-MCNC: 18 MG/DL — SIGNIFICANT CHANGE UP (ref 7–23)
CALCIUM SERPL-MCNC: 10.3 MG/DL — SIGNIFICANT CHANGE UP (ref 8.4–10.5)
CHLORIDE SERPL-SCNC: 102 MMOL/L — SIGNIFICANT CHANGE UP (ref 98–107)
CO2 SERPL-SCNC: 25 MMOL/L — SIGNIFICANT CHANGE UP (ref 22–31)
CREAT SERPL-MCNC: 1.44 MG/DL — HIGH (ref 0.5–1.3)
EGFR: 49 ML/MIN/1.73M2 — LOW
GLUCOSE SERPL-MCNC: 90 MG/DL — SIGNIFICANT CHANGE UP (ref 70–99)
HCT VFR BLD CALC: 32.4 % — LOW (ref 39–50)
HGB BLD-MCNC: 10.5 G/DL — LOW (ref 13–17)
MAGNESIUM SERPL-MCNC: 2.3 MG/DL — SIGNIFICANT CHANGE UP (ref 1.6–2.6)
MCHC RBC-ENTMCNC: 30 PG — SIGNIFICANT CHANGE UP (ref 27–34)
MCHC RBC-ENTMCNC: 32.4 GM/DL — SIGNIFICANT CHANGE UP (ref 32–36)
MCV RBC AUTO: 92.6 FL — SIGNIFICANT CHANGE UP (ref 80–100)
NRBC # BLD: 0 /100 WBCS — SIGNIFICANT CHANGE UP
NRBC # FLD: 0 K/UL — SIGNIFICANT CHANGE UP
PHOSPHATE SERPL-MCNC: 2.7 MG/DL — SIGNIFICANT CHANGE UP (ref 2.5–4.5)
PLATELET # BLD AUTO: 198 K/UL — SIGNIFICANT CHANGE UP (ref 150–400)
POTASSIUM SERPL-MCNC: 3.5 MMOL/L — SIGNIFICANT CHANGE UP (ref 3.5–5.3)
POTASSIUM SERPL-SCNC: 3.5 MMOL/L — SIGNIFICANT CHANGE UP (ref 3.5–5.3)
RBC # BLD: 3.5 M/UL — LOW (ref 4.2–5.8)
RBC # FLD: 14.5 % — SIGNIFICANT CHANGE UP (ref 10.3–14.5)
SARS-COV-2 RNA SPEC QL NAA+PROBE: SIGNIFICANT CHANGE UP
SODIUM SERPL-SCNC: 142 MMOL/L — SIGNIFICANT CHANGE UP (ref 135–145)
WBC # BLD: 4.18 K/UL — SIGNIFICANT CHANGE UP (ref 3.8–10.5)
WBC # FLD AUTO: 4.18 K/UL — SIGNIFICANT CHANGE UP (ref 3.8–10.5)

## 2022-03-02 RX ORDER — SOD SULF/SODIUM/NAHCO3/KCL/PEG
4000 SOLUTION, RECONSTITUTED, ORAL ORAL ONCE
Refills: 0 | Status: COMPLETED | OUTPATIENT
Start: 2022-03-02 | End: 2022-03-02

## 2022-03-02 RX ADMIN — ATORVASTATIN CALCIUM 40 MILLIGRAM(S): 80 TABLET, FILM COATED ORAL at 21:33

## 2022-03-02 RX ADMIN — LISINOPRIL 10 MILLIGRAM(S): 2.5 TABLET ORAL at 05:07

## 2022-03-02 RX ADMIN — POLYETHYLENE GLYCOL 3350 17 GRAM(S): 17 POWDER, FOR SOLUTION ORAL at 13:34

## 2022-03-02 RX ADMIN — Medication 4000 MILLILITER(S): at 09:13

## 2022-03-02 RX ADMIN — Medication 81 MILLIGRAM(S): at 11:36

## 2022-03-02 RX ADMIN — AMLODIPINE BESYLATE 10 MILLIGRAM(S): 2.5 TABLET ORAL at 05:07

## 2022-03-02 RX ADMIN — POLYETHYLENE GLYCOL 3350 17 GRAM(S): 17 POWDER, FOR SOLUTION ORAL at 05:08

## 2022-03-02 RX ADMIN — Medication 1 TABLET(S): at 11:36

## 2022-03-02 RX ADMIN — ISOSORBIDE MONONITRATE 30 MILLIGRAM(S): 60 TABLET, EXTENDED RELEASE ORAL at 11:36

## 2022-03-02 NOTE — PROGRESS NOTE ADULT - SUBJECTIVE AND OBJECTIVE BOX
Patient is a 81y old  Male who presents with a chief complaint of Constipation x 3 weeks (10 Feb 2022 14:14)    3/2/2022  HPI:  No new symptoms.    Prepped again for sigmoidoscopy tomorrow.    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,     HTN (hypertension)    Dementia    HLD (hyperlipidemia)    DM (diabetes mellitus)    Asthma    CAD (coronary artery disease)  s/p stent placement    Sinus node dysfunction  s/p Medtronic PPM    Cardiac pacemaker  Medtronic (SN: [id number]; Model: [id number])    S/P coronary artery stent placement        Review of Systems:   CONSTITUTIONAL: No fever, weight loss, or fatigue  EYES: No eye pain, visual disturbances, or discharge  ENMT:  No difficulty hearing, tinnitus, vertigo; No sinus or throat pain  NECK: No pain or stiffness  BREASTS: No pain, masses, or nipple discharge  RESPIRATORY: No cough, wheezing, chills or hemoptysis; No shortness of breath  CARDIOVASCULAR: No chest pain, palpitations, dizziness, or leg swelling  GASTROINTESTINAL: No abdominal or epigastric pain. No nausea, vomiting, or hematemesis; GENITOURINARY: No dysuria, frequency, hematuria, or incontinence  NEUROLOGICAL: No headaches, memory loss, Right sided weakness +  SKIN: No itching, burning, rashes, or lesions   LYMPH NODES: No enlarged glands  ENDOCRINE: No heat or cold intolerance; No hair loss  MUSCULOSKELETAL: No joint pain or swelling; No muscle, back, or extremity pain  PSYCHIATRIC: No depression, anxiety, mood swings, or difficulty sleeping  HEME/LYMPH: No easy bruising, or bleeding gums  ALLERY AND IMMUNOLOGIC: No hives or eczema    Allergies    No Known Allergies    Intolerances        Social History:     FAMILY HISTORY:  FH: diabetes mellitus  Mother, Brother    FHx: dementia  Father        MEDICATIONS  (STANDING):  amLODIPine   Tablet 10 milliGRAM(s) Oral daily  aspirin enteric coated 81 milliGRAM(s) Oral daily  atorvastatin 40 milliGRAM(s) Oral at bedtime  isosorbide   mononitrate ER Tablet (IMDUR) 30 milliGRAM(s) Oral daily  lactated ringers. 1000 milliLiter(s) (100 mL/Hr) IV Continuous <Continuous>  lactulose Syrup 10 Gram(s) Oral every 8 hours  lisinopril 10 milliGRAM(s) Oral daily  metoprolol succinate ER 25 milliGRAM(s) Oral daily  polyethylene glycol 3350 17 Gram(s) Oral <User Schedule>    MEDICATIONS  (PRN):  melatonin 3 milliGRAM(s) Oral at bedtime PRN Insomnia  OLANZapine Injectable 1.25 milliGRAM(s) IntraMuscular every 6 hours PRN Severe agitation  ondansetron Injectable 4 milliGRAM(s) IV Push every 8 hours PRN Nausea and/or Vomiting        CAPILLARY BLOOD GLUCOSE      POCT Blood Glucose.: 90 mg/dL (10 Feb 2022 08:07)  POCT Blood Glucose.: 89 mg/dL (10 Feb 2022 01:45)  POCT Blood Glucose.: 112 mg/dL (2022 22:05)    I&O's Summary    2022 07:01  -  10 Feb 2022 07:00  --------------------------------------------------------  IN: 0 mL / OUT: 2 mL / NET: -2 mL        PHYSICAL EXAM:  Vital Signs Last 24 Hrs  T(C): 36.5 (10 Feb 2022 12:54), Max: 37.2 (2022 18:06)  T(F): 97.7 (10 Feb 2022 12:54), Max: 98.9 (2022 18:06)  HR: 65 (10 Feb 2022 12:54) (60 - 68)  BP: 130/67 (10 Feb 2022 12:54) (130/67 - 177/94)  BP(mean): --  RR: 16 (10 Feb 2022 12:54) (16 - 18)  SpO2: 100% (10 Feb 2022 12:54) (98% - 100%)    GENERAL: NAD, well-developed  HEAD:  Atraumatic, Normocephalic  EYES: EOMI, PERRLA, conjunctiva and sclera clear  NECK: Supple, No JVD  CHEST/LUNG: Clear to auscultation bilaterally; No wheeze  HEART: Regular rate and rhythm; No murmurs, rubs, or gallops  ABDOMEN: Soft, Nontender, Nondistended; Bowel sounds present  EXTREMITIES:  2+ Peripheral Pulses, No clubbing, cyanosis, or edema  PSYCH: AAOx3  NEUROLOGY: Right side is weak.  SKIN: No rashes or lesions    LABS:                        10.8   4.97  )-----------( 198      ( 10 Feb 2022 07:16 )             31.4     02-10    141  |  101  |  10  ----------------------------<  80  3.3<L>   |  31  |  1.24    Ca    9.5      10 Feb 2022 07:11  Phos  2.4     02-10  Mg     1.90     -10    TPro  6.4  /  Alb  3.4  /  TBili  1.5<H>  /  DBili  x   /  AST  17  /  ALT  14  /  AlkPhos  81  02-08    PT/INR - ( 2022 20:17 )   PT: 12.8 sec;   INR: 1.13 ratio         PTT - ( 2022 20:17 )  PTT:31.9 sec      Urinalysis Basic - ( 2022 22:16 )    Color: Light Yellow / Appearance: Clear / S.014 / pH: x  Gluc: x / Ketone: Negative  / Bili: Negative / Urobili: <2 mg/dL   Blood: x / Protein: Trace / Nitrite: Negative   Leuk Esterase: Moderate / RBC: 4 /HPF / WBC 10 /HPF   Sq Epi: x / Non Sq Epi: 5 /HPF / Bacteria: Few        RADIOLOGY & ADDITIONAL TESTS:    Imaging Personally Reviewed:    Consultant(s) Notes Reviewed:      Care Discussed with Consultants/Other Providers:

## 2022-03-03 LAB
ANION GAP SERPL CALC-SCNC: 12 MMOL/L — SIGNIFICANT CHANGE UP (ref 7–14)
ANION GAP SERPL CALC-SCNC: 9 MMOL/L — SIGNIFICANT CHANGE UP (ref 7–14)
APTT BLD: 28.7 SEC — SIGNIFICANT CHANGE UP (ref 27–36.3)
BLD GP AB SCN SERPL QL: POSITIVE — SIGNIFICANT CHANGE UP
BUN SERPL-MCNC: 15 MG/DL — SIGNIFICANT CHANGE UP (ref 7–23)
BUN SERPL-MCNC: 17 MG/DL — SIGNIFICANT CHANGE UP (ref 7–23)
CALCIUM SERPL-MCNC: 9.7 MG/DL — SIGNIFICANT CHANGE UP (ref 8.4–10.5)
CALCIUM SERPL-MCNC: 9.8 MG/DL — SIGNIFICANT CHANGE UP (ref 8.4–10.5)
CHLORIDE SERPL-SCNC: 107 MMOL/L — SIGNIFICANT CHANGE UP (ref 98–107)
CHLORIDE SERPL-SCNC: 110 MMOL/L — HIGH (ref 98–107)
CO2 SERPL-SCNC: 25 MMOL/L — SIGNIFICANT CHANGE UP (ref 22–31)
CO2 SERPL-SCNC: 27 MMOL/L — SIGNIFICANT CHANGE UP (ref 22–31)
CREAT SERPL-MCNC: 1.27 MG/DL — SIGNIFICANT CHANGE UP (ref 0.5–1.3)
CREAT SERPL-MCNC: 1.37 MG/DL — HIGH (ref 0.5–1.3)
EGFR: 52 ML/MIN/1.73M2 — LOW
EGFR: 56 ML/MIN/1.73M2 — LOW
GLUCOSE SERPL-MCNC: 112 MG/DL — HIGH (ref 70–99)
GLUCOSE SERPL-MCNC: 87 MG/DL — SIGNIFICANT CHANGE UP (ref 70–99)
HCT VFR BLD CALC: 28.2 % — LOW (ref 39–50)
HGB BLD-MCNC: 9.5 G/DL — LOW (ref 13–17)
INR BLD: 1.12 RATIO — SIGNIFICANT CHANGE UP (ref 0.88–1.16)
MAGNESIUM SERPL-MCNC: 2 MG/DL — SIGNIFICANT CHANGE UP (ref 1.6–2.6)
MAGNESIUM SERPL-MCNC: 2.1 MG/DL — SIGNIFICANT CHANGE UP (ref 1.6–2.6)
MCHC RBC-ENTMCNC: 30.5 PG — SIGNIFICANT CHANGE UP (ref 27–34)
MCHC RBC-ENTMCNC: 33.7 GM/DL — SIGNIFICANT CHANGE UP (ref 32–36)
MCV RBC AUTO: 90.7 FL — SIGNIFICANT CHANGE UP (ref 80–100)
NRBC # BLD: 0 /100 WBCS — SIGNIFICANT CHANGE UP
NRBC # FLD: 0 K/UL — SIGNIFICANT CHANGE UP
PHOSPHATE SERPL-MCNC: 2.2 MG/DL — LOW (ref 2.5–4.5)
PHOSPHATE SERPL-MCNC: 2.3 MG/DL — LOW (ref 2.5–4.5)
PLATELET # BLD AUTO: 175 K/UL — SIGNIFICANT CHANGE UP (ref 150–400)
POTASSIUM SERPL-MCNC: 3 MMOL/L — LOW (ref 3.5–5.3)
POTASSIUM SERPL-MCNC: 4.1 MMOL/L — SIGNIFICANT CHANGE UP (ref 3.5–5.3)
POTASSIUM SERPL-SCNC: 3 MMOL/L — LOW (ref 3.5–5.3)
POTASSIUM SERPL-SCNC: 4.1 MMOL/L — SIGNIFICANT CHANGE UP (ref 3.5–5.3)
PROTHROM AB SERPL-ACNC: 13 SEC — SIGNIFICANT CHANGE UP (ref 10.5–13.4)
RBC # BLD: 3.11 M/UL — LOW (ref 4.2–5.8)
RBC # FLD: 14.5 % — SIGNIFICANT CHANGE UP (ref 10.3–14.5)
RH IG SCN BLD-IMP: POSITIVE — SIGNIFICANT CHANGE UP
SODIUM SERPL-SCNC: 144 MMOL/L — SIGNIFICANT CHANGE UP (ref 135–145)
SODIUM SERPL-SCNC: 146 MMOL/L — HIGH (ref 135–145)
WBC # BLD: 4.03 K/UL — SIGNIFICANT CHANGE UP (ref 3.8–10.5)
WBC # FLD AUTO: 4.03 K/UL — SIGNIFICANT CHANGE UP (ref 3.8–10.5)

## 2022-03-03 PROCEDURE — 99231 SBSQ HOSP IP/OBS SF/LOW 25: CPT | Mod: GC

## 2022-03-03 RX ORDER — POTASSIUM CHLORIDE 20 MEQ
40 PACKET (EA) ORAL ONCE
Refills: 0 | Status: COMPLETED | OUTPATIENT
Start: 2022-03-03 | End: 2022-03-03

## 2022-03-03 RX ORDER — SOD SULF/SODIUM/NAHCO3/KCL/PEG
4000 SOLUTION, RECONSTITUTED, ORAL ORAL ONCE
Refills: 0 | Status: COMPLETED | OUTPATIENT
Start: 2022-03-03 | End: 2022-03-03

## 2022-03-03 RX ORDER — SODIUM,POTASSIUM PHOSPHATES 278-250MG
1 POWDER IN PACKET (EA) ORAL ONCE
Refills: 0 | Status: COMPLETED | OUTPATIENT
Start: 2022-03-03 | End: 2022-03-03

## 2022-03-03 RX ORDER — POTASSIUM CHLORIDE 20 MEQ
10 PACKET (EA) ORAL
Refills: 0 | Status: COMPLETED | OUTPATIENT
Start: 2022-03-03 | End: 2022-03-03

## 2022-03-03 RX ADMIN — Medication 10 MILLIGRAM(S): at 22:06

## 2022-03-03 RX ADMIN — POLYETHYLENE GLYCOL 3350 17 GRAM(S): 17 POWDER, FOR SOLUTION ORAL at 22:07

## 2022-03-03 RX ADMIN — Medication 40 MILLIEQUIVALENT(S): at 05:01

## 2022-03-03 RX ADMIN — Medication 100 MILLIEQUIVALENT(S): at 12:05

## 2022-03-03 RX ADMIN — ATORVASTATIN CALCIUM 40 MILLIGRAM(S): 80 TABLET, FILM COATED ORAL at 22:07

## 2022-03-03 RX ADMIN — Medication 100 MILLIEQUIVALENT(S): at 13:19

## 2022-03-03 RX ADMIN — Medication 100 MILLIEQUIVALENT(S): at 14:20

## 2022-03-03 RX ADMIN — Medication 1 PACKET(S): at 17:23

## 2022-03-03 RX ADMIN — POLYETHYLENE GLYCOL 3350 17 GRAM(S): 17 POWDER, FOR SOLUTION ORAL at 12:28

## 2022-03-03 RX ADMIN — Medication 81 MILLIGRAM(S): at 12:05

## 2022-03-03 RX ADMIN — ISOSORBIDE MONONITRATE 30 MILLIGRAM(S): 60 TABLET, EXTENDED RELEASE ORAL at 12:06

## 2022-03-03 RX ADMIN — Medication 1 TABLET(S): at 12:06

## 2022-03-03 NOTE — PROGRESS NOTE ADULT - PROBLEM SELECTOR PROBLEM 3
Rectal lesion

## 2022-03-03 NOTE — PROGRESS NOTE ADULT - PROBLEM SELECTOR PLAN 3
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
MRI of abdomen canceled, does not help make a diagnosis  Stercoral colitis from severe constipation. Needs better bowel prep
MRI of abdomen canceled, does not help make a diagnosis  Stercoral colitis from severe constipation. Needs better bowel prep
MRI of abdomen ordered.  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
MRI of abdomen canceled, does not help make a diagnosis  Stercoral colitis from severe constipation. Needs better bowel prep
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 Needs better bowel prep
MRI of abdomen ordered.  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
MRI of abdomen canceled, does not help make a diagnosis  Stercoral colitis from severe constipation. Needs better bowel prep
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
MRI of abdomen canceled, does not help make a diagnosis  Stercoral colitis from severe constipation. Needs better bowel prep
MRI of abdomen canceled, does not help make a diagnosis  Stercoral colitis from severe constipation. Needs better bowel prep
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
MRI ordered.  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
CT A/P: Asymmetric irregular posterior rectal wall soft tissue thickening. Differential includes neoplasm, sequelae of infectious or inflammatory process, versus muscular hypertrophy.  -Radiology recommends scope with tissue sampling  -GI c/s for possible sigmoidoscopy requested
MRI of abdomen canceled, does not help make a diagnosis  Stercoral colitis from severe constipation. Needs better bowel prep
MRI of abdomen ordered.  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
MRI of abdomen ordered.  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

## 2022-03-03 NOTE — PROGRESS NOTE ADULT - PROBLEM SELECTOR PLAN 6
EP FU noted.
Bradycardia noted. EP eval done. Monitor on tele. No need for PM at this time.  EP FU noted.
Bradycardia noted. EP eval done. Monitor on tele. No need for PM at this time.  EP FU noted.
EP FU noted.
EP FU noted.
Bradycardia noted. EP eval done. Monitor on tele. No need for PM at this time.  EP FU noted.
EP FU noted.
Bradycardia noted. EP eval done. Monitor on tele. No need for PM at this time.  EP FU noted.
EP FU noted.
Bradycardia noted. EP eval done. Monitor on tele. No need for PM at this time.  EP FU noted.
Bradycardia noted. EP eval done. Monitor on tele. May need reinsertion of a PPM, MI[employer] is suited given recent history of extraction after sepsis.
EP FU noted.
Bradycardia noted. EP eval done. Monitor on tele. No need for PM at this time.  EP FU noted.
Stable  -EKG screening: no acute changes  -c/w ASA, BB, statin  -c/w HTN control
Bradycardia noted. EP eval done. Monitor on tele. No need for PM at this time.  EP FU noted.
Bradycardia noted. EP eval done. Monitor on tele. No need for PM at this time.  EP FU noted.
Bradycardia noted. EP eval done. Monitor on tele. May need reinsertion of a PPM, MI[employer] is suited given recent history of extraction after sepsis.  EP to FU
Bradycardia noted. EP eval done. Monitor on tele. No need for PM at this time.  EP FU noted.
EP FU noted.

## 2022-03-03 NOTE — PROGRESS NOTE ADULT - PROBLEM SELECTOR PROBLEM 8
DVT prophylaxis

## 2022-03-03 NOTE — PROGRESS NOTE ADULT - PROBLEM SELECTOR PROBLEM 1
Stercoral colitis

## 2022-03-03 NOTE — PROGRESS NOTE ADULT - PROBLEM SELECTOR PLAN 4
Cultures unremarkable, no need for antibiotics
Cultures unremarkable  DC antibiotics.
Cultures unremarkable, no need for antibiotics
Cultures unremarkable  DC antibiotics.
Cultures unremarkable, no need for antibiotics
Cultures unremarkable, no need for antibiotics

## 2022-03-03 NOTE — PROGRESS NOTE ADULT - PROBLEM SELECTOR PROBLEM 2
TAE (acute kidney injury)

## 2022-03-03 NOTE — PROGRESS NOTE ADULT - SUBJECTIVE AND OBJECTIVE BOX
Chief Complaint:  Patient is a 81y old  Male who presents with a chief complaint of Constipation x 3 weeks (02 Mar 2022 22:28)      Interval Events:   Still undergoing multiday bowel prep on clear liquids.  Still having brown stools.      Hospital Medications:  amLODIPine   Tablet 10 milliGRAM(s) Oral daily  aspirin enteric coated 81 milliGRAM(s) Oral daily  atorvastatin 40 milliGRAM(s) Oral at bedtime  bisacodyl 10 milliGRAM(s) Oral at bedtime  isosorbide   mononitrate ER Tablet (IMDUR) 30 milliGRAM(s) Oral daily  lisinopril 10 milliGRAM(s) Oral daily  melatonin 3 milliGRAM(s) Oral at bedtime PRN  multivitamin 1 Tablet(s) Oral daily  OLANZapine Injectable 1.25 milliGRAM(s) IntraMuscular every 6 hours PRN  ondansetron Injectable 4 milliGRAM(s) IV Push every 8 hours PRN  polyethylene glycol 3350 17 Gram(s) Oral <User Schedule>  polyethylene glycol/electrolyte Solution. 4000 milliLiter(s) Oral once  potassium chloride  10 mEq/100 mL IVPB 10 milliEquivalent(s) IV Intermittent every 1 hour      PMHX/PSHX:  CVA (cerebral infarction)    HTN (hypertension)    Diabetes    Hyperlipidemia    Dementia    HLD (hyperlipidemia)    DM (diabetes mellitus)    Asthma    CAD (coronary artery disease)    Sinus node dysfunction    No significant past surgical history    Cardiac pacemaker    S/P coronary artery stent placement            ROS: 14 point ROS negative unless otherwise stated in subjective      PHYSICAL EXAM:     GENERAL:  Thin, elderly, chronically ill appearing, no distress  HEENT:  NC/AT,  conjunctivae clear, sclera anicteric  CHEST:  Full & symmetric excursion, no increased effort w/ respirations  HEART:  Regular rhythm & rate  ABDOMEN:  +thin, soft, non-tender, non-distended  RECTAL: brown stool in rectal vault  EXTREMITIES:  no LE  edema  NEURO:  Awake, alert, minimally verbal      Vital Signs:  Vital Signs Last 24 Hrs  T(C): 37.2 (03 Mar 2022 12:43), Max: 37.2 (03 Mar 2022 12:43)  T(F): 98.9 (03 Mar 2022 12:43), Max: 98.9 (03 Mar 2022 12:43)  HR: 89 (03 Mar 2022 12:43) (61 - 89)  BP: 184/99 (03 Mar 2022 12:43) (135/67 - 184/99)  BP(mean): --  RR: 17 (03 Mar 2022 12:43) (17 - 18)  SpO2: 99% (03 Mar 2022 12:43) (98% - 99%)  Daily     Daily     LABS:                        9.5    4.03  )-----------( 175      ( 03 Mar 2022 03:31 )             28.2     03-03    146<H>  |  107  |  17  ----------------------------<  87  3.0<L>   |  27  |  1.37<H>    Ca    9.7      03 Mar 2022 03:31  Phos  2.3     03-03  Mg     2.10     03-03        PT/INR - ( 03 Mar 2022 03:31 )   PT: 13.0 sec;   INR: 1.12 ratio         PTT - ( 03 Mar 2022 03:31 )  PTT:28.7 sec        Imaging: No new abdominal imaging               Chief Complaint:  Patient is a 81y old  Male who presents with a chief complaint of Constipation x 3 weeks (02 Mar 2022 22:28)      Interval Events:   Still undergoing multiday bowel prep on clear liquids.  Still having brown stools.      Hospital Medications:  amLODIPine   Tablet 10 milliGRAM(s) Oral daily  aspirin enteric coated 81 milliGRAM(s) Oral daily  atorvastatin 40 milliGRAM(s) Oral at bedtime  bisacodyl 10 milliGRAM(s) Oral at bedtime  isosorbide   mononitrate ER Tablet (IMDUR) 30 milliGRAM(s) Oral daily  lisinopril 10 milliGRAM(s) Oral daily  melatonin 3 milliGRAM(s) Oral at bedtime PRN  multivitamin 1 Tablet(s) Oral daily  OLANZapine Injectable 1.25 milliGRAM(s) IntraMuscular every 6 hours PRN  ondansetron Injectable 4 milliGRAM(s) IV Push every 8 hours PRN  polyethylene glycol 3350 17 Gram(s) Oral <User Schedule>  polyethylene glycol/electrolyte Solution. 4000 milliLiter(s) Oral once  potassium chloride  10 mEq/100 mL IVPB 10 milliEquivalent(s) IV Intermittent every 1 hour      PMHX/PSHX:  CVA (cerebral infarction)    HTN (hypertension)    Diabetes    Hyperlipidemia    Dementia    HLD (hyperlipidemia)    DM (diabetes mellitus)    Asthma    CAD (coronary artery disease)    Sinus node dysfunction    No significant past surgical history    Cardiac pacemaker    S/P coronary artery stent placement            ROS: unable to obtain ROS due to pt's dementia        PHYSICAL EXAM:     GENERAL:  Thin, elderly, chronically ill appearing, no distress  HEENT:  NC/AT,  conjunctivae clear, sclera anicteric  CHEST:  Full & symmetric excursion, no increased effort w/ respirations  HEART:  Regular rhythm & rate  ABDOMEN:  +thin, soft, non-tender, non-distended  RECTAL: brown stool in rectal vault  EXTREMITIES:  no LE  edema  NEURO:  Awake, alert, minimally verbal      Vital Signs:  Vital Signs Last 24 Hrs  T(C): 37.2 (03 Mar 2022 12:43), Max: 37.2 (03 Mar 2022 12:43)  T(F): 98.9 (03 Mar 2022 12:43), Max: 98.9 (03 Mar 2022 12:43)  HR: 89 (03 Mar 2022 12:43) (61 - 89)  BP: 184/99 (03 Mar 2022 12:43) (135/67 - 184/99)  BP(mean): --  RR: 17 (03 Mar 2022 12:43) (17 - 18)  SpO2: 99% (03 Mar 2022 12:43) (98% - 99%)  Daily     Daily     LABS:                        9.5    4.03  )-----------( 175      ( 03 Mar 2022 03:31 )             28.2     03-03    146<H>  |  107  |  17  ----------------------------<  87  3.0<L>   |  27  |  1.37<H>    Ca    9.7      03 Mar 2022 03:31  Phos  2.3     03-03  Mg     2.10     03-03        PT/INR - ( 03 Mar 2022 03:31 )   PT: 13.0 sec;   INR: 1.12 ratio         PTT - ( 03 Mar 2022 03:31 )  PTT:28.7 sec        Imaging: No new abdominal imaging             Interval Events:   Still undergoing multiday bowel prep on clear liquids.  Still having brown stools.      Hospital Medications:  amLODIPine   Tablet 10 milliGRAM(s) Oral daily  aspirin enteric coated 81 milliGRAM(s) Oral daily  atorvastatin 40 milliGRAM(s) Oral at bedtime  bisacodyl 10 milliGRAM(s) Oral at bedtime  isosorbide   mononitrate ER Tablet (IMDUR) 30 milliGRAM(s) Oral daily  lisinopril 10 milliGRAM(s) Oral daily  melatonin 3 milliGRAM(s) Oral at bedtime PRN  multivitamin 1 Tablet(s) Oral daily  OLANZapine Injectable 1.25 milliGRAM(s) IntraMuscular every 6 hours PRN  ondansetron Injectable 4 milliGRAM(s) IV Push every 8 hours PRN  polyethylene glycol 3350 17 Gram(s) Oral <User Schedule>  polyethylene glycol/electrolyte Solution. 4000 milliLiter(s) Oral once  potassium chloride  10 mEq/100 mL IVPB 10 milliEquivalent(s) IV Intermittent every 1 hour      PMHX/PSHX:  CVA (cerebral infarction)    HTN (hypertension)    Diabetes    Hyperlipidemia    Dementia    HLD (hyperlipidemia)    DM (diabetes mellitus)    Asthma    CAD (coronary artery disease)    Sinus node dysfunction    No significant past surgical history    Cardiac pacemaker    S/P coronary artery stent placement            ROS: unable to obtain ROS due to pt's dementia        PHYSICAL EXAM:     GENERAL:  Thin, elderly, chronically ill appearing, no distress  HEENT:  NC/AT,  conjunctivae clear, sclera anicteric  CHEST:  Full & symmetric excursion, no increased effort w/ respirations  HEART:  Regular rhythm & rate  ABDOMEN:  +thin, soft, non-tender, non-distended  RECTAL: brown stool in rectal vault  EXTREMITIES:  no LE  edema  NEURO:  Awake, alert, minimally verbal      Vital Signs:  Vital Signs Last 24 Hrs  T(C): 37.2 (03 Mar 2022 12:43), Max: 37.2 (03 Mar 2022 12:43)  T(F): 98.9 (03 Mar 2022 12:43), Max: 98.9 (03 Mar 2022 12:43)  HR: 89 (03 Mar 2022 12:43) (61 - 89)  BP: 184/99 (03 Mar 2022 12:43) (135/67 - 184/99)  BP(mean): --  RR: 17 (03 Mar 2022 12:43) (17 - 18)  SpO2: 99% (03 Mar 2022 12:43) (98% - 99%)  Daily     Daily     LABS:                        9.5    4.03  )-----------( 175      ( 03 Mar 2022 03:31 )             28.2     03-03    146<H>  |  107  |  17  ----------------------------<  87  3.0<L>   |  27  |  1.37<H>    Ca    9.7      03 Mar 2022 03:31  Phos  2.3     03-03  Mg     2.10     03-03        PT/INR - ( 03 Mar 2022 03:31 )   PT: 13.0 sec;   INR: 1.12 ratio         PTT - ( 03 Mar 2022 03:31 )  PTT:28.7 sec        Imaging: No new abdominal imaging

## 2022-03-03 NOTE — CHART NOTE - NSCHARTNOTEFT_GEN_A_CORE
Source:   other [x] nurse, medical chart   Diet rx: NPO at time of visit    Pt 80 yo male with PMHx of HTN, HLD, CVA R residual weakness, dementia, bedbound, HTN, CAD s/p stent, s/p PPM extraction, asthma, stercoral colitis - per chart review.     At time of visit, Pt asleep. Per nurse, Pt NPO for possible GI procedure (flex sig); prior to NPO Pt was on Clear Liquids. No report of nausea, vomiting @ this time. Of note, Pt with Stercoral colitis. +Large BM (3/2) fecal incontinence, per flow sheet. If Clear Liquids to resume, rec to add PO supplement: Ensure Clear - 3x daily to diet rx. If unable to advance PO diet, suggest initiating alternative means of nutrition support. 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 Rec to add Prosource - 2x daily to diet rx. Case discussed with nurse. RDN remains available.     Pt's height: 66" (2/8)   IBW: 142#+/-10%  Pt's weight: 66.4 Kg (3/1), 66.4 kg (2/11) -->> no change in weights ?? question accuracy??   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 Resume PO diet once/when clinically indicates;   2. If Clear liquids to continue, rec to add PO supplement: Ensure Clear 1 can x 3 daily (~660 Kcal, ~24 gm Protein);    3. Encourage & assist Pt with meals; Monitor PO diet tolerance;  4. Add appetite stimulant to boost Pt's PO intake/appetite;   5. Add No Carb Prosource (1 pkg = 15 gms Protein) Qty per day: 2;  6. Monitor labs, weekly weights, hydration status;  7. If unable to advance PO diet, suggest initiating alternative means of nutrition support; consult nutrition for recommendations; Source:   other [x] nurse, medical chart   Diet rx: NPO at time of visit    Pt 80 yo male with PMHx of HTN, HLD, CVA R residual weakness, dementia, bedbound, HTN, CAD s/p stent, s/p PPM extraction, asthma, stercoral colitis - per chart review.     At time of visit, Pt asleep. Per nurse, Pt NPO for possible GI procedure (flex sig); prior to NPO Pt was on Clear Liquids. No report of nausea, vomiting @ this time. Of note, Pt with Stercoral colitis. +Large BM (3/2) fecal incontinence, per flow sheet. If Clear Liquids to resume, rec to add PO supplement: Ensure Clear - 3x daily to diet rx. If unable to advance PO diet, suggest initiating alternative means of nutrition support. 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 Rec to add Prosource - 2x daily to diet rx. Case discussed with nurse. RDN remains available.     Pt's height: 66" (2/8)   IBW: 142#+/-10%  Pt's weight: 66.4 Kg (3/1), 66.4 kg (2/11)   -->> no change in weights ?? question accuracy??  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 Resume PO diet once/when clinically indicates;   2. If Clear liquids to continue, rec to add PO supplement: Ensure Clear 1 can x 3 daily (~660 Kcal, ~24 gm Protein);    3. Encourage & assist Pt with meals; Monitor PO diet tolerance;  4. Add appetite stimulant to boost Pt's PO intake/appetite;   5. Add No Carb Prosource (1 pkg = 15 gms Protein) Qty per day: 2;  6. Rec to obtain Pt's accurate weights;   7. Monitor labs, hydration status;  8. If unable to advance PO diet, suggest initiating alternative means of nutrition support; consult nutrition for recommendations;

## 2022-03-03 NOTE — PROGRESS NOTE ADULT - PROBLEM SELECTOR PLAN 7
-Fall, aspiration precautions  -Diet, mech soft

## 2022-03-03 NOTE — PROGRESS NOTE ADULT - PROBLEM SELECTOR PROBLEM 6
CAD (coronary artery disease)

## 2022-03-03 NOTE — PROGRESS NOTE ADULT - SUBJECTIVE AND OBJECTIVE BOX
Patient is a 81y old  Male who presents with a chief complaint of Constipation x 3 weeks (10 Feb 2022 14:14)    3/3/2022  HPI:  No new symptoms.  Flex sig planned again tomorrow.    Prepped again for sigmoidoscopy tomorrow.    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,     HTN (hypertension)    Dementia    HLD (hyperlipidemia)    DM (diabetes mellitus)    Asthma    CAD (coronary artery disease)  s/p stent placement    Sinus node dysfunction  s/p Medtronic PPM    Cardiac pacemaker  Medtronic (SN: [id number]; Model: 00136)    S/P coronary artery stent placement        Review of Systems:   CONSTITUTIONAL: No fever, weight loss, or fatigue  EYES: No eye pain, visual disturbances, or discharge  ENMT:  No difficulty hearing, tinnitus, vertigo; No sinus or throat pain  NECK: No pain or stiffness  BREASTS: No pain, masses, or nipple discharge  RESPIRATORY: No cough, wheezing, chills or hemoptysis; No shortness of breath  CARDIOVASCULAR: No chest pain, palpitations, dizziness, or leg swelling  GASTROINTESTINAL: No abdominal or epigastric pain. No nausea, vomiting, or hematemesis; GENITOURINARY: No dysuria, frequency, hematuria, or incontinence  NEUROLOGICAL: No headaches, memory loss, Right sided weakness +  SKIN: No itching, burning, rashes, or lesions   LYMPH NODES: No enlarged glands  ENDOCRINE: No heat or cold intolerance; No hair loss  MUSCULOSKELETAL: No joint pain or swelling; No muscle, back, or extremity pain  PSYCHIATRIC: No depression, anxiety, mood swings, or difficulty sleeping  HEME/LYMPH: No easy bruising, or bleeding gums  ALLERY AND IMMUNOLOGIC: No hives or eczema    Allergies    No Known Allergies    Intolerances        Social History:     FAMILY HISTORY:  FH: diabetes mellitus  Mother, Brother    FHx: dementia  Father        MEDICATIONS  (STANDING):  amLODIPine   Tablet 10 milliGRAM(s) Oral daily  aspirin enteric coated 81 milliGRAM(s) Oral daily  atorvastatin 40 milliGRAM(s) Oral at bedtime  isosorbide   mononitrate ER Tablet (IMDUR) 30 milliGRAM(s) Oral daily  lactated ringers. 1000 milliLiter(s) (100 mL/Hr) IV Continuous <Continuous>  lactulose Syrup 10 Gram(s) Oral every 8 hours  lisinopril 10 milliGRAM(s) Oral daily  metoprolol succinate ER 25 milliGRAM(s) Oral daily  polyethylene glycol 3350 17 Gram(s) Oral <User Schedule>    MEDICATIONS  (PRN):  melatonin 3 milliGRAM(s) Oral at bedtime PRN Insomnia  OLANZapine Injectable 1.25 milliGRAM(s) IntraMuscular every 6 hours PRN Severe agitation  ondansetron Injectable 4 milliGRAM(s) IV Push every 8 hours PRN Nausea and/or Vomiting        CAPILLARY BLOOD GLUCOSE      POCT Blood Glucose.: 90 mg/dL (10 Feb 2022 08:07)  POCT Blood Glucose.: 89 mg/dL (10 Feb 2022 01:45)  POCT Blood Glucose.: 112 mg/dL (2022 22:05)    I&O's Summary    2022 07:01  -  10 Feb 2022 07:00  --------------------------------------------------------  IN: 0 mL / OUT: 2 mL / NET: -2 mL        PHYSICAL EXAM:  Vital Signs Last 24 Hrs  T(C): 36.5 (10 Feb 2022 12:54), Max: 37.2 (2022 18:06)  T(F): 97.7 (10 Feb 2022 12:54), Max: 98.9 (2022 18:06)  HR: 65 (10 Feb 2022 12:54) (60 - 68)  BP: 130/67 (10 Feb 2022 12:54) (130/67 - 177/94)  BP(mean): --  RR: 16 (10 Feb 2022 12:54) (16 - 18)  SpO2: 100% (10 Feb 2022 12:54) (98% - 100%)    GENERAL: NAD, well-developed  HEAD:  Atraumatic, Normocephalic  EYES: EOMI, PERRLA, conjunctiva and sclera clear  NECK: Supple, No JVD  CHEST/LUNG: Clear to auscultation bilaterally; No wheeze  HEART: Regular rate and rhythm; No murmurs, rubs, or gallops  ABDOMEN: Soft, Nontender, Nondistended; Bowel sounds present  EXTREMITIES:  2+ Peripheral Pulses, No clubbing, cyanosis, or edema  PSYCH: AAOx3  NEUROLOGY: Right side is weak.  SKIN: No rashes or lesions    LABS:                        10.8   4.97  )-----------( 198      ( 10 Feb 2022 07:16 )             31.4     02-10    141  |  101  |  10  ----------------------------<  80  3.3<L>   |  31  |  1.24    Ca    9.5      10 Feb 2022 07:11  Phos  2.4     02-10  Mg     1.90     02-10    TPro  6.4  /  Alb  3.4  /  TBili  1.5<H>  /  DBili  x   /  AST  17  /  ALT  14  /  AlkPhos  81  02-08    PT/INR - ( 2022 20:17 )   PT: 12.8 sec;   INR: 1.13 ratio         PTT - ( 2022 20:17 )  PTT:31.9 sec      Urinalysis Basic - ( 2022 22:16 )    Color: Light Yellow / Appearance: Clear / S.014 / pH: x  Gluc: x / Ketone: Negative  / Bili: Negative / Urobili: <2 mg/dL   Blood: x / Protein: Trace / Nitrite: Negative   Leuk Esterase: Moderate / RBC: 4 /HPF / WBC 10 /HPF   Sq Epi: x / Non Sq Epi: 5 /HPF / Bacteria: Few        RADIOLOGY & ADDITIONAL TESTS:    Imaging Personally Reviewed:    Consultant(s) Notes Reviewed:      Care Discussed with Consultants/Other Providers:

## 2022-03-03 NOTE — PROGRESS NOTE ADULT - PROBLEM SELECTOR PROBLEM 5
Sacral decubitus ulcer

## 2022-03-03 NOTE — PROGRESS NOTE ADULT - PROBLEM SELECTOR PLAN 1
Constipation persists CT abdomen shows a suspected mass on sigmoid colon area. GI FU noted. Flex sig planned.
Constipation persists CT abdomen shows a suspected mass on sigmoid colon area. GI FU noted. Flex sig failed  Daily prep as per GI  COVID test neg.
Constipation persists CT abdomen shows a suspected mass on sigmoid colon area. GI FU noted. Flex sig planned.
Improved with laxatives, now has diarrhea.  GI is following
Constipation persists CT abdomen shows a suspected mass on sigmoid colon area. MRI ordered.
Constipation persists CT abdomen shows a suspected mass on sigmoid colon area. GI FU noted. Flex sig to be reattempted tomorrow
Constipation persists
Constipation persists CT abdomen shows a suspected mass on sigmoid colon area. GI to FU
Improved with laxatives,   GI is following  Flex sig is planned. It may cause vaso vagal. EP FUnoted
Constipation persists CT abdomen shows a suspected mass on sigmoid colon area. GI FU noted. Flex sig planned.
Constipation persists CT abdomen shows a suspected mass on sigmoid colon area. GI FU noted. Flex sig planned.  COVID test neg
Constipation persists CT abdomen shows a suspected mass on sigmoid colon area. GI FU noted. Flex sig to be reattempted tomorrow    COVID test neg.
Constipation persists  MRI of abdomen ordered.
Improved with laxatives,   GI is following
Improved with laxatives,   GI is following  Repeat prep for colonoscopy
Constipation persists CT abdomen shows a suspected mass on sigmoid colon area. GI FU noted. Flex sig planned.  COVID test neg.
Constipation persists CT abdomen shows a suspected mass on sigmoid colon area. GI to FU. If they have no more plans, then obtain second opinion
Improved with laxatives, now has diarrhea.  GI to FU  If remains stable tomorrow, consider DC planning
Improved with laxatives,   GI is following, for colonoscopy today.

## 2022-03-03 NOTE — PROGRESS NOTE ADULT - ASSESSMENT
82 yo Male with hx of HTN, HLD CVA with Rt sided residual weakness, dementia, bedbound c/b sacral decubitus ulcer, HTN, CAD s/p stent, s/p PPM extraction, asthma, stercoral colitis, recent completion of IV abx for Ecoli and MRSA Bacteremia via PICC; Pt brought in by wife for constipation of three weeks duration with associated abd distention and discomfort. History obtained from pt's wife who is also his caretaker who was present at bedside. Per pt's wife, has had intermittent episodes of constipation at baseline. Recently she tried enemas, senna but these caused cramping and abd discomfort and were also ineffective.  No associated nausea, vomiting, fevers or chills. Here, VSS, Hgb 10 (BL 12-13). CT A/P notable for stool in distal rectum with wall thickening and perirectal fat stranding c/w stercoral colitis and asymmetric irregular posterior rectal wall soft tissue thickening c/f neoplasm vs infectious or inflammatory etiology. GI consulted for stercoral colitis and posterior rectal wall thickening.     #stercoral colitis  #chronic constipation  #rectal wall thickening  Suspect clinical picture is likely related to known chronic constipation causing stercoral colitis; however given age cannot exclude malignancy with posterior rectal wall thickening. Now having regular BMs. Attempted flex sig x3 this admission but unsuccessful due to large stool burden. Plan for repeat flex sig (4th attempt) with multiday bowel prep when stools are clear.    Recommendations:  - c/w Miralax TID  - would avoid stimulant laxatives (senna) or rectal suppositories  - keep on clear liquid diet until procedure  - continue with multi-day bowel prep (daily 4 L of Golytely) until stools are clear  - plan for repeat flex sig (4th attempt) when stools are clear    **THIS NOTE IS NOT FINALIZED UNTIL SIGNED BY THE ATTENDING**      Dasia Awad MD  GI Fellow, PGY-4  Available via [employer] Teams    NON-URGENT CONSULTS:  Please email [email or web address]  AT NIGHT AND ON WEEKENDS:  Contact on-call GI fellow via answering service ([phone]) from 5pm-8am and on weekends/holidays  MONDAY-FRIDAY 8AM-5PM:  Pager# [phone] ([facility]) or [phone] ([facility])  GI Phone# [phone] ([facility])     82 yo Male with hx of HTN, HLD CVA with Rt sided residual weakness, dementia, bedbound c/b sacral decubitus ulcer, HTN, CAD s/p stent, s/p PPM extraction, asthma, stercoral colitis, recent completion of IV abx for Ecoli and MRSA Bacteremia via PICC; Pt brought in by wife for constipation of three weeks duration with associated abd distention and discomfort. History obtained from pt's wife who is also his caretaker who was present at bedside. Per pt's wife, has had intermittent episodes of constipation at baseline. Recently she tried enemas, senna but these caused cramping and abd discomfort and were also ineffective.  No associated nausea, vomiting, fevers or chills. Here, VSS, Hgb 10 (BL 12-13). CT A/P notable for stool in distal rectum with wall thickening and perirectal fat stranding c/w stercoral colitis and asymmetric irregular posterior rectal wall soft tissue thickening c/f neoplasm vs infectious or inflammatory etiology. GI consulted for posterior rectal wall thickening.     #stercoral colitis  #chronic constipation  #rectal wall thickening  Suspect clinical picture is likely related to known chronic constipation causing stercoral colitis; however given age cannot exclude malignancy with posterior rectal wall thickening. Now having regular BMs. Attempted flex sig x3 this admission but unsuccessful due to large stool burden. Plan for repeat flex sig (4th attempt) with multiday bowel prep when stools are clear.    Recommendations:  - NPO at midnight, will reassess tomorrow morning; if clear, will plan for repeat endoscopic procedure  - continue with multi-day bowel prep (daily 4 L of Golytely) until stools are clear  - clear liquid diet today  - will need aggressive bowel regimen as outpatient    **THIS NOTE IS NOT FINALIZED UNTIL SIGNED BY THE ATTENDING**      Dasia Awad MD  GI Fellow, PGY-4  Available via [employer] Teams    NON-URGENT CONSULTS:  Please email [email or web address]  [email or web address]  AT NIGHT AND ON WEEKENDS:  Contact on-call GI fellow via answering service ([phone]) from 5pm-8am and on weekends/holidays  MONDAY-FRIDAY 8AM-5PM:  Pager# [phone] ([facility]) or [phone] ([facility])  GI Phone# [phone] ([facility])

## 2022-03-03 NOTE — PROGRESS NOTE ADULT - PROBLEM SELECTOR PLAN 5
-Wound care c/s requested

## 2022-03-03 NOTE — PROGRESS NOTE ADULT - PROBLEM SELECTOR PROBLEM 4
Acute cystitis

## 2022-03-03 NOTE — PROGRESS NOTE ADULT - PROBLEM SELECTOR PLAN 2
JESUS yanes
Creat is stable.
JESUS yanes
Creat is stable.
JESUS yanes
Creat is stable.
JESUS yanes
Creat is stable.
JESUS yanes
Creat is stable.
JESUS yanes
Creat is stable.

## 2022-03-04 VITALS
RESPIRATION RATE: 18 BRPM | DIASTOLIC BLOOD PRESSURE: 78 MMHG | OXYGEN SATURATION: 99 % | HEART RATE: 80 BPM | TEMPERATURE: 98 F | SYSTOLIC BLOOD PRESSURE: 139 MMHG

## 2022-03-04 LAB
ALBUMIN SERPL ELPH-MCNC: 3.6 G/DL — SIGNIFICANT CHANGE UP (ref 3.3–5)
ALP SERPL-CCNC: 83 U/L — SIGNIFICANT CHANGE UP (ref 40–120)
ALT FLD-CCNC: 15 U/L — SIGNIFICANT CHANGE UP (ref 4–41)
ANION GAP SERPL CALC-SCNC: 9 MMOL/L — SIGNIFICANT CHANGE UP (ref 7–14)
AST SERPL-CCNC: 21 U/L — SIGNIFICANT CHANGE UP (ref 4–40)
BILIRUB SERPL-MCNC: 1.5 MG/DL — HIGH (ref 0.2–1.2)
BUN SERPL-MCNC: 16 MG/DL — SIGNIFICANT CHANGE UP (ref 7–23)
CALCIUM SERPL-MCNC: 9.7 MG/DL — SIGNIFICANT CHANGE UP (ref 8.4–10.5)
CHLORIDE SERPL-SCNC: 110 MMOL/L — HIGH (ref 98–107)
CO2 SERPL-SCNC: 26 MMOL/L — SIGNIFICANT CHANGE UP (ref 22–31)
CREAT SERPL-MCNC: 1.29 MG/DL — SIGNIFICANT CHANGE UP (ref 0.5–1.3)
EGFR: 55 ML/MIN/1.73M2 — LOW
GLUCOSE SERPL-MCNC: 82 MG/DL — SIGNIFICANT CHANGE UP (ref 70–99)
HCT VFR BLD CALC: 32.4 % — LOW (ref 39–50)
HGB BLD-MCNC: 10.3 G/DL — LOW (ref 13–17)
MAGNESIUM SERPL-MCNC: 2 MG/DL — SIGNIFICANT CHANGE UP (ref 1.6–2.6)
MCHC RBC-ENTMCNC: 29.9 PG — SIGNIFICANT CHANGE UP (ref 27–34)
MCHC RBC-ENTMCNC: 31.8 GM/DL — LOW (ref 32–36)
MCV RBC AUTO: 94.2 FL — SIGNIFICANT CHANGE UP (ref 80–100)
NRBC # BLD: 0 /100 WBCS — SIGNIFICANT CHANGE UP
NRBC # FLD: 0 K/UL — SIGNIFICANT CHANGE UP
PHOSPHATE SERPL-MCNC: 2.9 MG/DL — SIGNIFICANT CHANGE UP (ref 2.5–4.5)
PLATELET # BLD AUTO: 170 K/UL — SIGNIFICANT CHANGE UP (ref 150–400)
POTASSIUM SERPL-MCNC: 4.5 MMOL/L — SIGNIFICANT CHANGE UP (ref 3.5–5.3)
POTASSIUM SERPL-SCNC: 4.5 MMOL/L — SIGNIFICANT CHANGE UP (ref 3.5–5.3)
PROT SERPL-MCNC: 6.9 G/DL — SIGNIFICANT CHANGE UP (ref 6–8.3)
RBC # BLD: 3.44 M/UL — LOW (ref 4.2–5.8)
RBC # FLD: 14.6 % — HIGH (ref 10.3–14.5)
SODIUM SERPL-SCNC: 145 MMOL/L — SIGNIFICANT CHANGE UP (ref 135–145)
WBC # BLD: 4.05 K/UL — SIGNIFICANT CHANGE UP (ref 3.8–10.5)
WBC # FLD AUTO: 4.05 K/UL — SIGNIFICANT CHANGE UP (ref 3.8–10.5)

## 2022-03-04 PROCEDURE — 99231 SBSQ HOSP IP/OBS SF/LOW 25: CPT | Mod: GC

## 2022-03-04 RX ORDER — SOD SULF/SODIUM/NAHCO3/KCL/PEG
4000 SOLUTION, RECONSTITUTED, ORAL ORAL ONCE
Refills: 0 | Status: COMPLETED | OUTPATIENT
Start: 2022-03-04 | End: 2022-03-04

## 2022-03-04 RX ADMIN — Medication 4000 MILLILITER(S): at 11:25

## 2022-03-04 NOTE — PROGRESS NOTE ADULT - PROVIDER SPECIALTY LIST ADULT
Gastroenterology
Wound Care
Cardiology
Gastroenterology
Cardiology
Electrophysiology
Gastroenterology
Internal Medicine

## 2022-03-04 NOTE — PROGRESS NOTE ADULT - REASON FOR ADMISSION
Constipation x 3 weeks

## 2022-03-04 NOTE — CHART NOTE - NSCHARTNOTESELECT_GEN_ALL_CORE
Event Note
GI/Event Note
GI/Event Note
Tele/Event Note
Event Note
Event Note
Follow-up/Nutrition Services
Follow-up/Nutrition Services
GI/Event Note
Tele/Event Note

## 2022-03-04 NOTE — CHART NOTE - NSCHARTNOTEFT_GEN_A_CORE
Case discussed with Dr. Gonzalez on plan of care and discharge planning. Flex sig was attempted four times on this admission but has been unsuccessful. Per GI, flex sig can be done outpatient.  has set up home health aid and home services for discharge. Per discussion with attending, patient is medically stable for discharge. Patient can follow up outpatient for flex sig. Patient is to be discharged tonight. Ambulance is set up for 830 PM.

## 2022-03-04 NOTE — PROGRESS NOTE ADULT - ATTENDING COMMENTS
#Abnormal CT: CT 2/8/22 with asymmetric rectal wall thickening as well as stool-filled rectum. Findings may be in setting of stercoral colitis, but given asymmetric thickening, unable to rule out malignancy.   #Constipation    --Tentatively plan for flex sig tomorrow to evaluate rectal wall thickening if medically optimized  --Bowel regimen, as above
81 year old man with a pmhx significant for dementia, Sinus node dysfunction s/p Medtronic PPM (12/2021) s/p PPM extraction (12/29/2021) for E.Coli and MRSA bacteremia at [facility] and recently completion of IV antibiotics through PICC line, bedbound, CVA with right hemiparesis, CAD s/p stent, HTN, HLD, and asthma who presented to [facility] for constipation x 3weeks after inability to tolerate senna due to abdominal pain. Patient with jessie episodes fo the 30s. Asymptomatic. No indication for pacing at this time. Plan for flex sig, patient may vagal and jessie at the time. Would recommend bedside atropine and pacing pads on. Family also declining any intervention from EP stand point at this time. Please call back with any questions.
Currently comfortable/NAD. Abdomen-soft, nontender, nondistended. Advise MiraLax 17 g 2 to 3 times daily. Pending response can consider adding senna 2 tabs q.d. I discussed flexible sigmoidoscopy with wife but she wished to defer at current time.
The nursing staff reports patient had multiple formed bowel movements. No hematochezia. Clinically unchanged-frail, debilitated and chronically ill-appearing but otherwise NAD/nontoxic-appearing. Nonverbal. Abdomen-soft, nontender, nondistended. 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 Continue current bowel regimen-MiraLax 2-3 times a day and p.r.n. TWE's. Option of sigmoidoscopy from diagnostic standpoint discussed with wife who will consider.
Rectal wall thickening on imaging.    Plan: Flex sig.
Planned for flex today, but morning examination still with brown stools.   Unfortunately will need additional prep to ensure adequate views obtained. Prior attempts (3x) have been unsuccessful due to significant stool burden.

## 2022-03-04 NOTE — PROGRESS NOTE ADULT - ASSESSMENT
80 yo Male with hx of HTN, HLD CVA with Rt sided residual weakness, dementia, bedbound c/b sacral decubitus ulcer, HTN, CAD s/p stent, s/p PPM extraction, asthma, stercoral colitis, recent completion of IV abx for Ecoli and MRSA Bacteremia via PICC; Pt brought in by wife for constipation of three weeks duration with associated abd distention and discomfort. History obtained from pt's wife who is also his caretaker who was present at bedside. Per pt's wife, has had intermittent episodes of constipation at baseline. Recently she tried enemas, senna but these caused cramping and abd discomfort and were also ineffective.  No associated nausea, vomiting, fevers or chills. Here, VSS, Hgb 10 (BL 12-13). CT A/P notable for stool in distal rectum with wall thickening and perirectal fat stranding c/w stercoral colitis and asymmetric irregular posterior rectal wall soft tissue thickening c/f neoplasm vs infectious or inflammatory etiology. GI consulted for posterior rectal wall thickening.     #stercoral colitis  #chronic constipation  #rectal wall thickening  Suspect clinical picture is likely related to known chronic constipation causing stercoral colitis; however given age cannot exclude malignancy with posterior rectal wall thickening. Now having regular BMs. Attempted flex sig x3 this admission but unsuccessful due to large stool burden. Plan for repeat flex sig (4th attempt) with multiday bowel prep when stools are clear.    Recommendations:  - No plan for procedure today given stools are still brown  - will re-order golytely prep and continue with multi-day bowel prep (daily 4 L of Golytely) until stools are clear  - clear liquid diet indefinitely  - will need aggressive bowel regimen as outpatient    **THIS NOTE IS NOT FINALIZED UNTIL SIGNED BY THE ATTENDING**      Dasia Awad MD  GI Fellow, PGY-4  Available via [employer] Teams    NON-URGENT CONSULTS:  Please email [email or web address]  [email or web address]  AT NIGHT AND ON WEEKENDS:  Contact on-call GI fellow via answering service ([phone]) from 5pm-8am and on weekends/holidays  MONDAY-FRIDAY 8AM-5PM:  Pager# [phone] ([facility]) or [phone] ([facility])  GI Phone# [phone] ([facility])         82 yo Male with hx of HTN, HLD CVA with Rt sided residual weakness, dementia, bedbound c/b sacral decubitus ulcer, HTN, CAD s/p stent, s/p PPM extraction, asthma, stercoral colitis, recent completion of IV abx for Ecoli and MRSA Bacteremia via PICC; Pt brought in by wife for constipation of three weeks duration with associated abd distention and discomfort. History obtained from pt's wife who is also his caretaker who was present at bedside. Per pt's wife, has had intermittent episodes of constipation at baseline. Recently she tried enemas, senna but these caused cramping and abd discomfort and were also ineffective.  No associated nausea, vomiting, fevers or chills. Here, VSS, Hgb 10 (BL 12-13). CT A/P notable for stool in distal rectum with wall thickening and perirectal fat stranding c/w stercoral colitis and asymmetric irregular posterior rectal wall soft tissue thickening c/f neoplasm vs infectious or inflammatory etiology. GI consulted for posterior rectal wall thickening.     #stercoral colitis  #chronic constipation  #rectal wall thickening  Suspect clinical picture is likely related to known chronic constipation causing stercoral colitis; however given age cannot exclude malignancy with posterior rectal wall thickening. Now having regular BMs. Attempted flex sig x3 this admission but unsuccessful due to large stool burden. Plan for repeat flex sig (4th attempt) with multiday bowel prep when stools are clear.    Recommendations:  - No plan for procedure today given stools are still brown and 3 prior attempts aborted  - continue golytely prep and continue with multi-day bowel prep (daily 4 L of Golytely) until stools are clear  - continue clear liquid diet   - will need aggressive bowel regimen as outpatient  - will reassess over weekend for possible flex on Monday    **THIS NOTE IS NOT FINALIZED UNTIL SIGNED BY THE ATTENDING**      Dasia Awad MD  GI Fellow, PGY-4  Available via [employer] Teams    NON-URGENT CONSULTS:  Please email [email or web address]  AT NIGHT AND ON WEEKENDS:  Contact on-call GI fellow via answering service ([phone]) from 5pm-8am and on weekends/holidays  MONDAY-FRIDAY 8AM-5PM:  Pager# [phone] ([facility]) or [phone] ([facility])  GI Phone# [phone] ([facility])

## 2022-03-04 NOTE — PROGRESS NOTE ADULT - SUBJECTIVE AND OBJECTIVE BOX
Chief Complaint:  Patient is a 81y old  Male who presents with a chief complaint of Constipation x 3 weeks (03 Mar 2022 20:33)      Interval Events:       Hospital Medications:  amLODIPine   Tablet 10 milliGRAM(s) Oral daily  aspirin enteric coated 81 milliGRAM(s) Oral daily  atorvastatin 40 milliGRAM(s) Oral at bedtime  bisacodyl 10 milliGRAM(s) Oral at bedtime  isosorbide   mononitrate ER Tablet (IMDUR) 30 milliGRAM(s) Oral daily  lisinopril 10 milliGRAM(s) Oral daily  melatonin 3 milliGRAM(s) Oral at bedtime PRN  multivitamin 1 Tablet(s) Oral daily  OLANZapine Injectable 1.25 milliGRAM(s) IntraMuscular every 6 hours PRN  ondansetron Injectable 4 milliGRAM(s) IV Push every 8 hours PRN  polyethylene glycol 3350 17 Gram(s) Oral <User Schedule>  polyethylene glycol/electrolyte Solution. 4000 milliLiter(s) Oral once  polyethylene glycol/electrolyte Solution. 4000 milliLiter(s) Oral once      PMHX/PSHX:  CVA (cerebral infarction)    HTN (hypertension)    Diabetes    Hyperlipidemia    Dementia    HLD (hyperlipidemia)    DM (diabetes mellitus)    Asthma    CAD (coronary artery disease)    Sinus node dysfunction    No significant past surgical history    Cardiac pacemaker    S/P coronary artery stent placement            ROS: 14 point ROS negative unless otherwise stated in subjective      PHYSICAL EXAM:     GENERAL:  Well developed, no distress  HEENT:  NC/AT,  conjunctivae clear, sclera anicteric  CHEST:  Full & symmetric excursion, no increased effort w/ respirations  HEART:  Regular rhythm & rate  ABDOMEN:  Soft, non-tender, non-distended  EXTREMITIES:  no LE  edema  SKIN:  No rash/erythema/ecchymoses/petechiae/wounds/jaundice  NEURO:  Alert, oriented    Vital Signs:  Vital Signs Last 24 Hrs  T(C): 36.3 (03 Mar 2022 19:13), Max: 37.2 (03 Mar 2022 12:43)  T(F): 97.3 (03 Mar 2022 19:13), Max: 98.9 (03 Mar 2022 12:43)  HR: 81 (03 Mar 2022 19:13) (81 - 89)  BP: 119/69 (03 Mar 2022 19:13) (119/69 - 184/99)  BP(mean): --  RR: 18 (03 Mar 2022 19:13) (17 - 18)  SpO2: 97% (03 Mar 2022 19:13) (97% - 99%)  Daily     Daily     LABS:                        10.3   4.05  )-----------( 170      ( 04 Mar 2022 07:08 )             32.4     03-04    145  |  110<H>  |  16  ----------------------------<  82  4.5   |  26  |  1.29    Ca    9.7      04 Mar 2022 07:08  Phos  2.9     03-04  Mg     2.00     03-04    TPro  6.9  /  Alb  3.6  /  TBili  1.5<H>  /  DBili  x   /  AST  21  /  ALT  15  /  AlkPhos  83  03-04    LIVER FUNCTIONS - ( 04 Mar 2022 07:08 )  Alb: 3.6 g/dL / Pro: 6.9 g/dL / ALK PHOS: 83 U/L / ALT: 15 U/L / AST: 21 U/L / GGT: x           PT/INR - ( 03 Mar 2022 03:31 )   PT: 13.0 sec;   INR: 1.12 ratio         PTT - ( 03 Mar 2022 03:31 )  PTT:28.7 sec        Imaging:             Chief Complaint:  Patient is a 81y old  Male who presents with a chief complaint of Constipation x 3 weeks (03 Mar 2022 20:33)      Interval Events:   No plan for flex sig today given still having brown stools.    Hospital Medications:  amLODIPine   Tablet 10 milliGRAM(s) Oral daily  aspirin enteric coated 81 milliGRAM(s) Oral daily  atorvastatin 40 milliGRAM(s) Oral at bedtime  bisacodyl 10 milliGRAM(s) Oral at bedtime  isosorbide   mononitrate ER Tablet (IMDUR) 30 milliGRAM(s) Oral daily  lisinopril 10 milliGRAM(s) Oral daily  melatonin 3 milliGRAM(s) Oral at bedtime PRN  multivitamin 1 Tablet(s) Oral daily  OLANZapine Injectable 1.25 milliGRAM(s) IntraMuscular every 6 hours PRN  ondansetron Injectable 4 milliGRAM(s) IV Push every 8 hours PRN  polyethylene glycol 3350 17 Gram(s) Oral <User Schedule>  polyethylene glycol/electrolyte Solution. 4000 milliLiter(s) Oral once  polyethylene glycol/electrolyte Solution. 4000 milliLiter(s) Oral once      PMHX/PSHX:  CVA (cerebral infarction)    HTN (hypertension)    Diabetes    Hyperlipidemia    Dementia    HLD (hyperlipidemia)    DM (diabetes mellitus)    Asthma    CAD (coronary artery disease)    Sinus node dysfunction    No significant past surgical history    Cardiac pacemaker    S/P coronary artery stent placement            ROS: unable to obtain ROS due to pt's dementia        PHYSICAL EXAM:     GENERAL:  Thin, elderly, chronically ill appearing, no distress  HEENT:  NC/AT,  conjunctivae clear, sclera anicteric  CHEST:  Full & symmetric excursion, no increased effort w/ respirations  HEART:  Regular rhythm & rate  ABDOMEN:  +thin, soft, non-tender, non-distended  RECTAL: brown stool in rectal vault  EXTREMITIES:  no LE  edema  NEURO:  Awake, alert, minimally verbal    Vital Signs:  Vital Signs Last 24 Hrs  T(C): 36.3 (03 Mar 2022 19:13), Max: 37.2 (03 Mar 2022 12:43)  T(F): 97.3 (03 Mar 2022 19:13), Max: 98.9 (03 Mar 2022 12:43)  HR: 81 (03 Mar 2022 19:13) (81 - 89)  BP: 119/69 (03 Mar 2022 19:13) (119/69 - 184/99)  BP(mean): --  RR: 18 (03 Mar 2022 19:13) (17 - 18)  SpO2: 97% (03 Mar 2022 19:13) (97% - 99%)  Daily     Daily     LABS:                        10.3   4.05  )-----------( 170      ( 04 Mar 2022 07:08 )             32.4     03-04    145  |  110<H>  |  16  ----------------------------<  82  4.5   |  26  |  1.29    Ca    9.7      04 Mar 2022 07:08  Phos  2.9     03-04  Mg     2.00     03-04    TPro  6.9  /  Alb  3.6  /  TBili  1.5<H>  /  DBili  x   /  AST  21  /  ALT  15  /  AlkPhos  83  03-04    LIVER FUNCTIONS - ( 04 Mar 2022 07:08 )  Alb: 3.6 g/dL / Pro: 6.9 g/dL / ALK PHOS: 83 U/L / ALT: 15 U/L / AST: 21 U/L / GGT: x           PT/INR - ( 03 Mar 2022 03:31 )   PT: 13.0 sec;   INR: 1.12 ratio         PTT - ( 03 Mar 2022 03:31 )  PTT:28.7 sec        Imaging: No new abdominal imaging               Chief Complaint:  Patient is a 81y old  Male who presents with a chief complaint of Constipation x 3 weeks (03 Mar 2022 20:33)      Interval Events:   No plan for flex sig today given still having brown stools.    Hospital Medications:  amLODIPine   Tablet 10 milliGRAM(s) Oral daily  aspirin enteric coated 81 milliGRAM(s) Oral daily  atorvastatin 40 milliGRAM(s) Oral at bedtime  bisacodyl 10 milliGRAM(s) Oral at bedtime  isosorbide   mononitrate ER Tablet (IMDUR) 30 milliGRAM(s) Oral daily  lisinopril 10 milliGRAM(s) Oral daily  melatonin 3 milliGRAM(s) Oral at bedtime PRN  multivitamin 1 Tablet(s) Oral daily  OLANZapine Injectable 1.25 milliGRAM(s) IntraMuscular every 6 hours PRN  ondansetron Injectable 4 milliGRAM(s) IV Push every 8 hours PRN  polyethylene glycol 3350 17 Gram(s) Oral <User Schedule>  polyethylene glycol/electrolyte Solution. 4000 milliLiter(s) Oral once  polyethylene glycol/electrolyte Solution. 4000 milliLiter(s) Oral once      PMHX/PSHX:  CVA (cerebral infarction)    HTN (hypertension)    Diabetes    Hyperlipidemia    Dementia    HLD (hyperlipidemia)    DM (diabetes mellitus)    Asthma    CAD (coronary artery disease)    Sinus node dysfunction    No significant past surgical history    Cardiac pacemaker    S/P coronary artery stent placement            ROS: unable to obtain ROS due to pt's dementia        PHYSICAL EXAM:     GENERAL:  Thin, elderly, chronically ill appearing, no distress  HEENT:  NC/AT,  conjunctivae clear, sclera anicteric  CHEST:  Full & symmetric excursion, no increased effort w/ respirations  HEART:  Regular rhythm & rate  ABDOMEN:  +thin, soft, non-tender, non-distended  RECTAL: +solid brown stool on smear  EXTREMITIES:  no LE  edema  NEURO:  Awake, alert, minimally verbal    Vital Signs:  Vital Signs Last 24 Hrs  T(C): 36.3 (03 Mar 2022 19:13), Max: 37.2 (03 Mar 2022 12:43)  T(F): 97.3 (03 Mar 2022 19:13), Max: 98.9 (03 Mar 2022 12:43)  HR: 81 (03 Mar 2022 19:13) (81 - 89)  BP: 119/69 (03 Mar 2022 19:13) (119/69 - 184/99)  BP(mean): --  RR: 18 (03 Mar 2022 19:13) (17 - 18)  SpO2: 97% (03 Mar 2022 19:13) (97% - 99%)  Daily     Daily     LABS:                        10.3   4.05  )-----------( 170      ( 04 Mar 2022 07:08 )             32.4     03-04    145  |  110<H>  |  16  ----------------------------<  82  4.5   |  26  |  1.29    Ca    9.7      04 Mar 2022 07:08  Phos  2.9     03-04  Mg     2.00     03-04    TPro  6.9  /  Alb  3.6  /  TBili  1.5<H>  /  DBili  x   /  AST  21  /  ALT  15  /  AlkPhos  83  03-04    LIVER FUNCTIONS - ( 04 Mar 2022 07:08 )  Alb: 3.6 g/dL / Pro: 6.9 g/dL / ALK PHOS: 83 U/L / ALT: 15 U/L / AST: 21 U/L / GGT: x           PT/INR - ( 03 Mar 2022 03:31 )   PT: 13.0 sec;   INR: 1.12 ratio         PTT - ( 03 Mar 2022 03:31 )  PTT:28.7 sec        Imaging: No new abdominal imaging             Interval Events:   Patient initially planned for flex sig today after extended prep, but still having brown stool.     Hospital Medications:  amLODIPine   Tablet 10 milliGRAM(s) Oral daily  aspirin enteric coated 81 milliGRAM(s) Oral daily  atorvastatin 40 milliGRAM(s) Oral at bedtime  bisacodyl 10 milliGRAM(s) Oral at bedtime  isosorbide   mononitrate ER Tablet (IMDUR) 30 milliGRAM(s) Oral daily  lisinopril 10 milliGRAM(s) Oral daily  melatonin 3 milliGRAM(s) Oral at bedtime PRN  multivitamin 1 Tablet(s) Oral daily  OLANZapine Injectable 1.25 milliGRAM(s) IntraMuscular every 6 hours PRN  ondansetron Injectable 4 milliGRAM(s) IV Push every 8 hours PRN  polyethylene glycol 3350 17 Gram(s) Oral <User Schedule>  polyethylene glycol/electrolyte Solution. 4000 milliLiter(s) Oral once  polyethylene glycol/electrolyte Solution. 4000 milliLiter(s) Oral once      PMHX/PSHX:  CVA (cerebral infarction)    HTN (hypertension)    Diabetes    Hyperlipidemia    Dementia    HLD (hyperlipidemia)    DM (diabetes mellitus)    Asthma    CAD (coronary artery disease)    Sinus node dysfunction    No significant past surgical history    Cardiac pacemaker    S/P coronary artery stent placement            ROS: unable to obtain ROS due to pt's dementia        PHYSICAL EXAM:     GENERAL:  Thin, elderly, chronically ill appearing, no distress  HEENT:  NC/AT,  conjunctivae clear, sclera anicteric  CHEST:  Full & symmetric excursion, no increased effort w/ respirations  HEART:  Regular rhythm & rate  ABDOMEN:  +thin, soft, non-tender, non-distended  RECTAL: +solid brown stool on smear  EXTREMITIES:  no LE  edema  NEURO:  Awake, alert, minimally verbal    Vital Signs:  Vital Signs Last 24 Hrs  T(C): 36.3 (03 Mar 2022 19:13), Max: 37.2 (03 Mar 2022 12:43)  T(F): 97.3 (03 Mar 2022 19:13), Max: 98.9 (03 Mar 2022 12:43)  HR: 81 (03 Mar 2022 19:13) (81 - 89)  BP: 119/69 (03 Mar 2022 19:13) (119/69 - 184/99)  BP(mean): --  RR: 18 (03 Mar 2022 19:13) (17 - 18)  SpO2: 97% (03 Mar 2022 19:13) (97% - 99%)  Daily     Daily     LABS:                        10.3   4.05  )-----------( 170      ( 04 Mar 2022 07:08 )             32.4     03-04    145  |  110<H>  |  16  ----------------------------<  82  4.5   |  26  |  1.29    Ca    9.7      04 Mar 2022 07:08  Phos  2.9     03-04  Mg     2.00     03-04    TPro  6.9  /  Alb  3.6  /  TBili  1.5<H>  /  DBili  x   /  AST  21  /  ALT  15  /  AlkPhos  83  03-04    LIVER FUNCTIONS - ( 04 Mar 2022 07:08 )  Alb: 3.6 g/dL / Pro: 6.9 g/dL / ALK PHOS: 83 U/L / ALT: 15 U/L / AST: 21 U/L / GGT: x           PT/INR - ( 03 Mar 2022 03:31 )   PT: 13.0 sec;   INR: 1.12 ratio         PTT - ( 03 Mar 2022 03:31 )  PTT:28.7 sec        Imaging: No new abdominal imaging

## 2022-03-14 ENCOUNTER — APPOINTMENT (OUTPATIENT)
Dept: WOUND CARE | Facility: CLINIC | Age: 82
End: 2022-03-14
Payer: COMMERCIAL

## 2022-03-14 DIAGNOSIS — S31.819A UNSPECIFIED OPEN WOUND OF RIGHT BUTTOCK, INITIAL ENCOUNTER: ICD-10-CM

## 2022-03-14 PROCEDURE — 99214 OFFICE O/P EST MOD 30 MIN: CPT

## 2022-03-14 NOTE — HISTORY OF PRESENT ILLNESS
[FreeTextEntry1] : Mr. DESHAWN TIWARI h/o bacteremia [facility] with MRSA/e. coil bacteremia and s/p PPm removal. DC with picc and IV vanco and CTX.   presents to the office with a wound for weeks duration.  The wound is located on  the ------- .  The patient has complaints of -------.   The patient has been dressing the wound with ------.  The patient denies fevers or chills.  The patient has localized pain to the wound upon dressing changes.  The patient has no other complaints or associated symptoms.  HbA1c is ----. Patient has a soft diet\par \par Had an issues of bradycardia \par \par \par \par

## 2022-03-14 NOTE — ASSESSMENT
[FreeTextEntry1] : 80 y/o male with MRSA/E. coli bacteremia, s/p PPm extraction comes for visit. Helaing well\par Patient wife declines SNF\par He is stable.\par \par Completed abx as scheduled will 2/6.\par \par D/w wife over phone \par Wound Assessment and Plan:\par \par The patient presents with a wound to the right buttock. \par No clinical sign of infection\par Recommendation:\par \par Apply lidocaine or topical anesthetic if needed to reduce pain upon washing the wound.\par Wash wound with ----0.9% saline\par Maegan cream\par Encouraged ambulation or exercise.\par Optimization of nutrition.\par Offloading to the wound site.\par \par ---Group II  Pressure relief mattress/boots recommended improving\par ---Roho cushion recommended\par ---Offloading shoe recommended\par \par \par -----Homecare orders in place\par \par Follow up appointment scheduled for 1 week telehealth\par \par TeleHealth Services discussed with the patient and/or family.  Discharge instructions given including download of [facility] information regarding:\par 1)  [facility] to obtain medical records\par 2)  [facility] to conduct Face-to-Face TeleHealth visit\par 3)  [facility] for the Patient (patient takes a picture of their wound which is sent to the patient's chart for review)\par  declines

## 2022-03-14 NOTE — REVIEW OF SYSTEMS
[As Noted in HPI] : as noted in HPI [Skin Lesions] : skin lesion [Negative] : Integumentary [de-identified] : pt demented, cannot get proper ROS

## 2022-03-28 ENCOUNTER — EMERGENCY (EMERGENCY)
Facility: HOSPITAL | Age: 82
LOS: 1 days | Discharge: HOME | End: 2022-03-28
Attending: EMERGENCY MEDICINE | Admitting: EMERGENCY MEDICINE
Payer: COMMERCIAL

## 2022-03-28 VITALS
RESPIRATION RATE: 17 BRPM | DIASTOLIC BLOOD PRESSURE: 101 MMHG | OXYGEN SATURATION: 100 % | TEMPERATURE: 99 F | HEART RATE: 71 BPM | SYSTOLIC BLOOD PRESSURE: 154 MMHG

## 2022-03-28 VITALS
SYSTOLIC BLOOD PRESSURE: 154 MMHG | DIASTOLIC BLOOD PRESSURE: 100 MMHG | TEMPERATURE: 99 F | RESPIRATION RATE: 16 BRPM | HEART RATE: 100 BPM | HEIGHT: 66 IN | OXYGEN SATURATION: 83 %

## 2022-03-28 DIAGNOSIS — Z95.0 PRESENCE OF CARDIAC PACEMAKER: Chronic | ICD-10-CM

## 2022-03-28 DIAGNOSIS — Z95.5 PRESENCE OF CORONARY ANGIOPLASTY IMPLANT AND GRAFT: Chronic | ICD-10-CM

## 2022-03-28 LAB
ALBUMIN SERPL ELPH-MCNC: 3.6 G/DL — SIGNIFICANT CHANGE UP (ref 3.3–5)
ALP SERPL-CCNC: 76 U/L — SIGNIFICANT CHANGE UP (ref 40–120)
ALT FLD-CCNC: 14 U/L — SIGNIFICANT CHANGE UP (ref 4–41)
ANION GAP SERPL CALC-SCNC: 9 MMOL/L — SIGNIFICANT CHANGE UP (ref 7–14)
AST SERPL-CCNC: 16 U/L — SIGNIFICANT CHANGE UP (ref 4–40)
BASE EXCESS BLDV CALC-SCNC: 6.6 MMOL/L — HIGH (ref -2–3)
BASOPHILS # BLD AUTO: 0.03 K/UL — SIGNIFICANT CHANGE UP (ref 0–0.2)
BASOPHILS NFR BLD AUTO: 0.8 % — SIGNIFICANT CHANGE UP (ref 0–2)
BILIRUB SERPL-MCNC: 0.8 MG/DL — SIGNIFICANT CHANGE UP (ref 0.2–1.2)
BLOOD GAS VENOUS COMPREHENSIVE RESULT: SIGNIFICANT CHANGE UP
BUN SERPL-MCNC: 8 MG/DL — SIGNIFICANT CHANGE UP (ref 7–23)
CALCIUM SERPL-MCNC: 9.4 MG/DL — SIGNIFICANT CHANGE UP (ref 8.4–10.5)
CHLORIDE BLDV-SCNC: 104 MMOL/L — SIGNIFICANT CHANGE UP (ref 96–108)
CHLORIDE SERPL-SCNC: 102 MMOL/L — SIGNIFICANT CHANGE UP (ref 98–107)
CO2 BLDV-SCNC: 33.9 MMOL/L — HIGH (ref 22–26)
CO2 SERPL-SCNC: 25 MMOL/L — SIGNIFICANT CHANGE UP (ref 22–31)
CREAT SERPL-MCNC: 0.9 MG/DL — SIGNIFICANT CHANGE UP (ref 0.5–1.3)
EGFR: 85 ML/MIN/1.73M2 — SIGNIFICANT CHANGE UP
EOSINOPHIL # BLD AUTO: 0.27 K/UL — SIGNIFICANT CHANGE UP (ref 0–0.5)
EOSINOPHIL NFR BLD AUTO: 7 % — HIGH (ref 0–6)
GAS PNL BLDV: 136 MMOL/L — SIGNIFICANT CHANGE UP (ref 136–145)
GLUCOSE BLDV-MCNC: 107 MG/DL — HIGH (ref 70–99)
GLUCOSE SERPL-MCNC: 104 MG/DL — HIGH (ref 70–99)
HCO3 BLDV-SCNC: 32 MMOL/L — HIGH (ref 22–29)
HCT VFR BLD CALC: 29.8 % — LOW (ref 39–50)
HCT VFR BLDA CALC: 31 % — LOW (ref 39–51)
HGB BLD CALC-MCNC: 10.2 G/DL — LOW (ref 13–17)
HGB BLD-MCNC: 10.2 G/DL — LOW (ref 13–17)
IANC: 2.1 K/UL — SIGNIFICANT CHANGE UP (ref 1.8–7.4)
IMM GRANULOCYTES NFR BLD AUTO: 0.5 % — SIGNIFICANT CHANGE UP (ref 0–1.5)
LACTATE BLDV-MCNC: 2.1 MMOL/L — HIGH (ref 0.5–2)
LYMPHOCYTES # BLD AUTO: 1.01 K/UL — SIGNIFICANT CHANGE UP (ref 1–3.3)
LYMPHOCYTES # BLD AUTO: 26.2 % — SIGNIFICANT CHANGE UP (ref 13–44)
MCHC RBC-ENTMCNC: 30.6 PG — SIGNIFICANT CHANGE UP (ref 27–34)
MCHC RBC-ENTMCNC: 34.2 GM/DL — SIGNIFICANT CHANGE UP (ref 32–36)
MCV RBC AUTO: 89.5 FL — SIGNIFICANT CHANGE UP (ref 80–100)
MONOCYTES # BLD AUTO: 0.42 K/UL — SIGNIFICANT CHANGE UP (ref 0–0.9)
MONOCYTES NFR BLD AUTO: 10.9 % — SIGNIFICANT CHANGE UP (ref 2–14)
NEUTROPHILS # BLD AUTO: 2.1 K/UL — SIGNIFICANT CHANGE UP (ref 1.8–7.4)
NEUTROPHILS NFR BLD AUTO: 54.6 % — SIGNIFICANT CHANGE UP (ref 43–77)
NRBC # BLD: 0 /100 WBCS — SIGNIFICANT CHANGE UP
NRBC # FLD: 0 K/UL — SIGNIFICANT CHANGE UP
PCO2 BLDV: 51 MMHG — SIGNIFICANT CHANGE UP (ref 42–55)
PH BLDV: 7.41 — SIGNIFICANT CHANGE UP (ref 7.32–7.43)
PLATELET # BLD AUTO: 239 K/UL — SIGNIFICANT CHANGE UP (ref 150–400)
PO2 BLDV: 28 MMHG — SIGNIFICANT CHANGE UP
POTASSIUM BLDV-SCNC: 4.1 MMOL/L — SIGNIFICANT CHANGE UP (ref 3.5–5.1)
POTASSIUM SERPL-MCNC: 3.9 MMOL/L — SIGNIFICANT CHANGE UP (ref 3.5–5.3)
POTASSIUM SERPL-SCNC: 3.9 MMOL/L — SIGNIFICANT CHANGE UP (ref 3.5–5.3)
PROT SERPL-MCNC: 6.6 G/DL — SIGNIFICANT CHANGE UP (ref 6–8.3)
RBC # BLD: 3.33 M/UL — LOW (ref 4.2–5.8)
RBC # FLD: 14.4 % — SIGNIFICANT CHANGE UP (ref 10.3–14.5)
SAO2 % BLDV: 40 % — SIGNIFICANT CHANGE UP
SARS-COV-2 RNA SPEC QL NAA+PROBE: SIGNIFICANT CHANGE UP
SODIUM SERPL-SCNC: 136 MMOL/L — SIGNIFICANT CHANGE UP (ref 135–145)
TROPONIN T, HIGH SENSITIVITY RESULT: 24 NG/L — SIGNIFICANT CHANGE UP
TROPONIN T, HIGH SENSITIVITY RESULT: 28 NG/L — SIGNIFICANT CHANGE UP
WBC # BLD: 3.85 K/UL — SIGNIFICANT CHANGE UP (ref 3.8–10.5)
WBC # FLD AUTO: 3.85 K/UL — SIGNIFICANT CHANGE UP (ref 3.8–10.5)

## 2022-03-28 PROCEDURE — 71045 X-RAY EXAM CHEST 1 VIEW: CPT | Mod: 26

## 2022-03-28 PROCEDURE — 99285 EMERGENCY DEPT VISIT HI MDM: CPT

## 2022-03-28 NOTE — ED PROVIDER NOTE - NSICDXPASTSURGICALHX_GEN_ALL_CORE_FT
PAST SURGICAL HISTORY:  Cardiac pacemaker Medtronic (SN: [id number]; Model: [id number])    S/P coronary artery stent placement

## 2022-03-28 NOTE — ED PROVIDER NOTE - PROGRESS NOTE DETAILS
Naseem Draper MD:  SP02 98% on RA after adequate waveform achieved. Will workup for SOB of unknown etiology, potential iatrogenic in setting of poor waveform. Sven PGY3: Reviewed and discussed results with patient and family member at bedside. Discussed importance of follow up and return precautions. Patient agrees with plan.

## 2022-03-28 NOTE — ED PROVIDER NOTE - NSFOLLOWUPINSTRUCTIONS_ED_ALL_ED_FT
- Continue all regular medications  - For pain, take tylenol or ibuprofen as directed on the packaging  - Follow up with your primary doctor within 1 week  - You were given copies of labs and/or imaging results if applicable, please take them to your follow up appointments  - Return to the ER for any worsening symptoms or concerns

## 2022-03-28 NOTE — ED PROVIDER NOTE - OBJECTIVE STATEMENT
80 yo Male with hx of HTN, HLD CVA with Rt sided residual weakness, dementia, bedbound, HTN, CAD s/p stent, asthma, stercoral colitis, presenting with SOB.  Per EMS home nurse found SPO2 90 on RA, EMS had ~85% on NRB, Patient denies any CP, SOB, difficulty breathing, abdominal pain, nausea, vomiting.  [phone] (daughter guillermo)

## 2022-03-28 NOTE — ED ADULT NURSE REASSESSMENT NOTE - NS ED NURSE REASSESS COMMENT FT1
Break RN note- patient resting quietly in bed, breathing even and nonlabored. No acute distress. A&Ox2. Unable to obtain IV. IV placed via ultrasound by Dr. Barrios. Labs sent as ordered. Safety maintained. Patient stable upon exiting the room. Cardiac monitor in place- sinus rhythm.

## 2022-03-28 NOTE — ED PROVIDER NOTE - PHYSICAL EXAMINATION
Physical Exam:  General: NAD, conversive in 1-2 word increments  Eyes: EOMI, Conjunctiva and sclera clear  Neck: No JVD  Lungs: Clear to auscultation bilaterally, decreased inspiration on L. side  Heart: Normal S1, S2, no murmurs  Abdomen: Soft, nontender, nondistended, no CVA tenderness  Extremities: 2+ peripheral pulses, no edema  Psych: AAO X1  Neurologic: Non-focal HUMPHREY x4

## 2022-03-28 NOTE — ED PROVIDER NOTE - CLINICAL SUMMARY MEDICAL DECISION MAKING FREE TEXT BOX
81 Y M H/O CVA, dementia, potential hypoxia on RA, well appearing, workup for potential hypoxia, CBC, CMP, trop, bnp, re-assess. Not hypoxic currently, no indication for CTPA imaging at this time.

## 2022-03-28 NOTE — ED PROCEDURE NOTE - PROCEDURE ADDITIONAL DETAILS
Peripheral IV access in the Emergency Department obtained under dynamic ultrasound guidance with dark nonpulsatile blood return.  Catheter was flushed afterwards without any resistance or resulting extravasation.  IV catheter confirmed in compressible vein after insertion.    20 to L AC

## 2022-03-28 NOTE — ED PROVIDER NOTE - ATTENDING CONTRIBUTION TO CARE
82 y/o M with h/o HTN, hyperlipidemia, CAD s/p stent, previous stroke with residual R weakness, dementia, bedbound at baseline BIB EMS for hypoxia.  Per EMS, pt had routine vital signs done this morning by home aide and noted to be hypoxic to low 90s-upper 80s on room air.  EMS notes hypoxia to low 80s on room air on their arrival and started on 2L NC prior to arrival.  Pt with baseline dementia, oriented to self and place.  He endorses central chest pain to me.  No sob, cough, or other complaints.  Elderly male, lying in stretcher, awake and alert, nontoxic.  AF/VSS, no hypoxia and satting 100% on RA.  No increased work of breathing.  Lungs cta bl.  Cards nl S1/S2, RRR, no MRG.  Abd soft ntnd.  No pedal edema or calf tenderness.  No apparent hypoxia or resp distress on exam, pt does endorse CP although hx is limited due to dementia.  Will attempt to obtain collateral from HHA, given risk factors and cardiac hx, will need to eval for acs event, no trauma, no infectious s/s, no hypoxia or pleuritic pain to suggest PE, do not suspect aortic catastrophe.  Plan for labs, cxr, ekg, reassess.

## 2022-03-28 NOTE — ED PROVIDER NOTE - PATIENT PORTAL LINK FT
You can access the FollowMyHealth Patient Portal offered by [facility] by registering at the following website: [email or web address]. By joining [employer]’s FollowMyHealth portal, you will also be able to view your health information using other applications (apps) compatible with our system.

## 2022-03-29 NOTE — ED ADULT NURSE NOTE - OBJECTIVE STATEMENT
report received from day shift RN Ana Laura. pt in room 9, a&ox1 , ambulatory , pmh of HTN , HLD, CVA  , p/w SOB and CP  . Pt breathing even and unlabored on room air. NSR on cardiac monitor.  pt educated on fall precautions and confirms understanding via teach back method. Stretcher locked in lowest position with siderails up x2. Call bell and personal items within reach. no nurse nt written .

## 2022-03-29 NOTE — ED ADULT NURSE NOTE - NSICDXPASTSURGICALHX_GEN_ALL_CORE_FT
PAST SURGICAL HISTORY:  Cardiac pacemaker Medtronic (SN: [id number]; Model: [id number])    S/P coronary artery stent placement

## 2022-04-09 ENCOUNTER — EMERGENCY (EMERGENCY)
Facility: HOSPITAL | Age: 82
LOS: 1 days | Discharge: HOME | End: 2022-04-09
Attending: EMERGENCY MEDICINE | Admitting: EMERGENCY MEDICINE
Payer: COMMERCIAL

## 2022-04-09 VITALS
SYSTOLIC BLOOD PRESSURE: 128 MMHG | TEMPERATURE: 98 F | OXYGEN SATURATION: 95 % | RESPIRATION RATE: 18 BRPM | HEART RATE: 112 BPM | HEIGHT: 66 IN | DIASTOLIC BLOOD PRESSURE: 78 MMHG

## 2022-04-09 VITALS
DIASTOLIC BLOOD PRESSURE: 64 MMHG | HEART RATE: 48 BPM | RESPIRATION RATE: 16 BRPM | OXYGEN SATURATION: 100 % | TEMPERATURE: 98 F | SYSTOLIC BLOOD PRESSURE: 130 MMHG

## 2022-04-09 DIAGNOSIS — Z95.5 PRESENCE OF CORONARY ANGIOPLASTY IMPLANT AND GRAFT: Chronic | ICD-10-CM

## 2022-04-09 DIAGNOSIS — Z95.0 PRESENCE OF CARDIAC PACEMAKER: Chronic | ICD-10-CM

## 2022-04-09 LAB
ALBUMIN SERPL ELPH-MCNC: 3.7 G/DL — SIGNIFICANT CHANGE UP (ref 3.3–5)
ALP SERPL-CCNC: 85 U/L — SIGNIFICANT CHANGE UP (ref 40–120)
ALT FLD-CCNC: 17 U/L — SIGNIFICANT CHANGE UP (ref 4–41)
ANION GAP SERPL CALC-SCNC: 12 MMOL/L — SIGNIFICANT CHANGE UP (ref 7–14)
AST SERPL-CCNC: 49 U/L — HIGH (ref 4–40)
BILIRUB SERPL-MCNC: 0.8 MG/DL — SIGNIFICANT CHANGE UP (ref 0.2–1.2)
BUN SERPL-MCNC: 15 MG/DL — SIGNIFICANT CHANGE UP (ref 7–23)
CALCIUM SERPL-MCNC: 10 MG/DL — SIGNIFICANT CHANGE UP (ref 8.4–10.5)
CHLORIDE SERPL-SCNC: 101 MMOL/L — SIGNIFICANT CHANGE UP (ref 98–107)
CO2 SERPL-SCNC: 23 MMOL/L — SIGNIFICANT CHANGE UP (ref 22–31)
CREAT SERPL-MCNC: 1.03 MG/DL — SIGNIFICANT CHANGE UP (ref 0.5–1.3)
EGFR: 73 ML/MIN/1.73M2 — SIGNIFICANT CHANGE UP
GLUCOSE SERPL-MCNC: 86 MG/DL — SIGNIFICANT CHANGE UP (ref 70–99)
HCT VFR BLD CALC: 30.2 % — LOW (ref 39–50)
HGB BLD-MCNC: 10.4 G/DL — LOW (ref 13–17)
MAGNESIUM SERPL-MCNC: 2.2 MG/DL — SIGNIFICANT CHANGE UP (ref 1.6–2.6)
MCHC RBC-ENTMCNC: 30.6 PG — SIGNIFICANT CHANGE UP (ref 27–34)
MCHC RBC-ENTMCNC: 34.4 GM/DL — SIGNIFICANT CHANGE UP (ref 32–36)
MCV RBC AUTO: 88.8 FL — SIGNIFICANT CHANGE UP (ref 80–100)
NRBC # BLD: 0 /100 WBCS — SIGNIFICANT CHANGE UP
NRBC # FLD: 0 K/UL — SIGNIFICANT CHANGE UP
PHOSPHATE SERPL-MCNC: 3.1 MG/DL — SIGNIFICANT CHANGE UP (ref 2.5–4.5)
PLATELET # BLD AUTO: 288 K/UL — SIGNIFICANT CHANGE UP (ref 150–400)
POTASSIUM SERPL-MCNC: 4.3 MMOL/L — SIGNIFICANT CHANGE UP (ref 3.5–5.3)
POTASSIUM SERPL-SCNC: 4.3 MMOL/L — SIGNIFICANT CHANGE UP (ref 3.5–5.3)
PROT SERPL-MCNC: 7.2 G/DL — SIGNIFICANT CHANGE UP (ref 6–8.3)
RBC # BLD: 3.4 M/UL — LOW (ref 4.2–5.8)
RBC # FLD: 14.3 % — SIGNIFICANT CHANGE UP (ref 10.3–14.5)
SODIUM SERPL-SCNC: 136 MMOL/L — SIGNIFICANT CHANGE UP (ref 135–145)
WBC # BLD: 4.6 K/UL — SIGNIFICANT CHANGE UP (ref 3.8–10.5)
WBC # FLD AUTO: 4.6 K/UL — SIGNIFICANT CHANGE UP (ref 3.8–10.5)

## 2022-04-09 PROCEDURE — G1004: CPT

## 2022-04-09 PROCEDURE — 74177 CT ABD & PELVIS W/CONTRAST: CPT | Mod: 26,ME

## 2022-04-09 PROCEDURE — 99285 EMERGENCY DEPT VISIT HI MDM: CPT

## 2022-04-09 RX ORDER — LACTULOSE 10 G/15ML
20 SOLUTION ORAL ONCE
Refills: 0 | Status: COMPLETED | OUTPATIENT
Start: 2022-04-09 | End: 2022-04-09

## 2022-04-09 RX ORDER — GLUCAGON INJECTION, SOLUTION 0.5 MG/.1ML
1 INJECTION, SOLUTION SUBCUTANEOUS ONCE
Refills: 0 | Status: DISCONTINUED | OUTPATIENT
Start: 2022-04-09 | End: 2022-04-12

## 2022-04-09 RX ORDER — DEXTROSE 50 % IN WATER 50 %
15 SYRINGE (ML) INTRAVENOUS ONCE
Refills: 0 | Status: COMPLETED | OUTPATIENT
Start: 2022-04-09 | End: 2022-04-09

## 2022-04-09 RX ORDER — SODIUM CHLORIDE 9 MG/ML
1000 INJECTION INTRAMUSCULAR; INTRAVENOUS; SUBCUTANEOUS ONCE
Refills: 0 | Status: COMPLETED | OUTPATIENT
Start: 2022-04-09 | End: 2022-04-09

## 2022-04-09 RX ORDER — MINERAL OIL
133 OIL (ML) MISCELLANEOUS ONCE
Refills: 0 | Status: COMPLETED | OUTPATIENT
Start: 2022-04-09 | End: 2022-04-09

## 2022-04-09 RX ORDER — DEXTROSE 50 % IN WATER 50 %
25 SYRINGE (ML) INTRAVENOUS ONCE
Refills: 0 | Status: DISCONTINUED | OUTPATIENT
Start: 2022-04-09 | End: 2022-04-09

## 2022-04-09 RX ORDER — DEXTROSE 50 % IN WATER 50 %
15 SYRINGE (ML) INTRAVENOUS ONCE
Refills: 0 | Status: DISCONTINUED | OUTPATIENT
Start: 2022-04-09 | End: 2022-04-12

## 2022-04-09 RX ORDER — DEXTROSE 50 % IN WATER 50 %
12.5 SYRINGE (ML) INTRAVENOUS ONCE
Refills: 0 | Status: DISCONTINUED | OUTPATIENT
Start: 2022-04-09 | End: 2022-04-12

## 2022-04-09 RX ORDER — DEXTROSE 50 % IN WATER 50 %
25 SYRINGE (ML) INTRAVENOUS ONCE
Refills: 0 | Status: DISCONTINUED | OUTPATIENT
Start: 2022-04-09 | End: 2022-04-12

## 2022-04-09 RX ADMIN — Medication 15 GRAM(S): at 09:52

## 2022-04-09 RX ADMIN — Medication 15 GRAM(S): at 11:16

## 2022-04-09 RX ADMIN — SODIUM CHLORIDE 1000 MILLILITER(S): 9 INJECTION INTRAMUSCULAR; INTRAVENOUS; SUBCUTANEOUS at 08:49

## 2022-04-09 RX ADMIN — Medication 133 MILLILITER(S): at 11:16

## 2022-04-09 RX ADMIN — Medication 15 GRAM(S): at 08:42

## 2022-04-09 RX ADMIN — Medication 15 GRAM(S): at 16:30

## 2022-04-09 RX ADMIN — LACTULOSE 20 GRAM(S): 10 SOLUTION ORAL at 08:49

## 2022-04-09 NOTE — PROVIDER CONTACT NOTE (OTHER) - ASSESSMENT
Arranged [employer] with [facility] [phone]. P/U 5 PM. trip# [id number]. Pt has [facility], was unable to obtain Auth, so bill back. Pt's family at bedside.

## 2022-04-09 NOTE — ED ADULT NURSE REASSESSMENT NOTE - NS ED NURSE REASSESS COMMENT FT1
PT is resting in stretcher, easily arousable to verbal stimuli. no apparent distress noted. pt still unable to move bowels post oil enema, provider aware. pt discharged, awaiting Transportation, ETA 5PM. will continue to monitor.

## 2022-04-09 NOTE — ED PROVIDER NOTE - NSICDXPASTSURGICALHX_GEN_ALL_CORE_FT
PAST SURGICAL HISTORY:  Cardiac pacemaker Medtronic (SN: [id number]; Model: [id number])    S/P coronary artery stent placement

## 2022-04-09 NOTE — ED ADULT NURSE NOTE - NSICDXPASTSURGICALHX_GEN_ALL_CORE_FT
PAST SURGICAL HISTORY:  Cardiac pacemaker Medtronic (SN: [id number]; Model: [id number])    S/P coronary artery stent placement

## 2022-04-09 NOTE — ED ADULT NURSE NOTE - OBJECTIVE STATEMENT
Received pt to bed 23, A+Ox2, redirected to date, non-ambulatory. wife at bedside, as per wife pt has not had a bowel movement in 2 weeks, laxatives not working at home, states pt is on a pure diet, no vomiting or nausea. ABD is soft, non tender, non distended. pt appears drowsy, arrives hypoglycemic to 66, provided with oral glucose gel. will recheck Blood sugar. Respirations even and unlabored, normal work of breathing, no accessory muscle use, speaking in full clear uninterrupted sentences. Pt denies any chest pain, SOB, headache, dizziness, N/V/D, fever, chills. 20G to LAC, Labs sent, Medicated as per MD, will continue to monitor.

## 2022-04-09 NOTE — ED PROVIDER NOTE - ATTENDING CONTRIBUTION TO CARE
81 year old male with dementia cva presents with no bm for extended time.  balderas snot appear in pain. tolerating po.  will check labs for braulio vs electrolyte abn vs anemia vs liver process. ct for stercocolitis vs sbo vs stool burden. increase home lactulose. enema. if labs wnl, fu with gi.

## 2022-04-09 NOTE — ED ADULT NURSE REASSESSMENT NOTE - NS ED NURSE REASSESS COMMENT FT1
Covering primary RN for break, Ambulate at bedside for transport back home, tp unable to stand up on his own, A this time pt is considered  unsafe for discharge due to  inapposite mode of transportation, Will reschedule ambulance for .

## 2022-04-09 NOTE — ED ADULT TRIAGE NOTE - CHIEF COMPLAINT QUOTE
Pt arrives from Home as per Wife..." He has not had BM in 2 weeks they gave him laxatives not working...MD told to go to ER if Miralax doesn't work....He eats a puree diet and has been eating well no vomiting. But complains of pain in his butt...think he is trying to pass stool not coming out." Hx CVA, rt sided residual weakness,not ambulatory pt is  full asst  DM htn, FBS 66x2 in triage

## 2022-04-09 NOTE — ED PROVIDER NOTE - NSFOLLOWUPINSTRUCTIONS_ED_ALL_ED_FT
- Continue all regular medications, take lactulose 10 mg three times per day and if bowel movements become too frequent then go back to two times per day  - For pain, take tylenol as directed on the packaging  - Follow up with your gastroenterologist within 1 week  - You were given copies of labs and/or imaging results if applicable, please take them to your follow up appointments  - Return to the ER for fever, constant vomiting or any worsening symptoms or concerns

## 2022-04-09 NOTE — ED PROVIDER NOTE - PATIENT PORTAL LINK FT
You can access the FollowMyHealth Patient Portal offered by [facility] by registering at the following website: [email or web address]. By joining [employer]’s FollowMyHealth portal, you will also be able to view your health information using other applications (apps) compatible with our system.

## 2022-04-09 NOTE — ED PROVIDER NOTE - PHYSICAL EXAMINATION
GENERAL: No acute distress, well-developed  HEAD:  Atraumatic, Normocephalic  CHEST/LUNG: CTAB; No wheezes, rales, or rhonchi  HEART: Regular rate and rhythm. Normal S1/S2. No murmurs, rubs, or gallops  ABDOMEN: Soft, non-tender, non-distended; normal bowel sounds. Liquid brown stool noted on rectal exam, no hard stool appreciated in rectal vault.  EXTREMITIES:  2+ peripheral pulses b/l, No clubbing, cyanosis, or edema  NEUROLOGY: A&O to person only, residual R-sided weakness from CVA  SKIN: No rashes or lesions

## 2022-04-09 NOTE — ED PROVIDER NOTE - PROGRESS NOTE DETAILS
Sven PGY3: Patient unable to hold in enema, no concern for urinary retention related cause. Afebrile. Performed rectal again and no stool in vault. Reviewed and discussed results with family. Discussed importance of follow up and return precautions. Family agrees with plan.

## 2022-04-09 NOTE — ED PROVIDER NOTE - CLINICAL SUMMARY MEDICAL DECISION MAKING FREE TEXT BOX
81M hx HTN, HLD CVA with Rt sided residual weakness, dementia (A&O x1 at baseline), bedbound, HTN, CAD s/p stent, asthma, stercoral colitis, presenting with two weeks constipation. Will get labs, CT abd/pelvis non-con to evaluate stool burden. Give lactulose 20g and reassess for BMs.

## 2022-04-15 ENCOUNTER — INPATIENT (INPATIENT)
Facility: HOSPITAL | Age: 82
LOS: 13 days | Discharge: NURSING, REHAB OR LONG-TERM CARE | End: 2022-04-29
Attending: STUDENT IN AN ORGANIZED HEALTH CARE EDUCATION/TRAINING PROGRAM | Admitting: STUDENT IN AN ORGANIZED HEALTH CARE EDUCATION/TRAINING PROGRAM
Payer: COMMERCIAL

## 2022-04-15 VITALS
HEART RATE: 45 BPM | HEIGHT: 66 IN | RESPIRATION RATE: 16 BRPM | OXYGEN SATURATION: 99 % | DIASTOLIC BLOOD PRESSURE: 55 MMHG | TEMPERATURE: 98 F | SYSTOLIC BLOOD PRESSURE: 82 MMHG

## 2022-04-15 DIAGNOSIS — R13.10 DYSPHAGIA, UNSPECIFIED: ICD-10-CM

## 2022-04-15 DIAGNOSIS — I10 ESSENTIAL (PRIMARY) HYPERTENSION: ICD-10-CM

## 2022-04-15 DIAGNOSIS — I49.5 SICK SINUS SYNDROME: ICD-10-CM

## 2022-04-15 DIAGNOSIS — Z95.5 PRESENCE OF CORONARY ANGIOPLASTY IMPLANT AND GRAFT: Chronic | ICD-10-CM

## 2022-04-15 DIAGNOSIS — Z29.9 ENCOUNTER FOR PROPHYLACTIC MEASURES, UNSPECIFIED: ICD-10-CM

## 2022-04-15 DIAGNOSIS — Z95.0 PRESENCE OF CARDIAC PACEMAKER: Chronic | ICD-10-CM

## 2022-04-15 DIAGNOSIS — I63.9 CEREBRAL INFARCTION, UNSPECIFIED: ICD-10-CM

## 2022-04-15 DIAGNOSIS — E11.9 TYPE 2 DIABETES MELLITUS WITHOUT COMPLICATIONS: ICD-10-CM

## 2022-04-15 DIAGNOSIS — N39.0 URINARY TRACT INFECTION, SITE NOT SPECIFIED: ICD-10-CM

## 2022-04-15 LAB
ALBUMIN SERPL ELPH-MCNC: 3.1 G/DL — LOW (ref 3.3–5)
ALP SERPL-CCNC: 74 U/L — SIGNIFICANT CHANGE UP (ref 40–120)
ALT FLD-CCNC: 8 U/L — SIGNIFICANT CHANGE UP (ref 4–41)
ANION GAP SERPL CALC-SCNC: 11 MMOL/L — SIGNIFICANT CHANGE UP (ref 7–14)
APPEARANCE UR: ABNORMAL
APTT BLD: 32.2 SEC — SIGNIFICANT CHANGE UP (ref 27–36.3)
AST SERPL-CCNC: 15 U/L — SIGNIFICANT CHANGE UP (ref 4–40)
BACTERIA # UR AUTO: NEGATIVE — SIGNIFICANT CHANGE UP
BASE EXCESS BLDV CALC-SCNC: -0.5 MMOL/L — SIGNIFICANT CHANGE UP (ref -2–3)
BASOPHILS # BLD AUTO: 0.02 K/UL — SIGNIFICANT CHANGE UP (ref 0–0.2)
BASOPHILS NFR BLD AUTO: 0.5 % — SIGNIFICANT CHANGE UP (ref 0–2)
BILIRUB SERPL-MCNC: 0.7 MG/DL — SIGNIFICANT CHANGE UP (ref 0.2–1.2)
BILIRUB UR-MCNC: NEGATIVE — SIGNIFICANT CHANGE UP
BLOOD GAS VENOUS COMPREHENSIVE RESULT: SIGNIFICANT CHANGE UP
BUN SERPL-MCNC: 10 MG/DL — SIGNIFICANT CHANGE UP (ref 7–23)
CALCIUM SERPL-MCNC: 9.6 MG/DL — SIGNIFICANT CHANGE UP (ref 8.4–10.5)
CHLORIDE BLDV-SCNC: 107 MMOL/L — SIGNIFICANT CHANGE UP (ref 96–108)
CHLORIDE SERPL-SCNC: 106 MMOL/L — SIGNIFICANT CHANGE UP (ref 98–107)
CO2 BLDV-SCNC: 26.2 MMOL/L — HIGH (ref 22–26)
CO2 SERPL-SCNC: 21 MMOL/L — LOW (ref 22–31)
COLOR SPEC: ABNORMAL
CREAT SERPL-MCNC: 1.03 MG/DL — SIGNIFICANT CHANGE UP (ref 0.5–1.3)
DIFF PNL FLD: ABNORMAL
EGFR: 73 ML/MIN/1.73M2 — SIGNIFICANT CHANGE UP
EOSINOPHIL # BLD AUTO: 0.14 K/UL — SIGNIFICANT CHANGE UP (ref 0–0.5)
EOSINOPHIL NFR BLD AUTO: 3.6 % — SIGNIFICANT CHANGE UP (ref 0–6)
EPI CELLS # UR: 0 /HPF — SIGNIFICANT CHANGE UP (ref 0–5)
GAS PNL BLDV: 136 MMOL/L — SIGNIFICANT CHANGE UP (ref 136–145)
GLUCOSE BLDC GLUCOMTR-MCNC: 100 MG/DL — HIGH (ref 70–99)
GLUCOSE BLDC GLUCOMTR-MCNC: 67 MG/DL — LOW (ref 70–99)
GLUCOSE BLDC GLUCOMTR-MCNC: 71 MG/DL — SIGNIFICANT CHANGE UP (ref 70–99)
GLUCOSE BLDV-MCNC: 99 MG/DL — SIGNIFICANT CHANGE UP (ref 70–99)
GLUCOSE SERPL-MCNC: 119 MG/DL — HIGH (ref 70–99)
GLUCOSE UR QL: NEGATIVE — SIGNIFICANT CHANGE UP
HCO3 BLDV-SCNC: 25 MMOL/L — SIGNIFICANT CHANGE UP (ref 22–29)
HCT VFR BLD CALC: 28.9 % — LOW (ref 39–50)
HCT VFR BLDA CALC: 29 % — LOW (ref 39–51)
HGB BLD CALC-MCNC: 9.6 G/DL — LOW (ref 13–17)
HGB BLD-MCNC: 9.5 G/DL — LOW (ref 13–17)
HYALINE CASTS # UR AUTO: SIGNIFICANT CHANGE UP (ref 0–7)
IANC: 2.29 K/UL — SIGNIFICANT CHANGE UP (ref 1.8–7.4)
IMM GRANULOCYTES NFR BLD AUTO: 0.5 % — SIGNIFICANT CHANGE UP (ref 0–1.5)
INR BLD: 1.16 RATIO — SIGNIFICANT CHANGE UP (ref 0.88–1.16)
KETONES UR-MCNC: NEGATIVE — SIGNIFICANT CHANGE UP
LACTATE BLDV-MCNC: 1.5 MMOL/L — SIGNIFICANT CHANGE UP (ref 0.5–2)
LEUKOCYTE ESTERASE UR-ACNC: ABNORMAL
LYMPHOCYTES # BLD AUTO: 1.01 K/UL — SIGNIFICANT CHANGE UP (ref 1–3.3)
LYMPHOCYTES # BLD AUTO: 25.8 % — SIGNIFICANT CHANGE UP (ref 13–44)
MCHC RBC-ENTMCNC: 30.1 PG — SIGNIFICANT CHANGE UP (ref 27–34)
MCHC RBC-ENTMCNC: 32.9 GM/DL — SIGNIFICANT CHANGE UP (ref 32–36)
MCV RBC AUTO: 91.5 FL — SIGNIFICANT CHANGE UP (ref 80–100)
MONOCYTES # BLD AUTO: 0.43 K/UL — SIGNIFICANT CHANGE UP (ref 0–0.9)
MONOCYTES NFR BLD AUTO: 11 % — SIGNIFICANT CHANGE UP (ref 2–14)
NEUTROPHILS # BLD AUTO: 2.29 K/UL — SIGNIFICANT CHANGE UP (ref 1.8–7.4)
NEUTROPHILS NFR BLD AUTO: 58.6 % — SIGNIFICANT CHANGE UP (ref 43–77)
NITRITE UR-MCNC: NEGATIVE — SIGNIFICANT CHANGE UP
NRBC # BLD: 0 /100 WBCS — SIGNIFICANT CHANGE UP
NRBC # FLD: 0 K/UL — SIGNIFICANT CHANGE UP
PCO2 BLDV: 43 MMHG — SIGNIFICANT CHANGE UP (ref 42–55)
PH BLDV: 7.37 — SIGNIFICANT CHANGE UP (ref 7.32–7.43)
PH UR: 5.5 — SIGNIFICANT CHANGE UP (ref 5–8)
PLATELET # BLD AUTO: 242 K/UL — SIGNIFICANT CHANGE UP (ref 150–400)
PO2 BLDV: 32 MMHG — SIGNIFICANT CHANGE UP
POTASSIUM BLDV-SCNC: 3.4 MMOL/L — LOW (ref 3.5–5.1)
POTASSIUM SERPL-MCNC: 4 MMOL/L — SIGNIFICANT CHANGE UP (ref 3.5–5.3)
POTASSIUM SERPL-SCNC: 4 MMOL/L — SIGNIFICANT CHANGE UP (ref 3.5–5.3)
PROT SERPL-MCNC: 6.4 G/DL — SIGNIFICANT CHANGE UP (ref 6–8.3)
PROT UR-MCNC: ABNORMAL
PROTHROM AB SERPL-ACNC: 13.5 SEC — HIGH (ref 10.5–13.4)
RBC # BLD: 3.16 M/UL — LOW (ref 4.2–5.8)
RBC # FLD: 14.1 % — SIGNIFICANT CHANGE UP (ref 10.3–14.5)
RBC CASTS # UR COMP ASSIST: 13 /HPF — HIGH (ref 0–4)
SAO2 % BLDV: 44.7 % — SIGNIFICANT CHANGE UP
SARS-COV-2 RNA SPEC QL NAA+PROBE: SIGNIFICANT CHANGE UP
SODIUM SERPL-SCNC: 138 MMOL/L — SIGNIFICANT CHANGE UP (ref 135–145)
SP GR SPEC: 1.02 — SIGNIFICANT CHANGE UP (ref 1–1.05)
UROBILINOGEN FLD QL: ABNORMAL
WBC # BLD: 3.91 K/UL — SIGNIFICANT CHANGE UP (ref 3.8–10.5)
WBC # FLD AUTO: 3.91 K/UL — SIGNIFICANT CHANGE UP (ref 3.8–10.5)
WBC UR QL: >50 /HPF — SIGNIFICANT CHANGE UP (ref 0–5)

## 2022-04-15 PROCEDURE — 99285 EMERGENCY DEPT VISIT HI MDM: CPT

## 2022-04-15 PROCEDURE — 99223 1ST HOSP IP/OBS HIGH 75: CPT

## 2022-04-15 PROCEDURE — 71045 X-RAY EXAM CHEST 1 VIEW: CPT | Mod: 26

## 2022-04-15 RX ORDER — ASPIRIN/CALCIUM CARB/MAGNESIUM 324 MG
81 TABLET ORAL DAILY
Refills: 0 | Status: DISCONTINUED | OUTPATIENT
Start: 2022-04-15 | End: 2022-04-29

## 2022-04-15 RX ORDER — ONDANSETRON 8 MG/1
4 TABLET, FILM COATED ORAL EVERY 8 HOURS
Refills: 0 | Status: DISCONTINUED | OUTPATIENT
Start: 2022-04-15 | End: 2022-04-29

## 2022-04-15 RX ORDER — DEXTROSE 50 % IN WATER 50 %
12.5 SYRINGE (ML) INTRAVENOUS ONCE
Refills: 0 | Status: COMPLETED | OUTPATIENT
Start: 2022-04-15 | End: 2022-04-15

## 2022-04-15 RX ORDER — VANCOMYCIN HCL 1 G
1000 VIAL (EA) INTRAVENOUS EVERY 12 HOURS
Refills: 0 | Status: DISCONTINUED | OUTPATIENT
Start: 2022-04-16 | End: 2022-04-16

## 2022-04-15 RX ORDER — TAMSULOSIN HYDROCHLORIDE 0.4 MG/1
0.4 CAPSULE ORAL AT BEDTIME
Refills: 0 | Status: DISCONTINUED | OUTPATIENT
Start: 2022-04-15 | End: 2022-04-29

## 2022-04-15 RX ORDER — VANCOMYCIN HCL 1 G
VIAL (EA) INTRAVENOUS
Refills: 0 | Status: DISCONTINUED | OUTPATIENT
Start: 2022-04-15 | End: 2022-04-15

## 2022-04-15 RX ORDER — DEXTROSE 50 % IN WATER 50 %
15 SYRINGE (ML) INTRAVENOUS ONCE
Refills: 0 | Status: DISCONTINUED | OUTPATIENT
Start: 2022-04-15 | End: 2022-04-29

## 2022-04-15 RX ORDER — SODIUM CHLORIDE 9 MG/ML
1000 INJECTION, SOLUTION INTRAVENOUS
Refills: 0 | Status: DISCONTINUED | OUTPATIENT
Start: 2022-04-15 | End: 2022-04-29

## 2022-04-15 RX ORDER — INSULIN LISPRO 100/ML
VIAL (ML) SUBCUTANEOUS AT BEDTIME
Refills: 0 | Status: DISCONTINUED | OUTPATIENT
Start: 2022-04-15 | End: 2022-04-19

## 2022-04-15 RX ORDER — GLUCAGON INJECTION, SOLUTION 0.5 MG/.1ML
1 INJECTION, SOLUTION SUBCUTANEOUS ONCE
Refills: 0 | Status: DISCONTINUED | OUTPATIENT
Start: 2022-04-15 | End: 2022-04-29

## 2022-04-15 RX ORDER — ASPIRIN/CALCIUM CARB/MAGNESIUM 324 MG
1 TABLET ORAL
Qty: 0 | Refills: 0 | DISCHARGE

## 2022-04-15 RX ORDER — DEXTROSE 50 % IN WATER 50 %
25 SYRINGE (ML) INTRAVENOUS ONCE
Refills: 0 | Status: DISCONTINUED | OUTPATIENT
Start: 2022-04-15 | End: 2022-04-29

## 2022-04-15 RX ORDER — LANOLIN ALCOHOL/MO/W.PET/CERES
3 CREAM (GRAM) TOPICAL AT BEDTIME
Refills: 0 | Status: DISCONTINUED | OUTPATIENT
Start: 2022-04-15 | End: 2022-04-28

## 2022-04-15 RX ORDER — MINERAL OIL
133 OIL (ML) MISCELLANEOUS DAILY
Refills: 0 | Status: DISCONTINUED | OUTPATIENT
Start: 2022-04-15 | End: 2022-04-20

## 2022-04-15 RX ORDER — ATORVASTATIN CALCIUM 80 MG/1
1 TABLET, FILM COATED ORAL
Qty: 0 | Refills: 0 | DISCHARGE

## 2022-04-15 RX ORDER — DEXTROSE 50 % IN WATER 50 %
12.5 SYRINGE (ML) INTRAVENOUS ONCE
Refills: 0 | Status: DISCONTINUED | OUTPATIENT
Start: 2022-04-15 | End: 2022-04-29

## 2022-04-15 RX ORDER — ACETAMINOPHEN 500 MG
650 TABLET ORAL EVERY 6 HOURS
Refills: 0 | Status: DISCONTINUED | OUTPATIENT
Start: 2022-04-15 | End: 2022-04-29

## 2022-04-15 RX ORDER — CEFTRIAXONE 500 MG/1
1000 INJECTION, POWDER, FOR SOLUTION INTRAMUSCULAR; INTRAVENOUS ONCE
Refills: 0 | Status: COMPLETED | OUTPATIENT
Start: 2022-04-15 | End: 2022-04-15

## 2022-04-15 RX ORDER — HEPARIN SODIUM 5000 [USP'U]/ML
5000 INJECTION INTRAVENOUS; SUBCUTANEOUS EVERY 8 HOURS
Refills: 0 | Status: DISCONTINUED | OUTPATIENT
Start: 2022-04-15 | End: 2022-04-29

## 2022-04-15 RX ORDER — SENNA PLUS 8.6 MG/1
2 TABLET ORAL AT BEDTIME
Refills: 0 | Status: DISCONTINUED | OUTPATIENT
Start: 2022-04-15 | End: 2022-04-29

## 2022-04-15 RX ORDER — INSULIN LISPRO 100/ML
VIAL (ML) SUBCUTANEOUS
Refills: 0 | Status: DISCONTINUED | OUTPATIENT
Start: 2022-04-15 | End: 2022-04-19

## 2022-04-15 RX ORDER — ATORVASTATIN CALCIUM 80 MG/1
20 TABLET, FILM COATED ORAL AT BEDTIME
Refills: 0 | Status: DISCONTINUED | OUTPATIENT
Start: 2022-04-15 | End: 2022-04-29

## 2022-04-15 RX ORDER — ALBUTEROL 90 UG/1
2 AEROSOL, METERED ORAL EVERY 6 HOURS
Refills: 0 | Status: DISCONTINUED | OUTPATIENT
Start: 2022-04-15 | End: 2022-04-29

## 2022-04-15 RX ORDER — VANCOMYCIN HCL 1 G
VIAL (EA) INTRAVENOUS
Refills: 0 | Status: DISCONTINUED | OUTPATIENT
Start: 2022-04-15 | End: 2022-04-16

## 2022-04-15 RX ORDER — VANCOMYCIN HCL 1 G
1000 VIAL (EA) INTRAVENOUS ONCE
Refills: 0 | Status: COMPLETED | OUTPATIENT
Start: 2022-04-15 | End: 2022-04-15

## 2022-04-15 RX ORDER — SODIUM CHLORIDE 9 MG/ML
1000 INJECTION INTRAMUSCULAR; INTRAVENOUS; SUBCUTANEOUS ONCE
Refills: 0 | Status: COMPLETED | OUTPATIENT
Start: 2022-04-15 | End: 2022-04-15

## 2022-04-15 RX ADMIN — Medication 12.5 GRAM(S): at 21:27

## 2022-04-15 RX ADMIN — CEFTRIAXONE 100 MILLIGRAM(S): 500 INJECTION, POWDER, FOR SOLUTION INTRAMUSCULAR; INTRAVENOUS at 15:32

## 2022-04-15 RX ADMIN — SODIUM CHLORIDE 1000 MILLILITER(S): 9 INJECTION INTRAMUSCULAR; INTRAVENOUS; SUBCUTANEOUS at 14:02

## 2022-04-15 RX ADMIN — Medication 250 MILLIGRAM(S): at 21:08

## 2022-04-15 NOTE — H&P ADULT - NSHPSOCIALHISTORY_GEN_ALL_CORE
Patient lives at home with his wife. He also has HHAs that help care for him. He smoked since he was a teenager until about 10 years ago, when he had the stroke. No former or current EtOH consumption.

## 2022-04-15 NOTE — BH CONSULTATION LIAISON ASSESSMENT NOTE - NSBHCONSULTSUBST_PSY_A_CORE
Adult Medicine Teaching Service / Internal Medicine Teaching Service (IMTS/AMTS)  Discharge Summary   Patient: Bernadine Sahu Discharge Date: 4/15/2022 [facility] Hospital: [facility]   YOB: 1965 Admission Date: 4/13/2022 [facility] Intern: Denis DAY Farhan   Medical Record Number: [id number] Admission Length: 0 Days IMTS Team Color: BLUE   Admitting Physician: Becka Trinidad MD Discharge Physician:  Rojas Perez MD Outpatient Primary Care Provider: MARTHA John     Admission Information     Admission Diagnoses:   Shortness of breath [R06.02] Primary Discharge Diagnoses:   Shortness of breath [R06.02] Secondary Discharge Diagnoses:   Patient Active Problem List   Diagnosis   • Herpes simplex   • Scoliosis   • Former heavy tobacco smoker   • DJD (degenerative joint disease), thoracolumbar   • Spinal stenosis, lumbar region, without neurogenic claudication   • Thoracic or lumbosacral neuritis or radiculitis, unspecified   • Facet arthropathy, lumbar (CMS/HCC)   • Cervicalgia   • Degenerative disc disease, cervical   • Chronic right hip pain   • Obesity (BMI 30-39.9)   • Bandemia   • Centrilobular emphysema (CMS/HCC)   • AR (allergic rhinitis)   • Bilateral carpal tunnel syndrome   • Chronic right SI joint pain   • SVT (supraventricular tachycardia)    • COPD (chronic obstructive pulmonary disease)    • Nodular thyroid disease (colloid per biopsy)   • Lung nodule right   • Shortness of breath        LACE(5-9):Moderate  Total Score: 8           2 LACE Length of Stay    3 LACE Acute Admission    2 LACE Charlson Comorbidity Index Evalution    1 LACE Visits to ED Previous 6 Months         Admission Condition: Poor    Discharged Condition: Stable    Disposition: Home     Hospital Course   Bernadine Sahu is a 56 year old female who presented with COPD, RAD, nodular thyroid disease, DJD, chronic back pain, pulmonary nodules presented on 4/13/2022 with SOB and palpitations. She  was recently admitted from 3/20-21 for SVT successfully cardioverted with adenosine. TTE at that time grossly normal with LVEF 60%. NM stress test during admission was negative. She was discharged on metoprolol succinate 25 mg daily.    During last admission she developed mild SOB that persisted after discharge.he has noticed that 1-2 hr after taking metoprolol each morning her SOB worsens. Due to continued SOB,  metoprolol dose reduced to 12.5 mg by PCP with no improvement of symptoms. Patient seen by EP (Dr. Valencia) on 4/12 at which time metoprolol was discontinued and diltiazem was prescribed. Patient had not taken diltiazem as pharmacy had not told her if it would be covered or not. She had not taken metoprolol for about 36 hr when on 4/13 around 7 pm patient noted palpitations. Patient took metoprolol after palpitations started and noticed an improvement in her palpitations but triggered her SOB.    During her hospitalization, EP was consulted.  EP gave glucagon to reverse metoprolol effects and waited for washout. .Cardiology fellow work with pharmacy to find out that diltiazem would only cost patient roughly $12-13. Patient started on diltiazem on hospital day 2, and tolerated it well.  Patient discharged on diltiazem per EP recommendations.  Patient to follow up with EP in 6m.    PCP to Follow up:  - New rhythm control medication changed from metoprolol to diltiazem.    Consultants:  IP Consult Orders (From admission, onward)             Start     Ordered    04/14/22 0146  Inpatient consult to Cardiology  ONE TIME        Provider:  Yoan Ruiz MD    04/14/22 0145              Physical Exam     Visit Vitals  /71 (BP Location: LUE - Left upper extremity, Patient Position: Sitting)   Pulse 65   Temp 98.1 °F (36.7 °C) (Oral)   Resp 18   Ht 5' 9\" (1.753 m)   Wt 92.2 kg (203 lb 4.2 oz)   SpO2 99%   BMI 30.02 kg/m²       General: No appreciable disease, comfortable appearing female.   HEENT:    PERRL,  no scleral icterus or conjunctival pallor, no nasal discharge   Cardiovascular:   RRR, no murmurs/rubs/gallops, S1/S2 normal, no JVD, no bilateral lower extremity edema   Respiratory:   Clear to auscultation bilaterally, no retractions or increased work of breathing   GI:   Positive bowel sounds, soft, nontender/non-distended   Musculoskeletal:  ROM normal throughout. No clubbing, edema, or cyanosis.   Skin:   No rashes/lesions/ecchymoses/jaundice.   Neurologic:  Alert and oriented x3. EOMI. No gross motor or sensory deficits. No facial droop or dysarthria.   Psychiatric:   Appropriate mood/affect.     Wt Readings from Last 1 Encounters:   04/14/22 92.2 kg (203 lb 4.2 oz)       Recommendations - Instructions - Follow-up   ED Advisories (Hx of violent behavior/ AMA/ Multiple hospital visits- if YES, then route note to ED Case Manager: [email or web address]): NA    Outpatient Follow-up Diagnostics/Recommendations (advised post discharge diagnostic studies/ INR range/ DVT/ Stent hx/ Coagulopathies/ etc.): NA    Diet: Regular Diet  Activity:  Activity as Tolerated Wound Care: None Needed     Follow-up Providers/Appointments:  Juan Francisco Valencia MD  [address]  [address]  [facility] [address] [phone]    In 6 months  If symptoms worsen; further palpations    MARTHA John  [address]  [facility] [address] [phone]    On 4/21/2022  at 2:45pm , for your Transitional Care Management appointment      Medications        Summary of your Discharge Medications      Take these Medications      Details   acetaminophen 650 MG CR tablet  Commonly known as: TYLENOL   Take 1-2 tablets by mouth 3 times daily as needed for Pain.  Comment: If covered by insurance     albuterol 108 (90 Base) MCG/ACT inhaler   Inhale 2 puffs into the lungs every 4 hours as needed for Shortness of Breath or Wheezing.     Breo Ellipta 100-25 MCG/INH inhaler   Generic drug: fluticasone-vilanterol  Inhale 1 puff  into the lungs daily.     calcium carbonate-vitamin D 600-400 MG-UNIT per tablet  Commonly known as: CALTRATE+D   Take 1 tablet by mouth daily.     cholecalciferol 1000 UNITS tablet  Commonly known as: VITAMIN D3   Take 1,000 Units by mouth daily.     diclofenac 1 % gel  Commonly known as: VOLTAREN   Apply 2-4 grams to the knees up to 4 times daily as needed     dilTIAZem 120 MG 24 hr capsule  Commonly known as: Tiazac   Take 1 capsule by mouth daily.            CMS Best Practices - MI - HF - STROKE   Quality Indicators     AMI With Heart Failure with Reduced LVEF (<40%)? no  Heart failure?  No  Stroke measures indicated? no  AMI? No  DVT/VTE Prophylaxis:  VTE Pharmacologic Prophylaxis: Yes  VTE Mechanical Prophylaxis: Yes  Severe sepsis with septic shock?  No      ____________________________  Denis Real DO  [facility] PGY-1  Pager: [phone]  4/15/2022 11:24 AM    This patient's case was discussed with attending physician, Rojas Perez MD. Please see below or additional note for addendum.     Attending Addendum:   I have evaluated Ms. Flo Sahu and have discussed her improved and now stable status as well as the discharge plan of care with Dr. Real.  I have reviewed and agree with the above discharge summary.    Rojas Perez MD  04/15/2022      AMTS/IMTS DC Summary; Dept. of Internal Medicine IMTS/ AMTS. Rev. 5.8; 9/23/15.   Support/ Technical Difficulties: [email or web address]   no

## 2022-04-15 NOTE — H&P ADULT - NSHPPHYSICALEXAM_GEN_ALL_CORE
Vital Signs Last 24 Hrs  T(C): 36.6 (15 Apr 2022 18:49), Max: 36.7 (15 Apr 2022 14:37)  T(F): 97.8 (15 Apr 2022 18:49), Max: 98 (15 Apr 2022 14:37)  HR: 42 (15 Apr 2022 18:49) (38 - 52)  BP: 140/71 (15 Apr 2022 18:49) (82/55 - 140/81)  BP(mean): --  RR: 18 (15 Apr 2022 18:49) (16 - 18)  SpO2: 100% (15 Apr 2022 18:49) (99% - 100%)    General: Frail, elderly male in NAD  Neurology: R sided hemiplegia  Eyes: PERRLA/ EOMI, Gross vision intact  ENT/Neck: Neck supple, trachea midline, No JVD, Gross hearing intact  Respiratory: Normal respiratory rate. CTA B/L, No wheezing, rales, rhonchi  CV: RRR, S1S2, no murmurs, rubs or gallops. No LE edema  Abdominal: Soft, NT, ND +BS,   Extremities: , + peripheral pulses  Skin: No Rashes, Hematoma, Ecchymosis

## 2022-04-15 NOTE — ED ADULT NURSE NOTE - NSIMPLEMENTINTERV_GEN_ALL_ED
Implemented All Fall with Harm Risk Interventions:  [place] to call system. Call bell, personal items and telephone within reach. Instruct patient to call for assistance. Room bathroom lighting operational. Non-slip footwear when patient is off stretcher. Physically safe environment: no spills, clutter or unnecessary equipment. Stretcher in lowest position, wheels locked, appropriate side rails in place. Provide visual cue, wrist band, yellow gown, etc. Monitor gait and stability. Monitor for mental status changes and reorient to person, place, and time. Review medications for side effects contributing to fall risk. Reinforce activity limits and safety measures with patient and family. Provide visual clues: red socks.

## 2022-04-15 NOTE — ED PROVIDER NOTE - COVID-19  TEST TYPE
ED Provider Note    CHIEF COMPLAINT  Chief Complaint   Patient presents with   • Sore Throat     pt states he was here on the 2nd, tested for covid and strep, pt states still getting fevers depsite tylenol and ibuprofen. pt states white bumps in throat now and more red. pt states hard to eat or drink anything due to pain.        HPI  Babak Barnhart is a 24 y.o. male who presents to the emergency department for evaluation of a sore throat.  Patient was seen here 3 days ago for sore throat.  That point he has had 3 days of sore throat and fever and difficulty swallowing.  He did not have cough, runny nose.  No COVID contacts.  Is been taking over-the-counter medicine without much relief.  Since that time his symptoms have continued and worsened and now he has enlarged tonsils with white lesions.  He is able to tolerate oral fluids and food.  No other acute concerns or complaints.  Has an amoxicillin allergy.    REVIEW OF SYSTEMS  See HPI for further details.  Constitutional: Positive fevers and chills.  GI: No nausea vomiting abdominal pain.  Respiratory: No cough, shortness of breath.    PAST MEDICAL HISTORY  Past Medical History:   Diagnosis Date   • Childhood asthma        FAMILY HISTORY  History reviewed. No pertinent family history.    SOCIAL HISTORY  Social History     Socioeconomic History   • Marital status: Single     Spouse name: Not on file   • Number of children: Not on file   • Years of education: Not on file   • Highest education level: Not on file   Occupational History   • Not on file   Social Needs   • Financial resource strain: Not on file   • Food insecurity     Worry: Not on file     Inability: Not on file   • Transportation needs     Medical: Not on file     Non-medical: Not on file   Tobacco Use   • Smoking status: Never Smoker   • Smokeless tobacco: Never Used   Substance and Sexual Activity   • Alcohol use: Yes     Comment: Occasionally   • Drug use: Yes     Types: Inhaled      "Comment: marijuana   • Sexual activity: Not on file   Lifestyle   • Physical activity     Days per week: Not on file     Minutes per session: Not on file   • Stress: Not on file   Relationships   • Social connections     Talks on phone: Not on file     Gets together: Not on file     Attends Taoism service: Not on file     Active member of club or organization: Not on file     Attends meetings of clubs or organizations: Not on file     Relationship status: Not on file   • Intimate partner violence     Fear of current or ex partner: Not on file     Emotionally abused: Not on file     Physically abused: Not on file     Forced sexual activity: Not on file   Other Topics Concern   • Not on file   Social History Narrative   • Not on file       SURGICAL HISTORY  Past Surgical History:   Procedure Laterality Date   • OTHER ORTHOPEDIC SURGERY         CURRENT MEDICATIONS  Home Medications     Reviewed by Eldon Hanna R.N. (Registered Nurse) on 08/05/20 at 1125  Med List Status: Complete   Medication Last Dose Status   acetaminophen (TYLENOL) 500 MG Tab 8/5/2020 Active   ibuprofen (MOTRIN) 600 MG Tab 8/5/2020 Active   loratadine (CLARITIN) 10 MG Tab  Active                ALLERGIES  Allergies   Allergen Reactions   • Amoxicillin Hives     Hives on his hands        PHYSICAL EXAM  VITAL SIGNS: /75   Pulse 92   Temp 36.8 °C (98.2 °F) (Temporal)   Resp 16   Ht 1.753 m (5' 9\")   Wt 75.9 kg (167 lb 5.3 oz)   SpO2 97%   BMI 24.71 kg/m²    Constitutional: Well developed, Well nourished, No acute distress, Non-toxic appearance.   HENT: Normocephalic, Atraumatic, Bilateral external ears normal, enlarged pharyngeal tonsils with exudates.  No signs of abscess.  No asymmetric swelling.  Eyes: PERRL, EOMI, Conjunctiva normal, No discharge.   Neck: Normal range of motion, no cervical lymphadenopathy which is large and tender.  No asymmetric swelling.  No tracheal deviation  Cardiovascular: Normal heart rate, Normal " rhythm, No murmurs,  Thorax & Lungs: Normal breath sounds, No respiratory distress  Abdomen: Bowel sounds normal, Soft, No tenderness  Skin: Warm, Dry, No erythema, No rash.   Musculoskeletal: Good range of motion in all major joints.  Neurologic: Alert,  No focal deficits noted.         COURSE & MEDICAL DECISION MAKING  Pertinent Labs & Imaging studies reviewed. (See chart for details)  The chart from the previous visit was reviewed.    The culture was positive for strep.  Patient clinically has strep.  There is no apparent abscess.  Mononucleosis remains a differential diagnosis but with a positive culture I think it is unlikely he requires treatment anyway.    Patient is a documented amoxicillin allergy.  He started on clindamycin.  Is also given a dose of Decadron.  Patient does not appear toxic or septic.  He will be discharged home with a prescription for clindamycin.  Prescribed clindamycin 300 mg 3 times daily.  I discussed the dose of the medication and the culture results with the pharmacy and this is the recommended medication.      Patient be discharged home with return precautions advised implant fluid return for worsening sore throat fever or other concerns.  Questions were answered he is agreeable to plan.    [address] [address]  [phone]  Schedule an appointment as soon as possible for a visit in 2 days          FINAL IMPRESSION  1. Strep pharyngitis         2.   3.         Electronically signed by: Wily Mckeon M.D., 8/5/2020 12:09 PM     MOLECULAR PCR

## 2022-04-15 NOTE — H&P ADULT - NSHPREVIEWOFSYSTEMS_GEN_ALL_CORE
REVIEW OF SYSTEMS:    CONSTITUTIONAL: No weakness, fevers or chills  EYES/ENT: No visual changes;  + throat pain   NECK: No pain or stiffness  RESPIRATORY: No cough, wheezing, hemoptysis; No shortness of breath  CARDIOVASCULAR: No chest pain or palpitations  GASTROINTESTINAL: No abdominal or epigastric pain. No nausea, vomiting, or hematemesis; + constipation. No melena or hematochezia.  GENITOURINARY: No dysuria, frequency or hematuria  NEUROLOGICAL: No numbness or weakness  PSYCH: No A/V hallucinations  MSK: No joint pain  SKIN: No itching, rashes

## 2022-04-15 NOTE — ED PROVIDER NOTE - ATTENDING CONTRIBUTION TO CARE
agree with PA note    "82 y/o male pmh htn, dm, hld, CVA, bed bound c/o throat pain x today. As per wife, pt was eating breakfast and felt like he was choking and having throat pain. Wife asked if he wanted to go to the doctor and he said yes which is unusual for him. Pt states that he feels terrible but is otherwise a poor historian. Initial vitals pt is bradycardic and hypotensive."    PE: initially hypotensive, self resolved before interventions, bradycardia, CTAB/L; s1 s2 no m/r/g abd soft/NT/ND ext: no edema pt stating he feels terrible    Imp:  BP self resolved but if drops would consider atropine; likely infectious/dehydration cause; will hydrate workup for infection admit for bradycardia, weakness

## 2022-04-15 NOTE — H&P ADULT - NSHPLABSRESULTS_GEN_ALL_CORE
9.5    3.91  )-----------( 242      ( 15 Apr 2022 13:34 )             28.9     Hgb Trend: 9.5<--, 10.4<--  04-15    138  |  106  |  10  ----------------------------<  119<H>  4.0   |  21<L>  |  1.03    Ca    9.6      15 Apr 2022 13:34    TPro  6.4  /  Alb  3.1<L>  /  TBili  0.7  /  DBili  x   /  AST  15  /  ALT  8   /  AlkPhos  74  04-15    Creatinine Trend: 1.03<--, 1.03<--, 0.90<--  PT/INR - ( 15 Apr 2022 13:34 )   PT: 13.5 sec;   INR: 1.16 ratio         PTT - ( 15 Apr 2022 13:34 )  PTT:32.2 sec  Urinalysis Basic - ( 15 Apr 2022 14:40 )    Color: Light Orange / Appearance: Turbid / S.019 / pH: x  Gluc: x / Ketone: Negative  / Bili: Negative / Urobili: 3 mg/dL   Blood: x / Protein: 30 mg/dL / Nitrite: Negative   Leuk Esterase: Large / RBC: 13 /HPF / WBC >50 /HPF   Sq Epi: x / Non Sq Epi: 0 /HPF / Bacteria: Negative

## 2022-04-15 NOTE — ED PROVIDER NOTE - NSICDXPASTSURGICALHX_GEN_ALL_CORE_FT
PAST SURGICAL HISTORY:  Cardiac pacemaker Medtronic (SN: [id number]; Model: [id number])    S/P coronary artery stent placement

## 2022-04-15 NOTE — ED ADULT TRIAGE NOTE - CHIEF COMPLAINT QUOTE
Patient brought to ER by EMS from home for sore throat. Pt was yelling most of the morning because of this and family called. As per family he scratched himself on the side of his buttock. Pt is alert and oriented times one to two and that is normal. Pt is bed bound.  Type 2 DM

## 2022-04-15 NOTE — H&P ADULT - ASSESSMENT
81 y.o. M with PMH of  HTN, T2DM, HLD, constipation CVA w R sided hemiplegia, CAD s/p PCI, diastolic CHF, sinus node dysfunction, bedbound who presents with dysphagia/odynophagia. Once arrived to the ED, he was found to be hypotensive with SBP of 80 and bradycardic with HR as low as 38. UA concerning for infection.

## 2022-04-15 NOTE — H&P ADULT - NSICDXPASTSURGICALHX_GEN_ALL_CORE_FT
PAST SURGICAL HISTORY:  Cardiac pacemaker Medtronic (SN: [id number]; Model: [id number])    S/P coronary artery stent placement

## 2022-04-15 NOTE — H&P ADULT - PROBLEM SELECTOR PLAN 1
-as well as dysphagia  -pt with coughing, choking and odynophagia this morning. On pureed diet with thin liquids at home  -suspect worsening dysphagia; will keep NPO until S&S eval in morning  -if continues to have pain despite no worsening of dysphagia, would further evaluation with ENT -as well as dysphagia  -pt with coughing, choking and odynophagia this morning. On pureed diet with thin liquids at home  -suspect worsening dysphagia; will keep NPO until S&S eval in morning  -if continues to have pain despite no worsening of dysphagia, would obtain further evaluation with ENT

## 2022-04-15 NOTE — H&P ADULT - HISTORY OF PRESENT ILLNESS
81 y.o. M with PMH of  HTN, T2DM, HLD, constipation CVA w R sided hemiplegia, CAD s/p PIC, diastolic CHF,  sinus node dysfunction, bedbound who presents with dysphagia/odynophagia. This morning, while having breakfast, pt ate a piece of bread, after which he began coughing/choking and yelling that he had throat pain. Usually eats a pureed diet with thin liquids. Per his wife, he sometimes coughs and clears his throat when eating; she believes this happens when the HHA feed him too quickly. Also, a few days ago, the HHA told pt's wife that pt was complaining of throat pain; he had no recurrent complaints until today. Otherwise, no fevers, chills, N/V/D, dysuria, rash, or abdominal pain.     In the ED, patient was found to be hypotensive/bradycardic. BP improved with IVF. He was given a dose of CTX in the ED.  81 y.o. M with PMH of  HTN, T2DM, HLD, constipation CVA w R sided hemiplegia, CAD s/p PIC, diastolic CHF,  sinus node dysfunction, bedbound who presents with dysphagia/odynophagia. This morning, while having breakfast, pt ate a piece of bread, after which he began coughing/choking and yelling that he had throat pain. Usually eats a pureed diet with thin liquids. Per his wife, he sometimes coughs and clears his throat when eating; she believes this happens when the HHAs feed him too quickly. Also, a few days ago, the HHA told pt's wife that pt was complaining of throat pain; he had no recurrent complaints until today. Otherwise, no fevers, chills, N/V/D, dysuria, rash, or abdominal pain.     In the ED, patient was found to be hypotensive/bradycardic. BP improved with IVF. He was given a dose of CTX in the ED.

## 2022-04-15 NOTE — ED PROVIDER NOTE - CLINICAL SUMMARY MEDICAL DECISION MAKING FREE TEXT BOX
80 y/o male pmh htn, hld, dm, cva, bedbound c/o not feeling well w/ hypotension- will check labs, cultures, cxr, ua, fluids, likely admit

## 2022-04-15 NOTE — H&P ADULT - PROBLEM SELECTOR PLAN 2
-Pt hypotensive with SBP of 80s admission, believed to be in setting of infection  -pt is not on valencia at home; placed on ED for urinary retention  -given pt's hx or multiple UTIs, event with growth of MRSA in the past, will continue tx with CTX and will also add vanco pending cx results   -flomax initiated  -Pressure ulcer examined; stage 1, no evidence of infection

## 2022-04-15 NOTE — ED PROVIDER NOTE - IV ALTEPLASE EXCL ABS HIDDEN
Use artificial tears like Optive omega in vials three times a day for 2 weeks.  Monitor right lower lid - bruise should resolve in 2 weeks    If change in vision seek care with [facility] or [facility] at [place] .    Julissa Vital O.D.  [facility]  [address] [place] [address] [phone]      
show

## 2022-04-15 NOTE — ED ADULT NURSE NOTE - OBJECTIVE STATEMENT
Stage 14: Number Of Blocks?: 0 Use Complex Repair Preambles?: Yes Complex Requirements: Extensive Undermining Performed?: No Length To Time In Minutes Device Was In Place: 10 Split-Thickness Skin Graft Text: The defect edges were debeveled with a #15 scalpel blade.  Given the location of the defect, shape of the defect and the proximity to free margins a split thickness skin graft was deemed most appropriate.  Using a sterile surgical marker, the primary defect shape was transferred to the donor site. The split thickness graft was then harvested.  The skin graft was then placed in the primary defect and oriented appropriately. Bilobed Transposition Flap Text: The defect edges were debeveled with a #15 scalpel blade.  Given the location of the defect and the proximity to free margins a bilobed transposition flap was deemed most appropriate.  Using a sterile surgical marker, an appropriate bilobe flap drawn around the defect.    The area thus outlined was incised deep to adipose tissue with a #15 scalpel blade.  The skin margins were undermined to an appropriate distance in all directions utilizing iris scissors. Referred To Otolaryngology For Closure Text (Leave Blank If You Do Not Want): After obtaining clear surgical margins the patient was sent to otolaryngology for surgical repair.  The patient understands they will receive post-surgical care and follow-up from the referring physician's office. Provider Procedure Text (B): After obtaining clear surgical margins the defect was repaired by another provider. Mid-Level Procedure Text (E): After obtaining clear surgical margins the patient was sent to a mid-level provider for surgical repair.  The patient understands they will receive post-surgical care and follow-up from the mid-level provider. Primary Defect Width In Cm (Final Defect Size - Required For Flaps/Grafts): 1 Special Stains Stage 1 - Results: Base On Clearance Noted Above Burow's Advancement Flap Text: The defect edges were debeveled with a #15 scalpel blade.  Given the location of the defect and the proximity to free margins a Burow's advancement flap was deemed most appropriate.  Using a sterile surgical marker, the appropriate advancement flap was drawn incorporating the defect and placing the expected incisions within the relaxed skin tension lines where possible.    The area thus outlined was incised deep to adipose tissue with a #15 scalpel blade.  The skin margins were undermined to an appropriate distance in all directions utilizing iris scissors. Island Pedicle Flap-Requiring Vessel Identification Text: The defect edges were debeveled with a #15 scalpel blade.  Given the location of the defect, shape of the defect and the proximity to free margins an island pedicle advancement flap was deemed most appropriate.  Using a sterile surgical marker, an appropriate advancement flap was drawn, based on the axial vessel mentioned above, incorporating the defect, outlining the appropriate donor tissue and placing the expected incisions within the relaxed skin tension lines where possible.    The area thus outlined was incised deep to adipose tissue with a #15 scalpel blade.  The skin margins were undermined to an appropriate distance in all directions around the primary defect and laterally outward around the island pedicle utilizing iris scissors.  There was minimal undermining beneath the pedicle flap. Tissue Cultured Epidermal Autograft Text: The defect edges were debeveled with a #15 scalpel blade.  Given the location of the defect, shape of the defect and the proximity to free margins a tissue cultured epidermal autograft was deemed most appropriate.  The graft was then trimmed to fit the size of the defect.  The graft was then placed in the primary defect and oriented appropriately. Mohs Method Verbiage: An incision at a 45 degree angle following the standard Mohs approach was done and the specimen was harvested as a microscopic controlled layer. Cheiloplasty (Complex) Text: A decision was made to reconstruct the defect with a  cheiloplasty.  The defect was undermined extensively.  Additional obicularis oris muscle was excised with a 15 blade scalpel.  The defect was converted into a full thickness wedge to facilite a better cosmetic result.  Small vessels were then tied off with 5-0 monocyrl. The obicularis oris, superficial fascia, adipose and dermis were then reapproximated.  After the deeper layers were approximated the epidermis was reapproximated with particular care given to realign the vermilion border. Estimated Blood Loss (Cc): minimal Inflammation Suggestive Of Cancer Camouflage Histology Text: There was a dense lymphocytic infiltrate which prevented adequate histologic evaluation of adjacent structures. Tarsorrhaphy Text: A tarsorrhaphy was performed using Frost sutures. W Plasty Text: The lesion was extirpated to the level of the fat with a #15 scalpel blade.  Given the location of the defect, shape of the defect and the proximity to free margins a W-plasty was deemed most appropriate for repair.  Using a sterile surgical marker, the appropriate transposition arms of the W-plasty were drawn incorporating the defect and placing the expected incisions within the relaxed skin tension lines where possible.    The area thus outlined was incised deep to adipose tissue with a #15 scalpel blade.  The skin margins were undermined to an appropriate distance in all directions utilizing iris scissors.  The opposing transposition arms were then transposed into place in opposite direction and anchored with interrupted buried subcutaneous sutures. Double O-Z Flap Text: The defect edges were debeveled with a #15 scalpel blade.  Given the location of the defect, shape of the defect and the proximity to free margins a Double O-Z flap was deemed most appropriate.  Using a sterile surgical marker, an appropriate transposition flap was drawn incorporating the defect and placing the expected incisions within the relaxed skin tension lines where possible. The area thus outlined was incised deep to adipose tissue with a #15 scalpel blade.  The skin margins were undermined to an appropriate distance in all directions utilizing iris scissors. Mohs Case Number: [id number] Subsequent Stages Histo Method Verbiage: Using a similar technique to that described above, a thin layer of tissue was removed from all areas where tumor was visible on the previous stage.  The tissue was again oriented, mapped, dyed, and processed as above. Nostril Rim Text: The closure involved the nostril rim. Oculoplastic Surgeon Procedure Text (B): After obtaining clear surgical margins the patient was sent to oculoplastics for surgical repair.  The patient understands they will receive post-surgical care and follow-up from the referring physician's office. Orbicularis Oris Muscle Flap Text: The defect edges were debeveled with a #15 scalpel blade.  Given that the defect affected the competency of the oral sphincter an obicularis oris muscle flap was deemed most appropriate to restore this competency and normal muscle function.  Using a sterile surgical marker, an appropriate flap was drawn incorporating the defect. The area thus outlined was incised with a #15 scalpel blade. Why Was The Change Made?: Please Select the Appropriate Response Plastic Surgeon Procedure Text (D): After obtaining clear surgical margins the patient was sent to plastics for surgical repair.  The patient understands they will receive post-surgical care and follow-up from the referring physician's office. Mastoid Interpolation Flap Text: A decision was made to reconstruct the defect utilizing an interpolation axial flap and a staged reconstruction.  A telfa template was made of the defect.  This telfa template was then used to outline the mastoid interpolation flap.  The donor area for the pedicle flap was then injected with anesthesia.  The flap was excised through the skin and subcutaneous tissue down to the layer of the underlying musculature.  The pedicle flap was carefully excised within this deep plane to maintain its blood supply.  The edges of the donor site were undermined.   The donor site was closed in a primary fashion.  The pedicle was then rotated into position and sutured.  Once the tube was sutured into place, adequate blood supply was confirmed with blanching and refill.  The pedicle was then wrapped with xeroform gauze and dressed appropriately with a telfa and gauze bandage to ensure continued blood supply and protect the attached pedicle. Hatchet Flap Text: The defect edges were debeveled with a #15 scalpel blade.  Given the location of the defect, shape of the defect and the proximity to free margins a hatchet flap was deemed most appropriate.  Using a sterile surgical marker, an appropriate hatchet flap was drawn incorporating the defect and placing the expected incisions within the relaxed skin tension lines where possible.    The area thus outlined was incised deep to adipose tissue with a #15 scalpel blade.  The skin margins were undermined to an appropriate distance in all directions utilizing iris scissors. Bcc Histology Text: There were numerous aggregates of basaloid cells. Double M-Plasty Complex Repair Preamble Text (Leave Blank If You Do Not Want): Extensive wide undermining was performed. Ftsg Text: The defect edges were debeveled with a #15 scalpel blade.  Given the location of the defect, shape of the defect and the proximity to free margins a full thickness skin graft was deemed most appropriate.  Using a sterile surgical marker, the primary defect shape was transferred to the donor site. The area thus outlined was incised deep to adipose tissue with a #15 scalpel blade.  The harvested graft was then trimmed of adipose tissue until only dermis and epidermis was left.  The skin margins of the secondary defect were undermined to an appropriate distance in all directions utilizing iris scissors.  The secondary defect was closed with interrupted buried subcutaneous sutures.  The skin edges were then re-apposed with running  sutures.  The skin graft was then placed in the primary defect and oriented appropriately. Initial Size Of Lesion: 0.9 Stage 14: Additional Anesthesia Type: 1% lidocaine with epinephrine Anesthesia Volume In Cc: 6 Island Pedicle Flap Text: The defect edges were debeveled with a #15 scalpel blade.  Given the location of the defect, shape of the defect and the proximity to free margins an island pedicle advancement flap was deemed most appropriate.  Using a sterile surgical marker, an appropriate advancement flap was drawn incorporating the defect, outlining the appropriate donor tissue and placing the expected incisions within the relaxed skin tension lines where possible.    The area thus outlined was incised deep to adipose tissue with a #15 scalpel blade.  The skin margins were undermined to an appropriate distance in all directions around the primary defect and laterally outward around the island pedicle utilizing iris scissors.  There was minimal undermining beneath the pedicle flap. Epidermal Sutures: 4-0 Nylon Closure 3 Information: This tab is for additional flaps and grafts above and beyond our usual structured repairs.  Please note if you enter information here it will not currently bill and you will need to add the billing information manually. Composite Graft Text: The defect edges were debeveled with a #15 scalpel blade.  Given the location of the defect, shape of the defect, the proximity to free margins and the fact the defect was full thickness a composite graft was deemed most appropriate.  The defect was outline and then transferred to the donor site.  A full thickness graft was then excised from the donor site. The graft was then placed in the primary defect, oriented appropriately and then sutured into place.  The secondary defect was then repaired using a primary closure. Unna Boot Text: An Unna boot was placed to help immobilize the limb and facilitate more rapid healing. Graft Donor Site Bandage (Optional-Leave Blank If You Don't Want In Note): Steri-strips and a pressure bandage were applied to the donor site. Epidermal Closure: horizontal mattress O-Z Plasty Text: The defect edges were debeveled with a #15 scalpel blade.  Given the location of the defect, shape of the defect and the proximity to free margins an O-Z plasty (double transposition flap) was deemed most appropriate.  Using a sterile surgical marker, the appropriate transposition flaps were drawn incorporating the defect and placing the expected incisions within the relaxed skin tension lines where possible.    The area thus outlined was incised deep to adipose tissue with a #15 scalpel blade.  The skin margins were undermined to an appropriate distance in all directions utilizing iris scissors.  Hemostasis was achieved with electrocautery.  The flaps were then transposed into place, one clockwise and the other counterclockwise, and anchored with interrupted buried subcutaneous sutures. A-T Advancement Flap Text: The defect edges were debeveled with a #15 scalpel blade.  Given the location of the defect, shape of the defect and the proximity to free margins an A-T advancement flap was deemed most appropriate.  Using a sterile surgical marker, an appropriate advancement flap was drawn incorporating the defect and placing the expected incisions within the relaxed skin tension lines where possible.    The area thus outlined was incised deep to adipose tissue with a #15 scalpel blade.  The skin margins were undermined to an appropriate distance in all directions utilizing iris scissors. Purse String (Intermediate) Text: Given the location of the defect and the characteristics of the surrounding skin a purse string intermediate closure was deemed most appropriate.  Undermining was performed circumfirentially around the surgical defect.  A purse string suture was then placed and tightened. Consent 1/Introductory Paragraph: The rationale for Mohs was explained to the patient and consent was obtained. The risks, benefits and alternatives to therapy were discussed in detail. Specifically, the risks of infection, scarring, bleeding, prolonged wound healing, incomplete removal, allergy to anesthesia, nerve injury and recurrence were addressed. Prior to the procedure, the treatment site was clearly identified and confirmed by the patient. All components of Universal Protocol/PAUSE Rule completed. received pt in room 15, 81 yr/o male A+Ox1 to self, lethargic at this time complete care. Brought in to the ER by EMS, daughter at bedside, pt C/O soar throat, as per daughter pt was agitated due to pain and requesting to come to ED. pt was found to be hypotensive at [facility]. right side buttock stage 1 pressure ulcer noted. right forearm 20g placed, labs drawn and sent. medications given as ordered. will continue to monitor. Cheek Interpolation Flap Text: A decision was made to reconstruct the defect utilizing an interpolation axial flap and a staged reconstruction.  A telfa template was made of the defect.  This telfa template was then used to outline the Cheek Interpolation flap.  The donor area for the pedicle flap was then injected with anesthesia.  The flap was excised through the skin and subcutaneous tissue down to the layer of the underlying musculature.  The interpolation flap was carefully excised within this deep plane to maintain its blood supply.  The edges of the donor site were undermined.   The donor site was closed in a primary fashion.  The pedicle was then rotated into position and sutured.  Once the tube was sutured into place, adequate blood supply was confirmed with blanching and refill.  The pedicle was then wrapped with xeroform gauze and dressed appropriately with a telfa and gauze bandage to ensure continued blood supply and protect the attached pedicle. Intermediate Repair Preamble Text (Leave Blank If You Do Not Want): Undermining was performed with blunt dissection. Undermining Type: Entire Wound Mercedes Flap Text: The defect edges were debeveled with a #15 scalpel blade.  Given the location of the defect, shape of the defect and the proximity to free margins a Mercedes flap was deemed most appropriate.  Using a sterile surgical marker, an appropriate advancement flap was drawn incorporating the defect and placing the expected incisions within the relaxed skin tension lines where possible. The area thus outlined was incised deep to adipose tissue with a #15 scalpel blade.  The skin margins were undermined to an appropriate distance in all directions utilizing iris scissors. Consent (Ear)/Introductory Paragraph: The rationale for Mohs was explained to the patient and consent was obtained. The risks, benefits and alternatives to therapy were discussed in detail. Specifically, the risks of ear deformity, infection, scarring, bleeding, prolonged wound healing, incomplete removal, allergy to anesthesia, nerve injury and recurrence were addressed. Prior to the procedure, the treatment site was clearly identified and confirmed by the patient. All components of Universal Protocol/PAUSE Rule completed. Hemigard Intro: Due to skin fragility and wound tension, it was decided to use HEMIGARD adhesive retention suture devices to permit a linear closure. The skin was cleaned and dried for a 6cm distance away from the wound. Excessive hair, if present, was removed to allow for adhesion. Rhomboid Transposition Flap Text: The defect edges were debeveled with a #15 scalpel blade.  Given the location of the defect and the proximity to free margins a rhomboid transposition flap was deemed most appropriate.  Using a sterile surgical marker, an appropriate rhomboid flap was drawn incorporating the defect.    The area thus outlined was incised deep to adipose tissue with a #15 scalpel blade.  The skin margins were undermined to an appropriate distance in all directions utilizing iris scissors. Cheiloplasty (Less Than 50%) Text: A decision was made to reconstruct the defect with a  cheiloplasty.  The defect was undermined extensively.  Additional obicularis oris muscle was excised with a 15 blade scalpel.  The defect was converted into a full thickness wedge, of less than 50% of the vertical height of the lip, to facilite a better cosmetic result.  Small vessels were then tied off with 5-0 monocyrl. The obicularis oris, superficial fascia, adipose and dermis were then reapproximated.  After the deeper layers were approximated the epidermis was reapproximated with particular care given to realign the vermilion border. Star Wedge Flap Text: The defect edges were debeveled with a #15 scalpel blade.  Given the location of the defect, shape of the defect and the proximity to free margins a star wedge flap was deemed most appropriate.  Using a sterile surgical marker, an appropriate rotation flap was drawn incorporating the defect and placing the expected incisions within the relaxed skin tension lines where possible. The area thus outlined was incised deep to adipose tissue with a #15 scalpel blade.  The skin margins were undermined to an appropriate distance in all directions utilizing iris scissors. O-Z Flap Text: The defect edges were debeveled with a #15 scalpel blade.  Given the location of the defect, shape of the defect and the proximity to free margins an O-Z flap was deemed most appropriate.  Using a sterile surgical marker, an appropriate transposition flap was drawn incorporating the defect and placing the expected incisions within the relaxed skin tension lines where possible. The area thus outlined was incised deep to adipose tissue with a #15 scalpel blade.  The skin margins were undermined to an appropriate distance in all directions utilizing iris scissors. Primary Defect Length In Cm (Final Defect Size - Required For Flaps/Grafts): 1.1 Double Island Pedicle Flap Text: The defect edges were debeveled with a #15 scalpel blade.  Given the location of the defect, shape of the defect and the proximity to free margins a double island pedicle advancement flap was deemed most appropriate.  Using a sterile surgical marker, an appropriate advancement flap was drawn incorporating the defect, outlining the appropriate donor tissue and placing the expected incisions within the relaxed skin tension lines where possible.    The area thus outlined was incised deep to adipose tissue with a #15 scalpel blade.  The skin margins were undermined to an appropriate distance in all directions around the primary defect and laterally outward around the island pedicle utilizing iris scissors.  There was minimal undermining beneath the pedicle flap. Closure 2 Information: This tab is for additional flaps and grafts, including complex repair and grafts and complex repair and flaps. You can also specify a different location for the additional defect, if the location is the same you do not need to select a new one. We will insert the automated text for the repair you select below just as we do for solitary flaps and grafts. Please note that at this time if you select a location with a different insurance zone you will need to override the ICD10 and CPT if appropriate. No Residual Tumor Seen Histology Text: There were no malignant cells seen in the sections examined. Localized Dermabrasion With Wire Brush Text: The patient was draped in routine manner.  Localized dermabrasion using 3 x 17 mm wire brush was performed in routine manner to papillary dermis. This spot dermabrasion is being performed to complete skin cancer reconstruction. It also will eliminate the other sun damaged precancerous cells that are known to be part of the regional effect of a lifetime's worth of sun exposure. This localized dermabrasion is therapeutic and should not be considered cosmetic in any regard. H Plasty Text: Given the location of the defect, shape of the defect and the proximity to free margins a H-plasty was deemed most appropriate for repair.  Using a sterile surgical marker, the appropriate advancement arms of the H-plasty were drawn incorporating the defect and placing the expected incisions within the relaxed skin tension lines where possible. The area thus outlined was incised deep to adipose tissue with a #15 scalpel blade. The skin margins were undermined to an appropriate distance in all directions utilizing iris scissors.  The opposing advancement arms were then advanced into place in opposite direction and anchored with interrupted buried subcutaneous sutures. Area M Indication Text: Tumors in this location are included in Area M (cheek, forehead, scalp, neck, jawline and pretibial skin).  Mohs surgery is indicated for tumors in these anatomic locations. Asc Procedure Text (B): After obtaining clear surgical margins the patient was sent to an ASC for surgical repair.  The patient understands they will receive post-surgical care and follow-up from the ASC physician. Consent (Temporal Branch)/Introductory Paragraph: The rationale for Mohs was explained to the patient and consent was obtained. The risks, benefits and alternatives to therapy were discussed in detail. Specifically, the risks of damage to the temporal branch of the facial nerve, infection, scarring, bleeding, prolonged wound healing, incomplete removal, allergy to anesthesia, and recurrence were addressed. Prior to the procedure, the treatment site was clearly identified and confirmed by the patient. All components of Universal Protocol/PAUSE Rule completed. Nasalis-Muscle-Based Myocutaneous Island Pedicle Flap Text: Using a #15 blade, an incision was made around the donor flap to the level of the nasalis muscle. Wide lateral undermining was then performed in both the subcutaneous plane above the nasalis muscle, and in a submuscular plane just above periosteum. This allowed the formation of a free nasalis muscle axial pedicle (based on the angular artery) which was still attached to the actual cutaneous flap, increasing its mobility and vascular viability. Hemostasis was obtained with pinpoint electrocoagulation. The flap was mobilized into position and the pivotal anchor points positioned and stabilized with buried interrupted sutures. Subcutaneous and dermal tissues were closed in a multilayered fashion with sutures. Tissue redundancies were excised, and the epidermal edges were apposed without significant tension and sutured with sutures. Vermilion Border Text: The closure involved the vermilion border. Melolabial Interpolation Flap Text: A decision was made to reconstruct the defect utilizing an interpolation axial flap and a staged reconstruction.  A telfa template was made of the defect.  This telfa template was then used to outline the melolabial interpolation flap.  The donor area for the pedicle flap was then injected with anesthesia.  The flap was excised through the skin and subcutaneous tissue down to the layer of the underlying musculature.  The pedicle flap was carefully excised within this deep plane to maintain its blood supply.  The edges of the donor site were undermined.   The donor site was closed in a primary fashion.  The pedicle was then rotated into position and sutured.  Once the tube was sutured into place, adequate blood supply was confirmed with blanching and refill.  The pedicle was then wrapped with xeroform gauze and dressed appropriately with a telfa and gauze bandage to ensure continued blood supply and protect the attached pedicle. Peng Advancement Flap Text: The defect edges were debeveled with a #15 scalpel blade.  Given the location of the defect, shape of the defect and the proximity to free margins a Peng advancement flap was deemed most appropriate.  Using a sterile surgical marker, an appropriate advancement flap was drawn incorporating the defect and placing the expected incisions within the relaxed skin tension lines where possible. The area thus outlined was incised deep to adipose tissue with a #15 scalpel blade.  The skin margins were undermined to an appropriate distance in all directions utilizing iris scissors. Post-Care Instructions: I reviewed with the patient in detail post-care instructions. Patient is not to engage in any heavy lifting, exercise, or swimming for the next 14 days. Should the patient develop any fevers, chills, bleeding, severe pain patient will contact the office immediately. Consent (Scalp)/Introductory Paragraph: The rationale for Mohs was explained to the patient and consent was obtained. The risks, benefits and alternatives to therapy were discussed in detail. Specifically, the risks of changes in hair growth pattern secondary to repair, infection, scarring, bleeding, prolonged wound healing, incomplete removal, allergy to anesthesia, nerve injury and recurrence were addressed. Prior to the procedure, the treatment site was clearly identified and confirmed by the patient. All components of Universal Protocol/PAUSE Rule completed. Suture Removal: 7 days Bilateral Helical Rim Advancement Flap Text: The defect edges were debeveled with a #15 blade scalpel.  Given the location of the defect and the proximity to free margins (helical rim) a bilateral helical rim advancement flap was deemed most appropriate.  Using a sterile surgical marker, the appropriate advancement flaps were drawn incorporating the defect and placing the expected incisions between the helical rim and antihelix where possible.  The area thus outlined was incised through and through with a #15 scalpel blade.  With a skin hook and iris scissors, the flaps were gently and sharply undermined and freed up. Deep Sutures: 4-0 PGA Graft Cartilage Fenestration Text: The cartilage was fenestrated with a 2mm punch biopsy to help facilitate graft survival and healing. Advancement-Rotation Flap Text: The defect edges were debeveled with a #15 scalpel blade.  Given the location of the defect, shape of the defect and the proximity to free margins an advancement-rotation flap was deemed most appropriate.  Using a sterile surgical marker, an appropriate flap was drawn incorporating the defect and placing the expected incisions within the relaxed skin tension lines where possible. The area thus outlined was incised deep to adipose tissue with a #15 scalpel blade.  The skin margins were undermined to an appropriate distance in all directions utilizing iris scissors. Complex Repair And Graft Additional Text (Will Appearing After The Standard Complex Repair Text): The complex repair was not sufficient to completely close the primary defect. The remaining additional defect was repaired with the graft mentioned below. Previous Accession (Optional): [id number] Spiral Flap Text: The defect edges were debeveled with a #15 scalpel blade.  Given the location of the defect, shape of the defect and the proximity to free margins a spiral flap was deemed most appropriate.  Using a sterile surgical marker, an appropriate rotation flap was drawn incorporating the defect and placing the expected incisions within the relaxed skin tension lines where possible. The area thus outlined was incised deep to adipose tissue with a #15 scalpel blade.  The skin margins were undermined to an appropriate distance in all directions utilizing iris scissors. Bcc Infiltrative Histology Text: There were numerous aggregates of basaloid cells demonstrating an infiltrative pattern. Alternatives Discussed Intro (Do Not Add Period): I discussed alternative treatments to Mohs surgery and specifically discussed the risks and benefits of Retention Suture Bite Size: 3 mm Zygomaticofacial Flap Text: Given the location of the defect, shape of the defect and the proximity to free margins a zygomaticofacial flap was deemed most appropriate for repair.  Using a sterile surgical marker, the appropriate flap was drawn incorporating the defect and placing the expected incisions within the relaxed skin tension lines where possible. The area thus outlined was incised deep to adipose tissue with a #15 scalpel blade with preservation of a vascular pedicle.  The skin margins were undermined to an appropriate distance in all directions utilizing iris scissors.  The flap was then placed into the defect and anchored with interrupted buried subcutaneous sutures. Mart-1 - Negative Histology Text: MART-1 staining demonstrates a normal density and pattern of melanocytes along the dermal-epidermal junction. The surgical margins are negative for tumor cells. Consent (Near Eyelid Margin)/Introductory Paragraph: The rationale for Mohs was explained to the patient and consent was obtained. The risks, benefits and alternatives to therapy were discussed in detail. Specifically, the risks of ectropion or eyelid deformity, infection, scarring, bleeding, prolonged wound healing, incomplete removal, allergy to anesthesia, nerve injury and recurrence were addressed. Prior to the procedure, the treatment site was clearly identified and confirmed by the patient. All components of Universal Protocol/PAUSE Rule completed. Dressing: dry sterile dressing Hemigard Retention Suture: 2-0 Nylon Rhombic Flap Text: The defect edges were debeveled with a #15 scalpel blade.  Given the location of the defect and the proximity to free margins a rhombic flap was deemed most appropriate.  Using a sterile surgical marker, an appropriate rhombic flap was drawn incorporating the defect.    The area thus outlined was incised deep to adipose tissue with a #15 scalpel blade.  The skin margins were undermined to an appropriate distance in all directions utilizing iris scissors. Paramedian Forehead Flap Text: A decision was made to reconstruct the defect utilizing an interpolation axial flap and a staged reconstruction.  A telfa template was made of the defect.  This telfa template was then used to outline the paramedian forehead pedicle flap.  The donor area for the pedicle flap was then injected with anesthesia.  The flap was excised through the skin and subcutaneous tissue down to the layer of the underlying musculature.  The pedicle flap was carefully excised within this deep plane to maintain its blood supply.  The edges of the donor site were undermined.   The donor site was closed in a primary fashion.  The pedicle was then rotated into position and sutured.  Once the tube was sutured into place, adequate blood supply was confirmed with blanching and refill.  The pedicle was then wrapped with xeroform gauze and dressed appropriately with a telfa and gauze bandage to ensure continued blood supply and protect the attached pedicle. Repair Type: Referred to plastics for closure Suturegard Body: The suture ends were repeatedly re-tightened and re-clamped to achieve the desired tissue expansion. Hemostasis: Electrocautery Skin Substitute Text: The defect edges were debeveled with a #15 scalpel blade.  Given the location of the defect, shape of the defect and the proximity to free margins a skin substitute graft was deemed most appropriate.  The graft material was trimmed to fit the size of the defect. The graft was then placed in the primary defect and oriented appropriately. Eye Protection Verbiage: Before proceeding with the stage, a plastic scleral shield was inserted. The globe was anesthetized with a few drops of 1% lidocaine with 1:100,000 epinephrine. Then, an appropriate sized scleral shield was chosen and coated with lacrilube ointment. The shield was gently inserted and left in place for the duration of each stage. After the stage was completed, the shield was gently removed. Advancement Flap (Double) Text: The defect edges were debeveled with a #15 scalpel blade.  Given the location of the defect and the proximity to free margins a double advancement flap was deemed most appropriate.  Using a sterile surgical marker, the appropriate advancement flaps were drawn incorporating the defect and placing the expected incisions within the relaxed skin tension lines where possible.    The area thus outlined was incised deep to adipose tissue with a #15 scalpel blade.  The skin margins were undermined to an appropriate distance in all directions utilizing iris scissors. Bilobed Flap Text: The defect edges were debeveled with a #15 scalpel blade.  Given the location of the defect and the proximity to free margins a bilobe flap was deemed most appropriate.  Using a sterile surgical marker, an appropriate bilobe flap drawn around the defect.    The area thus outlined was incised deep to adipose tissue with a #15 scalpel blade.  The skin margins were undermined to an appropriate distance in all directions utilizing iris scissors. Interpolation Flap Text: A decision was made to reconstruct the defect utilizing an interpolation axial flap and a staged reconstruction.  A telfa template was made of the defect.  This telfa template was then used to outline the interpolation flap.  The donor area for the pedicle flap was then injected with anesthesia.  The flap was excised through the skin and subcutaneous tissue down to the layer of the underlying musculature.  The interpolation flap was carefully excised within this deep plane to maintain its blood supply.  The edges of the donor site were undermined.   The donor site was closed in a primary fashion.  The pedicle was then rotated into position and sutured.  Once the tube was sutured into place, adequate blood supply was confirmed with blanching and refill.  The pedicle was then wrapped with xeroform gauze and dressed appropriately with a telfa and gauze bandage to ensure continued blood supply and protect the attached pedicle. Mucosal Advancement Flap Text: Given the location of the defect, shape of the defect and the proximity to free margins a mucosal advancement flap was deemed most appropriate. Incisions were made with a 15 blade scalpel in the appropriate fashion along the cutaneous vermilion border and the mucosal lip. The remaining actinically damaged mucosal tissue was excised.  The mucosal advancement flap was then elevated to the gingival sulcus with care taken to preserve the neurovascular structures and advanced into the primary defect. Care was taken to ensure that precise realignment of the vermilion border was achieved. Plastic Surgeon (A): Dr Wellington Molina MD- Plastic Surgeon in [place]. Patient request to be referred to a plastic surgeon to perform Same Histology In Subsequent Stages Text: The pattern and morphology of the tumor is as described in the first stage. Brow Lift Text: A midfrontal incision was made medially to the defect to allow access to the tissues just superior to the left eyebrow. Following careful dissection inferiorly in a supraperiosteal plane to the level of the left eyebrow, several 3-0 monocryl sutures were used to resuspend the eyebrow orbicularis oculi muscular unit to the superior frontal bone periosteum. This resulted in an appropriate reapproximation of static eyebrow symmetry and correction of the left brow ptosis. Non-Graft Cartilage Fenestration Text: The cartilage was fenestrated with a 2mm punch biopsy to help facilitate healing. Area H Indication Text: Tumors in this location are included in Area H (eyelids, eyebrows, nose, lips, chin, ear, pre-auricular, post-auricular, temple, genitalia, hands, feet, ankles and areola).  Tissue conservation is critical in these anatomic locations. Consent 3/Introductory Paragraph: I gave the patient a chance to ask questions they had about the procedure.  Following this I explained the Mohs procedure and consent was obtained. The risks, benefits and alternatives to therapy were discussed in detail. Specifically, the risks of infection, scarring, bleeding, prolonged wound healing, incomplete removal, allergy to anesthesia, nerve injury and recurrence were addressed. Prior to the procedure, the treatment site was clearly identified and confirmed by the patient. All components of Universal Protocol/PAUSE Rule completed. Nasal Turnover Hinge Flap Text: The defect edges were debeveled with a #15 scalpel blade.  Given the size, depth, location of the defect and the defect being full thickness a nasal turnover hinge flap was deemed most appropriate.  Using a sterile surgical marker, an appropriate hinge flap was drawn incorporating the defect. The area thus outlined was incised with a #15 scalpel blade. The flap was designed to recreate the nasal mucosal lining and the alar rim. The skin margins were undermined to an appropriate distance in all directions utilizing iris scissors. Mauc Instructions: By selecting yes to the question below the MAUC number will be added into the note.  This will be calculated automatically based on the diagnosis chosen, the size entered, the body zone selected (H,M,L) and the specific indications you chose. You will also have the option to override the Mohs AUC if you disagree with the automatically calculated number and this option is found in the Case Summary tab. Helical Rim Text: The closure involved the helical rim. Cartilage Graft Text: The defect edges were debeveled with a #15 scalpel blade.  Given the location of the defect, shape of the defect, the fact the defect involved a full thickness cartilage defect a cartilage graft was deemed most appropriate.  An appropriate donor site was identified, cleansed, and anesthetized. The cartilage graft was then harvested and transferred to the recipient site, oriented appropriately and then sutured into place.  The secondary defect was then repaired using a primary closure. Manual Repair Warning Statement: We plan on removing the manually selected variable below in favor of our much easier automatic structured text blocks found in the previous tab. We decided to do this to help make the flow better and give you the full power of structured data. Manual selection is never going to be ideal in our platform and I would encourage you to avoid using manual selection from this point on, especially since I will be sunsetting this feature. It is important that you do one of two things with the customized text below. First, you can save all of the text in a word file so you can have it for future reference. Second, transfer the text to the appropriate area in the Library tab. Lastly, if there is a flap or graft type which we do not have you need to let us know right away so I can add it in before the variable is hidden. No need to panic, we plan to give you roughly 6 months to make the change. Epidermal Closure Graft Donor Site (Optional): simple interrupted Consent (Lip)/Introductory Paragraph: The rationale for Mohs was explained to the patient and consent was obtained. The risks, benefits and alternatives to therapy were discussed in detail. Specifically, the risks of lip deformity, changes in the oral aperture, infection, scarring, bleeding, prolonged wound healing, incomplete removal, allergy to anesthesia, nerve injury and recurrence were addressed. Prior to the procedure, the treatment site was clearly identified and confirmed by the patient. All components of Universal Protocol/PAUSE Rule completed. Retention Suture Text: Retention sutures were placed to support the closure and prevent dehiscence. Consent Type: Consent 1 (Standard) Donor Site Anesthesia Type: same as repair anesthesia Helical Rim Advancement Flap Text: The defect edges were debeveled with a #15 blade scalpel.  Given the location of the defect and the proximity to free margins (helical rim) a double helical rim advancement flap was deemed most appropriate.  Using a sterile surgical marker, the appropriate advancement flaps were drawn incorporating the defect and placing the expected incisions between the helical rim and antihelix where possible.  The area thus outlined was incised through and through with a #15 scalpel blade.  With a skin hook and iris scissors, the flaps were gently and sharply undermined and freed up. Mohs Histo Method Verbiage: Each section was then chromacoded and processed in the Mohs lab using the Mohs protocol and submitted for frozen section. O-T Plasty Text: The defect edges were debeveled with a #15 scalpel blade.  Given the location of the defect, shape of the defect and the proximity to free margins an O-T plasty was deemed most appropriate.  Using a sterile surgical marker, an appropriate O-T plasty was drawn incorporating the defect and placing the expected incisions within the relaxed skin tension lines where possible.    The area thus outlined was incised deep to adipose tissue with a #15 scalpel blade.  The skin margins were undermined to an appropriate distance in all directions utilizing iris scissors. Crescentic Advancement Flap Text: The defect edges were debeveled with a #15 scalpel blade.  Given the location of the defect and the proximity to free margins a crescentic advancement flap was deemed most appropriate.  Using a sterile surgical marker, the appropriate advancement flap was drawn incorporating the defect and placing the expected incisions within the relaxed skin tension lines where possible.    The area thus outlined was incised deep to adipose tissue with a #15 scalpel blade.  The skin margins were undermined to an appropriate distance in all directions utilizing iris scissors. Purse String (Simple) Text: Given the location of the defect and the characteristics of the surrounding skin a purse string closure was deemed most appropriate.  Undermining was performed circumfirentially around the surgical defect.  A purse string suture was then placed and tightened. Full Thickness Lip Wedge Repair (Flap) Text: Given the location of the defect and the proximity to free margins a full thickness wedge repair was deemed most appropriate.  Using a sterile surgical marker, the appropriate repair was drawn incorporating the defect and placing the expected incisions perpendicular to the vermilion border.  The vermilion border was also meticulously outlined to ensure appropriate reapproximation during the repair.  The area thus outlined was incised through and through with a #15 scalpel blade.  The muscularis and dermis were reaproximated with deep sutures following hemostasis. Care was taken to realign the vermilion border before proceeding with the superficial closure.  Once the vermilion was realigned the superfical and mucosal closure was finished. Dorsal Nasal Flap Text: The defect edges were debeveled with a #15 scalpel blade.  Given the location of the defect and the proximity to free margins a dorsal nasal flap was deemed most appropriate.  Using a sterile surgical marker, an appropriate dorsal nasal flap was drawn around the defect.    The area thus outlined was incised deep to adipose tissue with a #15 scalpel blade.  The skin margins were undermined to an appropriate distance in all directions utilizing iris scissors. Suturegard Intro: Intraoperative tissue expansion was performed, utilizing the SUTUREGARD device, in order to reduce wound tension. Melolabial Transposition Flap Text: The defect edges were debeveled with a #15 scalpel blade.  Given the location of the defect and the proximity to free margins a melolabial flap was deemed most appropriate.  Using a sterile surgical marker, an appropriate melolabial transposition flap was drawn incorporating the defect.    The area thus outlined was incised deep to adipose tissue with a #15 scalpel blade.  The skin margins were undermined to an appropriate distance in all directions utilizing iris scissors. Tumor Depth: Less than 6mm from granular layer and no invasion beyond the subcutaneous fat Posterior Auricular Interpolation Flap Text: A decision was made to reconstruct the defect utilizing an interpolation axial flap and a staged reconstruction.  A telfa template was made of the defect.  This telfa template was then used to outline the posterior auricular interpolation flap.  The donor area for the pedicle flap was then injected with anesthesia.  The flap was excised through the skin and subcutaneous tissue down to the layer of the underlying musculature.  The pedicle flap was carefully excised within this deep plane to maintain its blood supply.  The edges of the donor site were undermined.   The donor site was closed in a primary fashion.  The pedicle was then rotated into position and sutured.  Once the tube was sutured into place, adequate blood supply was confirmed with blanching and refill.  The pedicle was then wrapped with xeroform gauze and dressed appropriately with a telfa and gauze bandage to ensure continued blood supply and protect the attached pedicle. Rotation Flap Text: The defect edges were debeveled with a #15 scalpel blade.  Given the location of the defect, shape of the defect and the proximity to free margins a rotation flap was deemed most appropriate.  Using a sterile surgical marker, an appropriate rotation flap was drawn incorporating the defect and placing the expected incisions within the relaxed skin tension lines where possible.    The area thus outlined was incised deep to adipose tissue with a #15 scalpel blade.  The skin margins were undermined to an appropriate distance in all directions utilizing iris scissors. Which Plastic Surgeon Are You Referring To?: A Medical Necessity Statement: Based on my medical judgement, Mohs surgery is the most appropriate treatment for this cancer compared to other treatments. Banner Transposition Flap Text: The defect edges were debeveled with a #15 scalpel blade.  Given the location of the defect and the proximity to free margins a Banner transposition flap was deemed most appropriate.  Using a sterile surgical marker, an appropriate flap drawn around the defect. The area thus outlined was incised deep to adipose tissue with a #15 scalpel blade.  The skin margins were undermined to an appropriate distance in all directions utilizing iris scissors. No Repair - Repaired With Adjacent Surgical Defect Text (Leave Blank If You Do Not Want): After obtaining clear surgical margins the defect was repaired concurrently with another surgical defect which was in close approximation. Consent (Spinal Accessory)/Introductory Paragraph: The rationale for Mohs was explained to the patient and consent was obtained. The risks, benefits and alternatives to therapy were discussed in detail. Specifically, the risks of damage to the spinal accessory nerve, infection, scarring, bleeding, prolonged wound healing, incomplete removal, allergy to anesthesia, and recurrence were addressed. Prior to the procedure, the treatment site was clearly identified and confirmed by the patient. All components of Universal Protocol/PAUSE Rule completed. Tumor Debulked?: not debulked Alar Island Pedicle Flap Text: The defect edges were debeveled with a #15 scalpel blade.  Given the location of the defect, shape of the defect and the proximity to the alar rim an island pedicle advancement flap was deemed most appropriate.  Using a sterile surgical marker, an appropriate advancement flap was drawn incorporating the defect, outlining the appropriate donor tissue and placing the expected incisions within the nasal ala running parallel to the alar rim. The area thus outlined was incised with a #15 scalpel blade.  The skin margins were undermined minimally to an appropriate distance in all directions around the primary defect and laterally outward around the island pedicle utilizing iris scissors.  There was minimal undermining beneath the pedicle flap. Staging Info: By selecting yes to the question above you will include information on AJCC 8 tumor staging in your Mohs note. Information on tumor staging will be automatically added for SCCs on the head and neck. AJCC 8 includes tumor size, tumor depth, perineural involvement and bone invasion. Dermal Autograft Text: The defect edges were debeveled with a #15 scalpel blade.  Given the location of the defect, shape of the defect and the proximity to free margins a dermal autograft was deemed most appropriate.  Using a sterile surgical marker, the primary defect shape was transferred to the donor site. The area thus outlined was incised deep to adipose tissue with a #15 scalpel blade.  The harvested graft was then trimmed of adipose and epidermal tissue until only dermis was left.  The skin graft was then placed in the primary defect and oriented appropriately. Advancement Flap (Single) Text: The defect edges were debeveled with a #15 scalpel blade.  Given the location of the defect and the proximity to free margins a single advancement flap was deemed most appropriate.  Using a sterile surgical marker, an appropriate advancement flap was drawn incorporating the defect and placing the expected incisions within the relaxed skin tension lines where possible.    The area thus outlined was incised deep to adipose tissue with a #15 scalpel blade.  The skin margins were undermined to an appropriate distance in all directions utilizing iris scissors. V-Y Plasty Text: The defect edges were debeveled with a #15 scalpel blade.  Given the location of the defect, shape of the defect and the proximity to free margins an V-Y advancement flap was deemed most appropriate.  Using a sterile surgical marker, an appropriate advancement flap was drawn incorporating the defect and placing the expected incisions within the relaxed skin tension lines where possible.    The area thus outlined was incised deep to adipose tissue with a #15 scalpel blade.  The skin margins were undermined to an appropriate distance in all directions utilizing iris scissors. O-L Flap Text: The defect edges were debeveled with a #15 scalpel blade.  Given the location of the defect, shape of the defect and the proximity to free margins an O-L flap was deemed most appropriate.  Using a sterile surgical marker, an appropriate advancement flap was drawn incorporating the defect and placing the expected incisions within the relaxed skin tension lines where possible.    The area thus outlined was incised deep to adipose tissue with a #15 scalpel blade.  The skin margins were undermined to an appropriate distance in all directions utilizing iris scissors. Body Location Override (Optional - Billing Will Still Be Based On Selected Body Map Location If Applicable): Right Check Partial Purse String (Intermediate) Text: Given the location of the defect and the characteristics of the surrounding skin an intermediate purse string closure was deemed most appropriate.  Undermining was performed circumfirentially around the surgical defect.  A purse string suture was then placed and tightened. Wound tension only allowed a partial closure of the circular defect. Postop Diagnosis: same Bi-Rhombic Flap Text: The defect edges were debeveled with a #15 scalpel blade.  Given the location of the defect and the proximity to free margins a bi-rhombic flap was deemed most appropriate.  Using a sterile surgical marker, an appropriate rhombic flap was drawn incorporating the defect. The area thus outlined was incised deep to adipose tissue with a #15 scalpel blade.  The skin margins were undermined to an appropriate distance in all directions utilizing iris scissors. Hemigard Postcare Instructions: The HEMIGARD strips are to remain completely dry for at least 5-7 days. Transposition Flap Text: The defect edges were debeveled with a #15 scalpel blade.  Given the location of the defect and the proximity to free margins a transposition flap was deemed most appropriate.  Using a sterile surgical marker, an appropriate transposition flap was drawn incorporating the defect.    The area thus outlined was incised deep to adipose tissue with a #15 scalpel blade.  The skin margins were undermined to an appropriate distance in all directions utilizing iris scissors. Consent 2/Introductory Paragraph: Mohs surgery was explained to the patient and consent was obtained. The risks, benefits and alternatives to therapy were discussed in detail. Specifically, the risks of infection, scarring, bleeding, prolonged wound healing, incomplete removal, allergy to anesthesia, nerve injury and recurrence were addressed. Prior to the procedure, the treatment site was clearly identified and confirmed by the patient. All components of Universal Protocol/PAUSE Rule completed. Pain Refusal Text: I offered to prescribe pain medication but the patient refused to take this medication. Trilobed Flap Text: The defect edges were debeveled with a #15 scalpel blade.  Given the location of the defect and the proximity to free margins a trilobed flap was deemed most appropriate.  Using a sterile surgical marker, an appropriate trilobed flap drawn around the defect.    The area thus outlined was incised deep to adipose tissue with a #15 scalpel blade.  The skin margins were undermined to an appropriate distance in all directions utilizing iris scissors. Burow's Graft Text: The defect edges were debeveled with a #15 scalpel blade.  Given the location of the defect, shape of the defect, the proximity to free margins and the presence of a standing cone deformity a Burow's skin graft was deemed most appropriate. The standing cone was removed and this tissue was then trimmed to the shape of the primary defect. The adipose tissue was also removed until only dermis and epidermis were left.  The skin margins of the secondary defect were undermined to an appropriate distance in all directions utilizing iris scissors.  The secondary defect was closed with interrupted buried subcutaneous sutures.  The skin edges were then re-apposed with running  sutures.  The skin graft was then placed in the primary defect and oriented appropriately. Consent (Nose)/Introductory Paragraph: The rationale for Mohs was explained to the patient and consent was obtained. The risks, benefits and alternatives to therapy were discussed in detail. Specifically, the risks of nasal deformity, changes in the flow of air through the nose, infection, scarring, bleeding, prolonged wound healing, incomplete removal, allergy to anesthesia, nerve injury and recurrence were addressed. Prior to the procedure, the treatment site was clearly identified and confirmed by the patient. All components of Universal Protocol/PAUSE Rule completed. Referring Physician (Optional): Mr. Puneet peraza Keystone Flap Text: The defect edges were debeveled with a #15 scalpel blade.  Given the location of the defect, shape of the defect a keystone flap was deemed most appropriate.  Using a sterile surgical marker, an appropriate keystone flap was drawn incorporating the defect, outlining the appropriate donor tissue and placing the expected incisions within the relaxed skin tension lines where possible. The area thus outlined was incised deep to adipose tissue with a #15 scalpel blade.  The skin margins were undermined to an appropriate distance in all directions around the primary defect and laterally outward around the flap utilizing iris scissors. Chonodrocutaneous Helical Advancement Flap Text: The defect edges were debeveled with a #15 scalpel blade.  Given the location of the defect and the proximity to free margins a chondrocutaneous helical advancement flap was deemed most appropriate.  Using a sterile surgical marker, the appropriate advancement flap was drawn incorporating the defect and placing the expected incisions within the relaxed skin tension lines where possible.    The area thus outlined was incised deep to adipose tissue with a #15 scalpel blade.  The skin margins were undermined to an appropriate distance in all directions utilizing iris scissors. Xenograft Text: The defect edges were debeveled with a #15 scalpel blade.  Given the location of the defect, shape of the defect and the proximity to free margins a xenograft was deemed most appropriate.  The graft was then trimmed to fit the size of the defect.  The graft was then placed in the primary defect and oriented appropriately. Surgeon/Pathologist Verbiage (Will Incorporate Name Of Surgeon From Intro If Not Blank): operated in two distinct and integrated capacities as the surgeon and pathologist. Ear Wedge Repair Text: A wedge excision was completed by carrying down an excision through the full thickness of the ear and cartilage with an inward facing Burow's triangle. The wound was then closed in a layered fashion. Z Plasty Text: The lesion was extirpated to the level of the fat with a #15 scalpel blade.  Given the location of the defect, shape of the defect and the proximity to free margins a Z-plasty was deemed most appropriate for repair.  Using a sterile surgical marker, the appropriate transposition arms of the Z-plasty were drawn incorporating the defect and placing the expected incisions within the relaxed skin tension lines where possible.    The area thus outlined was incised deep to adipose tissue with a #15 scalpel blade.  The skin margins were undermined to an appropriate distance in all directions utilizing iris scissors.  The opposing transposition arms were then transposed into place in opposite direction and anchored with interrupted buried subcutaneous sutures. Where Do You Want The Question To Include Opioid Counseling Located?: Case Summary Tab Complex Repair And Flap Additional Text (Will Appearing After The Standard Complex Repair Text): The complex repair was not sufficient to completely close the primary defect. The remaining additional defect was repaired with the flap mentioned below. Mohs Rapid Report Verbiage: The area of clinically evident tumor was marked with skin marking ink and appropriately hatched.  The initial incision was made following the Mohs approach through the skin.  The specimen was taken to the lab, divided into the necessary number of pieces, chromacoded and processed according to the Mohs protocol.  This was repeated in successive stages until a tumor free defect was achieved. V-Y Flap Text: The defect edges were debeveled with a #15 scalpel blade.  Given the location of the defect, shape of the defect and the proximity to free margins a V-Y flap was deemed most appropriate.  Using a sterile surgical marker, an appropriate advancement flap was drawn incorporating the defect and placing the expected incisions within the relaxed skin tension lines where possible.    The area thus outlined was incised deep to adipose tissue with a #15 scalpel blade.  The skin margins were undermined to an appropriate distance in all directions utilizing iris scissors. Date Of Previous Biopsy (Optional): 12/01/2020 Wound Care: Gentamicin 0.1% ointment Wound Care (No Sutures): Petrolatum Ear Star Wedge Flap Text: The defect edges were debeveled with a #15 blade scalpel.  Given the location of the defect and the proximity to free margins (helical rim) an ear star wedge flap was deemed most appropriate.  Using a sterile surgical marker, the appropriate flap was drawn incorporating the defect and placing the expected incisions between the helical rim and antihelix where possible.  The area thus outlined was incised through and through with a #15 scalpel blade. Debridement Text: The wound edges were debrided prior to proceeding with the closure to facilitate wound healing. Secondary Intention Text (Leave Blank If You Do Not Want): The defect will heal with secondary intention. Area L Indication Text: Tumors in this location are included in Area L (trunk and extremities).  Mohs surgery is indicated for larger tumors, or tumors with aggressive histologic features, in these anatomic locations. Consent (Marginal Mandibular)/Introductory Paragraph: The rationale for Mohs was explained to the patient and consent was obtained. The risks, benefits and alternatives to therapy were discussed in detail. Specifically, the risks of damage to the marginal mandibular branch of the facial nerve, infection, scarring, bleeding, prolonged wound healing, incomplete removal, allergy to anesthesia, and recurrence were addressed. Prior to the procedure, the treatment site was clearly identified and confirmed by the patient. All components of Universal Protocol/PAUSE Rule completed. Island Pedicle Flap With Canthal Suspension Text: The defect edges were debeveled with a #15 scalpel blade.  Given the location of the defect, shape of the defect and the proximity to free margins an island pedicle advancement flap was deemed most appropriate.  Using a sterile surgical marker, an appropriate advancement flap was drawn incorporating the defect, outlining the appropriate donor tissue and placing the expected incisions within the relaxed skin tension lines where possible. The area thus outlined was incised deep to adipose tissue with a #15 scalpel blade.  The skin margins were undermined to an appropriate distance in all directions around the primary defect and laterally outward around the island pedicle utilizing iris scissors.  There was minimal undermining beneath the pedicle flap. A suspension suture was placed in the canthal tendon to prevent tension and prevent ectropion. Home Suture Removal Text: Patient was provided instructions on removing sutures and will remove their sutures at home.  If they have any questions or difficulties they will call the office. Epidermal Autograft Text: The defect edges were debeveled with a #15 scalpel blade.  Given the location of the defect, shape of the defect and the proximity to free margins an epidermal autograft was deemed most appropriate.  Using a sterile surgical marker, the primary defect shape was transferred to the donor site. The epidermal graft was then harvested.  The skin graft was then placed in the primary defect and oriented appropriately. Detail Level: Detailed Double O-Z Plasty Text: The defect edges were debeveled with a #15 scalpel blade.  Given the location of the defect, shape of the defect and the proximity to free margins a Double O-Z plasty (double transposition flap) was deemed most appropriate.  Using a sterile surgical marker, the appropriate transposition flaps were drawn incorporating the defect and placing the expected incisions within the relaxed skin tension lines where possible. The area thus outlined was incised deep to adipose tissue with a #15 scalpel blade.  The skin margins were undermined to an appropriate distance in all directions utilizing iris scissors.  Hemostasis was achieved with electrocautery.  The flaps were then transposed into place, one clockwise and the other counterclockwise, and anchored with interrupted buried subcutaneous sutures. O-T Advancement Flap Text: The defect edges were debeveled with a #15 scalpel blade.  Given the location of the defect, shape of the defect and the proximity to free margins an O-T advancement flap was deemed most appropriate.  Using a sterile surgical marker, an appropriate advancement flap was drawn incorporating the defect and placing the expected incisions within the relaxed skin tension lines where possible.    The area thus outlined was incised deep to adipose tissue with a #15 scalpel blade.  The skin margins were undermined to an appropriate distance in all directions utilizing iris scissors. Partial Purse String (Simple) Text: Given the location of the defect and the characteristics of the surrounding skin a simple purse string closure was deemed most appropriate.  Undermining was performed circumfirentially around the surgical defect.  A purse string suture was then placed and tightened. Wound tension only allowed a partial closure of the circular defect. X Size Of Lesion In Cm (Optional): 0.8 Cheek-To-Nose Interpolation Flap Text: A decision was made to reconstruct the defect utilizing an interpolation axial flap and a staged reconstruction.  A telfa template was made of the defect.  This telfa template was then used to outline the Cheek-To-Nose Interpolation flap.  The donor area for the pedicle flap was then injected with anesthesia.  The flap was excised through the skin and subcutaneous tissue down to the layer of the underlying musculature.  The interpolation flap was carefully excised within this deep plane to maintain its blood supply.  The edges of the donor site were undermined.   The donor site was closed in a primary fashion.  The pedicle was then rotated into position and sutured.  Once the tube was sutured into place, adequate blood supply was confirmed with blanching and refill.  The pedicle was then wrapped with xeroform gauze and dressed appropriately with a telfa and gauze bandage to ensure continued blood supply and protect the attached pedicle. Muscle Hinge Flap Text: The defect edges were debeveled with a #15 scalpel blade.  Given the size, depth and location of the defect and the proximity to free margins a muscle hinge flap was deemed most appropriate.  Using a sterile surgical marker, an appropriate hinge flap was drawn incorporating the defect. The area thus outlined was incised with a #15 scalpel blade.  The skin margins were undermined to an appropriate distance in all directions utilizing iris scissors. Information: Selecting Yes will display possible errors in your note based on the variables you have selected. This validation is only offered as a suggestion for you. PLEASE NOTE THAT THE VALIDATION TEXT WILL BE REMOVED WHEN YOU FINALIZE YOUR NOTE. IF YOU WANT TO FAX A PRELIMINARY NOTE YOU WILL NEED TO TOGGLE THIS TO 'NO' IF YOU DO NOT WANT IT IN YOUR FAXED NOTE. Modified Advancement Flap Text: The defect edges were debeveled with a #15 scalpel blade.  Given the location of the defect, shape of the defect and the proximity to free margins a modified advancement flap was deemed most appropriate.  Using a sterile surgical marker, an appropriate advancement flap was drawn incorporating the defect and placing the expected incisions within the relaxed skin tension lines where possible.    The area thus outlined was incised deep to adipose tissue with a #15 scalpel blade.  The skin margins were undermined to an appropriate distance in all directions utilizing iris scissors. Mart-1 - Positive Histology Text: MART-1 staining demonstrates areas of higher density and clustering of melanocytes with Pagetoid spread upwards within the epidermis. The surgical margins are positive for tumor cells.

## 2022-04-15 NOTE — H&P ADULT - PROBLEM SELECTOR PLAN 5
-CVA occurred 10 years ago; no new CVA or any new suspicion for active CVA  -bed bound  -continue aspirin and status -CVA occurred 10 years ago; no new CVA or any new suspicion for active CVA  -bed bound  -continue aspirin and statin

## 2022-04-15 NOTE — ED PROVIDER NOTE - OBJECTIVE STATEMENT
80 y/o male pmh htn, dm, hld, CVA, bed bound c/o throat pain x today. As per wife, pt was eating breakfast and felt like he was choking and having throat pain. Wife asked if he wanted to go to the doctor and he said yes which is unusual for him. Pt states that he feels terrible but is otherwise a poor historian. Initial vitals pt is bradycardic and hypotensive.

## 2022-04-15 NOTE — H&P ADULT - PROBLEM SELECTOR PLAN 6
-as per cardiologist, Dr. Andrea has chronotropic competence  -hold all AV arpita agents  -monitor on tele

## 2022-04-16 DIAGNOSIS — R78.81 BACTEREMIA: ICD-10-CM

## 2022-04-16 LAB
ANION GAP SERPL CALC-SCNC: 17 MMOL/L — HIGH (ref 7–14)
BUN SERPL-MCNC: 10 MG/DL — SIGNIFICANT CHANGE UP (ref 7–23)
CALCIUM SERPL-MCNC: 9.7 MG/DL — SIGNIFICANT CHANGE UP (ref 8.4–10.5)
CHLORIDE SERPL-SCNC: 106 MMOL/L — SIGNIFICANT CHANGE UP (ref 98–107)
CO2 SERPL-SCNC: 16 MMOL/L — LOW (ref 22–31)
CREAT SERPL-MCNC: 0.88 MG/DL — SIGNIFICANT CHANGE UP (ref 0.5–1.3)
E FAECALIS DNA BLD POS QL NAA+NON-PROBE: SIGNIFICANT CHANGE UP
EGFR: 86 ML/MIN/1.73M2 — SIGNIFICANT CHANGE UP
GLUCOSE BLDC GLUCOMTR-MCNC: 104 MG/DL — HIGH (ref 70–99)
GLUCOSE BLDC GLUCOMTR-MCNC: 68 MG/DL — LOW (ref 70–99)
GLUCOSE BLDC GLUCOMTR-MCNC: 75 MG/DL — SIGNIFICANT CHANGE UP (ref 70–99)
GLUCOSE BLDC GLUCOMTR-MCNC: 78 MG/DL — SIGNIFICANT CHANGE UP (ref 70–99)
GLUCOSE BLDC GLUCOMTR-MCNC: 81 MG/DL — SIGNIFICANT CHANGE UP (ref 70–99)
GLUCOSE BLDC GLUCOMTR-MCNC: 81 MG/DL — SIGNIFICANT CHANGE UP (ref 70–99)
GLUCOSE BLDC GLUCOMTR-MCNC: 89 MG/DL — SIGNIFICANT CHANGE UP (ref 70–99)
GLUCOSE BLDC GLUCOMTR-MCNC: 96 MG/DL — SIGNIFICANT CHANGE UP (ref 70–99)
GLUCOSE SERPL-MCNC: 75 MG/DL — SIGNIFICANT CHANGE UP (ref 70–99)
GRAM STN FLD: SIGNIFICANT CHANGE UP
HCT VFR BLD CALC: 34.3 % — LOW (ref 39–50)
HGB BLD-MCNC: 11.3 G/DL — LOW (ref 13–17)
MAGNESIUM SERPL-MCNC: 2 MG/DL — SIGNIFICANT CHANGE UP (ref 1.6–2.6)
MCHC RBC-ENTMCNC: 30.3 PG — SIGNIFICANT CHANGE UP (ref 27–34)
MCHC RBC-ENTMCNC: 32.9 GM/DL — SIGNIFICANT CHANGE UP (ref 32–36)
MCV RBC AUTO: 92 FL — SIGNIFICANT CHANGE UP (ref 80–100)
METHOD TYPE: SIGNIFICANT CHANGE UP
NRBC # BLD: 0 /100 WBCS — SIGNIFICANT CHANGE UP
NRBC # FLD: 0 K/UL — SIGNIFICANT CHANGE UP
PHOSPHATE SERPL-MCNC: 2.1 MG/DL — LOW (ref 2.5–4.5)
PLATELET # BLD AUTO: 254 K/UL — SIGNIFICANT CHANGE UP (ref 150–400)
POTASSIUM SERPL-MCNC: 4.1 MMOL/L — SIGNIFICANT CHANGE UP (ref 3.5–5.3)
POTASSIUM SERPL-SCNC: 4.1 MMOL/L — SIGNIFICANT CHANGE UP (ref 3.5–5.3)
RBC # BLD: 3.73 M/UL — LOW (ref 4.2–5.8)
RBC # FLD: 14.1 % — SIGNIFICANT CHANGE UP (ref 10.3–14.5)
SODIUM SERPL-SCNC: 139 MMOL/L — SIGNIFICANT CHANGE UP (ref 135–145)
SPECIMEN SOURCE: SIGNIFICANT CHANGE UP
SPECIMEN SOURCE: SIGNIFICANT CHANGE UP
WBC # BLD: 4.18 K/UL — SIGNIFICANT CHANGE UP (ref 3.8–10.5)
WBC # FLD AUTO: 4.18 K/UL — SIGNIFICANT CHANGE UP (ref 3.8–10.5)

## 2022-04-16 PROCEDURE — 99222 1ST HOSP IP/OBS MODERATE 55: CPT

## 2022-04-16 PROCEDURE — 99233 SBSQ HOSP IP/OBS HIGH 50: CPT

## 2022-04-16 RX ORDER — CEFTRIAXONE 500 MG/1
2000 INJECTION, POWDER, FOR SOLUTION INTRAMUSCULAR; INTRAVENOUS EVERY 12 HOURS
Refills: 0 | Status: DISCONTINUED | OUTPATIENT
Start: 2022-04-17 | End: 2022-04-22

## 2022-04-16 RX ORDER — AMPICILLIN TRIHYDRATE 250 MG
2 CAPSULE ORAL EVERY 4 HOURS
Refills: 0 | Status: DISCONTINUED | OUTPATIENT
Start: 2022-04-16 | End: 2022-04-27

## 2022-04-16 RX ORDER — SODIUM CHLORIDE 9 MG/ML
1000 INJECTION, SOLUTION INTRAVENOUS
Refills: 0 | Status: DISCONTINUED | OUTPATIENT
Start: 2022-04-16 | End: 2022-04-16

## 2022-04-16 RX ORDER — AMPICILLIN TRIHYDRATE 250 MG
CAPSULE ORAL
Refills: 0 | Status: DISCONTINUED | OUTPATIENT
Start: 2022-04-16 | End: 2022-04-16

## 2022-04-16 RX ORDER — AMPICILLIN TRIHYDRATE 250 MG
2 CAPSULE ORAL ONCE
Refills: 0 | Status: COMPLETED | OUTPATIENT
Start: 2022-04-16 | End: 2022-04-16

## 2022-04-16 RX ORDER — CEFTRIAXONE 500 MG/1
INJECTION, POWDER, FOR SOLUTION INTRAMUSCULAR; INTRAVENOUS
Refills: 0 | Status: DISCONTINUED | OUTPATIENT
Start: 2022-04-16 | End: 2022-04-22

## 2022-04-16 RX ORDER — CEFTRIAXONE 500 MG/1
INJECTION, POWDER, FOR SOLUTION INTRAMUSCULAR; INTRAVENOUS
Refills: 0 | Status: DISCONTINUED | OUTPATIENT
Start: 2022-04-16 | End: 2022-04-16

## 2022-04-16 RX ORDER — POTASSIUM PHOSPHATE, MONOBASIC POTASSIUM PHOSPHATE, DIBASIC 236; 224 MG/ML; MG/ML
15 INJECTION, SOLUTION INTRAVENOUS ONCE
Refills: 0 | Status: COMPLETED | OUTPATIENT
Start: 2022-04-16 | End: 2022-04-16

## 2022-04-16 RX ORDER — CEFTRIAXONE 500 MG/1
2000 INJECTION, POWDER, FOR SOLUTION INTRAMUSCULAR; INTRAVENOUS ONCE
Refills: 0 | Status: COMPLETED | OUTPATIENT
Start: 2022-04-16 | End: 2022-04-16

## 2022-04-16 RX ORDER — SODIUM CHLORIDE 9 MG/ML
1000 INJECTION, SOLUTION INTRAVENOUS
Refills: 0 | Status: DISCONTINUED | OUTPATIENT
Start: 2022-04-16 | End: 2022-04-17

## 2022-04-16 RX ORDER — AMPICILLIN TRIHYDRATE 250 MG
2 CAPSULE ORAL EVERY 6 HOURS
Refills: 0 | Status: DISCONTINUED | OUTPATIENT
Start: 2022-04-16 | End: 2022-04-16

## 2022-04-16 RX ADMIN — Medication 216 GRAM(S): at 20:28

## 2022-04-16 RX ADMIN — SODIUM CHLORIDE 75 MILLILITER(S): 9 INJECTION, SOLUTION INTRAVENOUS at 18:16

## 2022-04-16 RX ADMIN — SODIUM CHLORIDE 75 MILLILITER(S): 9 INJECTION, SOLUTION INTRAVENOUS at 16:25

## 2022-04-16 RX ADMIN — Medication 133 MILLILITER(S): at 11:43

## 2022-04-16 RX ADMIN — Medication 250 MILLIGRAM(S): at 06:48

## 2022-04-16 RX ADMIN — POTASSIUM PHOSPHATE, MONOBASIC POTASSIUM PHOSPHATE, DIBASIC 62.5 MILLIMOLE(S): 236; 224 INJECTION, SOLUTION INTRAVENOUS at 11:37

## 2022-04-16 RX ADMIN — CEFTRIAXONE 100 MILLIGRAM(S): 500 INJECTION, POWDER, FOR SOLUTION INTRAMUSCULAR; INTRAVENOUS at 18:50

## 2022-04-16 RX ADMIN — Medication 216 GRAM(S): at 15:10

## 2022-04-16 RX ADMIN — HEPARIN SODIUM 5000 UNIT(S): 5000 INJECTION INTRAVENOUS; SUBCUTANEOUS at 14:38

## 2022-04-16 RX ADMIN — HEPARIN SODIUM 5000 UNIT(S): 5000 INJECTION INTRAVENOUS; SUBCUTANEOUS at 06:17

## 2022-04-16 RX ADMIN — HEPARIN SODIUM 5000 UNIT(S): 5000 INJECTION INTRAVENOUS; SUBCUTANEOUS at 21:27

## 2022-04-16 RX ADMIN — Medication 81 MILLIGRAM(S): at 11:43

## 2022-04-16 NOTE — PROVIDER CONTACT NOTE (CRITICAL VALUE NOTIFICATION) - ASSESSMENT
On assessment pt. appears at baseline. Resting in bed, awakens to voice. No s/s of distress noted or voiced at this time.

## 2022-04-16 NOTE — PROVIDER CONTACT NOTE (OTHER) - ACTION/TREATMENT ORDERED:
Bladder scan previously noted to be discontinued at this time if less than 300 ml noted during present scan. PA notified, awaiting orders.

## 2022-04-16 NOTE — PROGRESS NOTE ADULT - SUBJECTIVE AND OBJECTIVE BOX
[facility] Division of Hospital Medicine  Danilo Moreno MD  Pager [phone]    Patient is a 81y old  Male who presents with a chief complaint of Dysphagia/odynophagia (15 Apr 2022 18:39)      SUBJECTIVE / OVERNIGHT EVENTS: Comfortable. No S&S eval today      MEDICATIONS  (STANDING):  ampicillin  IVPB      ampicillin  IVPB 2 Gram(s) IV Intermittent every 6 hours  aspirin enteric coated 81 milliGRAM(s) Oral daily  atorvastatin 20 milliGRAM(s) Oral at bedtime  dextrose 5%. 1000 milliLiter(s) (50 mL/Hr) IV Continuous <Continuous>  dextrose 5%. 1000 milliLiter(s) (100 mL/Hr) IV Continuous <Continuous>  dextrose 50% Injectable 25 Gram(s) IV Push once  dextrose 50% Injectable 12.5 Gram(s) IV Push once  dextrose 50% Injectable 25 Gram(s) IV Push once  glucagon  Injectable 1 milliGRAM(s) IntraMuscular once  heparin   Injectable 5000 Unit(s) SubCutaneous every 8 hours  insulin lispro (ADMELOG) corrective regimen sliding scale   SubCutaneous three times a day before meals  insulin lispro (ADMELOG) corrective regimen sliding scale   SubCutaneous at bedtime  lactated ringers. 1000 milliLiter(s) (75 mL/Hr) IV Continuous <Continuous>  mineral oil enema 133 milliLiter(s) Rectal daily  senna 2 Tablet(s) Oral at bedtime  tamsulosin 0.4 milliGRAM(s) Oral at bedtime  vancomycin  IVPB 1000 milliGRAM(s) IV Intermittent every 12 hours  vancomycin  IVPB        MEDICATIONS  (PRN):  acetaminophen     Tablet .. 650 milliGRAM(s) Oral every 6 hours PRN Temp greater or equal to 38C (100.4F), Mild Pain (1 - 3)  ALBUTerol    90 MICROgram(s) HFA Inhaler 2 Puff(s) Inhalation every 6 hours PRN Shortness of Breath and/or Wheezing  aluminum hydroxide/magnesium hydroxide/simethicone Suspension 30 milliLiter(s) Oral every 4 hours PRN Dyspepsia  dextrose Oral Gel 15 Gram(s) Oral once PRN Blood Glucose LESS THAN 70 milliGRAM(s)/deciliter  melatonin 3 milliGRAM(s) Oral at bedtime PRN Insomnia  ondansetron Injectable 4 milliGRAM(s) IV Push every 8 hours PRN Nausea and/or Vomiting      CAPILLARY BLOOD GLUCOSE      POCT Blood Glucose.: 96 mg/dL (2022 12:57)  POCT Blood Glucose.: 104 mg/dL (2022 08:48)  POCT Blood Glucose.: 81 mg/dL (2022 03:22)  POCT Blood Glucose.: 100 mg/dL (15 Apr 2022 23:34)  POCT Blood Glucose.: 71 mg/dL (15 Apr 2022 21:15)  POCT Blood Glucose.: 67 mg/dL (15 Apr 2022 21:13)    I&O's Summary    15 Apr 2022 07:01  -  2022 07:00  --------------------------------------------------------  IN: 0 mL / OUT: 100 mL / NET: -100 mL    2022 07:01  -  2022 16:37  --------------------------------------------------------  IN: 0 mL / OUT: 300 mL / NET: -300 mL        PHYSICAL EXAM:  Vital Signs Last 24 Hrs  T(C): 36.7 (2022 06:30), Max: 36.8 (2022 03:15)  T(F): 98.1 (2022 06:30), Max: 98.2 (2022 03:15)  HR: 51 (2022 06:30) (40 - 62)  BP: 137/62 (2022 06:30) (129/61 - 149/87)  BP(mean): --  RR: 17 (2022 06:30) (16 - 18)  SpO2: 100% (2022 06:30) (100% - 100%)  CONSTITUTIONAL: NAD  EYES: EOMI, conjunctiva and sclera clear  ENMT: Moist oral mucosa  NECK: Supple  RESPIRATORY: Breathing unlabored, CTAB  CARDIOVASCULAR: S1S2 no MRG  ABDOMEN: Nontender to palpation, normoactive bowel sounds, no rebound/guarding  MUSCULOSKELETAL: no clubbing or cyanosis of digits  NEUROLOGY: No focal deficits   SKIN: No rashes or lesions    LABS:                        11.3   4.18  )-----------( 254      ( 2022 07:34 )             34.3     04-16    139  |  106  |  10  ----------------------------<  75  4.1   |  16<L>  |  0.88    Ca    9.7      2022 07:34  Phos  2.1     04-16  Mg     2.00     04-16    TPro  6.4  /  Alb  3.1<L>  /  TBili  0.7  /  DBili  x   /  AST  15  /  ALT  8   /  AlkPhos  74  04-15    PT/INR - ( 15 Apr 2022 13:34 )   PT: 13.5 sec;   INR: 1.16 ratio         PTT - ( 15 Apr 2022 13:34 )  PTT:32.2 sec      Urinalysis Basic - ( 15 Apr 2022 14:40 )    Color: Light Orange / Appearance: Turbid / S.019 / pH: x  Gluc: x / Ketone: Negative  / Bili: Negative / Urobili: 3 mg/dL   Blood: x / Protein: 30 mg/dL / Nitrite: Negative   Leuk Esterase: Large / RBC: 13 /HPF / WBC >50 /HPF   Sq Epi: x / Non Sq Epi: 0 /HPF / Bacteria: Negative        Culture - Blood (collected 15 Apr 2022 18:10)  Source: .Blood Blood-Peripheral  Gram Stain (2022 09:44):    Growth in aerobic and anaerobic bottles: Gram Positive Cocci in Pairs and    Chains  Preliminary Report (2022 09:45):    Growth in aerobic and anaerobic bottles: Gram Positive Cocci in Pairs and    Chains    Culture - Blood (collected 15 Apr 2022 18:10)  Source: .Blood Blood-Peripheral  Gram Stain (2022 09:43):    Growth in aerobic bottle: Gram positive cocci in pairs    Growth in anaerobic bottle: Gram Positive Cocci in Pairs and Chains  Preliminary Report (2022 09:44):    Growth in aerobic bottle: Gram positive cocci in pairs    Growth in anaerobic bottle: Gram Positive Cocci in Pairs and Chains    ***Blood Panel PCR results on this specimen are available    approximately 3 hours after the Gram stain result.***    Gram stain, PCR, and/or culture results may not always    correspond due to difference in methodologies.    ************************************************************    This PCR assay was performed by multiplex PCR. This    Assay tests for 66 bacterial and resistance gene targets.    Please refer to the [facility] test directory    at [email or web address] for details.  Organism: Blood Culture PCR (2022 09:02)  Organism: Blood Culture PCR (2022 09:02)    CODE: FULL

## 2022-04-16 NOTE — PATIENT PROFILE ADULT - FALL HARM RISK - HARM RISK INTERVENTIONS
Assistance with ambulation/Assistance OOB with selected safe patient handling equipment/Communicate Risk of Fall with Harm to all staff/Discuss with provider need for PT consult/Monitor for mental status changes/Monitor gait and stability/Move patient closer to nurses' station/Provide patient with walking aids - walker, cane, crutches/Reinforce activity limits and safety measures with patient and family/Reorient to person, place and time as needed/Tailored Fall Risk Interventions/Toileting schedule using arm’s reach rule for commode and bathroom/Use of alarms - bed, chair and/or voice tab/Visual Cue: Yellow wristband and red socks/Bed in lowest position, wheels locked, appropriate side rails in place/Call bell, personal items and telephone in reach/Instruct patient to call for assistance before getting out of bed or chair/Non-slip footwear when patient is out of bed/[place] to call system/Physically safe environment - no spills, clutter or unnecessary equipment/Purposeful Proactive Rounding/Room/bathroom lighting operational, light cord in reach

## 2022-04-16 NOTE — PROVIDER CONTACT NOTE (OTHER) - ASSESSMENT
On assessment, pt. is asymptomatic. A&O x1-2. Denies SOB and/or chest pain at this time. Observed resting in bed with eyes closed, awakens to voice. VSS as per flowsheet. No s/s of distress noted or voiced at this time.

## 2022-04-16 NOTE — CONSULT NOTE ADULT - SUBJECTIVE AND OBJECTIVE BOX
INFECTIOUS DISEASE CONSULT NOTE    Patient is a 81y old  Male who presents with a chief complaint of Dysphagia/odynophagia (2022 16:37)    HPI:  81 y.o. M with PMH of  HTN, T2DM, HLD, constipation CVA w R sided hemiplegia, CAD s/p PIC, diastolic CHF,  sinus node dysfunction, bedbound who presents with dysphagia/odynophagia. This morning, while having breakfast, pt ate a piece of bread, after which he began coughing/choking and yelling that he had throat pain. Usually eats a pureed diet with thin liquids. Per his wife, he sometimes coughs and clears his throat when eating; she believes this happens when the HHAs feed him too quickly. Also, a few days ago, the [facility] told pt's wife that pt was complaining of throat pain; he had no recurrent complaints until today. Otherwise, no fevers, chills, N/V/D, dysuria, rash, or abdominal pain.     In the ED, patient was found to be hypotensive/bradycardic. BP improved with IVF. He was given a dose of CTX in the ED.  (15 Apr 2022 18:39)       REVIEW OF SYSTEMS:  CONSTITUTIONAL: No unintentional weight loss; no weakness; no fevers or chills  HEENT: No decrease hearing, tinnitus, ear pain, rhinorrhea, congestion, or sore throat  RESPIRATORY: No cough, wheezing, hemoptysis; no shortness of breath/shortness of breath on exertion; no orthopnea  CARDIOVASCULAR: No chest pain or palpitations  GASTROINTESTINAL: No abdominal or epigastric pain; no change in appetite; no early satiety; no nausea, vomiting, or hematemesis; no diarrhea or constipation; no melena or hematochezia; no change of stool caliber  GENITOURINARY: No dysuria, increased in urinary frequency or hematuria; no urethral discharge  NEUROLOGICAL: No headache/dizziness; no focal numbness or motor weakness  MSK: No joint pain, erythema, or swelling; no back pain  SKIN: No itching, rashes; no recent insect bites  All other ROS negative except noted above    Prior hospital charts reviewed [Yes]  Primary team notes reviewed [Yes]  Other consultant notes reviewed [Yes]    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,     HTN (hypertension)    Dementia    HLD (hyperlipidemia)    DM (diabetes mellitus)    Asthma    CAD (coronary artery disease)  s/p stent placement    Sinus node dysfunction  s/p Medtronic PPM    Cardiac pacemaker  Medtronic (SN: [id number]; Model: [id number])    S/P coronary artery stent placement        SOCIAL HISTORY:  - Born in _____, [place] in 19XX  - Currently working as / Retired  - Lives with _____; no pets  - No recent travel  - Denies tobacco use  - Denies alcohol use  - Denies illicit drug use  - Currently sexually active, has one male/female sexual partner    FAMILY HISTORY:  FH: diabetes mellitus  Mother, Brother    FHx: dementia  Father        Allergies:  No Known Allergies      ANTIMICROBIALS:  ampicillin  IVPB 2 every 4 hours  cefTRIAXone   IVPB        ANTIMICROBIALS (past 90 days):  MEDICATIONS  (STANDING):    ampicillin  IVPB   216 mL/Hr IV Intermittent (22 @ 15:10)    cefTRIAXone   IVPB   100 mL/Hr IV Intermittent (04-15-22 @ 15:32)    vancomycin  IVPB   250 mL/Hr IV Intermittent (04-15-22 @ 21:08)    vancomycin  IVPB   250 mL/Hr IV Intermittent (22 @ 06:48)        OTHER MEDS:   MEDICATIONS  (STANDING):  acetaminophen     Tablet .. 650 every 6 hours PRN  ALBUTerol    90 MICROgram(s) HFA Inhaler 2 every 6 hours PRN  aluminum hydroxide/magnesium hydroxide/simethicone Suspension 30 every 4 hours PRN  aspirin enteric coated 81 daily  atorvastatin 20 at bedtime  dextrose 50% Injectable 25 once  dextrose 50% Injectable 12.5 once  dextrose 50% Injectable 25 once  dextrose Oral Gel 15 once PRN  glucagon  Injectable 1 once  heparin   Injectable 5000 every 8 hours  insulin lispro (ADMELOG) corrective regimen sliding scale  three times a day before meals  insulin lispro (ADMELOG) corrective regimen sliding scale  at bedtime  melatonin 3 at bedtime PRN  mineral oil enema 133 daily  ondansetron Injectable 4 every 8 hours PRN  senna 2 at bedtime  tamsulosin 0.4 at bedtime      VITALS:  Vital Signs Last 24 Hrs  T(F): 98.1 (22 @ 14:30), Max: 98.2 (22 @ 18:08)    Vital Signs Last 24 Hrs  HR: 51 (22 @ 14:30) (40 - 62)  BP: 123/68 (22 @ 14:30) (123/68 - 149/87)  RR: 18 (22 @ 14:30)  SpO2: 99% (22 @ 14:30) (99% - 100%)  Wt(kg): --    EXAM:  GENERAL: NAD, lying in bed comfortably  HEAD: Atraumatic, Normocephalic  EYES: EOMI, PERRLA, conjunctiva pink and cornea white  ENT: Normal external ears and nose, no discharges; moist mucous membranes, no erythema on posterior oropharynx  NECK: Supple, nontender to palpation; no JVD  CHEST/LUNG: Clear to auscultation bilaterally; No rales, rhonchi, wheezing, or rubs. Unlabored respirations  HEART: Regular rate and rhythm; No murmurs, rubs, or gallops  ABDOMEN: Soft, nontender, nondistended; no rebound tenderness, no guarding; no hepatomegaly; normoactive/hyperactive/hypoactive bowel sounds  EXTREMITIES:  2+ Peripheral Pulses, brisk capillary refill. No clubbing, cyanosis, or petal edema  NERVOUS SYSTEM: Alert and oriented to person, time, place and situation, speech clear. No focal deficits   MSK: FROM all 4 extremities, full and equal strength; no joint erythema, swelling or pain  SKIN: No rashes or lesions, no superficial thrombophlebitis    Labs:                        11.3   4.18  )-----------( 254      ( 2022 07:34 )             34.3     -    139  |  106  |  10  ----------------------------<  75  4.1   |  16<L>  |  0.88    Ca    9.7      2022 07:34  Phos  2.1       Mg     2.00         TPro  6.4  /  Alb  3.1<L>  /  TBili  0.7  /  DBili  x   /  AST  15  /  ALT  8   /  AlkPhos  74  04-15      WBC Trend:  WBC Count: 4.18 (22 @ 07:34)  WBC Count: 3.91 (04-15-22 @ 13:34)      Auto Neutrophil #: 2.29 K/uL (04-15-22 @ 13:34)  Auto Neutrophil #: 2.10 K/uL (22 @ 18:23)  Auto Neutrophil #: 1.91 K/uL (22 @ 06:54)  Auto Neutrophil #: 2.03 K/uL (22 @ 07:04)  Auto Neutrophil #: 2.58 K/uL (22 @ 06:36)      Creatine Trend:  Creatinine, Serum: 0.88 ()  Creatinine, Serum: 1.03 (04-15)      Liver Biochemical Testing Trend:  Alanine Aminotransferase (ALT/SGPT): 8 (04-15)  Alanine Aminotransferase (ALT/SGPT): 17 ()  Alanine Aminotransferase (ALT/SGPT): 14 ()  Alanine Aminotransferase (ALT/SGPT): 15 ()  Alanine Aminotransferase (ALT/SGPT): 12 ()  Aspartate Aminotransferase (AST/SGOT): 15 (04-15-22 @ 13:34)  Aspartate Aminotransferase (AST/SGOT): 49 (22 @ 08:43)  Aspartate Aminotransferase (AST/SGOT): 16 (22 @ 18:23)  Aspartate Aminotransferase (AST/SGOT): 21 (22 @ 07:08)  Aspartate Aminotransferase (AST/SGOT): 19 (22 @ 03:47)  Bilirubin Total, Serum: 0.7 (04-15)  Bilirubin Total, Serum: 0.8 ()  Bilirubin Total, Serum: 0.8 ()  Bilirubin Total, Serum: 1.5 ()  Bilirubin Total, Serum: 1.6 ()      Trend LDH      Auto Eosinophil %: 3.6 % (04-15-22 @ 13:34)      Urinalysis Basic - ( 15 Apr 2022 14:40 )    Color: Light Orange / Appearance: Turbid / S.019 / pH: x  Gluc: x / Ketone: Negative  / Bili: Negative / Urobili: 3 mg/dL   Blood: x / Protein: 30 mg/dL / Nitrite: Negative   Leuk Esterase: Large / RBC: 13 /HPF / WBC >50 /HPF   Sq Epi: x / Non Sq Epi: 0 /HPF / Bacteria: Negative        MICROBIOLOGY:        Culture - Blood (collected 15 Apr 2022 18:10)  Source: .Blood Blood-Peripheral  Preliminary Report:    Growth in aerobic and anaerobic bottles: Gram Positive Cocci in Pairs and    Chains    Culture - Blood (collected 15 Apr 2022 18:10)  Source: .Blood Blood-Peripheral  Preliminary Report:    Growth in aerobic bottle: Gram positive cocci in pairs    Growth in anaerobic bottle: Gram Positive Cocci in Pairs and Chains    ***Blood Panel PCR results on this specimen are available    approximately 3 hours after the Gram stain result.***    Gram stain, PCR, and/or culture results may not always    correspond due to difference in methodologies.    ************************************************************    This PCR assay was performed by multiplex PCR. This    Assay tests for 66 bacterial and resistance gene targets.    Please refer to the [facility] test directory    at [email or web address] for details.  Organism: Blood Culture PCR  Organism: Blood Culture PCR    Sensitivities:      -  Enterococcus faecalis: Detec      Method Type: PCR    Culture - Blood (collected 2022 04:37)  Source: .Blood Blood-Venous  Final Report:    No Growth Final    Culture - Blood (collected 2022 04:15)  Source: .Blood Blood-Peripheral  Final Report:    No Growth Final    Culture - Urine (collected 2022 22:07)  Source: Catheterized Catheterized  Final Report:    <10,000 CFU/mL Normal Urogenital Bozena    Culture - Blood (collected 30 Dec 2021 15:19)  Source: .Blood Blood-Venous  Final Report:    No Growth Final    Culture - Blood (collected 30 Dec 2021 15:19)  Source: .Blood Blood-Peripheral  Final Report:    No Growth Final    Culture - Fungal, Other (collected 30 Dec 2021 00:59)  Source: .Other Other  Final Report:    No fungus isolated after 4 weeks.    Culture - Acid Fast - Other w/Smear (collected 30 Dec 2021 00:59)  Source: .Other Other  Final Report:    No acid fast bacilli isolated after 6 weeks.    Culture - Surgical Swab (collected 30 Dec 2021 00:58)  Source: .Surgical Swab extracted leads  Final Report:    Growth in fluid media only Staphylococcus epidermidis  Organism: Staphylococcus epidermidis  Organism: Staphylococcus epidermidis    Sensitivities:      -  Ampicillin/Sulbactam: S <=8/4      -  Cefazolin: S <=4      -  Clindamycin: S <=0.25      -  Erythromycin: S <=0.25      -  Gentamicin: S <=1 Should not be used as monotherapy      -  Oxacillin: S <=0.25      -  Rifampin: S <=1 Should not be used as monotherapy      -  Tetra/Doxy: S <=1      -  Trimethoprim/Sulfamethoxazole: S <=0.5/9.5      -  Vancomycin: S 2      Method Type: ZAIN    COVID-19 PCR: NotDetec (04-15-22 @ 14:46)  COVID-19 PCR: NotDetec (22 @ 19:16)  COVID-19 PCR: NotDetec (22 @ 18:19)  COVID-19 PCR: NotDetec (22 @ 08:45)  COVID-19 PCR: NotDetec (22 @ 06:34)    COVID-19 Josh Domain AB Interp: Positive (21 @ 16:00)      Troponin T, High Sensitivity Result: 53 (04-15)    Blood Gas Venous - Lactate: 1.5 (04-15 @ 13:34)    A1C with Estimated Average Glucose Result: 5.3 % (21 @ 06:49)      RADIOLOGY:  imaging below personally reviewed    < from: CT Abdomen and Pelvis w/ IV Cont (22 @ 10:50) >  Prominent stool burden of the colon. Rectal fecal impaction and concern   for stercoral colitis. No free air.    Focal infiltration of the subcutaneous fat overlying the coccyx.   Correlate with direct visualization for potential developing decubitus   ulcer.    Dependent atelectasis/scarring and groundglass of the visualized thorax,   right greater than left. Distal airways mucus impaction right lower lobe.   Correlate for any signs and symptoms of superimposed infection.    Coronary artery calcification.    < end of copied text >   INFECTIOUS DISEASE CONSULT NOTE    Patient is a 81y old  Male who presents with a chief complaint of Dysphagia/odynophagia (2022 16:37)    HPI:  81 y.o. M with PMH of  HTN, T2DM, HLD, constipation CVA w R sided hemiplegia, CAD s/p PIC, diastolic CHF,  sinus node dysfunction, bedbound who presents with dysphagia/odynophagia. This morning, while having breakfast, pt ate a piece of bread, after which he began coughing/choking and yelling that he had throat pain. Usually eats a pureed diet with thin liquids. Per his wife, he sometimes coughs and clears his throat when eating; she believes this happens when the HHAs feed him too quickly. Also, a few days ago, the [facility] told pt's wife that pt was complaining of throat pain; he had no recurrent complaints until today. Otherwise, no fevers, chills, N/V/D, dysuria, rash, or abdominal pain.     In the ED, patient was found to be hypotensive/bradycardic. BP improved with IVF. He was given a dose of CTX in the ED.  (15 Apr 2022 18:39)       REVIEW OF SYSTEMS:  lno fever, chills.    Prior hospital charts reviewed [Yes]  Primary team notes reviewed [Yes]  Other consultant notes reviewed [Yes]    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,     HTN (hypertension)    Dementia    HLD (hyperlipidemia)    DM (diabetes mellitus)    Asthma    CAD (coronary artery disease)  s/p stent placement    Sinus node dysfunction  s/p Medtronic PPM    Cardiac pacemaker  Medtronic (SN: [id number]; Model: [id number])    S/P coronary artery stent placement        SOCIAL HISTORY:  - Born in [place]  - Currently working as / Retired  - Lives with _____; no pets  - No recent travel  - Denies tobacco use  - Denies alcohol use  - Denies illicit drug use  - Currently sexually active, has one male/female sexual partner    FAMILY HISTORY:  FH: diabetes mellitus  Mother, Brother    FHx: dementia  Father        Allergies:  No Known Allergies      ANTIMICROBIALS:  ampicillin  IVPB 2 every 4 hours  cefTRIAXone   IVPB        ANTIMICROBIALS (past 90 days):  MEDICATIONS  (STANDING):    ampicillin  IVPB   216 mL/Hr IV Intermittent (22 @ 15:10)    cefTRIAXone   IVPB   100 mL/Hr IV Intermittent (04-15-22 @ 15:32)    vancomycin  IVPB   250 mL/Hr IV Intermittent (04-15-22 @ 21:08)    vancomycin  IVPB   250 mL/Hr IV Intermittent (22 @ 06:48)        OTHER MEDS:   MEDICATIONS  (STANDING):  acetaminophen     Tablet .. 650 every 6 hours PRN  ALBUTerol    90 MICROgram(s) HFA Inhaler 2 every 6 hours PRN  aluminum hydroxide/magnesium hydroxide/simethicone Suspension 30 every 4 hours PRN  aspirin enteric coated 81 daily  atorvastatin 20 at bedtime  dextrose 50% Injectable 25 once  dextrose 50% Injectable 12.5 once  dextrose 50% Injectable 25 once  dextrose Oral Gel 15 once PRN  glucagon  Injectable 1 once  heparin   Injectable 5000 every 8 hours  insulin lispro (ADMELOG) corrective regimen sliding scale  three times a day before meals  insulin lispro (ADMELOG) corrective regimen sliding scale  at bedtime  melatonin 3 at bedtime PRN  mineral oil enema 133 daily  ondansetron Injectable 4 every 8 hours PRN  senna 2 at bedtime  tamsulosin 0.4 at bedtime      VITALS:  Vital Signs Last 24 Hrs  T(F): 98.1 (22 @ 14:30), Max: 98.2 (22 @ 18:08)    Vital Signs Last 24 Hrs  HR: 51 (22 @ 14:30) (40 - 62)  BP: 123/68 (22 @ 14:30) (123/68 - 149/87)  RR: 18 (22 @ 14:30)  SpO2: 99% (22 @ 14:30) (99% - 100%)  Wt(kg): --    EXAM:  GENERAL: NAD, lying in bed comfortably  HEAD: Atraumatic, Normocephalic  EYES: EOMI, PERRLA, conjunctiva pink and cornea white  ENT: Normal external ears and nose, no discharges; moist mucous membranes, no erythema on posterior oropharynx  NECK: Supple, nontender to palpation; no JVD  CHEST/LUNG: Clear to auscultation bilaterally; No rales, rhonchi, wheezing, or rubs. Unlabored respirations  HEART: Regular rate and rhythm; No murmurs, rubs, or gallops  ABDOMEN: Soft, nontender, nondistended; no rebound tenderness, no guarding; no hepatomegaly; normoactive/hyperactive/hypoactive bowel sounds   MSK: right side weakness right arm contracted  SKIN: No rash    Labs:                        11.3   4.18  )-----------( 254      ( 2022 07:34 )             34.3     -    139  |  106  |  10  ----------------------------<  75  4.1   |  16<L>  |  0.88    Ca    9.7      2022 07:34  Phos  2.1       Mg     2.00         TPro  6.4  /  Alb  3.1<L>  /  TBili  0.7  /  DBili  x   /  AST  15  /  ALT  8   /  AlkPhos  74  04-15      WBC Trend:  WBC Count: 4.18 (22 @ 07:34)  WBC Count: 3.91 (04-15-22 @ 13:34)      Auto Neutrophil #: 2.29 K/uL (04-15-22 @ 13:34)  Auto Neutrophil #: 2.10 K/uL (22 @ 18:23)  Auto Neutrophil #: 1.91 K/uL (22 @ 06:54)  Auto Neutrophil #: 2.03 K/uL (22 @ 07:04)  Auto Neutrophil #: 2.58 K/uL (22 @ 06:36)      Creatine Trend:  Creatinine, Serum: 0.88 ()  Creatinine, Serum: 1.03 (04-15)      Liver Biochemical Testing Trend:  Alanine Aminotransferase (ALT/SGPT): 8 (04-15)  Alanine Aminotransferase (ALT/SGPT): 17 ()  Alanine Aminotransferase (ALT/SGPT): 14 ()  Alanine Aminotransferase (ALT/SGPT): 15 ()  Alanine Aminotransferase (ALT/SGPT): 12 ()  Aspartate Aminotransferase (AST/SGOT): 15 (04-15-22 @ 13:34)  Aspartate Aminotransferase (AST/SGOT): 49 (22 @ 08:43)  Aspartate Aminotransferase (AST/SGOT): 16 (22 @ 18:23)  Aspartate Aminotransferase (AST/SGOT): 21 (22 @ 07:08)  Aspartate Aminotransferase (AST/SGOT): 19 (22 @ 03:47)  Bilirubin Total, Serum: 0.7 (-15)  Bilirubin Total, Serum: 0.8 (-)  Bilirubin Total, Serum: 0.8 ()  Bilirubin Total, Serum: 1.5 ()  Bilirubin Total, Serum: 1.6 ()          Auto Eosinophil %: 3.6 % (04-15-22 @ 13:34)      Urinalysis Basic - ( 15 Apr 2022 14:40 )    Color: Light Orange / Appearance: Turbid / S.019 / pH: x  Gluc: x / Ketone: Negative  / Bili: Negative / Urobili: 3 mg/dL   Blood: x / Protein: 30 mg/dL / Nitrite: Negative   Leuk Esterase: Large / RBC: 13 /HPF / WBC >50 /HPF   Sq Epi: x / Non Sq Epi: 0 /HPF / Bacteria: Negative        MICROBIOLOGY:        Culture - Blood (collected 15 Apr 2022 18:10)  Source: .Blood Blood-Peripheral  Preliminary Report:    Growth in aerobic and anaerobic bottles: Gram Positive Cocci in Pairs and    Chains    Culture - Blood (collected 15 Apr 2022 18:10)  Source: .Blood Blood-Peripheral  Preliminary Report:    Growth in aerobic bottle: Gram positive cocci in pairs    Growth in anaerobic bottle: Gram Positive Cocci in Pairs and Chains    ***Blood Panel PCR results on this specimen are available    approximately 3 hours after the Gram stain result.***    Gram stain, PCR, and/or culture results may not always    correspond due to difference in methodologies.    ************************************************************    This PCR assay was performed by multiplex PCR. This    Assay tests for 66 bacterial and resistance gene targets.    Please refer to the [facility] test directory    at [email or web address] for details.  Organism: Blood Culture PCR  Organism: Blood Culture PCR    Sensitivities:      -  Enterococcus faecalis: Detec      Method Type: PCR    Culture - Blood (collected 2022 04:37)  Source: .Blood Blood-Venous  Final Report:    No Growth Final    Culture - Blood (collected 2022 04:15)  Source: .Blood Blood-Peripheral  Final Report:    No Growth Final    Culture - Urine (collected 2022 22:07)  Source: Catheterized Catheterized  Final Report:    <10,000 CFU/mL Normal Urogenital Bozena    Culture - Blood (collected 30 Dec 2021 15:19)  Source: .Blood Blood-Venous  Final Report:    No Growth Final    Culture - Blood (collected 30 Dec 2021 15:19)  Source: .Blood Blood-Peripheral  Final Report:    No Growth Final    Culture - Fungal, Other (collected 30 Dec 2021 00:59)  Source: .Other Other  Final Report:    No fungus isolated after 4 weeks.    Culture - Acid Fast - Other w/Smear (collected 30 Dec 2021 00:59)  Source: .Other Other  Final Report:    No acid fast bacilli isolated after 6 weeks.    Culture - Surgical Swab (collected 30 Dec 2021 00:58)  Source: .Surgical Swab extracted leads  Final Report:    Growth in fluid media only Staphylococcus epidermidis  Organism: Staphylococcus epidermidis  Organism: Staphylococcus epidermidis    Sensitivities:      -  Ampicillin/Sulbactam: S <=8/4      -  Cefazolin: S <=4      -  Clindamycin: S <=0.25      -  Erythromycin: S <=0.25      -  Gentamicin: S <=1 Should not be used as monotherapy      -  Oxacillin: S <=0.25      -  Rifampin: S <=1 Should not be used as monotherapy      -  Tetra/Doxy: S <=1      -  Trimethoprim/Sulfamethoxazole: S <=0.5/9.5      -  Vancomycin: S 2      Method Type: ZAIN    COVID-19 PCR: NotDetec (04-15-22 @ 14:46)  COVID-19 PCR: NotDetec (22 @ 19:16)  COVID-19 PCR: NotDetec (22 @ 18:19)  COVID-19 PCR: NotDetec (22 @ 08:45)  COVID-19 PCR: NotDetec (22 @ 06:34)    COVID-19 Josh Domain AB Interp: Positive (21 @ 16:00)      Troponin T, High Sensitivity Result: 53 (-15)    Blood Gas Venous - Lactate: 1.5 (04-15 @ 13:34)    A1C with Estimated Average Glucose Result: 5.3 % (-17- @ 06:49)      RADIOLOGY:  imaging below personally reviewed    < from: CT Abdomen and Pelvis w/ IV Cont (22 @ 10:50) >  Prominent stool burden of the colon. Rectal fecal impaction and concern   for stercoral colitis. No free air.    Focal infiltration of the subcutaneous fat overlying the coccyx.   Correlate with direct visualization for potential developing decubitus   ulcer.    Dependent atelectasis/scarring and groundglass of the visualized thorax,   right greater than left. Distal airways mucus impaction right lower lobe.   Correlate for any signs and symptoms of superimposed infection.    Coronary artery calcification.    < end of copied text >   INFECTIOUS DISEASE CONSULT NOTE    Patient is a 81y old  Male who presents with a chief complaint of Dysphagia/odynophagia (2022 16:37)    HPI:  81 y.o. M with PMH of  HTN, T2DM, HLD, constipation CVA w R sided hemiplegia, CAD s/p PIC, diastolic CHF,  sinus node dysfunction, bedbound who presents with dysphagia/odynophagia. This morning, while having breakfast, pt ate a piece of bread, after which he began coughing/choking and yelling that he had throat pain. Usually eats a pureed diet with thin liquids. Per his wife, he sometimes coughs and clears his throat when eating; she believes this happens when the HHAs feed him too quickly. Also, a few days ago, the [facility] told pt's wife that pt was complaining of throat pain; he had no recurrent complaints until today. Otherwise, no fevers, chills, N/V/D, dysuria, rash, or abdominal pain.     In the ED, patient was found to be hypotensive/bradycardic. BP improved with IVF. He was given a dose of CTX in the ED.  (15 Apr 2022 18:39)       REVIEW OF SYSTEMS:  no fever, chills.    Prior hospital charts reviewed [Yes]  Primary team notes reviewed [Yes]  Other consultant notes reviewed [Yes]    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,     HTN (hypertension)    Dementia    HLD (hyperlipidemia)    DM (diabetes mellitus)    Asthma    CAD (coronary artery disease)  s/p stent placement    Sinus node dysfunction  s/p Medtronic PPM    Cardiac pacemaker  Medtronic (SN: [id number]; Model: [id number])    S/P coronary artery stent placement        SOCIAL HISTORY:  Unable to obtain due to cognitive impairment    FAMILY HISTORY:  FH: diabetes mellitus  Mother, Brother    FHx: dementia  Father        Allergies:  No Known Allergies      ANTIMICROBIALS:  ampicillin  IVPB 2 every 4 hours  cefTRIAXone   IVPB        ANTIMICROBIALS (past 90 days):  MEDICATIONS  (STANDING):    ampicillin  IVPB   216 mL/Hr IV Intermittent (22 @ 15:10)    cefTRIAXone   IVPB   100 mL/Hr IV Intermittent (04-15-22 @ 15:32)    vancomycin  IVPB   250 mL/Hr IV Intermittent (04-15-22 @ 21:08)    vancomycin  IVPB   250 mL/Hr IV Intermittent (04-16-22 @ 06:48)        OTHER MEDS:   MEDICATIONS  (STANDING):  acetaminophen     Tablet .. 650 every 6 hours PRN  ALBUTerol    90 MICROgram(s) HFA Inhaler 2 every 6 hours PRN  aluminum hydroxide/magnesium hydroxide/simethicone Suspension 30 every 4 hours PRN  aspirin enteric coated 81 daily  atorvastatin 20 at bedtime  dextrose 50% Injectable 25 once  dextrose 50% Injectable 12.5 once  dextrose 50% Injectable 25 once  dextrose Oral Gel 15 once PRN  glucagon  Injectable 1 once  heparin   Injectable 5000 every 8 hours  insulin lispro (ADMELOG) corrective regimen sliding scale  three times a day before meals  insulin lispro (ADMELOG) corrective regimen sliding scale  at bedtime  melatonin 3 at bedtime PRN  mineral oil enema 133 daily  ondansetron Injectable 4 every 8 hours PRN  senna 2 at bedtime  tamsulosin 0.4 at bedtime      VITALS:  Vital Signs Last 24 Hrs  T(F): 98.1 (22 @ 14:30), Max: 98.2 (22 @ 18:08)    Vital Signs Last 24 Hrs  HR: 51 (22 @ 14:30) (40 - 62)  BP: 123/68 (22 @ 14:30) (123/68 - 149/87)  RR: 18 (22 @ 14:30)  SpO2: 99% (22 @ 14:30) (99% - 100%)  Wt(kg): --    EXAM:  GENERAL: NAD, lying in bed  HEAD: Atraumatic, Normocephalic  EYES: Conjunctiva pink and cornea white  ENT: Normal external ears and nose, no discharges; moist mucous membranes  NECK: Supple  CHEST/LUNG: Clear to auscultation bilaterally;  HEART: Regular rate and rhythm; No murmurs  ABDOMEN: Soft, nontender, nondistended; normoactive bowel sounds   MSK: right side weakness right arm contracted  SKIN: No rash    Labs:                        11.3   4.18  )-----------( 254      ( 2022 07:34 )             34.3     -    139  |  106  |  10  ----------------------------<  75  4.1   |  16<L>  |  0.88    Ca    9.7      2022 07:34  Phos  2.1       Mg     2.00         TPro  6.4  /  Alb  3.1<L>  /  TBili  0.7  /  DBili  x   /  AST  15  /  ALT  8   /  AlkPhos  74  04-15      WBC Trend:  WBC Count: 4.18 (22 @ 07:34)  WBC Count: 3.91 (04-15-22 @ 13:34)      Auto Neutrophil #: 2.29 K/uL (04-15-22 @ 13:34)  Auto Neutrophil #: 2.10 K/uL (22 @ 18:23)  Auto Neutrophil #: 1.91 K/uL (22 @ 06:54)  Auto Neutrophil #: 2.03 K/uL (22 @ 07:04)  Auto Neutrophil #: 2.58 K/uL (22 @ 06:36)      Creatine Trend:  Creatinine, Serum: 0.88 ()  Creatinine, Serum: 1.03 (04-15)      Liver Biochemical Testing Trend:  Alanine Aminotransferase (ALT/SGPT): 8 (04-15)  Alanine Aminotransferase (ALT/SGPT): 17 ()  Alanine Aminotransferase (ALT/SGPT): 14 ()  Alanine Aminotransferase (ALT/SGPT): 15 ()  Alanine Aminotransferase (ALT/SGPT): 12 ()  Aspartate Aminotransferase (AST/SGOT): 15 (04-15-22 @ 13:34)  Aspartate Aminotransferase (AST/SGOT): 49 (22 @ 08:43)  Aspartate Aminotransferase (AST/SGOT): 16 (22 @ 18:23)  Aspartate Aminotransferase (AST/SGOT): 21 (22 @ 07:08)  Aspartate Aminotransferase (AST/SGOT): 19 (22 @ 03:47)  Bilirubin Total, Serum: 0.7 (04-15)  Bilirubin Total, Serum: 0.8 ()  Bilirubin Total, Serum: 0.8 ()  Bilirubin Total, Serum: 1.5 ()  Bilirubin Total, Serum: 1.6 ()          Auto Eosinophil %: 3.6 % (04-15-22 @ 13:34)      Urinalysis Basic - ( 15 Apr 2022 14:40 )    Color: Light Orange / Appearance: Turbid / S.019 / pH: x  Gluc: x / Ketone: Negative  / Bili: Negative / Urobili: 3 mg/dL   Blood: x / Protein: 30 mg/dL / Nitrite: Negative   Leuk Esterase: Large / RBC: 13 /HPF / WBC >50 /HPF   Sq Epi: x / Non Sq Epi: 0 /HPF / Bacteria: Negative        MICROBIOLOGY:        Culture - Blood (collected 15 Apr 2022 18:10)  Source: .Blood Blood-Peripheral  Preliminary Report:    Growth in aerobic and anaerobic bottles: Gram Positive Cocci in Pairs and    Chains    Culture - Blood (collected 15 Apr 2022 18:10)  Source: .Blood Blood-Peripheral  Preliminary Report:    Growth in aerobic bottle: Gram positive cocci in pairs    Growth in anaerobic bottle: Gram Positive Cocci in Pairs and Chains    ***Blood Panel PCR results on this specimen are available    approximately 3 hours after the Gram stain result.***    Gram stain, PCR, and/or culture results may not always    correspond due to difference in methodologies.    ************************************************************    This PCR assay was performed by multiplex PCR. This    Assay tests for 66 bacterial and resistance gene targets.    Please refer to the [facility] test directory    at [email or web address] for details.  Organism: Blood Culture PCR  Organism: Blood Culture PCR    Sensitivities:      -  Enterococcus faecalis: Detec      Method Type: PCR    Culture - Blood (collected 2022 04:37)  Source: .Blood Blood-Venous  Final Report:    No Growth Final    Culture - Blood (collected 2022 04:15)  Source: .Blood Blood-Peripheral  Final Report:    No Growth Final    Culture - Urine (collected 2022 22:07)  Source: Catheterized Catheterized  Final Report:    <10,000 CFU/mL Normal Urogenital Bozena    Culture - Blood (collected 30 Dec 2021 15:19)  Source: .Blood Blood-Venous  Final Report:    No Growth Final    Culture - Blood (collected 30 Dec 2021 15:19)  Source: .Blood Blood-Peripheral  Final Report:    No Growth Final    Culture - Fungal, Other (collected 30 Dec 2021 00:59)  Source: .Other Other  Final Report:    No fungus isolated after 4 weeks.    Culture - Acid Fast - Other w/Smear (collected 30 Dec 2021 00:59)  Source: .Other Other  Final Report:    No acid fast bacilli isolated after 6 weeks.    Culture - Surgical Swab (collected 30 Dec 2021 00:58)  Source: .Surgical Swab extracted leads  Final Report:    Growth in fluid media only Staphylococcus epidermidis  Organism: Staphylococcus epidermidis  Organism: Staphylococcus epidermidis    Sensitivities:      -  Ampicillin/Sulbactam: S <=8/4      -  Cefazolin: S <=4      -  Clindamycin: S <=0.25      -  Erythromycin: S <=0.25      -  Gentamicin: S <=1 Should not be used as monotherapy      -  Oxacillin: S <=0.25      -  Rifampin: S <=1 Should not be used as monotherapy      -  Tetra/Doxy: S <=1      -  Trimethoprim/Sulfamethoxazole: S <=0.5/9.5      -  Vancomycin: S 2      Method Type: ZAIN    COVID-19 PCR: NotDetec (04-15-22 @ 14:46)  COVID-19 PCR: NotDetec (22 @ 19:16)  COVID-19 PCR: NotDetec (22 @ 18:19)  COVID-19 PCR: NotDetec (22 @ 08:45)  COVID-19 PCR: NotDetec (22 @ 06:34)    COVID-19 Josh Domain AB Interp: Positive (21 @ 16:00)      Troponin T, High Sensitivity Result: 53 (04-15)    Blood Gas Venous - Lactate: 1.5 (04-15 @ 13:34)    A1C with Estimated Average Glucose Result: 5.3 % (21 @ 06:49)      RADIOLOGY:  imaging below personally reviewed    < from: CT Abdomen and Pelvis w/ IV Cont (22 @ 10:50) >  Prominent stool burden of the colon. Rectal fecal impaction and concern   for stercoral colitis. No free air.    Focal infiltration of the subcutaneous fat overlying the coccyx.   Correlate with direct visualization for potential developing decubitus   ulcer.    Dependent atelectasis/scarring and groundglass of the visualized thorax,   right greater than left. Distal airways mucus impaction right lower lobe.   Correlate for any signs and symptoms of superimposed infection.    Coronary artery calcification.    < end of copied text >

## 2022-04-16 NOTE — CONSULT NOTE ADULT - ASSESSMENT
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, s/p PPM extraction and 6 weeks of vancomycin and ceftriaxone  Had abdominal pain and CT showed prominent stool burden and possible stercoral colitis; possible sacral ulcer, and atelectasis\]  Afebrile, no leukocytosis, poor mental status so unable to give history  UA WBC > 50, UCx pending  BCx showed 3/4 bottles of enterococcus bacteremia  Likely GI vs Gu sources  Sacral area with skin loss but no acute ulceration or abscess formation      IMPRESSION:  Enterococcus bacteremia  Stercoral colitis  UTI  Physical debilitation    RECOMMENDATIONS:  - D/C vancomycin, increase IV ampicillin to 2gm q4hrs and add IV ceftriaxone 2gm q12hrs  - Repeat 2 sets of blood culture in 48hrs  - TTE, will likely need HARIKA due to previous presumed endocarditis  - Follow up urine culture  - Laxatives  - Offloading  - Consider ENT if throat pain persists  - Trend WBC, fever curve, transaminases, creatinine daily  - Will continue to follow    Patient is seen and examined with attending and case is discussed with primary team      Nicole Lindsey, DO  PGY4 ID fellow  Please contact me via page or text through [employer] Teams  If after 5PM or on weekends, please call [phone]     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, s/p PPM extraction and 6 weeks of vancomycin and ceftriaxone  Had abdominal pain and CT showed prominent stool burden and possible stercoral colitis; possible sacral ulcer, and atelectasis  Afebrile, no leukocytosis, poor mental status so unable to give history  UA WBC > 50, UCx pending  BCx showed 3/4 bottles of enterococcus bacteremia  Likely GI vs Gu sources  Sacral area with skin loss but no acute ulceration or abscess formation      IMPRESSION:  Enterococcus bacteremia  Stercoral colitis  UTI  Physical debilitation    RECOMMENDATIONS:  - D/C vancomycin, increase IV ampicillin to 2gm q4hrs and add IV ceftriaxone 2gm q12hrs  - Repeat 2 sets of blood culture in 48hrs  - TTE, will likely need HARIKA due to previous presumed endocarditis  - Follow up urine culture  - Laxatives  - Offloading  - Consider ENT if throat pain persists  - Trend WBC, fever curve, transaminases, creatinine daily  - Will continue to follow    Patient is seen and examined with attending and case is discussed with primary team      Nicole Lindsey, DO  PGY4 ID fellow  Please contact me via page or text through [employer] Teams  If after 5PM or on weekends, please call [phone]

## 2022-04-16 NOTE — PATIENT PROFILE ADULT - STATED REASON FOR ADMISSION
Pt is A&Ox1, unable to answer questions or obtain information at this time. Presents with dysphagia/ odynophagia. Once arrived to ED found to be hypotensive/bradycardia.

## 2022-04-16 NOTE — PROVIDER CONTACT NOTE (OTHER) - ACTION/TREATMENT ORDERED:
As per PA, zoll and atropine set up at bedside. Will continue to monitor pt's status. VS q4 continues.

## 2022-04-16 NOTE — PROVIDER CONTACT NOTE (OTHER) - ASSESSMENT
Condom cath in place, 100 ml noted in bag. No bladder distention noted. Bladder scan performed and 199 ml of retained urine noted.

## 2022-04-17 LAB
ANION GAP SERPL CALC-SCNC: 10 MMOL/L — SIGNIFICANT CHANGE UP (ref 7–14)
BUN SERPL-MCNC: 8 MG/DL — SIGNIFICANT CHANGE UP (ref 7–23)
CALCIUM SERPL-MCNC: 9.5 MG/DL — SIGNIFICANT CHANGE UP (ref 8.4–10.5)
CHLORIDE SERPL-SCNC: 108 MMOL/L — HIGH (ref 98–107)
CO2 SERPL-SCNC: 23 MMOL/L — SIGNIFICANT CHANGE UP (ref 22–31)
CREAT SERPL-MCNC: 0.92 MG/DL — SIGNIFICANT CHANGE UP (ref 0.5–1.3)
CULTURE RESULTS: SIGNIFICANT CHANGE UP
EGFR: 83 ML/MIN/1.73M2 — SIGNIFICANT CHANGE UP
GLUCOSE BLDC GLUCOMTR-MCNC: 115 MG/DL — HIGH (ref 70–99)
GLUCOSE BLDC GLUCOMTR-MCNC: 117 MG/DL — HIGH (ref 70–99)
GLUCOSE BLDC GLUCOMTR-MCNC: 60 MG/DL — LOW (ref 70–99)
GLUCOSE BLDC GLUCOMTR-MCNC: 66 MG/DL — LOW (ref 70–99)
GLUCOSE BLDC GLUCOMTR-MCNC: 73 MG/DL — SIGNIFICANT CHANGE UP (ref 70–99)
GLUCOSE BLDC GLUCOMTR-MCNC: 78 MG/DL — SIGNIFICANT CHANGE UP (ref 70–99)
GLUCOSE BLDC GLUCOMTR-MCNC: 84 MG/DL — SIGNIFICANT CHANGE UP (ref 70–99)
GLUCOSE SERPL-MCNC: 104 MG/DL — HIGH (ref 70–99)
HCT VFR BLD CALC: 29.2 % — LOW (ref 39–50)
HGB BLD-MCNC: 9.6 G/DL — LOW (ref 13–17)
MAGNESIUM SERPL-MCNC: 1.9 MG/DL — SIGNIFICANT CHANGE UP (ref 1.6–2.6)
MCHC RBC-ENTMCNC: 29.9 PG — SIGNIFICANT CHANGE UP (ref 27–34)
MCHC RBC-ENTMCNC: 32.9 GM/DL — SIGNIFICANT CHANGE UP (ref 32–36)
MCV RBC AUTO: 91 FL — SIGNIFICANT CHANGE UP (ref 80–100)
NRBC # BLD: 0 /100 WBCS — SIGNIFICANT CHANGE UP
NRBC # FLD: 0 K/UL — SIGNIFICANT CHANGE UP
PHOSPHATE SERPL-MCNC: 2.6 MG/DL — SIGNIFICANT CHANGE UP (ref 2.5–4.5)
PLATELET # BLD AUTO: 223 K/UL — SIGNIFICANT CHANGE UP (ref 150–400)
POTASSIUM SERPL-MCNC: 3.7 MMOL/L — SIGNIFICANT CHANGE UP (ref 3.5–5.3)
POTASSIUM SERPL-SCNC: 3.7 MMOL/L — SIGNIFICANT CHANGE UP (ref 3.5–5.3)
RBC # BLD: 3.21 M/UL — LOW (ref 4.2–5.8)
RBC # FLD: 14.2 % — SIGNIFICANT CHANGE UP (ref 10.3–14.5)
SODIUM SERPL-SCNC: 141 MMOL/L — SIGNIFICANT CHANGE UP (ref 135–145)
SPECIMEN SOURCE: SIGNIFICANT CHANGE UP
WBC # BLD: 2.92 K/UL — LOW (ref 3.8–10.5)
WBC # FLD AUTO: 2.92 K/UL — LOW (ref 3.8–10.5)

## 2022-04-17 PROCEDURE — 99233 SBSQ HOSP IP/OBS HIGH 50: CPT

## 2022-04-17 PROCEDURE — 99232 SBSQ HOSP IP/OBS MODERATE 35: CPT

## 2022-04-17 RX ORDER — POLYETHYLENE GLYCOL 3350 17 G/17G
17 POWDER, FOR SOLUTION ORAL ONCE
Refills: 0 | Status: COMPLETED | OUTPATIENT
Start: 2022-04-17 | End: 2022-04-17

## 2022-04-17 RX ADMIN — Medication 81 MILLIGRAM(S): at 11:58

## 2022-04-17 RX ADMIN — Medication 216 GRAM(S): at 10:43

## 2022-04-17 RX ADMIN — HEPARIN SODIUM 5000 UNIT(S): 5000 INJECTION INTRAVENOUS; SUBCUTANEOUS at 05:41

## 2022-04-17 RX ADMIN — Medication 216 GRAM(S): at 04:27

## 2022-04-17 RX ADMIN — HEPARIN SODIUM 5000 UNIT(S): 5000 INJECTION INTRAVENOUS; SUBCUTANEOUS at 13:50

## 2022-04-17 RX ADMIN — CEFTRIAXONE 100 MILLIGRAM(S): 500 INJECTION, POWDER, FOR SOLUTION INTRAMUSCULAR; INTRAVENOUS at 17:21

## 2022-04-17 RX ADMIN — Medication 216 GRAM(S): at 13:50

## 2022-04-17 RX ADMIN — Medication 133 MILLILITER(S): at 11:58

## 2022-04-17 RX ADMIN — Medication 216 GRAM(S): at 21:39

## 2022-04-17 RX ADMIN — Medication 216 GRAM(S): at 00:48

## 2022-04-17 RX ADMIN — ATORVASTATIN CALCIUM 20 MILLIGRAM(S): 80 TABLET, FILM COATED ORAL at 21:38

## 2022-04-17 RX ADMIN — POLYETHYLENE GLYCOL 3350 17 GRAM(S): 17 POWDER, FOR SOLUTION ORAL at 17:21

## 2022-04-17 RX ADMIN — TAMSULOSIN HYDROCHLORIDE 0.4 MILLIGRAM(S): 0.4 CAPSULE ORAL at 21:38

## 2022-04-17 RX ADMIN — CEFTRIAXONE 100 MILLIGRAM(S): 500 INJECTION, POWDER, FOR SOLUTION INTRAMUSCULAR; INTRAVENOUS at 05:41

## 2022-04-17 RX ADMIN — SENNA PLUS 2 TABLET(S): 8.6 TABLET ORAL at 21:38

## 2022-04-17 RX ADMIN — Medication 216 GRAM(S): at 17:54

## 2022-04-17 RX ADMIN — SODIUM CHLORIDE 75 MILLILITER(S): 9 INJECTION, SOLUTION INTRAVENOUS at 10:44

## 2022-04-17 RX ADMIN — HEPARIN SODIUM 5000 UNIT(S): 5000 INJECTION INTRAVENOUS; SUBCUTANEOUS at 21:39

## 2022-04-17 RX ADMIN — Medication 3 MILLIGRAM(S): at 21:39

## 2022-04-17 NOTE — PROGRESS NOTE ADULT - SUBJECTIVE AND OBJECTIVE BOX
[facility] Division of Hospital Medicine  Danilo Moreno MD  Pager [phone]    Patient is a 81y old  Male who presents with a chief complaint of Dysphagia/odynophagia (2022 17:47)      SUBJECTIVE / OVERNIGHT EVENTS: pulled out IV, now calm and alert      MEDICATIONS  (STANDING):  ampicillin  IVPB 2 Gram(s) IV Intermittent every 4 hours  aspirin enteric coated 81 milliGRAM(s) Oral daily  atorvastatin 20 milliGRAM(s) Oral at bedtime  cefTRIAXone   IVPB      cefTRIAXone   IVPB 2000 milliGRAM(s) IV Intermittent every 12 hours  dextrose 5%. 1000 milliLiter(s) (100 mL/Hr) IV Continuous <Continuous>  dextrose 5%. 1000 milliLiter(s) (50 mL/Hr) IV Continuous <Continuous>  dextrose 50% Injectable 25 Gram(s) IV Push once  dextrose 50% Injectable 12.5 Gram(s) IV Push once  dextrose 50% Injectable 25 Gram(s) IV Push once  glucagon  Injectable 1 milliGRAM(s) IntraMuscular once  heparin   Injectable 5000 Unit(s) SubCutaneous every 8 hours  insulin lispro (ADMELOG) corrective regimen sliding scale   SubCutaneous three times a day before meals  insulin lispro (ADMELOG) corrective regimen sliding scale   SubCutaneous at bedtime  mineral oil enema 133 milliLiter(s) Rectal daily  senna 2 Tablet(s) Oral at bedtime  tamsulosin 0.4 milliGRAM(s) Oral at bedtime    MEDICATIONS  (PRN):  acetaminophen     Tablet .. 650 milliGRAM(s) Oral every 6 hours PRN Temp greater or equal to 38C (100.4F), Mild Pain (1 - 3)  ALBUTerol    90 MICROgram(s) HFA Inhaler 2 Puff(s) Inhalation every 6 hours PRN Shortness of Breath and/or Wheezing  aluminum hydroxide/magnesium hydroxide/simethicone Suspension 30 milliLiter(s) Oral every 4 hours PRN Dyspepsia  dextrose Oral Gel 15 Gram(s) Oral once PRN Blood Glucose LESS THAN 70 milliGRAM(s)/deciliter  melatonin 3 milliGRAM(s) Oral at bedtime PRN Insomnia  ondansetron Injectable 4 milliGRAM(s) IV Push every 8 hours PRN Nausea and/or Vomiting      CAPILLARY BLOOD GLUCOSE      POCT Blood Glucose.: 78 mg/dL (2022 08:47)  POCT Blood Glucose.: 66 mg/dL (2022 08:46)  POCT Blood Glucose.: 117 mg/dL (2022 03:11)  POCT Blood Glucose.: 89 mg/dL (2022 21:33)  POCT Blood Glucose.: 78 mg/dL (2022 18:34)  POCT Blood Glucose.: 81 mg/dL (2022 17:44)  POCT Blood Glucose.: 75 mg/dL (2022 17:40)  POCT Blood Glucose.: 68 mg/dL (2022 17:37)  POCT Blood Glucose.: 96 mg/dL (2022 12:57)    I&O's Summary    2022 07:01  -  2022 07:00  --------------------------------------------------------  IN: 0 mL / OUT: 300 mL / NET: -300 mL        PHYSICAL EXAM:  Vital Signs Last 24 Hrs  T(C): 37.3 (2022 12:00), Max: 37.3 (2022 12:00)  T(F): 99.1 (2022 12:00), Max: 99.1 (2022 12:00)  HR: 46 (2022 12:00) (36 - 64)  BP: 132/69 (2022 12:00) (119/70 - 144/58)  BP(mean): --  RR: 16 (2022 12:00) (15 - 18)  SpO2: 99% (2022 12:00) (98% - 99%)  CONSTITUTIONAL: NAD  EYES: EOMI, conjunctiva and sclera clear  ENMT: Moist oral mucosa  NECK: Supple  RESPIRATORY: Breathing unlabored, CTAB  CARDIOVASCULAR: S1S2 no MRG  ABDOMEN: Nontender to palpation, normoactive bowel sounds, no rebound/guarding  MUSCULOSKELETAL: no clubbing or cyanosis of digits  NEUROLOGY: No focal deficits   SKIN: No rashes or lesions    LABS:                        9.6    2.92  )-----------( 223      ( 2022 10:38 )             29.2     04-17    141  |  108<H>  |  8   ----------------------------<  104<H>  3.7   |  23  |  0.92    Ca    9.5      2022 10:38  Phos  2.6       Mg     1.90         TPro  6.4  /  Alb  3.1<L>  /  TBili  0.7  /  DBili  x   /  AST  15  /  ALT  8   /  AlkPhos  74  04-15    PT/INR - ( 15 Apr 2022 13:34 )   PT: 13.5 sec;   INR: 1.16 ratio         PTT - ( 15 Apr 2022 13:34 )  PTT:32.2 sec      Urinalysis Basic - ( 15 Apr 2022 14:40 )    Color: Light Orange / Appearance: Turbid / S.019 / pH: x  Gluc: x / Ketone: Negative  / Bili: Negative / Urobili: 3 mg/dL   Blood: x / Protein: 30 mg/dL / Nitrite: Negative   Leuk Esterase: Large / RBC: 13 /HPF / WBC >50 /HPF   Sq Epi: x / Non Sq Epi: 0 /HPF / Bacteria: Negative        Culture - Blood (collected 15 Apr 2022 18:10)  Source: .Blood Blood-Peripheral  Gram Stain (2022 09:44):    Growth in aerobic and anaerobic bottles: Gram Positive Cocci in Pairs and    Chains  Preliminary Report (2022 12:07):    Growth in aerobic and anaerobic bottles: Enterococcus faecalis    See previous culture [id number]    Culture - Blood (collected 15 Apr 2022 18:10)  Source: .Blood Blood-Peripheral  Gram Stain (2022 09:43):    Growth in aerobic bottle: Gram positive cocci in pairs    Growth in anaerobic bottle: Gram Positive Cocci in Pairs and Chains  Preliminary Report (2022 12:07):    Growth in aerobic and anaerobic bottles: Enterococcus faecalis    ***Blood Panel PCR results on this specimen are available    approximately 3 hours after the Gram stain result.***    Gram stain, PCR, and/or culture results may not always    correspond dueto difference in methodologies.    ************************************************************    This PCR assay was performed by multiplex PCR. This    Assay tests for 66 bacterial and resistance gene targets.    Please refer to the [facility] test directory    at [email or web address] for details.  Organism: Blood Culture PCR (2022 09:02)  Organism: Blood Culture PCR (2022 09:02)

## 2022-04-17 NOTE — SWALLOW BEDSIDE ASSESSMENT ADULT - SWALLOW EVAL: DIAGNOSIS
1. mild oral stage deficits given thin liquids, mildly thick liquids, moderately thick liquids and puree marked by adequate bolus containment, adequate bolus manipulation, inappropriate mastication of puree texture and adequate anterior-posterior transport. Trace/mild lateral sulci residue noted post primary swallow though cleared with use of liquid wash. 2. Pharyngeal stage marked by observed initiation of the pharyngeal swallow trigger judged via laryngeal palpation. No clinically overt s/sx of laryngeal aspiration /penetration observed for all trials.

## 2022-04-17 NOTE — PROVIDER CONTACT NOTE (OTHER) - ACTION/TREATMENT ORDERED:
PA notified, as per PA she will look into the situation and see who can assist. Endorsed to oncoming nurse to follow up.

## 2022-04-17 NOTE — PROGRESS NOTE ADULT - SUBJECTIVE AND OBJECTIVE BOX
Follow Up:  enterococcus fecalis bacteremia    Interval History/ROS:  feels OK today.  much more awake and interactive.    Allergies  No Known Allergies        ANTIMICROBIALS:  ampicillin  IVPB 2 every 4 hours  cefTRIAXone   IVPB    cefTRIAXone   IVPB 2000 every 12 hours      OTHER MEDS:  acetaminophen     Tablet .. 650 milliGRAM(s) Oral every 6 hours PRN  ALBUTerol    90 MICROgram(s) HFA Inhaler 2 Puff(s) Inhalation every 6 hours PRN  aluminum hydroxide/magnesium hydroxide/simethicone Suspension 30 milliLiter(s) Oral every 4 hours PRN  aspirin enteric coated 81 milliGRAM(s) Oral daily  atorvastatin 20 milliGRAM(s) Oral at bedtime  dextrose 5%. 1000 milliLiter(s) IV Continuous <Continuous>  dextrose 5%. 1000 milliLiter(s) IV Continuous <Continuous>  dextrose 50% Injectable 25 Gram(s) IV Push once  dextrose 50% Injectable 12.5 Gram(s) IV Push once  dextrose 50% Injectable 25 Gram(s) IV Push once  dextrose Oral Gel 15 Gram(s) Oral once PRN  glucagon  Injectable 1 milliGRAM(s) IntraMuscular once  heparin   Injectable 5000 Unit(s) SubCutaneous every 8 hours  insulin lispro (ADMELOG) corrective regimen sliding scale   SubCutaneous three times a day before meals  insulin lispro (ADMELOG) corrective regimen sliding scale   SubCutaneous at bedtime  melatonin 3 milliGRAM(s) Oral at bedtime PRN  mineral oil enema 133 milliLiter(s) Rectal daily  ondansetron Injectable 4 milliGRAM(s) IV Push every 8 hours PRN  polyethylene glycol 3350 17 Gram(s) Oral once  senna 2 Tablet(s) Oral at bedtime  tamsulosin 0.4 milliGRAM(s) Oral at bedtime      Vital Signs Last 24 Hrs  T(C): 37.3 (17 Apr 2022 12:00), Max: 37.3 (17 Apr 2022 12:00)  T(F): 99.1 (17 Apr 2022 12:00), Max: 99.1 (17 Apr 2022 12:00)  HR: 46 (17 Apr 2022 12:00) (36 - 64)  BP: 132/69 (17 Apr 2022 12:00) (119/70 - 144/58)  BP(mean): --  RR: 16 (17 Apr 2022 12:00) (15 - 18)  SpO2: 99% (17 Apr 2022 12:00) (98% - 99%)    PHYSICAL EXAM:  Constitutional: Not in acute distress  Eyes: No icterus, right eyes droop  Oral cavity: Clear, no lesions  Neck: Supple  RS: Chest clear   CVS: S1, S2   Abdomen: Soft. No guarding/rigidity/tenderness.  : no valencia  Extremities: contracted right arm, right leg weak  Skin: No rash  Neuro: Alert, awake                            9.6    2.92  )-----------( 223      ( 17 Apr 2022 10:38 )             29.2       04-17    141  |  108<H>  |  8   ----------------------------<  104<H>  3.7   |  23  |  0.92    Ca    9.5      17 Apr 2022 10:38  Phos  2.6     04-17  Mg     1.90     04-17            MICROBIOLOGY:    blood culture 4/17  testing    .Blood Blood-Peripheral  04-15-22   Growth in aerobic and anaerobic bottles: Enterococcus faecalis  ***Blood Panel PCR results on this specimen are available  approximately 3 hours after the Gram stain result.***  Gram stain, PCR, and/or culture results may not always  correspond dueto difference in methodologies.  ************************************************************  This PCR assay was performed by multiplex PCR. This  Assay tests for 66 bacterial and resistance gene targets.  Please refer to the [facility] test directory  at [email or web address] for details.  --  Blood Culture PCR      Clean Catch Clean Catch (Midstream)  04-15-22   50,000 - 99,000 CFU/mL Candida albicans  --  --      RADIOLOGY:    ra< from: CT Abdomen and Pelvis w/ IV Cont (04.09.22 @ 10:50) >    ACC: [id number] EXAM:  CT ABDOMEN AND PELVIS IC                          PROCEDURE DATE:  04/09/2022          INTERPRETATION:  CLINICAL INFORMATION: Abdominal pain.    COMPARISON: CT the abdomen and pelvis dated 222.    CONTRAST/COMPLICATIONS:  IV Contrast: Omnipaque 350  90 cc administered   10 cc discarded  Oral Contrast: NONE  Complications: None reported at time of study completion    PROCEDURE:  CT of the abdomen and pelvis was performed. Coronal and sagittal   reformats were performed.    FINDINGS:    LOWER CHEST: Dependent atelectasis, scarring, and groundglass, right   greater than left. Distal airways mucus impaction right lower lobe. No   visualized pleural effusion. Coronary artery calcification.    LIVER: Liver size within normal limits. Main portal vein is patent.  BILE DUCTS: No biliary distention  GALLBLADDER: Unremarkable  SPLEEN: Normal size  PANCREAS: No main ductal dilatation. Mild fatty atrophy  ADRENALS: Unchanged appearance of indeterminate left adrenal nodule.  KIDNEYS/URETERS: No hydronephrosis. Bilateral renal cysts and   subcentimeter hypodensities too small to characterize. Right upper pole   cyst with apparent internal septations measuring 2.7 cm, stable on   multiple prior studies dating back to 12/16/2021.    BLADDER: Underdistended urinary bladder.  REPRODUCTIVE ORGANS: Enlarged prostate.    BOWEL: Limited evaluation the absence of oral contrast. Stomach is   partially distended with air. No small bowel distention. Appendix is not   visualized. Largeamount stool throughout the colon. The rectum is   distended to 8 cm and demonstrates mild wall thickening. Mild perirectal   fat stranding.  PERITONEUM: No ascites or free air.  VESSELS: No aneurysm of the abdominal aorta. Aortoiliac atheromatous   changes. Ectasia of the common iliac arteries.  RETROPERITONEUM/LYMPH NODES: No enlarged lymph nodes of the   abdomen/pelvis.  ABDOMINAL WALL: Focal infiltration of subcutaneous fat overlying the   coccyx just to the right midline.  BONES: Multilevel degenerative changes.    IMPRESSION:    Prominent stool burden of the colon. Rectal fecal impaction and concern   for stercoral colitis. No free air.    Focal infiltration of the subcutaneous fat overlying the coccyx.   Correlate with direct visualization for potential developing decubitus   ulcer.    Dependent atelectasis/scarring and groundglass of the visualized thorax,   right greater than left. Distal airways mucus impaction right lower lobe.   Correlate for any signs and symptoms of superimposed infection.    Coronary artery calcification.    --- End of Report ---          APPLE MONTSE M.D., RADIOLOGY FELLOW  This document has been electronically signed.  IZABEL HANNA M.D., ATTENDING RADIOLOGIST  This document has been electronically signed. Apr 9 2022 11:20AM    < end of copied text >

## 2022-04-17 NOTE — PROVIDER CONTACT NOTE (OTHER) - ASSESSMENT
On assessment, pt. is asymptomatic. A&O x1-2. Denies SOB and/or chest pain at this time. Observed resting in bed with eyes closed, awakens to voice. VSS as per flowsheet. No s/s of distress noted or voiced at this time. Patient appears at baseline, PA made aware that bradycardia continues. Zoll and atropine set up at bedside.

## 2022-04-17 NOTE — PROGRESS NOTE ADULT - ASSESSMENT
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, s/p PPM extraction and 6 weeks of vancomycin and ceftriaxone  Had abdominal pain and CT 4/9 showed prominent stool burden and possible stercoral colitis; possible sacral ulcer, and atelectasis  Afebrile, no leukocytosis  UA WBC > 50, UCx candida  BCx showed 3/4 bottles of enterococcus bacteremia  Likely GI source  Sacral area with skin loss but no acute ulceration or abscess formation      IMPRESSION:  Enterococcus bacteremia  Stercoral colitis  Physical debilitation    RECOMMENDATIONS:  - c/w IV ampicillin to 2gm q4hrs and  IV ceftriaxone 2gm q12hrs  - follow 4/17 blood cultures to assess for clearance  - TTE, will likely need HARIKA due to previous presumed endocarditis  - Offloading  - Consider ENT if throat pain persists

## 2022-04-17 NOTE — PROCEDURE NOTE - NSPROCDETAILS_GEN_ALL_CORE
blood seen on insertion/dressing applied/flushes easily/secured in place/sterile technique, catheter placed

## 2022-04-17 NOTE — PROVIDER CONTACT NOTE (OTHER) - ASSESSMENT
On assessment, patient appears to have pulled out IV. IV intact, sitting on top of sheet. New IV access was attempted by multiple people. IV access, morning labs, blood cultures unable to be obtained at this time.

## 2022-04-18 ENCOUNTER — APPOINTMENT (OUTPATIENT)
Dept: WOUND CARE | Facility: CLINIC | Age: 82
End: 2022-04-18

## 2022-04-18 DIAGNOSIS — R00.1 BRADYCARDIA, UNSPECIFIED: ICD-10-CM

## 2022-04-18 DIAGNOSIS — B95.2 ENTEROCOCCUS AS THE CAUSE OF DISEASES CLASSIFIED ELSEWHERE: ICD-10-CM

## 2022-04-18 LAB
-  AMPICILLIN: SIGNIFICANT CHANGE UP
-  GENTAMICIN SYNERGY: SIGNIFICANT CHANGE UP
-  STREPTOMYCIN SYNERGY: SIGNIFICANT CHANGE UP
-  VANCOMYCIN: SIGNIFICANT CHANGE UP
ANION GAP SERPL CALC-SCNC: 12 MMOL/L — SIGNIFICANT CHANGE UP (ref 7–14)
BUN SERPL-MCNC: 9 MG/DL — SIGNIFICANT CHANGE UP (ref 7–23)
CALCIUM SERPL-MCNC: 9.6 MG/DL — SIGNIFICANT CHANGE UP (ref 8.4–10.5)
CHLORIDE SERPL-SCNC: 108 MMOL/L — HIGH (ref 98–107)
CO2 SERPL-SCNC: 23 MMOL/L — SIGNIFICANT CHANGE UP (ref 22–31)
CREAT SERPL-MCNC: 0.89 MG/DL — SIGNIFICANT CHANGE UP (ref 0.5–1.3)
CULTURE RESULTS: SIGNIFICANT CHANGE UP
CULTURE RESULTS: SIGNIFICANT CHANGE UP
EGFR: 86 ML/MIN/1.73M2 — SIGNIFICANT CHANGE UP
GLUCOSE BLDC GLUCOMTR-MCNC: 106 MG/DL — HIGH (ref 70–99)
GLUCOSE BLDC GLUCOMTR-MCNC: 106 MG/DL — HIGH (ref 70–99)
GLUCOSE BLDC GLUCOMTR-MCNC: 32 MG/DL — CRITICAL LOW (ref 70–99)
GLUCOSE BLDC GLUCOMTR-MCNC: 42 MG/DL — CRITICAL LOW (ref 70–99)
GLUCOSE BLDC GLUCOMTR-MCNC: 46 MG/DL — CRITICAL LOW (ref 70–99)
GLUCOSE BLDC GLUCOMTR-MCNC: 46 MG/DL — CRITICAL LOW (ref 70–99)
GLUCOSE BLDC GLUCOMTR-MCNC: 56 MG/DL — LOW (ref 70–99)
GLUCOSE BLDC GLUCOMTR-MCNC: 67 MG/DL — LOW (ref 70–99)
GLUCOSE BLDC GLUCOMTR-MCNC: 90 MG/DL — SIGNIFICANT CHANGE UP (ref 70–99)
GLUCOSE BLDC GLUCOMTR-MCNC: 98 MG/DL — SIGNIFICANT CHANGE UP (ref 70–99)
GLUCOSE BLDC GLUCOMTR-MCNC: 99 MG/DL — SIGNIFICANT CHANGE UP (ref 70–99)
GLUCOSE SERPL-MCNC: 87 MG/DL — SIGNIFICANT CHANGE UP (ref 70–99)
HCT VFR BLD CALC: 31.5 % — LOW (ref 39–50)
HGB BLD-MCNC: 10.4 G/DL — LOW (ref 13–17)
MAGNESIUM SERPL-MCNC: 1.8 MG/DL — SIGNIFICANT CHANGE UP (ref 1.6–2.6)
MCHC RBC-ENTMCNC: 30.2 PG — SIGNIFICANT CHANGE UP (ref 27–34)
MCHC RBC-ENTMCNC: 33 GM/DL — SIGNIFICANT CHANGE UP (ref 32–36)
MCV RBC AUTO: 91.6 FL — SIGNIFICANT CHANGE UP (ref 80–100)
METHOD TYPE: SIGNIFICANT CHANGE UP
NRBC # BLD: 0 /100 WBCS — SIGNIFICANT CHANGE UP
NRBC # FLD: 0 K/UL — SIGNIFICANT CHANGE UP
ORGANISM # SPEC MICROSCOPIC CNT: SIGNIFICANT CHANGE UP
PHOSPHATE SERPL-MCNC: 2.4 MG/DL — LOW (ref 2.5–4.5)
PLATELET # BLD AUTO: 228 K/UL — SIGNIFICANT CHANGE UP (ref 150–400)
POTASSIUM SERPL-MCNC: 4 MMOL/L — SIGNIFICANT CHANGE UP (ref 3.5–5.3)
POTASSIUM SERPL-SCNC: 4 MMOL/L — SIGNIFICANT CHANGE UP (ref 3.5–5.3)
RBC # BLD: 3.44 M/UL — LOW (ref 4.2–5.8)
RBC # FLD: 14.5 % — SIGNIFICANT CHANGE UP (ref 10.3–14.5)
SODIUM SERPL-SCNC: 143 MMOL/L — SIGNIFICANT CHANGE UP (ref 135–145)
SPECIMEN SOURCE: SIGNIFICANT CHANGE UP
SPECIMEN SOURCE: SIGNIFICANT CHANGE UP
WBC # BLD: 4.05 K/UL — SIGNIFICANT CHANGE UP (ref 3.8–10.5)
WBC # FLD AUTO: 4.05 K/UL — SIGNIFICANT CHANGE UP (ref 3.8–10.5)

## 2022-04-18 PROCEDURE — 99233 SBSQ HOSP IP/OBS HIGH 50: CPT

## 2022-04-18 PROCEDURE — 99232 SBSQ HOSP IP/OBS MODERATE 35: CPT

## 2022-04-18 RX ORDER — DEXTROSE 50 % IN WATER 50 %
12.5 SYRINGE (ML) INTRAVENOUS ONCE
Refills: 0 | Status: COMPLETED | OUTPATIENT
Start: 2022-04-18 | End: 2022-04-18

## 2022-04-18 RX ORDER — DEXTROSE 50 % IN WATER 50 %
25 SYRINGE (ML) INTRAVENOUS ONCE
Refills: 0 | Status: COMPLETED | OUTPATIENT
Start: 2022-04-18 | End: 2022-04-18

## 2022-04-18 RX ORDER — SODIUM CHLORIDE 9 MG/ML
1000 INJECTION, SOLUTION INTRAVENOUS
Refills: 0 | Status: DISCONTINUED | OUTPATIENT
Start: 2022-04-18 | End: 2022-04-23

## 2022-04-18 RX ORDER — DEXTROSE 10 % IN WATER 10 %
1000 INTRAVENOUS SOLUTION INTRAVENOUS
Refills: 0 | Status: DISCONTINUED | OUTPATIENT
Start: 2022-04-18 | End: 2022-04-19

## 2022-04-18 RX ORDER — SODIUM CHLORIDE 9 MG/ML
1000 INJECTION, SOLUTION INTRAVENOUS
Refills: 0 | Status: DISCONTINUED | OUTPATIENT
Start: 2022-04-18 | End: 2022-04-18

## 2022-04-18 RX ORDER — POTASSIUM PHOSPHATE, MONOBASIC POTASSIUM PHOSPHATE, DIBASIC 236; 224 MG/ML; MG/ML
15 INJECTION, SOLUTION INTRAVENOUS ONCE
Refills: 0 | Status: COMPLETED | OUTPATIENT
Start: 2022-04-18 | End: 2022-04-18

## 2022-04-18 RX ADMIN — Medication 216 GRAM(S): at 23:08

## 2022-04-18 RX ADMIN — POTASSIUM PHOSPHATE, MONOBASIC POTASSIUM PHOSPHATE, DIBASIC 62.5 MILLIMOLE(S): 236; 224 INJECTION, SOLUTION INTRAVENOUS at 12:51

## 2022-04-18 RX ADMIN — CEFTRIAXONE 100 MILLIGRAM(S): 500 INJECTION, POWDER, FOR SOLUTION INTRAMUSCULAR; INTRAVENOUS at 19:06

## 2022-04-18 RX ADMIN — SODIUM CHLORIDE 75 MILLILITER(S): 9 INJECTION, SOLUTION INTRAVENOUS at 15:58

## 2022-04-18 RX ADMIN — Medication 216 GRAM(S): at 17:32

## 2022-04-18 RX ADMIN — Medication 216 GRAM(S): at 10:00

## 2022-04-18 RX ADMIN — Medication 12.5 GRAM(S): at 23:43

## 2022-04-18 RX ADMIN — Medication 25 MILLILITER(S): at 16:48

## 2022-04-18 RX ADMIN — Medication 216 GRAM(S): at 05:52

## 2022-04-18 RX ADMIN — Medication 216 GRAM(S): at 01:55

## 2022-04-18 RX ADMIN — Medication 25 GRAM(S): at 12:51

## 2022-04-18 RX ADMIN — HEPARIN SODIUM 5000 UNIT(S): 5000 INJECTION INTRAVENOUS; SUBCUTANEOUS at 05:56

## 2022-04-18 RX ADMIN — HEPARIN SODIUM 5000 UNIT(S): 5000 INJECTION INTRAVENOUS; SUBCUTANEOUS at 13:16

## 2022-04-18 RX ADMIN — Medication 12.5 GRAM(S): at 22:50

## 2022-04-18 RX ADMIN — HEPARIN SODIUM 5000 UNIT(S): 5000 INJECTION INTRAVENOUS; SUBCUTANEOUS at 23:08

## 2022-04-18 RX ADMIN — CEFTRIAXONE 100 MILLIGRAM(S): 500 INJECTION, POWDER, FOR SOLUTION INTRAMUSCULAR; INTRAVENOUS at 06:12

## 2022-04-18 RX ADMIN — Medication 81 MILLIGRAM(S): at 13:12

## 2022-04-18 NOTE — DIETITIAN INITIAL EVALUATION ADULT - ORAL INTAKE PTA/DIET HISTORY
Pt lives at home with his wife and son. His wife does all the cooking and grocery shopping without difficulty. No supplements at home or vitamins per pt PTA.

## 2022-04-18 NOTE — PROVIDER CONTACT NOTE (OTHER) - SITUATION
BGL 91, ***will not cross into sunrise****, PA aware and patient safe to be transported back to floor.

## 2022-04-18 NOTE — PROVIDER CONTACT NOTE (OTHER) - ASSESSMENT
Patient remains at baseline mental status, asyptomatic denies complaints. Patient remains at baseline mental status, asymptomatic denies complaints.

## 2022-04-18 NOTE — PROGRESS NOTE ADULT - SUBJECTIVE AND OBJECTIVE BOX
Patient is a 81y old  Male who presents with a chief complaint of Urinary tract infection     (18 Apr 2022 12:58)    SUBJECTIVE / OVERNIGHT EVENTS: No acute events. Denies chest pain, shortness of breath, nausea, vomiting, fever, chills or other discomfort.     MEDICATIONS  (STANDING):  ampicillin  IVPB 2 Gram(s) IV Intermittent every 4 hours  aspirin enteric coated 81 milliGRAM(s) Oral daily  atorvastatin 20 milliGRAM(s) Oral at bedtime  cefTRIAXone   IVPB      cefTRIAXone   IVPB 2000 milliGRAM(s) IV Intermittent every 12 hours  dextrose 5%. 1000 milliLiter(s) (100 mL/Hr) IV Continuous <Continuous>  dextrose 5%. 1000 milliLiter(s) (50 mL/Hr) IV Continuous <Continuous>  dextrose 5%. 1000 milliLiter(s) (50 mL/Hr) IV Continuous <Continuous>  dextrose 50% Injectable 25 Gram(s) IV Push once  dextrose 50% Injectable 12.5 Gram(s) IV Push once  dextrose 50% Injectable 25 Gram(s) IV Push once  glucagon  Injectable 1 milliGRAM(s) IntraMuscular once  heparin   Injectable 5000 Unit(s) SubCutaneous every 8 hours  insulin lispro (ADMELOG) corrective regimen sliding scale   SubCutaneous three times a day before meals  insulin lispro (ADMELOG) corrective regimen sliding scale   SubCutaneous at bedtime  mineral oil enema 133 milliLiter(s) Rectal daily  senna 2 Tablet(s) Oral at bedtime  tamsulosin 0.4 milliGRAM(s) Oral at bedtime    MEDICATIONS  (PRN):  acetaminophen     Tablet .. 650 milliGRAM(s) Oral every 6 hours PRN Temp greater or equal to 38C (100.4F), Mild Pain (1 - 3)  ALBUTerol    90 MICROgram(s) HFA Inhaler 2 Puff(s) Inhalation every 6 hours PRN Shortness of Breath and/or Wheezing  aluminum hydroxide/magnesium hydroxide/simethicone Suspension 30 milliLiter(s) Oral every 4 hours PRN Dyspepsia  dextrose Oral Gel 15 Gram(s) Oral once PRN Blood Glucose LESS THAN 70 milliGRAM(s)/deciliter  melatonin 3 milliGRAM(s) Oral at bedtime PRN Insomnia  ondansetron Injectable 4 milliGRAM(s) IV Push every 8 hours PRN Nausea and/or Vomiting    CAPILLARY BLOOD GLUCOSE    POCT Blood Glucose.: 99 mg/dL (18 Apr 2022 13:17)  POCT Blood Glucose.: 42 mg/dL (18 Apr 2022 12:26)  POCT Blood Glucose.: 46 mg/dL (18 Apr 2022 12:08)  POCT Blood Glucose.: 32 mg/dL (18 Apr 2022 12:06)  POCT Blood Glucose.: 90 mg/dL (18 Apr 2022 06:08)  POCT Blood Glucose.: 115 mg/dL (17 Apr 2022 22:35)  POCT Blood Glucose.: 73 mg/dL (17 Apr 2022 18:55)  POCT Blood Glucose.: 60 mg/dL (17 Apr 2022 18:52)    I&O's Summary    PHYSICAL EXAM:  Vital Signs Last 24 Hrs  T(C): 36.7 (18 Apr 2022 04:00), Max: 36.9 (17 Apr 2022 16:00)  T(F): 98.1 (18 Apr 2022 04:00), Max: 98.5 (17 Apr 2022 20:00)  HR: 56 (18 Apr 2022 04:00) (42 - 56)  BP: 122/63 (18 Apr 2022 04:00) (118/60 - 136/68)  BP(mean): --  RR: 17 (18 Apr 2022 04:00) (17 - 18)  SpO2: 99% (18 Apr 2022 04:00) (99% - 100%)    CONSTITUTIONAL: NAD, well-developed  EYES: conjunctiva and sclera clear  ENMT: Moist oral mucosa  NECK: no JVD  RESPIRATORY: Normal respiratory effort; lungs are clear to auscultation bilaterally  CARDIOVASCULAR: Regular rate and rhythm, normal S1 and S2, no lower extremity edema; Peripheral pulses are 2+ bilaterally  ABDOMEN: Nontender to palpation, normoactive bowel sounds, no rebound/guarding  PSYCH: calm  NEUROLOGY: moving all extremities  SKIN: No rashes; no palpable lesions    LABS:                        10.4   4.05  )-----------( 228      ( 18 Apr 2022 07:32 )             31.5     04-18    143  |  108<H>  |  9   ----------------------------<  87  4.0   |  23  |  0.89    Ca    9.6      18 Apr 2022 07:32  Phos  2.4     04-18  Mg     1.80     04-18    Culture - Urine (collected 15 Apr 2022 14:33)  Source: Clean Catch Clean Catch (Midstream)  Final Report (17 Apr 2022 16:01):    50,000 - 99,000 CFU/mL Candida albicans    Culture - Blood (collected 15 Apr 2022 13:35)  Source: .Blood Blood-Peripheral  Gram Stain (16 Apr 2022 09:44):    Growth in aerobic and anaerobic bottles: Gram Positive Cocci in Pairs and    Chains  Final Report (18 Apr 2022 12:13):    Growth in aerobic and anaerobic bottles: Enterococcus faecalis    See previous culture [id number]    RADIOLOGY & ADDITIONAL TESTS: Reviewed    COORDINATION OF CARE:  Care Discussed with Consultants/Other Providers [Y- Medicine ACP, Cardiology]   Patient is a 81y old  Male who presents with a chief complaint of Urinary tract infection     (18 Apr 2022 12:58)    SUBJECTIVE / OVERNIGHT EVENTS: No acute events. Denies chest pain, shortness of breath, nausea, vomiting, fever, chills or other discomfort.     MEDICATIONS  (STANDING):  ampicillin  IVPB 2 Gram(s) IV Intermittent every 4 hours  aspirin enteric coated 81 milliGRAM(s) Oral daily  atorvastatin 20 milliGRAM(s) Oral at bedtime  cefTRIAXone   IVPB      cefTRIAXone   IVPB 2000 milliGRAM(s) IV Intermittent every 12 hours  dextrose 5%. 1000 milliLiter(s) (100 mL/Hr) IV Continuous <Continuous>  dextrose 5%. 1000 milliLiter(s) (50 mL/Hr) IV Continuous <Continuous>  dextrose 5%. 1000 milliLiter(s) (50 mL/Hr) IV Continuous <Continuous>  dextrose 50% Injectable 25 Gram(s) IV Push once  dextrose 50% Injectable 12.5 Gram(s) IV Push once  dextrose 50% Injectable 25 Gram(s) IV Push once  glucagon  Injectable 1 milliGRAM(s) IntraMuscular once  heparin   Injectable 5000 Unit(s) SubCutaneous every 8 hours  insulin lispro (ADMELOG) corrective regimen sliding scale   SubCutaneous three times a day before meals  insulin lispro (ADMELOG) corrective regimen sliding scale   SubCutaneous at bedtime  mineral oil enema 133 milliLiter(s) Rectal daily  senna 2 Tablet(s) Oral at bedtime  tamsulosin 0.4 milliGRAM(s) Oral at bedtime    MEDICATIONS  (PRN):  acetaminophen     Tablet .. 650 milliGRAM(s) Oral every 6 hours PRN Temp greater or equal to 38C (100.4F), Mild Pain (1 - 3)  ALBUTerol    90 MICROgram(s) HFA Inhaler 2 Puff(s) Inhalation every 6 hours PRN Shortness of Breath and/or Wheezing  aluminum hydroxide/magnesium hydroxide/simethicone Suspension 30 milliLiter(s) Oral every 4 hours PRN Dyspepsia  dextrose Oral Gel 15 Gram(s) Oral once PRN Blood Glucose LESS THAN 70 milliGRAM(s)/deciliter  melatonin 3 milliGRAM(s) Oral at bedtime PRN Insomnia  ondansetron Injectable 4 milliGRAM(s) IV Push every 8 hours PRN Nausea and/or Vomiting    CAPILLARY BLOOD GLUCOSE    POCT Blood Glucose.: 99 mg/dL (18 Apr 2022 13:17)  POCT Blood Glucose.: 42 mg/dL (18 Apr 2022 12:26)  POCT Blood Glucose.: 46 mg/dL (18 Apr 2022 12:08)  POCT Blood Glucose.: 32 mg/dL (18 Apr 2022 12:06)  POCT Blood Glucose.: 90 mg/dL (18 Apr 2022 06:08)  POCT Blood Glucose.: 115 mg/dL (17 Apr 2022 22:35)  POCT Blood Glucose.: 73 mg/dL (17 Apr 2022 18:55)  POCT Blood Glucose.: 60 mg/dL (17 Apr 2022 18:52)    I&O's Summary    PHYSICAL EXAM:  Vital Signs Last 24 Hrs  T(C): 36.7 (18 Apr 2022 04:00), Max: 36.9 (17 Apr 2022 16:00)  T(F): 98.1 (18 Apr 2022 04:00), Max: 98.5 (17 Apr 2022 20:00)  HR: 56 (18 Apr 2022 04:00) (42 - 56)  BP: 122/63 (18 Apr 2022 04:00) (118/60 - 136/68)  BP(mean): --  RR: 17 (18 Apr 2022 04:00) (17 - 18)  SpO2: 99% (18 Apr 2022 04:00) (99% - 100%)    CONSTITUTIONAL: NAD, well-developed  EYES: conjunctiva and sclera clear  ENMT: Moist oral mucosa  NECK: no JVD  RESPIRATORY: Normal respiratory effort; lungs are clear to auscultation bilaterally  CARDIOVASCULAR: Regular rate and rhythm, normal S1 and S2, no lower extremity edema; Peripheral pulses are 2+ bilaterally  ABDOMEN: Nontender to palpation, normoactive bowel sounds, no rebound/guarding  PSYCH: calm  SKIN: No rashes; no palpable lesions    LABS:                        10.4   4.05  )-----------( 228      ( 18 Apr 2022 07:32 )             31.5     04-18    143  |  108<H>  |  9   ----------------------------<  87  4.0   |  23  |  0.89    Ca    9.6      18 Apr 2022 07:32  Phos  2.4     04-18  Mg     1.80     04-18    Culture - Urine (collected 15 Apr 2022 14:33)  Source: Clean Catch Clean Catch (Midstream)  Final Report (17 Apr 2022 16:01):    50,000 - 99,000 CFU/mL Candida albicans    Culture - Blood (collected 15 Apr 2022 13:35)  Source: .Blood Blood-Peripheral  Gram Stain (16 Apr 2022 09:44):    Growth in aerobic and anaerobic bottles: Gram Positive Cocci in Pairs and    Chains  Final Report (18 Apr 2022 12:13):    Growth in aerobic and anaerobic bottles: Enterococcus faecalis    See previous culture [id number]    RADIOLOGY & ADDITIONAL TESTS: Reviewed    COORDINATION OF CARE:  Care Discussed with Consultants/Other Providers [Y- Medicine ACP, Cardiology]

## 2022-04-18 NOTE — DIETITIAN INITIAL EVALUATION ADULT - OTHER INFO
Per chart,     Nutrition interview: No recent episodes of nausea, vomiting, diarrhea or constipation, No BM noted per RN flowsheets, bowel regimen in place. Continue to document BMs in RN flowsheets. Pt states last BM was yesterday. Denies any chewing/swallowing difficulties. No food allergies. Stated UBW: ~151-160# a couple of months ago, no specific time frame given- could not specify why wt loss occurred. Food preferences explored and noted. Intake is % per RN flowsheets and per pt. PCA at bedside today upon interview, endorses 100% intake. Feeding skills: Total assistance required for eating from staff. Pt last seen by SLP on 4/17, continues on pureed, thin liquids as safest texture/consistency 2/2 dysphagia. Wt taken today via bedscale by RD: 60.4 kg, 132.8#.    Education not warranted at present as pt w/ decreased cognition.

## 2022-04-18 NOTE — PROGRESS NOTE ADULT - SUBJECTIVE AND OBJECTIVE BOX
Follow Up:  enterococcus fecalis bacteremia    Interval History/ROS:  feels ok.  trying to get OOB.     Allergies  No Known Allergies        ANTIMICROBIALS:  ampicillin  IVPB 2 every 4 hours  cefTRIAXone   IVPB    cefTRIAXone   IVPB 2000 every 12 hours      MEDICATIONS  (STANDING):  acetaminophen     Tablet .. 650 every 6 hours PRN  ALBUTerol    90 MICROgram(s) HFA Inhaler 2 every 6 hours PRN  aluminum hydroxide/magnesium hydroxide/simethicone Suspension 30 every 4 hours PRN  aspirin enteric coated 81 daily  atorvastatin 20 at bedtime  dextrose 50% Injectable 25 once  dextrose 50% Injectable 12.5 once  dextrose 50% Injectable 25 once  dextrose Oral Gel 15 once PRN  glucagon  Injectable 1 once  heparin   Injectable 5000 every 8 hours  insulin lispro (ADMELOG) corrective regimen sliding scale  three times a day before meals  insulin lispro (ADMELOG) corrective regimen sliding scale  at bedtime  melatonin 3 at bedtime PRN  mineral oil enema 133 daily  ondansetron Injectable 4 every 8 hours PRN  senna 2 at bedtime  tamsulosin 0.4 at bedtime    Vital Signs Last 24 Hrs  T(F): 97.5 (04-18-22 @ 14:00), Max: 98.5 (04-17-22 @ 20:00)  HR: 82 (04-18-22 @ 14:00)  BP: 148/86 (04-18-22 @ 14:00)  RR: 17 (04-18-22 @ 14:00)  SpO2: 99% (04-18-22 @ 14:00) (99% - 100%)    PHYSICAL EXAM:  Constitutional: Not in acute distress  Eyes: No icterus, right eyes droop  Oral cavity: Clear, no lesions  Neck: Supple  RS: Chest clear   CVS: S1, S2   Abdomen: Soft. No guarding/rigidity/tenderness.  : external catheter  Extremities: contracted right arm, right leg weak  Skin: No rash  Neuro: Alert, awake                            10.4   4.05  )-----------( 228      ( 18 Apr 2022 07:32 )             31.5 04-18    143  |  108  |  9   ----------------------------<  87  4.0   |  23  |  0.89  Ca    9.6      18 Apr 2022 07:32Phos  2.4     04-18Mg     1.80     04-18              MICROBIOLOGY:    blood culture 4/17  no growth to date    .Blood Blood-Peripheral  04-15-22   Growth in aerobic and anaerobic bottles: Enterococcus faecalis  ***Blood Panel PCR results on this specimen are available  approximately 3 hours after the Gram stain result.***  Gram stain, PCR, and/or culture results may not always  correspond dueto difference in methodologies.  ************************************************************  This PCR assay was performed by multiplex PCR. This  Assay tests for 66 bacterial and resistance gene targets.  Please refer to the [facility] test directory  at [email or web address] for details.  --  Blood Culture PCR      Clean Catch Clean Catch (Midstream)  04-15-22   50,000 - 99,000 CFU/mL Candida albicans  --  --      RADIOLOGY:    ra< from: CT Abdomen and Pelvis w/ IV Cont (04.09.22 @ 10:50) >    ACC: [id number] EXAM:  CT ABDOMEN AND PELVIS IC                          PROCEDURE DATE:  04/09/2022          INTERPRETATION:  CLINICAL INFORMATION: Abdominal pain.    COMPARISON: CT the abdomen and pelvis dated 222.    CONTRAST/COMPLICATIONS:  IV Contrast: Omnipaque 350  90 cc administered   10 cc discarded  Oral Contrast: NONE  Complications: None reported at time of study completion    PROCEDURE:  CT of the abdomen and pelvis was performed. Coronal and sagittal   reformats were performed.    FINDINGS:    LOWER CHEST: Dependent atelectasis, scarring, and groundglass, right   greater than left. Distal airways mucus impaction right lower lobe. No   visualized pleural effusion. Coronary artery calcification.    LIVER: Liver size within normal limits. Main portal vein is patent.  BILE DUCTS: No biliary distention  GALLBLADDER: Unremarkable  SPLEEN: Normal size  PANCREAS: No main ductal dilatation. Mild fatty atrophy  ADRENALS: Unchanged appearance of indeterminate left adrenal nodule.  KIDNEYS/URETERS: No hydronephrosis. Bilateral renal cysts and   subcentimeter hypodensities too small to characterize. Right upper pole   cyst with apparent internal septations measuring 2.7 cm, stable on   multiple prior studies dating back to 12/16/2021.    BLADDER: Underdistended urinary bladder.  REPRODUCTIVE ORGANS: Enlarged prostate.    BOWEL: Limited evaluation the absence of oral contrast. Stomach is   partially distended with air. No small bowel distention. Appendix is not   visualized. Largeamount stool throughout the colon. The rectum is   distended to 8 cm and demonstrates mild wall thickening. Mild perirectal   fat stranding.  PERITONEUM: No ascites or free air.  VESSELS: No aneurysm of the abdominal aorta. Aortoiliac atheromatous   changes. Ectasia of the common iliac arteries.  RETROPERITONEUM/LYMPH NODES: No enlarged lymph nodes of the   abdomen/pelvis.  ABDOMINAL WALL: Focal infiltration of subcutaneous fat overlying the   coccyx just to the right midline.  BONES: Multilevel degenerative changes.    IMPRESSION:    Prominent stool burden of the colon. Rectal fecal impaction and concern   for stercoral colitis. No free air.    Focal infiltration of the subcutaneous fat overlying the coccyx.   Correlate with direct visualization for potential developing decubitus   ulcer.    Dependent atelectasis/scarring and groundglass of the visualized thorax,   right greater than left. Distal airways mucus impaction right lower lobe.   Correlate for any signs and symptoms of superimposed infection.    Coronary artery calcification.    --- End of Report ---          APPLE MONTES M.D., RADIOLOGY FELLOW  This document has been electronically signed.  IZABEL HANNA M.D., ATTENDING RADIOLOGIST  This document has been electronically signed. Apr 9 2022 11:20AM    < end of copied text >

## 2022-04-18 NOTE — CONSULT NOTE ADULT - SUBJECTIVE AND OBJECTIVE BOX
Patient is confused. History obtained from chart.   This is an 81y old  male with PMH of  HTN, T2DM, HLD, CVA w residual right sided hemiplegia, CAD s/p PCI, sinus node dysfunction s/p dual chamber PPM (12/19/14),  diastolic CHF, bedbound who presents with dysphagia/odynophagia. This morning, while having breakfast, pt ate a piece of bread, after which he began coughing/choking and yelling that he had throat pain. Usually eats a pureed diet with thin liquids. Per his wife, he sometimes coughs and clears his throat when eating; she believes this happens when the HHAs feed him too quickly. Also, a few days ago, the HHA told pt's wife that pt was complaining of throat pain; he had no recurrent complaints until today. Otherwise, no fevers, chills, N/V/D, dysuria, rash, or abdominal pain. On admission found to be hypotensive with sinus bradycardia to 38 bpm.in the setting of a UTI with entero faecalis bacteremia. Started on ampicillin and Vancomycin. Has continued to have asymptomatic bradycardia on telemetry.       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, 1998  HTN (hypertension)  Dementia  HLD (hyperlipidemia)  DM (diabetes mellitus)  Asthma  CAD (coronary artery disease)  s/p stent placement    Sinus node dysfunction  s/p Medtronic PPM    Cardiac pacemaker  Medtronic (SN: [id number]; Model: [id number])    S/P coronary artery stent placement                            MEDICATIONS  (STANDING):  ampicillin  IVPB 2 Gram(s) IV Intermittent every 4 hours  aspirin enteric coated 81 milliGRAM(s) Oral daily  atorvastatin 20 milliGRAM(s) Oral at bedtime  cefTRIAXone   IVPB      cefTRIAXone   IVPB 2000 milliGRAM(s) IV Intermittent every 12 hours  dextrose 5%. 1000 milliLiter(s) (100 mL/Hr) IV Continuous <Continuous>  dextrose 5%. 1000 milliLiter(s) (50 mL/Hr) IV Continuous <Continuous>  dextrose 5%. 1000 milliLiter(s) (50 mL/Hr) IV Continuous <Continuous>  dextrose 50% Injectable 25 Gram(s) IV Push once  dextrose 50% Injectable 12.5 Gram(s) IV Push once  dextrose 50% Injectable 25 Gram(s) IV Push once  glucagon  Injectable 1 milliGRAM(s) IntraMuscular once  heparin   Injectable 5000 Unit(s) SubCutaneous every 8 hours  insulin lispro (ADMELOG) corrective regimen sliding scale   SubCutaneous three times a day before meals  insulin lispro (ADMELOG) corrective regimen sliding scale   SubCutaneous at bedtime  mineral oil enema 133 milliLiter(s) Rectal daily  senna 2 Tablet(s) Oral at bedtime  tamsulosin 0.4 milliGRAM(s) Oral at bedtime    MEDICATIONS  (PRN):  acetaminophen     Tablet .. 650 milliGRAM(s) Oral every 6 hours PRN Temp greater or equal to 38C (100.4F), Mild Pain (1 - 3)  ALBUTerol    90 MICROgram(s) HFA Inhaler 2 Puff(s) Inhalation every 6 hours PRN Shortness of Breath and/or Wheezing  aluminum hydroxide/magnesium hydroxide/simethicone Suspension 30 milliLiter(s) Oral every 4 hours PRN Dyspepsia  dextrose Oral Gel 15 Gram(s) Oral once PRN Blood Glucose LESS THAN 70 milliGRAM(s)/deciliter  melatonin 3 milliGRAM(s) Oral at bedtime PRN Insomnia  ondansetron Injectable 4 milliGRAM(s) IV Push every 8 hours PRN Nausea and/or Vomiting      FAMILY HISTORY:  FH: diabetes mellitus  Mother, Brother    FHx: dementia  Father        SOCIAL HISTORY: Lives with his wife.    CIGARETTES:  No  DRUGS;  never  ALCOHOL:   No    REVIEW OF SYSTEMS:    CONSTITUTIONAL: No fever, weight loss, chills, shakes, or fatigue  EYES: No eye pain, visual disturbances, or discharge  ENMT:  No difficulty hearing, tinnitus, vertigo; No sinus or throat pain  NECK: No pain or stiffness  BREASTS: No pain, masses, or nipple discharge  RESPIRATORY: No cough, wheezing, hemoptysis, or shortness of breath  CARDIOVASCULAR: No chest pain, dyspnea, palpitations, dizziness, syncope, paroxysmal nocturnal dyspnea, orthopnea, or arm or leg swelling  GASTROINTESTINAL: No abdominal  or epigastric pain, nausea, vomiting, hematemesis, diarrhea, constipation, melena or bright red blood.  GENITOURINARY: No dysuria, nocturia, hematuria, or urinary incontinence  NEUROLOGICAL: No headaches, memory loss, slurred speech, limb weakness, loss of strength, numbness, or tremors  SKIN: No itching, burning, rashes, or lesions   LYMPH NODES: No enlarged glands  ENDOCRINE: No heat or cold intolerance, or hair loss  MUSCULOSKELETAL: No joint pain or swelling, muscle, back, or extremity pain  PSYCHIATRIC: No depression, anxiety, or difficulty sleeping  HEME/LYMPH: No easy bruising or bleeding gums  ALLERY AND IMMUNOLOGIC: No hives or rash.      Vital Signs Last 24 Hrs  T(C): 36.3 (18 Apr 2022 07:50), Max: 36.9 (17 Apr 2022 16:00)  T(F): 97.4 (18 Apr 2022 07:50), Max: 98.5 (17 Apr 2022 20:00)  HR: 49 (18 Apr 2022 07:50) (42 - 56)  BP: 124/65 (18 Apr 2022 07:50) (118/60 - 136/68)  BP(mean): --  RR: 17 (18 Apr 2022 07:50) (17 - 18)  SpO2: 99% (18 Apr 2022 07:50) (99% - 100%)    PHYSICAL EXAM:    GENERAL: In no apparent distress, well nourished, and hydrated.  HEAD:  Atraumatic, Normocephalic  EYES: EOMI, PERRLA, conjunctiva and sclera clear  ENMT: No tonsillar erythema, exudates, or enlargement; Moist mucous membranes, Good dentition, No lesions  NECK: Supple and normal thyroid.  No JVD or carotid bruit.  Carotid pulse is 2+ bilaterally.  HEART: Regular rate and rhythm; No murmurs, rubs, or gallops.  PULMONARY: Clear to auscultation and perfusion.  No rales, wheezing, or rhonchi bilaterally.  ABDOMEN: Soft, Nontender, Nondistended; Bowel sounds present  EXTREMITIES:  2+ Peripheral Pulses, No clubbing, cyanosis, or edema  LYMPH: No lymphadenopathy noted  NEUROLOGICAL: Grossly nonfocal          INTERPRETATION OF TELEMETRY:    ECG:    I&O's Detail      LABS:                        10.4   4.05  )-----------( 228      ( 18 Apr 2022 07:32 )             31.5     04-18    143  |  108<H>  |  9   ----------------------------<  87  4.0   |  23  |  0.89    Ca    9.6      18 Apr 2022 07:32  Phos  2.4     04-18  Mg     1.80     04-18              BNP  I&O's Detail    Daily     Daily     RADIOLOGY & ADDITIONAL STUDIES:  PREVIOUS DIAGNOSTIC TESTING:      ECHO  FINDINGS:    STRESS  FINDINGS:    CATHETERIZATION  FINDINGS:      ASSESSMENT:        RECOMMENDATIONS: Patient is confused. History obtained from chart.   This is an 81y old  male with PMH of  HTN, T2DM, HLD, CVA w residual right sided hemiplegia, CAD s/p PCI, sinus node dysfunction s/p dual chamber PPM (12/19/14),  diastolic CHF, bedbound who presents with dysphagia/odynophagia. No fevers, chills, N/V/D, dysuria, rash, or abdominal pain. On admission found to be hypotensive with sinus bradycardia to 38 bpm.in the setting of a UTI with entero faecalis bacteremia. Started on ampicillin and Ceftriaxone.  Has continued to have asymptomatic bradycardia on telemetry. Awaiting HARIKA to rule out endocarditis.       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, 1998  HTN (hypertension)  Dementia  HLD (hyperlipidemia)  DM (diabetes mellitus)  Asthma  CAD (coronary artery disease)  s/p stent placement    Sinus node dysfunction  s/p Medtronic PPM    Cardiac pacemaker  Medtronic (SN: [id number]; Model: [id number])    S/P coronary artery stent placement        MEDICATIONS  (STANDING):  ampicillin  IVPB 2 Gram(s) IV Intermittent every 4 hours  aspirin enteric coated 81 milliGRAM(s) Oral daily  atorvastatin 20 milliGRAM(s) Oral at bedtime  cefTRIAXone   IVPB      cefTRIAXone   IVPB 2000 milliGRAM(s) IV Intermittent every 12 hours  dextrose 5%. 1000 milliLiter(s) (100 mL/Hr) IV Continuous <Continuous>  dextrose 5%. 1000 milliLiter(s) (50 mL/Hr) IV Continuous <Continuous>  dextrose 5%. 1000 milliLiter(s) (50 mL/Hr) IV Continuous <Continuous>  dextrose 50% Injectable 25 Gram(s) IV Push once  dextrose 50% Injectable 12.5 Gram(s) IV Push once  dextrose 50% Injectable 25 Gram(s) IV Push once  glucagon  Injectable 1 milliGRAM(s) IntraMuscular once  heparin   Injectable 5000 Unit(s) SubCutaneous every 8 hours  insulin lispro (ADMELOG) corrective regimen sliding scale   SubCutaneous three times a day before meals  insulin lispro (ADMELOG) corrective regimen sliding scale   SubCutaneous at bedtime  mineral oil enema 133 milliLiter(s) Rectal daily  senna 2 Tablet(s) Oral at bedtime  tamsulosin 0.4 milliGRAM(s) Oral at bedtime    MEDICATIONS  (PRN):  acetaminophen     Tablet .. 650 milliGRAM(s) Oral every 6 hours PRN Temp greater or equal to 38C (100.4F), Mild Pain (1 - 3)  ALBUTerol    90 MICROgram(s) HFA Inhaler 2 Puff(s) Inhalation every 6 hours PRN Shortness of Breath and/or Wheezing  aluminum hydroxide/magnesium hydroxide/simethicone Suspension 30 milliLiter(s) Oral every 4 hours PRN Dyspepsia  dextrose Oral Gel 15 Gram(s) Oral once PRN Blood Glucose LESS THAN 70 milliGRAM(s)/deciliter  melatonin 3 milliGRAM(s) Oral at bedtime PRN Insomnia  ondansetron Injectable 4 milliGRAM(s) IV Push every 8 hours PRN Nausea and/or Vomiting      FAMILY HISTORY:  FH: Mother and brother:   diabetes mellitus  Mother, Brother    FHx:  Father: dementia          SOCIAL HISTORY: Lives with his wife.    CIGARETTES:  No  DRUGS;  never  ALCOHOL:   No    REVIEW OF SYSTEMS:    CONSTITUTIONAL: No fever, chills, shakes, or fatigue  EYES: No eye pain, visual disturbances, or discharge  ENMT:  No difficulty hearing, tinnitus, vertigo;  + recent throat pain.   NECK: No pain or stiffness  BREASTS: No pain, masses, or nipple discharge  RESPIRATORY: No cough, wheezing, hemoptysis, or shortness of breath  CARDIOVASCULAR: No chest pain, dyspnea, palpitations, dizziness, syncope, paroxysmal nocturnal dyspnea, orthopnea, or arm or leg swelling  GASTROINTESTINAL: No abdominal  or epigastric pain, nausea, vomiting, hematemesis, diarrhea, constipation, melena or bright red blood.  GENITOURINARY: No dysuria, nocturia, hematuria, or urinary incontinence  NEUROLOGICAL: No headaches, memory loss, slurred speech, limb weakness, loss of strength, numbness, or tremors  SKIN: No itching, burning, rashes, or lesions   LYMPH NODES: No enlarged glands  ENDOCRINE: No heat or cold intolerance, or hair loss  MUSCULOSKELETAL: No joint pain or swelling, muscle, back, or extremity pain  PSYCHIATRIC: No depression, anxiety, or difficulty sleeping  HEME/LYMPH: No easy bruising or bleeding gums  ALLERY AND IMMUNOLOGIC: No hives or rash.      Vital Signs Last 24 Hrs  T(C): 36.3 (18 Apr 2022 07:50), Max: 36.9 (17 Apr 2022 16:00)  T(F): 97.4 (18 Apr 2022 07:50), Max: 98.5 (17 Apr 2022 20:00)  HR: 49 (18 Apr 2022 07:50) (42 - 56)  BP: 124/65 (18 Apr 2022 07:50) (118/60 - 136/68)  BP(mean): --  RR: 17 (18 Apr 2022 07:50) (17 - 18)  SpO2: 99% (18 Apr 2022 07:50) (99% - 100%)    PHYSICAL EXAM:    GENERAL: In no apparent distress, well nourished, and hydrated.  HEAD:  Atraumatic, Normocephalic  EYES: EOMI, PERRLA, conjunctiva and sclera clear  ENMT: No tonsillar erythema, exudates, or enlargement; Moist mucous membranes, Good dentition, No lesions  NECK: Supple and normal thyroid.  No JVD or carotid bruit.  Carotid pulse is 2+ bilaterally.  HEART: Regular rate and rhythm; No murmurs, rubs, or gallops.  PULMONARY: Clear to auscultation and perfusion.  No rales, wheezing, or rhonchi bilaterally.  ABDOMEN: Soft, Nontender, Nondistended; Bowel sounds present  EXTREMITIES:  2+ Peripheral Pulses, No clubbing, cyanosis, or edema  LYMPH: No lymphadenopathy noted  NEUROLOGICAL: Grossly nonfocal          INTERPRETATION OF TELEMETRY:    ECG:    I&O's Detail      LABS:                        10.4   4.05  )-----------( 228      ( 18 Apr 2022 07:32 )             31.5     04-18    143  |  108<H>  |  9   ----------------------------<  87  4.0   |  23  |  0.89    Ca    9.6      18 Apr 2022 07:32  Phos  2.4     04-18  Mg     1.80     04-18              BNP  I&O's Detail    Daily     Daily     RADIOLOGY & ADDITIONAL STUDIES:  PREVIOUS DIAGNOSTIC TESTING:      ECHO  FINDINGS:    STRESS  FINDINGS:    CATHETERIZATION  FINDINGS:      ASSESSMENT:        RECOMMENDATIONS: Patient is confused. History obtained from chart.   This is an 81y old  male with PMH of  HTN, T2DM, HLD, CVA w residual right sided hemiplegia, CAD s/p PCI, sinus node dysfunction s/p dual chamber PPM (14) and device extraction for MRSA bacteremia , diastolic CHF, bedbound who presents with dysphagia/odynophagia. No fevers, chills, N/V/D, dysuria, rash, or abdominal pain. On admission found to be hypotensive with sinus bradycardia to 38 bpm.in the setting of a UTI with entero faecalis bacteremia. Started on ampicillin and Ceftriaxone.  Has continued to have asymptomatic bradycardia on telemetry. Awaiting HARIKA to rule out endocarditis.       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,   HTN (hypertension)  Dementia  HLD (hyperlipidemia)  DM (diabetes mellitus)  Asthma  CAD (coronary artery disease)  s/p stent placement    Sinus node dysfunction  s/p Medtronic PPM    Cardiac pacemaker  Medtronic (SN: [id number]; Model: [id number])    S/P coronary artery stent placement        MEDICATIONS  (STANDING):  ampicillin  IVPB 2 Gram(s) IV Intermittent every 4 hours  aspirin enteric coated 81 milliGRAM(s) Oral daily  atorvastatin 20 milliGRAM(s) Oral at bedtime  cefTRIAXone   IVPB      cefTRIAXone   IVPB 2000 milliGRAM(s) IV Intermittent every 12 hours  dextrose 5%. 1000 milliLiter(s) (100 mL/Hr) IV Continuous <Continuous>  dextrose 5%. 1000 milliLiter(s) (50 mL/Hr) IV Continuous <Continuous>  dextrose 5%. 1000 milliLiter(s) (50 mL/Hr) IV Continuous <Continuous>  dextrose 50% Injectable 25 Gram(s) IV Push once  dextrose 50% Injectable 12.5 Gram(s) IV Push once  dextrose 50% Injectable 25 Gram(s) IV Push once  glucagon  Injectable 1 milliGRAM(s) IntraMuscular once  heparin   Injectable 5000 Unit(s) SubCutaneous every 8 hours  insulin lispro (ADMELOG) corrective regimen sliding scale   SubCutaneous three times a day before meals  insulin lispro (ADMELOG) corrective regimen sliding scale   SubCutaneous at bedtime  mineral oil enema 133 milliLiter(s) Rectal daily  senna 2 Tablet(s) Oral at bedtime  tamsulosin 0.4 milliGRAM(s) Oral at bedtime    MEDICATIONS  (PRN):  acetaminophen     Tablet .. 650 milliGRAM(s) Oral every 6 hours PRN Temp greater or equal to 38C (100.4F), Mild Pain (1 - 3)  ALBUTerol    90 MICROgram(s) HFA Inhaler 2 Puff(s) Inhalation every 6 hours PRN Shortness of Breath and/or Wheezing  aluminum hydroxide/magnesium hydroxide/simethicone Suspension 30 milliLiter(s) Oral every 4 hours PRN Dyspepsia  dextrose Oral Gel 15 Gram(s) Oral once PRN Blood Glucose LESS THAN 70 milliGRAM(s)/deciliter  melatonin 3 milliGRAM(s) Oral at bedtime PRN Insomnia  ondansetron Injectable 4 milliGRAM(s) IV Push every 8 hours PRN Nausea and/or Vomiting      FAMILY HISTORY:  FH: Mother and brother:   diabetes mellitus  Mother, Brother    FHx:  Father: dementia          SOCIAL HISTORY: Lives with his wife.    CIGARETTES:  No  DRUGS;  never  ALCOHOL:   No    REVIEW OF SYSTEMS:    CONSTITUTIONAL: No fever, chills, shakes, or fatigue  EYES: No eye pain, visual disturbances, or discharge  ENMT:  No difficulty hearing, tinnitus, vertigo;  + recent throat pain.   NECK: No pain or stiffness  BREASTS: No pain, masses, or nipple discharge  RESPIRATORY: No cough, wheezing, hemoptysis, or shortness of breath  CARDIOVASCULAR: No chest pain, dyspnea, palpitations, dizziness, syncope, paroxysmal nocturnal dyspnea, orthopnea, or arm or leg swelling  GASTROINTESTINAL: No abdominal  or epigastric pain, nausea, vomiting, hematemesis, diarrhea, constipation, melena or bright red blood.  GENITOURINARY: No dysuria, nocturia, hematuria, or urinary incontinence  NEUROLOGICAL: No headaches, memory loss, slurred speech, limb weakness, loss of strength, numbness, or tremors  SKIN: No itching, burning, rashes, or lesions   LYMPH NODES: No enlarged glands  ENDOCRINE: No heat or cold intolerance, or hair loss  MUSCULOSKELETAL: No joint pain or swelling, muscle, back, or extremity pain  PSYCHIATRIC: No depression, anxiety, or difficulty sleeping  HEME/LYMPH: No easy bruising or bleeding gums  ALLERY AND IMMUNOLOGIC: No hives or rash.      Vital Signs Last 24 Hrs  T(C): 36.3 (2022 07:50), Max: 36.9 (2022 16:00)  T(F): 97.4 (2022 07:50), Max: 98.5 (2022 20:00)  HR: 49 (2022 07:50) (42 - 56)  BP: 124/65 (2022 07:50) (118/60 - 136/68)  BP(mean): --  RR: 17 (2022 07:50) (17 - 18)  SpO2: 99% (2022 07:50) (99% - 100%)    PHYSICAL EXAM:    GENERAL: In no apparent distress, well nourished, and hydrated.  HEAD:  Atraumatic, Normocephalic  EYES: EOMI, PERRLA, conjunctiva and sclera clear  ENMT: No tonsillar erythema, exudates, or enlargement; Moist mucous membranes, Good dentition, No lesions  NECK: Supple and normal thyroid.  No JVD or carotid bruit.  Carotid pulse is 2+ bilaterally.  HEART: Regular rate and rhythm; No murmurs, rubs, or gallops.  PULMONARY: Clear to auscultation and perfusion.  No rales, wheezing, or rhonchi bilaterally.  ABDOMEN: Soft, Nontender, Nondistended; Bowel sounds present  EXTREMITIES:  2+ Peripheral Pulses, No clubbing, cyanosis, or edema  LYMPH: No lymphadenopathy noted  NEUROLOGICAL: Grossly nonfocal          INTERPRETATION OF TELEMETRY: Sinus rhythm with episodes of sinus bradycardia to 30's but mostly 40- high 50's.     ECG: Sinus bradycardia 47 bpm with occasional PVC's. ELIJAH 160ms.  QRDSd 80ms QT 400ms          LABS:                        10.4   4.05  )-----------( 228      ( 2022 07:32 )             31.5     04-18    143  |  108<H>  |  9   ----------------------------<  87  4.0   |  23  |  0.89    Ca    9.6      2022 07:32  Phos  2.4     04-18  Mg     1.80     04-18                        RADIOLOGY & ADDITIONAL STUDIES:  PREVIOUS DIAGNOSTIC TESTING:      ECHO  FINDINGS:    < from: Transthoracic Echocardiogram (21 @ 12:55) >  Patient name: DESHAWN TIWARI  YOB: 1940   Age: 81 (M)   MR#: [id number]  Study Date: 2021  Location: [address] [place], [place] [address]Sonographer: Sinai Chavez RDCS  2nd Sonographer: Josefa Hinds RDCS  Study quality: Technically Difficult/Limited  Referring Physician: Ophelia Rice MD  Blood Pressure: 154/83 mmHg  Height: 167 cm  Weight: 81 kg  BSA: 1.9 m2  ------------------------------------------------------------------------  PROCEDURE: Transthoracic echocardiogram with 2-D, M-Mode  and complete spectral and color flow Doppler.  INDICATION: Nonrheumatic mitral valve disorder, unspecified  (I34.9)  ------------------------------------------------------------------------  OBSERVATIONS:  Mitral Valve: Mitral valve not well visualized, probably  normal.  Aortic Root: Normal aortic root.  Aortic Valve: Aortic valve not well visualized.  Left Atrium: Normal left atrium.  Left Ventricle: Endocardium not well visualized; grossly  normal left ventricular systolic function. Normal left  ventricular internal dimensions and wall thicknesses. Mild  diastolic dysfunction (Stage I).  Right Heart: A device wire is noted in the right heart.  Normal right ventricular size and function. Normal  tricuspid valve. Normal pulmonic valve.  Pericardium/PleuraNormal pericardium with no pericardial  effusion.  ------------------------------------------------------------------------  CONCLUSIONS:  1. Endocardium not well visualized; grossly normal left  ventricular systolic function.  2. Mild diastolic dysfunction (Stage I).  3. A device wire is noted in the right heart.  4. Normal right ventricular size and function.  *** Compared with echocardiogram of 2021, no  significant changes noted.  ------------------------------------------------------------------------  Confirmed on  2021 - 14:16:38 by Sadaf Peace MD  ------------------------------------------------------------------------        :    CATHETERIZATION  FINDINGS:  < from: Cardiac Cath Lab - Adult (14 @ 12:25) >  [place] [phone]  Cath Lab Report -- Comprehensive Report  Patient: DESHAWN TIWARI  Study date: 2014  Account number: [id number]  MR number: [id number]  : 1940  Gender: Male  Race: B  Case Physician(s):  Manohar Ellington M.D.  Fellow:  Huang Glasgow M.D.  Referring Physician:  Ravi Tay M.D.  INDICATIONS: Angina/MI: stable angina, CCS class II. Cardiac: arrhythmia.  HISTORY: There was no prior cardiac history. The patient has hypertension  and oral hypoglycemic-treated diabetes.  PRIOR DIAGNOSTIC TEST RESULTS: Nuclear stress test was unavailable.  PROCEDURE:  --  Left coronary angiography.  --  Right coronary angiography.  TECHNIQUE: The risks and alternatives of the procedures and conscious  sedation were explained to the patient and informed consent was obtained.  Cardiac catheterization performed electively.  Local anesthetic given. Right femoral artery access. Left coronary artery  angiography. The vessel was injected utilizing a catheter. Right coronary  artery angiography. The vessel was injected utilizing a catheter.  RADIATION EXPOSURE: 6 min.  CONTRAST GIVEN: 49 ml Optiray.  MEDICATIONS GIVEN: Midazolam, 0.5 mg, IV. Fentanyl, 25 mcg, IV.2%  Lidocaine, 10 ml, subcutaneously. 0.9% Normal saline, 30 ml, IV.  CORONARY VESSELS: The coronary circulation is right dominant.  LM:   --  LM: Angiography showed minor luminal irregularities with no flow  limiting lesions.  LAD:   --  Distal LAD:There was a 100 % stenosis.  CX:   --  OM3: There was a 100 % stenosis.  RCA:   --  Distal RCA: There was a 100 % stenosis.  COMPLICATIONS: There were no complications. No complications occurred  during the cath lab visit.  DIAGNOSTIC RECOMMENDATIONS: Will get EP to evaluate patient for symptomatic  bradycardia.  INTERVENTIONAL RECOMMENDATIONS: Will get EP to evaluate patient for  symptomatic bradycardia.  Prepared and signed by  Manohar Ellington M.D.  Signed 2014 13:07:25            ASSESSMENT:        RECOMMENDATIONS: Patient is confused. History obtained from chart.   This is an 81y old  male with PMH of  HTN, T2DM, HLD, CVA w residual right sided hemiplegia, CAD s/p PCI, sinus node dysfunction s/p dual chamber PPM (14) with device extraction for MRSA bacteremia , diastolic CHF, bedbound who presents with dysphagia/odynophagia. No fevers, chills, N/V/D, dysuria, rash, or abdominal pain. On admission found to be hypotensive with sinus bradycardia to 38 bpm.in the setting of a UTI with entero faecalis bacteremia. Started on ampicillin and Ceftriaxone.  Has continued to have asymptomatic bradycardia to low 30's on telemetry.  Awaiting HARIKA to rule out endocarditis.   EP consulted for sinus bradycardia.    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,   HTN (hypertension)  Dementia  HLD (hyperlipidemia)  DM (diabetes mellitus)  Asthma  CAD (coronary artery disease)  s/p stent placement  Sinus node dysfunction s/p [employer] PPM (SN: [id number]; Model: [id number])  PPM extraction for MRSA bacteremia 21      MEDICATIONS  (STANDING):  ampicillin  IVPB 2 Gram(s) IV Intermittent every 4 hours  aspirin enteric coated 81 milliGRAM(s) Oral daily  atorvastatin 20 milliGRAM(s) Oral at bedtime  cefTRIAXone   IVPB      cefTRIAXone   IVPB 2000 milliGRAM(s) IV Intermittent every 12 hours  dextrose 5%. 1000 milliLiter(s) (100 mL/Hr) IV Continuous <Continuous>  dextrose 5%. 1000 milliLiter(s) (50 mL/Hr) IV Continuous <Continuous>  dextrose 5%. 1000 milliLiter(s) (50 mL/Hr) IV Continuous <Continuous>  dextrose 50% Injectable 25 Gram(s) IV Push once  dextrose 50% Injectable 12.5 Gram(s) IV Push once  dextrose 50% Injectable 25 Gram(s) IV Push once  glucagon  Injectable 1 milliGRAM(s) IntraMuscular once  heparin   Injectable 5000 Unit(s) SubCutaneous every 8 hours  insulin lispro (ADMELOG) corrective regimen sliding scale   SubCutaneous three times a day before meals  insulin lispro (ADMELOG) corrective regimen sliding scale   SubCutaneous at bedtime  mineral oil enema 133 milliLiter(s) Rectal daily  senna 2 Tablet(s) Oral at bedtime  tamsulosin 0.4 milliGRAM(s) Oral at bedtime    MEDICATIONS  (PRN):  acetaminophen     Tablet .. 650 milliGRAM(s) Oral every 6 hours PRN Temp greater or equal to 38C (100.4F), Mild Pain (1 - 3)  ALBUTerol    90 MICROgram(s) HFA Inhaler 2 Puff(s) Inhalation every 6 hours PRN Shortness of Breath and/or Wheezing  aluminum hydroxide/magnesium hydroxide/simethicone Suspension 30 milliLiter(s) Oral every 4 hours PRN Dyspepsia  dextrose Oral Gel 15 Gram(s) Oral once PRN Blood Glucose LESS THAN 70 milliGRAM(s)/deciliter  melatonin 3 milliGRAM(s) Oral at bedtime PRN Insomnia  ondansetron Injectable 4 milliGRAM(s) IV Push every 8 hours PRN Nausea and/or Vomiting      FAMILY HISTORY:  FH: Mother and brother:   diabetes mellitus  Mother, Brother    FHx:  Father: dementia          SOCIAL HISTORY: Lives with his wife.    CIGARETTES:  No  DRUGS;  never  ALCOHOL:   No    REVIEW OF SYSTEMS:    CONSTITUTIONAL: No fever, chills, shakes, or fatigue Confused  EYES: No eye pain, visual disturbances, or discharge  ENMT:  No difficulty hearing, tinnitus, vertigo;  + recent throat pain.   NECK: No pain or stiffness  BREASTS: No pain, masses, or nipple discharge  RESPIRATORY: + cough without wheezing, hemoptysis, or shortness of breath  CARDIOVASCULAR: No chest pain, dyspnea, palpitations, dizziness, syncope, paroxysmal nocturnal dyspnea, orthopnea, or arm or leg swelling  GASTROINTESTINAL: No abdominal  or epigastric pain, nausea, vomiting, hematemesis, diarrhea, constipation, melena or bright red blood.  GENITOURINARY: No dysuria, nocturia, hematuria, + urinary incontinence  NEUROLOGICAL: No headaches, memory loss, slurred speech, limb weakness, loss of strength, numbness, or tremors  SKIN: No itching, burning, rashes, or lesions   LYMPH NODES: No enlarged glands  ENDOCRINE: No heat or cold intolerance, or hair loss  MUSCULOSKELETAL: No joint pain or swelling, muscle, back, or extremity pain  PSYCHIATRIC: + dementia  HEME/LYMPH: No easy bruising or bleeding gums  ALLERGY AND IMMUNOLOGIC: No hives or rash.      Vital Signs Last 24 Hrs  T(C): 36.3 (2022 07:50), Max: 36.9 (2022 16:00)  T(F): 97.4 (2022 07:50), Max: 98.5 (2022 20:00)  HR: 49 (2022 07:50) (42 - 56)  BP: 124/65 (2022 07:50) (118/60 - 136/68)  BP(mean): --  RR: 17 (2022 07:50) (17 - 18)  SpO2: 99% (2022 07:50) (99% - 100%)    PHYSICAL EXAM:    GENERAL: In no apparent distress, well nourished, and hydrated.  HEAD:  Atraumatic, Normocephalic  EYES: EOMI, PERRLA, conjunctiva and sclera clear  ENMT: No tonsillar erythema, exudates, or enlargement; Moist mucous membranes, Ill fitting dentures  NECK: Supple and normal thyroid.  No JVD or carotid bruit.  Carotid pulse is 2+ bilaterally.  HEART: Bradycardia with regular rhythm; No murmurs, rubs, or gallops. Right sided PPM surgical scar well healed.  PULMONARY: Clear to auscultation and perfusion.  No rales, wheezing, or rhonchi bilaterally.  ABDOMEN: Soft, Nontender, Nondistended; Bowel sounds present  EXTREMITIES:  2+ Peripheral Pulses, No clubbing, cyanosis, or edema  LYMPH: No lymphadenopathy noted  NEUROLOGICAL: Grossly nonfocal          INTERPRETATION OF TELEMETRY: Sinus rhythm with episodes of sinus bradycardia to 30's but mostly 40- high 50's.     ECG: Sinus bradycardia 47 bpm with occasional PVC's. ELIJAH 160ms.  QRDSd 80ms QT 400ms          LABS:                        10.4   4.05  )-----------( 228      ( 2022 07:32 )             31.5     04-18    143  |  108<H>  |  9   ----------------------------<  87  4.0   |  23  |  0.89    Ca    9.6      2022 07:32  Phos  2.4     04-18  Mg     1.80     04-18                        RADIOLOGY & ADDITIONAL STUDIES:  PREVIOUS DIAGNOSTIC TESTING:      ECHO  FINDINGS:    < from: Transthoracic Echocardiogram (21 @ 12:55) >  Patient name: DESHAWN TIWARI  YOB: 1940   Age: 81 (M)   MR#: [id number]  Study Date: 2021  Location: [address] [place], [place] [address]Sonographer: Sinai Chavez RDCS  2nd Sonographer: Josefa Hinds RDCS  Study quality: Technically Difficult/Limited  Referring Physician: Ophelia Rice MD  Blood Pressure: 154/83 mmHg  Height: 167 cm  Weight: 81 kg  BSA: 1.9 m2  ------------------------------------------------------------------------  PROCEDURE: Transthoracic echocardiogram with 2-D, M-Mode  and complete spectral and color flow Doppler.  INDICATION: Nonrheumatic mitral valve disorder, unspecified  (I34.9)  ------------------------------------------------------------------------  OBSERVATIONS:  Mitral Valve: Mitral valve not well visualized, probably  normal.  Aortic Root: Normal aortic root.  Aortic Valve: Aortic valve not well visualized.  Left Atrium: Normal left atrium.  Left Ventricle: Endocardium not well visualized; grossly  normal left ventricular systolic function. Normal left  ventricular internal dimensions and wall thicknesses. Mild  diastolic dysfunction (Stage I).  Right Heart: A device wire is noted in the right heart.  Normal right ventricular size and function. Normal  tricuspid valve. Normal pulmonic valve.  Pericardium/PleuraNormal pericardium with no pericardial  effusion.  ------------------------------------------------------------------------  CONCLUSIONS:  1. Endocardium not well visualized; grossly normal left  ventricular systolic function.  2. Mild diastolic dysfunction (Stage I).  3. A device wire is noted in the right heart.  4. Normal right ventricular size and function.  *** Compared with echocardiogram of 2021, no  significant changes noted.  ------------------------------------------------------------------------  Confirmed on  2021 - 14:16:38 by Sadaf Peace MD  ------------------------------------------------------------------------        :    CATHETERIZATION  FINDINGS:  < from: Cardiac Cath Lab - Adult (12.18.14 @ 12:25) >  [place] [address]  [phone]  Cath Lab Report -- Comprehensive Report  Patient: DESHAWN TIWARI  Study date: 2014  Account number: [id number]  MR number: [id number]  : 1940  Gender: Male  Race: B  Case Physician(s):  Manohar Ellington M.D.  Fellow:  Huang Glasgow M.D.  Referring Physician:  Ravi Tay M.D.  INDICATIONS: Angina/MI: stable angina, CCS class II. Cardiac: arrhythmia.  HISTORY: There was no prior cardiac history. The patient has hypertension  and oral hypoglycemic-treated diabetes.  PRIOR DIAGNOSTIC TEST RESULTS: Nuclear stress test was unavailable.  PROCEDURE:  --  Left coronary angiography.  --  Right coronary angiography.  TECHNIQUE: The risks and alternatives of the procedures and conscious  sedation were explained to the patient and informed consent was obtained.  Cardiac catheterization performed electively.  Local anesthetic given. Right femoral artery access. Left coronary artery  angiography. The vessel was injected utilizing a catheter. Right coronary  artery angiography. The vessel was injected utilizing a catheter.  RADIATION EXPOSURE: 6 min.  CONTRAST GIVEN: 49 ml Optiray.  MEDICATIONS GIVEN: Midazolam, 0.5 mg, IV. Fentanyl, 25 mcg, IV.2%  Lidocaine, 10 ml, subcutaneously. 0.9% Normal saline, 30 ml, IV.  CORONARY VESSELS: The coronary circulation is right dominant.  LM:   --  LM: Angiography showed minor luminal irregularities with no flow  limiting lesions.  LAD:   --  Distal LAD:There was a 100 % stenosis.  CX:   --  OM3: There was a 100 % stenosis.  RCA:   --  Distal RCA: There was a 100 % stenosis.  COMPLICATIONS: There were no complications. No complications occurred  during the cath lab visit.  DIAGNOSTIC RECOMMENDATIONS: Will get EP to evaluate patient for symptomatic  bradycardia.  INTERVENTIONAL RECOMMENDATIONS: Will get EP to evaluate patient for  symptomatic bradycardia.  Prepared and signed by  Manohar Ellington M.D.  Signed 2014 13:07:25

## 2022-04-18 NOTE — CONSULT NOTE ADULT - NS ATTEND AMEND GEN_ALL_CORE FT
Patient is confused. History obtained from chart.   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 On admission found to be hypotensive with sinus bradycardia to 38 bpm.in the setting of a UTI with entero faecalis bacteremia. Started on ampicillin and Ceftriaxone.  Has continued to have asymptomatic bradycardia to low 30's on telemetry but mostly high 40's to 50's. When moving in bed, his heart rate increases to low 60's suggesting chronotropic competence. No EP intervention at this time.

## 2022-04-18 NOTE — PROVIDER CONTACT NOTE (OTHER) - ACTION/TREATMENT ORDERED:
0.5 ampule of dextrose administered. Will repeat in fifteen minutes and will return to inpatient bed. ARIE Morse made aware.

## 2022-04-18 NOTE — DIETITIAN INITIAL EVALUATION ADULT - NSICDXPASTSURGICALHX_GEN_ALL_CORE_FT
PAST SURGICAL HISTORY:  Cardiac pacemaker [employer] (SN: [id number]; Model: 30435)    S/P coronary artery stent placement

## 2022-04-18 NOTE — PROGRESS NOTE ADULT - ASSESSMENT
81 year old male with HTN, T2DM, HLD, constipation, CVA c/b R sided hemiplegia, CAD s/p PCI, HFpEF, sinus node dysfunction, bedbound who presents with dysphagia/odynophagia found to have e. faecalis bacteremia and asymptomatic bradycardia.

## 2022-04-18 NOTE — PROVIDER CONTACT NOTE (OTHER) - SITUATION
Arrives to [facility] for HARIKA A*Ox1 (baseline mental status). Fingerstick checked per anesthesiologist Dr. Blunt, BGL= 76. HARIKA cancelled and spoke with Floor GAVINO Mcdonnell. Noted to have pending D5W order,

## 2022-04-18 NOTE — PROVIDER CONTACT NOTE (OTHER) - ACTION/TREATMENT ORDERED:
All pertinent team members aware, will repeat BGL prior to sending patient to floor. Dr. Blunt (anesthesia) aware and plan of care. All pertinent team members aware, will repeat BGL prior to sending patient to floor. Dr. Blunt (anesthesia) aware of plan of care, Floor RN updated regarding patient status. VSS, patient in NAD.

## 2022-04-18 NOTE — PROGRESS NOTE ADULT - ASSESSMENT
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, s/p PPM extraction and 6 weeks of vancomycin and ceftriaxone  Had abdominal pain and CT 4/9 showed prominent stool burden and possible stercoral colitis; possible sacral ulcer, and atelectasis  Afebrile, no leukocytosis  UA WBC > 50, UCx candida  BCx showed 3/4 bottles of enterococcus bacteremia  Likely GI source  Sacral area with skin loss but no acute ulceration or abscess formation      IMPRESSION:  Enterococcus bacteremia  Stercoral colitis  Physical debilitation    RECOMMENDATIONS:  - c/w IV ampicillin to 2gm q4hrs and  IV ceftriaxone 2gm q12hrs  - follow 4/17 blood cultures to assess for clearance  - TTE, will likely need HARIKA due to previous presumed endocarditis  - Offloading  - Consider ENT if throat pain persists

## 2022-04-18 NOTE — CONSULT NOTE ADULT - ASSESSMENT
Patient is confused. History obtained from chart.   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 On admission found to be hypotensive with sinus bradycardia to 38 bpm.in the setting of a UTI with entero faecalis bacteremia. Started on ampicillin and Ceftriaxone.  Has continued to have asymptomatic bradycardia to low 30's on telemetry but mostly high 40's to 50's. When moving in bed, his heart rate increases to low 60's suggesting chronotropic competence. Echo with normal biventricular systolic function and mild diastolic dysfunction. Patient followed by Dr. Andrea who documents similar findings. Patient bedbound and asymptomatic and currently with entero faecalis bacteremia, likely GI source. Awaiting HARIKA to rule out endocarditis.   EP consulted for sinus bradycardia.    Plan:  Continue to observe on telemetry  Avoid AV arpita blockers  No pacing indication at this time.

## 2022-04-18 NOTE — DIETITIAN INITIAL EVALUATION ADULT - NS FNS DIET ORDER
Diet, NPO after Midnight:      NPO Start Date: 17-Apr-2022,   NPO Start Time: 23:59 (04-17-22 @ 10:53) [Active]  Diet, Pureed:   Consistent Carbohydrate {No Snacks} (CSTCHO)  DASH/TLC {Sodium & Cholesterol Restricted} (DASH) (04-17-22 @ 08:09) [Active]

## 2022-04-18 NOTE — DIETITIAN INITIAL EVALUATION ADULT - PERTINENT LABORATORY DATA
04-18    143  |  108<H>  |  9   ----------------------------<  87  4.0   |  23  |  0.89    Ca    9.6      18 Apr 2022 07:32  Phos  2.4     04-18  Mg     1.80     04-18    POCT Blood Glucose.: 42 mg/dL (04-18-22 @ 12:26)  A1C with Estimated Average Glucose Result: 5.3 % (12-17-21 @ 06:49)  A1C with Estimated Average Glucose Result: 5.5 % (06-24-21 @ 05:23)  A1C with Estimated Average Glucose Result: 5.5 % (06-11-21 @ 08:07)

## 2022-04-18 NOTE — DIETITIAN INITIAL EVALUATION ADULT - PERTINENT MEDS FT
MEDICATIONS  (STANDING):  ampicillin  IVPB 2 Gram(s) IV Intermittent every 4 hours  aspirin enteric coated 81 milliGRAM(s) Oral daily  atorvastatin 20 milliGRAM(s) Oral at bedtime  cefTRIAXone   IVPB      cefTRIAXone   IVPB 2000 milliGRAM(s) IV Intermittent every 12 hours  dextrose 5%. 1000 milliLiter(s) (100 mL/Hr) IV Continuous <Continuous>  dextrose 5%. 1000 milliLiter(s) (50 mL/Hr) IV Continuous <Continuous>  dextrose 5%. 1000 milliLiter(s) (50 mL/Hr) IV Continuous <Continuous>  dextrose 50% Injectable 25 Gram(s) IV Push once  dextrose 50% Injectable 12.5 Gram(s) IV Push once  dextrose 50% Injectable 25 Gram(s) IV Push once  glucagon  Injectable 1 milliGRAM(s) IntraMuscular once  heparin   Injectable 5000 Unit(s) SubCutaneous every 8 hours  insulin lispro (ADMELOG) corrective regimen sliding scale   SubCutaneous three times a day before meals  insulin lispro (ADMELOG) corrective regimen sliding scale   SubCutaneous at bedtime  mineral oil enema 133 milliLiter(s) Rectal daily  senna 2 Tablet(s) Oral at bedtime  tamsulosin 0.4 milliGRAM(s) Oral at bedtime    MEDICATIONS  (PRN):  acetaminophen     Tablet .. 650 milliGRAM(s) Oral every 6 hours PRN Temp greater or equal to 38C (100.4F), Mild Pain (1 - 3)  ALBUTerol    90 MICROgram(s) HFA Inhaler 2 Puff(s) Inhalation every 6 hours PRN Shortness of Breath and/or Wheezing  aluminum hydroxide/magnesium hydroxide/simethicone Suspension 30 milliLiter(s) Oral every 4 hours PRN Dyspepsia  dextrose Oral Gel 15 Gram(s) Oral once PRN Blood Glucose LESS THAN 70 milliGRAM(s)/deciliter  melatonin 3 milliGRAM(s) Oral at bedtime PRN Insomnia  ondansetron Injectable 4 milliGRAM(s) IV Push every 8 hours PRN Nausea and/or Vomiting

## 2022-04-18 NOTE — PROVIDER CONTACT NOTE (OTHER) - SITUATION
When transport came to get patient to return to inpatient bed, sudheer BGL was 62, repeat 70. D5W running as ordered. GAVINO Wilson to order 0.5 amp of dextrose and patient to return to inpatient bed When transport came to get patient to return to inpatient bed, patients BGL was 62, repeat 70. D5W running as ordered. GAVINO Wilson to order 0.5 amp of dextrose and patient to return to inpatient bed

## 2022-04-18 NOTE — DIETITIAN INITIAL EVALUATION ADULT - ADD RECOMMEND
Recommend d/c Consistent carb diet, d/c DASH/TLC diet, Liberalize diet to regular, pureed, thin liquids to promote optimal PO intake, wt gain and wound healing and decrease likelihood of hypoglycemic episodes,  dept to provide Vital Health Shake 2x daily (1040 radha and 44 gm protein) and Nighttime snack. Obtain weights. Encourage PO intake and honor food preferences as able. Continue to Monitor weights, BM's, skin integrity, p.o. intake in RN flowsheets. Reconsult RD as needed.

## 2022-04-18 NOTE — PROGRESS NOTE ADULT - SUBJECTIVE AND OBJECTIVE BOX
I have evaluated this patient as he presented to IR [facility] prior to his HARIKA.  Of note were his glucose values.  He recently had values of 42 to which he received D50.  Values following this were 96, 98, and 106 immediately preceding his transport to the IR [facility] area.  His glucose measured was then 73.      I am post-poning today's HARIKA pending stabilization of his glucose values.  My concern is further and more profound hypoglycemia under anesthesia.  I have discussed this with a 2nd Attending Anesthesiologist as well as the Cardiologist who agree.  We will begin a D5 IV infusion prior to his transport from IR [facility].    Dr. LISET Blunt   [id number]

## 2022-04-19 DIAGNOSIS — E78.49 OTHER HYPERLIPIDEMIA: ICD-10-CM

## 2022-04-19 DIAGNOSIS — E16.2 HYPOGLYCEMIA, UNSPECIFIED: ICD-10-CM

## 2022-04-19 LAB
ANION GAP SERPL CALC-SCNC: 12 MMOL/L — SIGNIFICANT CHANGE UP (ref 7–14)
BUN SERPL-MCNC: 5 MG/DL — LOW (ref 7–23)
CALCIUM SERPL-MCNC: 8.9 MG/DL — SIGNIFICANT CHANGE UP (ref 8.4–10.5)
CHLORIDE SERPL-SCNC: 101 MMOL/L — SIGNIFICANT CHANGE UP (ref 98–107)
CO2 SERPL-SCNC: 19 MMOL/L — LOW (ref 22–31)
CORTIS AM PEAK SERPL-MCNC: 11.6 UG/DL — SIGNIFICANT CHANGE UP (ref 6–18.4)
CREAT SERPL-MCNC: 0.73 MG/DL — SIGNIFICANT CHANGE UP (ref 0.5–1.3)
EGFR: 91 ML/MIN/1.73M2 — SIGNIFICANT CHANGE UP
GLUCOSE BLDC GLUCOMTR-MCNC: 100 MG/DL — HIGH (ref 70–99)
GLUCOSE BLDC GLUCOMTR-MCNC: 101 MG/DL — HIGH (ref 70–99)
GLUCOSE BLDC GLUCOMTR-MCNC: 115 MG/DL — HIGH (ref 70–99)
GLUCOSE BLDC GLUCOMTR-MCNC: 161 MG/DL — HIGH (ref 70–99)
GLUCOSE BLDC GLUCOMTR-MCNC: 30 MG/DL — CRITICAL LOW (ref 70–99)
GLUCOSE BLDC GLUCOMTR-MCNC: 38 MG/DL — CRITICAL LOW (ref 70–99)
GLUCOSE BLDC GLUCOMTR-MCNC: 41 MG/DL — CRITICAL LOW (ref 70–99)
GLUCOSE BLDC GLUCOMTR-MCNC: 47 MG/DL — CRITICAL LOW (ref 70–99)
GLUCOSE BLDC GLUCOMTR-MCNC: 58 MG/DL — LOW (ref 70–99)
GLUCOSE BLDC GLUCOMTR-MCNC: 70 MG/DL — SIGNIFICANT CHANGE UP (ref 70–99)
GLUCOSE BLDC GLUCOMTR-MCNC: 79 MG/DL — SIGNIFICANT CHANGE UP (ref 70–99)
GLUCOSE BLDC GLUCOMTR-MCNC: 93 MG/DL — SIGNIFICANT CHANGE UP (ref 70–99)
GLUCOSE BLDC GLUCOMTR-MCNC: 93 MG/DL — SIGNIFICANT CHANGE UP (ref 70–99)
GLUCOSE SERPL-MCNC: 67 MG/DL — LOW (ref 70–99)
MAGNESIUM SERPL-MCNC: 1.8 MG/DL — SIGNIFICANT CHANGE UP (ref 1.6–2.6)
PHOSPHATE SERPL-MCNC: 2.6 MG/DL — SIGNIFICANT CHANGE UP (ref 2.5–4.5)
POTASSIUM SERPL-MCNC: 3.5 MMOL/L — SIGNIFICANT CHANGE UP (ref 3.5–5.3)
POTASSIUM SERPL-SCNC: 3.5 MMOL/L — SIGNIFICANT CHANGE UP (ref 3.5–5.3)
SODIUM SERPL-SCNC: 132 MMOL/L — LOW (ref 135–145)
T4 FREE SERPL-MCNC: 1.6 NG/DL — SIGNIFICANT CHANGE UP (ref 0.9–1.8)
TSH SERPL-MCNC: 2.43 UIU/ML — SIGNIFICANT CHANGE UP (ref 0.27–4.2)

## 2022-04-19 PROCEDURE — 99233 SBSQ HOSP IP/OBS HIGH 50: CPT

## 2022-04-19 PROCEDURE — 99222 1ST HOSP IP/OBS MODERATE 55: CPT

## 2022-04-19 PROCEDURE — 99232 SBSQ HOSP IP/OBS MODERATE 35: CPT

## 2022-04-19 RX ORDER — DEXTROSE 50 % IN WATER 50 %
25 SYRINGE (ML) INTRAVENOUS ONCE
Refills: 0 | Status: COMPLETED | OUTPATIENT
Start: 2022-04-19 | End: 2022-04-19

## 2022-04-19 RX ORDER — DEXTROSE 10 % IN WATER 10 %
1000 INTRAVENOUS SOLUTION INTRAVENOUS
Refills: 0 | Status: DISCONTINUED | OUTPATIENT
Start: 2022-04-19 | End: 2022-04-22

## 2022-04-19 RX ADMIN — Medication 216 GRAM(S): at 05:32

## 2022-04-19 RX ADMIN — SENNA PLUS 2 TABLET(S): 8.6 TABLET ORAL at 22:00

## 2022-04-19 RX ADMIN — Medication 216 GRAM(S): at 21:58

## 2022-04-19 RX ADMIN — Medication 216 GRAM(S): at 10:55

## 2022-04-19 RX ADMIN — ATORVASTATIN CALCIUM 20 MILLIGRAM(S): 80 TABLET, FILM COATED ORAL at 22:00

## 2022-04-19 RX ADMIN — Medication 25 GRAM(S): at 09:22

## 2022-04-19 RX ADMIN — CEFTRIAXONE 100 MILLIGRAM(S): 500 INJECTION, POWDER, FOR SOLUTION INTRAMUSCULAR; INTRAVENOUS at 05:59

## 2022-04-19 RX ADMIN — Medication 81 MILLIGRAM(S): at 14:23

## 2022-04-19 RX ADMIN — Medication 216 GRAM(S): at 14:23

## 2022-04-19 RX ADMIN — Medication 50 MILLILITER(S): at 02:34

## 2022-04-19 RX ADMIN — Medication 65 MILLILITER(S): at 14:24

## 2022-04-19 RX ADMIN — CEFTRIAXONE 100 MILLIGRAM(S): 500 INJECTION, POWDER, FOR SOLUTION INTRAMUSCULAR; INTRAVENOUS at 17:13

## 2022-04-19 RX ADMIN — Medication 216 GRAM(S): at 02:35

## 2022-04-19 RX ADMIN — HEPARIN SODIUM 5000 UNIT(S): 5000 INJECTION INTRAVENOUS; SUBCUTANEOUS at 05:33

## 2022-04-19 RX ADMIN — Medication 216 GRAM(S): at 18:32

## 2022-04-19 RX ADMIN — HEPARIN SODIUM 5000 UNIT(S): 5000 INJECTION INTRAVENOUS; SUBCUTANEOUS at 21:59

## 2022-04-19 RX ADMIN — Medication 65 MILLILITER(S): at 21:57

## 2022-04-19 RX ADMIN — HEPARIN SODIUM 5000 UNIT(S): 5000 INJECTION INTRAVENOUS; SUBCUTANEOUS at 14:24

## 2022-04-19 RX ADMIN — TAMSULOSIN HYDROCHLORIDE 0.4 MILLIGRAM(S): 0.4 CAPSULE ORAL at 21:59

## 2022-04-19 NOTE — PROVIDER CONTACT NOTE (HYPOGLYCEMIA EVENT) - NS PROVIDER CONTACT CONTRIBUTING FACTORS OF EPISODE
Poor oral intake within the last 24 hours
Poor oral intake within the last 24 hours
Poor oral intake within the last 24 hours/Patient NPO greater than 8 hours
Poor oral intake within the last 24 hours/Patient NPO greater than 8 hours/Previous finger stick less than 100 mg/dL
Poor oral intake within the last 24 hours/Patient NPO greater than 8 hours
Patient NPO greater than 8 hours

## 2022-04-19 NOTE — PROGRESS NOTE ADULT - SUBJECTIVE AND OBJECTIVE BOX
Patient calmer today.         Vital Signs Last 24 Hrs  T(C): 36.7 (19 Apr 2022 04:00), Max: 36.7 (19 Apr 2022 04:00)  T(F): 98 (19 Apr 2022 04:00), Max: 98 (19 Apr 2022 04:00)  HR: 52 (19 Apr 2022 04:00) (51 - 82)  BP: 131/62 (19 Apr 2022 04:00) (128/62 - 148/86)  BP(mean): --  RR: 18 (19 Apr 2022 04:00) (17 - 18)  SpO2: 99% (19 Apr 2022 04:00) (99% - 99%)      Telemetry:  sinus bradycardia 30's overnight and when awake, increased to high 50's.   MEDICATIONS  (STANDING):  ampicillin  IVPB 2 Gram(s) IV Intermittent every 4 hours  aspirin enteric coated 81 milliGRAM(s) Oral daily  atorvastatin 20 milliGRAM(s) Oral at bedtime  cefTRIAXone   IVPB      cefTRIAXone   IVPB 2000 milliGRAM(s) IV Intermittent every 12 hours  dextrose 10%. 1000 milliLiter(s) (50 mL/Hr) IV Continuous <Continuous>  dextrose 5%. 1000 milliLiter(s) (50 mL/Hr) IV Continuous <Continuous>  dextrose 5%. 1000 milliLiter(s) (50 mL/Hr) IV Continuous <Continuous>  dextrose 5%. 1000 milliLiter(s) (100 mL/Hr) IV Continuous <Continuous>  dextrose 50% Injectable 25 Gram(s) IV Push once  dextrose 50% Injectable 12.5 Gram(s) IV Push once  dextrose 50% Injectable 25 Gram(s) IV Push once  dextrose 50% Injectable 25 Gram(s) IV Push once  glucagon  Injectable 1 milliGRAM(s) IntraMuscular once  heparin   Injectable 5000 Unit(s) SubCutaneous every 8 hours  insulin lispro (ADMELOG) corrective regimen sliding scale   SubCutaneous three times a day before meals  insulin lispro (ADMELOG) corrective regimen sliding scale   SubCutaneous at bedtime  mineral oil enema 133 milliLiter(s) Rectal daily  senna 2 Tablet(s) Oral at bedtime  tamsulosin 0.4 milliGRAM(s) Oral at bedtime    MEDICATIONS  (PRN):  acetaminophen     Tablet .. 650 milliGRAM(s) Oral every 6 hours PRN Temp greater or equal to 38C (100.4F), Mild Pain (1 - 3)  ALBUTerol    90 MICROgram(s) HFA Inhaler 2 Puff(s) Inhalation every 6 hours PRN Shortness of Breath and/or Wheezing  aluminum hydroxide/magnesium hydroxide/simethicone Suspension 30 milliLiter(s) Oral every 4 hours PRN Dyspepsia  dextrose Oral Gel 15 Gram(s) Oral once PRN Blood Glucose LESS THAN 70 milliGRAM(s)/deciliter  melatonin 3 milliGRAM(s) Oral at bedtime PRN Insomnia  ondansetron Injectable 4 milliGRAM(s) IV Push every 8 hours PRN Nausea and/or Vomiting          Physical exam:   Gen- quiet calm NAD  Resp- poor effort. Decreased breath sounds at bases  CV-  s1 and S2 bradycardic. No murmurs, gallops or rubs  ABD- soft nontender +bowel sounds  EXT- no edema no calf tenderness  Neuro- dementia.                             10.4   4.05  )-----------( 228      ( 18 Apr 2022 07:32 )             31.5       04-19    132<L>  |  101  |  5<L>  ----------------------------<  67<L>  3.5   |  19<L>  |  0.73    Ca    8.9      19 Apr 2022 06:29  Phos  2.6     04-19  Mg     1.80     04-19                  Assessment and Plan:         Patient lethargic. Unable to provided history.   No events overnight.   Heart rate trends in 30-40's at night and 50 - 70 during the day.  No pauses of evidence of high degree AVB on telemetry.       Vital Signs Last 24 Hrs  T(C): 36.7 (19 Apr 2022 04:00), Max: 36.7 (19 Apr 2022 04:00)  T(F): 98 (19 Apr 2022 04:00), Max: 98 (19 Apr 2022 04:00)  HR: 52 (19 Apr 2022 04:00) (51 - 82)  BP: 131/62 (19 Apr 2022 04:00) (128/62 - 148/86)  BP(mean): --  RR: 18 (19 Apr 2022 04:00) (17 - 18)  SpO2: 99% (19 Apr 2022 04:00) (99% - 99%)      Telemetry:  sinus bradycardia 30's overnight and when awake, increased to high 50's.   MEDICATIONS  (STANDING):  ampicillin  IVPB 2 Gram(s) IV Intermittent every 4 hours  aspirin enteric coated 81 milliGRAM(s) Oral daily  atorvastatin 20 milliGRAM(s) Oral at bedtime  cefTRIAXone   IVPB      cefTRIAXone   IVPB 2000 milliGRAM(s) IV Intermittent every 12 hours  dextrose 10%. 1000 milliLiter(s) (50 mL/Hr) IV Continuous <Continuous>  dextrose 5%. 1000 milliLiter(s) (50 mL/Hr) IV Continuous <Continuous>  dextrose 5%. 1000 milliLiter(s) (50 mL/Hr) IV Continuous <Continuous>  dextrose 5%. 1000 milliLiter(s) (100 mL/Hr) IV Continuous <Continuous>  dextrose 50% Injectable 25 Gram(s) IV Push once  dextrose 50% Injectable 12.5 Gram(s) IV Push once  dextrose 50% Injectable 25 Gram(s) IV Push once  dextrose 50% Injectable 25 Gram(s) IV Push once  glucagon  Injectable 1 milliGRAM(s) IntraMuscular once  heparin   Injectable 5000 Unit(s) SubCutaneous every 8 hours  insulin lispro (ADMELOG) corrective regimen sliding scale   SubCutaneous three times a day before meals  insulin lispro (ADMELOG) corrective regimen sliding scale   SubCutaneous at bedtime  mineral oil enema 133 milliLiter(s) Rectal daily  senna 2 Tablet(s) Oral at bedtime  tamsulosin 0.4 milliGRAM(s) Oral at bedtime    MEDICATIONS  (PRN):  acetaminophen     Tablet .. 650 milliGRAM(s) Oral every 6 hours PRN Temp greater or equal to 38C (100.4F), Mild Pain (1 - 3)  ALBUTerol    90 MICROgram(s) HFA Inhaler 2 Puff(s) Inhalation every 6 hours PRN Shortness of Breath and/or Wheezing  aluminum hydroxide/magnesium hydroxide/simethicone Suspension 30 milliLiter(s) Oral every 4 hours PRN Dyspepsia  dextrose Oral Gel 15 Gram(s) Oral once PRN Blood Glucose LESS THAN 70 milliGRAM(s)/deciliter  melatonin 3 milliGRAM(s) Oral at bedtime PRN Insomnia  ondansetron Injectable 4 milliGRAM(s) IV Push every 8 hours PRN Nausea and/or Vomiting          Physical exam:   Gen- quiet lethargic NAD  Resp- poor effort. Decreased breath sounds at bases  CV-  s1 and S2 bradycardic. No murmurs, gallops or rubs  ABD- soft nontender +bowel sounds  EXT- no edema no calf tenderness  Neuro- dementia. + lethargy                            10.4   4.05  )-----------( 228      ( 18 Apr 2022 07:32 )             31.5       04-19    132<L>  |  101  |  5<L>  ----------------------------<  67<L>  3.5   |  19<L>  |  0.73    Ca    8.9      19 Apr 2022 06:29  Phos  2.6     04-19  Mg     1.80     04-19

## 2022-04-19 NOTE — PROVIDER CONTACT NOTE (HYPOGLYCEMIA EVENT) - NS PROVIDER CONTACT RECOMMEND-HYPO
Per GAVINO Schmitz, pt started on dextrose 5%&  sodium chloride 0.9% 75 ml/hr. Repeat Fs after being on fluids for 20 minutes was 78, no new orders, pt to remain on fluids. 
As per NP Urvashi Corona, 12.5 grams of Dextrose given IVP and Dextrose 10% running @50mL/hr. Repeat F/S until above 100mg/dL.
Per PA okay to give 25g IV push of dextrose 50%. Pt remained on continuous IV dextrose 10%, rate increased to 65ml/hr. 
patient advanced to pureed diet, 4oz juice given, waiting on breakfast tray and FS recheck 
 patient drinking juice and ate 100% of his dinner . recheck FS at bedtime

## 2022-04-19 NOTE — CONSULT NOTE ADULT - SUBJECTIVE AND OBJECTIVE BOX
HPI:  81 y.o. M with PMH of  HTN, T2DM, HLD, constipation CVA w R sided hemiplegia, CAD s/p PIC, diastolic CHF,  sinus node dysfunction, bedbound who presents with dysphagia/odynophagia. This morning, while having breakfast, pt ate a piece of bread, after which he began coughing/choking and yelling that he had throat pain. Usually eats a pureed diet with thin liquids. Per his wife, he sometimes coughs and clears his throat when eating; she believes this happens when the HHAs feed him too quickly. Also, a few days ago, the HHA told pt's wife that pt was complaining of throat pain; he had no recurrent complaints until today. Otherwise, no fevers, chills, N/V/D, dysuria, rash, or abdominal pain.     In the ED, patient was found to be hypotensive/bradycardic. BP improved with IVF. He was given a dose of CTX in the ED.     Endocrine history:  Patient minimally verbal at time of visit, not stating name, said year is 1990s  Wife at bedside, she cares for him at home  History of DM2 x many years which she reports has been stable, takes metformin at home no insulin and no other oral agents.  Not routinely checking FS at home.  No history of issue with hypoglycemia.  Per wife when glucose low last night he was more sleepy.  On admission concern for dysphagia so was made NPO.      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, 1998    HTN (hypertension)    Dementia    HLD (hyperlipidemia)    DM (diabetes mellitus)    Asthma    CAD (coronary artery disease)  s/p stent placement    Sinus node dysfunction  s/p Medtronic PPM    Cardiac pacemaker  Medtronic (SN: [id number]; Model: [id number])    S/P coronary artery stent placement        FAMILY HISTORY:  FH: diabetes mellitus  Mother, Brother    FHx: dementia  Father    Social History: lives with wife    Outpatient Medications:   · 	benazepril 10 mg oral tablet: Last Dose Taken:  , 1 tab(s) orally once a day  · 	isosorbide mononitrate 30 mg oral tablet, extended release: Last Dose Taken:  , 1 tab(s) orally once a day  · 	aspirin 81 mg oral delayed release tablet: Last Dose Taken:  , 1 tab(s) orally once a day  · 	metoprolol succinate 25 mg oral tablet, extended release: Last Dose Taken:  , 1 tab(s) orally once a day  · 	Albuterol (Eqv-Proventil HFA) 90 mcg/inh inhalation aerosol: Last Dose Taken:  , 2 puff(s) inhaled every 6 hours, As Needed  · 	atorvastatin 20 mg oral tablet: Last Dose Taken:  , 1 tab(s) orally once a day  · 	metFORMIN 500 mg oral tablet, extended release: Last Dose Taken:  , 1 tab(s) orally once a day    MEDICATIONS  (STANDING):  ampicillin  IVPB 2 Gram(s) IV Intermittent every 4 hours  aspirin enteric coated 81 milliGRAM(s) Oral daily  atorvastatin 20 milliGRAM(s) Oral at bedtime  cefTRIAXone   IVPB      cefTRIAXone   IVPB 2000 milliGRAM(s) IV Intermittent every 12 hours  dextrose 10%. 1000 milliLiter(s) (65 mL/Hr) IV Continuous <Continuous>  dextrose 5%. 1000 milliLiter(s) (50 mL/Hr) IV Continuous <Continuous>  dextrose 5%. 1000 milliLiter(s) (100 mL/Hr) IV Continuous <Continuous>  dextrose 5%. 1000 milliLiter(s) (50 mL/Hr) IV Continuous <Continuous>  dextrose 50% Injectable 25 Gram(s) IV Push once  dextrose 50% Injectable 12.5 Gram(s) IV Push once  dextrose 50% Injectable 25 Gram(s) IV Push once  glucagon  Injectable 1 milliGRAM(s) IntraMuscular once  heparin   Injectable 5000 Unit(s) SubCutaneous every 8 hours  mineral oil enema 133 milliLiter(s) Rectal daily  senna 2 Tablet(s) Oral at bedtime  tamsulosin 0.4 milliGRAM(s) Oral at bedtime    MEDICATIONS  (PRN):  acetaminophen     Tablet .. 650 milliGRAM(s) Oral every 6 hours PRN Temp greater or equal to 38C (100.4F), Mild Pain (1 - 3)  ALBUTerol    90 MICROgram(s) HFA Inhaler 2 Puff(s) Inhalation every 6 hours PRN Shortness of Breath and/or Wheezing  aluminum hydroxide/magnesium hydroxide/simethicone Suspension 30 milliLiter(s) Oral every 4 hours PRN Dyspepsia  dextrose Oral Gel 15 Gram(s) Oral once PRN Blood Glucose LESS THAN 70 milliGRAM(s)/deciliter  melatonin 3 milliGRAM(s) Oral at bedtime PRN Insomnia  ondansetron Injectable 4 milliGRAM(s) IV Push every 8 hours PRN Nausea and/or Vomiting      Allergies    No Known Allergies    Intolerances      Review of Systems:    UNABLE TO OBTAIN    PHYSICAL EXAM:  VITALS: T(C): 36.7 (04-19-22 @ 17:47)  T(F): 98.1 (04-19-22 @ 17:47), Max: 98.1 (04-19-22 @ 17:47)  HR: 62 (04-19-22 @ 17:47) (51 - 63)  BP: 119/64 (04-19-22 @ 17:47) (119/64 - 134/65)  RR:  (17 - 18)  SpO2:  (99% - 99%)  Wt(kg): --  GENERAL: NAD, well-groomed, well-developed  EYES: No proptosis, no lid lag, anicteric  HEENT:  Atraumatic, Normocephalic, moist mucous membranes  THYROID: Normal size, no palpable nodules  RESPIRATORY: Clear to auscultation bilaterally; No rales, rhonchi, wheezing  CARDIOVASCULAR: Regular rate and rhythm; No murmurs; no peripheral edema  GI: Soft, nontender, non distended, normal bowel sounds  SKIN: Dry, intact, No rashes or lesions  MUSCULOSKELETAL: R arm contracted, moving left arm  PSYCH: Alert and oriented x 0 at this time, awakens to verbal communication  CUSHING'S SIGNS: no striae      CAPILLARY BLOOD GLUCOSE      POCT Blood Glucose.: 93 mg/dL (19 Apr 2022 17:19)  POCT Blood Glucose.: 115 mg/dL (19 Apr 2022 13:03)  POCT Blood Glucose.: 161 mg/dL (19 Apr 2022 10:24)  POCT Blood Glucose.: 58 mg/dL (19 Apr 2022 10:03)  POCT Blood Glucose.: 38 mg/dL (19 Apr 2022 09:02)  POCT Blood Glucose.: 47 mg/dL (19 Apr 2022 08:56)  POCT Blood Glucose.: 41 mg/dL (19 Apr 2022 08:55)  POCT Blood Glucose.: 30 mg/dL (19 Apr 2022 05:36)  POCT Blood Glucose.: 79 mg/dL (19 Apr 2022 05:35)  POCT Blood Glucose.: 101 mg/dL (19 Apr 2022 01:50)  POCT Blood Glucose.: 70 mg/dL (19 Apr 2022 01:46)  POCT Blood Glucose.: 93 mg/dL (19 Apr 2022 00:32)  POCT Blood Glucose.: 67 mg/dL (18 Apr 2022 23:19)  POCT Blood Glucose.: 56 mg/dL (18 Apr 2022 22:05)  POCT Blood Glucose.: 46 mg/dL (18 Apr 2022 22:03)  POCT Blood Glucose.: 106 mg/dL (18 Apr 2022 18:09)                            10.4   4.05  )-----------( 228      ( 18 Apr 2022 07:32 )             31.5       04-19    132<L>  |  101  |  5<L>  ----------------------------<  67<L>  3.5   |  19<L>  |  0.73    EGFR if : x   EGFR if non : x     Ca    8.9      04-19  Mg     1.80     04-19  Phos  2.6     04-19        Thyroid Function Tests:  04-19 @ 06:34 TSH 2.43 FreeT4 1.6 T3 -- Anti TPO -- Anti Thyroglobulin Ab -- TSI --      A1C with Estimated Average Glucose Result: 5.3 % (12-17-21 @ 06:49)  A1C with Estimated Average Glucose Result: 5.5 % (06-24-21 @ 05:23)  A1C with Estimated Average Glucose Result: 5.5 % (06-11-21 @ 08:07)          Radiology:

## 2022-04-19 NOTE — PROGRESS NOTE ADULT - SUBJECTIVE AND OBJECTIVE BOX
Patient is a 81y old  Male who presents with a chief complaint of Dysphagia/odynophagia (19 Apr 2022 09:14)    SUBJECTIVE / OVERNIGHT EVENTS: Hypoglycemic overnight/this AM, as low as 30. Increased dextrose. Patient sleepy but answering few questions, denies pain. Unable to obtain more detailed subjective.     MEDICATIONS  (STANDING):  ampicillin  IVPB 2 Gram(s) IV Intermittent every 4 hours  aspirin enteric coated 81 milliGRAM(s) Oral daily  atorvastatin 20 milliGRAM(s) Oral at bedtime  cefTRIAXone   IVPB      cefTRIAXone   IVPB 2000 milliGRAM(s) IV Intermittent every 12 hours  dextrose 10%. 1000 milliLiter(s) (65 mL/Hr) IV Continuous <Continuous>  dextrose 5%. 1000 milliLiter(s) (50 mL/Hr) IV Continuous <Continuous>  dextrose 5%. 1000 milliLiter(s) (50 mL/Hr) IV Continuous <Continuous>  dextrose 5%. 1000 milliLiter(s) (100 mL/Hr) IV Continuous <Continuous>  dextrose 50% Injectable 25 Gram(s) IV Push once  dextrose 50% Injectable 12.5 Gram(s) IV Push once  dextrose 50% Injectable 25 Gram(s) IV Push once  glucagon  Injectable 1 milliGRAM(s) IntraMuscular once  heparin   Injectable 5000 Unit(s) SubCutaneous every 8 hours  mineral oil enema 133 milliLiter(s) Rectal daily  senna 2 Tablet(s) Oral at bedtime  tamsulosin 0.4 milliGRAM(s) Oral at bedtime    MEDICATIONS  (PRN):  acetaminophen     Tablet .. 650 milliGRAM(s) Oral every 6 hours PRN Temp greater or equal to 38C (100.4F), Mild Pain (1 - 3)  ALBUTerol    90 MICROgram(s) HFA Inhaler 2 Puff(s) Inhalation every 6 hours PRN Shortness of Breath and/or Wheezing  aluminum hydroxide/magnesium hydroxide/simethicone Suspension 30 milliLiter(s) Oral every 4 hours PRN Dyspepsia  dextrose Oral Gel 15 Gram(s) Oral once PRN Blood Glucose LESS THAN 70 milliGRAM(s)/deciliter  melatonin 3 milliGRAM(s) Oral at bedtime PRN Insomnia  ondansetron Injectable 4 milliGRAM(s) IV Push every 8 hours PRN Nausea and/or Vomiting    CAPILLARY BLOOD GLUCOSE    POCT Blood Glucose.: 161 mg/dL (19 Apr 2022 10:24)  POCT Blood Glucose.: 58 mg/dL (19 Apr 2022 10:03)  POCT Blood Glucose.: 38 mg/dL (19 Apr 2022 09:02)  POCT Blood Glucose.: 47 mg/dL (19 Apr 2022 08:56)  POCT Blood Glucose.: 41 mg/dL (19 Apr 2022 08:55)  POCT Blood Glucose.: 30 mg/dL (19 Apr 2022 05:36)  POCT Blood Glucose.: 79 mg/dL (19 Apr 2022 05:35)  POCT Blood Glucose.: 101 mg/dL (19 Apr 2022 01:50)  POCT Blood Glucose.: 70 mg/dL (19 Apr 2022 01:46)  POCT Blood Glucose.: 93 mg/dL (19 Apr 2022 00:32)  POCT Blood Glucose.: 67 mg/dL (18 Apr 2022 23:19)  POCT Blood Glucose.: 56 mg/dL (18 Apr 2022 22:05)  POCT Blood Glucose.: 46 mg/dL (18 Apr 2022 22:03)  POCT Blood Glucose.: 106 mg/dL (18 Apr 2022 18:09)  POCT Blood Glucose.: 106 mg/dL (18 Apr 2022 15:15)  POCT Blood Glucose.: 98 mg/dL (18 Apr 2022 14:04)  POCT Blood Glucose.: 99 mg/dL (18 Apr 2022 13:17)    I&O's Summary    PHYSICAL EXAM:  Vital Signs Last 24 Hrs  T(C): 36.7 (19 Apr 2022 04:00), Max: 36.7 (19 Apr 2022 04:00)  T(F): 98 (19 Apr 2022 04:00), Max: 98 (19 Apr 2022 04:00)  HR: 52 (19 Apr 2022 04:00) (51 - 82)  BP: 131/62 (19 Apr 2022 04:00) (128/62 - 148/86)  BP(mean): --  RR: 18 (19 Apr 2022 04:00) (17 - 18)  SpO2: 99% (19 Apr 2022 04:00) (99% - 99%)    CONSTITUTIONAL: NAD, well-developed, laying in bed  ENMT: Moist oral mucosa  NECK: no JVD  RESPIRATORY: no wheezing or crackles, lungs are clear to auscultation bilaterally  CARDIOVASCULAR: Regular rate and rhythm, normal S1 and S2, no lower extremity edema; Peripheral pulses are 2+ bilaterally  ABDOMEN: Nontender to palpation, normoactive bowel sounds, no rebound/guarding  PSYCH: calm  SKIN: No rashes; no palpable lesions    LABS:                        10.4   4.05  )-----------( 228      ( 18 Apr 2022 07:32 )             31.5     04-19    132<L>  |  101  |  5<L>  ----------------------------<  67<L>  3.5   |  19<L>  |  0.73    Ca    8.9      19 Apr 2022 06:29  Phos  2.6     04-19  Mg     1.80     04-19      Culture - Blood (collected 17 Apr 2022 14:20)  Source: .Blood Blood-Venous  Preliminary Report (18 Apr 2022 15:01):    No growth to date.    Culture - Blood (collected 17 Apr 2022 14:20)  Source: .Blood Blood-Peripheral  Preliminary Report (18 Apr 2022 15:01):    No growth to date.    RADIOLOGY & ADDITIONAL TESTS: Reviewed    COORDINATION OF CARE:  Care Discussed with Consultants/Other Providers [Y- Medicine ACP, Cardiology]

## 2022-04-19 NOTE — CONSULT NOTE ADULT - SUBJECTIVE AND OBJECTIVE BOX
Cardiovascular Disease Initial Evaluation    CHIEF COMPLAINT: Difficulty swallowing    HISTORY OF PRESENT ILLNESS:    This is an 81 year old man with prior endocarditis s/p PPM extraction in 12/2021, HTN, HLD, CAD s/p PCI and bedbound who presented to [facility] on 4/15/2022 with dysphagia/odynophagia.   He usually eats a pureed diet with thin liquids. Per his wife, he sometimes coughs and clears his throat when eating; she believes this happens when the HHAs feed him too quickly.     In the ED, patient was found to be hypotensive/bradycardic.  Furthermore, he is septic with concern for recurrence of endocarditis.  Currently he is lethargic and cannot provide a history.       Allergies  No Known Allergies      MEDICATIONS:  aspirin enteric coated 81 milliGRAM(s) Oral daily  heparin   Injectable 5000 Unit(s) SubCutaneous every 8 hours  tamsulosin 0.4 milliGRAM(s) Oral at bedtime    ampicillin  IVPB 2 Gram(s) IV Intermittent every 4 hours  cefTRIAXone   IVPB      cefTRIAXone   IVPB 2000 milliGRAM(s) IV Intermittent every 12 hours    ALBUTerol    90 MICROgram(s) HFA Inhaler 2 Puff(s) Inhalation every 6 hours PRN    acetaminophen     Tablet .. 650 milliGRAM(s) Oral every 6 hours PRN  melatonin 3 milliGRAM(s) Oral at bedtime PRN  ondansetron Injectable 4 milliGRAM(s) IV Push every 8 hours PRN    aluminum hydroxide/magnesium hydroxide/simethicone Suspension 30 milliLiter(s) Oral every 4 hours PRN  mineral oil enema 133 milliLiter(s) Rectal daily  senna 2 Tablet(s) Oral at bedtime    atorvastatin 20 milliGRAM(s) Oral at bedtime  dextrose 50% Injectable 25 Gram(s) IV Push once  dextrose 50% Injectable 12.5 Gram(s) IV Push once  dextrose 50% Injectable 25 Gram(s) IV Push once  dextrose Oral Gel 15 Gram(s) Oral once PRN  glucagon  Injectable 1 milliGRAM(s) IntraMuscular once  insulin lispro (ADMELOG) corrective regimen sliding scale   SubCutaneous three times a day before meals  insulin lispro (ADMELOG) corrective regimen sliding scale   SubCutaneous at bedtime    dextrose 10%. 1000 milliLiter(s) IV Continuous <Continuous>  dextrose 5%. 1000 milliLiter(s) IV Continuous <Continuous>  dextrose 5%. 1000 milliLiter(s) IV Continuous <Continuous>  dextrose 5%. 1000 milliLiter(s) IV Continuous <Continuous>      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, 1998    HTN (hypertension)    Dementia    HLD (hyperlipidemia)    DM (diabetes mellitus)    Asthma    CAD (coronary artery disease)  s/p stent placement    Sinus node dysfunction  s/p Medtronic PPM    Cardiac pacemaker  Medtronic (SN: [id number]; Model: [id number])    S/P coronary artery stent placement        FAMILY HISTORY:  FH: diabetes mellitus  Mother, Brother    FHx: dementia  Father        SOCIAL HISTORY:    The patient is a nonsmoker       REVIEW OF SYSTEMS:  See HPI, otherwise complete 14 point review of systems negative        PHYSICAL EXAM:  T(C): 36.7 (04-19-22 @ 04:00), Max: 36.7 (04-19-22 @ 04:00)  HR: 52 (04-19-22 @ 04:00) (49 - 82)  BP: 131/62 (04-19-22 @ 04:00) (124/65 - 148/86)  RR: 18 (04-19-22 @ 04:00) (17 - 18)  SpO2: 99% (04-19-22 @ 04:00) (99% - 99%)  Wt(kg): --  I&O's Summary      Appearance: No Acute Distress; resting comfortably  HEENT:  Normal oral mucosa 	  Cardiovascular: Normal S1 S2, No JVD, No murmurs/rubs/gallops  Respiratory: Normal respiratory effort; Lungs clear to auscultation bilaterally  Gastrointestinal:  Soft, Non-tender, + BS	  Skin: No rashes, No ecchymoses, No cyanosis	  Neurologic: Non-focal; no weakness  Extremities: No clubbing, cyanosis or edema  Vascular: Peripheral pulses palpable 2+ bilaterally  Psychiatry: lethargic    Laboratory Data:	 	    CBC Full  -  ( 18 Apr 2022 07:32 )  WBC Count : 4.05 K/uL  Hemoglobin : 10.4 g/dL  Hematocrit : 31.5 %  Platelet Count - Automated : 228 K/uL  Mean Cell Volume : 91.6 fL  Mean Cell Hemoglobin : 30.2 pg  Mean Cell Hemoglobin Concentration : 33.0 gm/dL  Auto Neutrophil # : x  Auto Lymphocyte # : x  Auto Monocyte # : x  Auto Eosinophil # : x  Auto Basophil # : x  Auto Neutrophil % : x  Auto Lymphocyte % : x  Auto Monocyte % : x  Auto Eosinophil % : x  Auto Basophil % : x    04-19    132<L>  |  101  |  5<L>  ----------------------------<  67<L>  3.5   |  19<L>  |  0.73  04-18    143  |  108<H>  |  9   ----------------------------<  87  4.0   |  23  |  0.89    Ca    8.9      19 Apr 2022 06:29  Ca    9.6      18 Apr 2022 07:32  Phos  2.6     04-19  Phos  2.4     04-18  Mg     1.80     04-19  Mg     1.80     04-18      Interpretation of Telemetry: Sinus bradycardia	    ECG:  	Sinus bradycardia      Assessment: 81 year old man with prior endocarditis s/p PPM extraction in 12/2021, HTN, HLD, CAD s/p PCI and bedbound noted to have sinus bradycardia and sepsis bacteremia with concern for endocarditis.     Plan of Care:    #History of endocarditis-  Bacteremia noted on this admission.   Currently Mr. Garcia is lethargic and has persistent hypoglycemia.  Of note, he was bedbound prior to this admission and unlikely to be a surgical candidate should endocarditis be found.   The risks of HARIKA outweigh the benefits at this time given lethargy and intubation risk.   Consider empiric ABx treatment for endocarditis for now.  If patient's clinical status improves, we will consider HARIKA.     #Sinus bradycardia-  S/P PPM extraction for endocarditis in 12/2021.  No evidence of pauses or high degree AV block.  EP input noted- hold off on PPM at this time.     #CAD-  No signs of active ischemia.  Continue ASA and statin.       52 minutes spent on total encounter; more than 50% of the visit was spent counseling and/or coordinating care by the attending physician.   	  Frank Boyd MD [facility]  Cardiovascular Diseases  [phone]

## 2022-04-19 NOTE — PROVIDER CONTACT NOTE (HYPOGLYCEMIA EVENT) - NS PROVIDER CONTACT NOTE-NOTIFY DATE & TIME-HYPO
16-Apr-2022 17:45
17-Apr-2022 09:25
17-Apr-2022 19:10
18-Apr-2022 12:10
18-Apr-2022 22:05
19-Apr-2022 09:05

## 2022-04-19 NOTE — PROVIDER CONTACT NOTE (OTHER) - ASSESSMENT
Pt is A&Ox1 (self) and lethargic. Arousable with repeated physical and verbal stimulation but falls back asleep. VS stable as per flowsheet.

## 2022-04-19 NOTE — PROVIDER CONTACT NOTE (OTHER) - ASSESSMENT
Pt is A&Ox1 (self) and lethargic but arousable with repeated physical and verbal stimulation. VS are stable as per flowsheet and pt displays no s/s of hypoglycemia at this time. Nonverbal indicators of CP, SOB or dizziness noted at this time. Dextrose 10% IVF are running @50mL/hr. Pt is A&Ox1 (self) and lethargic but arousable with physical and verbal stimulation. VS are stable as per flowsheet and pt displays no s/s of hypoglycemia at this time. Nonverbal indicators of CP, SOB or dizziness noted at this time. Dextrose 10% IVF are running @50mL/hr.

## 2022-04-19 NOTE — PROVIDER CONTACT NOTE (HYPOGLYCEMIA EVENT) - NS PROVIDER CONTACT REASON - HYPO
FS 60, recheck on different finger 73.
pt hypoglycemic
Pt BG 46, recheck was 56
pt with hypoglycemic event

## 2022-04-19 NOTE — PROVIDER CONTACT NOTE (HYPOGLYCEMIA EVENT) - NS PROVIDER CONTACT NOTE-TREATMENT-HYPO
Other (Specify)
Dextrose 50% 12.5 Grams IV Push/Other (Specify)
administer two 4oz apple juices  25mg IV push dextrose  administered additional 40z apple juice/4 oz Fruit Juice (Specify quantity, date/time)
4 oz Fruit Juice (Specify quantity, date/time)
Dextrose 50% 25 Grams IV Push/Other (Specify)
4 oz Fruit Juice (Specify quantity, date/time)/Other (Specify)

## 2022-04-19 NOTE — PROVIDER CONTACT NOTE (HYPOGLYCEMIA EVENT) - NS PROVIDER CONTACT SITUATION-HYPO
pt hypoglycemic, first FS showed 68, repeat increased to 75, then 81. 
pt with hypoglycemic event 
FS 66, second confirmation check was 78. patient was NPO but just passed speech & swallow evaluation. patient asymptomatic. 
Pt BG 46, recheck was 56
FS 60, recheck on different finger 73.

## 2022-04-19 NOTE — PROGRESS NOTE ADULT - ASSESSMENT
81 year old male with HTN, T2DM, HLD, constipation, CVA c/b R sided hemiplegia, CAD s/p PCI, HFpEF, sinus node dysfunction, bedbound who presents with dysphagia/odynophagia found to have e.faecalis bacteremia and asymptomatic bradycardia.

## 2022-04-19 NOTE — PROGRESS NOTE ADULT - ASSESSMENT
Patient is confused. History obtained from chart.   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 On admission found to be hypotensive with sinus bradycardia to 38 bpm.in the setting of a UTI with entero faecalis bacteremia. Started on ampicillin and Ceftriaxone.  Has continued to have asymptomatic bradycardia to low 30's on telemetry but mostly high 40's to 50's. When moving in bed, his heart rate increases to low 60's suggesting chronotropic competence.No pauses or evidence of high degree AVB.  Echo with normal biventricular systolic function and mild diastolic dysfunction. Patient followed by Dr. Andrea who documents similar findings. Patient bedbound and asymptomatic and currently with entero faecalis bacteremia, likely GI source. Awaiting HARIKA to rule out endocarditis.   EP consulted for sinus bradycardia.    Plan:  Continue to observe on telemetry  Avoid AV arpita blockers  No plans for pacemaker implantation oat this time.  Patient is confused. History obtained from chart.   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 On admission found to be hypotensive with sinus bradycardia to 38 bpm.in the setting of a UTI with entero faecalis bacteremia. Started on ampicillin and Ceftriaxone.  Has continued to have asymptomatic bradycardia to low 30's on telemetry but mostly high 40's to 50's. When moving in bed, his heart rate increases to low 60's suggesting chronotropic competence.No pauses or evidence of high degree AVB.  Echo with normal biventricular systolic function and mild diastolic dysfunction. Patient followed by Dr. Andrea who documents similar findings. Patient bedbound and asymptomatic and currently with entero faecalis bacteremia, likely GI source. Awaiting HARIKA to rule out endocarditis.   EP consulted for sinus bradycardia.    Plan:  Continue to observe on telemetry  Avoid AV arpita blockers  No plans for pacemaker implantation or EP intervention at this time.  Will sign off. can reconsult if necessary. Patient is confused. History obtained from chart.   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 On admission found to be hypotensive with sinus bradycardia to 38 bpm.in the setting of a UTI with entero faecalis bacteremia. Started on ampicillin and Ceftriaxone.  Has continued to have asymptomatic bradycardia to low 30's on telemetry but mostly high 40's to 50's. When moving in bed, his heart rate increases to low 60's suggesting chronotropic competence.No pauses or evidence of high degree AVB.  Echo with normal biventricular systolic function and mild diastolic dysfunction. Patient followed by Dr. Andrea who documents similar findings. Patient bedbound and asymptomatic and currently with entero faecalis bacteremia, likely GI source. Awaiting HARIKA to rule out endocarditis.   EP consulted for sinus bradycardia.    Plan:  Continue to observe on telemetry  Avoid AV arpita blockers  No plans for pacemaker implantation or EP intervention at this time.  Will sign off. Can reconsult if necessary.

## 2022-04-19 NOTE — CONSULT NOTE ADULT - ASSESSMENT
81M DM2 on metformin, hemiplegia, baseline A&O x 1, presented with odynophagia made npo, developed new hypoglycemia.    1) Hypoglycemia - unable to assess for whipple's triad due to altered mental status  suspecting related to poor po intake, now diet resumed, encourage po intake  AM cortisol and TFTs added on - normal  FS q 4h  no insulin/correction scale  On D10@ 65 cc/hour  if hypoglycemia again (< 55) - send BMP, insulin level, c-peptide, pro-insulin, beta hydroxy butyrate    2) DM2  HbA1c 5.3%  stop metformin on dc  no meds    3) Hyperlipidemia  atorvastatin 20mg    Plan discussed with primary team    Marilia Nunes MD  Division of Endocrinology  Pager: [phone]    If after 6PM or before 9AM, or on weekends/holidays, please call endocrine answering service for assistance ([phone]).  For nonurgent matters email [email or web address] for [email or web address].

## 2022-04-19 NOTE — PROVIDER CONTACT NOTE (HYPOGLYCEMIA EVENT) - NS PROVIDER CONTACT BACKGROUND-HYPO
Age: 81y    Gender: Male    POCT Blood Glucose:  101 mg/dL (04-19-22 @ 01:50)  70 mg/dL (04-19-22 @ 01:46)  93 mg/dL (04-19-22 @ 00:32)  67 mg/dL (04-18-22 @ 23:19)  56 mg/dL (04-18-22 @ 22:05)  46 mg/dL (04-18-22 @ 22:03)  106 mg/dL (04-18-22 @ 18:09)  106 mg/dL (04-18-22 @ 15:15)      eMAR:  dextrose 50% Injectable   25 Gram(s) IV Push (04-18-22 @ 12:51)    dextrose 50% Injectable   25 milliLiter(s) IV Push (04-18-22 @ 16:48)    dextrose 50% Injectable   12.5 Gram(s) IV Push (04-18-22 @ 22:50)    dextrose 50% Injectable   12.5 Gram(s) IV Push (04-18-22 @ 23:43)    
Age: 81y    Gender: Male    POCT Blood Glucose:  106 mg/dL (04-18-22 @ 15:15)  98 mg/dL (04-18-22 @ 14:04)  99 mg/dL (04-18-22 @ 13:17)  42 mg/dL (04-18-22 @ 12:26)  46 mg/dL (04-18-22 @ 12:08)  32 mg/dL (04-18-22 @ 12:06)  90 mg/dL (04-18-22 @ 06:08)  115 mg/dL (04-17-22 @ 22:35)      eMAR:atorvastatin   20 milliGRAM(s) Oral (04-17-22 @ 21:38)    dextrose 50% Injectable   25 Gram(s) IV Push (04-18-22 @ 12:51)    
Age: 81y    Gender: Male    POCT Blood Glucose:  78 mg/dL (04-16-22 @ 18:34)  81 mg/dL (04-16-22 @ 17:44)  75 mg/dL (04-16-22 @ 17:40)  68 mg/dL (04-16-22 @ 17:37)          
Age: 81y    Gender: Male    POCT Blood Glucose:  78 mg/dL (04-17-22 @ 08:47)  66 mg/dL (04-17-22 @ 08:46)  117 mg/dL (04-17-22 @ 03:11)  89 mg/dL (04-16-22 @ 21:33)  78 mg/dL (04-16-22 @ 18:34)  81 mg/dL (04-16-22 @ 17:44)  75 mg/dL (04-16-22 @ 17:40)  68 mg/dL (04-16-22 @ 17:37)      eMAR:  
Age: 81y    Gender: Male    POCT Blood Glucose:  93 mg/dL (04-19-22 @ 17:19)  115 mg/dL (04-19-22 @ 13:03)  161 mg/dL (04-19-22 @ 10:24)  58 mg/dL (04-19-22 @ 10:03)  38 mg/dL (04-19-22 @ 09:02)  47 mg/dL (04-19-22 @ 08:56)  41 mg/dL (04-19-22 @ 08:55)        eMAR:    dextrose 50% Injectable   25 Gram(s) IV Push (04-19-22 @ 09:22)    
Age: 81y    Gender: Male    POCT Blood Glucose:  73 mg/dL (04-17-22 @ 18:55)  60 mg/dL (04-17-22 @ 18:52)  84 mg/dL (04-17-22 @ 12:54)  78 mg/dL (04-17-22 @ 08:47)  66 mg/dL (04-17-22 @ 08:46)  117 mg/dL (04-17-22 @ 03:11)  89 mg/dL (04-16-22 @ 21:33)      eMAR:

## 2022-04-19 NOTE — PROVIDER CONTACT NOTE (HYPOGLYCEMIA EVENT) - NS PROVIDER CONTACT ASSESS-HYPO
patient asymptomatic 
pt hypoglycemic, first FS showed 68, repeat increased to 75, then 81. Pt remains lethargic but arousable.  Pt is a&o1. 
Pt A&Ox1 (self), at baseline. VS stable as per flowsheet. Nonverbal indicators of pain, SOB, CP and dizziness absent. Pt lethargic but arousable with repeated physical and verbal stimulation.

## 2022-04-19 NOTE — PROVIDER CONTACT NOTE (OTHER) - ACTION/TREATMENT ORDERED:
NP Halle Corona made aware. As per NP, do not give Dextrose IVP at this time because pt has Dextrose 10% IVF running.

## 2022-04-20 LAB
A1C WITH ESTIMATED AVERAGE GLUCOSE RESULT: 5.2 % — SIGNIFICANT CHANGE UP (ref 4–5.6)
ANION GAP SERPL CALC-SCNC: 11 MMOL/L — SIGNIFICANT CHANGE UP (ref 7–14)
BASOPHILS # BLD AUTO: 0.02 K/UL — SIGNIFICANT CHANGE UP (ref 0–0.2)
BASOPHILS NFR BLD AUTO: 0.6 % — SIGNIFICANT CHANGE UP (ref 0–2)
BUN SERPL-MCNC: 3 MG/DL — LOW (ref 7–23)
CALCIUM SERPL-MCNC: 9.3 MG/DL — SIGNIFICANT CHANGE UP (ref 8.4–10.5)
CHLORIDE SERPL-SCNC: 102 MMOL/L — SIGNIFICANT CHANGE UP (ref 98–107)
CO2 SERPL-SCNC: 24 MMOL/L — SIGNIFICANT CHANGE UP (ref 22–31)
CREAT SERPL-MCNC: 0.79 MG/DL — SIGNIFICANT CHANGE UP (ref 0.5–1.3)
EGFR: 89 ML/MIN/1.73M2 — SIGNIFICANT CHANGE UP
EOSINOPHIL # BLD AUTO: 0.26 K/UL — SIGNIFICANT CHANGE UP (ref 0–0.5)
EOSINOPHIL NFR BLD AUTO: 8.2 % — HIGH (ref 0–6)
ESTIMATED AVERAGE GLUCOSE: 103 — SIGNIFICANT CHANGE UP
GLUCOSE BLDC GLUCOMTR-MCNC: 104 MG/DL — HIGH (ref 70–99)
GLUCOSE BLDC GLUCOMTR-MCNC: 113 MG/DL — HIGH (ref 70–99)
GLUCOSE BLDC GLUCOMTR-MCNC: 55 MG/DL — LOW (ref 70–99)
GLUCOSE BLDC GLUCOMTR-MCNC: 56 MG/DL — LOW (ref 70–99)
GLUCOSE BLDC GLUCOMTR-MCNC: 59 MG/DL — LOW (ref 70–99)
GLUCOSE BLDC GLUCOMTR-MCNC: 65 MG/DL — LOW (ref 70–99)
GLUCOSE BLDC GLUCOMTR-MCNC: 73 MG/DL — SIGNIFICANT CHANGE UP (ref 70–99)
GLUCOSE BLDC GLUCOMTR-MCNC: 74 MG/DL — SIGNIFICANT CHANGE UP (ref 70–99)
GLUCOSE BLDC GLUCOMTR-MCNC: 89 MG/DL — SIGNIFICANT CHANGE UP (ref 70–99)
GLUCOSE BLDC GLUCOMTR-MCNC: 92 MG/DL — SIGNIFICANT CHANGE UP (ref 70–99)
GLUCOSE SERPL-MCNC: 95 MG/DL — SIGNIFICANT CHANGE UP (ref 70–99)
HCT VFR BLD CALC: 32.8 % — LOW (ref 39–50)
HGB BLD-MCNC: 10.9 G/DL — LOW (ref 13–17)
IANC: 1.65 K/UL — LOW (ref 1.8–7.4)
IMM GRANULOCYTES NFR BLD AUTO: 0.6 % — SIGNIFICANT CHANGE UP (ref 0–1.5)
LYMPHOCYTES # BLD AUTO: 0.79 K/UL — LOW (ref 1–3.3)
LYMPHOCYTES # BLD AUTO: 25 % — SIGNIFICANT CHANGE UP (ref 13–44)
MAGNESIUM SERPL-MCNC: 1.7 MG/DL — SIGNIFICANT CHANGE UP (ref 1.6–2.6)
MCHC RBC-ENTMCNC: 29.9 PG — SIGNIFICANT CHANGE UP (ref 27–34)
MCHC RBC-ENTMCNC: 33.2 GM/DL — SIGNIFICANT CHANGE UP (ref 32–36)
MCV RBC AUTO: 90.1 FL — SIGNIFICANT CHANGE UP (ref 80–100)
MONOCYTES # BLD AUTO: 0.42 K/UL — SIGNIFICANT CHANGE UP (ref 0–0.9)
MONOCYTES NFR BLD AUTO: 13.3 % — SIGNIFICANT CHANGE UP (ref 2–14)
NEUTROPHILS # BLD AUTO: 1.65 K/UL — LOW (ref 1.8–7.4)
NEUTROPHILS NFR BLD AUTO: 52.3 % — SIGNIFICANT CHANGE UP (ref 43–77)
NRBC # BLD: 0 /100 WBCS — SIGNIFICANT CHANGE UP
NRBC # FLD: 0 K/UL — SIGNIFICANT CHANGE UP
PHOSPHATE SERPL-MCNC: 2.3 MG/DL — LOW (ref 2.5–4.5)
PLATELET # BLD AUTO: 236 K/UL — SIGNIFICANT CHANGE UP (ref 150–400)
POTASSIUM SERPL-MCNC: 3.2 MMOL/L — LOW (ref 3.5–5.3)
POTASSIUM SERPL-SCNC: 3.2 MMOL/L — LOW (ref 3.5–5.3)
RBC # BLD: 3.64 M/UL — LOW (ref 4.2–5.8)
RBC # FLD: 14.2 % — SIGNIFICANT CHANGE UP (ref 10.3–14.5)
SODIUM SERPL-SCNC: 137 MMOL/L — SIGNIFICANT CHANGE UP (ref 135–145)
WBC # BLD: 3.16 K/UL — LOW (ref 3.8–10.5)
WBC # FLD AUTO: 3.16 K/UL — LOW (ref 3.8–10.5)

## 2022-04-20 PROCEDURE — 93306 TTE W/DOPPLER COMPLETE: CPT | Mod: 26

## 2022-04-20 PROCEDURE — 99233 SBSQ HOSP IP/OBS HIGH 50: CPT

## 2022-04-20 PROCEDURE — 99232 SBSQ HOSP IP/OBS MODERATE 35: CPT

## 2022-04-20 RX ORDER — POLYETHYLENE GLYCOL 3350 17 G/17G
17 POWDER, FOR SOLUTION ORAL DAILY
Refills: 0 | Status: DISCONTINUED | OUTPATIENT
Start: 2022-04-20 | End: 2022-04-29

## 2022-04-20 RX ORDER — POTASSIUM CHLORIDE 20 MEQ
10 PACKET (EA) ORAL
Refills: 0 | Status: COMPLETED | OUTPATIENT
Start: 2022-04-20 | End: 2022-04-20

## 2022-04-20 RX ORDER — POTASSIUM CHLORIDE 20 MEQ
40 PACKET (EA) ORAL ONCE
Refills: 0 | Status: COMPLETED | OUTPATIENT
Start: 2022-04-20 | End: 2022-04-20

## 2022-04-20 RX ADMIN — Medication 216 GRAM(S): at 15:20

## 2022-04-20 RX ADMIN — Medication 216 GRAM(S): at 11:07

## 2022-04-20 RX ADMIN — CEFTRIAXONE 100 MILLIGRAM(S): 500 INJECTION, POWDER, FOR SOLUTION INTRAMUSCULAR; INTRAVENOUS at 17:59

## 2022-04-20 RX ADMIN — Medication 216 GRAM(S): at 21:49

## 2022-04-20 RX ADMIN — TAMSULOSIN HYDROCHLORIDE 0.4 MILLIGRAM(S): 0.4 CAPSULE ORAL at 21:49

## 2022-04-20 RX ADMIN — Medication 216 GRAM(S): at 05:13

## 2022-04-20 RX ADMIN — CEFTRIAXONE 100 MILLIGRAM(S): 500 INJECTION, POWDER, FOR SOLUTION INTRAMUSCULAR; INTRAVENOUS at 05:19

## 2022-04-20 RX ADMIN — SENNA PLUS 2 TABLET(S): 8.6 TABLET ORAL at 21:50

## 2022-04-20 RX ADMIN — Medication 40 MILLIEQUIVALENT(S): at 17:59

## 2022-04-20 RX ADMIN — Medication 100 MILLIEQUIVALENT(S): at 21:21

## 2022-04-20 RX ADMIN — Medication 216 GRAM(S): at 02:06

## 2022-04-20 RX ADMIN — Medication 100 MILLIEQUIVALENT(S): at 19:47

## 2022-04-20 RX ADMIN — Medication 81 MILLIGRAM(S): at 13:31

## 2022-04-20 RX ADMIN — Medication 100 MILLIEQUIVALENT(S): at 18:00

## 2022-04-20 RX ADMIN — ATORVASTATIN CALCIUM 20 MILLIGRAM(S): 80 TABLET, FILM COATED ORAL at 21:49

## 2022-04-20 RX ADMIN — HEPARIN SODIUM 5000 UNIT(S): 5000 INJECTION INTRAVENOUS; SUBCUTANEOUS at 05:14

## 2022-04-20 RX ADMIN — HEPARIN SODIUM 5000 UNIT(S): 5000 INJECTION INTRAVENOUS; SUBCUTANEOUS at 21:49

## 2022-04-20 NOTE — PROGRESS NOTE ADULT - SUBJECTIVE AND OBJECTIVE BOX
Chief Complaint: Hypoglycemia    History: patient mildly more alert today, able to state name  reports eating eggs for breakfast  states it is 2007  FS noted in hypoglycemia range overnight  BMP glucose normal when checked later AM  On D10@ 65 cc/hour    MEDICATIONS  (STANDING):  ampicillin  IVPB 2 Gram(s) IV Intermittent every 4 hours  aspirin enteric coated 81 milliGRAM(s) Oral daily  atorvastatin 20 milliGRAM(s) Oral at bedtime  cefTRIAXone   IVPB      cefTRIAXone   IVPB 2000 milliGRAM(s) IV Intermittent every 12 hours  dextrose 10%. 1000 milliLiter(s) (65 mL/Hr) IV Continuous <Continuous>  dextrose 5%. 1000 milliLiter(s) (100 mL/Hr) IV Continuous <Continuous>  dextrose 5%. 1000 milliLiter(s) (50 mL/Hr) IV Continuous <Continuous>  dextrose 5%. 1000 milliLiter(s) (50 mL/Hr) IV Continuous <Continuous>  dextrose 50% Injectable 25 Gram(s) IV Push once  dextrose 50% Injectable 12.5 Gram(s) IV Push once  dextrose 50% Injectable 25 Gram(s) IV Push once  glucagon  Injectable 1 milliGRAM(s) IntraMuscular once  heparin   Injectable 5000 Unit(s) SubCutaneous every 8 hours  senna 2 Tablet(s) Oral at bedtime  tamsulosin 0.4 milliGRAM(s) Oral at bedtime    MEDICATIONS  (PRN):  acetaminophen     Tablet .. 650 milliGRAM(s) Oral every 6 hours PRN Temp greater or equal to 38C (100.4F), Mild Pain (1 - 3)  ALBUTerol    90 MICROgram(s) HFA Inhaler 2 Puff(s) Inhalation every 6 hours PRN Shortness of Breath and/or Wheezing  aluminum hydroxide/magnesium hydroxide/simethicone Suspension 30 milliLiter(s) Oral every 4 hours PRN Dyspepsia  dextrose Oral Gel 15 Gram(s) Oral once PRN Blood Glucose LESS THAN 70 milliGRAM(s)/deciliter  melatonin 3 milliGRAM(s) Oral at bedtime PRN Insomnia  ondansetron Injectable 4 milliGRAM(s) IV Push every 8 hours PRN Nausea and/or Vomiting  polyethylene glycol 3350 17 Gram(s) Oral daily PRN Constipation      Allergies    No Known Allergies    Intolerances      Review of Systems:    ALL OTHER SYSTEMS REVIEWED AND NEGATIVE      PHYSICAL EXAM:  VITALS: T(C): 37.3 (04-20-22 @ 09:47)  T(F): 99.2 (04-20-22 @ 09:47), Max: 99.2 (04-20-22 @ 09:47)  HR: 67 (04-20-22 @ 09:47) (60 - 67)  BP: 147/95 (04-20-22 @ 09:47) (119/64 - 147/95)  RR:  (18 - 18)  SpO2:  (98% - 99%)  Wt(kg): --  GENERAL: NAD, appears comfortable  EYES: No proptosis, anicteric  HEENT:  Atraumatic, Normocephalic, moist mucous membranes  RESPIRATORY: nonlabored respirations, no wheezing  GI: Soft, nontender, non distended  Musculokel: contracted R arm, moving left arm  Psych: A & O x 1    CAPILLARY BLOOD GLUCOSE      POCT Blood Glucose.: 92 mg/dL (20 Apr 2022 12:51)  POCT Blood Glucose.: 74 mg/dL (20 Apr 2022 07:17)  POCT Blood Glucose.: 65 mg/dL (20 Apr 2022 07:15)  POCT Blood Glucose.: 55 mg/dL (20 Apr 2022 07:11)  POCT Blood Glucose.: 89 mg/dL (20 Apr 2022 05:42)  POCT Blood Glucose.: 73 mg/dL (20 Apr 2022 03:06)  POCT Blood Glucose.: 59 mg/dL (20 Apr 2022 02:58)  POCT Blood Glucose.: 56 mg/dL (20 Apr 2022 02:56)  POCT Blood Glucose.: 100 mg/dL (19 Apr 2022 22:43)  POCT Blood Glucose.: 93 mg/dL (19 Apr 2022 17:19)      04-20    137  |  102  |  3<L>  ----------------------------<  95  3.2<L>   |  24  |  0.79    EGFR if : x   EGFR if non : x     Ca    9.3      04-20  Mg     1.70     04-20  Phos  2.3     04-20        A1C with Estimated Average Glucose Result: 5.2 % (04-20-22 @ 10:24)  A1C with Estimated Average Glucose Result: 5.3 % (12-17-21 @ 06:49)  A1C with Estimated Average Glucose Result: 5.5 % (06-24-21 @ 05:23)  A1C with Estimated Average Glucose Result: 5.5 % (06-11-21 @ 08:07)      Thyroid Function Tests:  04-19 @ 06:34 TSH 2.43 FreeT4 1.6 T3 -- Anti TPO -- Anti Thyroglobulin Ab -- TSI --

## 2022-04-20 NOTE — PROGRESS NOTE ADULT - ASSESSMENT
81M DM2 on metformin, hemiplegia, baseline A&O x 1, presented with odynophagia made npo, developed new hypoglycemia.    1) Hypoglycemia - unable to assess for whipple's triad due to altered mental status  suspecting related to poor po intake, now diet resumed, encourage po intake. Per wife did not have issue with hypoglycemia prior to admission.  AM cortisol and TFTs normal  FS q 4h  no insulin/correction scale  On D10@ 65 cc/hour  if hypoglycemia again (< 55) - send BMP, insulin level, c-peptide, pro-insulin, beta hydroxy butyrate    2) DM2  HbA1c 5.3%  stop metformin on dc  no meds    3) Hyperlipidemia  atorvastatin 20mg      Marilia Nunes MD  Division of Endocrinology  Pager: [phone]    If after 6PM or before 9AM, or on weekends/holidays, please call endocrine answering service for assistance ([phone]).  For nonurgent matters email [email or web address] for assistance.

## 2022-04-20 NOTE — PROGRESS NOTE ADULT - SUBJECTIVE AND OBJECTIVE BOX
Cardiovascular Disease Progress Note    Overnight events: No acute events overnight. Mr. Garcia denies chest pain or SOB.    Otherwise review of systems negative    Objective Findings:  T(C): 36.6 (04-20-22 @ 05:47), Max: 36.8 (04-19-22 @ 21:47)  HR: 65 (04-20-22 @ 05:47) (60 - 65)  BP: 144/72 (04-20-22 @ 05:47) (119/64 - 144/72)  RR: 18 (04-20-22 @ 05:47) (18 - 18)  SpO2: 99% (04-20-22 @ 05:47) (99% - 99%)  Wt(kg): --  Daily     Daily       Physical Exam:  Gen: NAD; Patient resting comfortably  HEENT: EOMI, Normocephalic/ atraumatic  CV: RRR, normal S1 + S2, no m/r/g  Lungs:  Normal respiratory effort; clear to auscultation bilaterally  Abd: soft, non-tender; bowel sounds present  Ext: No edema; warm and well perfused    Telemetry: Sinus bradycardia    Laboratory Data:    04-19    132<L>  |  101  |  5<L>  ----------------------------<  67<L>  3.5   |  19<L>  |  0.73    Ca    8.9      19 Apr 2022 06:29  Phos  2.6     04-19  Mg     1.80     04-19                Inpatient Medications:  MEDICATIONS  (STANDING):  ampicillin  IVPB 2 Gram(s) IV Intermittent every 4 hours  aspirin enteric coated 81 milliGRAM(s) Oral daily  atorvastatin 20 milliGRAM(s) Oral at bedtime  cefTRIAXone   IVPB      cefTRIAXone   IVPB 2000 milliGRAM(s) IV Intermittent every 12 hours  dextrose 10%. 1000 milliLiter(s) (65 mL/Hr) IV Continuous <Continuous>  dextrose 5%. 1000 milliLiter(s) (100 mL/Hr) IV Continuous <Continuous>  dextrose 5%. 1000 milliLiter(s) (50 mL/Hr) IV Continuous <Continuous>  dextrose 5%. 1000 milliLiter(s) (50 mL/Hr) IV Continuous <Continuous>  dextrose 50% Injectable 25 Gram(s) IV Push once  dextrose 50% Injectable 12.5 Gram(s) IV Push once  dextrose 50% Injectable 25 Gram(s) IV Push once  glucagon  Injectable 1 milliGRAM(s) IntraMuscular once  heparin   Injectable 5000 Unit(s) SubCutaneous every 8 hours  mineral oil enema 133 milliLiter(s) Rectal daily  senna 2 Tablet(s) Oral at bedtime  tamsulosin 0.4 milliGRAM(s) Oral at bedtime      Assessment: 81 year old man with prior endocarditis s/p PPM extraction in 12/2021, HTN, HLD, CAD s/p PCI and bedbound noted to have sinus bradycardia and sepsis bacteremia with concern for endocarditis.     Plan of Care:    #History of endocarditis-  Bacteremia noted on this admission.   Of note, he was bedbound prior to this admission and unlikely to be a surgical candidate should endocarditis be found.   The risks of HARIKA outweigh the benefits at this time given persistent hypoglycemia and intubation risk.   Consider empiric ABx treatment for endocarditis for now.  If patient's clinical status improves, we will consider HARIKA.     #Sinus bradycardia-  S/P PPM extraction for endocarditis in 12/2021.  No evidence of pauses or high degree AV block.  EP input noted- hold off on PPM at this time.     #CAD-  No signs of active ischemia.  Continue ASA and statin.       Over 25 minutes spent on total encounter; more than 50% of the visit was spent counseling and/or coordinating care by the attending physician.      Frank Boyd MD [facility]  Cardiovascular Disease  [phone]

## 2022-04-20 NOTE — PROGRESS NOTE ADULT - SUBJECTIVE AND OBJECTIVE BOX
[facility] Division of Hospital Medicine  Rafael Goddard) MD [place]  Pager [phone]    SUBJECTIVE:  Chief complaint: bacteremia.    Pt seen and evaluated at bedside this AM. Course c/b hypoglycemia. Otherwise asymptomatic. No f/c.    ROS: All systems negative except as noted.      Vital Signs Last 24 Hrs  T(C): 37.3 (20 Apr 2022 09:47), Max: 37.3 (20 Apr 2022 09:47)  T(F): 99.2 (20 Apr 2022 09:47), Max: 99.2 (20 Apr 2022 09:47)  HR: 67 (20 Apr 2022 09:47) (60 - 67)  BP: 147/95 (20 Apr 2022 09:47) (119/64 - 147/95)  BP(mean): --  RR: 18 (20 Apr 2022 09:47) (18 - 18)  SpO2: 98% (20 Apr 2022 09:47) (98% - 99%)      PHYSICAL EXAM:  Gen- In bed, comfortable, NAD  Resp- CTAB, good effort. No r/r/w. No accessory muscle use.  CVS- RRR, S1S2, no g/r/m. No LE edema.  GI- Soft abd, NT, ND, +BSx4. No HSM.  MSK- No C/C. ROM intact. No crepitus.  Neuro- CN II-XII intact. Speech fluent/face symmetric. Sensation intact.  Skin- No rashes/ulcers. Warm/moist.  Psych- Appropriate mood/affect.      MEDICATION:  MEDICATIONS  (STANDING):  ampicillin  IVPB 2 Gram(s) IV Intermittent every 4 hours  aspirin enteric coated 81 milliGRAM(s) Oral daily  atorvastatin 20 milliGRAM(s) Oral at bedtime  cefTRIAXone   IVPB      cefTRIAXone   IVPB 2000 milliGRAM(s) IV Intermittent every 12 hours  dextrose 10%. 1000 milliLiter(s) (65 mL/Hr) IV Continuous <Continuous>  dextrose 5%. 1000 milliLiter(s) (50 mL/Hr) IV Continuous <Continuous>  dextrose 5%. 1000 milliLiter(s) (50 mL/Hr) IV Continuous <Continuous>  dextrose 5%. 1000 milliLiter(s) (100 mL/Hr) IV Continuous <Continuous>  dextrose 50% Injectable 25 Gram(s) IV Push once  dextrose 50% Injectable 12.5 Gram(s) IV Push once  dextrose 50% Injectable 25 Gram(s) IV Push once  glucagon  Injectable 1 milliGRAM(s) IntraMuscular once  heparin   Injectable 5000 Unit(s) SubCutaneous every 8 hours  senna 2 Tablet(s) Oral at bedtime  tamsulosin 0.4 milliGRAM(s) Oral at bedtime    MEDICATIONS  (PRN):  acetaminophen     Tablet .. 650 milliGRAM(s) Oral every 6 hours PRN Temp greater or equal to 38C (100.4F), Mild Pain (1 - 3)  ALBUTerol    90 MICROgram(s) HFA Inhaler 2 Puff(s) Inhalation every 6 hours PRN Shortness of Breath and/or Wheezing  aluminum hydroxide/magnesium hydroxide/simethicone Suspension 30 milliLiter(s) Oral every 4 hours PRN Dyspepsia  dextrose Oral Gel 15 Gram(s) Oral once PRN Blood Glucose LESS THAN 70 milliGRAM(s)/deciliter  melatonin 3 milliGRAM(s) Oral at bedtime PRN Insomnia  ondansetron Injectable 4 milliGRAM(s) IV Push every 8 hours PRN Nausea and/or Vomiting  polyethylene glycol 3350 17 Gram(s) Oral daily PRN Constipation            LABORATORY:                          10.9   3.16  )-----------( 236      ( 20 Apr 2022 10:24 )             32.8     04-20    137  |  102  |  3<L>  ----------------------------<  95  3.2<L>   |  24  |  0.79    Ca    9.3      20 Apr 2022 10:24  Phos  2.3     04-20  Mg     1.70     04-20                COVID-19 PCR: NotDetec (15 Apr 2022 14:46)  COVID-19 PCR: NotDetec (28 Mar 2022 19:16)  COVID-19 PCR: NotDetec (02 Mar 2022 18:19)  COVID-19 PCR: NotDetec (27 Feb 2022 08:45)  SARS-CoV-2: NotDetec (27 Feb 2022 07:13)  COVID-19 PCR: NotDetec (21 Feb 2022 06:34)  COVID-19 PCR: NotDetec (14 Feb 2022 15:35)  COVID-19 PCR: NotDetec (08 Feb 2022 20:25)  COVID-19 PCR: NotDetec (03 Jan 2022 11:59)  COVID-19 PCR: NotDetec (28 Dec 2021 18:14)  SARS-CoV-2: NotDetec (25 Dec 2021 10:42)  SARS-CoV-2: NotDetec (16 Dec 2021 07:36)

## 2022-04-21 LAB
ANION GAP SERPL CALC-SCNC: 10 MMOL/L — SIGNIFICANT CHANGE UP (ref 7–14)
ANISOCYTOSIS BLD QL: SLIGHT — SIGNIFICANT CHANGE UP
BASOPHILS # BLD AUTO: 0.03 K/UL — SIGNIFICANT CHANGE UP (ref 0–0.2)
BASOPHILS NFR BLD AUTO: 0.9 % — SIGNIFICANT CHANGE UP (ref 0–2)
BUN SERPL-MCNC: 5 MG/DL — LOW (ref 7–23)
CALCIUM SERPL-MCNC: 9.1 MG/DL — SIGNIFICANT CHANGE UP (ref 8.4–10.5)
CHLORIDE SERPL-SCNC: 104 MMOL/L — SIGNIFICANT CHANGE UP (ref 98–107)
CO2 SERPL-SCNC: 21 MMOL/L — LOW (ref 22–31)
CREAT SERPL-MCNC: 0.81 MG/DL — SIGNIFICANT CHANGE UP (ref 0.5–1.3)
EGFR: 88 ML/MIN/1.73M2 — SIGNIFICANT CHANGE UP
EOSINOPHIL # BLD AUTO: 0.13 K/UL — SIGNIFICANT CHANGE UP (ref 0–0.5)
EOSINOPHIL NFR BLD AUTO: 4.6 % — SIGNIFICANT CHANGE UP (ref 0–6)
GIANT PLATELETS BLD QL SMEAR: PRESENT — SIGNIFICANT CHANGE UP
GLUCOSE BLDC GLUCOMTR-MCNC: 100 MG/DL — HIGH (ref 70–99)
GLUCOSE BLDC GLUCOMTR-MCNC: 101 MG/DL — HIGH (ref 70–99)
GLUCOSE BLDC GLUCOMTR-MCNC: 107 MG/DL — HIGH (ref 70–99)
GLUCOSE BLDC GLUCOMTR-MCNC: 113 MG/DL — HIGH (ref 70–99)
GLUCOSE BLDC GLUCOMTR-MCNC: 86 MG/DL — SIGNIFICANT CHANGE UP (ref 70–99)
GLUCOSE BLDC GLUCOMTR-MCNC: 97 MG/DL — SIGNIFICANT CHANGE UP (ref 70–99)
GLUCOSE SERPL-MCNC: 99 MG/DL — SIGNIFICANT CHANGE UP (ref 70–99)
HCT VFR BLD CALC: 26.9 % — LOW (ref 39–50)
HGB BLD-MCNC: 9.1 G/DL — LOW (ref 13–17)
IANC: 1.23 K/UL — LOW (ref 1.8–7.4)
LYMPHOCYTES # BLD AUTO: 0.43 K/UL — LOW (ref 1–3.3)
LYMPHOCYTES # BLD AUTO: 14.8 % — SIGNIFICANT CHANGE UP (ref 13–44)
MACROCYTES BLD QL: SIGNIFICANT CHANGE UP
MAGNESIUM SERPL-MCNC: 1.7 MG/DL — SIGNIFICANT CHANGE UP (ref 1.6–2.6)
MANUAL SMEAR VERIFICATION: SIGNIFICANT CHANGE UP
MCHC RBC-ENTMCNC: 30.4 PG — SIGNIFICANT CHANGE UP (ref 27–34)
MCHC RBC-ENTMCNC: 33.8 GM/DL — SIGNIFICANT CHANGE UP (ref 32–36)
MCV RBC AUTO: 90 FL — SIGNIFICANT CHANGE UP (ref 80–100)
MONOCYTES # BLD AUTO: 0.27 K/UL — SIGNIFICANT CHANGE UP (ref 0–0.9)
MONOCYTES NFR BLD AUTO: 9.3 % — SIGNIFICANT CHANGE UP (ref 2–14)
MYELOCYTES NFR BLD: 0.9 % — HIGH (ref 0–0)
NEUTROPHILS # BLD AUTO: 1.54 K/UL — LOW (ref 1.8–7.4)
NEUTROPHILS NFR BLD AUTO: 50.9 % — SIGNIFICANT CHANGE UP (ref 43–77)
NEUTS BAND # BLD: 1.9 % — SIGNIFICANT CHANGE UP (ref 0–6)
OVALOCYTES BLD QL SMEAR: SLIGHT — SIGNIFICANT CHANGE UP
PHOSPHATE SERPL-MCNC: 2.3 MG/DL — LOW (ref 2.5–4.5)
PLAT MORPH BLD: NORMAL — SIGNIFICANT CHANGE UP
PLATELET # BLD AUTO: 187 K/UL — SIGNIFICANT CHANGE UP (ref 150–400)
PLATELET COUNT - ESTIMATE: NORMAL — SIGNIFICANT CHANGE UP
POIKILOCYTOSIS BLD QL AUTO: SLIGHT — SIGNIFICANT CHANGE UP
POTASSIUM SERPL-MCNC: 3.7 MMOL/L — SIGNIFICANT CHANGE UP (ref 3.5–5.3)
POTASSIUM SERPL-SCNC: 3.7 MMOL/L — SIGNIFICANT CHANGE UP (ref 3.5–5.3)
RBC # BLD: 2.99 M/UL — LOW (ref 4.2–5.8)
RBC # FLD: 14.3 % — SIGNIFICANT CHANGE UP (ref 10.3–14.5)
RBC BLD AUTO: NORMAL — SIGNIFICANT CHANGE UP
SARS-COV-2 RNA SPEC QL NAA+PROBE: SIGNIFICANT CHANGE UP
SCHISTOCYTES BLD QL AUTO: SLIGHT — SIGNIFICANT CHANGE UP
SMUDGE CELLS # BLD: PRESENT — SIGNIFICANT CHANGE UP
SODIUM SERPL-SCNC: 135 MMOL/L — SIGNIFICANT CHANGE UP (ref 135–145)
VARIANT LYMPHS # BLD: 16.7 % — HIGH (ref 0–6)
WBC # BLD: 2.92 K/UL — LOW (ref 3.8–10.5)
WBC # FLD AUTO: 2.92 K/UL — LOW (ref 3.8–10.5)

## 2022-04-21 PROCEDURE — 99232 SBSQ HOSP IP/OBS MODERATE 35: CPT

## 2022-04-21 RX ORDER — SODIUM,POTASSIUM PHOSPHATES 278-250MG
1 POWDER IN PACKET (EA) ORAL
Refills: 0 | Status: COMPLETED | OUTPATIENT
Start: 2022-04-21 | End: 2022-04-23

## 2022-04-21 RX ORDER — POTASSIUM CHLORIDE 20 MEQ
40 PACKET (EA) ORAL ONCE
Refills: 0 | Status: COMPLETED | OUTPATIENT
Start: 2022-04-21 | End: 2022-04-21

## 2022-04-21 RX ADMIN — TAMSULOSIN HYDROCHLORIDE 0.4 MILLIGRAM(S): 0.4 CAPSULE ORAL at 22:00

## 2022-04-21 RX ADMIN — SENNA PLUS 2 TABLET(S): 8.6 TABLET ORAL at 22:00

## 2022-04-21 RX ADMIN — CEFTRIAXONE 100 MILLIGRAM(S): 500 INJECTION, POWDER, FOR SOLUTION INTRAMUSCULAR; INTRAVENOUS at 05:44

## 2022-04-21 RX ADMIN — Medication 65 MILLILITER(S): at 13:33

## 2022-04-21 RX ADMIN — Medication 65 MILLILITER(S): at 09:40

## 2022-04-21 RX ADMIN — ATORVASTATIN CALCIUM 20 MILLIGRAM(S): 80 TABLET, FILM COATED ORAL at 22:00

## 2022-04-21 RX ADMIN — Medication 81 MILLIGRAM(S): at 13:33

## 2022-04-21 RX ADMIN — CEFTRIAXONE 100 MILLIGRAM(S): 500 INJECTION, POWDER, FOR SOLUTION INTRAMUSCULAR; INTRAVENOUS at 17:51

## 2022-04-21 RX ADMIN — Medication 40 MILLIEQUIVALENT(S): at 13:34

## 2022-04-21 RX ADMIN — Medication 216 GRAM(S): at 22:37

## 2022-04-21 RX ADMIN — Medication 216 GRAM(S): at 17:15

## 2022-04-21 RX ADMIN — Medication 216 GRAM(S): at 13:37

## 2022-04-21 RX ADMIN — HEPARIN SODIUM 5000 UNIT(S): 5000 INJECTION INTRAVENOUS; SUBCUTANEOUS at 05:42

## 2022-04-21 RX ADMIN — Medication 1 PACKET(S): at 17:16

## 2022-04-21 RX ADMIN — HEPARIN SODIUM 5000 UNIT(S): 5000 INJECTION INTRAVENOUS; SUBCUTANEOUS at 13:34

## 2022-04-21 RX ADMIN — Medication 216 GRAM(S): at 05:43

## 2022-04-21 RX ADMIN — Medication 216 GRAM(S): at 09:40

## 2022-04-21 RX ADMIN — POLYETHYLENE GLYCOL 3350 17 GRAM(S): 17 POWDER, FOR SOLUTION ORAL at 17:15

## 2022-04-21 RX ADMIN — Medication 216 GRAM(S): at 03:56

## 2022-04-21 RX ADMIN — HEPARIN SODIUM 5000 UNIT(S): 5000 INJECTION INTRAVENOUS; SUBCUTANEOUS at 22:37

## 2022-04-21 NOTE — PROGRESS NOTE ADULT - ASSESSMENT
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, s/p PPM extraction and 6 weeks of vancomycin and ceftriaxone  Had abdominal pain and CT 4/9 showed prominent stool burden and possible stercoral colitis; possible sacral ulcer, and atelectasis  UA WBC > 50, UCx candida  BCx showed 3/4 bottles of enterococcus bacteremia  Sacral area with skin loss but no acute ulceration or abscess formation    TTE possible vegetation vs retained wire      IMPRESSION:  Enterococcus bacteremia  Abnormal TTE   R/O endocarditis    RECOMMENDATIONS:  - c/w IV ampicillin to 2gm q4hrs and  IV ceftriaxone 2gm q12hrs  - follow HARIKA  - Consider ENT if throat pain persists

## 2022-04-21 NOTE — PROGRESS NOTE ADULT - SUBJECTIVE AND OBJECTIVE BOX
Follow Up:  enterococcus fecalis bacteremia    Interval History/ROS:  no complaint lethargic    Allergies  No Known Allergies        ANTIMICROBIALS:  ampicillin  IVPB 2 every 4 hours  cefTRIAXone   IVPB    cefTRIAXone   IVPB 2000 every 12 hours        MEDICATIONS  (STANDING):  acetaminophen     Tablet .. 650 every 6 hours PRN  ALBUTerol    90 MICROgram(s) HFA Inhaler 2 every 6 hours PRN  aluminum hydroxide/magnesium hydroxide/simethicone Suspension 30 every 4 hours PRN  aspirin enteric coated 81 daily  atorvastatin 20 at bedtime  dextrose 50% Injectable 25 once  dextrose 50% Injectable 12.5 once  dextrose 50% Injectable 25 once  dextrose Oral Gel 15 once PRN  glucagon  Injectable 1 once  heparin   Injectable 5000 every 8 hours  insulin lispro (ADMELOG) corrective regimen sliding scale  three times a day before meals  insulin lispro (ADMELOG) corrective regimen sliding scale  at bedtime  melatonin 3 at bedtime PRN  mineral oil enema 133 daily  ondansetron Injectable 4 every 8 hours PRN  senna 2 at bedtime  tamsulosin 0.4 at bedtime    Vital Signs Last 24 Hrs  T(F): 98.4 (04-21-22 @ 13:51), Max: 98.8 (04-20-22 @ 21:44)  HR: 56 (04-21-22 @ 13:51)  BP: 129/75 (04-21-22 @ 13:51)  RR: 16 (04-21-22 @ 13:51)  SpO2: 100% (04-21-22 @ 13:51) (99% - 100%)    PHYSICAL EXAM:  Constitutional: Not in acute distress  Eyes: No icterus, right eyes droop  Oral cavity: Clear, no lesions  Neck: Supple  RS: Chest clear   CVS: S1, S2   Abdomen: Soft. No guarding/rigidity/tenderness.  : external catheter  Extremities: contracted right arm, right leg weak  Skin: No rash  Neuro: Alert, awake                                 9.1    2.92  )-----------( 187      ( 21 Apr 2022 07:23 )             26.9 04-21    135  |  104  |  5   ----------------------------<  99  3.7   |  21  |  0.81  Ca    9.1      21 Apr 2022 07:23Phos  2.3     04-21Mg     1.70     04-21                MICROBIOLOGY:    blood culture 4/17  no growth to date    .Blood Blood-Peripheral  04-15-22   Growth in aerobic and anaerobic bottles: Enterococcus faecalis  ***Blood Panel PCR results on this specimen are available  approximately 3 hours after the Gram stain result.***  Gram stain, PCR, and/or culture results may not always  correspond dueto difference in methodologies.  ************************************************************  This PCR assay was performed by multiplex PCR. This  Assay tests for 66 bacterial and resistance gene targets.  Please refer to the [facility] test directory  at [email or web address] for details.  --  Blood Culture PCR      Clean Catch Clean Catch (Midstream)  04-15-22   50,000 - 99,000 CFU/mL Candida albicans  --  --      RADIOLOGY:    ra< from: CT Abdomen and Pelvis w/ IV Cont (04.09.22 @ 10:50) >    ACC: [id number] EXAM:  CT ABDOMEN AND PELVIS IC                          PROCEDURE DATE:  04/09/2022          INTERPRETATION:  CLINICAL INFORMATION: Abdominal pain.    COMPARISON: CT the abdomen and pelvis dated 222.    CONTRAST/COMPLICATIONS:  IV Contrast: Omnipaque 350  90 cc administered   10 cc discarded  Oral Contrast: NONE  Complications: None reported at time of study completion    PROCEDURE:  CT of the abdomen and pelvis was performed. Coronal and sagittal   reformats were performed.    FINDINGS:    LOWER CHEST: Dependent atelectasis, scarring, and groundglass, right   greater than left. Distal airways mucus impaction right lower lobe. No   visualized pleural effusion. Coronary artery calcification.    LIVER: Liver size within normal limits. Main portal vein is patent.  BILE DUCTS: No biliary distention  GALLBLADDER: Unremarkable  SPLEEN: Normal size  PANCREAS: No main ductal dilatation. Mild fatty atrophy  ADRENALS: Unchanged appearance of indeterminate left adrenal nodule.  KIDNEYS/URETERS: No hydronephrosis. Bilateral renal cysts and   subcentimeter hypodensities too small to characterize. Right upper pole   cyst with apparent internal septations measuring 2.7 cm, stable on   multiple prior studies dating back to 12/16/2021.    BLADDER: Underdistended urinary bladder.  REPRODUCTIVE ORGANS: Enlarged prostate.    BOWEL: Limited evaluation the absence of oral contrast. Stomach is   partially distended with air. No small bowel distention. Appendix is not   visualized. Largeamount stool throughout the colon. The rectum is   distended to 8 cm and demonstrates mild wall thickening. Mild perirectal   fat stranding.  PERITONEUM: No ascites or free air.  VESSELS: No aneurysm of the abdominal aorta. Aortoiliac atheromatous   changes. Ectasia of the common iliac arteries.  RETROPERITONEUM/LYMPH NODES: No enlarged lymph nodes of the   abdomen/pelvis.  ABDOMINAL WALL: Focal infiltration of subcutaneous fat overlying the   coccyx just to the right midline.  BONES: Multilevel degenerative changes.    IMPRESSION:    Prominent stool burden of the colon. Rectal fecal impaction and concern   for stercoral colitis. No free air.    Focal infiltration of the subcutaneous fat overlying the coccyx.   Correlate with direct visualization for potential developing decubitus   ulcer.    Dependent atelectasis/scarring and groundglass of the visualized thorax,   right greater than left. Distal airways mucus impaction right lower lobe.   Correlate for any signs and symptoms of superimposed infection.    Coronary artery calcification.    --- End of Report ---          APPLE MONTES M.D., RADIOLOGY FELLOW  This document has been electronically signed.  IZABEL HANNA M.D., ATTENDING RADIOLOGIST  This document has been electronically signed. Apr 9 2022 11:20AM    < end of copied text >    < from: Transthoracic Echocardiogram (04.20.22 @ 08:52) >  CONCLUSIONS:  1. Normal left ventricular systolic function. No segmental  wall motion abnormalities.  2. Normal right ventricular size and function.  3. Normal tricuspid valve. 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 It is at  least 1.5 cm x 0.4 cm.  This may represent part of a device  wire, vegetation, or both.  Recommend HARIKA to evaluate tricuspid valve mass more fully,    < end of copied text >

## 2022-04-21 NOTE — PROGRESS NOTE ADULT - PROBLEM SELECTOR PLAN 7
-DVT ppx: sq heparin  -Diet: pureed      Dispo- Pending further infectious workup.    Communication:  Spoke w/ wife Nerissa, updated on latest status and POC.

## 2022-04-21 NOTE — PROGRESS NOTE ADULT - SUBJECTIVE AND OBJECTIVE BOX
[facility] Division of Hospital Medicine  Rafael SerranoJoshua) MD [place]  Pager [phone]    SUBJECTIVE:  Chief complaint: bacteremia.    Pt seen and evaluated at bedside this AM. No o/n events. FS improved. Denies any SOb/Cp/NV.     ROS: All systems negative except as noted.      Vital Signs Last 24 Hrs  T(C): 36.9 (21 Apr 2022 13:51), Max: 37.1 (20 Apr 2022 17:47)  T(F): 98.4 (21 Apr 2022 13:51), Max: 98.8 (20 Apr 2022 21:44)  HR: 56 (21 Apr 2022 13:51) (55 - 72)  BP: 129/75 (21 Apr 2022 13:51) (129/64 - 149/76)  BP(mean): --  RR: 16 (21 Apr 2022 13:51) (16 - 20)  SpO2: 100% (21 Apr 2022 13:51) (99% - 100%)    PHYSICAL EXAM:  Gen- In bed, comfortable, NAD. Frail, cachetic.   Resp- CTAB, good effort. No r/r/w. No accessory muscle use.  CVS- RRR, S1S2, no g/r/m. No LE edema.  GI- Soft abd, NT, ND, +BSx4. No HSM.  MSK- No C/C. ROM intact. No crepitus.  Neuro- CN II-XII intact. Speech fluent/face symmetric. Sensation intact.  Skin- No rashes/ulcers. Warm/moist.  Psych- Appropriate mood/affect.      MEDICATION:  MEDICATIONS  (STANDING):  ampicillin  IVPB 2 Gram(s) IV Intermittent every 4 hours  aspirin enteric coated 81 milliGRAM(s) Oral daily  atorvastatin 20 milliGRAM(s) Oral at bedtime  cefTRIAXone   IVPB      cefTRIAXone   IVPB 2000 milliGRAM(s) IV Intermittent every 12 hours  dextrose 10%. 1000 milliLiter(s) (65 mL/Hr) IV Continuous <Continuous>  dextrose 5%. 1000 milliLiter(s) (50 mL/Hr) IV Continuous <Continuous>  dextrose 5%. 1000 milliLiter(s) (50 mL/Hr) IV Continuous <Continuous>  dextrose 5%. 1000 milliLiter(s) (100 mL/Hr) IV Continuous <Continuous>  dextrose 50% Injectable 25 Gram(s) IV Push once  dextrose 50% Injectable 12.5 Gram(s) IV Push once  dextrose 50% Injectable 25 Gram(s) IV Push once  glucagon  Injectable 1 milliGRAM(s) IntraMuscular once  heparin   Injectable 5000 Unit(s) SubCutaneous every 8 hours  potassium phosphate / sodium phosphate Powder (PHOS-NaK) 1 Packet(s) Oral two times a day  senna 2 Tablet(s) Oral at bedtime  tamsulosin 0.4 milliGRAM(s) Oral at bedtime    MEDICATIONS  (PRN):  acetaminophen     Tablet .. 650 milliGRAM(s) Oral every 6 hours PRN Temp greater or equal to 38C (100.4F), Mild Pain (1 - 3)  ALBUTerol    90 MICROgram(s) HFA Inhaler 2 Puff(s) Inhalation every 6 hours PRN Shortness of Breath and/or Wheezing  aluminum hydroxide/magnesium hydroxide/simethicone Suspension 30 milliLiter(s) Oral every 4 hours PRN Dyspepsia  dextrose Oral Gel 15 Gram(s) Oral once PRN Blood Glucose LESS THAN 70 milliGRAM(s)/deciliter  melatonin 3 milliGRAM(s) Oral at bedtime PRN Insomnia  ondansetron Injectable 4 milliGRAM(s) IV Push every 8 hours PRN Nausea and/or Vomiting  polyethylene glycol 3350 17 Gram(s) Oral daily PRN Constipation          LABORATORY:                          9.1    2.92  )-----------( 187      ( 21 Apr 2022 07:23 )             26.9     04-21    135  |  104  |  5<L>  ----------------------------<  99  3.7   |  21<L>  |  0.81    Ca    9.1      21 Apr 2022 07:23  Phos  2.3     04-21  Mg     1.70     04-21                COVID-19 PCR: NotDetec (15 Apr 2022 14:46)  COVID-19 PCR: NotDetec (28 Mar 2022 19:16)  COVID-19 PCR: NotDetec (02 Mar 2022 18:19)  COVID-19 PCR: NotDetec (27 Feb 2022 08:45)  SARS-CoV-2: NotDetec (27 Feb 2022 07:13)  COVID-19 PCR: NotDetec (21 Feb 2022 06:34)  COVID-19 PCR: NotDetec (14 Feb 2022 15:35)  COVID-19 PCR: NotDetec (08 Feb 2022 20:25)  COVID-19 PCR: NotDetec (03 Jan 2022 11:59)  COVID-19 PCR: NotDetec (28 Dec 2021 18:14)  SARS-CoV-2: NotDetec (25 Dec 2021 10:42)  SARS-CoV-2: NotDetec (16 Dec 2021 07:36)

## 2022-04-21 NOTE — PROGRESS NOTE ADULT - SUBJECTIVE AND OBJECTIVE BOX
Cardiovascular Disease Progress Note    Overnight events: No acute events overnight. Mr. Garcia denies chest pain or SOB.     Otherwise review of systems negative    Objective Findings:  T(C): 36.8 (04-21-22 @ 05:40), Max: 37.3 (04-20-22 @ 09:47)  HR: 55 (04-21-22 @ 05:40) (55 - 72)  BP: 129/64 (04-21-22 @ 05:40) (129/64 - 149/76)  RR: 18 (04-21-22 @ 05:40) (18 - 20)  SpO2: 99% (04-21-22 @ 05:40) (98% - 100%)  Wt(kg): --  Daily     Daily       Physical Exam:  Gen: NAD; Patient resting comfortably  HEENT: EOMI, Normocephalic/ atraumatic  CV: RRR, normal S1 + S2,   Lungs:  Normal respiratory effort; clear to auscultation bilaterally  Abd: soft, non-tender; bowel sounds present  Ext: No edema; warm and well perfused    Telemetry: Sinus bradycardia.     Laboratory Data:                        9.1    2.92  )-----------( 187      ( 21 Apr 2022 07:23 )             26.9     04-21    135  |  104  |  5<L>  ----------------------------<  99  3.7   |  21<L>  |  0.81    Ca    9.1      21 Apr 2022 07:23  Phos  2.3     04-21  Mg     1.70     04-21                Inpatient Medications:  MEDICATIONS  (STANDING):  ampicillin  IVPB 2 Gram(s) IV Intermittent every 4 hours  aspirin enteric coated 81 milliGRAM(s) Oral daily  atorvastatin 20 milliGRAM(s) Oral at bedtime  cefTRIAXone   IVPB      cefTRIAXone   IVPB 2000 milliGRAM(s) IV Intermittent every 12 hours  dextrose 10%. 1000 milliLiter(s) (65 mL/Hr) IV Continuous <Continuous>  dextrose 5%. 1000 milliLiter(s) (100 mL/Hr) IV Continuous <Continuous>  dextrose 5%. 1000 milliLiter(s) (50 mL/Hr) IV Continuous <Continuous>  dextrose 5%. 1000 milliLiter(s) (50 mL/Hr) IV Continuous <Continuous>  dextrose 50% Injectable 25 Gram(s) IV Push once  dextrose 50% Injectable 12.5 Gram(s) IV Push once  dextrose 50% Injectable 25 Gram(s) IV Push once  glucagon  Injectable 1 milliGRAM(s) IntraMuscular once  heparin   Injectable 5000 Unit(s) SubCutaneous every 8 hours  senna 2 Tablet(s) Oral at bedtime  tamsulosin 0.4 milliGRAM(s) Oral at bedtime      Assessment: 81 year old man with prior endocarditis s/p PPM extraction in 12/2021, HTN, HLD, CAD s/p PCI and bedbound noted to have sinus bradycardia and sepsis bacteremia with concern for endocarditis.     Plan of Care:    #History of endocarditis-  Bacteremia noted on this admission.   TTE reviewed- it is unclear if the mass on the tricuspid valve represents a vegetation or a retained pacemaker wire segment.   Of note, he is status PPM extraction in 12/2021.  Given concern for retained foreign material, I would proceed with HARIKA.  Discussed with ACP, Macarena Bond.     #Sinus bradycardia-  S/P PPM extraction for endocarditis in 12/2021.  No evidence of pauses or high degree AV block.  EP input noted- hold off on PPM at this time.     #CAD-  No signs of active ischemia.  Continue ASA and statin.       Over 25 minutes spent on total encounter; more than 50% of the visit was spent counseling and/or coordinating care by the attending physician.      Frank Boyd MD [facility]  Cardiovascular Disease  [phone]

## 2022-04-22 LAB
ALBUMIN SERPL ELPH-MCNC: 2.9 G/DL — LOW (ref 3.3–5)
ALP SERPL-CCNC: 65 U/L — SIGNIFICANT CHANGE UP (ref 40–120)
ALT FLD-CCNC: 6 U/L — SIGNIFICANT CHANGE UP (ref 4–41)
ANION GAP SERPL CALC-SCNC: 11 MMOL/L — SIGNIFICANT CHANGE UP (ref 7–14)
APTT BLD: 31 SEC — SIGNIFICANT CHANGE UP (ref 27–36.3)
AST SERPL-CCNC: 20 U/L — SIGNIFICANT CHANGE UP (ref 4–40)
BASOPHILS # BLD AUTO: 0.02 K/UL — SIGNIFICANT CHANGE UP (ref 0–0.2)
BASOPHILS NFR BLD AUTO: 0.6 % — SIGNIFICANT CHANGE UP (ref 0–2)
BILIRUB DIRECT SERPL-MCNC: <0.2 MG/DL — SIGNIFICANT CHANGE UP (ref 0–0.3)
BILIRUB INDIRECT FLD-MCNC: >0.2 MG/DL — SIGNIFICANT CHANGE UP (ref 0–1)
BILIRUB SERPL-MCNC: 0.4 MG/DL — SIGNIFICANT CHANGE UP (ref 0.2–1.2)
BUN SERPL-MCNC: 4 MG/DL — LOW (ref 7–23)
CALCIUM SERPL-MCNC: 9.5 MG/DL — SIGNIFICANT CHANGE UP (ref 8.4–10.5)
CHLORIDE SERPL-SCNC: 104 MMOL/L — SIGNIFICANT CHANGE UP (ref 98–107)
CO2 SERPL-SCNC: 23 MMOL/L — SIGNIFICANT CHANGE UP (ref 22–31)
CREAT SERPL-MCNC: 0.76 MG/DL — SIGNIFICANT CHANGE UP (ref 0.5–1.3)
CULTURE RESULTS: SIGNIFICANT CHANGE UP
CULTURE RESULTS: SIGNIFICANT CHANGE UP
EGFR: 90 ML/MIN/1.73M2 — SIGNIFICANT CHANGE UP
EOSINOPHIL # BLD AUTO: 0.23 K/UL — SIGNIFICANT CHANGE UP (ref 0–0.5)
EOSINOPHIL NFR BLD AUTO: 6.8 % — HIGH (ref 0–6)
GLUCOSE BLDC GLUCOMTR-MCNC: 100 MG/DL — HIGH (ref 70–99)
GLUCOSE BLDC GLUCOMTR-MCNC: 106 MG/DL — HIGH (ref 70–99)
GLUCOSE BLDC GLUCOMTR-MCNC: 70 MG/DL — SIGNIFICANT CHANGE UP (ref 70–99)
GLUCOSE BLDC GLUCOMTR-MCNC: 85 MG/DL — SIGNIFICANT CHANGE UP (ref 70–99)
GLUCOSE BLDC GLUCOMTR-MCNC: 96 MG/DL — SIGNIFICANT CHANGE UP (ref 70–99)
GLUCOSE BLDC GLUCOMTR-MCNC: 96 MG/DL — SIGNIFICANT CHANGE UP (ref 70–99)
GLUCOSE BLDC GLUCOMTR-MCNC: 99 MG/DL — SIGNIFICANT CHANGE UP (ref 70–99)
GLUCOSE SERPL-MCNC: 88 MG/DL — SIGNIFICANT CHANGE UP (ref 70–99)
HCT VFR BLD CALC: 28.1 % — LOW (ref 39–50)
HGB BLD-MCNC: 9.6 G/DL — LOW (ref 13–17)
IANC: 1.4 K/UL — LOW (ref 1.8–7.4)
IMM GRANULOCYTES NFR BLD AUTO: 0.6 % — SIGNIFICANT CHANGE UP (ref 0–1.5)
INR BLD: 1.17 RATIO — HIGH (ref 0.88–1.16)
LYMPHOCYTES # BLD AUTO: 1.38 K/UL — SIGNIFICANT CHANGE UP (ref 1–3.3)
LYMPHOCYTES # BLD AUTO: 40.7 % — SIGNIFICANT CHANGE UP (ref 13–44)
MAGNESIUM SERPL-MCNC: 1.8 MG/DL — SIGNIFICANT CHANGE UP (ref 1.6–2.6)
MCHC RBC-ENTMCNC: 30.1 PG — SIGNIFICANT CHANGE UP (ref 27–34)
MCHC RBC-ENTMCNC: 34.2 GM/DL — SIGNIFICANT CHANGE UP (ref 32–36)
MCV RBC AUTO: 88.1 FL — SIGNIFICANT CHANGE UP (ref 80–100)
MONOCYTES # BLD AUTO: 0.34 K/UL — SIGNIFICANT CHANGE UP (ref 0–0.9)
MONOCYTES NFR BLD AUTO: 10 % — SIGNIFICANT CHANGE UP (ref 2–14)
NEUTROPHILS # BLD AUTO: 1.4 K/UL — LOW (ref 1.8–7.4)
NEUTROPHILS NFR BLD AUTO: 41.3 % — LOW (ref 43–77)
NRBC # BLD: 0 /100 WBCS — SIGNIFICANT CHANGE UP
NRBC # FLD: 0 K/UL — SIGNIFICANT CHANGE UP
PHOSPHATE SERPL-MCNC: 2.5 MG/DL — SIGNIFICANT CHANGE UP (ref 2.5–4.5)
PLATELET # BLD AUTO: 146 K/UL — LOW (ref 150–400)
POTASSIUM SERPL-MCNC: 4.6 MMOL/L — SIGNIFICANT CHANGE UP (ref 3.5–5.3)
POTASSIUM SERPL-SCNC: 4.6 MMOL/L — SIGNIFICANT CHANGE UP (ref 3.5–5.3)
PROT SERPL-MCNC: 6.3 G/DL — SIGNIFICANT CHANGE UP (ref 6–8.3)
PROTHROM AB SERPL-ACNC: 13.6 SEC — HIGH (ref 10.5–13.4)
RBC # BLD: 3.19 M/UL — LOW (ref 4.2–5.8)
RBC # FLD: 14.5 % — SIGNIFICANT CHANGE UP (ref 10.3–14.5)
SARS-COV-2 RNA SPEC QL NAA+PROBE: SIGNIFICANT CHANGE UP
SODIUM SERPL-SCNC: 138 MMOL/L — SIGNIFICANT CHANGE UP (ref 135–145)
SPECIMEN SOURCE: SIGNIFICANT CHANGE UP
SPECIMEN SOURCE: SIGNIFICANT CHANGE UP
WBC # BLD: 3.39 K/UL — LOW (ref 3.8–10.5)
WBC # FLD AUTO: 3.39 K/UL — LOW (ref 3.8–10.5)

## 2022-04-22 PROCEDURE — 99232 SBSQ HOSP IP/OBS MODERATE 35: CPT

## 2022-04-22 PROCEDURE — 93325 DOPPLER ECHO COLOR FLOW MAPG: CPT | Mod: 26,GC

## 2022-04-22 PROCEDURE — 93312 ECHO TRANSESOPHAGEAL: CPT | Mod: 26

## 2022-04-22 PROCEDURE — 93320 DOPPLER ECHO COMPLETE: CPT | Mod: 26,GC

## 2022-04-22 RX ORDER — DEXTROSE 10 % IN WATER 10 %
1000 INTRAVENOUS SOLUTION INTRAVENOUS
Refills: 0 | Status: DISCONTINUED | OUTPATIENT
Start: 2022-04-22 | End: 2022-04-23

## 2022-04-22 RX ORDER — MAGNESIUM SULFATE 500 MG/ML
1 VIAL (ML) INJECTION ONCE
Refills: 0 | Status: COMPLETED | OUTPATIENT
Start: 2022-04-22 | End: 2022-04-22

## 2022-04-22 RX ADMIN — Medication 216 GRAM(S): at 06:36

## 2022-04-22 RX ADMIN — HEPARIN SODIUM 5000 UNIT(S): 5000 INJECTION INTRAVENOUS; SUBCUTANEOUS at 14:37

## 2022-04-22 RX ADMIN — Medication 216 GRAM(S): at 22:48

## 2022-04-22 RX ADMIN — Medication 216 GRAM(S): at 10:30

## 2022-04-22 RX ADMIN — TAMSULOSIN HYDROCHLORIDE 0.4 MILLIGRAM(S): 0.4 CAPSULE ORAL at 22:42

## 2022-04-22 RX ADMIN — ATORVASTATIN CALCIUM 20 MILLIGRAM(S): 80 TABLET, FILM COATED ORAL at 22:41

## 2022-04-22 RX ADMIN — HEPARIN SODIUM 5000 UNIT(S): 5000 INJECTION INTRAVENOUS; SUBCUTANEOUS at 22:42

## 2022-04-22 RX ADMIN — HEPARIN SODIUM 5000 UNIT(S): 5000 INJECTION INTRAVENOUS; SUBCUTANEOUS at 06:36

## 2022-04-22 RX ADMIN — Medication 216 GRAM(S): at 02:30

## 2022-04-22 RX ADMIN — Medication 81 MILLIGRAM(S): at 14:36

## 2022-04-22 RX ADMIN — Medication 1 PACKET(S): at 17:48

## 2022-04-22 RX ADMIN — Medication 100 GRAM(S): at 15:09

## 2022-04-22 RX ADMIN — Medication 30 MILLILITER(S): at 19:00

## 2022-04-22 RX ADMIN — Medication 216 GRAM(S): at 14:35

## 2022-04-22 RX ADMIN — CEFTRIAXONE 100 MILLIGRAM(S): 500 INJECTION, POWDER, FOR SOLUTION INTRAMUSCULAR; INTRAVENOUS at 17:48

## 2022-04-22 RX ADMIN — Medication 1 PACKET(S): at 06:36

## 2022-04-22 RX ADMIN — Medication 216 GRAM(S): at 18:38

## 2022-04-22 RX ADMIN — SENNA PLUS 2 TABLET(S): 8.6 TABLET ORAL at 22:42

## 2022-04-22 RX ADMIN — CEFTRIAXONE 100 MILLIGRAM(S): 500 INJECTION, POWDER, FOR SOLUTION INTRAMUSCULAR; INTRAVENOUS at 06:37

## 2022-04-22 NOTE — PROGRESS NOTE ADULT - SUBJECTIVE AND OBJECTIVE BOX
Follow Up:  enterococcus fecalis bacteremia    Interval History/ROS:  no complaint lethargic    HARIKA no vegetation    Allergies  No Known Allergies        ANTIMICROBIALS:  ampicillin  IVPB 2 every 4 hours  cefTRIAXone   IVPB    cefTRIAXone   IVPB 2000 every 12 hours          MEDICATIONS  (STANDING):  acetaminophen     Tablet .. 650 every 6 hours PRN  ALBUTerol    90 MICROgram(s) HFA Inhaler 2 every 6 hours PRN  aluminum hydroxide/magnesium hydroxide/simethicone Suspension 30 every 4 hours PRN  aspirin enteric coated 81 daily  atorvastatin 20 at bedtime  dextrose 50% Injectable 25 once  dextrose 50% Injectable 12.5 once  dextrose 50% Injectable 25 once  dextrose Oral Gel 15 once PRN  glucagon  Injectable 1 once  heparin   Injectable 5000 every 8 hours  insulin lispro (ADMELOG) corrective regimen sliding scale  three times a day before meals  insulin lispro (ADMELOG) corrective regimen sliding scale  at bedtime  melatonin 3 at bedtime PRN  mineral oil enema 133 daily  ondansetron Injectable 4 every 8 hours PRN  senna 2 at bedtime  tamsulosin 0.4 at bedtime    Vital Signs Last 24 Hrs  T(F): 98.3 (04-22-22 @ 18:07), Max: 98.6 (04-21-22 @ 21:50)  HR: 61 (04-22-22 @ 18:07)  BP: 123/76 (04-22-22 @ 18:07)  RR: 18 (04-22-22 @ 18:07)  SpO2: 99% (04-22-22 @ 18:07) (98% - 99%)    PHYSICAL EXAM:  Constitutional: Not in acute distress  Eyes: No icterus, right eyes droop  Oral cavity: Clear, no lesions  Neck: Supple  RS: Chest clear   CVS: S1, S2   scar left chest  Abdomen: Soft. No guarding/rigidity/tenderness.  : external catheter  Extremities: contracted right arm, right leg weak  Skin: No rash  Neuro: Alert, awake                                            9.6    3.39  )-----------( 146      ( 22 Apr 2022 08:27 )             28.1 04-22    138  |  104  |  4   ----------------------------<  88  4.6   |  23  |  0.76  Ca    9.5      22 Apr 2022 08:07Phos  2.5     04-22Mg     1.80     04-22  TPro  6.3  /  Alb  2.9  /  TBili  0.4  /  DBili  <0.2  /  AST  20  /  ALT  6   /  AlkPhos  65  04-22                MICROBIOLOGY:    blood culture 4/17  no growth     .Blood Blood-Peripheral  04-15-22   Growth in aerobic and anaerobic bottles: Enterococcus faecalis  ***Blood Panel PCR results on this specimen are available  approximately 3 hours after the Gram stain result.***  Gram stain, PCR, and/or culture results may not always  correspond dueto difference in methodologies.  ************************************************************  This PCR assay was performed by multiplex PCR. This  Assay tests for 66 bacterial and resistance gene targets.  Please refer to the [facility] test directory  at [email or web address] for details.  --  Blood Culture PCR      Clean Catch Clean Catch (Midstream)  04-15-22   50,000 - 99,000 CFU/mL Candida albicans  --  --      RADIOLOGY:    ra< from: CT Abdomen and Pelvis w/ IV Cont (04.09.22 @ 10:50) >    ACC: [id number] EXAM:  CT ABDOMEN AND PELVIS IC                          PROCEDURE DATE:  04/09/2022          INTERPRETATION:  CLINICAL INFORMATION: Abdominal pain.    COMPARISON: CT the abdomen and pelvis dated 222.    CONTRAST/COMPLICATIONS:  IV Contrast: Omnipaque 350  90 cc administered   10 cc discarded  Oral Contrast: NONE  Complications: None reported at time of study completion    PROCEDURE:  CT of the abdomen and pelvis was performed. Coronal and sagittal   reformats were performed.    FINDINGS:    LOWER CHEST: Dependent atelectasis, scarring, and groundglass, right   greater than left. Distal airways mucus impaction right lower lobe. No   visualized pleural effusion. Coronary artery calcification.    LIVER: Liver size within normal limits. Main portal vein is patent.  BILE DUCTS: No biliary distention  GALLBLADDER: Unremarkable  SPLEEN: Normal size  PANCREAS: No main ductal dilatation. Mild fatty atrophy  ADRENALS: Unchanged appearance of indeterminate left adrenal nodule.  KIDNEYS/URETERS: No hydronephrosis. Bilateral renal cysts and   subcentimeter hypodensities too small to characterize. Right upper pole   cyst with apparent internal septations measuring 2.7 cm, stable on   multiple prior studies dating back to 12/16/2021.    BLADDER: Underdistended urinary bladder.  REPRODUCTIVE ORGANS: Enlarged prostate.    BOWEL: Limited evaluation the absence of oral contrast. Stomach is   partially distended with air. No small bowel distention. Appendix is not   visualized. Largeamount stool throughout the colon. The rectum is   distended to 8 cm and demonstrates mild wall thickening. Mild perirectal   fat stranding.  PERITONEUM: No ascites or free air.  VESSELS: No aneurysm of the abdominal aorta. Aortoiliac atheromatous   changes. Ectasia of the common iliac arteries.  RETROPERITONEUM/LYMPH NODES: No enlarged lymph nodes of the   abdomen/pelvis.  ABDOMINAL WALL: Focal infiltration of subcutaneous fat overlying the   coccyx just to the right midline.  BONES: Multilevel degenerative changes.    IMPRESSION:    Prominent stool burden of the colon. Rectal fecal impaction and concern   for stercoral colitis. No free air.    Focal infiltration of the subcutaneous fat overlying the coccyx.   Correlate with direct visualization for potential developing decubitus   ulcer.    Dependent atelectasis/scarring and groundglass of the visualized thorax,   right greater than left. Distal airways mucus impaction right lower lobe.   Correlate for any signs and symptoms of superimposed infection.    Coronary artery calcification.    --- End of Report ---          APPLE MONTES M.D., RADIOLOGY FELLOW  This document has been electronically signed.  IZABEL HANNA M.D., ATTENDING RADIOLOGIST  This document has been electronically signed. Apr 9 2022 11:20AM    < end of copied text >    < from: Transthoracic Echocardiogram (04.20.22 @ 08:52) >  CONCLUSIONS:  1. Normal left ventricular systolic function. No segmental  wall motion abnormalities.  2. Normal right ventricular size and function.  3. Normal tricuspid valve. 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 It is at  least 1.5 cm x 0.4 cm.  This may represent part of a device  wire, vegetation, or both.  Recommend HARIKA to evaluate tricuspid valve mass more fully,    < end of copied text >    c< from: Transesophageal Echocardiogram w/o TTE (04.22.22 @ 10:46) >    Patient name: DESHAWN TIWARI  YOB: 1940   Age: 81 (M)   MR#: [id number]  Study Date: 4/22/2022  Location: [place] [address] HARIKA OnlySonographer: José Conrad MD  Study quality: Technically good  Referring Physician: Rafael Olivares MD  Blood Pressure: 180/100 mmHg  Height: 168 cm  Weight: 63 kg  BSA: 1.7 m2  Heart Rate: 70 mmHg  ------------------------------------------------------------------------  PROCEDURE: Transesophageal (HARIKA) was performed in the  echocardiography laboratory.  Informed consent was first  obtained for HARIKA.   The patient was sedated by the  anesthesiologist service(reported separately).   The  procedure was monitored with automatic blood pressure  monitoring, ECG tracings and pulse oximetry.  Gag reflex  was abolishedwith topical Cetacaine and the  transesophageal probe was placed in the esophagus posterior  to the heart without complications.  The patient tolerated  the procedure well.  INDICATION: Bacteremia (R78.81)  ------------------------------------------------------------------------  OBSERVATIONS:  Mitral Valve: Mitral annular calcification, otherwise  normal mitral valve. Mild mitral regurgitation.  Aortic Root: Normal aortic root, aortic arch and descending  thoracic aorta.  Aortic Valve: Calcifiedtrileaflet aortic valve with normal  opening.  Left Atrium: Normal left atrium.  Left Ventricle: Normal left ventricular systolic function.  No segmental wall motion abnormalities. Normal left  ventricular internal dimensions and wall thicknesses.  Right Heart: Normal right atrium. Normal right ventricular  size and function. Normal tricuspid valve. Normal pulmonic  valve.  Pericardium/PleuraNormal pericardium with no pericardial  effusion.  ------------------------------------------------------------------------  CONCLUSIONS:  1. Mitral annular calcification, otherwise normal mitral  valve. Mild mitral regurgitation.  2. Calcified trileaflet aortic valve with normal opening.  3. Normal left ventricular systolic function. No segmental  wall motion abnormalities.  4. Normal right ventricular size and function.  5. Normal tricuspid valve.  6. Normal pulmonic valve.  No echocardiographic evidence of endocarditis on this  study.  ------------------------------------------------------------------------  Confirmed on  4/22/2022 - 14:29:11 by José Conrad M.D.  ------------------------------------------------------------------------    < end of copied text >

## 2022-04-22 NOTE — PROGRESS NOTE ADULT - ASSESSMENT
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, s/p PPM extraction and 6 weeks of vancomycin and ceftriaxone  Had abdominal pain and CT 4/9 showed prominent stool burden and possible stercoral colitis; possible sacral ulcer, and atelectasis  UA WBC > 50, UCx candida  BCx showed 3/4 bottles of enterococcus bacteremia  Sacral area with skin loss but no acute ulceration or abscess formation  Bacteremia cleared    TTE possible vegetation vs retained wire  HARIKA no evidence of endocarditis      IMPRESSION:  Enterococcus bacteremia  Cleared bacteremia  Abnormal TTE;  however HARIKA no evidence of endocarditis       RECOMMENDATIONS:    -  ampicillin 2 gm iv q 6 h --> 5/1 then obtain surveillance blood cultures to assure negative    - d/c ceftriaxone    I will be away --> 4/29  ID service available for questions

## 2022-04-22 NOTE — PROGRESS NOTE ADULT - SUBJECTIVE AND OBJECTIVE BOX
Cardiovascular Disease Progress Note    Overnight events: No acute events overnight.    Mr. Garcia is more alert this morning.  He denies chest pain or SOB.       Objective Findings:  T(C): 36.8 (04-22-22 @ 05:50), Max: 37 (04-21-22 @ 21:50)  HR: 51 (04-22-22 @ 05:50) (51 - 59)  BP: 127/69 (04-22-22 @ 05:50) (123/63 - 129/75)  RR: 18 (04-22-22 @ 05:50) (16 - 18)  SpO2: 98% (04-22-22 @ 05:50) (98% - 100%)  Wt(kg): --  Daily     Daily       Physical Exam:  Gen: NAD; Patient resting comfortably  HEENT: EOMI, Normocephalic/ atraumatic  CV: RRR, normal S1 + S2, no m/r/g  Lungs:  Normal respiratory effort; clear to auscultation bilaterally  Abd: soft, non-tender; bowel sounds present  Ext: No edema; warm and well perfused    Telemetry: Sinus; occasional PVDs    Laboratory Data:                        9.1    2.92  )-----------( 187      ( 21 Apr 2022 07:23 )             26.9     04-22    x   |  x   |  4<L>  ----------------------------<  88  x    |  23  |  0.76    Ca    9.5      22 Apr 2022 08:07  Phos  2.5     04-22  Mg     1.80     04-22      PT/INR - ( 22 Apr 2022 07:54 )   PT: 13.6 sec;   INR: 1.17 ratio         PTT - ( 22 Apr 2022 07:54 )  PTT:31.0 sec          Inpatient Medications:  MEDICATIONS  (STANDING):  ampicillin  IVPB 2 Gram(s) IV Intermittent every 4 hours  aspirin enteric coated 81 milliGRAM(s) Oral daily  atorvastatin 20 milliGRAM(s) Oral at bedtime  cefTRIAXone   IVPB      cefTRIAXone   IVPB 2000 milliGRAM(s) IV Intermittent every 12 hours  dextrose 10%. 1000 milliLiter(s) (65 mL/Hr) IV Continuous <Continuous>  dextrose 5%. 1000 milliLiter(s) (50 mL/Hr) IV Continuous <Continuous>  dextrose 5%. 1000 milliLiter(s) (50 mL/Hr) IV Continuous <Continuous>  dextrose 5%. 1000 milliLiter(s) (100 mL/Hr) IV Continuous <Continuous>  dextrose 50% Injectable 25 Gram(s) IV Push once  dextrose 50% Injectable 12.5 Gram(s) IV Push once  dextrose 50% Injectable 25 Gram(s) IV Push once  glucagon  Injectable 1 milliGRAM(s) IntraMuscular once  heparin   Injectable 5000 Unit(s) SubCutaneous every 8 hours  potassium phosphate / sodium phosphate Powder (PHOS-NaK) 1 Packet(s) Oral two times a day  senna 2 Tablet(s) Oral at bedtime  tamsulosin 0.4 milliGRAM(s) Oral at bedtime      Assessment: 81 year old man with prior endocarditis s/p PPM extraction in 12/2021, HTN, HLD, CAD s/p PCI and bedbound noted to have sinus bradycardia and sepsis bacteremia with concern for endocarditis.     Plan of Care:    #History of endocarditis-  Bacteremia noted on this admission.   TTE reviewed- it is unclear if the mass on the tricuspid valve represents a vegetation or a retained pacemaker wire segment.   Of note, he is status PPM extraction in 12/2021.  Given concern for retained foreign material, I agree to proceed with HARIKA.     #Sinus bradycardia-  S/P PPM extraction for endocarditis in 12/2021.  No evidence of pauses or high degree AV block.  EP input noted- hold off on PPM at this time.     #CAD-  No signs of active ischemia.  Continue ASA and statin.         Over 25 minutes spent on total encounter; more than 50% of the visit was spent counseling and/or coordinating care by the attending physician.      Frank Boyd MD [facility]  Cardiovascular Disease  [phone]

## 2022-04-22 NOTE — PROGRESS NOTE ADULT - SUBJECTIVE AND OBJECTIVE BOX
[facility] Division of Hospital Medicine  Rafael Goddard) MD [place]  Pager [phone]    SUBJECTIVE:  Chief complaint: bacteremia.    Pt seen and evaluated at bedside this AM. FS remains stable in the low 100s range .Pt has been NPO pending HARIKA. He has been maintain on D10 solution.     ROS: All systems negative except as noted.    Vital Signs Last 24 Hrs  T(C): 36.8 (22 Apr 2022 05:50), Max: 37 (21 Apr 2022 21:50)  T(F): 98.3 (22 Apr 2022 05:50), Max: 98.6 (21 Apr 2022 21:50)  HR: 51 (22 Apr 2022 05:50) (51 - 59)  BP: 127/69 (22 Apr 2022 05:50) (123/63 - 129/75)  BP(mean): --  RR: 18 (22 Apr 2022 05:50) (16 - 18)  SpO2: 98% (22 Apr 2022 05:50) (98% - 100%)    PHYSICAL EXAM:  Gen- In bed, comfortable, NAD. Frail, cachetic.   Resp- CTAB, good effort. No r/r/w. No accessory muscle use.  CVS- RRR, S1S2, no g/r/m. No LE edema.  GI- Soft abd, NT, ND, +BSx4. No HSM.  MSK- No C/C. ROM intact. No crepitus.  Neuro- CN II-XII intact. Speech fluent/face symmetric. Sensation intact.  Skin- No rashes/ulcers. Warm/moist.  Psych- Appropriate mood/affect.      MEDICATION:  MEDICATIONS  (STANDING):  ampicillin  IVPB 2 Gram(s) IV Intermittent every 4 hours  aspirin enteric coated 81 milliGRAM(s) Oral daily  atorvastatin 20 milliGRAM(s) Oral at bedtime  cefTRIAXone   IVPB      cefTRIAXone   IVPB 2000 milliGRAM(s) IV Intermittent every 12 hours  dextrose 10%. 1000 milliLiter(s) (65 mL/Hr) IV Continuous <Continuous>  dextrose 5%. 1000 milliLiter(s) (50 mL/Hr) IV Continuous <Continuous>  dextrose 5%. 1000 milliLiter(s) (50 mL/Hr) IV Continuous <Continuous>  dextrose 5%. 1000 milliLiter(s) (100 mL/Hr) IV Continuous <Continuous>  dextrose 50% Injectable 25 Gram(s) IV Push once  dextrose 50% Injectable 12.5 Gram(s) IV Push once  dextrose 50% Injectable 25 Gram(s) IV Push once  glucagon  Injectable 1 milliGRAM(s) IntraMuscular once  heparin   Injectable 5000 Unit(s) SubCutaneous every 8 hours  magnesium sulfate  IVPB 1 Gram(s) IV Intermittent once  potassium phosphate / sodium phosphate Powder (PHOS-NaK) 1 Packet(s) Oral two times a day  senna 2 Tablet(s) Oral at bedtime  tamsulosin 0.4 milliGRAM(s) Oral at bedtime    MEDICATIONS  (PRN):  acetaminophen     Tablet .. 650 milliGRAM(s) Oral every 6 hours PRN Temp greater or equal to 38C (100.4F), Mild Pain (1 - 3)  ALBUTerol    90 MICROgram(s) HFA Inhaler 2 Puff(s) Inhalation every 6 hours PRN Shortness of Breath and/or Wheezing  aluminum hydroxide/magnesium hydroxide/simethicone Suspension 30 milliLiter(s) Oral every 4 hours PRN Dyspepsia  dextrose Oral Gel 15 Gram(s) Oral once PRN Blood Glucose LESS THAN 70 milliGRAM(s)/deciliter  melatonin 3 milliGRAM(s) Oral at bedtime PRN Insomnia  ondansetron Injectable 4 milliGRAM(s) IV Push every 8 hours PRN Nausea and/or Vomiting  polyethylene glycol 3350 17 Gram(s) Oral daily PRN Constipation        LABORATORY:                          9.6    3.39  )-----------( 146      ( 22 Apr 2022 08:27 )             28.1     04-22    138  |  104  |  4<L>  ----------------------------<  88  4.6   |  23  |  0.76    Ca    9.5      22 Apr 2022 08:07  Phos  2.5     04-22  Mg     1.80     04-22    TPro  6.3  /  Alb  2.9<L>  /  TBili  0.4  /  DBili  <0.2  /  AST  20  /  ALT  6   /  AlkPhos  65  04-22    PT/INR - ( 22 Apr 2022 07:54 )   PT: 13.6 sec;   INR: 1.17 ratio         PTT - ( 22 Apr 2022 07:54 )  PTT:31.0 sec          COVID-19 PCR: NotDetec (21 Apr 2022 18:25)  COVID-19 PCR: NotDetec (15 Apr 2022 14:46)  COVID-19 PCR: NotDetec (28 Mar 2022 19:16)  COVID-19 PCR: NotDetec (02 Mar 2022 18:19)  COVID-19 PCR: NotDetec (27 Feb 2022 08:45)  SARS-CoV-2: NotDetec (27 Feb 2022 07:13)  COVID-19 PCR: NotDetec (21 Feb 2022 06:34)  COVID-19 PCR: NotDetec (14 Feb 2022 15:35)  COVID-19 PCR: NotDetec (08 Feb 2022 20:25)  COVID-19 PCR: NotDetec (03 Jan 2022 11:59)  COVID-19 PCR: NotDetec (28 Dec 2021 18:14)  SARS-CoV-2: NotDetec (25 Dec 2021 10:42)  SARS-CoV-2: NotDetec (16 Dec 2021 07:36)

## 2022-04-22 NOTE — PROGRESS NOTE ADULT - PROBLEM SELECTOR PLAN 7
-DVT ppx: sq heparin  -Diet: pureed      Dispo- Pending further infectious workup. Anticipate return home w/ svc.    Communication:  Spoke w/ wife Nerissa 4/21, updated on latest status and POC. Will f/u once new info is available.

## 2022-04-23 LAB
GLUCOSE BLDC GLUCOMTR-MCNC: 107 MG/DL — HIGH (ref 70–99)
GLUCOSE BLDC GLUCOMTR-MCNC: 87 MG/DL — SIGNIFICANT CHANGE UP (ref 70–99)
GLUCOSE BLDC GLUCOMTR-MCNC: 91 MG/DL — SIGNIFICANT CHANGE UP (ref 70–99)
GLUCOSE BLDC GLUCOMTR-MCNC: 92 MG/DL — SIGNIFICANT CHANGE UP (ref 70–99)
GLUCOSE BLDC GLUCOMTR-MCNC: 92 MG/DL — SIGNIFICANT CHANGE UP (ref 70–99)
GLUCOSE BLDC GLUCOMTR-MCNC: 95 MG/DL — SIGNIFICANT CHANGE UP (ref 70–99)

## 2022-04-23 PROCEDURE — 99232 SBSQ HOSP IP/OBS MODERATE 35: CPT

## 2022-04-23 RX ADMIN — HEPARIN SODIUM 5000 UNIT(S): 5000 INJECTION INTRAVENOUS; SUBCUTANEOUS at 14:12

## 2022-04-23 RX ADMIN — SENNA PLUS 2 TABLET(S): 8.6 TABLET ORAL at 22:01

## 2022-04-23 RX ADMIN — Medication 216 GRAM(S): at 10:23

## 2022-04-23 RX ADMIN — Medication 216 GRAM(S): at 14:11

## 2022-04-23 RX ADMIN — ATORVASTATIN CALCIUM 20 MILLIGRAM(S): 80 TABLET, FILM COATED ORAL at 22:01

## 2022-04-23 RX ADMIN — HEPARIN SODIUM 5000 UNIT(S): 5000 INJECTION INTRAVENOUS; SUBCUTANEOUS at 05:57

## 2022-04-23 RX ADMIN — Medication 216 GRAM(S): at 18:17

## 2022-04-23 RX ADMIN — Medication 216 GRAM(S): at 02:16

## 2022-04-23 RX ADMIN — Medication 216 GRAM(S): at 22:01

## 2022-04-23 RX ADMIN — Medication 1 PACKET(S): at 06:03

## 2022-04-23 RX ADMIN — HEPARIN SODIUM 5000 UNIT(S): 5000 INJECTION INTRAVENOUS; SUBCUTANEOUS at 22:01

## 2022-04-23 RX ADMIN — Medication 81 MILLIGRAM(S): at 14:12

## 2022-04-23 RX ADMIN — TAMSULOSIN HYDROCHLORIDE 0.4 MILLIGRAM(S): 0.4 CAPSULE ORAL at 22:01

## 2022-04-23 RX ADMIN — Medication 216 GRAM(S): at 05:56

## 2022-04-23 NOTE — PROGRESS NOTE ADULT - SUBJECTIVE AND OBJECTIVE BOX
[facility] Division of Hospital Medicine  Rafael Goddard) MD [place]  Pager [phone]    SUBJECTIVE:  Chief complaint: bacteremia.    Pt seen and evaluated at bedside this AM. FS stable in the 90s. Otherwise asymptomatic and seems to be at baseline mentation.     ROS: All systems negative except as noted.    Vital Signs Last 24 Hrs  T(C): 36.9 (23 Apr 2022 05:50), Max: 36.9 (22 Apr 2022 10:15)  T(F): 98.4 (23 Apr 2022 05:50), Max: 98.4 (22 Apr 2022 10:15)  HR: 64 (23 Apr 2022 05:50) (60 - 65)  BP: 125/68 (23 Apr 2022 05:50) (119/73 - 130/67)  BP(mean): --  RR: 18 (23 Apr 2022 05:50) (18 - 18)  SpO2: 97% (23 Apr 2022 05:50) (97% - 99%)    PHYSICAL EXAM:  Gen- In bed, comfortable, NAD. Frail, cachetic.   Resp- CTAB, good effort. No r/r/w. No accessory muscle use.  CVS- RRR, S1S2, no g/r/m. No LE edema.  GI- Soft abd, NT, ND, +BSx4. No HSM.  MSK- No C/C. ROM intact. No crepitus.  Neuro- CN II-XII intact. Speech fluent/face symmetric. Sensation intact.  Skin- No rashes/ulcers. Warm/moist.  Psych- Appropriate mood/affect.      MEDICATION:  MEDICATIONS  (STANDING):  ampicillin  IVPB 2 Gram(s) IV Intermittent every 4 hours  aspirin enteric coated 81 milliGRAM(s) Oral daily  atorvastatin 20 milliGRAM(s) Oral at bedtime  dextrose 5%. 1000 milliLiter(s) (50 mL/Hr) IV Continuous <Continuous>  dextrose 5%. 1000 milliLiter(s) (100 mL/Hr) IV Continuous <Continuous>  dextrose 50% Injectable 25 Gram(s) IV Push once  dextrose 50% Injectable 12.5 Gram(s) IV Push once  dextrose 50% Injectable 25 Gram(s) IV Push once  glucagon  Injectable 1 milliGRAM(s) IntraMuscular once  heparin   Injectable 5000 Unit(s) SubCutaneous every 8 hours  senna 2 Tablet(s) Oral at bedtime  tamsulosin 0.4 milliGRAM(s) Oral at bedtime    MEDICATIONS  (PRN):  acetaminophen     Tablet .. 650 milliGRAM(s) Oral every 6 hours PRN Temp greater or equal to 38C (100.4F), Mild Pain (1 - 3)  ALBUTerol    90 MICROgram(s) HFA Inhaler 2 Puff(s) Inhalation every 6 hours PRN Shortness of Breath and/or Wheezing  aluminum hydroxide/magnesium hydroxide/simethicone Suspension 30 milliLiter(s) Oral every 4 hours PRN Dyspepsia  dextrose Oral Gel 15 Gram(s) Oral once PRN Blood Glucose LESS THAN 70 milliGRAM(s)/deciliter  melatonin 3 milliGRAM(s) Oral at bedtime PRN Insomnia  ondansetron Injectable 4 milliGRAM(s) IV Push every 8 hours PRN Nausea and/or Vomiting  polyethylene glycol 3350 17 Gram(s) Oral daily PRN Constipation      LABORATORY:                          9.6    3.39  )-----------( 146      ( 22 Apr 2022 08:27 )             28.1     04-22    138  |  104  |  4<L>  ----------------------------<  88  4.6   |  23  |  0.76    Ca    9.5      22 Apr 2022 08:07  Phos  2.5     04-22  Mg     1.80     04-22    TPro  6.3  /  Alb  2.9<L>  /  TBili  0.4  /  DBili  <0.2  /  AST  20  /  ALT  6   /  AlkPhos  65  04-22    PT/INR - ( 22 Apr 2022 07:54 )   PT: 13.6 sec;   INR: 1.17 ratio         PTT - ( 22 Apr 2022 07:54 )  PTT:31.0 sec          COVID-19 PCR: NotDetec (22 Apr 2022 08:07)  COVID-19 PCR: NotDetec (21 Apr 2022 18:25)  COVID-19 PCR: NotDetec (15 Apr 2022 14:46)  COVID-19 PCR: NotDetec (28 Mar 2022 19:16)  COVID-19 PCR: NotDetec (02 Mar 2022 18:19)  COVID-19 PCR: NotDetec (27 Feb 2022 08:45)  SARS-CoV-2: NotDetec (27 Feb 2022 07:13)  COVID-19 PCR: NotDetec (21 Feb 2022 06:34)  COVID-19 PCR: NotDetec (14 Feb 2022 15:35)  COVID-19 PCR: NotDetec (08 Feb 2022 20:25)  COVID-19 PCR: NotDetec (03 Jan 2022 11:59)  COVID-19 PCR: NotDetec (28 Dec 2021 18:14)  SARS-CoV-2: NotDetec (25 Dec 2021 10:42)  SARS-CoV-2: NotDetec (16 Dec 2021 07:36)

## 2022-04-23 NOTE — PROGRESS NOTE ADULT - PROBLEM SELECTOR PLAN 7
-DVT ppx: sq heparin  -Diet: pureed      Dispo- Will need placement to complete IV abx - anticipation on Monday.     Communication:  Spoke w/ wife Nerissa 4/22, updated on latest status and POC. Will f/u once new info is available.

## 2022-04-23 NOTE — PROGRESS NOTE ADULT - SUBJECTIVE AND OBJECTIVE BOX
Cardiovascular Disease Progress Note    Overnight events: No acute events overnight.  Mr. Garcia is laying flat and comfortably in no distress.   Otherwise review of systems negative    Objective Findings:  T(C): 36.9 (04-23-22 @ 05:50), Max: 36.9 (04-22-22 @ 10:15)  HR: 64 (04-23-22 @ 05:50) (60 - 65)  BP: 125/68 (04-23-22 @ 05:50) (119/73 - 130/67)  RR: 18 (04-23-22 @ 05:50) (18 - 18)  SpO2: 97% (04-23-22 @ 05:50) (97% - 99%)  Wt(kg): --  Daily     Daily       Physical Exam:  Gen: NAD; Patient resting comfortably  HEENT: EOMI, Normocephalic/ atraumatic  CV: RRR, normal S1 + S2, no m/r/g  Lungs:  Normal respiratory effort; clear to auscultation bilaterally  Abd: soft, non-tender; bowel sounds present  Ext: No edema; warm and well perfused    Telemetry: Sinus; occasional PVDs    Laboratory Data:                        9.6    3.39  )-----------( 146      ( 22 Apr 2022 08:27 )             28.1     04-22    138  |  104  |  4<L>  ----------------------------<  88  4.6   |  23  |  0.76    Ca    9.5      22 Apr 2022 08:07  Phos  2.5     04-22  Mg     1.80     04-22    TPro  6.3  /  Alb  2.9<L>  /  TBili  0.4  /  DBili  <0.2  /  AST  20  /  ALT  6   /  AlkPhos  65  04-22    PT/INR - ( 22 Apr 2022 07:54 )   PT: 13.6 sec;   INR: 1.17 ratio         PTT - ( 22 Apr 2022 07:54 )  PTT:31.0 sec          Inpatient Medications:  MEDICATIONS  (STANDING):  ampicillin  IVPB 2 Gram(s) IV Intermittent every 4 hours  aspirin enteric coated 81 milliGRAM(s) Oral daily  atorvastatin 20 milliGRAM(s) Oral at bedtime  dextrose 5%. 1000 milliLiter(s) (50 mL/Hr) IV Continuous <Continuous>  dextrose 5%. 1000 milliLiter(s) (100 mL/Hr) IV Continuous <Continuous>  dextrose 50% Injectable 25 Gram(s) IV Push once  dextrose 50% Injectable 12.5 Gram(s) IV Push once  dextrose 50% Injectable 25 Gram(s) IV Push once  glucagon  Injectable 1 milliGRAM(s) IntraMuscular once  heparin   Injectable 5000 Unit(s) SubCutaneous every 8 hours  senna 2 Tablet(s) Oral at bedtime  tamsulosin 0.4 milliGRAM(s) Oral at bedtime      Assessment:  81 year old man with prior endocarditis s/p PPM extraction in 12/2021, HTN, HLD, CAD s/p PCI and bedbound noted to have sinus bradycardia and sepsis bacteremia with concern for endocarditis.     Plan of Care:    #History of endocarditis-  Bacteremia noted on this admission.   HARIKA with no evidence of endocarditis or retained foreign material.  ABx duration as per ID.     #Sinus bradycardia-  S/P PPM extraction for endocarditis in 12/2021.  No evidence of pauses or high degree AV block.  EP input noted- hold off on PPM at this time.     #CAD-  No signs of active ischemia.  Continue ASA and statin.       Over 25 minutes spent on total encounter; more than 50% of the visit was spent counseling and/or coordinating care by the attending physician.      Frank Boyd MD [facility]  Cardiovascular Disease  [phone]

## 2022-04-24 LAB
GLUCOSE BLDC GLUCOMTR-MCNC: 119 MG/DL — HIGH (ref 70–99)
GLUCOSE BLDC GLUCOMTR-MCNC: 61 MG/DL — LOW (ref 70–99)
GLUCOSE BLDC GLUCOMTR-MCNC: 81 MG/DL — SIGNIFICANT CHANGE UP (ref 70–99)
GLUCOSE BLDC GLUCOMTR-MCNC: 85 MG/DL — SIGNIFICANT CHANGE UP (ref 70–99)
GLUCOSE BLDC GLUCOMTR-MCNC: 87 MG/DL — SIGNIFICANT CHANGE UP (ref 70–99)
GLUCOSE BLDC GLUCOMTR-MCNC: 95 MG/DL — SIGNIFICANT CHANGE UP (ref 70–99)
GLUCOSE BLDC GLUCOMTR-MCNC: 95 MG/DL — SIGNIFICANT CHANGE UP (ref 70–99)

## 2022-04-24 PROCEDURE — 99232 SBSQ HOSP IP/OBS MODERATE 35: CPT

## 2022-04-24 RX ORDER — DEXTROSE 50 % IN WATER 50 %
25 SYRINGE (ML) INTRAVENOUS ONCE
Refills: 0 | Status: COMPLETED | OUTPATIENT
Start: 2022-04-24 | End: 2022-04-24

## 2022-04-24 RX ADMIN — HEPARIN SODIUM 5000 UNIT(S): 5000 INJECTION INTRAVENOUS; SUBCUTANEOUS at 13:33

## 2022-04-24 RX ADMIN — SENNA PLUS 2 TABLET(S): 8.6 TABLET ORAL at 21:45

## 2022-04-24 RX ADMIN — Medication 216 GRAM(S): at 09:51

## 2022-04-24 RX ADMIN — TAMSULOSIN HYDROCHLORIDE 0.4 MILLIGRAM(S): 0.4 CAPSULE ORAL at 21:45

## 2022-04-24 RX ADMIN — Medication 81 MILLIGRAM(S): at 13:33

## 2022-04-24 RX ADMIN — Medication 216 GRAM(S): at 02:30

## 2022-04-24 RX ADMIN — Medication 25 GRAM(S): at 02:58

## 2022-04-24 RX ADMIN — ATORVASTATIN CALCIUM 20 MILLIGRAM(S): 80 TABLET, FILM COATED ORAL at 21:45

## 2022-04-24 RX ADMIN — HEPARIN SODIUM 5000 UNIT(S): 5000 INJECTION INTRAVENOUS; SUBCUTANEOUS at 06:24

## 2022-04-24 RX ADMIN — Medication 216 GRAM(S): at 21:46

## 2022-04-24 RX ADMIN — Medication 216 GRAM(S): at 18:29

## 2022-04-24 RX ADMIN — Medication 216 GRAM(S): at 13:32

## 2022-04-24 RX ADMIN — Medication 216 GRAM(S): at 06:23

## 2022-04-24 RX ADMIN — HEPARIN SODIUM 5000 UNIT(S): 5000 INJECTION INTRAVENOUS; SUBCUTANEOUS at 21:46

## 2022-04-24 NOTE — PROGRESS NOTE ADULT - PROBLEM SELECTOR PLAN 7
-DVT ppx: sq heparin  -Diet: pureed      Dispo- Will need placement to complete IV abx - anticipation on Monday. Discussed with SW on Friday.    Communication:  Spoke w/ wife Nerissa 4/22, updated on latest status and POC. Will f/u once new info is available. -DVT ppx: sq heparin  -Diet: pureed      Dispo- Will need placement to complete IV abx. Discussed with SW on Friday. Will have SW assist w/ process. Await stability of FS prior to DC.    Communication:  Spoke w/ wife Nerissa 4/24, updated on latest status and POC. Discussed anticipation fo DC once he is placed. Wife expressed concerns about risk for infection and the care that he'll be receiving and doesn't want to take a chance by placing him. Explained that the discharge plan will clearly outline what pt's needs would be. From the medical standpoint - pt is still appropriate for discharge as long as he is clinically stable and he has no new hypoglycemic events. Will reassess in AM.

## 2022-04-24 NOTE — PROGRESS NOTE ADULT - SUBJECTIVE AND OBJECTIVE BOX
[facility] Division of Hospital Medicine  Rafael SerranoJoshua) MD [place]  Pager [phone]    SUBJECTIVE:  Chief complaint: hypoglycemia.    Pt seen and evaluated at bedside this AM. Overnight event noted. Hypoglycemic to Fs 61. S/p D50 push. Otherwise, uncomplicated course. Per RN - pt ate his full meal today with feeding assistance. FS have since trended in the 90s. Pt otherwise asymptomatic.     ROS: All systems negative except as noted.      PHYSICAL EXAM:  Vital Signs Last 24 Hrs  T(C): 36.7 (24 Apr 2022 06:00), Max: 36.9 (23 Apr 2022 18:10)  T(F): 98 (24 Apr 2022 06:00), Max: 98.4 (23 Apr 2022 18:10)  HR: 67 (24 Apr 2022 06:00) (63 - 68)  BP: 121/68 (24 Apr 2022 06:00) (118/67 - 127/65)  BP(mean): --  RR: 18 (24 Apr 2022 06:00) (17 - 18)  SpO2: 99% (24 Apr 2022 06:00) (98% - 99%)    Gen- In bed, comfortable, NAD. Frail, cachetic.   Resp- CTAB, good effort. No r/r/w. No accessory muscle use.  CVS- RRR, S1S2, no g/r/m. No LE edema.  GI- Soft abd, NT, ND, +BSx4. No HSM.  MSK- No C/C. ROM intact. No crepitus.  Neuro- CN II-XII intact. Speech fluent/face symmetric. Sensation intact.  Skin- No rashes/ulcers. Warm/moist.  Psych- Appropriate mood/affect.      MEDICATION:  MEDICATIONS  (STANDING):  ampicillin  IVPB 2 Gram(s) IV Intermittent every 4 hours  aspirin enteric coated 81 milliGRAM(s) Oral daily  atorvastatin 20 milliGRAM(s) Oral at bedtime  dextrose 5%. 1000 milliLiter(s) (100 mL/Hr) IV Continuous <Continuous>  dextrose 5%. 1000 milliLiter(s) (50 mL/Hr) IV Continuous <Continuous>  dextrose 50% Injectable 25 Gram(s) IV Push once  dextrose 50% Injectable 12.5 Gram(s) IV Push once  dextrose 50% Injectable 25 Gram(s) IV Push once  glucagon  Injectable 1 milliGRAM(s) IntraMuscular once  heparin   Injectable 5000 Unit(s) SubCutaneous every 8 hours  senna 2 Tablet(s) Oral at bedtime  tamsulosin 0.4 milliGRAM(s) Oral at bedtime    MEDICATIONS  (PRN):  acetaminophen     Tablet .. 650 milliGRAM(s) Oral every 6 hours PRN Temp greater or equal to 38C (100.4F), Mild Pain (1 - 3)  ALBUTerol    90 MICROgram(s) HFA Inhaler 2 Puff(s) Inhalation every 6 hours PRN Shortness of Breath and/or Wheezing  aluminum hydroxide/magnesium hydroxide/simethicone Suspension 30 milliLiter(s) Oral every 4 hours PRN Dyspepsia  dextrose Oral Gel 15 Gram(s) Oral once PRN Blood Glucose LESS THAN 70 milliGRAM(s)/deciliter  melatonin 3 milliGRAM(s) Oral at bedtime PRN Insomnia  ondansetron Injectable 4 milliGRAM(s) IV Push every 8 hours PRN Nausea and/or Vomiting  polyethylene glycol 3350 17 Gram(s) Oral daily PRN Constipation      LABORATORY:  No labs today.

## 2022-04-24 NOTE — PROGRESS NOTE ADULT - ASSESSMENT
81 year old male with HTN, T2DM, HLD, constipation, CVA c/b R sided hemiplegia, CAD s/p PCI, HFpEF, sinus node dysfunction, bedbound who presents with dysphagia/odynophagia found to have e.faecalis bacteremia and asymptomatic bradycardia. Hospital course c/b hypoglycemia in setting of poor PO intake.

## 2022-04-24 NOTE — PROGRESS NOTE ADULT - SUBJECTIVE AND OBJECTIVE BOX
Cardiovascular Disease Progress Note    Overnight events: No acute events overnight. Mr. Garcia is in no distress.    Otherwise review of systems negative    Objective Findings:  T(C): 36.7 (04-24-22 @ 06:00), Max: 36.9 (04-23-22 @ 18:10)  HR: 67 (04-24-22 @ 06:00) (63 - 68)  BP: 121/68 (04-24-22 @ 06:00) (118/67 - 127/65)  RR: 18 (04-24-22 @ 06:00) (17 - 18)  SpO2: 99% (04-24-22 @ 06:00) (98% - 99%)  Wt(kg): --  Daily     Daily       Physical Exam:  Gen: NAD; Patient resting comfortably  HEENT: EOMI, Normocephalic/ atraumatic  CV: RRR, normal S1 + S2, no m/r/g  Lungs:  Normal respiratory effort; clear to auscultation bilaterally  Abd: soft, non-tender; bowel sounds present  Ext: No edema; warm and well perfused    Telemetry: Sinus; no ectopy    Laboratory Data:                    Inpatient Medications:  MEDICATIONS  (STANDING):  ampicillin  IVPB 2 Gram(s) IV Intermittent every 4 hours  aspirin enteric coated 81 milliGRAM(s) Oral daily  atorvastatin 20 milliGRAM(s) Oral at bedtime  dextrose 5%. 1000 milliLiter(s) (50 mL/Hr) IV Continuous <Continuous>  dextrose 5%. 1000 milliLiter(s) (100 mL/Hr) IV Continuous <Continuous>  dextrose 50% Injectable 25 Gram(s) IV Push once  dextrose 50% Injectable 12.5 Gram(s) IV Push once  dextrose 50% Injectable 25 Gram(s) IV Push once  glucagon  Injectable 1 milliGRAM(s) IntraMuscular once  heparin   Injectable 5000 Unit(s) SubCutaneous every 8 hours  senna 2 Tablet(s) Oral at bedtime  tamsulosin 0.4 milliGRAM(s) Oral at bedtime      Assessment: 81 year old man with prior endocarditis s/p PPM extraction in 12/2021, HTN, HLD, CAD s/p PCI and bedbound noted to have sinus bradycardia and sepsis bacteremia with concern for endocarditis.     Plan of Care:    #History of endocarditis-  Bacteremia noted on this admission.   HARIKA with no evidence of endocarditis or retained foreign material.  ABx duration as per ID.     #Sinus bradycardia-  S/P PPM extraction for endocarditis in 12/2021.  No evidence of pauses or high degree AV block.  EP input noted- hold off on PPM at this time.     #CAD-  No signs of active ischemia.  Continue ASA and statin.       Over 25 minutes spent on total encounter; more than 50% of the visit was spent counseling and/or coordinating care by the attending physician.      Frank Boyd MD [facility]  Cardiovascular Disease  [phone]

## 2022-04-25 LAB
ANION GAP SERPL CALC-SCNC: 10 MMOL/L — SIGNIFICANT CHANGE UP (ref 7–14)
BASOPHILS # BLD AUTO: 0.03 K/UL — SIGNIFICANT CHANGE UP (ref 0–0.2)
BASOPHILS NFR BLD AUTO: 0.8 % — SIGNIFICANT CHANGE UP (ref 0–2)
BUN SERPL-MCNC: 8 MG/DL — SIGNIFICANT CHANGE UP (ref 7–23)
CALCIUM SERPL-MCNC: 9.5 MG/DL — SIGNIFICANT CHANGE UP (ref 8.4–10.5)
CHLORIDE SERPL-SCNC: 104 MMOL/L — SIGNIFICANT CHANGE UP (ref 98–107)
CO2 SERPL-SCNC: 22 MMOL/L — SIGNIFICANT CHANGE UP (ref 22–31)
CREAT SERPL-MCNC: 0.86 MG/DL — SIGNIFICANT CHANGE UP (ref 0.5–1.3)
EGFR: 86 ML/MIN/1.73M2 — SIGNIFICANT CHANGE UP
EOSINOPHIL # BLD AUTO: 0.21 K/UL — SIGNIFICANT CHANGE UP (ref 0–0.5)
EOSINOPHIL NFR BLD AUTO: 5.9 % — SIGNIFICANT CHANGE UP (ref 0–6)
GLUCOSE BLDC GLUCOMTR-MCNC: 71 MG/DL — SIGNIFICANT CHANGE UP (ref 70–99)
GLUCOSE BLDC GLUCOMTR-MCNC: 81 MG/DL — SIGNIFICANT CHANGE UP (ref 70–99)
GLUCOSE BLDC GLUCOMTR-MCNC: 92 MG/DL — SIGNIFICANT CHANGE UP (ref 70–99)
GLUCOSE SERPL-MCNC: 70 MG/DL — SIGNIFICANT CHANGE UP (ref 70–99)
HCT VFR BLD CALC: 29.8 % — LOW (ref 39–50)
HGB BLD-MCNC: 9.7 G/DL — LOW (ref 13–17)
IANC: 1.28 K/UL — LOW (ref 1.8–7.4)
IMM GRANULOCYTES NFR BLD AUTO: 0.3 % — SIGNIFICANT CHANGE UP (ref 0–1.5)
LYMPHOCYTES # BLD AUTO: 1.69 K/UL — SIGNIFICANT CHANGE UP (ref 1–3.3)
LYMPHOCYTES # BLD AUTO: 47.5 % — HIGH (ref 13–44)
MAGNESIUM SERPL-MCNC: 2 MG/DL — SIGNIFICANT CHANGE UP (ref 1.6–2.6)
MCHC RBC-ENTMCNC: 29.7 PG — SIGNIFICANT CHANGE UP (ref 27–34)
MCHC RBC-ENTMCNC: 32.6 GM/DL — SIGNIFICANT CHANGE UP (ref 32–36)
MCV RBC AUTO: 91.1 FL — SIGNIFICANT CHANGE UP (ref 80–100)
MONOCYTES # BLD AUTO: 0.34 K/UL — SIGNIFICANT CHANGE UP (ref 0–0.9)
MONOCYTES NFR BLD AUTO: 9.6 % — SIGNIFICANT CHANGE UP (ref 2–14)
NEUTROPHILS # BLD AUTO: 1.28 K/UL — LOW (ref 1.8–7.4)
NEUTROPHILS NFR BLD AUTO: 35.9 % — LOW (ref 43–77)
NRBC # BLD: 0 /100 WBCS — SIGNIFICANT CHANGE UP
NRBC # FLD: 0 K/UL — SIGNIFICANT CHANGE UP
PHOSPHATE SERPL-MCNC: 2.6 MG/DL — SIGNIFICANT CHANGE UP (ref 2.5–4.5)
PLATELET # BLD AUTO: 204 K/UL — SIGNIFICANT CHANGE UP (ref 150–400)
POTASSIUM SERPL-MCNC: 3.9 MMOL/L — SIGNIFICANT CHANGE UP (ref 3.5–5.3)
POTASSIUM SERPL-SCNC: 3.9 MMOL/L — SIGNIFICANT CHANGE UP (ref 3.5–5.3)
RBC # BLD: 3.27 M/UL — LOW (ref 4.2–5.8)
RBC # FLD: 14.5 % — SIGNIFICANT CHANGE UP (ref 10.3–14.5)
SARS-COV-2 RNA SPEC QL NAA+PROBE: SIGNIFICANT CHANGE UP
SODIUM SERPL-SCNC: 136 MMOL/L — SIGNIFICANT CHANGE UP (ref 135–145)
WBC # BLD: 3.56 K/UL — LOW (ref 3.8–10.5)
WBC # FLD AUTO: 3.56 K/UL — LOW (ref 3.8–10.5)

## 2022-04-25 PROCEDURE — 99232 SBSQ HOSP IP/OBS MODERATE 35: CPT

## 2022-04-25 RX ORDER — HYDRALAZINE HCL 50 MG
5 TABLET ORAL ONCE
Refills: 0 | Status: COMPLETED | OUTPATIENT
Start: 2022-04-25 | End: 2022-04-25

## 2022-04-25 RX ADMIN — Medication 216 GRAM(S): at 10:15

## 2022-04-25 RX ADMIN — HEPARIN SODIUM 5000 UNIT(S): 5000 INJECTION INTRAVENOUS; SUBCUTANEOUS at 13:30

## 2022-04-25 RX ADMIN — ATORVASTATIN CALCIUM 20 MILLIGRAM(S): 80 TABLET, FILM COATED ORAL at 22:24

## 2022-04-25 RX ADMIN — Medication 5 MILLIGRAM(S): at 05:41

## 2022-04-25 RX ADMIN — Medication 216 GRAM(S): at 01:52

## 2022-04-25 RX ADMIN — Medication 81 MILLIGRAM(S): at 13:30

## 2022-04-25 RX ADMIN — Medication 216 GRAM(S): at 18:30

## 2022-04-25 RX ADMIN — Medication 216 GRAM(S): at 13:30

## 2022-04-25 RX ADMIN — SENNA PLUS 2 TABLET(S): 8.6 TABLET ORAL at 22:24

## 2022-04-25 RX ADMIN — Medication 216 GRAM(S): at 23:16

## 2022-04-25 RX ADMIN — TAMSULOSIN HYDROCHLORIDE 0.4 MILLIGRAM(S): 0.4 CAPSULE ORAL at 22:24

## 2022-04-25 RX ADMIN — HEPARIN SODIUM 5000 UNIT(S): 5000 INJECTION INTRAVENOUS; SUBCUTANEOUS at 22:25

## 2022-04-25 RX ADMIN — HEPARIN SODIUM 5000 UNIT(S): 5000 INJECTION INTRAVENOUS; SUBCUTANEOUS at 05:41

## 2022-04-25 RX ADMIN — Medication 216 GRAM(S): at 05:41

## 2022-04-25 NOTE — PROGRESS NOTE ADULT - SUBJECTIVE AND OBJECTIVE BOX
Chief Complaint: Hypoglycemia    History: Lethargic  No hypoglycemia events noted  Has been maintained off IV fluids  tolerating some pureed diet    MEDICATIONS  (STANDING):  ampicillin  IVPB 2 Gram(s) IV Intermittent every 4 hours  aspirin enteric coated 81 milliGRAM(s) Oral daily  atorvastatin 20 milliGRAM(s) Oral at bedtime  dextrose 5%. 1000 milliLiter(s) (100 mL/Hr) IV Continuous <Continuous>  dextrose 5%. 1000 milliLiter(s) (50 mL/Hr) IV Continuous <Continuous>  dextrose 50% Injectable 25 Gram(s) IV Push once  dextrose 50% Injectable 12.5 Gram(s) IV Push once  dextrose 50% Injectable 25 Gram(s) IV Push once  glucagon  Injectable 1 milliGRAM(s) IntraMuscular once  heparin   Injectable 5000 Unit(s) SubCutaneous every 8 hours  senna 2 Tablet(s) Oral at bedtime  tamsulosin 0.4 milliGRAM(s) Oral at bedtime    MEDICATIONS  (PRN):  acetaminophen     Tablet .. 650 milliGRAM(s) Oral every 6 hours PRN Temp greater or equal to 38C (100.4F), Mild Pain (1 - 3)  ALBUTerol    90 MICROgram(s) HFA Inhaler 2 Puff(s) Inhalation every 6 hours PRN Shortness of Breath and/or Wheezing  aluminum hydroxide/magnesium hydroxide/simethicone Suspension 30 milliLiter(s) Oral every 4 hours PRN Dyspepsia  dextrose Oral Gel 15 Gram(s) Oral once PRN Blood Glucose LESS THAN 70 milliGRAM(s)/deciliter  melatonin 3 milliGRAM(s) Oral at bedtime PRN Insomnia  ondansetron Injectable 4 milliGRAM(s) IV Push every 8 hours PRN Nausea and/or Vomiting  polyethylene glycol 3350 17 Gram(s) Oral daily PRN Constipation      Allergies    No Known Allergies    Intolerances      Review of Systems:    UNABLE TO OBTAIN    PHYSICAL EXAM:  VITALS: T(C): 36.7 (04-25-22 @ 13:06)  T(F): 98 (04-25-22 @ 13:06), Max: 98 (04-24-22 @ 21:45)  HR: 60 (04-25-22 @ 13:06) (60 - 97)  BP: 120/64 (04-25-22 @ 13:06) (120/64 - 170/76)  RR:  (18 - 18)  SpO2:  (98% - 99%)  Wt(kg): --  GENERAL: NAD, lethargic  EYES: No proptosis,, anicteric  HEENT:  Atraumatic, Normocephalic, moist mucous membranes  RESPIRATORY: nonlabored respirations, no wheezing  SKIN: Dry, intact, No rashes or lesions      CAPILLARY BLOOD GLUCOSE      POCT Blood Glucose.: 98 mg/dL (25 Apr 2022 17:47)  POCT Blood Glucose.: 96 mg/dL (25 Apr 2022 15:24)  POCT Blood Glucose.: 92 mg/dL (25 Apr 2022 11:01)  POCT Blood Glucose.: 71 mg/dL (25 Apr 2022 06:44)  POCT Blood Glucose.: 81 mg/dL (25 Apr 2022 03:17)  POCT Blood Glucose.: 95 mg/dL (24 Apr 2022 21:41)      04-25    136  |  104  |  8   ----------------------------<  70  3.9   |  22  |  0.86    EGFR if : x   EGFR if non : x     Ca    9.5      04-25  Mg     2.00     04-25  Phos  2.6     04-25        A1C with Estimated Average Glucose Result: 5.2 % (04-20-22 @ 10:24)  A1C with Estimated Average Glucose Result: 5.3 % (12-17-21 @ 06:49)  A1C with Estimated Average Glucose Result: 5.5 % (06-24-21 @ 05:23)  A1C with Estimated Average Glucose Result: 5.5 % (06-11-21 @ 08:07)      Thyroid Function Tests:  04-19 @ 06:34 TSH 2.43 FreeT4 1.6 T3 -- Anti TPO -- Anti Thyroglobulin Ab -- TSI --

## 2022-04-25 NOTE — PROGRESS NOTE ADULT - SUBJECTIVE AND OBJECTIVE BOX
[facility] Division of Hospital Medicine  Preet Nguyen MD  Pager [phone]      Patient is a 81y old  Male who presents with a chief complaint of Dysphagia/odynophagia (25 Apr 2022 07:24)      SUBJECTIVE / OVERNIGHT EVENTS: Unable to obtain due to dementia    MEDICATIONS  (STANDING):  ampicillin  IVPB 2 Gram(s) IV Intermittent every 4 hours  aspirin enteric coated 81 milliGRAM(s) Oral daily  atorvastatin 20 milliGRAM(s) Oral at bedtime  dextrose 5%. 1000 milliLiter(s) (100 mL/Hr) IV Continuous <Continuous>  dextrose 5%. 1000 milliLiter(s) (50 mL/Hr) IV Continuous <Continuous>  dextrose 50% Injectable 25 Gram(s) IV Push once  dextrose 50% Injectable 12.5 Gram(s) IV Push once  dextrose 50% Injectable 25 Gram(s) IV Push once  glucagon  Injectable 1 milliGRAM(s) IntraMuscular once  heparin   Injectable 5000 Unit(s) SubCutaneous every 8 hours  senna 2 Tablet(s) Oral at bedtime  tamsulosin 0.4 milliGRAM(s) Oral at bedtime    MEDICATIONS  (PRN):  acetaminophen     Tablet .. 650 milliGRAM(s) Oral every 6 hours PRN Temp greater or equal to 38C (100.4F), Mild Pain (1 - 3)  ALBUTerol    90 MICROgram(s) HFA Inhaler 2 Puff(s) Inhalation every 6 hours PRN Shortness of Breath and/or Wheezing  aluminum hydroxide/magnesium hydroxide/simethicone Suspension 30 milliLiter(s) Oral every 4 hours PRN Dyspepsia  dextrose Oral Gel 15 Gram(s) Oral once PRN Blood Glucose LESS THAN 70 milliGRAM(s)/deciliter  melatonin 3 milliGRAM(s) Oral at bedtime PRN Insomnia  ondansetron Injectable 4 milliGRAM(s) IV Push every 8 hours PRN Nausea and/or Vomiting  polyethylene glycol 3350 17 Gram(s) Oral daily PRN Constipation      CAPILLARY BLOOD GLUCOSE      POCT Blood Glucose.: 92 mg/dL (25 Apr 2022 11:01)  POCT Blood Glucose.: 71 mg/dL (25 Apr 2022 06:44)  POCT Blood Glucose.: 81 mg/dL (25 Apr 2022 03:17)  POCT Blood Glucose.: 95 mg/dL (24 Apr 2022 21:41)  POCT Blood Glucose.: 119 mg/dL (24 Apr 2022 17:31)    I&O's Summary      PHYSICAL EXAM:  Vital Signs Last 24 Hrs  T(C): 36.7 (25 Apr 2022 13:06), Max: 36.7 (24 Apr 2022 21:45)  T(F): 98 (25 Apr 2022 13:06), Max: 98 (24 Apr 2022 21:45)  HR: 60 (25 Apr 2022 13:06) (60 - 97)  BP: 120/64 (25 Apr 2022 13:06) (120/64 - 170/76)  BP(mean): --  RR: 18 (25 Apr 2022 13:06) (18 - 18)  SpO2: 98% (25 Apr 2022 13:06) (98% - 99%)  CONSTITUTIONAL: NAD  EYES: conjunctiva and sclera clear  ENMT: mmm  NECK: Supple,  RESPIRATORY: Normal respiratory effort; lungs are clear to auscultation bilaterally  CARDIOVASCULAR: Regular rate and rhythm, + S1 and S2  ABDOMEN: Nontender to palpation, normoactive bowel sounds, no rebound/guarding  MUSCULOSKELETAL:  no clubbing or cyanosis of digits;   PSYCH: Alert but not oriented  SKIN: No rashes;     LABS:                        9.7    3.56  )-----------( 204      ( 25 Apr 2022 07:26 )             29.8     04-25    136  |  104  |  8   ----------------------------<  70  3.9   |  22  |  0.86    Ca    9.5      25 Apr 2022 07:26  Phos  2.6     04-25  Mg     2.00     04-25

## 2022-04-25 NOTE — PROGRESS NOTE ADULT - SUBJECTIVE AND OBJECTIVE BOX
Cardiovascular Disease Progress Note    Overnight events: No acute events overnight. Mr. Garcia is in no distress.     Otherwise review of systems negative    Objective Findings:  T(C): 36.6 (04-25-22 @ 05:40), Max: 36.7 (04-24-22 @ 21:45)  HR: 97 (04-25-22 @ 05:40) (65 - 97)  BP: 170/76 (04-25-22 @ 05:40) (124/86 - 170/76)  RR: 18 (04-25-22 @ 05:40) (18 - 18)  SpO2: 99% (04-25-22 @ 05:40) (99% - 99%)  Wt(kg): --  Daily     Daily       Physical Exam:  Gen: NAD; Patient resting comfortably  HEENT: EOMI, Normocephalic/ atraumatic  CV: RRR, normal S1 + S2, no m/r/g  Lungs:  Normal respiratory effort; clear to auscultation bilaterally  Abd: soft, non-tender; bowel sounds present  Ext: No edema; warm and well perfused    Telemetry: Sinus; occasional PVDs    Laboratory Data:      No recent labs      Inpatient Medications:  MEDICATIONS  (STANDING):  ampicillin  IVPB 2 Gram(s) IV Intermittent every 4 hours  aspirin enteric coated 81 milliGRAM(s) Oral daily  atorvastatin 20 milliGRAM(s) Oral at bedtime  dextrose 5%. 1000 milliLiter(s) (50 mL/Hr) IV Continuous <Continuous>  dextrose 5%. 1000 milliLiter(s) (100 mL/Hr) IV Continuous <Continuous>  dextrose 50% Injectable 25 Gram(s) IV Push once  dextrose 50% Injectable 12.5 Gram(s) IV Push once  dextrose 50% Injectable 25 Gram(s) IV Push once  glucagon  Injectable 1 milliGRAM(s) IntraMuscular once  heparin   Injectable 5000 Unit(s) SubCutaneous every 8 hours  senna 2 Tablet(s) Oral at bedtime  tamsulosin 0.4 milliGRAM(s) Oral at bedtime      Assessment: 81 year old man with prior endocarditis s/p PPM extraction in 12/2021, HTN, HLD, CAD s/p PCI and bedbound noted to have sinus bradycardia and sepsis bacteremia with concern for endocarditis.     Plan of Care:    #History of endocarditis-  Bacteremia noted on this admission.   HARIKA with no evidence of endocarditis or retained foreign material.  ABx duration as per ID.     #Sinus bradycardia-  S/P PPM extraction for endocarditis in 12/2021.  No evidence of pauses or high degree AV block.  EP input noted- hold off on PPM at this time.     #CAD-  No signs of active ischemia.  Continue ASA and statin.       #ACP (advance care planning)-  Advanced care planning was addressed.  No indication for invasive cardiac procedures at this time.     Over 25 minutes spent on total encounter; more than 50% of the visit was spent counseling and/or coordinating care by the attending physician.      Frank Boyd MD [facility]  Cardiovascular Disease  [phone]

## 2022-04-25 NOTE — PROGRESS NOTE ADULT - PROBLEM SELECTOR PLAN 7
-DVT ppx: sq heparin  -Diet: pureed      Dispo- Will need placement to complete IV abx. Discussed with SW on Friday. Will have SW assist w/ process. Await stability of FS prior to DC.    Communication:  Spoke w/ wife Nerissa 4/25, list of facilities will be given today

## 2022-04-25 NOTE — PROGRESS NOTE ADULT - ASSESSMENT
81M DM2 on metformin, hemiplegia, baseline A&O x 1, presented with odynophagia made npo, developed new hypoglycemia.    1) Hypoglycemia - unable to assess for whipple's triad due to altered mental status  suspecting related to poor po intake, now diet resumed, encourage po intake. Per wife did not have issue with hypoglycemia prior to admission.  AM cortisol and TFTs normal  Glucose trend has improved with po intake  can reduce FS monitoring to premeal and bedtime.  no insulin/correction scale  Remains off IV dextrose and is maintaining glucose level.  if hypoglycemia again (< 55) - send BMP, insulin level, c-peptide, pro-insulin, beta hydroxy butyrate    2) DM2  HbA1c 5.3%  stop metformin on dc  no meds    3) Hyperlipidemia  atorvastatin 20mg    Endocrine will sign off at this time. Continue to encourage po intake.  Call back if further questions.      Marilia Nunes MD  Division of Endocrinology  Pager: [phone]    If after 6PM or before 9AM, or on weekends/holidays, please call endocrine answering service for assistance ([phone]).  For nonurgent matters email [email or web address] for assistance.

## 2022-04-26 LAB
ANION GAP SERPL CALC-SCNC: 12 MMOL/L — SIGNIFICANT CHANGE UP (ref 7–14)
BASOPHILS # BLD AUTO: 0.03 K/UL — SIGNIFICANT CHANGE UP (ref 0–0.2)
BASOPHILS NFR BLD AUTO: 0.8 % — SIGNIFICANT CHANGE UP (ref 0–2)
BUN SERPL-MCNC: 8 MG/DL — SIGNIFICANT CHANGE UP (ref 7–23)
CALCIUM SERPL-MCNC: 9.7 MG/DL — SIGNIFICANT CHANGE UP (ref 8.4–10.5)
CHLORIDE SERPL-SCNC: 102 MMOL/L — SIGNIFICANT CHANGE UP (ref 98–107)
CO2 SERPL-SCNC: 20 MMOL/L — LOW (ref 22–31)
CREAT SERPL-MCNC: 0.89 MG/DL — SIGNIFICANT CHANGE UP (ref 0.5–1.3)
EGFR: 86 ML/MIN/1.73M2 — SIGNIFICANT CHANGE UP
EOSINOPHIL # BLD AUTO: 0.22 K/UL — SIGNIFICANT CHANGE UP (ref 0–0.5)
EOSINOPHIL NFR BLD AUTO: 6.1 % — HIGH (ref 0–6)
GLUCOSE SERPL-MCNC: 93 MG/DL — SIGNIFICANT CHANGE UP (ref 70–99)
HCT VFR BLD CALC: 31.2 % — LOW (ref 39–50)
HGB BLD-MCNC: 10.3 G/DL — LOW (ref 13–17)
IANC: 1.23 K/UL — LOW (ref 1.8–7.4)
IMM GRANULOCYTES NFR BLD AUTO: 0.6 % — SIGNIFICANT CHANGE UP (ref 0–1.5)
LYMPHOCYTES # BLD AUTO: 1.74 K/UL — SIGNIFICANT CHANGE UP (ref 1–3.3)
LYMPHOCYTES # BLD AUTO: 48.1 % — HIGH (ref 13–44)
MAGNESIUM SERPL-MCNC: 2 MG/DL — SIGNIFICANT CHANGE UP (ref 1.6–2.6)
MCHC RBC-ENTMCNC: 30.1 PG — SIGNIFICANT CHANGE UP (ref 27–34)
MCHC RBC-ENTMCNC: 33 GM/DL — SIGNIFICANT CHANGE UP (ref 32–36)
MCV RBC AUTO: 91.2 FL — SIGNIFICANT CHANGE UP (ref 80–100)
MONOCYTES # BLD AUTO: 0.38 K/UL — SIGNIFICANT CHANGE UP (ref 0–0.9)
MONOCYTES NFR BLD AUTO: 10.5 % — SIGNIFICANT CHANGE UP (ref 2–14)
NEUTROPHILS # BLD AUTO: 1.23 K/UL — LOW (ref 1.8–7.4)
NEUTROPHILS NFR BLD AUTO: 33.9 % — LOW (ref 43–77)
NRBC # BLD: 0 /100 WBCS — SIGNIFICANT CHANGE UP
NRBC # FLD: 0 K/UL — SIGNIFICANT CHANGE UP
PHOSPHATE SERPL-MCNC: 2.3 MG/DL — LOW (ref 2.5–4.5)
PLATELET # BLD AUTO: 195 K/UL — SIGNIFICANT CHANGE UP (ref 150–400)
POTASSIUM SERPL-MCNC: 4.2 MMOL/L — SIGNIFICANT CHANGE UP (ref 3.5–5.3)
POTASSIUM SERPL-SCNC: 4.2 MMOL/L — SIGNIFICANT CHANGE UP (ref 3.5–5.3)
RBC # BLD: 3.42 M/UL — LOW (ref 4.2–5.8)
RBC # FLD: 14.6 % — HIGH (ref 10.3–14.5)
SODIUM SERPL-SCNC: 134 MMOL/L — LOW (ref 135–145)
WBC # BLD: 3.62 K/UL — LOW (ref 3.8–10.5)
WBC # FLD AUTO: 3.62 K/UL — LOW (ref 3.8–10.5)

## 2022-04-26 PROCEDURE — 99232 SBSQ HOSP IP/OBS MODERATE 35: CPT

## 2022-04-26 RX ORDER — SODIUM,POTASSIUM PHOSPHATES 278-250MG
1 POWDER IN PACKET (EA) ORAL THREE TIMES A DAY
Refills: 0 | Status: COMPLETED | OUTPATIENT
Start: 2022-04-26 | End: 2022-04-27

## 2022-04-26 RX ADMIN — SENNA PLUS 2 TABLET(S): 8.6 TABLET ORAL at 22:20

## 2022-04-26 RX ADMIN — Medication 1 PACKET(S): at 14:47

## 2022-04-26 RX ADMIN — Medication 81 MILLIGRAM(S): at 11:07

## 2022-04-26 RX ADMIN — ATORVASTATIN CALCIUM 20 MILLIGRAM(S): 80 TABLET, FILM COATED ORAL at 22:19

## 2022-04-26 RX ADMIN — Medication 216 GRAM(S): at 06:27

## 2022-04-26 RX ADMIN — Medication 1 PACKET(S): at 22:19

## 2022-04-26 RX ADMIN — Medication 216 GRAM(S): at 11:07

## 2022-04-26 RX ADMIN — Medication 216 GRAM(S): at 15:24

## 2022-04-26 RX ADMIN — HEPARIN SODIUM 5000 UNIT(S): 5000 INJECTION INTRAVENOUS; SUBCUTANEOUS at 06:27

## 2022-04-26 RX ADMIN — TAMSULOSIN HYDROCHLORIDE 0.4 MILLIGRAM(S): 0.4 CAPSULE ORAL at 22:19

## 2022-04-26 RX ADMIN — Medication 216 GRAM(S): at 18:31

## 2022-04-26 RX ADMIN — Medication 216 GRAM(S): at 22:18

## 2022-04-26 RX ADMIN — Medication 216 GRAM(S): at 02:59

## 2022-04-26 RX ADMIN — HEPARIN SODIUM 5000 UNIT(S): 5000 INJECTION INTRAVENOUS; SUBCUTANEOUS at 22:20

## 2022-04-26 RX ADMIN — HEPARIN SODIUM 5000 UNIT(S): 5000 INJECTION INTRAVENOUS; SUBCUTANEOUS at 14:47

## 2022-04-26 NOTE — PROGRESS NOTE ADULT - PROBLEM SELECTOR PLAN 7
-DVT ppx: sq heparin  -Diet: pureed      Dispo- Will need placement to complete IV abx. Discussed with SW on Friday. Will have SW assist w/ process. Await stability of FS prior to DC.    Communication:  Spoke w/ wife Nerissa 4/25, list of facilities provided, awaiting choices.

## 2022-04-26 NOTE — PROGRESS NOTE ADULT - SUBJECTIVE AND OBJECTIVE BOX
Cardiovascular Disease Progress Note    Overnight events: No acute events overnight.   Mr. Garcia is in no apparent distress.    He cannot provide a meaningful history.    Objective Findings:  T(C): 36.9 (04-26-22 @ 05:44), Max: 36.9 (04-26-22 @ 05:44)  HR: 56 (04-26-22 @ 05:44) (56 - 60)  BP: 130/81 (04-26-22 @ 05:44) (120/64 - 134/76)  RR: 18 (04-26-22 @ 05:44) (18 - 18)  SpO2: 100% (04-26-22 @ 05:44) (98% - 100%)  Wt(kg): --  Daily     Daily       Physical Exam:  Gen: NAD; Patient resting comfortably  HEENT: EOMI, Normocephalic/ atraumatic  CV: RRR, normal S1 + S2, no m/r/g  Lungs:  Normal respiratory effort; clear to auscultation bilaterally  Abd: soft, non-tender; bowel sounds present  Ext: No edema; warm and well perfused    Telemetry: Sinus; occasional PVDs.     Laboratory Data:                        9.7    3.56  )-----------( 204      ( 25 Apr 2022 07:26 )             29.8     04-25    136  |  104  |  8   ----------------------------<  70  3.9   |  22  |  0.86    Ca    9.5      25 Apr 2022 07:26  Phos  2.6     04-25  Mg     2.00     04-25                Inpatient Medications:  MEDICATIONS  (STANDING):  ampicillin  IVPB 2 Gram(s) IV Intermittent every 4 hours  aspirin enteric coated 81 milliGRAM(s) Oral daily  atorvastatin 20 milliGRAM(s) Oral at bedtime  dextrose 5%. 1000 milliLiter(s) (100 mL/Hr) IV Continuous <Continuous>  dextrose 5%. 1000 milliLiter(s) (50 mL/Hr) IV Continuous <Continuous>  dextrose 50% Injectable 25 Gram(s) IV Push once  dextrose 50% Injectable 12.5 Gram(s) IV Push once  dextrose 50% Injectable 25 Gram(s) IV Push once  glucagon  Injectable 1 milliGRAM(s) IntraMuscular once  heparin   Injectable 5000 Unit(s) SubCutaneous every 8 hours  senna 2 Tablet(s) Oral at bedtime  tamsulosin 0.4 milliGRAM(s) Oral at bedtime      Assessment: 81 year old man with prior endocarditis s/p PPM extraction in 12/2021, HTN, HLD, CAD s/p PCI and bedbound noted to have sinus bradycardia and sepsis bacteremia with concern for endocarditis.     Plan of Care:    #History of endocarditis-  Bacteremia noted on this admission.   HARIKA with no evidence of endocarditis or retained foreign material.  ABx duration as per ID.     #Sinus bradycardia-  S/P PPM extraction for endocarditis in 12/2021.  No evidence of pauses or high degree AV block.  EP input noted- hold off on PPM at this time.     #CAD-  No signs of active ischemia.  Continue ASA and statin.       Over 25 minutes spent on total encounter; more than 50% of the visit was spent counseling and/or coordinating care by the attending physician.      Frank Boyd MD [facility]  Cardiovascular Disease  [phone]

## 2022-04-26 NOTE — CHART NOTE - NSCHARTNOTEFT_GEN_A_CORE
Notified by RN patient's AM FS 41. RN gave patient IV push of 25 grams of 50% dextrose. D10 W running at 50cc/Hr overnight. Patient assessed at bedside. He was awake and alert; He was at baseline mentation (A&Ox1). He was bradycardic to 50s, other vitals signs stable. Finger stick was re-assessed 10 minutes after dextrose administration and was 58. Asked RN to reassess glucose again after 10 minutes at different site, which was 161.  Discussed with Dr. Yuan. Increased D10 W rate to 65cc/hr and removed patient's NPO status. Placed endocrine consult. Discussed with consulting provider, who recommended discharging sliding scale insulin, which was done. Placed lab work for TSH/ free T4, and AM cortisol. Additionally, placed one time activation order for hypoglycemic lab work to be drawn if the patient has finger stick glucose level under 55 again. Will follow results of pending lab work. Will follow up additional endocrine recommendations. Will continue to closely monitor patient's blood glucose status and continue to evaluate the patient.     Surya Pereira PA-C  Department of Medicine  Pager #[phone]
Nutrition follow-up for severe malnutrition.  Patient not in room at time of RD visit, off unit, in cath lab.  Patient was NPO after Midnight 4/21.  Patient was noted with good PO (>75%) intake for 1 meal documented in nursing flowsheets yesterday on 4/21.  PO intake not consistently noted in flowsheets, therefore, unable to assess adequacy of PO intake at this time.  Patient noted to be disoriented, confused; needs to be assisted with feeding.      Endocrinology following patient for hypoglycemia, on D10; per endocrinology, prudent assessment of as to whether patient can be titrated down on the dextrose; goal to optimize PO intake.      PO intake should be consistently recorded for each meal in Nutrition section of nursing flowsheets to best assess for adequacy of PO intake.  Noted with good PO intake at time of initial dietitian evaluation 4/18/22; diet liberalization was previously recommended to help encourage/optimize PO intake; Vital shakes were recommended during that time as well to help optimize intake.  Patient noted with odynodysphagia/dysphagia, therefore, pureed diet consistency / thin liquids was recommended by SLP 4/17/22.     Source: Patient [ ]    Family [ ]     other [ X ] Chart Review     Diet, NPO after Midnight:      NPO Start Date: 21-Apr-2022,   NPO Start Time: 23:59  Except Medications (04-21-22 @ 11:59)  Diet, Pureed (04-18-22 @ 18:14)    Current Weight: 4/19 - 63kgs, no new weights to assess      Pertinent Medications:   atorvastatin  dextrose 10%.  dextrose 5%.  dextrose 5%.  dextrose 5%.  dextrose 50% Injectable  dextrose 50% Injectable  dextrose 50% Injectable  glucagon  Injectable  magnesium sulfate  IVPB  potassium phosphate / sodium phosphate Powder (PHOS-NaK)  senna  tamsulosin    Pertinent Labs:  04-22 Na138 mmol/L Glu 88 mg/dL K+ 4.6 mmol/L Cr  0.76 mg/dL BUN 4 mg/dL<L> 04-22 Phos 2.5 mg/dL 04-22 Alb 2.9 g/dL<L>    CAPILLARY BLOOD GLUCOSE      POCT Blood Glucose.: 70 mg/dL (22 Apr 2022 12:46)  POCT Blood Glucose.: 100 mg/dL (22 Apr 2022 09:24)  POCT Blood Glucose.: 106 mg/dL (22 Apr 2022 06:10)  POCT Blood Glucose.: 85 mg/dL (22 Apr 2022 01:43)  POCT Blood Glucose.: 86 mg/dL (21 Apr 2022 21:21)  POCT Blood Glucose.: 107 mg/dL (21 Apr 2022 17:02)  POCT Blood Glucose.: 113 mg/dL (21 Apr 2022 12:54)    Skin: No pressure injuries, No edema noted per nursing flowsheets    Estimated Needs:   [X] no change since previous assessment  [ ] recalculated:     Previous Nutrition Diagnosis: Severe Malnutrition, ongoing    Nutrition Diagnosis #2: Altered Nutrition Related Labs related to DM as evidenced by Hypoglycemia, endocrinology following.    Additional Recommendations:  1) Please consistently document % PO intake in nutrition section of nursing flowsheets to assess adequacy of PO intake;  2) Assist patient with all meals to help optimize PO intake;   3) Obtain and record current weights;   4) Encourage/offer snacks in between meals to help promote glycemic control and to possibly help prevent hypoglycemia.
Patient last seen by RDN on 4/22, now for malnutrition follow up. Spoke with RN and obtained subjective information from extensive chart review.     Current Diet : Diet, Pureed:   Supplement Feeding Modality:  Oral  Glucerna Shake Cans or Servings Per Day:  1       Frequency:  Three Times a day (04-24-22 @ 11:57)  PO intake:  50-75%     Current Weight: No current weights available  Height (cm): 167.6   Weight (kg): 63   BMI (kg/m2): 22.4     Nutrition Interval Events: Spoke with nursing staff who said that pt has eaten fairly today. He has been drinking some of Glucerna shakes with encouragement. As per Endocrinology, pt is now off IVF with no more hypoglycemic episodes noted. No GI distress at present; pt with fecal incontinence with last BM 4/25. Request obtaining new weight to determine adequacy of PO intake. No edema nor pressure injuries identified. Continue with oral supplementation for additional calories and protein. Pt remains at severe risk for malnutrition based on continued physical findings of severe muscle mass wasting and severe subcutaneous fat store depletion. RDN services to remain available as needed.     __________________ Pertinent Medications__________________   MEDICATIONS  (STANDING):  ampicillin  IVPB 2 Gram(s) IV Intermittent every 4 hours  aspirin enteric coated 81 milliGRAM(s) Oral daily  atorvastatin 20 milliGRAM(s) Oral at bedtime  dextrose 5%. 1000 milliLiter(s) (100 mL/Hr) IV Continuous <Continuous>  dextrose 5%. 1000 milliLiter(s) (50 mL/Hr) IV Continuous <Continuous>  dextrose 50% Injectable 25 Gram(s) IV Push once  dextrose 50% Injectable 12.5 Gram(s) IV Push once  dextrose 50% Injectable 25 Gram(s) IV Push once  glucagon  Injectable 1 milliGRAM(s) IntraMuscular once  heparin   Injectable 5000 Unit(s) SubCutaneous every 8 hours  potassium phosphate / sodium phosphate Powder (PHOS-NaK) 1 Packet(s) Oral three times a day  senna 2 Tablet(s) Oral at bedtime  tamsulosin 0.4 milliGRAM(s) Oral at bedtime    MEDICATIONS  (PRN):  acetaminophen     Tablet .. 650 milliGRAM(s) Oral every 6 hours PRN Temp greater or equal to 38C (100.4F), Mild Pain (1 - 3)  ALBUTerol    90 MICROgram(s) HFA Inhaler 2 Puff(s) Inhalation every 6 hours PRN Shortness of Breath and/or Wheezing  aluminum hydroxide/magnesium hydroxide/simethicone Suspension 30 milliLiter(s) Oral every 4 hours PRN Dyspepsia  dextrose Oral Gel 15 Gram(s) Oral once PRN Blood Glucose LESS THAN 70 milliGRAM(s)/deciliter  melatonin 3 milliGRAM(s) Oral at bedtime PRN Insomnia  ondansetron Injectable 4 milliGRAM(s) IV Push every 8 hours PRN Nausea and/or Vomiting  polyethylene glycol 3350 17 Gram(s) Oral daily PRN Constipation      __________________ Pertinent Labs__________________   04-26 Na134 mmol/L<L> Glu 93 mg/dL K+ 4.2 mmol/L Cr  0.89 mg/dL BUN 8 mg/dL 04-26 Phos 2.3 mg/dL<L> 04-22 Alb 2.9 g/dL<L>      Estimated Needs:   [x] no change since previous assessment      Nutrition Diagnosis: Severe malnutrition  [x] ongoing      Previous Nutrition Diagnosis: Altered GI Function   Nutrition Diagnosis is [ ] ongoing  [x] resolved       Goal(s):  1. Patient to meet > 75% estimated energy needs    Recommendations:   1. Continue Glucerna Therapeutic Nutrition Shake 240mls 3x daily (660kcals, 30g protein).    Monitoring and Evaluation:   1. Monitor weights, labs, BMs, skin integrity, PO intake and edema.  2. RD services to remain available. Request obtaining new weight to assess trend and determine adequacy of PO intake. Continue with oral supplementation for additional calories and protein.
Patient's bedtime fingerstick noted to be 46 and repeat is 56. Recommended RN to initiate hypoglycemic protocol.  Patient is currently on IV fluid D5 W at 75ml/ hour. IV fluid changed to D10 at 50 ml/hour. Will repeat fingerstick and monitor fingerstick Q4 hours.
the pt is off the unit       there is no Hypoglycemia noted, however the pt is on D10 at a rate of 65    when the pt returns from the procedure it will be prudent to assess if the pt can be titrated down on the dextrose (as pt will not be able to go home on dextrose)  optimize PO intake, consider calorie count with nutrition consult     the goal is to titrate down the dextrose and maintain normal glucose with PO intake, if hypoglycemia were to occur please send STAT labs below (have at bedside with labels), get labs first and than treat hypoglycemia with amp of dextrose and resume dextrose drip    Regarding Hypoglycemia - unable to assess for whipple's triad due to altered mental status  suspecting related to poor po intake, encourage po intake. Per wife did not have issue with hypoglycemia prior to admission.  AM cortisol and TFTs normal  FS q 4h  no insulin/correction scale  if hypoglycemia again (< 55) OFF THE DEXTROSE- send BMP, insulin level, c-peptide, pro-insulin, beta hydroxy butyrate        Estrella Pearl MD  Attending Physician   Department of Endocrinology, Diabetes and Metabolism     Pager  [phone] [please provide 10 digit call back number]  MARCELL 9-5  [email or web address]  Nights and weekends: [phone]  Please note that this patient may be followed by a different provider tomorrow.   If no answer or after hours, please contact [phone].  For final dc reccomendations, please call [phone] or page the endocrine fellow on call.
Patient is currently NPO, started on LR 75 cc/hr this morning as per recommended by Dr. Moreno. RN called on 17:45 on FS 68, rechecked 75, rechecked last 81. Started on D5 + NaCl .9% at 75 cc/hr, plan to repeat fingerstick in 15 mins. Pt's AxO x 1 unchanged from baseline, arousable.
Type of Procedure: HARIKA (Transesophageal echocardiogram)  Licensed independent practitioner: José Conrad MD  Assistant: None  Estimated blood loss: None  Specimen removed: None  Preoperative Dx: Bacteremia  Postoperative Dx: Same  Complications: None  Anesthesia type: Sedation by the attending anesthesiologist    Findings:     Normal study. No signs of endocarditis. Previous TV findings likely from calcified chordae.    Full report to follow    José Conrad MD  Chief, Cardiology  [facility]

## 2022-04-26 NOTE — CHART NOTE - NSCHARTNOTESELECT_GEN_ALL_CORE
Brief HARIKA Procedure Note/Event Note
Event Note
Follow-Up/Nutrition Services
Nutrition Follow Up/Nutrition Services
endocrine/Event Note

## 2022-04-26 NOTE — PROGRESS NOTE ADULT - SUBJECTIVE AND OBJECTIVE BOX
[facility] Division of Hospital Medicine  Preet Nguyen MD  Pager [phone]      Patient is a 81y old  Male who presents with a chief complaint of Dysphagia/odynophagia (26 Apr 2022 07:56)      SUBJECTIVE / OVERNIGHT EVENTS: Unable to obtain due to dementia    MEDICATIONS  (STANDING):  ampicillin  IVPB 2 Gram(s) IV Intermittent every 4 hours  aspirin enteric coated 81 milliGRAM(s) Oral daily  atorvastatin 20 milliGRAM(s) Oral at bedtime  dextrose 5%. 1000 milliLiter(s) (100 mL/Hr) IV Continuous <Continuous>  dextrose 5%. 1000 milliLiter(s) (50 mL/Hr) IV Continuous <Continuous>  dextrose 50% Injectable 25 Gram(s) IV Push once  dextrose 50% Injectable 12.5 Gram(s) IV Push once  dextrose 50% Injectable 25 Gram(s) IV Push once  glucagon  Injectable 1 milliGRAM(s) IntraMuscular once  heparin   Injectable 5000 Unit(s) SubCutaneous every 8 hours  potassium phosphate / sodium phosphate Powder (PHOS-NaK) 1 Packet(s) Oral three times a day  senna 2 Tablet(s) Oral at bedtime  tamsulosin 0.4 milliGRAM(s) Oral at bedtime    MEDICATIONS  (PRN):  acetaminophen     Tablet .. 650 milliGRAM(s) Oral every 6 hours PRN Temp greater or equal to 38C (100.4F), Mild Pain (1 - 3)  ALBUTerol    90 MICROgram(s) HFA Inhaler 2 Puff(s) Inhalation every 6 hours PRN Shortness of Breath and/or Wheezing  aluminum hydroxide/magnesium hydroxide/simethicone Suspension 30 milliLiter(s) Oral every 4 hours PRN Dyspepsia  dextrose Oral Gel 15 Gram(s) Oral once PRN Blood Glucose LESS THAN 70 milliGRAM(s)/deciliter  melatonin 3 milliGRAM(s) Oral at bedtime PRN Insomnia  ondansetron Injectable 4 milliGRAM(s) IV Push every 8 hours PRN Nausea and/or Vomiting  polyethylene glycol 3350 17 Gram(s) Oral daily PRN Constipation      CAPILLARY BLOOD GLUCOSE      POCT Blood Glucose.: 84 mg/dL (26 Apr 2022 09:03)  POCT Blood Glucose.: 96 mg/dL (26 Apr 2022 06:12)  POCT Blood Glucose.: 89 mg/dL (26 Apr 2022 02:02)  POCT Blood Glucose.: 103 mg/dL (25 Apr 2022 22:29)  POCT Blood Glucose.: 98 mg/dL (25 Apr 2022 17:47)  POCT Blood Glucose.: 96 mg/dL (25 Apr 2022 15:24)    I&O's Summary    25 Apr 2022 07:01  -  26 Apr 2022 07:00  --------------------------------------------------------  IN: 0 mL / OUT: 800 mL / NET: -800 mL    26 Apr 2022 07:01  -  26 Apr 2022 13:02  --------------------------------------------------------  IN: 0 mL / OUT: 700 mL / NET: -700 mL        PHYSICAL EXAM:  Vital Signs Last 24 Hrs  T(C): 36.9 (26 Apr 2022 05:44), Max: 36.9 (26 Apr 2022 05:44)  T(F): 98.5 (26 Apr 2022 05:44), Max: 98.5 (26 Apr 2022 05:44)  HR: 56 (26 Apr 2022 05:44) (56 - 60)  BP: 130/81 (26 Apr 2022 05:44) (120/64 - 134/76)  BP(mean): --  RR: 18 (26 Apr 2022 05:44) (18 - 18)  SpO2: 100% (26 Apr 2022 05:44) (98% - 100%)  CONSTITUTIONAL: NAD  EYES: conjunctiva and sclera clear  ENMT: mmm  NECK: Supple,  RESPIRATORY: Normal respiratory effort; lungs are clear to auscultation bilaterally  CARDIOVASCULAR: Regular rate and rhythm, + S1 and S2  ABDOMEN: Nontender to palpation, normoactive bowel sounds, no rebound/guarding  MUSCULOSKELETAL:  no clubbing or cyanosis of digits;   PSYCH: Alert not oriented   NEUROLOGY: no gross deficits   SKIN: No rashes;     LABS:                        10.3   3.62  )-----------( 195      ( 26 Apr 2022 10:47 )             31.2     04-26    134<L>  |  102  |  8   ----------------------------<  93  4.2   |  20<L>  |  0.89    Ca    9.7      26 Apr 2022 10:47  Phos  2.3     04-26  Mg     2.00     04-26

## 2022-04-27 ENCOUNTER — APPOINTMENT (OUTPATIENT)
Dept: CT IMAGING | Facility: CLINIC | Age: 82
End: 2022-04-27

## 2022-04-27 LAB
ANION GAP SERPL CALC-SCNC: 7 MMOL/L — SIGNIFICANT CHANGE UP (ref 7–14)
BASOPHILS # BLD AUTO: 0 K/UL — SIGNIFICANT CHANGE UP (ref 0–0.2)
BASOPHILS NFR BLD AUTO: 0 % — SIGNIFICANT CHANGE UP (ref 0–2)
BUN SERPL-MCNC: 9 MG/DL — SIGNIFICANT CHANGE UP (ref 7–23)
BURR CELLS BLD QL SMEAR: PRESENT — SIGNIFICANT CHANGE UP
CALCIUM SERPL-MCNC: 9.1 MG/DL — SIGNIFICANT CHANGE UP (ref 8.4–10.5)
CHLORIDE SERPL-SCNC: 106 MMOL/L — SIGNIFICANT CHANGE UP (ref 98–107)
CO2 SERPL-SCNC: 25 MMOL/L — SIGNIFICANT CHANGE UP (ref 22–31)
CREAT SERPL-MCNC: 0.86 MG/DL — SIGNIFICANT CHANGE UP (ref 0.5–1.3)
EGFR: 86 ML/MIN/1.73M2 — SIGNIFICANT CHANGE UP
EOSINOPHIL # BLD AUTO: 0.17 K/UL — SIGNIFICANT CHANGE UP (ref 0–0.5)
EOSINOPHIL NFR BLD AUTO: 5.3 % — SIGNIFICANT CHANGE UP (ref 0–6)
GIANT PLATELETS BLD QL SMEAR: PRESENT — SIGNIFICANT CHANGE UP
GLUCOSE SERPL-MCNC: 74 MG/DL — SIGNIFICANT CHANGE UP (ref 70–99)
HCT VFR BLD CALC: 27.5 % — LOW (ref 39–50)
HGB BLD-MCNC: 9 G/DL — LOW (ref 13–17)
IANC: 0.99 K/UL — LOW (ref 1.8–7.4)
LYMPHOCYTES # BLD AUTO: 1.62 K/UL — SIGNIFICANT CHANGE UP (ref 1–3.3)
LYMPHOCYTES # BLD AUTO: 50.4 % — HIGH (ref 13–44)
MAGNESIUM SERPL-MCNC: 1.9 MG/DL — SIGNIFICANT CHANGE UP (ref 1.6–2.6)
MANUAL SMEAR VERIFICATION: SIGNIFICANT CHANGE UP
MCHC RBC-ENTMCNC: 30 PG — SIGNIFICANT CHANGE UP (ref 27–34)
MCHC RBC-ENTMCNC: 32.7 GM/DL — SIGNIFICANT CHANGE UP (ref 32–36)
MCV RBC AUTO: 91.7 FL — SIGNIFICANT CHANGE UP (ref 80–100)
MONOCYTES # BLD AUTO: 0.23 K/UL — SIGNIFICANT CHANGE UP (ref 0–0.9)
MONOCYTES NFR BLD AUTO: 7.1 % — SIGNIFICANT CHANGE UP (ref 2–14)
NEUTROPHILS # BLD AUTO: 1.14 K/UL — LOW (ref 1.8–7.4)
NEUTROPHILS NFR BLD AUTO: 34.5 % — LOW (ref 43–77)
NEUTS BAND # BLD: 0.9 % — SIGNIFICANT CHANGE UP (ref 0–6)
PHOSPHATE SERPL-MCNC: 2.4 MG/DL — LOW (ref 2.5–4.5)
PLAT MORPH BLD: NORMAL — SIGNIFICANT CHANGE UP
PLATELET # BLD AUTO: 187 K/UL — SIGNIFICANT CHANGE UP (ref 150–400)
PLATELET COUNT - ESTIMATE: NORMAL — SIGNIFICANT CHANGE UP
POIKILOCYTOSIS BLD QL AUTO: SIGNIFICANT CHANGE UP
POTASSIUM SERPL-MCNC: 3.7 MMOL/L — SIGNIFICANT CHANGE UP (ref 3.5–5.3)
POTASSIUM SERPL-SCNC: 3.7 MMOL/L — SIGNIFICANT CHANGE UP (ref 3.5–5.3)
RBC # BLD: 3 M/UL — LOW (ref 4.2–5.8)
RBC # FLD: 14.5 % — SIGNIFICANT CHANGE UP (ref 10.3–14.5)
RBC BLD AUTO: ABNORMAL
SARS-COV-2 RNA SPEC QL NAA+PROBE: SIGNIFICANT CHANGE UP
SMUDGE CELLS # BLD: PRESENT — SIGNIFICANT CHANGE UP
SODIUM SERPL-SCNC: 138 MMOL/L — SIGNIFICANT CHANGE UP (ref 135–145)
VARIANT LYMPHS # BLD: 1.8 % — SIGNIFICANT CHANGE UP (ref 0–6)
WBC # BLD: 3.21 K/UL — LOW (ref 3.8–10.5)
WBC # FLD AUTO: 3.21 K/UL — LOW (ref 3.8–10.5)

## 2022-04-27 PROCEDURE — 99232 SBSQ HOSP IP/OBS MODERATE 35: CPT

## 2022-04-27 RX ORDER — AMPICILLIN TRIHYDRATE 250 MG
2 CAPSULE ORAL EVERY 6 HOURS
Refills: 0 | Status: DISCONTINUED | OUTPATIENT
Start: 2022-04-27 | End: 2022-04-29

## 2022-04-27 RX ADMIN — ATORVASTATIN CALCIUM 20 MILLIGRAM(S): 80 TABLET, FILM COATED ORAL at 21:18

## 2022-04-27 RX ADMIN — Medication 216 GRAM(S): at 06:16

## 2022-04-27 RX ADMIN — Medication 216 GRAM(S): at 21:17

## 2022-04-27 RX ADMIN — Medication 3 MILLIGRAM(S): at 21:19

## 2022-04-27 RX ADMIN — Medication 216 GRAM(S): at 02:24

## 2022-04-27 RX ADMIN — Medication 1 PACKET(S): at 06:17

## 2022-04-27 RX ADMIN — Medication 216 GRAM(S): at 14:45

## 2022-04-27 RX ADMIN — HEPARIN SODIUM 5000 UNIT(S): 5000 INJECTION INTRAVENOUS; SUBCUTANEOUS at 21:19

## 2022-04-27 RX ADMIN — TAMSULOSIN HYDROCHLORIDE 0.4 MILLIGRAM(S): 0.4 CAPSULE ORAL at 21:18

## 2022-04-27 RX ADMIN — HEPARIN SODIUM 5000 UNIT(S): 5000 INJECTION INTRAVENOUS; SUBCUTANEOUS at 06:17

## 2022-04-27 RX ADMIN — Medication 81 MILLIGRAM(S): at 12:53

## 2022-04-27 RX ADMIN — Medication 216 GRAM(S): at 09:59

## 2022-04-27 RX ADMIN — SENNA PLUS 2 TABLET(S): 8.6 TABLET ORAL at 21:18

## 2022-04-27 RX ADMIN — HEPARIN SODIUM 5000 UNIT(S): 5000 INJECTION INTRAVENOUS; SUBCUTANEOUS at 14:50

## 2022-04-27 NOTE — PROGRESS NOTE ADULT - SUBJECTIVE AND OBJECTIVE BOX
Follow Up:  enterococcus fecalis bacteremia    Interval History/ROS:  awakens to name      Allergies  No Known Allergies        ANTIMICROBIALS:  ampicillin  IVPB 2 every 4 hours    MEDICATIONS  (STANDING):  acetaminophen     Tablet .. 650 every 6 hours PRN  ALBUTerol    90 MICROgram(s) HFA Inhaler 2 every 6 hours PRN  aluminum hydroxide/magnesium hydroxide/simethicone Suspension 30 every 4 hours PRN  aspirin enteric coated 81 daily  atorvastatin 20 at bedtime  dextrose 50% Injectable 25 once  dextrose 50% Injectable 12.5 once  dextrose 50% Injectable 25 once  dextrose Oral Gel 15 once PRN  glucagon  Injectable 1 once  heparin   Injectable 5000 every 8 hours  melatonin 3 at bedtime PRN  ondansetron Injectable 4 every 8 hours PRN  polyethylene glycol 3350 17 daily PRN  senna 2 at bedtime  tamsulosin 0.4 at bedtime    Vital Signs Last 24 Hrs  T(F): 97.7 (04-27-22 @ 12:56), Max: 98 (04-27-22 @ 05:40)  HR: 50 (04-27-22 @ 12:56)  BP: 116/68 (04-27-22 @ 12:56)  RR: 18 (04-27-22 @ 12:56)  SpO2: 100% (04-27-22 @ 12:56) (100% - 100%)    PHYSICAL EXAM:  Constitutional: Not in acute distress  Eyes: No icterus, right eye droop  Oral cavity: Clear, no lesion  RS: Chest clear   CVS: S1, S2   scar left chest  Abdomen: Soft. No guarding/rigidity/tenderness.  : external catheter  Extremities: contracted right arm, right leg weak  Skin: No rash  Neuro: awakens to name                            9.0    3.21  )-----------( 187      ( 27 Apr 2022 07:27 )             27.5 04-27    138  |  106  |  9   ----------------------------<  74  3.7   |  25  |  0.86  Ca    9.1      27 Apr 2022 07:27Phos  2.4     04-27Mg     1.90     04-27          MICROBIOLOGY:    blood culture 4/17  no growth     .Blood Blood-Peripheral  04-15-22   Growth in aerobic and anaerobic bottles: Enterococcus faecalis  ***Blood Panel PCR results on this specimen are available  approximately 3 hours after the Gram stain result.***  Gram stain, PCR, and/or culture results may not always  correspond dueto difference in methodologies.  ************************************************************  This PCR assay was performed by multiplex PCR. This  Assay tests for 66 bacterial and resistance gene targets.  Please refer to the [facility] test directory  at [email or web address] for details.  --  Blood Culture PCR      Clean Catch Clean Catch (Midstream)  04-15-22   50,000 - 99,000 CFU/mL Candida albicans  --  --      RADIOLOGY:    ra< from: CT Abdomen and Pelvis w/ IV Cont (04.09.22 @ 10:50) >    ACC: [id number] EXAM:  CT ABDOMEN AND PELVIS IC                          PROCEDURE DATE:  04/09/2022          INTERPRETATION:  CLINICAL INFORMATION: Abdominal pain.    COMPARISON: CT the abdomen and pelvis dated 222.    CONTRAST/COMPLICATIONS:  IV Contrast: Omnipaque 350  90 cc administered   10 cc discarded  Oral Contrast: NONE  Complications: None reported at time of study completion    PROCEDURE:  CT of the abdomen and pelvis was performed. Coronal and sagittal   reformats were performed.    FINDINGS:    LOWER CHEST: Dependent atelectasis, scarring, and groundglass, right   greater than left. Distal airways mucus impaction right lower lobe. No   visualized pleural effusion. Coronary artery calcification.    LIVER: Liver size within normal limits. Main portal vein is patent.  BILE DUCTS: No biliary distention  GALLBLADDER: Unremarkable  SPLEEN: Normal size  PANCREAS: No main ductal dilatation. Mild fatty atrophy  ADRENALS: Unchanged appearance of indeterminate left adrenal nodule.  KIDNEYS/URETERS: No hydronephrosis. Bilateral renal cysts and   subcentimeter hypodensities too small to characterize. Right upper pole   cyst with apparent internal septations measuring 2.7 cm, stable on   multiple prior studies dating back to 12/16/2021.    BLADDER: Underdistended urinary bladder.  REPRODUCTIVE ORGANS: Enlarged prostate.    BOWEL: Limited evaluation the absence of oral contrast. Stomach is   partially distended with air. No small bowel distention. Appendix is not   visualized. Largeamount stool throughout the colon. The rectum is   distended to 8 cm and demonstrates mild wall thickening. Mild perirectal   fat stranding.  PERITONEUM: No ascites or free air.  VESSELS: No aneurysm of the abdominal aorta. Aortoiliac atheromatous   changes. Ectasia of the common iliac arteries.  RETROPERITONEUM/LYMPH NODES: No enlarged lymph nodes of the   abdomen/pelvis.  ABDOMINAL WALL: Focal infiltration of subcutaneous fat overlying the   coccyx just to the right midline.  BONES: Multilevel degenerative changes.    IMPRESSION:    Prominent stool burden of the colon. Rectal fecal impaction and concern   for stercoral colitis. No free air.    Focal infiltration of the subcutaneous fat overlying the coccyx.   Correlate with direct visualization for potential developing decubitus   ulcer.    Dependent atelectasis/scarring and groundglass of the visualized thorax,   right greater than left. Distal airways mucus impaction right lower lobe.   Correlate for any signs and symptoms of superimposed infection.    Coronary artery calcification.    --- End of Report ---          APPLE MONTES M.D., RADIOLOGY FELLOW  This document has been electronically signed.  IZABEL HANNA M.D., ATTENDING RADIOLOGIST  This document has been electronically signed. Apr 9 2022 11:20AM    < end of copied text >    < from: Transthoracic Echocardiogram (04.20.22 @ 08:52) >  CONCLUSIONS:  1. Normal left ventricular systolic function. No segmental  wall motion abnormalities.  2. Normal right ventricular size and function.  3. Normal tricuspid valve. 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 It is at  least 1.5 cm x 0.4 cm.  This may represent part of a device  wire, vegetation, or both.  Recommend HARIKA to evaluate tricuspid valve mass more fully,    < end of copied text >    c< from: Transesophageal Echocardiogram w/o TTE (04.22.22 @ 10:46) >    Patient name: DESHAWN TIWARI  YOB: 1940   Age: 81 (M)   MR#: [id number]  Study Date: 4/22/2022  Location: [place] [address] HARIKA OnlySonographer: José Conrda MD  Study quality: Technically good  Referring Physician: Rafael Olivares MD  Blood Pressure: 180/100 mmHg  Height: 168 cm  Weight: 63 kg  BSA: 1.7 m2  Heart Rate: 70 mmHg  ------------------------------------------------------------------------  PROCEDURE: Transesophageal (HARIKA) was performed in the  echocardiography laboratory.  Informed consent was first  obtained for HARIKA.   The patient was sedated by the  anesthesiologist service(reported separately).   The  procedure was monitored with automatic blood pressure  monitoring, ECG tracings and pulse oximetry.  Gag reflex  was abolishedwith topical Cetacaine and the  transesophageal probe was placed in the esophagus posterior  to the heart without complications.  The patient tolerated  the procedure well.  INDICATION: Bacteremia (R78.81)  ------------------------------------------------------------------------  OBSERVATIONS:  Mitral Valve: Mitral annular calcification, otherwise  normal mitral valve. Mild mitral regurgitation.  Aortic Root: Normal aortic root, aortic arch and descending  thoracic aorta.  Aortic Valve: Calcifiedtrileaflet aortic valve with normal  opening.  Left Atrium: Normal left atrium.  Left Ventricle: Normal left ventricular systolic function.  No segmental wall motion abnormalities. Normal left  ventricular internal dimensions and wall thicknesses.  Right Heart: Normal right atrium. Normal right ventricular  size and function. Normal tricuspid valve. Normal pulmonic  valve.  Pericardium/PleuraNormal pericardium with no pericardial  effusion.  ------------------------------------------------------------------------  CONCLUSIONS:  1. Mitral annular calcification, otherwise normal mitral  valve. Mild mitral regurgitation.  2. Calcified trileaflet aortic valve with normal opening.  3. Normal left ventricular systolic function. No segmental  wall motion abnormalities.  4. Normal right ventricular size and function.  5. Normal tricuspid valve.  6. Normal pulmonic valve.  No echocardiographic evidence of endocarditis on this  study.  ------------------------------------------------------------------------  Confirmed on  4/22/2022 - 14:29:11 by José Conrad M.D.  ------------------------------------------------------------------------    < end of copied text >

## 2022-04-27 NOTE — PROGRESS NOTE ADULT - PROBLEM SELECTOR PLAN 7
-DVT ppx: sq heparin  -Diet: pureed      Dispo- Will need placement to complete IV abx. Discussed with SW on Friday. Will have SW assist w/ process. Await stability of FS prior to DC.    Communication:  Spoke w/ wife Nerissa 4/25, list of facilities provided, awaiting authorization

## 2022-04-27 NOTE — PROGRESS NOTE ADULT - SUBJECTIVE AND OBJECTIVE BOX
[facility] Division of Hospital Medicine  Preet Nguyen MD  Pager [phone]      Patient is a 81y old  Male who presents with a chief complaint of Dysphagia/odynophagia (27 Apr 2022 11:48)      SUBJECTIVE / OVERNIGHT EVENTS: Unable to obtain due to dementia    MEDICATIONS  (STANDING):  ampicillin  IVPB 2 Gram(s) IV Intermittent every 4 hours  aspirin enteric coated 81 milliGRAM(s) Oral daily  atorvastatin 20 milliGRAM(s) Oral at bedtime  dextrose 5%. 1000 milliLiter(s) (50 mL/Hr) IV Continuous <Continuous>  dextrose 5%. 1000 milliLiter(s) (100 mL/Hr) IV Continuous <Continuous>  dextrose 50% Injectable 25 Gram(s) IV Push once  dextrose 50% Injectable 12.5 Gram(s) IV Push once  dextrose 50% Injectable 25 Gram(s) IV Push once  glucagon  Injectable 1 milliGRAM(s) IntraMuscular once  heparin   Injectable 5000 Unit(s) SubCutaneous every 8 hours  senna 2 Tablet(s) Oral at bedtime  tamsulosin 0.4 milliGRAM(s) Oral at bedtime    MEDICATIONS  (PRN):  acetaminophen     Tablet .. 650 milliGRAM(s) Oral every 6 hours PRN Temp greater or equal to 38C (100.4F), Mild Pain (1 - 3)  ALBUTerol    90 MICROgram(s) HFA Inhaler 2 Puff(s) Inhalation every 6 hours PRN Shortness of Breath and/or Wheezing  aluminum hydroxide/magnesium hydroxide/simethicone Suspension 30 milliLiter(s) Oral every 4 hours PRN Dyspepsia  dextrose Oral Gel 15 Gram(s) Oral once PRN Blood Glucose LESS THAN 70 milliGRAM(s)/deciliter  melatonin 3 milliGRAM(s) Oral at bedtime PRN Insomnia  ondansetron Injectable 4 milliGRAM(s) IV Push every 8 hours PRN Nausea and/or Vomiting  polyethylene glycol 3350 17 Gram(s) Oral daily PRN Constipation      CAPILLARY BLOOD GLUCOSE  73 (27 Apr 2022 09:00)      POCT Blood Glucose.: 99 mg/dL (27 Apr 2022 12:10)  POCT Blood Glucose.: 73 mg/dL (27 Apr 2022 09:12)  POCT Blood Glucose.: 97 mg/dL (26 Apr 2022 22:38)  POCT Blood Glucose.: 121 mg/dL (26 Apr 2022 17:29)    I&O's Summary    26 Apr 2022 07:01  -  27 Apr 2022 07:00  --------------------------------------------------------  IN: 750 mL / OUT: 1175 mL / NET: -425 mL        PHYSICAL EXAM:  Vital Signs Last 24 Hrs  T(C): 36.5 (27 Apr 2022 12:56), Max: 36.7 (27 Apr 2022 05:40)  T(F): 97.7 (27 Apr 2022 12:56), Max: 98 (27 Apr 2022 05:40)  HR: 50 (27 Apr 2022 12:56) (50 - 63)  BP: 116/68 (27 Apr 2022 12:56) (116/63 - 129/70)  BP(mean): --  RR: 18 (27 Apr 2022 12:56) (18 - 18)  SpO2: 100% (27 Apr 2022 12:56) (100% - 100%)  CONSTITUTIONAL: NAD  EYES: conjunctiva and sclera clear  ENMT: mmm  NECK: Supple,  RESPIRATORY: Normal respiratory effort; lungs are clear to auscultation bilaterally  CARDIOVASCULAR: Regular rate and rhythm, + S1 and S2  ABDOMEN: Nontender to palpation, normoactive bowel sounds, no rebound/guarding  MUSCULOSKELETAL:  no clubbing or cyanosis of digits;   PSYCH: Alert not oriented  NEUROLOGY: no gross deficits   SKIN: No rashes;     LABS:                        9.0    3.21  )-----------( 187      ( 27 Apr 2022 07:27 )             27.5     04-27    138  |  106  |  9   ----------------------------<  74  3.7   |  25  |  0.86    Ca    9.1      27 Apr 2022 07:27  Phos  2.4     04-27  Mg     1.90     04-27

## 2022-04-27 NOTE — PROGRESS NOTE ADULT - SUBJECTIVE AND OBJECTIVE BOX
Cardiovascular Disease Progress Note    Overnight events: No acute events overnight.  Mr. Garcia is in no distress.   Otherwise review of systems negative    Objective Findings:  T(C): 36.7 (04-27-22 @ 05:40), Max: 36.7 (04-27-22 @ 05:40)  HR: 58 (04-27-22 @ 05:40) (58 - 63)  BP: 129/70 (04-27-22 @ 05:40) (116/63 - 129/70)  RR: 18 (04-27-22 @ 05:40) (18 - 18)  SpO2: 100% (04-27-22 @ 05:40) (100% - 100%)  Wt(kg): --  Daily     Daily       Physical Exam:  Gen: NAD; Patient resting comfortably  HEENT: EOMI, Normocephalic/ atraumatic  CV: RRR, normal S1 + S2, no m/r/g  Lungs:  Normal respiratory effort; clear to auscultation bilaterally  Abd: soft, non-tender; bowel sounds present  Ext: No edema; warm and well perfused    Telemetry: Sinus; occasional PVDs    Laboratory Data:                        9.0    3.21  )-----------( 187      ( 27 Apr 2022 07:27 )             27.5     04-27    138  |  106  |  9   ----------------------------<  74  3.7   |  25  |  0.86    Ca    9.1      27 Apr 2022 07:27  Phos  2.4     04-27  Mg     1.90     04-27                Inpatient Medications:  MEDICATIONS  (STANDING):  ampicillin  IVPB 2 Gram(s) IV Intermittent every 4 hours  aspirin enteric coated 81 milliGRAM(s) Oral daily  atorvastatin 20 milliGRAM(s) Oral at bedtime  dextrose 5%. 1000 milliLiter(s) (50 mL/Hr) IV Continuous <Continuous>  dextrose 5%. 1000 milliLiter(s) (100 mL/Hr) IV Continuous <Continuous>  dextrose 50% Injectable 25 Gram(s) IV Push once  dextrose 50% Injectable 12.5 Gram(s) IV Push once  dextrose 50% Injectable 25 Gram(s) IV Push once  glucagon  Injectable 1 milliGRAM(s) IntraMuscular once  heparin   Injectable 5000 Unit(s) SubCutaneous every 8 hours  senna 2 Tablet(s) Oral at bedtime  tamsulosin 0.4 milliGRAM(s) Oral at bedtime      Assessment: 81 year old man with prior endocarditis s/p PPM extraction in 12/2021, HTN, HLD, CAD s/p PCI and bedbound noted to have sinus bradycardia and sepsis bacteremia with concern for endocarditis.     Plan of Care:    #History of endocarditis-  Bacteremia noted on this admission.   HARIKA with no evidence of endocarditis or retained foreign material.  ABx duration as per ID.     #Sinus bradycardia-  S/P PPM extraction for endocarditis in 12/2021.  No evidence of pauses or high degree AV block.  EP input noted- hold off on PPM at this time.     #CAD-  No signs of active ischemia.  Continue ASA and statin.     #ACP (advance care planning)-  Advanced care planning was addressed.   No indication for invasive cardiac procedures at this time.       Over 25 minutes spent on total encounter; more than 50% of the visit was spent counseling and/or coordinating care by the attending physician.      Frank Boyd MD [facility]  Cardiovascular Disease  [phone]

## 2022-04-27 NOTE — PROGRESS NOTE ADULT - ASSESSMENT
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, s/p PPM extraction and 6 weeks of vancomycin and ceftriaxone  Had abdominal pain and CT 4/9 showed prominent stool burden and possible stercoral colitis; possible sacral ulcer, and atelectasis  UA WBC > 50, UCx candida  BCx showed 3/4 bottles of enterococcus bacteremia  Sacral area with skin loss but no acute ulceration or abscess formation  Bacteremia cleared    TTE possible vegetation vs retained wire  HARIKA no evidence of endocarditis      IMPRESSION:  Enterococcus bacteremia  Cleared bacteremia  Abnormal TTE;  however HARIKA no evidence of endocarditis       RECOMMENDATIONS:    - can decrease  ampicillin 2 gm iv q 6 h     -  duration -->  5/1 then obtain surveillance blood cultures to assure clearance of bacteremia

## 2022-04-28 ENCOUNTER — TRANSCRIPTION ENCOUNTER (OUTPATIENT)
Age: 82
End: 2022-04-28

## 2022-04-28 LAB
ANION GAP SERPL CALC-SCNC: 11 MMOL/L — SIGNIFICANT CHANGE UP (ref 7–14)
BASOPHILS # BLD AUTO: 0.04 K/UL — SIGNIFICANT CHANGE UP (ref 0–0.2)
BASOPHILS NFR BLD AUTO: 1.1 % — SIGNIFICANT CHANGE UP (ref 0–2)
BUN SERPL-MCNC: 11 MG/DL — SIGNIFICANT CHANGE UP (ref 7–23)
CALCIUM SERPL-MCNC: 9.6 MG/DL — SIGNIFICANT CHANGE UP (ref 8.4–10.5)
CHLORIDE SERPL-SCNC: 103 MMOL/L — SIGNIFICANT CHANGE UP (ref 98–107)
CO2 SERPL-SCNC: 24 MMOL/L — SIGNIFICANT CHANGE UP (ref 22–31)
CREAT SERPL-MCNC: 0.86 MG/DL — SIGNIFICANT CHANGE UP (ref 0.5–1.3)
EGFR: 86 ML/MIN/1.73M2 — SIGNIFICANT CHANGE UP
EOSINOPHIL # BLD AUTO: 0.21 K/UL — SIGNIFICANT CHANGE UP (ref 0–0.5)
EOSINOPHIL NFR BLD AUTO: 5.7 % — SIGNIFICANT CHANGE UP (ref 0–6)
GLUCOSE SERPL-MCNC: 80 MG/DL — SIGNIFICANT CHANGE UP (ref 70–99)
HCT VFR BLD CALC: 30.8 % — LOW (ref 39–50)
HGB BLD-MCNC: 10.2 G/DL — LOW (ref 13–17)
IANC: 1.42 K/UL — LOW (ref 1.8–7.4)
IMM GRANULOCYTES NFR BLD AUTO: 0.3 % — SIGNIFICANT CHANGE UP (ref 0–1.5)
LYMPHOCYTES # BLD AUTO: 1.55 K/UL — SIGNIFICANT CHANGE UP (ref 1–3.3)
LYMPHOCYTES # BLD AUTO: 42.2 % — SIGNIFICANT CHANGE UP (ref 13–44)
MAGNESIUM SERPL-MCNC: 1.9 MG/DL — SIGNIFICANT CHANGE UP (ref 1.6–2.6)
MCHC RBC-ENTMCNC: 30.5 PG — SIGNIFICANT CHANGE UP (ref 27–34)
MCHC RBC-ENTMCNC: 33.1 GM/DL — SIGNIFICANT CHANGE UP (ref 32–36)
MCV RBC AUTO: 92.2 FL — SIGNIFICANT CHANGE UP (ref 80–100)
MONOCYTES # BLD AUTO: 0.44 K/UL — SIGNIFICANT CHANGE UP (ref 0–0.9)
MONOCYTES NFR BLD AUTO: 12 % — SIGNIFICANT CHANGE UP (ref 2–14)
NEUTROPHILS # BLD AUTO: 1.42 K/UL — LOW (ref 1.8–7.4)
NEUTROPHILS NFR BLD AUTO: 38.7 % — LOW (ref 43–77)
NRBC # BLD: 0 /100 WBCS — SIGNIFICANT CHANGE UP
NRBC # FLD: 0 K/UL — SIGNIFICANT CHANGE UP
PHOSPHATE SERPL-MCNC: 2.4 MG/DL — LOW (ref 2.5–4.5)
PLATELET # BLD AUTO: 226 K/UL — SIGNIFICANT CHANGE UP (ref 150–400)
POTASSIUM SERPL-MCNC: 4.2 MMOL/L — SIGNIFICANT CHANGE UP (ref 3.5–5.3)
POTASSIUM SERPL-SCNC: 4.2 MMOL/L — SIGNIFICANT CHANGE UP (ref 3.5–5.3)
RBC # BLD: 3.34 M/UL — LOW (ref 4.2–5.8)
RBC # FLD: 14.6 % — HIGH (ref 10.3–14.5)
SODIUM SERPL-SCNC: 138 MMOL/L — SIGNIFICANT CHANGE UP (ref 135–145)
WBC # BLD: 3.67 K/UL — LOW (ref 3.8–10.5)
WBC # FLD AUTO: 3.67 K/UL — LOW (ref 3.8–10.5)

## 2022-04-28 PROCEDURE — 99232 SBSQ HOSP IP/OBS MODERATE 35: CPT

## 2022-04-28 RX ORDER — SODIUM,POTASSIUM PHOSPHATES 278-250MG
1 POWDER IN PACKET (EA) ORAL ONCE
Refills: 0 | Status: COMPLETED | OUTPATIENT
Start: 2022-04-28 | End: 2022-04-28

## 2022-04-28 RX ORDER — LANOLIN ALCOHOL/MO/W.PET/CERES
6 CREAM (GRAM) TOPICAL AT BEDTIME
Refills: 0 | Status: DISCONTINUED | OUTPATIENT
Start: 2022-04-28 | End: 2022-04-29

## 2022-04-28 RX ORDER — SENNA PLUS 8.6 MG/1
2 TABLET ORAL
Qty: 0 | Refills: 0 | DISCHARGE
Start: 2022-04-28

## 2022-04-28 RX ORDER — POLYETHYLENE GLYCOL 3350 17 G/17G
17 POWDER, FOR SOLUTION ORAL
Qty: 0 | Refills: 0 | DISCHARGE
Start: 2022-04-28

## 2022-04-28 RX ORDER — METOPROLOL TARTRATE 50 MG
1 TABLET ORAL
Qty: 0 | Refills: 0 | DISCHARGE

## 2022-04-28 RX ORDER — ACETAMINOPHEN 500 MG
2 TABLET ORAL
Qty: 0 | Refills: 0 | DISCHARGE
Start: 2022-04-28

## 2022-04-28 RX ADMIN — Medication 216 GRAM(S): at 13:37

## 2022-04-28 RX ADMIN — HEPARIN SODIUM 5000 UNIT(S): 5000 INJECTION INTRAVENOUS; SUBCUTANEOUS at 06:28

## 2022-04-28 RX ADMIN — Medication 216 GRAM(S): at 02:05

## 2022-04-28 RX ADMIN — Medication 216 GRAM(S): at 21:05

## 2022-04-28 RX ADMIN — Medication 216 GRAM(S): at 08:46

## 2022-04-28 RX ADMIN — ATORVASTATIN CALCIUM 20 MILLIGRAM(S): 80 TABLET, FILM COATED ORAL at 21:06

## 2022-04-28 RX ADMIN — TAMSULOSIN HYDROCHLORIDE 0.4 MILLIGRAM(S): 0.4 CAPSULE ORAL at 21:06

## 2022-04-28 RX ADMIN — Medication 81 MILLIGRAM(S): at 13:37

## 2022-04-28 RX ADMIN — HEPARIN SODIUM 5000 UNIT(S): 5000 INJECTION INTRAVENOUS; SUBCUTANEOUS at 21:06

## 2022-04-28 RX ADMIN — HEPARIN SODIUM 5000 UNIT(S): 5000 INJECTION INTRAVENOUS; SUBCUTANEOUS at 13:40

## 2022-04-28 RX ADMIN — Medication 1 TABLET(S): at 13:40

## 2022-04-28 RX ADMIN — SENNA PLUS 2 TABLET(S): 8.6 TABLET ORAL at 21:06

## 2022-04-28 NOTE — DISCHARGE NOTE PROVIDER - NSDCCPCAREPLAN_GEN_ALL_CORE_FT
PRINCIPAL DISCHARGE DIAGNOSIS  Diagnosis: Enterococcal bacteremia  Assessment and Plan of Treatment:       SECONDARY DISCHARGE DIAGNOSES  Diagnosis: T2DM (type 2 diabetes mellitus)  Assessment and Plan of Treatment:     Diagnosis: HTN (hypertension)  Assessment and Plan of Treatment:     Diagnosis: Odynophagia  Assessment and Plan of Treatment:     Diagnosis: Bradycardia  Assessment and Plan of Treatment:      PRINCIPAL DISCHARGE DIAGNOSIS  Diagnosis: Enterococcal bacteremia  Assessment and Plan of Treatment: You were found to have a bacterial infection in the blood stream which requires treatment with IV antibiotics (Ampicillin) until 5/1/22. You will complete your antibiotics at rehab. Following completion of your antibiotics, the rehab should perform blood cultues (blood test) to make sure the bacteria has cleared from your blood. Follow up with your PCP when discharged from rehab.      SECONDARY DISCHARGE DIAGNOSES  Diagnosis: T2DM (type 2 diabetes mellitus)  Assessment and Plan of Treatment: Continue your medication regimen and a consistent carbohydrate diet (Meaning eating the same amount of carbohydrates at the same time each day). Monitor blood glucose levels throughout the day before meals and at bedtime. Record blood sugars and bring to outpatient providers appointment in order to be reviewed by your doctor for management modifications. If your sugars are more than 400 or less than 70 you should contact your PCP immediately. Monitor for signs/symptoms of low blood glucose, such as, dizziness, altered mental status, or cool/clammy skin. In addition, monitor for signs/symptoms of high blood glucose, such as, feeling hot, dry, fatigued, or with increased thirst/urination. Make regular podiatry appointments in order to have feet checked for wounds and uncontrolled toe nail growth to prevent infections, as well as, appointments with an ophthalmologist to monitor your vision.    Diagnosis: HTN (hypertension)  Assessment and Plan of Treatment:     Diagnosis: Odynophagia  Assessment and Plan of Treatment: Your mouth pain/ swallowing pain has resolved. You had a swallow evaluation performed and a PUREED DIET was recommended. Please follow up with a gastroenterologist if your symptoms return and report any pain with eating or difficulty swallowing to the rehab staff.    Diagnosis: Bradycardia  Assessment and Plan of Treatment: Your heart rate was found to be low at times during this admission. You had a pacemaker in the past which has been removed. The electrophysiology team evaluated you and no cardiac intervention is required. Stop taking metoprolol as this can further cause slowing of your heart rate.     PRINCIPAL DISCHARGE DIAGNOSIS  Diagnosis: Enterococcal bacteremia  Assessment and Plan of Treatment: You were found to have a bacterial infection in the blood stream which requires treatment with IV antibiotics (Ampicillin) until 5/1/22. You will complete your antibiotics at rehab. Following completion of your antibiotics, the rehab should perform blood cultues (blood test) to make sure the bacteria has cleared from your blood. Follow up with your PCP when discharged from rehab.      SECONDARY DISCHARGE DIAGNOSES  Diagnosis: T2DM (type 2 diabetes mellitus)  Assessment and Plan of Treatment: You have a history of diabetes however your blood sugar here is on the lower side and you hemoglobin A1c is 5.2. Stop your metformin for now. Follow up with your PCP when discharged from rehab to re-evaluate if you require diabetes medications.    Diagnosis: HTN (hypertension)  Assessment and Plan of Treatment: Your blood pressure medications were held because your blood pressure was normal off these medications. Monitor your blood pressure daily and medications can be reintroduced as needed.    Diagnosis: Odynophagia  Assessment and Plan of Treatment: Your mouth pain/ swallowing pain has resolved. You had a swallow evaluation performed and a PUREED DIET was recommended. Please follow up with a gastroenterologist if your symptoms return and report any pain with eating or difficulty swallowing to the rehab staff.    Diagnosis: Bradycardia  Assessment and Plan of Treatment: Your heart rate was found to be low at times during this admission. You had a pacemaker in the past which has been removed. The electrophysiology team evaluated you and no cardiac intervention is required. Stop taking metoprolol as this can further cause slowing of your heart rate.

## 2022-04-28 NOTE — DISCHARGE NOTE PROVIDER - NSDCMRMEDTOKEN_GEN_ALL_CORE_FT
acetaminophen 325 mg oral tablet: 2 tab(s) orally every 6 hours, As needed, Temp greater or equal to 38C (100.4F), Mild Pain (1 - 3)  Albuterol (Eqv-Proventil HFA) 90 mcg/inh inhalation aerosol: 2 puff(s) inhaled every 6 hours, As Needed  aspirin 81 mg oral delayed release tablet: 1 tab(s) orally once a day  atorvastatin 20 mg oral tablet: 1 tab(s) orally once a day  benazepril 10 mg oral tablet: 1 tab(s) orally once a day  isosorbide mononitrate 30 mg oral tablet, extended release: 1 tab(s) orally once a day  metFORMIN 500 mg oral tablet, extended release: 1 tab(s) orally once a day  polyethylene glycol 3350 oral powder for reconstitution: 17 gram(s) orally once a day, As needed, Constipation  senna oral tablet: 2 tab(s) orally once a day (at bedtime)   acetaminophen 325 mg oral tablet: 2 tab(s) orally every 6 hours, As needed, Temp greater or equal to 38C (100.4F), Mild Pain (1 - 3)  Albuterol (Eqv-Proventil HFA) 90 mcg/inh inhalation aerosol: 2 puff(s) inhaled every 6 hours, As Needed  ampicillin: 2 gram(s) intravenously every 6 hours  Last day 5/1/22. Needs repeat blood cultures x2 when antibiotics completed   aspirin 81 mg oral delayed release tablet: 1 tab(s) orally once a day  atorvastatin 20 mg oral tablet: 1 tab(s) orally once a day  polyethylene glycol 3350 oral powder for reconstitution: 17 gram(s) orally once a day, As needed, Constipation  senna oral tablet: 2 tab(s) orally once a day (at bedtime)  tamsulosin 0.4 mg oral capsule: 1 cap(s) orally once a day (at bedtime)

## 2022-04-28 NOTE — DISCHARGE NOTE PROVIDER - HOSPITAL COURSE
81 year old male with HTN, T2DM, HLD, constipation, CVA c/b R sided hemiplegia, CAD s/p PCI, HFpEF, sinus node dysfunction, bedbound who presents with dysphagia/odynophagia. Patient passed swallow evaluation and tolerating a dysphagia diet well. Patient found to have enterococcus faecalis bacteremia. HARIKA negative for endocarditis. Started on Ampicillin per ID recommendation with last day of ABX on 5/1/22. Patient will be sent with peripheral IV to rehab to complete ABX. Following completion of ABX, will need repeat BCx to ensure clearance of infection. Found to be bradycardic this admission with HR as low as 29. Patient with a history of sinus node dysfunction and is s/p PPM extraction 12/2021 due to an infection. No intervention per EP.      81 year old male with HTN, T2DM, HLD, constipation, CVA c/b R sided hemiplegia, CAD s/p PCI, HFpEF, sinus node dysfunction, bedbound who presents with dysphagia/odynophagia. Patient passed swallow evaluation and tolerating a dysphagia diet well. Patient found to have enterococcus faecalis bacteremia. HARIKA negative for endocarditis. Started on Ampicillin per ID recommendation with last day of ABX on 5/1/22. Patient will be sent with peripheral IV to rehab to complete ABX. Following completion of ABX, will need repeat BCx to ensure clearance of infection. Found to be bradycardic this admission with HR as low as 29. Patient with a history of sinus node dysfunction and is s/p PPM extraction 12/2021 due to an infection. No intervention per EP.     Patient seen and evaluated. Reviewed discharge medications with patient and attending. All new medications requiring new prescriptions were sent to the pharmacy of patient's choice. Reviewed need for prescription for previous home medications and new prescriptions sent if requested. Medically cleared/stable for discharge as per Dr. Oh on 4/29/22 with appropriate follow up. Patient understands and agrees with plan of care.    81 year old male with HTN, T2DM, HLD, constipation, CVA c/b R sided hemiplegia, CAD s/p PCI, HFpEF, sinus node dysfunction, bedbound who presents with dysphagia/odynophagia. Patient passed swallow evaluation and tolerating a dysphagia diet well. Patient found to have enterococcus faecalis bacteremia. HARIKA negative for endocarditis. Started on Ampicillin per ID recommendation with last day of ABX on 5/1/22. Patient will be sent with peripheral IV to rehab to complete ABX. Following completion of ABX, will need repeat BCx to ensure clearance of infection. Found to be bradycardic this admission with HR as low as 29. Patient with a history of sinus node dysfunction and is s/p PPM extraction 12/2021 due to an infection. No intervention per EP.     Patient seen and evaluated. Reviewed discharge medications with patient and attending. All new medications requiring new prescriptions were sent to the pharmacy of patient's choice. Reviewed need for prescription for previous home medications and new prescriptions sent if requested. Medically cleared/stable for discharge as per Dr. Nguyen on 4/29/22 with appropriate follow up. Patient understands and agrees with plan of care.    81 year old male with HTN, T2DM, HLD, constipation, CVA c/b R sided hemiplegia, CAD s/p PCI, HFpEF, sinus node dysfunction, bedbound who presents with dysphagia/odynophagia. Patient passed swallow evaluation and tolerating a dysphagia diet well. Pt was admitted with severe sepsis ( neutropenia, AMS, Hypotension present on admission) due to UTI and Enterococcus bacteremia. HARIKA negative for endocarditis. Started on Ampicillin per ID recommendation with last day of ABX on 5/1/22. Patient will be sent with peripheral IV to rehab to complete ABX. Following completion of ABX, will need repeat BCx to ensure clearance of infection. Found to be bradycardic this admission with HR as low as 29. Patient with a history of sinus node dysfunction and is s/p PPM extraction 12/2021 due to an infection. No intervention per EP. Pt was also had functional quadriplegia due to need for total assistance.    Patient seen and evaluated. Reviewed discharge medications with patient and attending. All new medications requiring new prescriptions were sent to the pharmacy of patient's choice. Reviewed need for prescription for previous home medications and new prescriptions sent if requested. Medically cleared/stable for discharge as per Dr. Nguyen on 4/29/22 with appropriate follow up. Patient understands and agrees with plan of care.

## 2022-04-28 NOTE — PROVIDER CONTACT NOTE (OTHER) - BACKGROUND
Patient currently has condom cath in place. Patient is voiding, BS protocol in place to check for retention.
Pt admitted for UTI. Hx of CAD, diabetes, CVA, HTN, HLD.
Admitted for UTI. Hx of CAD, diabetes, HLD, and CVA.
PMH HTN, T2DM, HLD, r sided hemiplegia, diastolic CHR, sinus node dysfunction. Once arrived to ED, found to be hypotensive and bradycardic with HR as low as 38.
PMH HTN, T2DM, HLD, r sided hemiplegia, diastolic CHR, sinus node dysfunction. Once arrived to ED, found to be hypotensive and bradycardic.
patient has condom cath on, did not have a valencia catheter placed in the ED as noted in H&P, only had condom cath on
Admitted for UTI. Hx of CAD, diabetes, HLD, and CVA.
***Of note, IRS fingersticks will not cross into sunrise, see provider contact noted (by writer) for IRS course.
Dextrose 5% + NaCl 0.9% infusing continuously, IV ABT q4h ongoing. Pt. sustaining in the 30's on tele, zoll and atropine at bedside.

## 2022-04-28 NOTE — PROVIDER CONTACT NOTE (OTHER) - NAME OF MD/NP/PA/DO NOTIFIED:
GAVINO Bloom
GAVINO Mcdonnell
Per seth PA- [id number]
Edd Pak
GAVINO Adler
GAVINO Sanders
NATHALY Corona
GAVINO Adler
Halle Arellano
GAVINO Bloom
NATHALY Corona
Cyn Strickland, ACP

## 2022-04-28 NOTE — DISCHARGE NOTE PROVIDER - DETAILS OF MALNUTRITION DIAGNOSIS/DIAGNOSES
This patient has been assessed with a concern for Malnutrition and was treated during this hospitalization for the following Nutrition diagnosis/diagnoses:     -  04/18/2022: Severe protein-calorie malnutrition

## 2022-04-28 NOTE — PROVIDER CONTACT NOTE (OTHER) - ACTION/TREATMENT ORDERED:
Patient refusing breakfast tray; agreed to drink 1 full chocolate glucerna shake. Patient now resting in bed. Will re-check blood glucose in afternoon

## 2022-04-28 NOTE — PROGRESS NOTE ADULT - PROBLEM SELECTOR PLAN 7
-DVT ppx: sq heparin  -Diet: pureed      Awaiting rehab placement.     Communication:  Spoke w/ wife Nerissa 4/25, list of facilities provided, awaiting authorization -DVT ppx: sq heparin  -Diet: pureed      Awaiting rehab placement.     Communication:  Spoke w/ wife Nerissa 4/28, list of facilities provided, awaiting authorization

## 2022-04-28 NOTE — PROVIDER CONTACT NOTE (OTHER) - SITUATION
Pt. blood glucose in AM 69, repeated value of 80. Patient increasingly agitated and kicking/screaming. Agreed to drink sips of apple juice.

## 2022-04-28 NOTE — PROGRESS NOTE ADULT - SUBJECTIVE AND OBJECTIVE BOX
Cardiovascular Disease Progress Note    Overnight events: No acute events overnight. Mr. Garcia is in no distress.     Otherwise review of systems negative    Objective Findings:  T(C): 36.6 (04-28-22 @ 06:20), Max: 36.6 (04-27-22 @ 21:15)  HR: 66 (04-28-22 @ 06:20) (50 - 66)  BP: 127/79 (04-28-22 @ 06:20) (112/73 - 127/79)  RR: 17 (04-28-22 @ 06:20) (17 - 18)  SpO2: 99% (04-28-22 @ 06:20) (99% - 100%)  Wt(kg): --  Daily     Daily       Physical Exam:  Gen: NAD; Patient resting comfortably  HEENT: EOMI, Normocephalic/ atraumatic  CV: RRR, normal S1 + S2, no m/r/g  Lungs:  Normal respiratory effort; clear to auscultation bilaterally  Abd: soft, non-tender; bowel sounds present  Ext: No edema; warm and well perfused    Telemetry: Sinus; occasional PVDs    Laboratory Data:                        9.0    3.21  )-----------( 187      ( 27 Apr 2022 07:27 )             27.5     04-27    138  |  106  |  9   ----------------------------<  74  3.7   |  25  |  0.86    Ca    9.1      27 Apr 2022 07:27  Phos  2.4     04-27  Mg     1.90     04-27                Inpatient Medications:  MEDICATIONS  (STANDING):  ampicillin  IVPB 2 Gram(s) IV Intermittent every 6 hours  aspirin enteric coated 81 milliGRAM(s) Oral daily  atorvastatin 20 milliGRAM(s) Oral at bedtime  dextrose 5%. 1000 milliLiter(s) (100 mL/Hr) IV Continuous <Continuous>  dextrose 5%. 1000 milliLiter(s) (50 mL/Hr) IV Continuous <Continuous>  dextrose 50% Injectable 25 Gram(s) IV Push once  dextrose 50% Injectable 12.5 Gram(s) IV Push once  dextrose 50% Injectable 25 Gram(s) IV Push once  glucagon  Injectable 1 milliGRAM(s) IntraMuscular once  heparin   Injectable 5000 Unit(s) SubCutaneous every 8 hours  senna 2 Tablet(s) Oral at bedtime  tamsulosin 0.4 milliGRAM(s) Oral at bedtime      Assessment: 81 year old man with prior endocarditis s/p PPM extraction in 12/2021, HTN, HLD, CAD s/p PCI and bedbound noted to have sinus bradycardia and sepsis bacteremia with concern for endocarditis.     Plan of Care:    #History of endocarditis-  Bacteremia noted on this admission.   HARIKA with no evidence of endocarditis or retained foreign material.  ABx duration as per ID.     #Sinus bradycardia-  S/P PPM extraction for endocarditis in 12/2021.  No evidence of pauses or high degree AV block.  EP input noted- hold off on PPM at this time.     #CAD-  No signs of active ischemia.  Continue ASA and statin.       Over 25 minutes spent on total encounter; more than 50% of the visit was spent counseling and/or coordinating care by the attending physician.      Frank Boyd MD [facility]  Cardiovascular Disease  [phone]

## 2022-04-28 NOTE — PROVIDER CONTACT NOTE (OTHER) - DATE AND TIME:
17-Apr-2022 07:15
18-Apr-2022 17:04
16-Apr-2022 04:00
18-Apr-2022 16:42
18-Apr-2022 22:00
28-Apr-2022 10:00
15-Apr-2022 23:15
17-Apr-2022 00:00
18-Apr-2022 15:43
15-Apr-2022 19:51
17-Apr-2022 12:05
19-Apr-2022 05:35

## 2022-04-28 NOTE — PROGRESS NOTE ADULT - SUBJECTIVE AND OBJECTIVE BOX
[facility] Division of Hospital Medicine  Preet Nguyen MD  Pager [phone]      Patient is a 81y old  Male who presents with a chief complaint of Dysphagia/odynophagia (28 Apr 2022 11:32)      SUBJECTIVE / OVERNIGHT EVENTS: Pt endorses being hungry. Denies pain.    MEDICATIONS  (STANDING):  ampicillin  IVPB 2 Gram(s) IV Intermittent every 6 hours  aspirin enteric coated 81 milliGRAM(s) Oral daily  atorvastatin 20 milliGRAM(s) Oral at bedtime  dextrose 5%. 1000 milliLiter(s) (100 mL/Hr) IV Continuous <Continuous>  dextrose 5%. 1000 milliLiter(s) (50 mL/Hr) IV Continuous <Continuous>  dextrose 50% Injectable 25 Gram(s) IV Push once  dextrose 50% Injectable 12.5 Gram(s) IV Push once  dextrose 50% Injectable 25 Gram(s) IV Push once  glucagon  Injectable 1 milliGRAM(s) IntraMuscular once  heparin   Injectable 5000 Unit(s) SubCutaneous every 8 hours  potassium phosphate / sodium phosphate Tablet (K-PHOS No. 2) 1 Tablet(s) Oral once  senna 2 Tablet(s) Oral at bedtime  tamsulosin 0.4 milliGRAM(s) Oral at bedtime    MEDICATIONS  (PRN):  acetaminophen     Tablet .. 650 milliGRAM(s) Oral every 6 hours PRN Temp greater or equal to 38C (100.4F), Mild Pain (1 - 3)  ALBUTerol    90 MICROgram(s) HFA Inhaler 2 Puff(s) Inhalation every 6 hours PRN Shortness of Breath and/or Wheezing  aluminum hydroxide/magnesium hydroxide/simethicone Suspension 30 milliLiter(s) Oral every 4 hours PRN Dyspepsia  dextrose Oral Gel 15 Gram(s) Oral once PRN Blood Glucose LESS THAN 70 milliGRAM(s)/deciliter  melatonin 3 milliGRAM(s) Oral at bedtime PRN Insomnia  ondansetron Injectable 4 milliGRAM(s) IV Push every 8 hours PRN Nausea and/or Vomiting  polyethylene glycol 3350 17 Gram(s) Oral daily PRN Constipation      CAPILLARY BLOOD GLUCOSE      POCT Blood Glucose.: 80 mg/dL (28 Apr 2022 09:03)  POCT Blood Glucose.: 69 mg/dL (28 Apr 2022 08:58)  POCT Blood Glucose.: 97 mg/dL (27 Apr 2022 21:48)  POCT Blood Glucose.: 106 mg/dL (27 Apr 2022 18:12)    I&O's Summary      PHYSICAL EXAM:  Vital Signs Last 24 Hrs  T(C): 36.6 (28 Apr 2022 06:20), Max: 36.6 (27 Apr 2022 21:15)  T(F): 97.9 (28 Apr 2022 06:20), Max: 97.9 (28 Apr 2022 06:20)  HR: 66 (28 Apr 2022 06:20) (50 - 66)  BP: 127/79 (28 Apr 2022 06:20) (112/73 - 127/79)  BP(mean): --  RR: 17 (28 Apr 2022 06:20) (17 - 18)  SpO2: 99% (28 Apr 2022 06:20) (99% - 100%)  CONSTITUTIONAL: NAD  EYES: conjunctiva and sclera clear  ENMT: mmm  NECK: Supple,  RESPIRATORY: Normal respiratory effort; lungs are clear to auscultation bilaterally  CARDIOVASCULAR: Regular rate and rhythm, + S1 and S2  ABDOMEN: Nontender to palpation, normoactive bowel sounds, no rebound/guarding  MUSCULOSKELETAL:  no clubbing or cyanosis of digits;   PSYCH: A+O x 1  NEUROLOGY: R hemiparesis  SKIN: No rashes;     LABS:                        10.2   3.67  )-----------( 226      ( 28 Apr 2022 08:26 )             30.8     04-28    138  |  103  |  11  ----------------------------<  80  4.2   |  24  |  0.86    Ca    9.6      28 Apr 2022 08:26  Phos  2.4     04-28  Mg     1.90     04-28

## 2022-04-28 NOTE — PROVIDER CONTACT NOTE (OTHER) - REASON
Bladder scan showed 133 mL retained urine
BGL 60, 72
Pt too lethargic for 10pm meds
BGL 91
Patient pulled out IV, unable to gain new IV access
Bladder scan showed 199 mL retained urine
HR <50
BGL 76
HR Gera to 34 on tele, sustaining between 36-40, asymptomatic.
Pt continues to sustain HR between 34-40 on tele, asymptomatic.
Pt F/S of 79
low blood glucose; patient agitated

## 2022-04-28 NOTE — PROVIDER CONTACT NOTE (OTHER) - RECOMMENDATIONS
Per GAVINO Mcdonnell D5W should be started ans patient can return to floor.
NP Urvashi Corona made aware. OK as per NP to hold 10pm meds.
Continue to encourage PO intake. Re-check blood glucose
PA notified, as per PA she will look into the situation and see who can assist. Endorsed to oncoming nurse to follow up.
Bladder scan previously noted to be discontinued at this time if less than 300 ml noted during present scan. PA notified, awaiting orders.
NP Halle Corona made aware. As per NP, do not give Dextrose IVP at this time because pt has Dextrose 10% IVF running.
As per PA, continue to monitor pt's status. VS q4 continues.
As per PA, zoll and atropine set up at bedside. Will continue to monitor pt's status. VS q4 continues.

## 2022-04-29 ENCOUNTER — TRANSCRIPTION ENCOUNTER (OUTPATIENT)
Age: 82
End: 2022-04-29

## 2022-04-29 VITALS
DIASTOLIC BLOOD PRESSURE: 61 MMHG | RESPIRATION RATE: 18 BRPM | OXYGEN SATURATION: 98 % | TEMPERATURE: 98 F | SYSTOLIC BLOOD PRESSURE: 120 MMHG | HEART RATE: 51 BPM

## 2022-04-29 LAB
ANION GAP SERPL CALC-SCNC: 10 MMOL/L — SIGNIFICANT CHANGE UP (ref 7–14)
BUN SERPL-MCNC: 10 MG/DL — SIGNIFICANT CHANGE UP (ref 7–23)
CALCIUM SERPL-MCNC: 9.3 MG/DL — SIGNIFICANT CHANGE UP (ref 8.4–10.5)
CHLORIDE SERPL-SCNC: 102 MMOL/L — SIGNIFICANT CHANGE UP (ref 98–107)
CO2 SERPL-SCNC: 23 MMOL/L — SIGNIFICANT CHANGE UP (ref 22–31)
CREAT SERPL-MCNC: 0.92 MG/DL — SIGNIFICANT CHANGE UP (ref 0.5–1.3)
EGFR: 83 ML/MIN/1.73M2 — SIGNIFICANT CHANGE UP
GLUCOSE BLDC GLUCOMTR-MCNC: 81 MG/DL — SIGNIFICANT CHANGE UP (ref 70–99)
GLUCOSE SERPL-MCNC: 75 MG/DL — SIGNIFICANT CHANGE UP (ref 70–99)
HCT VFR BLD CALC: 27.8 % — LOW (ref 39–50)
HGB BLD-MCNC: 9.1 G/DL — LOW (ref 13–17)
MAGNESIUM SERPL-MCNC: 2 MG/DL — SIGNIFICANT CHANGE UP (ref 1.6–2.6)
MCHC RBC-ENTMCNC: 30.2 PG — SIGNIFICANT CHANGE UP (ref 27–34)
MCHC RBC-ENTMCNC: 32.7 GM/DL — SIGNIFICANT CHANGE UP (ref 32–36)
MCV RBC AUTO: 92.4 FL — SIGNIFICANT CHANGE UP (ref 80–100)
NRBC # BLD: 0 /100 WBCS — SIGNIFICANT CHANGE UP
NRBC # FLD: 0 K/UL — SIGNIFICANT CHANGE UP
PHOSPHATE SERPL-MCNC: 2.8 MG/DL — SIGNIFICANT CHANGE UP (ref 2.5–4.5)
PLATELET # BLD AUTO: 209 K/UL — SIGNIFICANT CHANGE UP (ref 150–400)
POTASSIUM SERPL-MCNC: 4.2 MMOL/L — SIGNIFICANT CHANGE UP (ref 3.5–5.3)
POTASSIUM SERPL-SCNC: 4.2 MMOL/L — SIGNIFICANT CHANGE UP (ref 3.5–5.3)
RBC # BLD: 3.01 M/UL — LOW (ref 4.2–5.8)
RBC # FLD: 14.7 % — HIGH (ref 10.3–14.5)
SODIUM SERPL-SCNC: 135 MMOL/L — SIGNIFICANT CHANGE UP (ref 135–145)
WBC # BLD: 3.23 K/UL — LOW (ref 3.8–10.5)
WBC # FLD AUTO: 3.23 K/UL — LOW (ref 3.8–10.5)

## 2022-04-29 PROCEDURE — 99239 HOSP IP/OBS DSCHRG MGMT >30: CPT

## 2022-04-29 RX ORDER — TAMSULOSIN HYDROCHLORIDE 0.4 MG/1
1 CAPSULE ORAL
Qty: 0 | Refills: 0 | DISCHARGE
Start: 2022-04-29

## 2022-04-29 RX ORDER — METFORMIN HYDROCHLORIDE 850 MG/1
1 TABLET ORAL
Qty: 0 | Refills: 0 | DISCHARGE

## 2022-04-29 RX ORDER — BENAZEPRIL HYDROCHLORIDE 40 MG/1
1 TABLET ORAL
Qty: 0 | Refills: 0 | DISCHARGE

## 2022-04-29 RX ORDER — ISOSORBIDE MONONITRATE 60 MG/1
1 TABLET, EXTENDED RELEASE ORAL
Qty: 0 | Refills: 0 | DISCHARGE

## 2022-04-29 RX ORDER — HALOPERIDOL DECANOATE 100 MG/ML
0.5 INJECTION INTRAMUSCULAR ONCE
Refills: 0 | Status: DISCONTINUED | OUTPATIENT
Start: 2022-04-29 | End: 2022-04-29

## 2022-04-29 RX ORDER — AMPICILLIN TRIHYDRATE 250 MG
2 CAPSULE ORAL
Qty: 0 | Refills: 0 | DISCHARGE
Start: 2022-04-29

## 2022-04-29 RX ADMIN — HEPARIN SODIUM 5000 UNIT(S): 5000 INJECTION INTRAVENOUS; SUBCUTANEOUS at 05:50

## 2022-04-29 RX ADMIN — Medication 216 GRAM(S): at 08:48

## 2022-04-29 RX ADMIN — Medication 216 GRAM(S): at 02:15

## 2022-04-29 NOTE — SWALLOW BEDSIDE ASSESSMENT ADULT - COMMENTS
MD orders received. Chart reviewed. Per charting, pt is an "81 year old male with HTN, T2DM, HLD, constipation, CVA c/b R sided hemiplegia, CAD s/p PCI, HFpEF, sinus node dysfunction, bedbound who presents with dysphagia/odynophagia found to have e.faecalis bacteremia and asymptomatic bradycardia. Hospital course c/b hypoglycemia in setting of poor PO intake." Recent CXR revealed "Clear lungs."    At the time of today's assessment, pt presents as lethargic and is unable to maintain adequate level of alertness to safely engage in PO trials despite maximum verbal encouragement and tactile prompting (i.e. bed repositioning, sternal rub, cold compress) provided by SLP. PO trials deferred at this time given above described presentation. Would suggest RD consult to ensure pt is meeting adequate caloric needs. Medical team advised to reconsult this service when pt is able to maintain adequate level of alertness to participate in evaluation.
Per Hospitalist Note 4/16/22: "81 y.o. M with PMH of  HTN, T2DM, HLD, constipation CVA w R sided hemiplegia, CAD s/p PCI, diastolic CHF, sinus node dysfunction, bedbound who presents with dysphagia/odynophagia. Once arrived to the ED, he was found to be hypotensive with SBP of 80 and bradycardic with HR as low as 38. UA concerning for infection."     WBC WFL   CXR Chest 4.15: Clear Lungs     Patient is familiar to this department as the patient has been seen for multiple swallow evaluations during previous admissions, with the most recent in February 2022. Refer to full report for further details.     Patient received upright in bed, asleep, though arousable via verbal cues, AAOx1. Patient noted with confusion (i.e. where am I going?) though able to follow 1-step commands and answer yes/no questions. Patient denies odynophagia and globus sensation

## 2022-04-29 NOTE — PROGRESS NOTE ADULT - NUTRITIONAL ASSESSMENT
This patient has been assessed with a concern for Malnutrition and has been determined to have a diagnosis/diagnoses of Severe protein-calorie malnutrition.    This patient is being managed with:   Diet NPO after Midnight-     NPO Start Date: 17-Apr-2022   NPO Start Time: 23:59  Entered: Apr 17 2022 10:53AM    Diet Pureed-  Consistent Carbohydrate {No Snacks} (CSTCHO)  DASH/TLC {Sodium & Cholesterol Restricted} (DASH)  Entered: Apr 17 2022  8:09AM    
This patient has been assessed with a concern for Malnutrition and has been determined to have a diagnosis/diagnoses of Severe protein-calorie malnutrition.    This patient is being managed with:   Diet Pureed-  Entered: Apr 18 2022  6:14PM    
This patient has been assessed with a concern for Malnutrition and has been determined to have a diagnosis/diagnoses of Severe protein-calorie malnutrition.    This patient is being managed with:   Diet Pureed-  Entered: Apr 18 2022  6:14PM    
This patient has been assessed with a concern for Malnutrition and has been determined to have a diagnosis/diagnoses of Severe protein-calorie malnutrition.    This patient is being managed with:   Diet Pureed-  Supplement Feeding Modality:  Oral  Glucerna Shake Cans or Servings Per Day:  1       Frequency:  Three Times a day  Entered: Apr 24 2022 11:57AM    
This patient has been assessed with a concern for Malnutrition and has been determined to have a diagnosis/diagnoses of Severe protein-calorie malnutrition.    This patient is being managed with:   Diet Pureed-  Entered: Apr 18 2022  6:14PM    
This patient has been assessed with a concern for Malnutrition and has been determined to have a diagnosis/diagnoses of Severe protein-calorie malnutrition.    This patient is being managed with:   Diet NPO after Midnight-     NPO Start Date: 21-Apr-2022   NPO Start Time: 23:59  Except Medications  Entered: Apr 21 2022 11:59AM    Diet Pureed-  Entered: Apr 18 2022  6:14PM    
This patient has been assessed with a concern for Malnutrition and has been determined to have a diagnosis/diagnoses of Severe protein-calorie malnutrition.    This patient is being managed with:   Diet NPO after Midnight-     NPO Start Date: 21-Apr-2022   NPO Start Time: 23:59  Except Medications  Entered: Apr 21 2022 11:59AM    Diet Pureed-  Entered: Apr 18 2022  6:14PM    
This patient has been assessed with a concern for Malnutrition and has been determined to have a diagnosis/diagnoses of Severe protein-calorie malnutrition.    This patient is being managed with:   Diet Pureed-  Supplement Feeding Modality:  Oral  Glucerna Shake Cans or Servings Per Day:  1       Frequency:  Three Times a day  Entered: Apr 24 2022 11:57AM    
This patient has been assessed with a concern for Malnutrition and has been determined to have a diagnosis/diagnoses of Severe protein-calorie malnutrition.    This patient is being managed with:   Diet Pureed-  Entered: Apr 18 2022  6:14PM    
This patient has been assessed with a concern for Malnutrition and has been determined to have a diagnosis/diagnoses of Severe protein-calorie malnutrition.    This patient is being managed with:   Diet Pureed-  Entered: Apr 18 2022  6:14PM    
This patient has been assessed with a concern for Malnutrition and has been determined to have a diagnosis/diagnoses of Severe protein-calorie malnutrition.    This patient is being managed with:   Diet Pureed-  Supplement Feeding Modality:  Oral  Glucerna Shake Cans or Servings Per Day:  1       Frequency:  Three Times a day  Entered: Apr 24 2022 11:57AM    
This patient has been assessed with a concern for Malnutrition and has been determined to have a diagnosis/diagnoses of Severe protein-calorie malnutrition.    This patient is being managed with:   Diet Pureed-  Supplement Feeding Modality:  Oral  Glucerna Shake Cans or Servings Per Day:  1       Frequency:  Three Times a day  Entered: Apr 24 2022 11:57AM

## 2022-04-29 NOTE — PROGRESS NOTE ADULT - PROBLEM SELECTOR PROBLEM 3
Other hyperlipidemia
Bradycardia
HTN (hypertension)
Other hyperlipidemia
Bradycardia
Odynophagia
HTN (hypertension)
Bradycardia

## 2022-04-29 NOTE — PROGRESS NOTE ADULT - PROBLEM SELECTOR PLAN 6
-CVA occurred 10 years ago; no new CVA or any new suspicion for active CVA  -bed bound  -continue aspirin and statin
-CVA occurred 10 years ago; no new CVA or any new suspicion for active CVA  -bed bound  -continue aspirin and statin
-as per cardiologist, Dr. Andrea has chronotropic competence  -hold all AV arpita agents  -monitor on tele
-CVA occurred 10 years ago; no new CVA or any new suspicion for active CVA  -bed bound  -continue aspirin and statin
-as per cardiologist, Dr. Andrea has chronotropic competence  -hold all AV arpita agents  -monitor on tele
-CVA occurred 10 years ago; no new CVA or any new suspicion for active CVA  -bed bound  -continue aspirin and statin

## 2022-04-29 NOTE — PROGRESS NOTE ADULT - PROBLEM SELECTOR PLAN 3
-given hypotension, will hold all antihypertensives currently, monitor BP
Asymptomatic bradycardia on tele to 29. History of sinus node dysfunction.  - s/p PPM extraction in 12/2021 due to infection.  - Stable tele.
Asymptomatic bradycardia on tele to 29. History of sinus node dysfunction.  - s/p PPM extraction in 12/2021 due to infection.  - Stable tele.
Asymptomatic bradycardia on tele to 29. History of sinus node dysfunction.  - s/p PPM extraction in 12/2021 due to infection.  - Tele reviewed - SB 40-50s. C/w tele    - Appreciate EP consult - if stable - may be reasonable to DC remote telemetry. Will d/w EP.
Asymptomatic bradycardia on tele to 29. History of sinus node dysfunction.  - s/p PPM extraction in 12/2021 due to infection.  - Stable tele.
Asymptomatic bradycardia on tele to 29. History of sinus node dysfunction.  - Tele reviewed - SB 40-50s.  - Holding all arpita blockers  - C/w tele  - Appreciate EP consult
Asymptomatic bradycardia on tele to 29. History of sinus node dysfunction.  - s/p PPM extraction in 12/2021 due to infection.  - Stable tele.  Remains in jessie.
-given hypotension, will hold all antihypertensives currently
-passed S&S, now on modified diet, pureed with thin liquids  - Consider ENT consult if persistent throat pain
Asymptomatic bradycardia on tele to 29. History of sinus node dysfunction.  - s/p PPM extraction in 12/2021 due to infection.  - Stable tele.
Asymptomatic bradycardia on tele to 29. History of sinus node dysfunction.  - Holding all arpita blockers  - C/w tele  - Appreciate EP consult
Asymptomatic bradycardia on tele to 29. History of sinus node dysfunction.  - Tele reviewed - SB 60s.  - Holding all arpita blockers  - C/w tele  - Appreciate EP consult
Asymptomatic bradycardia on tele to 29. History of sinus node dysfunction.  - s/p PPM extraction in 12/2021 due to infection.  - Stable tele.  Monitor for now - titration down D10.
Asymptomatic bradycardia on tele to 29. History of sinus node dysfunction.  - s/p PPM extraction in 12/2021 due to infection.  - Stable tele.

## 2022-04-29 NOTE — PROGRESS NOTE ADULT - PROBLEM SELECTOR PLAN 5
-CVA occurred 10 years ago; no new CVA or any new suspicion for active CVA  -bed bound  -continue aspirin and statin
Stable off antihypertensives.
Stable off antihypertensives.
BP normotensive now (previously hypotensive), holding antihypertensives at this time
Stable off antihypertensives.
Stable off antihypertensives.
-CVA occurred 10 years ago; no new CVA or any new suspicion for active CVA  -bed bound  -continue aspirin and statin
-on home metformin, holding here  -low dose ISS and glucose checks
BP normotensive now (previously hypotensive), holding antihypertensives at this time
Stable off antihypertensives.
BP normotensive now (previously hypotensive), holding antihypertensives at this time

## 2022-04-29 NOTE — PROGRESS NOTE ADULT - PROBLEM SELECTOR PROBLEM 6
Cerebrovascular accident (CVA)
Sinus node dysfunction
Cerebrovascular accident (CVA)
Cerebrovascular accident (CVA)
Sinus node dysfunction
Cerebrovascular accident (CVA)

## 2022-04-29 NOTE — PROGRESS NOTE ADULT - SUBJECTIVE AND OBJECTIVE BOX
[facility] Division of Hospital Medicine  Preet Nguyen MD  Pager [phone]      Patient is a 81y old  Male who presents with a chief complaint of Dysphagia/odynophagia (29 Apr 2022 08:08)      SUBJECTIVE / OVERNIGHT EVENTS: Unable to obtain due to AMS    MEDICATIONS  (STANDING):  ampicillin  IVPB 2 Gram(s) IV Intermittent every 6 hours  aspirin enteric coated 81 milliGRAM(s) Oral daily  atorvastatin 20 milliGRAM(s) Oral at bedtime  dextrose 5%. 1000 milliLiter(s) (100 mL/Hr) IV Continuous <Continuous>  dextrose 5%. 1000 milliLiter(s) (50 mL/Hr) IV Continuous <Continuous>  dextrose 50% Injectable 25 Gram(s) IV Push once  dextrose 50% Injectable 12.5 Gram(s) IV Push once  dextrose 50% Injectable 25 Gram(s) IV Push once  glucagon  Injectable 1 milliGRAM(s) IntraMuscular once  haloperidol    Injectable 0.5 milliGRAM(s) IntraMuscular once  heparin   Injectable 5000 Unit(s) SubCutaneous every 8 hours  senna 2 Tablet(s) Oral at bedtime  tamsulosin 0.4 milliGRAM(s) Oral at bedtime    MEDICATIONS  (PRN):  acetaminophen     Tablet .. 650 milliGRAM(s) Oral every 6 hours PRN Temp greater or equal to 38C (100.4F), Mild Pain (1 - 3)  ALBUTerol    90 MICROgram(s) HFA Inhaler 2 Puff(s) Inhalation every 6 hours PRN Shortness of Breath and/or Wheezing  aluminum hydroxide/magnesium hydroxide/simethicone Suspension 30 milliLiter(s) Oral every 4 hours PRN Dyspepsia  dextrose Oral Gel 15 Gram(s) Oral once PRN Blood Glucose LESS THAN 70 milliGRAM(s)/deciliter  ondansetron Injectable 4 milliGRAM(s) IV Push every 8 hours PRN Nausea and/or Vomiting  polyethylene glycol 3350 17 Gram(s) Oral daily PRN Constipation      CAPILLARY BLOOD GLUCOSE      POCT Blood Glucose.: 121 mg/dL (28 Apr 2022 21:37)  POCT Blood Glucose.: 119 mg/dL (28 Apr 2022 17:51)  POCT Blood Glucose.: 93 mg/dL (28 Apr 2022 12:36)    I&O's Summary      PHYSICAL EXAM:  Vital Signs Last 24 Hrs  T(C): 36.3 (29 Apr 2022 06:10), Max: 36.6 (28 Apr 2022 20:30)  T(F): 97.4 (29 Apr 2022 06:10), Max: 97.8 (28 Apr 2022 20:30)  HR: 51 (29 Apr 2022 06:10) (51 - 54)  BP: 124/75 (29 Apr 2022 06:10) (117/68 - 124/75)  BP(mean): --  RR: 18 (29 Apr 2022 06:10) (17 - 18)  SpO2: 100% (29 Apr 2022 06:10) (99% - 100%)  CONSTITUTIONAL: NAD  EYES: conjunctiva and sclera clear  ENMT: mmm  NECK: Supple,  RESPIRATORY: Normal respiratory effort; lungs are clear to auscultation bilaterally  CARDIOVASCULAR: Regular rate and rhythm, + S1 and S2  ABDOMEN: Nontender to palpation, normoactive bowel sounds, no rebound/guarding  MUSCULOSKELETAL:  no clubbing or cyanosis of digits;   PSYCH: Alert not oriented  NEUROLOGY: R sided hemiparesis  SKIN: No rashes;     LABS:                        9.1    3.23  )-----------( 209      ( 29 Apr 2022 08:02 )             27.8     04-29    135  |  102  |  10  ----------------------------<  75  4.2   |  23  |  0.92    Ca    9.3      29 Apr 2022 08:02  Phos  2.8     04-29  Mg     2.00     04-29

## 2022-04-29 NOTE — DISCHARGE NOTE NURSING/CASE MANAGEMENT/SOCIAL WORK - PATIENT PORTAL LINK FT
You can access the FollowMyHealth Patient Portal offered by [facility] by registering at the following website: [email or web address]. By joining [facility]’s FollowMyHealth portal, you will also be able to view your health information using other applications (apps) compatible with our system.

## 2022-04-29 NOTE — PROGRESS NOTE ADULT - PROBLEM SELECTOR PROBLEM 4
Odynophagia
HTN (hypertension)
Odynophagia
Odynophagia
T2DM (type 2 diabetes mellitus)
Odynophagia
T2DM (type 2 diabetes mellitus)
Odynophagia

## 2022-04-29 NOTE — PROGRESS NOTE ADULT - PROVIDER SPECIALTY LIST ADULT
Cardiology
Hospitalist
Infectious Disease
Cardiology
Cardiology
Electrophysiology
Hospitalist
Anesthesia
Cardiology
Infectious Disease
Hospitalist
Infectious Disease
Hospitalist
Hospitalist
Endocrinology
Hospitalist
Endocrinology
Hospitalist

## 2022-04-29 NOTE — PROGRESS NOTE ADULT - PROBLEM SELECTOR PROBLEM 5
HTN (hypertension)
Cerebrovascular accident (CVA)
HTN (hypertension)
Cerebrovascular accident (CVA)
T2DM (type 2 diabetes mellitus)
HTN (hypertension)

## 2022-04-29 NOTE — PROGRESS NOTE ADULT - SUBJECTIVE AND OBJECTIVE BOX
Cardiovascular Disease Progress Note    Overnight events: No acute events overnight.  Mr. Garcia is in no distress.   Otherwise review of systems negative    Objective Findings:  T(C): 36.3 (04-29-22 @ 06:10), Max: 36.6 (04-28-22 @ 20:30)  HR: 51 (04-29-22 @ 06:10) (51 - 54)  BP: 124/75 (04-29-22 @ 06:10) (117/68 - 124/75)  RR: 18 (04-29-22 @ 06:10) (17 - 18)  SpO2: 100% (04-29-22 @ 06:10) (99% - 100%)  Wt(kg): --  Daily     Daily       Physical Exam:  Gen: NAD; Patient resting comfortably  HEENT: EOMI, Normocephalic/ atraumatic  CV: RRR, normal S1 + S2, no m/r/g  Lungs:  Normal respiratory effort; clear to auscultation bilaterally  Abd: soft, non-tender; bowel sounds present  Ext: No edema; warm and well perfused    Telemetry: Sinus bradycardia.     Laboratory Data:                        10.2   3.67  )-----------( 226      ( 28 Apr 2022 08:26 )             30.8     04-28    138  |  103  |  11  ----------------------------<  80  4.2   |  24  |  0.86    Ca    9.6      28 Apr 2022 08:26  Phos  2.4     04-28  Mg     1.90     04-28                Inpatient Medications:  MEDICATIONS  (STANDING):  ampicillin  IVPB 2 Gram(s) IV Intermittent every 6 hours  aspirin enteric coated 81 milliGRAM(s) Oral daily  atorvastatin 20 milliGRAM(s) Oral at bedtime  dextrose 5%. 1000 milliLiter(s) (50 mL/Hr) IV Continuous <Continuous>  dextrose 5%. 1000 milliLiter(s) (100 mL/Hr) IV Continuous <Continuous>  dextrose 50% Injectable 25 Gram(s) IV Push once  dextrose 50% Injectable 12.5 Gram(s) IV Push once  dextrose 50% Injectable 25 Gram(s) IV Push once  glucagon  Injectable 1 milliGRAM(s) IntraMuscular once  heparin   Injectable 5000 Unit(s) SubCutaneous every 8 hours  senna 2 Tablet(s) Oral at bedtime  tamsulosin 0.4 milliGRAM(s) Oral at bedtime      Assessment: 81 year old man with prior endocarditis s/p PPM extraction in 12/2021, HTN, HLD, CAD s/p PCI and bedbound noted to have sinus bradycardia and sepsis bacteremia with concern for endocarditis.     Plan of Care:    #History of endocarditis-  Bacteremia noted on this admission.   HARIKA with no evidence of endocarditis or retained foreign material.  ABx duration as per ID.     #Sinus bradycardia-  S/P PPM extraction for endocarditis in 12/2021.  No evidence of pauses or high degree AV block.  EP input noted- hold off on PPM at this time.     #CAD-  No signs of active ischemia.  Continue ASA and statin.         Over 25 minutes spent on total encounter; more than 50% of the visit was spent counseling and/or coordinating care by the attending physician.      Frank Boyd MD [facility]  Cardiovascular Disease  [phone]

## 2022-04-29 NOTE — PROGRESS NOTE ADULT - PROBLEM SELECTOR PROBLEM 2
T2DM (type 2 diabetes mellitus)
Enterococcus faecalis infection
T2DM (type 2 diabetes mellitus)
Enterococcus faecalis infection
T2DM (type 2 diabetes mellitus)
Odynophagia
Odynophagia
Enterococcus faecalis infection
Bradycardia
T2DM (type 2 diabetes mellitus)
Enterococcus faecalis infection
Enterococcus faecalis infection
T2DM (type 2 diabetes mellitus)
Enterococcus faecalis infection

## 2022-04-29 NOTE — PROGRESS NOTE ADULT - PROBLEM SELECTOR PLAN 2
-FS overall improved, now in the 90s-100s.  -on home metformin, holding here  - c/w glucose checks per protocol (q4h) and d10 at this time  - endocrinology consulted, appreciate input - will send off additional labs if FS<55.
-as well as dysphagia  -pt with coughing, choking and odynophagia . On pureed diet with thin liquids at home  -suspect worsening dysphagia; will keep NPO until S&S eval  -IVF while NPO
-FS overall improved, stable in the 90s-100s. No significant symptoms.   -Continue glucose checks.  -Will wean D10 to 30cc/h AFTER pt's first meal. If stable FS, will continue to titrate to OFF fluids by AM.   -Appreciate endo recs - if recurrent hypoglycemic events - will send off labs as per recs.
-Continues to have hypoglycemic events, but asymptomatic.   -on home metformin, holding here  - c/w glucose checks per protocol (q4h) and d10 at this time  - endocrinology consulted, appreciate input - will send off additional labs if FS<55.
Here with e. faecalis bacteremia on 4/15 BCx  -4/17 BCx NGTD..  -HARIKA negative for endocarditis.  -Stopped CTX.  -Continue ampicillin until 5/1 then obtain surveillance blood cultures to assure negative  -Appreciate ID recs.  -Spoke w/ wife re: home infusion - family unable to assist given q4h needs. No other alternatives as discussed w/ ID. Discussed w/ SW/CM, will plan for placement to SNF till completion of abx - Wife to provide choices, SW to follow up 12pm 4/26 per wife's request.  -Durable line to be placed once placement confirmed.
-passed S&S, now on modified diet  pureed with thin liquids
Here with e. faecalis bacteremia on 4/15 BCx  -4/17 BCx NGTD..  -HARIKA negative for endocarditis.  -Stopped CTX.  -Continue ampicillin until 5/1 then obtain surveillance blood cultures to assure negative  -Appreciate ID recs.  -Spoke w/ wife re: home infusion - family unable to assist given q4h needs. No other alternatives as discussed w/ ID. Discussed w/ SW/CM, will plan for placement to SNF till completion of abx - Awaiting auth  -Durable line to be placed once placement confirmed, most likely will be dced with Peripheral IV line
Here with e. faecalis bacteremia on 4/15 BCx  -4/17 BCx NGTD..  -HARIKA negative for endocarditis.  -Stopped CTX.  -Continue ampicillin until 5/1 then obtain surveillance blood cultures to assure negative  -Appreciate ID recs.  -Spoke w/ wife re: home infusion - family unable to assist given q4h needs. No other alternatives as discussed w/ ID. Discussed w/ SW/CM, will plan for placement to SNF till completion of abx - Wife to provide choices  -Durable line to be placed once placement confirmed.
Here with e. faecalis bacteremia on 4/15 BCx  -4/17 BCx NGTD..  -HARIKA negative for endocarditis.  -Stopped CTX.  -Continue ampicillin until 5/1 then obtain surveillance blood cultures to assure negative, can be followed outpt  -Appreciate ID recs.  -Spoke w/ wife re: home infusion - family unable to assist given q4h needs. No other alternatives as discussed w/ ID. Discussed w/ SW/CM, will plan for placement to SNF till completion of abx - Awaiting auth  -Durable line to be placed once placement confirmed, most likely will be dced with Peripheral IV line
-FS stable - IVF reduced to 30cc/h - FS stable in the 90s. No significant hypoglycemic symptoms.    -Will stop D10 now and assess throughout the day.   -Continue glucose checks q4h. Encourage PO intake. Appreciate nutrition/SLP recs.   -Appreciate endo recs - if recurrent hypoglycemic events - will send off labs as per recs (conditionally ordered)
Here with e. faecalis bacteremia on 4/15 BCx  -4/17 BCx NGTD..  -HARIKA negative for endocarditis.  -Stopped CTX.  -Continue ampicillin until 5/1 then obtain surveillance blood cultures to assure negative  -Appreciate ID recs.  -Spoke w/ wife re: home infusion - family unable to assist given q4h needs. No other alternatives as discussed w/ ID. Discussed w/ SW/CM, will plan for placement to SNF till completion of abx - hopefully Monday.  -Durable line to be placed once placement confirmed.
Here with e. faecalis bacteremia on 4/15 BCx  -4/17 BCx NGTD..  -HARIKA negative for endocarditis.  -Stopped CTX.  -Continue ampicillin until 5/1 then obtain surveillance blood cultures to assure negative, can be followed outpt  -Appreciate ID recs.  -Spoke w/ wife re: home infusion - family unable to assist given q4h needs. No other alternatives as discussed w/ ID. Discussed w/ SW/CM, will plan for placement to SNF till completion of abx - Awaiting auth  -Durable line to be placed once placement confirmed, most likely will be dced with Peripheral IV line
#Hypoglycemia as low as 30  -on home metformin, holding here  - c/w glucose checks and d10 at this time  - endocrinology consulted, appreciate input
Asymptomatic bradycardia on tele to 29. History of sinus node dysfunction.  - Holding all arpita blockers  - Repeat EKG during repeat episode  - C/w tele  - Cardiology consult appreciated

## 2022-04-29 NOTE — PROGRESS NOTE ADULT - PROBLEM SELECTOR PROBLEM 1
T2DM (type 2 diabetes mellitus)
Bacteremia
T2DM (type 2 diabetes mellitus)
Enterococcus faecalis infection
Hypoglycemia
Hypoglycemia
Enterococcus faecalis infection
T2DM (type 2 diabetes mellitus)
Enterococcus faecalis infection
Enterococcus faecalis infection
T2DM (type 2 diabetes mellitus)
Bacteremia
T2DM (type 2 diabetes mellitus)
T2DM (type 2 diabetes mellitus)
Enterococcus faecalis infection
Enterococcus faecalis infection

## 2022-04-29 NOTE — SWALLOW BEDSIDE ASSESSMENT ADULT - SPECIFY REASON(S)
to assess swallow function to reassess swallow function. pt familiar to this service from multiple clinical swallow evaluations completed throughout this admission (please see report for details).

## 2022-04-29 NOTE — PROGRESS NOTE ADULT - PROBLEM SELECTOR PLAN 1
-Hypoglycemia (? related to poor po intake) improving, c/w monitoring   -Continue glucose checks before meals and at bedtime. Encourage PO intake. Appreciate nutrition/SLP recs. c/w with  calorie dense meal.  -c/w glucerna shakes ( pt refers chocolate, fed pt 4/28)  -Appreciate endo recs - if recurrent hypoglycemic events [<55]- will send off labs as per recs (conditionally ordered)
Appreciate ID recs, on CTX and ampicillin, f/u sens of E faecalis in blood cx  f/u urine cx  for HARIKA
Here with e. faecalis bacteremia on 4/15 BCx  - Appreciate ID consult and recs. Suspected GI source. C/w IV ampicillin, CTX per ID, f/u repeat blood cultures (currently NGTD)  - Initially scheduled for HARIKA however cancelled due to hypoglycemia (addressed below)  - Will obtain TTE first, and then continue to discuss risks/benefits with ID/Cardiology regarding pursuing HARIKA. Cardiology documenting risks of HARIKA likely outweigh benefits
Here with e. faecalis bacteremia on 4/15 BCx  - Appreciate ID consult and recs. Suspected GI source. C/w IV ampicillin, CTX per ID, f/u repeat blood cultures, f/u HARIKA to r/o endocarditis (hx of previous presumed endocarditis)
-Hypoglycemia (? related to poor po intake) improving, s/p d50 4/24.   -Continue glucose checks before meals and at bedtime. Encourage PO intake. Appreciate nutrition/SLP recs. May need more calorie dense meal.  -c/w glucerna patti   -Appreciate endo recs - if recurrent hypoglycemic events [<55]- will send off labs as per recs (conditionally ordered)
Here with e. faecalis bacteremia on 4/15 BCx  -4/17 BCx NGTD.  -Continue ampicillin and CTX IV.  -Appreciate ID recs. Will reassess post-HARIKA for final abx recs.   -HARIKA today.
-Hypoglycemia (? related to poor po intake) improving, s/p d50 4/24.   -Continue glucose checks q4h. Encourage PO intake. Appreciate nutrition/SLP recs. May need more calorie dense meal.  -c/w glucerbarbara armstrong   -Appreciate endo recs - if recurrent hypoglycemic events [<55]- will send off labs as per recs (conditionally ordered)
Patient with evidence of UTI with enterococcus faecalis in blood cultures, likely urinary source  Started ampicillin, continue vancomycin as well until sensitivities are performed  f/u urine cx
-Hypoglycemia (? related to poor po intake) improving, c/w monitoring   -Continue glucose checks before meals and at bedtime. Encourage PO intake. Appreciate nutrition/SLP recs. May need more calorie dense meal.  -c/w glucerna shakes ( pt refers chocolate, fed pt 4/28)  -Appreciate endo recs - if recurrent hypoglycemic events [<55]- will send off labs as per recs (conditionally ordered)
-While off D10, pt was hypoglycemic overnight night to as low as 61. He received D50 x1.   -FS has since been in the 80s-90s. Per staff - pt ate 100% of his breakfast this AM w/ assistance. Event likely in setting of poor PO intake.   -Continue glucose checks q4h. Encourage PO intake. Appreciate nutrition/SLP recs. May need more calorie dense meal.  -Will add glucerna shakes to his meals.  -Appreciate endo recs - if recurrent hypoglycemic events [<55]- will send off labs as per recs (conditionally ordered)
Here with e. faecalis bacteremia on 4/15 BCx  -4/17 BCx NGTD..  -HARIKA negative for endocarditis.  -Stopped CTX.  -Continue ampicillin until 5/1 then obtain surveillance blood cultures to assure negative  -Appreciate ID recs.  -Spoke w/ wife re: home infusion - family unable to assist given q4h needs. No other alternatives as discussed w/ ID. Discussed w/ SW/CM, will plan for placement to SNF till completion of abx - hopefully Monday.  -Durable line to be placed once placement confirmed.
-Hypoglycemia (? related to poor po intake) improving, s/p d50 4/24.   -Continue glucose checks befor emeals and at bedtime. Encourage PO intake. Appreciate nutrition/SLP recs. May need more calorie dense meal.  -c/w glucerna patti   -Appreciate endo recs - if recurrent hypoglycemic events [<55]- will send off labs as per recs (conditionally ordered)
Here with e. faecalis bacteremia on 4/15 BCx  - Appreciate ID consult and recs. Suspected GI source. C/w IV ampicillin, CTX per ID, f/u repeat blood cultures (currently NGTD)  - Planning for HARIKA as per cardio.   - NPO @ [place].  - F/u repeat Bcx.  -Monitor labs w/ downtrending WBC.
Here with e. faecalis bacteremia on 4/15 BCx  - Appreciate ID consult and recs. Suspected GI source. C/w IV ampicillin, CTX per ID, f/u repeat blood cultures (currently NGTD)  - Initially scheduled for HARIKA however cancelled due to hypoglycemia (addressed below)  - Will obtain TTE first, and then continue to discuss risks/benefits with ID/Cardiology regarding pursuing HARIKA. Cardiology documenting risks of HARIKA likely outweigh benefits  - F/u repeat Bcx.

## 2022-04-29 NOTE — PROGRESS NOTE ADULT - PROBLEM SELECTOR PLAN 4
- passed S&S, now on modified diet, pureed with thin liquids  - No new complaints of pain.
- passed S&S, now on modified diet, pureed with thin liquids  - No new complaints of pain.
-on home metformin  -on low dose ISS
- passed S&S, now on modified diet, pureed with thin liquids  - No new complaints of pain.
BP normotensive now (previously hypotensive), holding antihypertensives at this time
- passed S&S, now on modified diet, pureed with thin liquids  - No new complaints of pain.
- passed S&S, now on modified diet, pureed with thin liquids  - Consider ENT consult if persistent throat pain
- passed S&S, now on modified diet, pureed with thin liquids  - No new complaints of pain.
- passed S&S, now on modified diet, pureed with thin liquids  - Consider ENT consult if persistent throat pain
-on home metformin  -on low dose ISS
- passed S&S, now on modified diet, pureed with thin liquids  - Consider ENT consult if persistent throat pain

## 2022-04-29 NOTE — PROGRESS NOTE ADULT - PROBLEM SELECTOR PLAN 7
-DVT ppx: sq heparin  -Diet: pureed      Awaiting rehab placement.     Communication:  Spoke w/ wife Nerissa 4/28, plan for dc to rehab today  Spent 40mins on dc

## 2022-04-29 NOTE — PROGRESS NOTE ADULT - REASON FOR ADMISSION
Dysphagia/odynophagia

## 2022-05-02 NOTE — PATIENT PROFILE ADULT - FALL HARM RISK TYPE OF ASSESSMENT
It appears patient arrived after this therapist waited and marked pt no show at 10:20.  Support staff, please call patient to inform, cancel todays visit, offer to reschedule and provide updated information regarding video visits that can be from [place] as she is temporarily a student and her permanent address is [place] she will no longer need to travel to [place] for video visits.   
Left message for Patient to call back. Appointment canceled.    
admission

## 2022-05-14 ENCOUNTER — INPATIENT (INPATIENT)
Facility: HOSPITAL | Age: 82
LOS: 3 days | Discharge: HOME | End: 2022-05-18
Attending: HOSPITALIST | Admitting: HOSPITALIST
Payer: MEDICARE

## 2022-05-14 VITALS
HEART RATE: 58 BPM | DIASTOLIC BLOOD PRESSURE: 88 MMHG | OXYGEN SATURATION: 99 % | TEMPERATURE: 98 F | SYSTOLIC BLOOD PRESSURE: 160 MMHG | RESPIRATION RATE: 18 BRPM | HEIGHT: 66 IN

## 2022-05-14 DIAGNOSIS — R41.82 ALTERED MENTAL STATUS, UNSPECIFIED: ICD-10-CM

## 2022-05-14 DIAGNOSIS — Z95.0 PRESENCE OF CARDIAC PACEMAKER: Chronic | ICD-10-CM

## 2022-05-14 DIAGNOSIS — Z95.5 PRESENCE OF CORONARY ANGIOPLASTY IMPLANT AND GRAFT: Chronic | ICD-10-CM

## 2022-05-14 LAB
ALBUMIN SERPL ELPH-MCNC: 3.6 G/DL — SIGNIFICANT CHANGE UP (ref 3.3–5)
ALP SERPL-CCNC: 68 U/L — SIGNIFICANT CHANGE UP (ref 40–120)
ALT FLD-CCNC: 10 U/L — SIGNIFICANT CHANGE UP (ref 4–41)
ANION GAP SERPL CALC-SCNC: 11 MMOL/L — SIGNIFICANT CHANGE UP (ref 7–14)
APPEARANCE UR: ABNORMAL
APTT BLD: 28.1 SEC — SIGNIFICANT CHANGE UP (ref 27–36.3)
AST SERPL-CCNC: 13 U/L — SIGNIFICANT CHANGE UP (ref 4–40)
B PERT DNA SPEC QL NAA+PROBE: SIGNIFICANT CHANGE UP
B PERT+PARAPERT DNA PNL SPEC NAA+PROBE: SIGNIFICANT CHANGE UP
BACTERIA # UR AUTO: ABNORMAL
BASOPHILS # BLD AUTO: 0 K/UL — SIGNIFICANT CHANGE UP (ref 0–0.2)
BASOPHILS NFR BLD AUTO: 0 % — SIGNIFICANT CHANGE UP (ref 0–2)
BILIRUB SERPL-MCNC: 0.8 MG/DL — SIGNIFICANT CHANGE UP (ref 0.2–1.2)
BILIRUB UR-MCNC: NEGATIVE — SIGNIFICANT CHANGE UP
BLOOD GAS VENOUS COMPREHENSIVE RESULT: SIGNIFICANT CHANGE UP
BORDETELLA PARAPERTUSSIS (RAPRVP): SIGNIFICANT CHANGE UP
BUN SERPL-MCNC: 14 MG/DL — SIGNIFICANT CHANGE UP (ref 7–23)
C PNEUM DNA SPEC QL NAA+PROBE: SIGNIFICANT CHANGE UP
CALCIUM SERPL-MCNC: 9.7 MG/DL — SIGNIFICANT CHANGE UP (ref 8.4–10.5)
CHLORIDE SERPL-SCNC: 104 MMOL/L — SIGNIFICANT CHANGE UP (ref 98–107)
CK MB BLD-MCNC: 2.9 % — HIGH (ref 0–2.5)
CK MB CFR SERPL CALC: 1.6 NG/ML — SIGNIFICANT CHANGE UP
CK SERPL-CCNC: 55 U/L — SIGNIFICANT CHANGE UP (ref 30–200)
CO2 SERPL-SCNC: 24 MMOL/L — SIGNIFICANT CHANGE UP (ref 22–31)
COLOR SPEC: YELLOW — SIGNIFICANT CHANGE UP
COMMENT - URINE: SIGNIFICANT CHANGE UP
CREAT SERPL-MCNC: 1.1 MG/DL — SIGNIFICANT CHANGE UP (ref 0.5–1.3)
DIFF PNL FLD: NEGATIVE — SIGNIFICANT CHANGE UP
EGFR: 67 ML/MIN/1.73M2 — SIGNIFICANT CHANGE UP
EOSINOPHIL # BLD AUTO: 0.22 K/UL — SIGNIFICANT CHANGE UP (ref 0–0.5)
EOSINOPHIL NFR BLD AUTO: 8 % — HIGH (ref 0–6)
EPI CELLS # UR: 3 /HPF — SIGNIFICANT CHANGE UP (ref 0–5)
FLUAV SUBTYP SPEC NAA+PROBE: SIGNIFICANT CHANGE UP
FLUBV RNA SPEC QL NAA+PROBE: SIGNIFICANT CHANGE UP
GLUCOSE SERPL-MCNC: 114 MG/DL — HIGH (ref 70–99)
GLUCOSE UR QL: NEGATIVE — SIGNIFICANT CHANGE UP
HADV DNA SPEC QL NAA+PROBE: SIGNIFICANT CHANGE UP
HCOV 229E RNA SPEC QL NAA+PROBE: SIGNIFICANT CHANGE UP
HCOV HKU1 RNA SPEC QL NAA+PROBE: SIGNIFICANT CHANGE UP
HCOV NL63 RNA SPEC QL NAA+PROBE: SIGNIFICANT CHANGE UP
HCOV OC43 RNA SPEC QL NAA+PROBE: SIGNIFICANT CHANGE UP
HCT VFR BLD CALC: 31.2 % — LOW (ref 39–50)
HGB BLD-MCNC: 10 G/DL — LOW (ref 13–17)
HMPV RNA SPEC QL NAA+PROBE: SIGNIFICANT CHANGE UP
HPIV1 RNA SPEC QL NAA+PROBE: SIGNIFICANT CHANGE UP
HPIV2 RNA SPEC QL NAA+PROBE: SIGNIFICANT CHANGE UP
HPIV3 RNA SPEC QL NAA+PROBE: SIGNIFICANT CHANGE UP
HPIV4 RNA SPEC QL NAA+PROBE: SIGNIFICANT CHANGE UP
HYALINE CASTS # UR AUTO: 1 /LPF — SIGNIFICANT CHANGE UP (ref 0–7)
IANC: 1.26 K/UL — LOW (ref 1.8–7.4)
INR BLD: 1.11 RATIO — SIGNIFICANT CHANGE UP (ref 0.88–1.16)
KETONES UR-MCNC: NEGATIVE — SIGNIFICANT CHANGE UP
LEUKOCYTE ESTERASE UR-ACNC: ABNORMAL
LYMPHOCYTES # BLD AUTO: 0.92 K/UL — LOW (ref 1–3.3)
LYMPHOCYTES # BLD AUTO: 33 % — SIGNIFICANT CHANGE UP (ref 13–44)
M PNEUMO DNA SPEC QL NAA+PROBE: SIGNIFICANT CHANGE UP
MANUAL SMEAR VERIFICATION: SIGNIFICANT CHANGE UP
MCHC RBC-ENTMCNC: 30.3 PG — SIGNIFICANT CHANGE UP (ref 27–34)
MCHC RBC-ENTMCNC: 32.1 GM/DL — SIGNIFICANT CHANGE UP (ref 32–36)
MCV RBC AUTO: 94.5 FL — SIGNIFICANT CHANGE UP (ref 80–100)
MONOCYTES # BLD AUTO: 0.2 K/UL — SIGNIFICANT CHANGE UP (ref 0–0.9)
MONOCYTES NFR BLD AUTO: 7 % — SIGNIFICANT CHANGE UP (ref 2–14)
NEUTROPHILS # BLD AUTO: 1.4 K/UL — LOW (ref 1.8–7.4)
NEUTROPHILS NFR BLD AUTO: 50 % — SIGNIFICANT CHANGE UP (ref 43–77)
NITRITE UR-MCNC: NEGATIVE — SIGNIFICANT CHANGE UP
NRBC # BLD: 0 /100 — SIGNIFICANT CHANGE UP (ref 0–0)
PH UR: 6 — SIGNIFICANT CHANGE UP (ref 5–8)
PLAT MORPH BLD: NORMAL — SIGNIFICANT CHANGE UP
PLATELET # BLD AUTO: 213 K/UL — SIGNIFICANT CHANGE UP (ref 150–400)
PLATELET COUNT - ESTIMATE: NORMAL — SIGNIFICANT CHANGE UP
POTASSIUM SERPL-MCNC: 3.8 MMOL/L — SIGNIFICANT CHANGE UP (ref 3.5–5.3)
POTASSIUM SERPL-SCNC: 3.8 MMOL/L — SIGNIFICANT CHANGE UP (ref 3.5–5.3)
PROT SERPL-MCNC: 6.8 G/DL — SIGNIFICANT CHANGE UP (ref 6–8.3)
PROT UR-MCNC: ABNORMAL
PROTHROM AB SERPL-ACNC: 12.9 SEC — SIGNIFICANT CHANGE UP (ref 10.5–13.4)
RAPID RVP RESULT: SIGNIFICANT CHANGE UP
RBC # BLD: 3.3 M/UL — LOW (ref 4.2–5.8)
RBC # FLD: 15 % — HIGH (ref 10.3–14.5)
RBC BLD AUTO: SIGNIFICANT CHANGE UP
RBC CASTS # UR COMP ASSIST: 1 /HPF — SIGNIFICANT CHANGE UP (ref 0–4)
RSV RNA SPEC QL NAA+PROBE: SIGNIFICANT CHANGE UP
RV+EV RNA SPEC QL NAA+PROBE: SIGNIFICANT CHANGE UP
SARS-COV-2 RNA SPEC QL NAA+PROBE: SIGNIFICANT CHANGE UP
SODIUM SERPL-SCNC: 139 MMOL/L — SIGNIFICANT CHANGE UP (ref 135–145)
SP GR SPEC: 1.02 — SIGNIFICANT CHANGE UP (ref 1–1.05)
TROPONIN T, HIGH SENSITIVITY RESULT: 34 NG/L — SIGNIFICANT CHANGE UP
TROPONIN T, HIGH SENSITIVITY RESULT: 38 NG/L — SIGNIFICANT CHANGE UP
UROBILINOGEN FLD QL: SIGNIFICANT CHANGE UP
VARIANT LYMPHS # BLD: 2 % — SIGNIFICANT CHANGE UP (ref 0–6)
WBC # BLD: 2.8 K/UL — LOW (ref 3.8–10.5)
WBC # FLD AUTO: 2.8 K/UL — LOW (ref 3.8–10.5)
WBC UR QL: >50 /HPF — HIGH (ref 0–5)

## 2022-05-14 PROCEDURE — 99291 CRITICAL CARE FIRST HOUR: CPT

## 2022-05-14 PROCEDURE — 70450 CT HEAD/BRAIN W/O DYE: CPT | Mod: 26,MA

## 2022-05-14 PROCEDURE — 71045 X-RAY EXAM CHEST 1 VIEW: CPT | Mod: 26

## 2022-05-14 RX ORDER — AMPICILLIN TRIHYDRATE 250 MG
2 CAPSULE ORAL ONCE
Refills: 0 | Status: COMPLETED | OUTPATIENT
Start: 2022-05-14 | End: 2022-05-14

## 2022-05-14 NOTE — CONSULT NOTE ADULT - ATTENDING COMMENTS
Mr. Garcia is an 81 yo man with h/o multiple strokes with encephalopathy - per nurse - continues to have fluctuating mental status.   DDx: toxic metabolic septic encephalopathy; seizure; less likely new stroke.  EEG  Work up for any acute medical illness per medical team.   MRI brain  I agree with work up and management as above.     Thank you

## 2022-05-14 NOTE — ED ADULT NURSE NOTE - NSIMPLEMENTINTERV_GEN_ALL_ED
Implemented All Fall Risk Interventions:  [place] to call system. Call bell, personal items and telephone within reach. Instruct patient to call for assistance. Room bathroom lighting operational. Non-slip footwear when patient is off stretcher. Physically safe environment: no spills, clutter or unnecessary equipment. Stretcher in lowest position, wheels locked, appropriate side rails in place. Provide visual cue, wrist band, yellow gown, etc. Monitor gait and stability. Monitor for mental status changes and reorient to person, place, and time. Review medications for side effects contributing to fall risk. Reinforce activity limits and safety measures with patient and family.

## 2022-05-14 NOTE — ED ADULT NURSE NOTE - NSICDXPASTSURGICALHX_GEN_ALL_CORE_FT
PAST SURGICAL HISTORY:  Cardiac pacemaker [employer] (SN: [id number]; Model: 20957)    S/P coronary artery stent placement

## 2022-05-14 NOTE — ED PROVIDER NOTE - ATTENDING CONTRIBUTION TO CARE
I performed a face-to-face evaluation of the patient and performed a history and physical examination. I agree with the history and physical examination. If this was a PA visit, I personally saw the patient with the PA and performed a substantive portion of the visit including all aspects of the medical decision making.    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 P/w AMS. Code Stroke called. DDx: CVA, ACS, infectious, metabolic causes. Will get CT/CTA, EKG, trop, CBC, CMP. Likely admit.    I have personally provided the amount of critical care time documented below, excluding time spent on separate procedures. I spoke with the consulting service or read their note/recommendations. I performed a face-to-face evaluation of the patient and performed a history and physical examination. I agree with the history and physical examination. If this was a PA visit, I personally saw the patient with the PA and performed a substantive portion of the visit including all aspects of the medical decision making.    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 P/w AMS. Code Stroke called. DDx: CVA, ACS, infectious, metabolic causes. Will get CT/CTA, EKG, trop, CBC, CMP. Likely admit.    I have personally provided the amount of critical care time documented below, excluding time spent on separate procedures. I spoke with the consulting service or read their note/recommendations. I spoke with several family member(s).

## 2022-05-14 NOTE — ED ADULT NURSE REASSESSMENT NOTE - NS ED NURSE REASSESS COMMENT FT1
Received report from ARIE Winters. Pt baseline mental status, bed bound. Breathing even and unlabored. Straight cath for urine samples, with 200mL output.

## 2022-05-14 NOTE — ED ADULT NURSE NOTE - OBJECTIVE STATEMENT
pt received to 5 awake and alert following some commands.  as per EMS, pt is normally more responsive and verbal.  pt took a nap at 12 PM and when he woke up he was less responsive and has a noted right sided facia droop.  MD at bedside, neuro at bedside.  pt enroute to CT, report given to primary RN Siobhan

## 2022-05-14 NOTE — ED ADULT TRIAGE NOTE - HEIGHT IN FEET
HISTORY OF PRESENT ILLNESS:  Mateo presents today for follow-up injury right chest wall. Originally seen ER at [facility] in am told no fracture seen. He's really been struggling with focal rib pain no suggestive of a contusion.  He has been back to work limited duties in particular no torquing or laying on that side. 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 Reports at least 50% improvement since the original incident.  He denies increasing shortness of breath. Coughing still causes some discomfort focally there. He has cut down dramatically and smoking.  Denies increasing shortness of breath. No hemoptysis    PAST MEDICAL HISTORY, SOCIAL HISTORY, MEDICATIONS:  All reviewed in [facility].    REVIEW OF SYSTEMS:  No other pertinent positives.    PHYSICAL EXAM:  VITAL SIGNS:   Visit Vitals  /66 (BP Location: [facility] [place] Position: Sitting, Cuff Size: Regular)   Pulse 89   Temp 97.8 °F (36.6 °C) (Oral)   Resp 15   Ht 6' 2\" (1.88 m)   Wt 108.9 kg   BMI 30.81 kg/m²     Pleasant alert non-ill-appearing gentleman  Auscultation reveals good lung sounds throughout.  Palpation reveals some focal tenderness anterior lateral aspect lower rib cage roughly rib #9 or 10. No crepitance    LABORATORY STUDIES:      IMPRESSION:  (S20.211A) Contusion of rib on right side, subsequent encounter  (primary encounter diagnosis)  Plan: Patient with suspected contusion or potential occult fracture lower right rib slowly progressing as expected. We'll simply keep him protected with continued restriction for the next 2 weeks area did reassess at that point would anticipate getting back to normal duties. Again would like to get the reports from the emergency room from [facility]      PLAN:  No orders of the defined types were placed in this encounter.      
5
99

## 2022-05-14 NOTE — ED ADULT NURSE NOTE - NSFALLRSKPASTHIST_ED_ALL_ED
I will STOP taking the medications listed below when I get home from the hospital:  None unable to determine

## 2022-05-14 NOTE — STROKE CODE NOTE - MRS SCORE
(4) Moderately severe disabilty.  Unable to attend to own bodily needs without assistance, and unable to walk unassisted. (5) Severe disability. Requires constant nursing care and attention, bedridden, incontent.

## 2022-05-14 NOTE — ED PROVIDER NOTE - CLINICAL SUMMARY MEDICAL DECISION MAKING FREE TEXT BOX
Joel-PGY3: 82 year old male with PMH HTN, HLD, CVA with residual right sided weakness, CAD s/p PCI, HFpEF, bedbound presents with AMS x 1 day. LKN ~1200 today, per EMS pt took a nap at approximately 1200, awoke at ~1400 not speaking or interactive as he usually is. Code stroke called at triage. Unclear etiology, ddx includes infectious, metabolic, CVA. Will obtain labs, imaging, supportive treatment with dispo pending workup.

## 2022-05-14 NOTE — ED ADULT NURSE REASSESSMENT NOTE - NS ED NURSE REASSESS COMMENT FT1
Unable to obtain blood cultures at this time. Pt agitated and unable to remain still for blood draw.

## 2022-05-14 NOTE — CONSULT NOTE ADULT - ASSESSMENT
81yo R-handed man with PMH of CVA (1998 with residual RUE weakness), HTN, HLD, DM, s/p PPM extraction 12/2021, HFpEF, sinus node dysfunction, and dementia (baseline AAOx1-2) presented to the ED with speech disturbance. Neurology consulted as code stroke. LKN 5/14/22 at 12:00 before he took a nap. Pt woke up at 14:00 unable to speak. Code stroke was called. Collateral obtained by patient's wife (Ms. Multani [phone]). She reports patient was less responsive after his nap he was alert and talking this morning. At baseline, speaks but has some word finding issues, bedbound, needs help with all ADLs. Of note, pt was admitted 4/15-4/29/22 for UTI and enterococcus bacteremia. Pt on aspirin 81mg daily, no anticoagulation. No hx of seizures. NIHSS: 16, preMRS: 5. No tpa or MT. [facility] w/ ___    Impression:    Recommendations:     81yo R-handed man with PMH of CVA (1998 with residual RUE weakness), HTN, HLD, DM, s/p PPM extraction 12/2021, HFpEF, sinus node dysfunction, and dementia (baseline AAOx1-2) presented to the ED with speech disturbance. LKN 5/14/22 at 12:00 before he took a nap. Pt woke up at 14:00 unable to speak. Per wife, patient was less responsive after his nap he was alert and talking this morning. At baseline, speaks but has some word finding issues, bedbound, needs help with all ADLs. Of note, pt was admitted 4/15-4/29/22 for UTI and enterococcus bacteremia. Pt on aspirin 81mg daily, no anticoagulation. No hx of seizures. NIHSS: 16, preMRS: 5. No tpa or MT. CTH w/ very motion limited,     Impression:    Recommendations:     83yo R-handed man with PMH of CVA (1998 with residual RUE weakness), HTN, HLD, DM, s/p PPM extraction 12/2021, HFpEF, sinus node dysfunction, and dementia (baseline AAOx1-2) presented to the ED with speech disturbance. LKN 5/14/22 at 12:00 before he took a nap. Pt woke up at 14:00 unable to speak. Per wife, patient was less responsive after his nap he was alert and talking this morning. At baseline, speaks but has some word finding issues, bedbound, needs help with all ADLs. Of note, pt was admitted 4/15-4/29/22 for UTI and enterococcus bacteremia. Pt on aspirin 81mg daily, no anticoagulation. No hx of seizures. NIHSS: 16, preMRS: 5. No tpa or MT. CTH w/ very motion limited, no overt hemorrhage or edema. CTA deferred with discussion with telestroke attending. Neuro exam with chronic R facial droop, AAOx2, RUE contracture, rest of extremities w/ drift, upgoing R babinski    Impression: transient speech disturbance possibly 2/2 TIA/minor stroke vs. focal seizure vs. toxic/metabolic/infectious etiology    Recommendations:  [] toxic/metabolic/infectious workup per primary team  [] rEEG  [] MRI brain without contrast, can be done inpatient vs. outpatient  [x] TTE 4/20/22: LVEF 60-65%   [] monitor on telemetry  [] ILR to assess for afib per STROKE-AF trial, can be done inpatient vs. outpatient  [] continue home ASA 81 mg PO daily   [] increase home atorvastatin 20mg to 40mg PO daily (titrate to LDL < 70)   [] stroke risk factor modification and counseling   [] check HA1c, lipid panel, Utox  [] NPO until bedside dysphagia screen  [] Patient can follow up with Dr. Milind Duke after discharge. Please instruct the patient to call [phone] to schedule an appointment within the next 2-3 days. Office is located at [address] [place] [address].    Case discussed with telestroke attending Dr. Josue  Case to be seen and discussed with Dr. Gonzalez         81yo R-handed man with PMH of CVA (1998 with residual RUE weakness), HTN, HLD, DM, s/p PPM extraction 12/2021, HFpEF, sinus node dysfunction, and dementia (baseline AAOx1-2) presented to the ED with speech disturbance. LKN 5/14/22 at 12:00 before he took a nap. Pt woke up at 14:00 unable to speak. Per wife, patient was less responsive after his nap he was alert and talking this morning. He was also noted to have his eyes closed, clenching his L fist and leaning to the R. At baseline, speaks but has some word finding issues, bedbound, needs help with all ADLs. Of note, pt was admitted 4/15-4/29/22 for UTI and enterococcus bacteremia. Pt on aspirin 81mg daily, no anticoagulation. No hx of seizures. NIHSS: 16, preMRS: 5. No tpa or MT. CTH w/ very motion limited, no overt hemorrhage or edema. CTA deferred with discussion with telestroke attending. Neuro exam with chronic R facial droop, AAOx2, RUE contracture, rest of extremities w/ drift, upgoing R babinski. In the ED, pt noted to be back to baseline (confirmed wtih wife), speaking after CT scan.     Impression: transient speech disturbance, back to baseline, possibly 2/2 TIA/minor stroke vs. focal seizure vs. toxic/metabolic/infectious etiology    Recommendations:  [] toxic/metabolic/infectious workup per primary team  [] rEEG  [] monitor off AEDs  [] aspiration/seizure/fall precautions  [] MRI brain without contrast, can be done inpatient vs. outpatient  [x] TTE 4/20/22: LVEF 60-65%   [] monitor on telemetry  [] ILR to assess for afib per STROKE-AF trial, can be done inpatient vs. outpatient  [] continue home ASA 81 mg PO daily   [] start plavix 75mg daily per CHANCE trial  [] increase home atorvastatin 20mg to 40mg PO daily (titrate to LDL < 70)   [] stroke risk factor modification and counseling   [] check HA1c, lipid panel, Utox  [] NPO until bedside dysphagia screen  [] Patient can follow up with Dr. Milind Duke after discharge. Please instruct the patient to call [phone] to schedule an appointment within the next 2-3 days. Office is located at [address] [place] [address].    Case discussed with telestroke attending Dr. Josue  Case to be seen and discussed with Dr. Gonzalez         83yo R-handed man with PMH of multiple CVAs (residual RUE weakness, moderate aphasia), HTN, HLD, DM, s/p PPM extraction 12/2021, HFpEF, sinus node dysfunction, and dementia (baseline AAOx1-2) presented to the ED with speech disturbance. LKN 5/14/22 at 12:00 before he took a nap. Pt woke up at 14:00 unable to speak. Per wife, patient was less responsive after his nap he was alert and talking this morning. He was also noted to have his eyes closed, clenching his L fist and leaning to the R. At baseline, speaks but has some word finding issues, bedbound, needs help with all ADLs. Of note, pt was admitted 4/15-4/29/22 for UTI and enterococcus bacteremia. Pt on aspirin 81mg daily, no anticoagulation. No hx of seizures. NIHSS: 16, preMRS: 5. No tpa or MT. CTH w/ very motion limited, no overt hemorrhage or edema. CTA deferred with discussion with telestroke attending. 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 Smaller right thalamic and basal ganglia lacunar infarctions. Neuro exam with chronic R facial droop, AAOx2, RUE contracture, rest of extremities w/ drift, upgoing R babinski. In the ED, pt noted to be back to baseline (confirmed wtih wife), speaking after CT scan.     Impression: transient speech disturbance, back to baseline, possibly 2/2 TIA/minor stroke vs. focal seizure vs. toxic/metabolic/infectious etiology    Recommendations:  [] toxic/metabolic/infectious workup per primary team  [] rEEG  [] monitor off AEDs  [] aspiration/seizure/fall precautions  [] MRI brain without contrast, can be done inpatient vs. outpatient  [x] TTE 4/20/22: LVEF 60-65%   [] monitor on telemetry  [] ILR to assess for afib per STROKE-AF trial, can be done inpatient vs. outpatient  [] continue home ASA 81 mg PO daily   [] start plavix 75mg daily per CHANCE trial  [] increase home atorvastatin 20mg to 40mg PO daily (titrate to LDL < 70)   [] stroke risk factor modification and counseling   [] check HA1c, lipid panel, Utox  [] NPO until bedside dysphagia screen  [] Patient can follow up with Dr. Milind Duke after discharge. Please instruct the patient to call [phone] to schedule an appointment within the next 2-3 days. Office is located at [address] [place] [address].    Case discussed with telestroke attending Dr. Josue  Case to be seen and discussed with Dr. Gonzalez

## 2022-05-14 NOTE — CONSULT NOTE ADULT - SUBJECTIVE AND OBJECTIVE BOX
HPI:  83yo R-handed man with PMH of CVA (1998 with residual RUE weakness), HTN, HLD, DM, s/p PPM extraction 12/2021, HFpEF, sinus node dysfunction, and dementia (baseline AAOx1-2) presented to the ED with speech disturbance. Neurology consulted as code stroke. LKN 5/14/22 at 12:00 before he took a nap. Pt woke up at 14:00 unable to speak. Code stroke was called. Collateral obtained by patient's wife (Ms. Multani [phone]). She reports patient was less responsive after his nap he was alert and talking this morning. At baseline, speaks but has some word finding issues, bedbound, needs help with all ADLs. Of note, pt was admitted 4/15-4/29/22 for UTI and enterococcus bacteremia. Pt on aspirin 81mg daily, no anticoagulation. No hx of seizures.    LKN: 5/14/22 at 12:00  NIHSS: 16  preMRS: 5  Pt is not a candidate for tpa due to outside tpa window   Pt is not a candidate for mechanical thrombectomy due to high preMRS    REVIEW OF SYSTEMS    A 10-system ROS was performed and is negative except for those items noted above and/or in the HPI.    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, 1998      HTN (hypertension)      Dementia      HLD (hyperlipidemia)      DM (diabetes mellitus)      Asthma      CAD (coronary artery disease)  s/p stent placement      Sinus node dysfunction  s/p Medtronic PPM      Cardiac pacemaker  Medtronic (SN: [id number]; Model: [id number])      S/P coronary artery stent placement        FAMILY HISTORY:  FH: diabetes mellitus  Mother, Brother    FHx: dementia  Father      SOCIAL HISTORY:   T/E/D:   Occupation:   Lives with:     MEDICATIONS (HOME):  Home Medications:  acetaminophen 325 mg oral tablet: 2 tab(s) orally every 6 hours, As needed, Temp greater or equal to 38C (100.4F), Mild Pain (1 - 3) (28 Apr 2022 11:40)  Albuterol (Eqv-Proventil HFA) 90 mcg/inh inhalation aerosol: 2 puff(s) inhaled every 6 hours, As Needed (15 Apr 2022 18:32)  ampicillin: 2 gram(s) intravenously every 6 hours  Last day 5/1/22. Needs repeat blood cultures x2 when antibiotics completed  (29 Apr 2022 09:51)  aspirin 81 mg oral delayed release tablet: 1 tab(s) orally once a day (15 Apr 2022 18:32)  atorvastatin 20 mg oral tablet: 1 tab(s) orally once a day (15 Apr 2022 18:32)  polyethylene glycol 3350 oral powder for reconstitution: 17 gram(s) orally once a day, As needed, Constipation (28 Apr 2022 11:40)  senna oral tablet: 2 tab(s) orally once a day (at bedtime) (28 Apr 2022 11:40)  tamsulosin 0.4 mg oral capsule: 1 cap(s) orally once a day (at bedtime) (29 Apr 2022 09:51)    MEDICATIONS  (STANDING):    MEDICATIONS  (PRN):    ALLERGIES/INTOLERANCES:  Allergies  No Known Allergies    Intolerances    VITALS & EXAMINATION:  Vital Signs Last 24 Hrs  T(C): 36.7 (14 May 2022 16:34), Max: 36.7 (14 May 2022 16:34)  T(F): 98 (14 May 2022 16:34), Max: 98 (14 May 2022 16:34)  HR: 58 (14 May 2022 16:34) (58 - 58)  BP: 160/88 (14 May 2022 16:34) (160/88 - 160/88)  BP(mean): --  RR: 18 (14 May 2022 16:34) (18 - 18)  SpO2: 99% (14 May 2022 16:34) (99% - 99%)    General:  Constitutional: cachetic male, appears stated age, in no apparent distress including pain  Head: Normocephalic & atraumatic.  Respiratory: No increased work of breathing  Extremities: No cyanosis, clubbing, or edema.  Skin: No rashes, bruising, or discoloration.    Gen: NAD, nontoxic  HEENT: normocephalic, atraumatic, eyes non icteric, MMM    Neurologic:  - Mental Status: Eyes open, awake, alert, following some commands (show 2 fingers), shakes head no to orientation questions  - Cranial Nerves: Pupils are 2mm, equal, round, and reactive to light; Mild OD esotropia in primary gaze. Face shows chronic R facial at rest. Head turning intact.  - Motor: RUE is chronically severely contracted. Rest of extremities are antigravity. LUE drift, does not hit bed within 10 seconds. B/l LE do not hit bed in 5 seconds but with drift. Legs crossed.  - Reflexes:  Plantar responses down in the L and up in the R.  - Sensory: Withdraws throughout extremities to noxious stimuli  - Coordination: Patient unable to complete task.      LABORATORY:    STUDIES & IMAGING:  Studies (EKG, EEG, EMG, etc):     Radiology (XR, CT, MR, U/S, TTE/HARIKA): HPI:  83yo R-handed man with PMH of CVA (1998 with residual RUE weakness), HTN, HLD, DM, s/p PPM extraction 12/2021, HFpEF, sinus node dysfunction, and dementia (baseline AAOx1-2) presented to the ED with speech disturbance. Neurology consulted as code stroke. LKN 5/14/22 at 12:00 before he took a nap. Pt woke up at 14:00 unable to speak. Code stroke was called. Collateral obtained by patient's wife (Ms. Multani [phone]). She reports patient was less responsive after his nap he was alert and talking this morning. At baseline, speaks but has some word finding issues, bedbound, needs help with all ADLs. Of note, pt was admitted 4/15-4/29/22 for UTI and enterococcus bacteremia. Pt on aspirin 81mg daily, no anticoagulation. No hx of seizures.    LKN: 5/14/22 at 12:00  NIHSS: 16  preMRS: 5  Pt is not a candidate for tpa due to outside tpa window   Pt is not a candidate for mechanical thrombectomy due to high preMRS    REVIEW OF SYSTEMS    A 10-system ROS was performed and is negative except for those items noted above and/or in the HPI.    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, 1998      HTN (hypertension)      Dementia      HLD (hyperlipidemia)      DM (diabetes mellitus)      Asthma      CAD (coronary artery disease)  s/p stent placement      Sinus node dysfunction  s/p Medtronic PPM      Cardiac pacemaker  Medtronic (SN: [id number]; Model: [id number])      S/P coronary artery stent placement        FAMILY HISTORY:  FH: diabetes mellitus  Mother, Brother    FHx: dementia  Father      SOCIAL HISTORY:   T/E/D:   Occupation:   Lives with:     MEDICATIONS (HOME):  Home Medications:  acetaminophen 325 mg oral tablet: 2 tab(s) orally every 6 hours, As needed, Temp greater or equal to 38C (100.4F), Mild Pain (1 - 3) (28 Apr 2022 11:40)  Albuterol (Eqv-Proventil HFA) 90 mcg/inh inhalation aerosol: 2 puff(s) inhaled every 6 hours, As Needed (15 Apr 2022 18:32)  ampicillin: 2 gram(s) intravenously every 6 hours  Last day 5/1/22. Needs repeat blood cultures x2 when antibiotics completed  (29 Apr 2022 09:51)  aspirin 81 mg oral delayed release tablet: 1 tab(s) orally once a day (15 Apr 2022 18:32)  atorvastatin 20 mg oral tablet: 1 tab(s) orally once a day (15 Apr 2022 18:32)  polyethylene glycol 3350 oral powder for reconstitution: 17 gram(s) orally once a day, As needed, Constipation (28 Apr 2022 11:40)  senna oral tablet: 2 tab(s) orally once a day (at bedtime) (28 Apr 2022 11:40)  tamsulosin 0.4 mg oral capsule: 1 cap(s) orally once a day (at bedtime) (29 Apr 2022 09:51)    MEDICATIONS  (STANDING):    MEDICATIONS  (PRN):    ALLERGIES/INTOLERANCES:  Allergies  No Known Allergies    Intolerances    VITALS & EXAMINATION:  Vital Signs Last 24 Hrs  T(C): 36.7 (14 May 2022 16:34), Max: 36.7 (14 May 2022 16:34)  T(F): 98 (14 May 2022 16:34), Max: 98 (14 May 2022 16:34)  HR: 58 (14 May 2022 16:34) (58 - 58)  BP: 160/88 (14 May 2022 16:34) (160/88 - 160/88)  BP(mean): --  RR: 18 (14 May 2022 16:34) (18 - 18)  SpO2: 99% (14 May 2022 16:34) (99% - 99%)    General:  Constitutional: cachetic male, appears stated age, in no apparent distress including pain  Head: Normocephalic & atraumatic.  Respiratory: No increased work of breathing  Extremities: No cyanosis, clubbing, or edema.  Skin: No rashes, bruising, or discoloration.    Gen: NAD, nontoxic  HEENT: normocephalic, atraumatic, eyes non icteric, MMM    Neurologic:  - Mental Status: Eyes open, awake, alert, following some commands (show 2 fingers, lift leg), shakes head no to orientation questions; on subsequent exam, pt able to say this name and able to say hospital but not answering all questions.  - Cranial Nerves: Pupils are 2mm, equal, round, and reactive to light; Mild OD esotropia in primary gaze. Face shows chronic R facial at rest. Head turning intact.  - Motor: RUE is chronically severely contracted. Rest of extremities are antigravity. LUE drift, does not hit bed within 10 seconds. B/l LE do not hit bed in 5 seconds but with drift, moving RLE slightly less than LLE. Legs crossed.  - Reflexes:  Plantar responses down in the L and up in the R.  - Sensory: Withdraws throughout extremities to noxious stimuli  - Coordination: Patient unable to complete task.      LABORATORY:    STUDIES & IMAGING:  Studies (EKG, EEG, EMG, etc):     Radiology (XR, CT, MR, U/S, TTE/HARIKA): HPI:  83yo R-handed man with PMH of CVA (1998 with residual RUE weakness), HTN, HLD, DM, s/p PPM extraction 12/2021, HFpEF, sinus node dysfunction, and dementia (baseline AAOx1-2) presented to the ED with speech disturbance. Neurology consulted as code stroke. LKN 5/14/22 at 12:00 before he took a nap. Pt woke up at 14:00 unable to speak. Code stroke was called. Collateral obtained by patient's wife (Ms. Multani [phone]). She reports patient was less responsive after his nap he was alert and talking this morning. At baseline, speaks but has some word finding issues, bedbound, needs help with all ADLs. Of note, pt was admitted 4/15-4/29/22 for UTI and enterococcus bacteremia. Pt on aspirin 81mg daily, no anticoagulation. No hx of seizures.    LKN: 5/14/22 at 12:00  NIHSS: 16  preMRS: 5  Pt is not a candidate for tpa due to outside tpa window   Pt is not a candidate for mechanical thrombectomy due to high preMRS    REVIEW OF SYSTEMS    A 10-system ROS was performed and is negative except for those items noted above and/or in the HPI.    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, 1998      HTN (hypertension)      Dementia      HLD (hyperlipidemia)      DM (diabetes mellitus)      Asthma      CAD (coronary artery disease)  s/p stent placement      Sinus node dysfunction  s/p Medtronic PPM      Cardiac pacemaker  Medtronic (SN: [id number]; Model: [id number])      S/P coronary artery stent placement        FAMILY HISTORY:  FH: diabetes mellitus  Mother, Brother    FHx: dementia  Father      SOCIAL HISTORY:   T/E/D:   Occupation:   Lives with:     MEDICATIONS (HOME):  Home Medications:  acetaminophen 325 mg oral tablet: 2 tab(s) orally every 6 hours, As needed, Temp greater or equal to 38C (100.4F), Mild Pain (1 - 3) (28 Apr 2022 11:40)  Albuterol (Eqv-Proventil HFA) 90 mcg/inh inhalation aerosol: 2 puff(s) inhaled every 6 hours, As Needed (15 Apr 2022 18:32)  ampicillin: 2 gram(s) intravenously every 6 hours  Last day 5/1/22. Needs repeat blood cultures x2 when antibiotics completed  (29 Apr 2022 09:51)  aspirin 81 mg oral delayed release tablet: 1 tab(s) orally once a day (15 Apr 2022 18:32)  atorvastatin 20 mg oral tablet: 1 tab(s) orally once a day (15 Apr 2022 18:32)  polyethylene glycol 3350 oral powder for reconstitution: 17 gram(s) orally once a day, As needed, Constipation (28 Apr 2022 11:40)  senna oral tablet: 2 tab(s) orally once a day (at bedtime) (28 Apr 2022 11:40)  tamsulosin 0.4 mg oral capsule: 1 cap(s) orally once a day (at bedtime) (29 Apr 2022 09:51)    MEDICATIONS  (STANDING):    MEDICATIONS  (PRN):    ALLERGIES/INTOLERANCES:  Allergies  No Known Allergies    Intolerances    VITALS & EXAMINATION:  Vital Signs Last 24 Hrs  T(C): 36.7 (14 May 2022 16:34), Max: 36.7 (14 May 2022 16:34)  T(F): 98 (14 May 2022 16:34), Max: 98 (14 May 2022 16:34)  HR: 58 (14 May 2022 16:34) (58 - 58)  BP: 160/88 (14 May 2022 16:34) (160/88 - 160/88)  BP(mean): --  RR: 18 (14 May 2022 16:34) (18 - 18)  SpO2: 99% (14 May 2022 16:34) (99% - 99%)    General:  Constitutional: cachetic male, appears stated age, in no apparent distress including pain  Head: Normocephalic & atraumatic.  Respiratory: No increased work of breathing  Extremities: No cyanosis, clubbing, or edema.  Skin: No rashes, bruising, or discoloration.    Gen: NAD, nontoxic  HEENT: normocephalic, atraumatic, eyes non icteric, MMM    Neurologic:  - Mental Status: Eyes open, awake, alert, following some commands (show 2 fingers, lift leg), shakes head no to orientation questions; on subsequent exam, pt able to say this name and able to say hospital but not answering all questions.  - Cranial Nerves: Pupils are 2mm, equal, round, and reactive to light; Mild OD esotropia in primary gaze. Face shows chronic R facial at rest. Head turning intact.  - Motor: RUE is chronically severely contracted. Rest of extremities are antigravity. LUE drift, does not hit bed within 10 seconds. B/l LE do not hit bed in 5 seconds but with drift, moving RLE slightly less than LLE. Legs crossed.  - Reflexes:  Plantar responses down in the L and up in the R.  - Sensory: Withdraws throughout extremities to noxious stimuli  - Coordination: Patient unable to complete task.      LABORATORY:    STUDIES & IMAGING:  Studies (EKG, EEG, EMG, etc):     Radiology (XR, CT, MR, U/S, TTE/HARIKA):    < from: CT Brain Stroke Protocol (05.14.22 @ 17:20) >  FINDINGS:  Markedly limited by motion.    No gross evidence for large acute intracranial hemorrhage or large brain   edema.    Similar extensive white matter microvascular ischemic disease.    Similar mild to moderate ventricular dilatation.    IMPRESSION:    Markedly limited by motion.    No gross evidence for large acute intracranial hemorrhage or large brain   edema.    Similar extensive white matter microvascular ischemic disease.    Similar mild to moderate ventricular dilatation.    < end of copied text >    < from: Transesophageal Echocardiogram w/o TTE (04.22.22 @ 10:46) >  ------------------------------------------------------------------------  CONCLUSIONS:  1. Mitral annular calcification, otherwise normal mitral  valve. Mild mitral regurgitation.  2. Calcified trileaflet aortic valve with normal opening.  3. Normal left ventricular systolic function. No segmental  wall motion abnormalities.  4. Normal right ventricular size and function.  5. Normal tricuspid valve.  6. Normal pulmonic valve.  No echocardiographic evidence of endocarditis on this  study.    < end of copied text >  < from: Transthoracic Echocardiogram (04.20.22 @ 08:52) >  Ejection Fraction (Visual Estimate): 60-65 %    < end of copied text >   HPI:  83yo R-handed man with PMH of CVA (1998 with residual RUE weakness), HTN, HLD, DM, s/p PPM extraction 12/2021, HFpEF, sinus node dysfunction, and dementia (baseline AAOx1-2) presented to the ED with speech disturbance. Neurology consulted as code stroke. LKN 5/14/22 at 12:00 before he took a nap. Pt woke up at 14:00 unable to speak. Code stroke was called. Collateral obtained by patient's wife (Ms. Multani [phone]). She reports patient was less responsive after his nap he was alert and talking this morning. He was also noted to have his eyes closed, clenching his L fist and leaning to the R. At baseline, speaks but has some word finding issues, bedbound, needs help with all ADLs. Of note, pt was admitted 4/15-4/29/22 for UTI and enterococcus bacteremia. Pt on aspirin 81mg daily, no anticoagulation. No hx of seizures. In the ED, pt noted to be back to normal, speaking after CT scan. Now back to baseline per wife.    LKN: 5/14/22 at 12:00  NIHSS: 16  preMRS: 5  Pt is not a candidate for tpa due to outside tpa window   Pt is not a candidate for mechanical thrombectomy due to high preMRS    REVIEW OF SYSTEMS    A 10-system ROS was performed and is negative except for those items noted above and/or in the HPI.    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, 1998      HTN (hypertension)      Dementia      HLD (hyperlipidemia)      DM (diabetes mellitus)      Asthma      CAD (coronary artery disease)  s/p stent placement      Sinus node dysfunction  s/p Medtronic PPM      Cardiac pacemaker  Medtronic (SN: [id number]; Model: [id number])      S/P coronary artery stent placement        FAMILY HISTORY:  FH: diabetes mellitus  Mother, Brother    FHx: dementia  Father      SOCIAL HISTORY:   T/E/D:   Occupation:   Lives with:     MEDICATIONS (HOME):  Home Medications:  acetaminophen 325 mg oral tablet: 2 tab(s) orally every 6 hours, As needed, Temp greater or equal to 38C (100.4F), Mild Pain (1 - 3) (28 Apr 2022 11:40)  Albuterol (Eqv-Proventil HFA) 90 mcg/inh inhalation aerosol: 2 puff(s) inhaled every 6 hours, As Needed (15 Apr 2022 18:32)  ampicillin: 2 gram(s) intravenously every 6 hours  Last day 5/1/22. Needs repeat blood cultures x2 when antibiotics completed  (29 Apr 2022 09:51)  aspirin 81 mg oral delayed release tablet: 1 tab(s) orally once a day (15 Apr 2022 18:32)  atorvastatin 20 mg oral tablet: 1 tab(s) orally once a day (15 Apr 2022 18:32)  polyethylene glycol 3350 oral powder for reconstitution: 17 gram(s) orally once a day, As needed, Constipation (28 Apr 2022 11:40)  senna oral tablet: 2 tab(s) orally once a day (at bedtime) (28 Apr 2022 11:40)  tamsulosin 0.4 mg oral capsule: 1 cap(s) orally once a day (at bedtime) (29 Apr 2022 09:51)    MEDICATIONS  (STANDING):    MEDICATIONS  (PRN):    ALLERGIES/INTOLERANCES:  Allergies  No Known Allergies    Intolerances    VITALS & EXAMINATION:  Vital Signs Last 24 Hrs  T(C): 36.7 (14 May 2022 16:34), Max: 36.7 (14 May 2022 16:34)  T(F): 98 (14 May 2022 16:34), Max: 98 (14 May 2022 16:34)  HR: 58 (14 May 2022 16:34) (58 - 58)  BP: 160/88 (14 May 2022 16:34) (160/88 - 160/88)  BP(mean): --  RR: 18 (14 May 2022 16:34) (18 - 18)  SpO2: 99% (14 May 2022 16:34) (99% - 99%)    General:  Constitutional: cachetic male, appears stated age, in no apparent distress including pain  Head: Normocephalic & atraumatic.  Respiratory: No increased work of breathing  Extremities: No cyanosis, clubbing, or edema.  Skin: No rashes, bruising, or discoloration.    Gen: NAD, nontoxic  HEENT: normocephalic, atraumatic, eyes non icteric, MMM    Neurologic:  - Mental Status: Eyes open, awake, alert, following some commands (show 2 fingers, lift leg), shakes head no to orientation questions; on subsequent exam, pt able to say this name and able to say hospital but not answering all questions.  - Cranial Nerves: Pupils are 2mm, equal, round, and reactive to light; Mild OD esotropia in primary gaze. Face shows chronic R facial at rest. Head turning intact.  - Motor: RUE is chronically severely contracted. Rest of extremities are antigravity. LUE drift, does not hit bed within 10 seconds. B/l LE do not hit bed in 5 seconds but with drift, moving RLE slightly less than LLE. Legs crossed.  - Reflexes:  Plantar responses down in the L and up in the R.  - Sensory: Withdraws throughout extremities to noxious stimuli  - Coordination: Patient unable to complete task.      LABORATORY:    STUDIES & IMAGING:  Studies (EKG, EEG, EMG, etc):     Radiology (XR, CT, MR, U/S, TTE/HARIKA):    < from: CT Brain Stroke Protocol (05.14.22 @ 17:20) >  FINDINGS:  Markedly limited by motion.    No gross evidence for large acute intracranial hemorrhage or large brain   edema.    Similar extensive white matter microvascular ischemic disease.    Similar mild to moderate ventricular dilatation.    IMPRESSION:    Markedly limited by motion.    No gross evidence for large acute intracranial hemorrhage or large brain   edema.    Similar extensive white matter microvascular ischemic disease.    Similar mild to moderate ventricular dilatation.    < end of copied text >    < from: Transesophageal Echocardiogram w/o TTE (04.22.22 @ 10:46) >  ------------------------------------------------------------------------  CONCLUSIONS:  1. Mitral annular calcification, otherwise normal mitral  valve. Mild mitral regurgitation.  2. Calcified trileaflet aortic valve with normal opening.  3. Normal left ventricular systolic function. No segmental  wall motion abnormalities.  4. Normal right ventricular size and function.  5. Normal tricuspid valve.  6. Normal pulmonic valve.  No echocardiographic evidence of endocarditis on this  study.    < end of copied text >  < from: Transthoracic Echocardiogram (04.20.22 @ 08:52) >  Ejection Fraction (Visual Estimate): 60-65 %    < end of copied text >   HPI:  83yo R-handed man with PMH of multiple CVAs (residual RUE weakness, moderate aphasia), HTN, HLD, DM, s/p PPM extraction 12/2021, HFpEF, sinus node dysfunction, and dementia (baseline AAOx1-2) presented to the ED with speech disturbance. Neurology consulted as code stroke. LKN 5/14/22 at 12:00 before he took a nap. Pt woke up at 14:00 unable to speak. Code stroke was called. Collateral obtained by patient's wife (Ms. Multani [phone]). She reports patient was less responsive after his nap he was alert and talking this morning. He was also noted to have his eyes closed, clenching his L fist and leaning to the R. At baseline, speaks but has some word finding issues, bedbound, needs help with all ADLs. Of note, pt was admitted 4/15-4/29/22 for UTI and enterococcus bacteremia. Pt on aspirin 81mg daily, no anticoagulation. No hx of seizures. In the ED, pt noted to be back to normal, speaking after CT scan. Now back to baseline per wife.    LKN: 5/14/22 at 12:00  NIHSS: 16  preMRS: 5  Pt is not a candidate for tpa due to outside tpa window   Pt is not a candidate for mechanical thrombectomy due to high preMRS    REVIEW OF SYSTEMS    A 10-system ROS was performed and is negative except for those items noted above and/or in the HPI.    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, 1998      HTN (hypertension)      Dementia      HLD (hyperlipidemia)      DM (diabetes mellitus)      Asthma      CAD (coronary artery disease)  s/p stent placement      Sinus node dysfunction  s/p Medtronic PPM      Cardiac pacemaker  Medtronic (SN: [id number]; Model: [id number])      S/P coronary artery stent placement        FAMILY HISTORY:  FH: diabetes mellitus  Mother, Brother    FHx: dementia  Father      SOCIAL HISTORY:   T/E/D:   Occupation:   Lives with:     MEDICATIONS (HOME):  Home Medications:  acetaminophen 325 mg oral tablet: 2 tab(s) orally every 6 hours, As needed, Temp greater or equal to 38C (100.4F), Mild Pain (1 - 3) (28 Apr 2022 11:40)  Albuterol (Eqv-Proventil HFA) 90 mcg/inh inhalation aerosol: 2 puff(s) inhaled every 6 hours, As Needed (15 Apr 2022 18:32)  ampicillin: 2 gram(s) intravenously every 6 hours  Last day 5/1/22. Needs repeat blood cultures x2 when antibiotics completed  (29 Apr 2022 09:51)  aspirin 81 mg oral delayed release tablet: 1 tab(s) orally once a day (15 Apr 2022 18:32)  atorvastatin 20 mg oral tablet: 1 tab(s) orally once a day (15 Apr 2022 18:32)  polyethylene glycol 3350 oral powder for reconstitution: 17 gram(s) orally once a day, As needed, Constipation (28 Apr 2022 11:40)  senna oral tablet: 2 tab(s) orally once a day (at bedtime) (28 Apr 2022 11:40)  tamsulosin 0.4 mg oral capsule: 1 cap(s) orally once a day (at bedtime) (29 Apr 2022 09:51)    MEDICATIONS  (STANDING):    MEDICATIONS  (PRN):    ALLERGIES/INTOLERANCES:  Allergies  No Known Allergies    Intolerances    VITALS & EXAMINATION:  Vital Signs Last 24 Hrs  T(C): 36.7 (14 May 2022 16:34), Max: 36.7 (14 May 2022 16:34)  T(F): 98 (14 May 2022 16:34), Max: 98 (14 May 2022 16:34)  HR: 58 (14 May 2022 16:34) (58 - 58)  BP: 160/88 (14 May 2022 16:34) (160/88 - 160/88)  BP(mean): --  RR: 18 (14 May 2022 16:34) (18 - 18)  SpO2: 99% (14 May 2022 16:34) (99% - 99%)    General:  Constitutional: cachetic male, appears stated age, in no apparent distress including pain  Head: Normocephalic & atraumatic.  Respiratory: No increased work of breathing  Extremities: No cyanosis, clubbing, or edema.  Skin: No rashes, bruising, or discoloration.    Gen: NAD, nontoxic  HEENT: normocephalic, atraumatic, eyes non icteric, MMM    Neurologic:  - Mental Status: Eyes open, awake, alert, following some commands (show 2 fingers, lift leg), shakes head no to orientation questions; on subsequent exam, pt able to say this name and able to say hospital but not answering all questions.  - Cranial Nerves: Pupils are 2mm, equal, round, and reactive to light; Mild OD esotropia in primary gaze. Face shows chronic R facial at rest. Head turning intact.  - Motor: RUE is chronically severely contracted. Rest of extremities are antigravity. LUE drift, does not hit bed within 10 seconds. B/l LE do not hit bed in 5 seconds but with drift, moving RLE slightly less than LLE. Legs crossed.  - Reflexes:  Plantar responses down in the L and up in the R.  - Sensory: Withdraws throughout extremities to noxious stimuli  - Coordination: Patient unable to complete task.      LABORATORY:    STUDIES & IMAGING:  Studies (EKG, EEG, EMG, etc):     Radiology (XR, CT, MR, U/S, TTE/HARIKA):    < from: CT Brain Stroke Protocol (05.14.22 @ 17:20) >  FINDINGS:  Markedly limited by motion.    No gross evidence for large acute intracranial hemorrhage or large brain   edema.    Similar extensive white matter microvascular ischemic disease.    Similar mild to moderate ventricular dilatation.    IMPRESSION:    Markedly limited by motion.    No gross evidence for large acute intracranial hemorrhage or large brain   edema.    Similar extensive white matter microvascular ischemic disease.    Similar mild to moderate ventricular dilatation.    < end of copied text >    < from: Transesophageal Echocardiogram w/o TTE (04.22.22 @ 10:46) >  ------------------------------------------------------------------------  CONCLUSIONS:  1. Mitral annular calcification, otherwise normal mitral  valve. Mild mitral regurgitation.  2. Calcified trileaflet aortic valve with normal opening.  3. Normal left ventricular systolic function. No segmental  wall motion abnormalities.  4. Normal right ventricular size and function.  5. Normal tricuspid valve.  6. Normal pulmonic valve.  No echocardiographic evidence of endocarditis on this  study.    < end of copied text >  < from: Transthoracic Echocardiogram (04.20.22 @ 08:52) >  Ejection Fraction (Visual Estimate): 60-65 %    < end of copied text >    < from: CT Head No Cont (02.08.22 @ 22:26) >  FINDINGS:    There is no acute intracranial mass-effect, hemorrhage, midline shift, or   abnormal extra-axial fluid collection.    Chronic left gangliocapsular and thalamocapsular lacunar type infarctions   extending to the corona radiata and centrum semiovale. Smaller right   thalamic and basal ganglia lacunar infarctions are present to a lesser   extent.    Diffuse regions of periventricular and deep cerebral white matter   hypoattenuation due to advanced chronic microangiopathic ischemic   changes. Atheromatous calcifications along the carotid siphons are   present.    Moderate to marked centrally predominant cerebral volume loss noted.  No   evidence of hydrocephalus. Basal cisterns are patent.    Visualized paranasal sinuses  and mastoid air cells are clear. Calvarium   is intact.    IMPRESSION:    No acute intracranial bleeding.    Advanced central volume loss, extensive chronic microvascular ischemic   changes, and chronic left greater than right lacunar infarctions.    < end of copied text >

## 2022-05-14 NOTE — ED PROVIDER NOTE - NSICDXPASTSURGICALHX_GEN_ALL_CORE_FT
PAST SURGICAL HISTORY:  Cardiac pacemaker [employer] (SN: [id number]; Model: 69797)    S/P coronary artery stent placement

## 2022-05-14 NOTE — ED PROVIDER NOTE - PROGRESS NOTE DETAILS
[place]: Pt. had SSS and a pacer that was removed 2/2 blood infection. Has known bradycardia. HR here ~45.

## 2022-05-14 NOTE — ED PROVIDER NOTE - PHYSICAL EXAMINATION
CONSTITUTIONAL: cachectic appearing  SKIN: no rash, no petechiae.  EYES: PERRL, pink conjunctiva, anicteric  NECK: Supple; no meningismus, no JVD  CARD: RRR, no murmurs, equal radial pulses bilaterally 2+  RESP: CTAB, no respiratory distress  ABD: Soft, non-tender, non-distended, no peritoneal signs  EXT: Right sided extremity contractures. Left sided motor limited by patient cooperation, strength intact LUE and LLE.   NEURO: Alert, eyes open spontaneously.  Right sided extremity contractures. Left sided motor limited by patient cooperation, strength intact LUE and LLE. Right sided facial droop noted.

## 2022-05-14 NOTE — ED ADULT TRIAGE NOTE - CHIEF COMPLAINT QUOTE
Patient brought to ER by EMS as a notification for altered mental status. As per wife patient went to sleep at 12, woke up not himself. Usually speaks and interacts but is not doing so. Pt had a CVA over 10 years ago and a recent removal of a PPM.

## 2022-05-14 NOTE — ED PROVIDER NOTE - OBJECTIVE STATEMENT
Unable to obtain history from patient secondary to AMS, history obtained from collateral and chart review.     82 year old male with PMH HTN, HLD, CVA with residual right sided weakness, CAD s/p PCI, HFpEF, bedbound presents with AMS x 1 day. LKN ~1200 today, per EMS pt took a nap at approximately 1200, awoke at ~1400 not speaking or interactive as he usually is. Denies any additional complaints.

## 2022-05-15 DIAGNOSIS — K59.00 CONSTIPATION, UNSPECIFIED: ICD-10-CM

## 2022-05-15 DIAGNOSIS — N40.0 BENIGN PROSTATIC HYPERPLASIA WITHOUT LOWER URINARY TRACT SYMPTOMS: ICD-10-CM

## 2022-05-15 DIAGNOSIS — R00.1 BRADYCARDIA, UNSPECIFIED: ICD-10-CM

## 2022-05-15 DIAGNOSIS — N39.0 URINARY TRACT INFECTION, SITE NOT SPECIFIED: ICD-10-CM

## 2022-05-15 DIAGNOSIS — Z86.73 PERSONAL HISTORY OF TRANSIENT ISCHEMIC ATTACK (TIA), AND CEREBRAL INFARCTION WITHOUT RESIDUAL DEFICITS: ICD-10-CM

## 2022-05-15 DIAGNOSIS — S31.809A UNSPECIFIED OPEN WOUND OF UNSPECIFIED BUTTOCK, INITIAL ENCOUNTER: ICD-10-CM

## 2022-05-15 DIAGNOSIS — G93.40 ENCEPHALOPATHY, UNSPECIFIED: ICD-10-CM

## 2022-05-15 DIAGNOSIS — R41.82 ALTERED MENTAL STATUS, UNSPECIFIED: ICD-10-CM

## 2022-05-15 DIAGNOSIS — Z29.9 ENCOUNTER FOR PROPHYLACTIC MEASURES, UNSPECIFIED: ICD-10-CM

## 2022-05-15 LAB
ANION GAP SERPL CALC-SCNC: 8 MMOL/L — SIGNIFICANT CHANGE UP (ref 7–14)
BASE EXCESS BLDV CALC-SCNC: -0.9 MMOL/L — SIGNIFICANT CHANGE UP (ref -2–3)
BASOPHILS # BLD AUTO: 0.03 K/UL — SIGNIFICANT CHANGE UP (ref 0–0.2)
BASOPHILS NFR BLD AUTO: 0.9 % — SIGNIFICANT CHANGE UP (ref 0–2)
BUN SERPL-MCNC: 12 MG/DL — SIGNIFICANT CHANGE UP (ref 7–23)
CA-I SERPL-SCNC: 1.33 MMOL/L — SIGNIFICANT CHANGE UP (ref 1.15–1.33)
CALCIUM SERPL-MCNC: 10 MG/DL — SIGNIFICANT CHANGE UP (ref 8.4–10.5)
CHLORIDE BLDV-SCNC: 106 MMOL/L — SIGNIFICANT CHANGE UP (ref 96–108)
CHLORIDE SERPL-SCNC: 105 MMOL/L — SIGNIFICANT CHANGE UP (ref 98–107)
CO2 BLDV-SCNC: 26.8 MMOL/L — HIGH (ref 22–26)
CO2 SERPL-SCNC: 25 MMOL/L — SIGNIFICANT CHANGE UP (ref 22–31)
CREAT SERPL-MCNC: 0.89 MG/DL — SIGNIFICANT CHANGE UP (ref 0.5–1.3)
EGFR: 85 ML/MIN/1.73M2 — SIGNIFICANT CHANGE UP
EOSINOPHIL # BLD AUTO: 0.22 K/UL — SIGNIFICANT CHANGE UP (ref 0–0.5)
EOSINOPHIL NFR BLD AUTO: 6.9 % — HIGH (ref 0–6)
GAS PNL BLDV: 136 MMOL/L — SIGNIFICANT CHANGE UP (ref 136–145)
GAS PNL BLDV: SIGNIFICANT CHANGE UP
GLUCOSE BLDV-MCNC: 76 MG/DL — SIGNIFICANT CHANGE UP (ref 70–99)
GLUCOSE SERPL-MCNC: 82 MG/DL — SIGNIFICANT CHANGE UP (ref 70–99)
HCO3 BLDV-SCNC: 25 MMOL/L — SIGNIFICANT CHANGE UP (ref 22–29)
HCT VFR BLD CALC: 31.5 % — LOW (ref 39–50)
HCT VFR BLDA CALC: 30 % — LOW (ref 39–51)
HGB BLD-MCNC: 10.4 G/DL — LOW (ref 13–17)
IANC: 1.14 K/UL — LOW (ref 1.8–7.4)
IMM GRANULOCYTES NFR BLD AUTO: 0.3 % — SIGNIFICANT CHANGE UP (ref 0–1.5)
LACTATE BLDV-MCNC: 1.2 MMOL/L — SIGNIFICANT CHANGE UP (ref 0.5–2)
LYMPHOCYTES # BLD AUTO: 1.44 K/UL — SIGNIFICANT CHANGE UP (ref 1–3.3)
LYMPHOCYTES # BLD AUTO: 45.1 % — HIGH (ref 13–44)
MAGNESIUM SERPL-MCNC: 2 MG/DL — SIGNIFICANT CHANGE UP (ref 1.6–2.6)
MCHC RBC-ENTMCNC: 31 PG — SIGNIFICANT CHANGE UP (ref 27–34)
MCHC RBC-ENTMCNC: 33 GM/DL — SIGNIFICANT CHANGE UP (ref 32–36)
MCV RBC AUTO: 93.8 FL — SIGNIFICANT CHANGE UP (ref 80–100)
MONOCYTES # BLD AUTO: 0.35 K/UL — SIGNIFICANT CHANGE UP (ref 0–0.9)
MONOCYTES NFR BLD AUTO: 11 % — SIGNIFICANT CHANGE UP (ref 2–14)
NEUTROPHILS # BLD AUTO: 1.14 K/UL — LOW (ref 1.8–7.4)
NEUTROPHILS NFR BLD AUTO: 35.8 % — LOW (ref 43–77)
NRBC # BLD: 0 /100 WBCS — SIGNIFICANT CHANGE UP
NRBC # FLD: 0 K/UL — SIGNIFICANT CHANGE UP
NT-PROBNP SERPL-SCNC: 162 PG/ML — SIGNIFICANT CHANGE UP
PCO2 BLDV: 48 MMHG — SIGNIFICANT CHANGE UP (ref 42–55)
PH BLDV: 7.33 — SIGNIFICANT CHANGE UP (ref 7.32–7.43)
PHOSPHATE SERPL-MCNC: 2.8 MG/DL — SIGNIFICANT CHANGE UP (ref 2.5–4.5)
PLATELET # BLD AUTO: 205 K/UL — SIGNIFICANT CHANGE UP (ref 150–400)
PO2 BLDV: 31 MMHG — SIGNIFICANT CHANGE UP
POTASSIUM BLDV-SCNC: 3.7 MMOL/L — SIGNIFICANT CHANGE UP (ref 3.5–5.1)
POTASSIUM SERPL-MCNC: 3.8 MMOL/L — SIGNIFICANT CHANGE UP (ref 3.5–5.3)
POTASSIUM SERPL-SCNC: 3.8 MMOL/L — SIGNIFICANT CHANGE UP (ref 3.5–5.3)
PROCALCITONIN SERPL-MCNC: 0.05 NG/ML — SIGNIFICANT CHANGE UP (ref 0.02–0.1)
RBC # BLD: 3.36 M/UL — LOW (ref 4.2–5.8)
RBC # FLD: 14.6 % — HIGH (ref 10.3–14.5)
SAO2 % BLDV: 43 % — SIGNIFICANT CHANGE UP
SODIUM SERPL-SCNC: 138 MMOL/L — SIGNIFICANT CHANGE UP (ref 135–145)
TSH SERPL-MCNC: 1.87 UIU/ML — SIGNIFICANT CHANGE UP (ref 0.27–4.2)
WBC # BLD: 3.19 K/UL — LOW (ref 3.8–10.5)
WBC # FLD AUTO: 3.19 K/UL — LOW (ref 3.8–10.5)

## 2022-05-15 PROCEDURE — 12345: CPT | Mod: NC

## 2022-05-15 PROCEDURE — 99223 1ST HOSP IP/OBS HIGH 75: CPT

## 2022-05-15 PROCEDURE — 95816 EEG AWAKE AND DROWSY: CPT | Mod: 26

## 2022-05-15 RX ORDER — SODIUM CHLORIDE 9 MG/ML
500 INJECTION INTRAMUSCULAR; INTRAVENOUS; SUBCUTANEOUS ONCE
Refills: 0 | Status: COMPLETED | OUTPATIENT
Start: 2022-05-15 | End: 2022-05-15

## 2022-05-15 RX ORDER — PIPERACILLIN AND TAZOBACTAM 4; .5 G/20ML; G/20ML
3.38 INJECTION, POWDER, LYOPHILIZED, FOR SOLUTION INTRAVENOUS EVERY 8 HOURS
Refills: 0 | Status: DISCONTINUED | OUTPATIENT
Start: 2022-05-15 | End: 2022-05-18

## 2022-05-15 RX ORDER — ZINC SULFATE TAB 220 MG (50 MG ZINC EQUIVALENT) 220 (50 ZN) MG
220 TAB ORAL DAILY
Refills: 0 | Status: DISCONTINUED | OUTPATIENT
Start: 2022-05-15 | End: 2022-05-18

## 2022-05-15 RX ORDER — TAMSULOSIN HYDROCHLORIDE 0.4 MG/1
0.4 CAPSULE ORAL AT BEDTIME
Refills: 0 | Status: DISCONTINUED | OUTPATIENT
Start: 2022-05-15 | End: 2022-05-18

## 2022-05-15 RX ORDER — ATORVASTATIN CALCIUM 80 MG/1
20 TABLET, FILM COATED ORAL AT BEDTIME
Refills: 0 | Status: DISCONTINUED | OUTPATIENT
Start: 2022-05-15 | End: 2022-05-18

## 2022-05-15 RX ORDER — ASPIRIN/CALCIUM CARB/MAGNESIUM 324 MG
81 TABLET ORAL DAILY
Refills: 0 | Status: DISCONTINUED | OUTPATIENT
Start: 2022-05-15 | End: 2022-05-18

## 2022-05-15 RX ORDER — ENOXAPARIN SODIUM 100 MG/ML
40 INJECTION SUBCUTANEOUS EVERY 24 HOURS
Refills: 0 | Status: DISCONTINUED | OUTPATIENT
Start: 2022-05-15 | End: 2022-05-18

## 2022-05-15 RX ORDER — PIPERACILLIN AND TAZOBACTAM 4; .5 G/20ML; G/20ML
3.38 INJECTION, POWDER, LYOPHILIZED, FOR SOLUTION INTRAVENOUS ONCE
Refills: 0 | Status: DISCONTINUED | OUTPATIENT
Start: 2022-05-15 | End: 2022-05-15

## 2022-05-15 RX ORDER — PIPERACILLIN AND TAZOBACTAM 4; .5 G/20ML; G/20ML
3.38 INJECTION, POWDER, LYOPHILIZED, FOR SOLUTION INTRAVENOUS EVERY 8 HOURS
Refills: 0 | Status: DISCONTINUED | OUTPATIENT
Start: 2022-05-15 | End: 2022-05-15

## 2022-05-15 RX ORDER — POLYETHYLENE GLYCOL 3350 17 G/17G
17 POWDER, FOR SOLUTION ORAL
Refills: 0 | Status: DISCONTINUED | OUTPATIENT
Start: 2022-05-15 | End: 2022-05-18

## 2022-05-15 RX ORDER — SENNA PLUS 8.6 MG/1
2 TABLET ORAL AT BEDTIME
Refills: 0 | Status: DISCONTINUED | OUTPATIENT
Start: 2022-05-15 | End: 2022-05-18

## 2022-05-15 RX ADMIN — Medication 216 GRAM(S): at 01:53

## 2022-05-15 RX ADMIN — ZINC SULFATE TAB 220 MG (50 MG ZINC EQUIVALENT) 220 MILLIGRAM(S): 220 (50 ZN) TAB at 11:54

## 2022-05-15 RX ADMIN — PIPERACILLIN AND TAZOBACTAM 25 GRAM(S): 4; .5 INJECTION, POWDER, LYOPHILIZED, FOR SOLUTION INTRAVENOUS at 07:04

## 2022-05-15 RX ADMIN — POLYETHYLENE GLYCOL 3350 17 GRAM(S): 17 POWDER, FOR SOLUTION ORAL at 02:20

## 2022-05-15 RX ADMIN — Medication 81 MILLIGRAM(S): at 11:53

## 2022-05-15 RX ADMIN — PIPERACILLIN AND TAZOBACTAM 25 GRAM(S): 4; .5 INJECTION, POWDER, LYOPHILIZED, FOR SOLUTION INTRAVENOUS at 22:38

## 2022-05-15 RX ADMIN — SODIUM CHLORIDE 1000 MILLILITER(S): 9 INJECTION INTRAMUSCULAR; INTRAVENOUS; SUBCUTANEOUS at 07:04

## 2022-05-15 RX ADMIN — ENOXAPARIN SODIUM 40 MILLIGRAM(S): 100 INJECTION SUBCUTANEOUS at 07:03

## 2022-05-15 RX ADMIN — ATORVASTATIN CALCIUM 20 MILLIGRAM(S): 80 TABLET, FILM COATED ORAL at 22:44

## 2022-05-15 RX ADMIN — TAMSULOSIN HYDROCHLORIDE 0.4 MILLIGRAM(S): 0.4 CAPSULE ORAL at 22:46

## 2022-05-15 RX ADMIN — SENNA PLUS 2 TABLET(S): 8.6 TABLET ORAL at 22:44

## 2022-05-15 RX ADMIN — POLYETHYLENE GLYCOL 3350 17 GRAM(S): 17 POWDER, FOR SOLUTION ORAL at 11:54

## 2022-05-15 NOTE — H&P ADULT - NSHPPHYSICALEXAM_GEN_ALL_CORE
Vital Signs Last 24 Hrs  T(C): 36.6 (14 May 2022 22:00), Max: 36.7 (14 May 2022 16:34)  T(F): 97.9 (14 May 2022 22:00), Max: 98 (14 May 2022 16:34)  HR: 68 (14 May 2022 22:00) (58 - 68)  BP: 180/98 (14 May 2022 22:00) (108/73 - 180/98)  BP(mean): --  RR: 16 (14 May 2022 22:00) (16 - 18)  SpO2: 100% (14 May 2022 22:00) (99% - 100%)    Physical Exam:     General: Elderly male laying in stretcher unable to participate in interview     Eyes: PERRL     ENT: MMM, no oropharyngeal lesions or erythema appreciated     Pulm: No increased WOB. CTAB. No wheezing.     CV: RRR. S1&S2+. No M/R/G appreciated.     Abdomen: +BS. Soft, NTND. No organomegaly.     MSK: Unable to assess, moving upper and lower extremities     Extremities: No peripheral edema or cyanosis. RUE contracted     Neuro: A&Ox0, awake     Skin: Warm and dry. R buttock wound, closed no area of fluctuance

## 2022-05-15 NOTE — ED ADULT NURSE REASSESSMENT NOTE - NS ED NURSE REASSESS COMMENT FT1
Only able to obtain one set of blood culture through a-stick by MD Kelley. Gave report to [facility] 1 RN Ellie. Pt in NAD, breathing even and unlabored

## 2022-05-15 NOTE — PATIENT PROFILE ADULT - STATED REASON FOR ADMISSION
Pt is A&Ox1, unable to answer questions or obtain information at this time. Presents with hx of dysphagia/ odynophagia.

## 2022-05-15 NOTE — H&P ADULT - NSHPLABSRESULTS_GEN_ALL_CORE
10.0   2.80  )-----------( 213      ( 14 May 2022 17:22 )             31.2           139  |  104  |  14  ----------------------------<  114<H>  3.8   |  24  |  1.10    Ca    9.7      14 May 2022 17:22    TPro  6.8  /  Alb  3.6  /  TBili  0.8  /  DBili  x   /  AST  13  /  ALT  10  /  AlkPhos  68  05-14              Urinalysis Basic - ( 14 May 2022 20:50 )    Color: Yellow / Appearance: Slightly Turbid / S.016 / pH: x  Gluc: x / Ketone: Negative  / Bili: Negative / Urobili: <2 mg/dL   Blood: x / Protein: Trace / Nitrite: Negative   Leuk Esterase: Large / RBC: 1 /HPF / WBC >50 /HPF   Sq Epi: x / Non Sq Epi: 3 /HPF / Bacteria: Few        PT/INR - ( 14 May 2022 17:22 )   PT: 12.9 sec;   INR: 1.11 ratio         PTT - ( 14 May 2022 17:22 )  PTT:28.1 sec    Lactate Trend      CARDIAC MARKERS ( 14 May 2022 17:22 )  x     / x     / 55 U/L / x     / 1.6 ng/mL        CAPILLARY BLOOD GLUCOSE  81 (14 May 2022 16:55)            Culture Results:   No Growth Final ( @ 10:34)  Culture Results:   No Growth Final ( @ 10:14)  Culture Results:   50,000 - 99,000 CFU/mL Candida albicans (04-15 @ 14:33)  Culture Results:   Growth in aerobic and anaerobic bottles: Enterococcus faecalis  See previous culture [id number] (04-15 @ 13:35)  Culture Results:   Growth in aerobic and anaerobic bottles: Enterococcus faecalis  ***Blood Panel PCR results on this specimen are available  approximately 3 hours after the Gram stain result.***  Gram stain, PCR, and/or culture results may not always  correspond dueto difference in methodologies.  ************************************************************  This PCR assay was performed by multiplex PCR. This  Assay tests for 66 bacterial and resistance gene targets.  Please refer to the [facility] test directory  at [email or web address] for details. (04-15 @ 13:20)

## 2022-05-15 NOTE — PROGRESS NOTE ADULT - PROBLEM SELECTOR PLAN 3
Patient with history of sinus node dysfunction s/p PPM (extracted 12/2021 due to bacteremia). Pt asymptomatic.   -Patient with sinus bradycardia to 30-50's in ED  -Per chart review, patient previously with similar bradycardia and EP saw during recent hospitalization. Pt asymptomatic and with bacteremia at that time so no plans for PPM replacement  -Will f/u bcx and if not bacteremic, consider EP consult. If bacteremic, will obtain TTE.   -Monitor on telemetry Patient with history of sinus node dysfunction s/p PPM (extracted 12/2021 due to bacteremia). Pt asymptomatic.   -Patient with sinus bradycardia to 30-50's in ED  -Per chart review, patient previously with similar bradycardia and EP saw during recent hospitalization. Pt asymptomatic and with bacteremia at that time so no plans for PPM replacement  - Will consult EP if bradycardia profound or worsening  -Monitor on telemetry

## 2022-05-15 NOTE — H&P ADULT - HISTORY OF PRESENT ILLNESS
82M with PMH significant for dementia (A&Ox1-2 at baseline), CVA c/b R sided hemiplegia, functional quadriplegic, HTN, HLD, diet-controlled T2DM, CAD s/p PCI (2014), HFpEF, and sinus node dysfunction s/p PPM extraction (12/2021 2/2 bacteremia), presenting for AMS and lethargy x few hours. Patient recently hospitalized at [facility] (4/15-4/29) after presenting with dysphagia and found to be septic 2/2 UTI and enterococcus faecalis bacteremia (HARIKA neg for endocarditis). Patient was discharged to [facility] and was meant to complete ampicillin course on 5/1 but per wife he did not get antibiotics at rehab due to difficulty with IV placement. Patient returned home on 5/12 and per wife, he's been at his baseline mental/functional status until today, when she noted he woke up from a nap and was very lethargic, not speaking, and his eyes were closed. Wife called EMS and patient brought to ED. She states that patient has also had some constipation and decreased urine output for past few days. Denies fevers/chills, cough, sob, cp, palpitations, n/v/d, abdominal pain. She notes a wound on his R buttock that wife states was bleeding after [facility] but        In the ED, vital signs notable for bradycardia to 40's. ECG sinus bradycardia. -180's. Labs notable for lact 2.4. UA positive. Ucx/Bcx collected. Pt given ampicillin. Of note, patient initially a code stroke, CTH unremarkable. 82M former smoker with PMH significant for dementia (A&Ox1-2 at baseline), CVA c/b R sided hemiplegia, functional quadriplegic, HTN, HLD, diet-controlled T2DM, CAD s/p PCI (2014), HFpEF, and sinus node dysfunction s/p PPM extraction (12/2021 2/2 bacteremia), presenting for AMS and lethargy x few hours. Patient recently hospitalized at [facility] (4/15-4/29) after presenting with dysphagia and found to be septic 2/2 UTI and enterococcus faecalis bacteremia (HARIKA neg for endocarditis). Patient was discharged to [facility] and was meant to complete ampicillin course on 5/1 but per wife he did not get antibiotics at rehab due to difficulty with IV placement. Patient returned home on 5/12 and per wife, he's been at his baseline mental/functional status until today, when she noted he woke up from a nap and was very lethargic, not speaking, and his eyes were closed. Wife called EMS and patient brought to ED. She states that patient has also had some constipation and decreased urine output for past few days. Denies fevers/chills, cough, sob, cp, palpitations, n/v/d, abdominal pain. She notes a wound on his R buttock that wife states was bleeding after [facility] but she's been dressing it and it has improved, no open wound. Per wife, patient now improved to baseline mental status. Discussed GOC, wife states she is his HCP and wants patient to be DNR/I, MOLST completed.     In the ED, vital signs notable for bradycardia to 40's. ECG sinus bradycardia. -180's. Labs notable for lact 2.4. UA positive. Ucx/Bcx collected. Pt given ampicillin. Of note, patient initially a code stroke, CTH unremarkable.

## 2022-05-15 NOTE — PATIENT PROFILE ADULT - FALL HARM RISK - HARM RISK INTERVENTIONS
Assistance with ambulation/Assistance OOB with selected safe patient handling equipment/Communicate Risk of Fall with Harm to all staff/Discuss with provider need for PT consult/Monitor for mental status changes/Monitor gait and stability/Move patient closer to nurses' station/Provide patient with walking aids - walker, cane, crutches/Reinforce activity limits and safety measures with patient and family/Reorient to person, place and time as needed/Tailored Fall Risk Interventions/Toileting schedule using arm’s reach rule for commode and bathroom/Use of alarms - bed, chair and/or voice tab/Visual Cue: Yellow wristband and red socks/Bed in lowest position, wheels locked, appropriate side rails in place/Call bell, personal items and telephone in reach/Instruct patient to call for assistance before getting out of bed or chair/Non-slip footwear when patient is out of bed/[place] to call system/Physically safe environment - no spills, clutter or unnecessary equipment/Purposeful Proactive Rounding/Room/bathroom lighting operational, light cord in reach

## 2022-05-15 NOTE — PROGRESS NOTE ADULT - PROBLEM SELECTOR PLAN 1
Patient with lethargy and worsening mental status x 1 day per wife. Recent hospitalization for UTI and E faecalis bacteremia, discharged to [facility] until 5/12. Likely encephalopathy in setting of infection. Now improved to baseline per wife at bedside. Although patient not meeting criteria for SIRS, patient presented similarly with neutropenia, lactic acidosis, and encephalopathy during recent hospitalization.   -Code stroke on presentation, CTH stable.   -Concern for inadequate completion of abx course after recent hospitalization, will collect Bcx and treat empirically with Zosyn. If bacteremic, will obtain TTE  -UA+, Ucx collected   -Lactate 2.4, will give 500cc bolus and recheck in AM   -CXR clear lungs   -RVP negative   -R buttock wound with no signs of infxn, wound care consulted Patient with lethargy and worsening mental status x 1 day per wife. Recent hospitalization for UTI and E faecalis bacteremia, discharged to [facility] until 5/12. Likely encephalopathy in setting of infection. Now improved to baseline per wife at bedside. Although patient not meeting criteria for SIRS, patient presented similarly with neutropenia, lactic acidosis, and encephalopathy during recent hospitalization.   -Code stroke on presentation, CTH stable, Neuro recs MRI and EEG  -Concern for inadequate completion of abx course after recent hospitalization, will collect Bcx and treat empirically with Zosyn. If bacteremic, will obtain TTE  -UA+, Ucx collected   -Lactate 2.4 on arrival cleared after 500cc bolus  -CXR clear lungs   -RVP negative   -R buttock wound with no signs of infxn, wound care consulted

## 2022-05-15 NOTE — H&P ADULT - ATTENDING COMMENTS
82M former smoker with PMH significant for dementia (A&Ox1-2 at baseline), CVA c/b R sided hemiplegia, functional quadriplegic, HTN, HLD, diet-controlled T2DM, CAD s/p PCI (2014), HFpEF, and sinus node dysfunction s/p PPM extraction (12/2021 2/2 bacteremia), presenting with lethargy c/w acute infectious encephalopathy. On exam, patient is alert and awake, follows some commands, cachectic. Labs reviewed with the resident. UA is positive for pyuria. C/w zosyn for now. D/c abx if urine cx is negative. Patient could also be lethargic from bradycardia. Monitor on tele. Consider EP consult in the AM--pt had a PPM in the past but was taken out due to bacteremia.

## 2022-05-15 NOTE — H&P ADULT - PROBLEM SELECTOR PLAN 5
H/o CVA 20+ years ago with residual R-sided deficits and functional quadriplegia.  -C/w home ASA + atorvastatin Pt with chronic constipation. Last BM on 5/12 per wife.   -Miralax BID   -Senna qHS

## 2022-05-15 NOTE — H&P ADULT - PROBLEM SELECTOR PLAN 2
Pt p/w AMS + lethargy. UA 50 WBCs, large LE. Recent hospitalization with UTI.   -Ucx collected  -Will bladder scan to r/o obstruction   -S/p Ampicillin in ED, will give CTX for broader coverage Pt p/w AMS + lethargy. UA 50 WBCs, large LE. Recent hospitalization with UTI.   -Ucx collected  -Will bladder scan to r/o obstruction   -S/p Ampicillin in ED, will broaden to Zosyn

## 2022-05-15 NOTE — PHYSICAL THERAPY INITIAL EVALUATION ADULT - ADDITIONAL COMMENTS
patient is a poor historian, minimally verbal on encounter, unable to attain history. as per chart patient is functional quadriplegic. bedbound

## 2022-05-15 NOTE — EEG REPORT - NS EEG TEXT BOX
[facility]   COMPREHENSIVE EPILEPSY CENTER   REPORT OF ROUTINE VIDEO EEG     [facility]: [address], [place] [address], Ph#: [phone]  [facility]:  [place] [address], Ph#: [phone]  [facility]: [address] [place] [address], Ph#: [phone]    Patient Name: DESHAWN TIWARI  Age and : 82y (38)  MRN #: [id number]  Location: [facility]  Referring Physician: Juan Jose Parker    Study Date: 05-15-22  _____________________________________________________________  TECHNICAL INFORMATION    Placement and Labeling of Electrodes:  The EEG was performed utilizing 20 channels referential EEG connections (coronal over temporal over parasagittal montage) using all standard 10-20 electrode placements with EKG.  Recording was at a sampling rate of 256 samples per second per channel.  Time synchronized digital video recording was done simultaneously with EEG recording.  A low light infrared camera was used for low light recording.  Josh and seizure detection algorithms were utilized.  _____________________________________________________________  HISTORY    Patient is a 82y old  Male who presents with a chief complaint of AMS (15 May 2022 07:55)    PERTINENT MEDICATION:  none  _____________________________________________________________  STUDY INTERPRETATION    Findings: The background was continuous, spontaneously variable and reactive. During wakefulness, the posterior dominant rhythm consisted of fragmentary 6 Hz activity, with amplitude to 30 uV, that attenuated to eye opening.     Background Slowing:  Diffuse theta and polymorphic delta slowing.    Focal Slowing:   None were present.    Sleep Background:  Drowsiness was characterized by fragmentation, attenuation, and slowing of the background activity.    Stage II sleep transients were not recorded.    Other Non-Epileptiform Findings:  None were present.    Interictal Epileptiform Activity:   None were present.    Events:  Clinical events: None recorded.  Seizures: None recorded.    Activation Procedures:   Hyperventilation was not performed.    Photic stimulation was performed and did not elicit any abnormality.     Artifacts:  Intermittent myogenic and movement artifacts were noted.    ECG:  The heart rate on single channel ECG was limited by artifact  _____________________________________________________________  EEG SUMMARY/CLASSIFICATION    Abnormal EEG in the awake, drowsy and asleep states.  - Mild to Moderate generalized slowing.  _____________________________________________________________  EEG IMPRESSION/CLINICAL CORRELATE    Abnormal EEG study.   Mild to Moderate nonspecific diffuse or multifocal cerebral dysfunction.   No epileptiform pattern or seizure seen.    Saturnino Holloway MD PGY-5  Epilepsy Fellow    This Preliminary report is based on fellow review. Final report pending attending review.    Reading Room: [phone]  On Call Service After Hours: [phone] [facility]   COMPREHENSIVE EPILEPSY CENTER   REPORT OF ROUTINE VIDEO EEG     [facility]: [address], [place] [address], Ph#: [phone]  [facility]:  [place] [address], Ph#: [phone]  [facility]: [address] [place] [address], Ph#: [phone]    Patient Name: DESHAWN TIWARI  Age and : 82y (39)  MRN #: [id number]  Location: [facility]  Referring Physician: Juan Jose Parker    Study Date: 05-15-22  _____________________________________________________________  TECHNICAL INFORMATION    Placement and Labeling of Electrodes:  The EEG was performed utilizing 20 channels referential EEG connections (coronal over temporal over parasagittal montage) using all standard 10-20 electrode placements with EKG.  Recording was at a sampling rate of 256 samples per second per channel.  Time synchronized digital video recording was done simultaneously with EEG recording.  A low light infrared camera was used for low light recording.  Josh and seizure detection algorithms were utilized.  _____________________________________________________________  HISTORY    Patient is a 82y old  Male who presents with a chief complaint of AMS (15 May 2022 07:55)    PERTINENT MEDICATION:  none  _____________________________________________________________  STUDY INTERPRETATION    Findings: The background was continuous, spontaneously variable and reactive. During wakefulness, the posterior dominant rhythm consisted of fragmentary 6 Hz activity, with amplitude to 30 uV, that attenuated to eye opening.     Background Slowing:  Diffuse theta and polymorphic delta slowing.    Focal Slowing:   None were present.    Sleep Background:  Drowsiness was characterized by fragmentation, attenuation, and slowing of the background activity.    Stage II sleep transients were not recorded.    Other Non-Epileptiform Findings:  None were present.    Interictal Epileptiform Activity:   None were present.    Events:  Clinical events: None recorded.  Seizures: None recorded.    Activation Procedures:   Hyperventilation was not performed.    Photic stimulation was performed and did not elicit any abnormality.     Artifacts:  Intermittent myogenic and movement artifacts were noted.    ECG:  The heart rate on single channel ECG was limited by artifact  _____________________________________________________________  EEG SUMMARY/CLASSIFICATION    Abnormal EEG in the awake, drowsy and asleep states.  - Mild to Moderate generalized slowing.  _____________________________________________________________  EEG IMPRESSION/CLINICAL CORRELATE    Abnormal EEG study.   Mild to Moderate nonspecific diffuse or multifocal cerebral dysfunction.   No epileptiform pattern or seizure seen.    Saturnino Holloway MD PGY-5  Epilepsy Fellow      Reading Room: [phone]  On Call Service After Hours: [phone]

## 2022-05-15 NOTE — SWALLOW BEDSIDE ASSESSMENT ADULT - SWALLOW EVAL: DIAGNOSIS
1) Mild oral dysphagia for puree, mildly thick liquids, and thin liquids marked by reduced utensil stripping, prolonged bolus manipulation and increased preparation time, resulting in delayed A/P transfer and transit. Adequate oral clearance observed.  2) Functional pharyngeal stage of swallow for puree and mildly thick liquids marked by initiation of pharyngeal swallow trigger and hyolaryngeal excursion noted upon digital palpation. No overt signs/symptoms of penetration/aspiration noted. 3) Moderate to Severe pharyngeal dysphagia for thin liquids marked by a suspected delayed pharyngeal swallow trigger with hyolaryngeal elevation noted upon digital palpation. Patient with delayed throat clearing following oral intake suggestive of airway penetration/aspiration.

## 2022-05-15 NOTE — H&P ADULT - PROBLEM SELECTOR PLAN 1
Patient with lethargy and worsening mental status x 1 day per wife. Recent hospitalization for UTI and E faecalis bacteremia, discharged to [facility] until 5/12. Likely metabolic encephalopathy in setting of infection. Improved to baseline per wife at bedside.   -Code stroke on presentation, CTH stable.   -Concern for inadequate completion of abx course after recent hospitalization, will collected Bcx and treat empirically with Ampicillin. Although patient not meeting criteria for SIRS, patient presented similarly with neutropenia, lactic acidosis, and encephalopathy during recent hospitalization.   -UA+, Ucx collected - Start CTX   -Lactate 2.4, will give 500cc bolus and recheck in AM   -CXR clear lungs   -RVP negative   -R buttock wound with signs of infxn, wound care consulted Patient with lethargy and worsening mental status x 1 day per wife. Recent hospitalization for UTI and E faecalis bacteremia, discharged to [facility] until 5/12. Likely encephalopathy in setting of infection. Now improved to baseline per wife at bedside. Although patient not meeting criteria for SIRS, patient presented similarly with neutropenia, lactic acidosis, and encephalopathy during recent hospitalization.   -Code stroke on presentation, CTH stable.   -Concern for inadequate completion of abx course after recent hospitalization, will collect Bcx and treat empirically with Zosyn. If bacteremic, will obtain TTE  -UA+, Ucx collected   -Lactate 2.4, will give 500cc bolus and recheck in AM   -CXR clear lungs   -RVP negative   -R buttock wound with no signs of infxn, wound care consulted

## 2022-05-15 NOTE — H&P ADULT - NSHPSOCIALHISTORY_GEN_ALL_CORE
Lives with wife. Functional quadriplegic. Former smoker, quit 20+ years ago. No EtOH or illicit drug use.

## 2022-05-15 NOTE — PROGRESS NOTE ADULT - PROBLEM SELECTOR PLAN 2
Pt p/w AMS + lethargy. UA 50 WBCs, large LE. Recent hospitalization with UTI.   -Ucx collected  -Will bladder scan to r/o obstruction   -S/p Ampicillin in ED, will broaden to Zosyn Pt p/w AMS + lethargy. UA 50 WBCs, large LE. Recent hospitalization with UTI.   -Ucx collected  - Historical Ucx: 4/15 candida albicans 50-99K, 12/25: >100k E Coli pan sensitive and 50-99k MRSA  - Historical Bcx: 4/15 enterococcus pan sensitive, 12/27: MRSA  -S/p Ampicillin in ED, will broaden to Zosyn pending urine cultures

## 2022-05-15 NOTE — PROGRESS NOTE ADULT - SUBJECTIVE AND OBJECTIVE BOX
Jen Linder MD  EM/IM PGY-1  Pager # [phone]  --------------------------------  ==============UNFINISHED NOTE==================    INTERVAL HPI/OVERNIGHT EVENTS: admitted overnight for encephalopathy and posible UTI. No acute events overnight, vitals remained stable.    SUBJECTIVE: Patient seen and examined at bedside.     VITAL SIGNS:  T(C): 36.4 (05-15-22 @ 07:01), Max: 36.7 (22 @ 16:34)  HR: 55 (05-15-22 @ 07:01) (43 - 68)  BP: 168/94 (05-15-22 @ 07:01) (108/73 - 180/98)  RR: 16 (05-15-22 @ 07:01) (16 - 18)  SpO2: 96% (05-15-22 @ 07:01) (96% - 100%)      PHYSICAL EXAM:        MEDICATIONS:  MEDICATIONS  (STANDING):  aspirin enteric coated 81 milliGRAM(s) Oral daily  atorvastatin 20 milliGRAM(s) Oral at bedtime  enoxaparin Injectable 40 milliGRAM(s) SubCutaneous every 24 hours  piperacillin/tazobactam IVPB.. 3.375 Gram(s) IV Intermittent every 8 hours  polyethylene glycol 3350 17 Gram(s) Oral two times a day  senna 2 Tablet(s) Oral at bedtime  tamsulosin 0.4 milliGRAM(s) Oral at bedtime  zinc sulfate 220 milliGRAM(s) Oral daily    MEDICATIONS  (PRN):      LABS:                        10.0   2.80  )-----------( 213      ( 14 May 2022 17:22 )             31.2         139  |  104  |  14  ----------------------------<  114<H>  3.8   |  24  |  1.10    Ca    9.7      14 May 2022 17:22    TPro  6.8  /  Alb  3.6  /  TBili  0.8  /  DBili  x   /  AST  13  /  ALT  10  /  AlkPhos  68      PT/INR - ( 14 May 2022 17:22 )   PT: 12.9 sec;   INR: 1.11 ratio         PTT - ( 14 May 2022 17:22 )  PTT:28.1 sec  Urinalysis Basic - ( 14 May 2022 20:50 )    Color: Yellow / Appearance: Slightly Turbid / S.016 / pH: x  Gluc: x / Ketone: Negative  / Bili: Negative / Urobili: <2 mg/dL   Blood: x / Protein: Trace / Nitrite: Negative   Leuk Esterase: Large / RBC: 1 /HPF / WBC >50 /HPF   Sq Epi: x / Non Sq Epi: 3 /HPF / Bacteria: Few         Jen Linder MD  EM/IM PGY-1  Pager # [phone]  --------------------------------    INTERVAL HPI/OVERNIGHT EVENTS: admitted overnight for encephalopathy and posible UTI. No acute events overnight, vitals remained stable.    SUBJECTIVE: Patient seen and examined at bedside. Responds to pain with grimacing and L arm movement but will not open eyes to command or pain.     VITAL SIGNS:  T(C): 36.4 (05-15-22 @ 07:01), Max: 36.7 (22 @ 16:34)  HR: 55 (05-15-22 @ 07:01) (43 - 68)  BP: 168/94 (05-15-22 @ 07:01) (108/73 - 180/98)  RR: 16 (05-15-22 @ 07:01) (16 - 18)  SpO2: 96% (05-15-22 @ 07:01) (96% - 100%)      PHYSICAL EXAM:    GENERAL: Lying in bed asleep, thin habitus  NEURO: Does not arouse to voice command, grimaces to pain only, does not open eyes. Pupils symmetric at 3mm with direct contraction intact b/l.  HEENT: No conjunctival injection or scleral icterus.   CARD: bradycardic rate and regular rhythm, no murmurs and no gallops appreciated.  RESP: Clear to auscultation bilaterally, No wheezes, rales or rhonchi. minimal respiratory effort, exam limited  ABD: Nondistended, Soft and nontender to palpation in all quadrants, no guarding, no rigidity. No masses appreciated.  EXT: No pedal edema. 2+DP pulses bilaterally.  SKIN: No rashes, bruising or acute skin injuries on face, limbs, abdomen, chest or back, no decubitus ulcer.      MEDICATIONS:  MEDICATIONS  (STANDING):  aspirin enteric coated 81 milliGRAM(s) Oral daily  atorvastatin 20 milliGRAM(s) Oral at bedtime  enoxaparin Injectable 40 milliGRAM(s) SubCutaneous every 24 hours  piperacillin/tazobactam IVPB.. 3.375 Gram(s) IV Intermittent every 8 hours  polyethylene glycol 3350 17 Gram(s) Oral two times a day  senna 2 Tablet(s) Oral at bedtime  tamsulosin 0.4 milliGRAM(s) Oral at bedtime  zinc sulfate 220 milliGRAM(s) Oral daily    MEDICATIONS  (PRN):      LABS:                        10.0   2.80  )-----------( 213      ( 14 May 2022 17:22 )             31.2         139  |  104  |  14  ----------------------------<  114<H>  3.8   |  24  |  1.10    Ca    9.7      14 May 2022 17:22    TPro  6.8  /  Alb  3.6  /  TBili  0.8  /  DBili  x   /  AST  13  /  ALT  10  /  AlkPhos  68      PT/INR - ( 14 May 2022 17:22 )   PT: 12.9 sec;   INR: 1.11 ratio         PTT - ( 14 May 2022 17:22 )  PTT:28.1 sec  Urinalysis Basic - ( 14 May 2022 20:50 )    Color: Yellow / Appearance: Slightly Turbid / S.016 / pH: x  Gluc: x / Ketone: Negative  / Bili: Negative / Urobili: <2 mg/dL   Blood: x / Protein: Trace / Nitrite: Negative   Leuk Esterase: Large / RBC: 1 /HPF / WBC >50 /HPF   Sq Epi: x / Non Sq Epi: 3 /HPF / Bacteria: Few

## 2022-05-15 NOTE — H&P ADULT - PROBLEM SELECTOR PLAN 4
Pt with chronic constipation. Last BM on 5/12 per wife.   -Miralax BID   -Senna qHS -R buttock wound with no signs of infxn, wound care consulted  -Continue to monitor

## 2022-05-15 NOTE — PHYSICAL THERAPY INITIAL EVALUATION ADULT - DISCHARGE DISPOSITION, PT EVAL
home with previous level of assist from family. Patient at this time is not a rehab candidate and is unable to actively participate in PT, therefore discharge from PT program at this time.

## 2022-05-15 NOTE — SWALLOW BEDSIDE ASSESSMENT ADULT - COMMENTS
As per Internal Medicine note 5/15 "82M former smoker with PMH significant for dementia (A&Ox1-2 at baseline), CVA c/b R sided hemiplegia, functional quadriplegic, HTN, HLD, diet-controlled T2DM, CAD s/p PCI (2014), HFpEF, and sinus node dysfunction s/p PPM extraction (12/2021 2/2 bacteremia), presenting for AMS and lethargy x few hours. Patient admitted for sepsis work-up."    WBC is low. CXR 5/14 "Clear lungs."    Patient is known to this department as he was seen previously across multiple admissions most recently on 4/29 during which time PO was contraindicated (See report for details).     Patient seen at bedside, awake/alert, during clinical swallow evaluation this PM. Patient noted with confusion and minimally verbal, with difficulty following simple directives/commands. Patient with no physical indicators of pain. Patient was cooperative and receptive to PO trials.

## 2022-05-15 NOTE — PROGRESS NOTE ADULT - PROBLEM SELECTOR PLAN 6
H/o CVA 20+ years ago with residual R-sided deficits and functional quadriplegia.  -C/w home ASA + atorvastatin

## 2022-05-15 NOTE — H&P ADULT - ASSESSMENT
82M former smoker with PMH significant for dementia (A&Ox1-2 at baseline), CVA c/b R sided hemiplegia, functional quadriplegic, HTN, HLD, diet-controlled T2DM, CAD s/p PCI (2014), HFpEF, and sinus node dysfunction s/p PPM extraction (12/2021 2/2 bacteremia), presenting for AMS and lethargy x few hours. Patient admitted with UTI.

## 2022-05-15 NOTE — H&P ADULT - PROBLEM SELECTOR PLAN 8
-DVT ppx: lovenox   -Diet: NPO pending dysphagia screen, on pureed diet at home per wife   -Dispo: pending clinical improvement  -Code status: Wife states she is HCP, will bring form tomorrow. DNR/I per discussion with wife, PEDRO completed and placed in chart.

## 2022-05-15 NOTE — PROGRESS NOTE ADULT - ATTENDING COMMENTS
Patient seen and examined by myself , case discussed  with resident ,agree with the above finding and plan  82M former smoker with PMH significant for dementia (A&Ox1-2 at baseline), CVA c/b R sided hemiplegia, functional quadriplegic, HTN, HLD, diet-controlled T2DM, CAD s/p PCI (2014), HFpEF, and sinus node dysfunction s/p PPM extraction (12/2021 2/2 bacteremia), presenting with lethargy c/w acute infectious encephalopathy.  UA is positive for pyuria. C/w zosyn for now. D/c abx if urine cx is negative. Patient could also be lethargic from bradycardia. Monitor on tele. Consider EP consult --pt had a PPM in the past but was taken out due to bacteremia.  Aspiration precaution   skin care as per protocol   DVT PPx  PT eval

## 2022-05-15 NOTE — H&P ADULT - PROBLEM SELECTOR PLAN 3
Patient with history of sinus node dysfunction s/p PPM (extracted 12/2021 due to bacteremia). Pt asymptomatic.   -Patient with sinus bradycardia to 30-50's in ED  -Per chart review, patient previously with similar bradycardia and EP saw during recent hospitalization. Pt asymptomatic and with bacteremia at that time so no plans for PPM replacement  -Will f/u bcx and if not bacteremic, consider EP consult Patient with history of sinus node dysfunction s/p PPM (extracted 12/2021 due to bacteremia). Pt asymptomatic.   -Patient with sinus bradycardia to 30-50's in ED  -Per chart review, patient previously with similar bradycardia and EP saw during recent hospitalization. Pt asymptomatic and with bacteremia at that time so no plans for PPM replacement  -Will f/u bcx and if not bacteremic, consider EP consult. If bacteremic, will obtain TTE.   -Monitor on telemetry

## 2022-05-15 NOTE — H&P ADULT - PROBLEM SELECTOR PLAN 7
-DVT ppx: lovenox   -Diet: NPO pending dysphagia screen, on pureed diet at home per wife   -Dispo: pending clinical improvement  -Code status: Wife states she is HCP, will bring form tomorrow. DNR/I per discussion with wife, PEDRO completed and placed in chart. -C/w home tamsulosin

## 2022-05-15 NOTE — PROGRESS NOTE ADULT - ASSESSMENT
82M former smoker with PMH significant for dementia (A&Ox1-2 at baseline), CVA c/b R sided hemiplegia, functional quadriplegic, HTN, HLD, diet-controlled T2DM, CAD s/p PCI (2014), HFpEF, and sinus node dysfunction s/p PPM extraction (12/2021 2/2 bacteremia), presenting for AMS and lethargy x few hours. Patient admitted with UTI.  82M former smoker with PMH significant for dementia (A&Ox1-2 at baseline), CVA c/b R sided hemiplegia, functional quadriplegic, HTN, HLD, diet-controlled T2DM, CAD s/p PCI (2014), HFpEF, and sinus node dysfunction s/p PPM extraction (12/2021 2/2 bacteremia), presenting for AMS and lethargy x few hours. Patient admitted for sepsis work-up.

## 2022-05-15 NOTE — H&P ADULT - NSICDXPASTSURGICALHX_GEN_ALL_CORE_FT
PAST SURGICAL HISTORY:  Cardiac pacemaker [employer] (SN: [id number]; Model: 11839)    S/P coronary artery stent placement

## 2022-05-15 NOTE — H&P ADULT - NSHPREVIEWOFSYSTEMS_GEN_ALL_CORE
Review of Systems:     Constitutional: No weakness, fever, chills     Eyes: No blindness, no eye pain    ENT: No throat pain, no rhinorrhea     Neck: No pain or stiffness     Respiratory: No cough, no shortness of breath     Cardiovascular: No chest pain, no palpitations     Gastrointestinal: No abdominal or epigastric pain. No nausea, vomiting, or diarrhea +constipation    Genitourinary: No dysuria, no change in frequency, no hematuria +dec uop     Neurological: No numbness or weakness  +ams     Skin: No itching, burning, rashes, or lesions     Psych: No depression or anxiety

## 2022-05-15 NOTE — SWALLOW BEDSIDE ASSESSMENT ADULT - ADDITIONAL RECOMMENDATIONS
Monitor for PO tolerance and intake. This service to follow up as schedule permits. Please reconsult as needed.
pt reports to ED complaining of nausea. pt suffers from chronic back pain which he sees pain management for. pt states that he takes dilaudid for the pain, which he last took at 4am. pt states that he takes dilaudid for the nausea that he gets from the pain medication, which he also took at 4am with no relief.

## 2022-05-15 NOTE — H&P ADULT - PROBLEM SELECTOR PLAN 6
-C/w home tamsulosin H/o CVA 20+ years ago with residual R-sided deficits and functional quadriplegia.  -C/w home ASA + atorvastatin

## 2022-05-16 LAB
ALBUMIN SERPL ELPH-MCNC: 3.7 G/DL — SIGNIFICANT CHANGE UP (ref 3.3–5)
ALP SERPL-CCNC: 68 U/L — SIGNIFICANT CHANGE UP (ref 40–120)
ALT FLD-CCNC: 10 U/L — SIGNIFICANT CHANGE UP (ref 4–41)
ANION GAP SERPL CALC-SCNC: 8 MMOL/L — SIGNIFICANT CHANGE UP (ref 7–14)
AST SERPL-CCNC: 18 U/L — SIGNIFICANT CHANGE UP (ref 4–40)
BASOPHILS # BLD AUTO: 0.02 K/UL — SIGNIFICANT CHANGE UP (ref 0–0.2)
BASOPHILS NFR BLD AUTO: 0.8 % — SIGNIFICANT CHANGE UP (ref 0–2)
BILIRUB SERPL-MCNC: 1 MG/DL — SIGNIFICANT CHANGE UP (ref 0.2–1.2)
BUN SERPL-MCNC: 13 MG/DL — SIGNIFICANT CHANGE UP (ref 7–23)
CALCIUM SERPL-MCNC: 10.3 MG/DL — SIGNIFICANT CHANGE UP (ref 8.4–10.5)
CHLORIDE SERPL-SCNC: 106 MMOL/L — SIGNIFICANT CHANGE UP (ref 98–107)
CO2 SERPL-SCNC: 23 MMOL/L — SIGNIFICANT CHANGE UP (ref 22–31)
CREAT SERPL-MCNC: 1.07 MG/DL — SIGNIFICANT CHANGE UP (ref 0.5–1.3)
EGFR: 69 ML/MIN/1.73M2 — SIGNIFICANT CHANGE UP
EOSINOPHIL # BLD AUTO: 0.21 K/UL — SIGNIFICANT CHANGE UP (ref 0–0.5)
EOSINOPHIL NFR BLD AUTO: 8.6 % — HIGH (ref 0–6)
GLUCOSE BLDC GLUCOMTR-MCNC: 131 MG/DL — HIGH (ref 70–99)
GLUCOSE BLDC GLUCOMTR-MCNC: 91 MG/DL — SIGNIFICANT CHANGE UP (ref 70–99)
GLUCOSE SERPL-MCNC: 75 MG/DL — SIGNIFICANT CHANGE UP (ref 70–99)
HCT VFR BLD CALC: 31.4 % — LOW (ref 39–50)
HGB BLD-MCNC: 10.4 G/DL — LOW (ref 13–17)
IANC: 0.85 K/UL — LOW (ref 1.8–7.4)
IMM GRANULOCYTES NFR BLD AUTO: 0.4 % — SIGNIFICANT CHANGE UP (ref 0–1.5)
LYMPHOCYTES # BLD AUTO: 1.11 K/UL — SIGNIFICANT CHANGE UP (ref 1–3.3)
LYMPHOCYTES # BLD AUTO: 45.3 % — HIGH (ref 13–44)
MAGNESIUM SERPL-MCNC: 2 MG/DL — SIGNIFICANT CHANGE UP (ref 1.6–2.6)
MCHC RBC-ENTMCNC: 30.4 PG — SIGNIFICANT CHANGE UP (ref 27–34)
MCHC RBC-ENTMCNC: 33.1 GM/DL — SIGNIFICANT CHANGE UP (ref 32–36)
MCV RBC AUTO: 91.8 FL — SIGNIFICANT CHANGE UP (ref 80–100)
MONOCYTES # BLD AUTO: 0.25 K/UL — SIGNIFICANT CHANGE UP (ref 0–0.9)
MONOCYTES NFR BLD AUTO: 10.2 % — SIGNIFICANT CHANGE UP (ref 2–14)
NEUTROPHILS # BLD AUTO: 0.85 K/UL — LOW (ref 1.8–7.4)
NEUTROPHILS NFR BLD AUTO: 34.7 % — LOW (ref 43–77)
NRBC # BLD: 0 /100 WBCS — SIGNIFICANT CHANGE UP
NRBC # FLD: 0 K/UL — SIGNIFICANT CHANGE UP
PHOSPHATE SERPL-MCNC: 3.3 MG/DL — SIGNIFICANT CHANGE UP (ref 2.5–4.5)
PLATELET # BLD AUTO: 189 K/UL — SIGNIFICANT CHANGE UP (ref 150–400)
POTASSIUM SERPL-MCNC: 3.9 MMOL/L — SIGNIFICANT CHANGE UP (ref 3.5–5.3)
POTASSIUM SERPL-SCNC: 3.9 MMOL/L — SIGNIFICANT CHANGE UP (ref 3.5–5.3)
PROT SERPL-MCNC: 6.7 G/DL — SIGNIFICANT CHANGE UP (ref 6–8.3)
RBC # BLD: 3.42 M/UL — LOW (ref 4.2–5.8)
RBC # FLD: 14.8 % — HIGH (ref 10.3–14.5)
SODIUM SERPL-SCNC: 137 MMOL/L — SIGNIFICANT CHANGE UP (ref 135–145)
WBC # BLD: 2.45 K/UL — LOW (ref 3.8–10.5)
WBC # FLD AUTO: 2.45 K/UL — LOW (ref 3.8–10.5)

## 2022-05-16 PROCEDURE — 99233 SBSQ HOSP IP/OBS HIGH 50: CPT | Mod: GC

## 2022-05-16 RX ADMIN — ATORVASTATIN CALCIUM 20 MILLIGRAM(S): 80 TABLET, FILM COATED ORAL at 22:36

## 2022-05-16 RX ADMIN — POLYETHYLENE GLYCOL 3350 17 GRAM(S): 17 POWDER, FOR SOLUTION ORAL at 05:48

## 2022-05-16 RX ADMIN — ZINC SULFATE TAB 220 MG (50 MG ZINC EQUIVALENT) 220 MILLIGRAM(S): 220 (50 ZN) TAB at 14:22

## 2022-05-16 RX ADMIN — POLYETHYLENE GLYCOL 3350 17 GRAM(S): 17 POWDER, FOR SOLUTION ORAL at 17:12

## 2022-05-16 RX ADMIN — Medication 81 MILLIGRAM(S): at 14:23

## 2022-05-16 RX ADMIN — SENNA PLUS 2 TABLET(S): 8.6 TABLET ORAL at 22:36

## 2022-05-16 RX ADMIN — ENOXAPARIN SODIUM 40 MILLIGRAM(S): 100 INJECTION SUBCUTANEOUS at 05:57

## 2022-05-16 RX ADMIN — PIPERACILLIN AND TAZOBACTAM 25 GRAM(S): 4; .5 INJECTION, POWDER, LYOPHILIZED, FOR SOLUTION INTRAVENOUS at 05:55

## 2022-05-16 RX ADMIN — PIPERACILLIN AND TAZOBACTAM 25 GRAM(S): 4; .5 INJECTION, POWDER, LYOPHILIZED, FOR SOLUTION INTRAVENOUS at 22:35

## 2022-05-16 RX ADMIN — TAMSULOSIN HYDROCHLORIDE 0.4 MILLIGRAM(S): 0.4 CAPSULE ORAL at 22:35

## 2022-05-16 RX ADMIN — PIPERACILLIN AND TAZOBACTAM 25 GRAM(S): 4; .5 INJECTION, POWDER, LYOPHILIZED, FOR SOLUTION INTRAVENOUS at 14:23

## 2022-05-16 NOTE — PROGRESS NOTE ADULT - SUBJECTIVE AND OBJECTIVE BOX
Jen Linder MD  EM/IM PGY-1  Pager # [phone] or Teams  --------------------------------  ==============UNFINISHED NOTE==================    INTERVAL HPI/OVERNIGHT EVENTS: No acute events overnight, vitals remained stable with intermittent HR down to as low as 33 with BP normal 112/65. All scheduled medications given, No PRN medications required.      SUBJECTIVE: Patient seen and examined at bedside.     VITAL SIGNS:  T(C): 36.4 (22 @ 05:59), Max: 37.2 (05-15-22 @ 10:57)  HR: 58 (22 @ 05:59) (33 - 83)  BP: 121/63 (22 @ 05:59) (112/65 - 168/94)  RR: 18 (22 @ 05:59) (16 - 18)  SpO2: 94% (22 @ 05:59) (94% - 99%)      PHYSICAL EXAM:        MEDICATIONS:  MEDICATIONS  (STANDING):  aspirin enteric coated 81 milliGRAM(s) Oral daily  atorvastatin 20 milliGRAM(s) Oral at bedtime  enoxaparin Injectable 40 milliGRAM(s) SubCutaneous every 24 hours  piperacillin/tazobactam IVPB.. 3.375 Gram(s) IV Intermittent every 8 hours  polyethylene glycol 3350 17 Gram(s) Oral two times a day  senna 2 Tablet(s) Oral at bedtime  tamsulosin 0.4 milliGRAM(s) Oral at bedtime  zinc sulfate 220 milliGRAM(s) Oral daily    MEDICATIONS  (PRN):      LABS:                        10.4   3.19  )-----------( 205      ( 15 May 2022 08:40 )             31.5     05-15    138  |  105  |  12  ----------------------------<  82  3.8   |  25  |  0.89    Ca    10.0      15 May 2022 08:40  Phos  2.8     05-15  Mg     2.00     05-15    TPro  6.8  /  Alb  3.6  /  TBili  0.8  /  DBili  x   /  AST  13  /  ALT  10  /  AlkPhos  68  05-14    PT/INR - ( 14 May 2022 17:22 )   PT: 12.9 sec;   INR: 1.11 ratio         PTT - ( 14 May 2022 17:22 )  PTT:28.1 sec  Urinalysis Basic - ( 14 May 2022 20:50 )    Color: Yellow / Appearance: Slightly Turbid / S.016 / pH: x  Gluc: x / Ketone: Negative  / Bili: Negative / Urobili: <2 mg/dL   Blood: x / Protein: Trace / Nitrite: Negative   Leuk Esterase: Large / RBC: 1 /HPF / WBC >50 /HPF   Sq Epi: x / Non Sq Epi: 3 /HPF / Bacteria: Few         Jen Linder MD  EM/IM PGY-1  Pager # [phone] or Teams  --------------------------------    INTERVAL HPI/OVERNIGHT EVENTS: No acute events overnight, vitals remained stable with intermittent HR down to as low as 33 with BP normal 112/65. All scheduled medications given, No PRN medications required.      SUBJECTIVE: Patient seen and examined at bedside. Has some back pain otherwise no complaints. A&Ox1 on exam today.     VITAL SIGNS:  T(C): 36.4 (22 @ 05:59), Max: 37.2 (05-15-22 @ 10:57)  HR: 58 (22 @ 05:59) (33 - 83)  BP: 121/63 (22 @ 05:59) (112/65 - 168/94)  RR: 18 (22 @ 05:59) (16 - 18)  SpO2: 94% (22 @ 05:59) (94% - 99%)      PHYSICAL EXAM:  GENERAL: Sitting comfortably in bed in no acute distress  NEURO: Alert and Oriented to person only. Pupils symmetric.  HEENT: No conjunctival injection or scleral icterus.   CARD: Normal rate and regular rhythm, no murmurs and no gallops appreciated.  RESP: Clear to auscultation bilaterally in anterior lung fields, No wheezes, rales or rhonchi. Good respiratory effort.  ABD:  Nondistended, Soft and nontender to palpation in all quadrants, no guarding, no rigidity. No masses appreciated.  EXT: R arm contracted and resting near face, R leg 0/5, L leg 2/5, L arm 4/5.  SKIN: No rashes, bruising or acute skin injuries on face, limbs, abdomen, chest or back        MEDICATIONS:  MEDICATIONS  (STANDING):  aspirin enteric coated 81 milliGRAM(s) Oral daily  atorvastatin 20 milliGRAM(s) Oral at bedtime  enoxaparin Injectable 40 milliGRAM(s) SubCutaneous every 24 hours  piperacillin/tazobactam IVPB.. 3.375 Gram(s) IV Intermittent every 8 hours  polyethylene glycol 3350 17 Gram(s) Oral two times a day  senna 2 Tablet(s) Oral at bedtime  tamsulosin 0.4 milliGRAM(s) Oral at bedtime  zinc sulfate 220 milliGRAM(s) Oral daily    MEDICATIONS  (PRN):      LABS:                        10.4   3.19  )-----------( 205      ( 15 May 2022 08:40 )             31.5     05-15    138  |  105  |  12  ----------------------------<  82  3.8   |  25  |  0.89    Ca    10.0      15 May 2022 08:40  Phos  2.8     05-15  Mg     2.00     05-15    TPro  6.8  /  Alb  3.6  /  TBili  0.8  /  DBili  x   /  AST  13  /  ALT  10  /  AlkPhos  68  05-14    PT/INR - ( 14 May 2022 17:22 )   PT: 12.9 sec;   INR: 1.11 ratio         PTT - ( 14 May 2022 17:22 )  PTT:28.1 sec  Urinalysis Basic - ( 14 May 2022 20:50 )    Color: Yellow / Appearance: Slightly Turbid / S.016 / pH: x  Gluc: x / Ketone: Negative  / Bili: Negative / Urobili: <2 mg/dL   Blood: x / Protein: Trace / Nitrite: Negative   Leuk Esterase: Large / RBC: 1 /HPF / WBC >50 /HPF   Sq Epi: x / Non Sq Epi: 3 /HPF / Bacteria: Few

## 2022-05-16 NOTE — PROGRESS NOTE ADULT - PROBLEM SELECTOR PLAN 4
-R buttock wound with no signs of infxn, wound care consulted  -Continue to monitor -Continue to monitor

## 2022-05-16 NOTE — PROGRESS NOTE ADULT - PROBLEM SELECTOR PLAN 6
H/o CVA 20+ years ago with residual R-sided deficits and functional quadriplegia.  -C/w home ASA + atorvastatin H/o CVA 20+ years ago with residual R-sided deficits and functional quadriplegia.  -C/w home ASA + atorvastatin  - Per Neuro, at the code stroke their impression was he presented with sudden inability to speak per their discussion with wife, qualifying event as a TIA and recommend plavix 75mg qd for 21 days and increasing atorvastatin to 40mg pending lipid panel unless we feel he is high risk for bleeding on DAPT  - Will discuss with wife

## 2022-05-16 NOTE — PROGRESS NOTE ADULT - PROBLEM SELECTOR PLAN 2
Pt p/w AMS + lethargy. UA 50 WBCs, large LE. Recent hospitalization with UTI.   -Ucx collected  - Historical Ucx: 4/15 candida albicans 50-99K, 12/25: >100k E Coli pan sensitive and 50-99k MRSA  - Historical Bcx: 4/15 enterococcus pan sensitive, 12/27: MRSA  -S/p Ampicillin in ED, will broaden to Zosyn pending urine cultures Pt p/w AMS + lethargy. UA 50 WBCs, large LE. Recent hospitalization with UTI.   -Ucx collected  - Historical Ucx: 4/15 candida albicans 50-99K, 12/25: >100k E Coli pan sensitive and 50-99k MRSA  - Historical Bcx: 4/15 enterococcus pan sensitive, 12/27: MRSA  -S/p Ampicillin in ED, will broaden to Zosyn (5/15- ) pending urine cultures

## 2022-05-16 NOTE — PROGRESS NOTE ADULT - PROBLEM SELECTOR PLAN 1
Patient with lethargy and worsening mental status x 1 day per wife. Recent hospitalization for UTI and E faecalis bacteremia, discharged to [facility] until 5/12. Likely encephalopathy in setting of infection. Now improved to baseline per wife at bedside. Although patient not meeting criteria for SIRS, patient presented similarly with neutropenia, lactic acidosis, and encephalopathy during recent hospitalization.   -Code stroke on presentation, CTH stable, Neuro recs MRI and EEG  -Concern for inadequate completion of abx course after recent hospitalization, will collect Bcx and treat empirically with Zosyn. If bacteremic, will obtain TTE  -UA+, Ucx collected   -Lactate 2.4 on arrival cleared after 500cc bolus  -CXR clear lungs   -RVP negative   -R buttock wound with no signs of infxn, wound care consulted Patient with lethargy and worsening mental status x 1 day per wife. Recent hospitalization for UTI and E faecalis bacteremia, discharged to [facility] until 5/12. Likely encephalopathy in setting of infection. Now improved to baseline per wife at bedside in the ED. Although patient not meeting criteria for SIRS, patient presented similarly with neutropenia, lactic acidosis, and encephalopathy during recent hospitalization.   -Code stroke on presentation, CTH stable, Neuro recs MRI and EEG  -Concern for inadequate completion of abx course after recent hospitalization, will collect Bcx and treat empirically with Zosyn. If bacteremic, will obtain TTE  -UA with leuk esterase and WBC, CXR clear lungs, RVP neg  -Lactate 2.4 on arrival cleared after 500cc bolus  - EEG 5/15: Mild to Moderate nonspecific diffuse or multifocal cerebral dysfunction. No epileptiform pattern or seizure seen.  - Pending MRI

## 2022-05-16 NOTE — PROGRESS NOTE ADULT - ATTENDING COMMENTS
Patient seen and examined AAO1 today, reportedly approaching baseline per family. Appears comfortable   82M former smoker with PMH significant for dementia (A&Ox1-2 at baseline), CVA c/b R sided hemiplegia, functional quadriplegic, HTN, HLD, diet-controlled T2DM, CAD s/p PCI (2014), HFpEF, and sinus node dysfunction s/p PPM extraction (12/2021 2/2 bacteremia), presenting with lethargy c/w acute infectious encephalopathy secondary to presumed UTI.  Acute encephalopathy improving. MRI pending. BCx NGTD, Pending UCx, but will continue with empiric ABX as cultures obtained after ABX administration. Plan for 5 day course.

## 2022-05-16 NOTE — PROGRESS NOTE ADULT - PROBLEM SELECTOR PLAN 3
Patient with history of sinus node dysfunction s/p PPM (extracted 12/2021 due to bacteremia). Pt asymptomatic.   -Patient with sinus bradycardia to 30-50's in ED  -Per chart review, patient previously with similar bradycardia and EP saw during recent hospitalization. Pt asymptomatic and with bacteremia at that time so no plans for PPM replacement  - Will consult EP if bradycardia profound or worsening  -Monitor on telemetry Patient with history of sinus node dysfunction s/p PPM (extracted 12/2021 due to bacteremia). Pt asymptomatic.   -Patient with sinus bradycardia to 30s, BP stable  -Per chart review, patient previously with similar bradycardia and EP saw during recent hospitalization. Pt asymptomatic and with bacteremia at that time so no plans for PPM replacement  - Will consult EP if bradycardia profound or worsening  -Monitor on telemetry

## 2022-05-16 NOTE — PROGRESS NOTE ADULT - PROBLEM SELECTOR PLAN 5
Pt with chronic constipation. Last BM on 5/12 per wife.   -Miralax BID   -Senna qHS Pt with chronic constipation  -Miralax BID   -Senna qHS

## 2022-05-16 NOTE — PROGRESS NOTE ADULT - PROBLEM SELECTOR PLAN 8
-DVT ppx: lovenox   -Diet: NPO pending dysphagia screen, on pureed diet at home per wife   -Dispo: pending clinical improvement  -Code status: Wife states she is HCP, will bring form tomorrow. DNR/I per discussion with wife, PEDRO completed and placed in chart. -DVT ppx: lovenox   -Diet: NPO pending dysphagia screen, on pureed diet at home per wife   -Dispo: pending clinical improvement  -Code status: Wife states she is HCP. DNR/I per discussion with wife, PEDRO completed and placed in chart.

## 2022-05-16 NOTE — PROGRESS NOTE ADULT - ASSESSMENT
82M former smoker with PMH significant for dementia (A&Ox1-2 at baseline), CVA c/b R sided hemiplegia, functional quadriplegic, HTN, HLD, diet-controlled T2DM, CAD s/p PCI (2014), HFpEF, and sinus node dysfunction s/p PPM extraction (12/2021 2/2 bacteremia), presenting for AMS and lethargy x few hours. Patient admitted for sepsis work-up.

## 2022-05-17 ENCOUNTER — TRANSCRIPTION ENCOUNTER (OUTPATIENT)
Age: 82
End: 2022-05-17

## 2022-05-17 LAB
BASOPHILS # BLD AUTO: 0.02 K/UL — SIGNIFICANT CHANGE UP (ref 0–0.2)
BASOPHILS NFR BLD AUTO: 0.6 % — SIGNIFICANT CHANGE UP (ref 0–2)
CULTURE RESULTS: SIGNIFICANT CHANGE UP
EOSINOPHIL # BLD AUTO: 0.23 K/UL — SIGNIFICANT CHANGE UP (ref 0–0.5)
EOSINOPHIL NFR BLD AUTO: 7.4 % — HIGH (ref 0–6)
GLUCOSE BLDC GLUCOMTR-MCNC: 74 MG/DL — SIGNIFICANT CHANGE UP (ref 70–99)
GLUCOSE BLDC GLUCOMTR-MCNC: 78 MG/DL — SIGNIFICANT CHANGE UP (ref 70–99)
GLUCOSE BLDC GLUCOMTR-MCNC: 87 MG/DL — SIGNIFICANT CHANGE UP (ref 70–99)
GLUCOSE BLDC GLUCOMTR-MCNC: 89 MG/DL — SIGNIFICANT CHANGE UP (ref 70–99)
HCT VFR BLD CALC: 28.9 % — LOW (ref 39–50)
HGB BLD-MCNC: 9.6 G/DL — LOW (ref 13–17)
IANC: 1.11 K/UL — LOW (ref 1.8–7.4)
IMM GRANULOCYTES NFR BLD AUTO: 0.3 % — SIGNIFICANT CHANGE UP (ref 0–1.5)
LYMPHOCYTES # BLD AUTO: 1.43 K/UL — SIGNIFICANT CHANGE UP (ref 1–3.3)
LYMPHOCYTES # BLD AUTO: 46.1 % — HIGH (ref 13–44)
MCHC RBC-ENTMCNC: 30.8 PG — SIGNIFICANT CHANGE UP (ref 27–34)
MCHC RBC-ENTMCNC: 33.2 GM/DL — SIGNIFICANT CHANGE UP (ref 32–36)
MCV RBC AUTO: 92.6 FL — SIGNIFICANT CHANGE UP (ref 80–100)
MONOCYTES # BLD AUTO: 0.3 K/UL — SIGNIFICANT CHANGE UP (ref 0–0.9)
MONOCYTES NFR BLD AUTO: 9.7 % — SIGNIFICANT CHANGE UP (ref 2–14)
NEUTROPHILS # BLD AUTO: 1.11 K/UL — LOW (ref 1.8–7.4)
NEUTROPHILS NFR BLD AUTO: 35.9 % — LOW (ref 43–77)
NRBC # BLD: 0 /100 WBCS — SIGNIFICANT CHANGE UP
NRBC # FLD: 0 K/UL — SIGNIFICANT CHANGE UP
PHOSPHATE SERPL-MCNC: 3.1 MG/DL — SIGNIFICANT CHANGE UP (ref 2.5–4.5)
PLATELET # BLD AUTO: 171 K/UL — SIGNIFICANT CHANGE UP (ref 150–400)
RBC # BLD: 3.12 M/UL — LOW (ref 4.2–5.8)
RBC # FLD: 14.4 % — SIGNIFICANT CHANGE UP (ref 10.3–14.5)
SPECIMEN SOURCE: SIGNIFICANT CHANGE UP
WBC # BLD: 3.1 K/UL — LOW (ref 3.8–10.5)
WBC # FLD AUTO: 3.1 K/UL — LOW (ref 3.8–10.5)

## 2022-05-17 PROCEDURE — 99232 SBSQ HOSP IP/OBS MODERATE 35: CPT | Mod: GC

## 2022-05-17 PROCEDURE — 99232 SBSQ HOSP IP/OBS MODERATE 35: CPT

## 2022-05-17 RX ORDER — MORPHINE SULFATE 50 MG/1
2 CAPSULE, EXTENDED RELEASE ORAL EVERY 4 HOURS
Refills: 0 | Status: DISCONTINUED | OUTPATIENT
Start: 2022-05-17 | End: 2022-05-18

## 2022-05-17 RX ADMIN — POLYETHYLENE GLYCOL 3350 17 GRAM(S): 17 POWDER, FOR SOLUTION ORAL at 05:09

## 2022-05-17 RX ADMIN — ATORVASTATIN CALCIUM 20 MILLIGRAM(S): 80 TABLET, FILM COATED ORAL at 21:46

## 2022-05-17 RX ADMIN — PIPERACILLIN AND TAZOBACTAM 25 GRAM(S): 4; .5 INJECTION, POWDER, LYOPHILIZED, FOR SOLUTION INTRAVENOUS at 06:57

## 2022-05-17 RX ADMIN — POLYETHYLENE GLYCOL 3350 17 GRAM(S): 17 POWDER, FOR SOLUTION ORAL at 17:06

## 2022-05-17 RX ADMIN — Medication 81 MILLIGRAM(S): at 11:42

## 2022-05-17 RX ADMIN — ZINC SULFATE TAB 220 MG (50 MG ZINC EQUIVALENT) 220 MILLIGRAM(S): 220 (50 ZN) TAB at 11:41

## 2022-05-17 RX ADMIN — TAMSULOSIN HYDROCHLORIDE 0.4 MILLIGRAM(S): 0.4 CAPSULE ORAL at 21:46

## 2022-05-17 RX ADMIN — SENNA PLUS 2 TABLET(S): 8.6 TABLET ORAL at 21:46

## 2022-05-17 RX ADMIN — PIPERACILLIN AND TAZOBACTAM 25 GRAM(S): 4; .5 INJECTION, POWDER, LYOPHILIZED, FOR SOLUTION INTRAVENOUS at 13:25

## 2022-05-17 RX ADMIN — ENOXAPARIN SODIUM 40 MILLIGRAM(S): 100 INJECTION SUBCUTANEOUS at 05:09

## 2022-05-17 RX ADMIN — PIPERACILLIN AND TAZOBACTAM 25 GRAM(S): 4; .5 INJECTION, POWDER, LYOPHILIZED, FOR SOLUTION INTRAVENOUS at 21:46

## 2022-05-17 NOTE — DISCHARGE NOTE PROVIDER - HOSPITAL COURSE
82M former smoker with PMH significant for dementia (A&Ox1-2 at baseline), CVA c/b R sided hemiplegia, functional quadriplegic, HTN, HLD, diet-controlled T2DM, CAD s/p PCI (2014), HFpEF, and sinus node dysfunction s/p PPM extraction (12/2021 2/2 MRSA bacteremia), presented on 5/14 for AMS and lethargy x few hours. Patient recently hospitalized at [facility] (4/15-4/29) after presenting with dysphagia and found to be septic 2/2 UTI and enterococcus faecalis bacteremia (HARIKA neg for endocarditis). Patient was discharged to [facility] and was meant to complete ampicillin course on 5/1 but per wife he did not get antibiotics at rehab due to difficulty with IV placement. Patient returned home on 5/12 and per wife, he's been at his baseline mental/functional status until today, when she noted he woke up from a nap and was very lethargic, not speaking, and his eyes were closed. Wife called EMS and patient brought to ED. She states that patient has also had some constipation and decreased urine output for past few days. Denies fevers/chills, cough, sob, cp, palpitations, n/v/d, abdominal pain. She notes a wound on his R buttock that wife states was bleeding after [facility] but she's been dressing it and it has improved, no open wound. Per wife, patient improved to baseline mental status before leaving the ED.    In the ED, vital signs notable for bradycardia to 40's. ECG sinus bradycardia. -180's. Labs notable for lact 2.4 which cleared after 500cc IV fluids. UA positive for leuk esterase and WBC. Patient initially a code stroke, CTH unremarkable. Admitted to medicine for stroke and sepsis work-up, given history of multiple recent bacteremia episodes with similar presentation.    Hospital course: EEG on 5/15 showed mild to moderate nonspecific diffuse or multifocal cerebral dysfunction, no seizure. Pt was given Zosyn from 5/15 until discharge. Blood Cx x4 NGTD and U Cx only with 50-99K candida albicans similar to prior. Low suspicion for stroke/TIA as a cause of symptoms so did not start Plavix or increase statin. Labs otherwise unremarkable during his hospital stay. MRI

## 2022-05-17 NOTE — PROGRESS NOTE ADULT - ASSESSMENT
82M former smoker with PMH significant for dementia (A&Ox1-2 at baseline), CVA c/b R sided hemiplegia, functional quadriplegic, HTN, HLD, diet-controlled T2DM, CAD s/p PCI (2014), HFpEF, and sinus node dysfunction s/p PPM extraction (12/2021 2/2 bacteremia), presenting for AMS and lethargy x few hours. Patient admitted for sepsis & stroke work-up.

## 2022-05-17 NOTE — PROGRESS NOTE ADULT - PROBLEM SELECTOR PLAN 8
-DVT ppx: lovenox   -Diet: NPO pending dysphagia screen, on pureed diet at home per wife   -Dispo: pending clinical improvement  -Code status: Wife states she is HCP. DNR/I per discussion with wife, PEDRO completed and placed in chart.

## 2022-05-17 NOTE — CONSULT NOTE ADULT - ASSESSMENT
Assessment/Plan: 82M former smoker with PMH significant for dementia (A&Ox1-2 at baseline), CVA c/b R sided hemiplegia, functional quadriplegic, HTN, HLD, diet-controlled T2DM, CAD s/p PCI (2014), HFpEF, and sinus node dysfunction s/p PPM extraction (12/2021 2/2 bacteremia), presenting for AMS and lethargy x few hours. Patient admitted for sepsis & stroke work-up.     Wound Consult requested to assist w/ management of Right buttock wound.     Right buttock with area of hypopigmentation and hyperpigmentation. No open ulcers. No cellulitis.   -Recommendations: Clean with skin cleanser spray, pat dry. Apply Maegan barrier cream, apply twice a day or PRN with incontinence episodes.     Incontinence associated dermatitis in bilateral buttocks as evident by hyperpigmentation. Intact skin.   -Recommendations: see above   Continue w/ Pericare as per protocol  Continue w/ low air loss fluidized bed surface   Care as per medicine, will follow w/ you    Seen w/ wound care attng Anibal Buchanan and D/w team    Thank you for this consult, reconsult if needed it   Ada Denny, MECCA-BC, CWOCN (pager #[phone])    If after 4PM or before 7:30AM on Mon-Friday or weekend/holiday please contact general surgery for urgent matters.   Team A- [id number]   Team B- [id number]  For non-urgent matters e-mail [email or web address]    We spent 30 minutes face to face with this patient of which more than 50% of the time was spent counseling & coordinating care of this pt

## 2022-05-17 NOTE — PROGRESS NOTE ADULT - PROBLEM SELECTOR PLAN 3
Patient with history of sinus node dysfunction s/p PPM (extracted 12/2021 due to bacteremia). Pt asymptomatic.   -Patient with sinus bradycardia to 30s, BP stable  -Per chart review, patient previously with similar bradycardia and EP saw during recent hospitalization. Pt asymptomatic and with bacteremia at that time so no plans for PPM replacement  - Will consult EP if bradycardia profound or worsening  -Monitor on telemetry

## 2022-05-17 NOTE — PROGRESS NOTE ADULT - PROBLEM SELECTOR PLAN 1
Patient with lethargy and worsening mental status x 1 day per wife. Recent hospitalization for UTI and E faecalis bacteremia, discharged to [facility] until 5/12. Likely encephalopathy in setting of infection. Now improved to baseline per wife at bedside in the ED. Although patient not meeting criteria for SIRS, patient presented similarly with neutropenia, lactic acidosis, and encephalopathy during recent hospitalization.   -Code stroke on presentation, CTH stable, Neuro recs MRI and EEG  -Concern for inadequate completion of abx course after recent hospitalization, will collect Bcx and treat empirically with Zosyn.  - 5/15 Bcx NGTD, Ucx in progress  -UA with leuk esterase and WBC, CXR clear lungs, RVP neg  -Lactate 2.4 on arrival cleared after 500cc bolus  - EEG 5/15: Mild to Moderate nonspecific diffuse or multifocal cerebral dysfunction. No epileptiform pattern or seizure seen.  - Pending MRI Patient with lethargy and worsening mental status x 1 day per wife. Recent hospitalization for UTI and E faecalis bacteremia, discharged to [facility] until 5/12. Likely encephalopathy in setting of infection. Now improved to baseline per wife at bedside in the ED. Although patient not meeting criteria for SIRS, patient presented similarly with neutropenia, lactic acidosis, and encephalopathy during recent hospitalization.   -Code stroke on presentation, CTH stable, Neuro recs MRI and EEG  -Concern for inadequate completion of abx course after recent hospitalization, will collect Bcx and treat empirically with Zosyn, currently on day 3 of abx therapy (5/15- )  - 5/15 Bcx NGTD, Ucx in progress  -UA with leuk esterase and WBC, CXR clear lungs, RVP neg  -Lactate 2.4 on arrival cleared after 500cc bolus  - EEG 5/15: Mild to Moderate nonspecific diffuse or multifocal cerebral dysfunction. No epileptiform pattern or seizure seen.  - Pending MRI

## 2022-05-17 NOTE — DISCHARGE NOTE PROVIDER - NSDCCPCAREPLAN_GEN_ALL_CORE_FT
PRINCIPAL DISCHARGE DIAGNOSIS  Diagnosis: AMS (altered mental status)  Assessment and Plan of Treatment: You were brought to the hospital for altered mental status, we did not find an obvious reason for this alteration but treated you for an infection given your history of multiple recent infections causing similar symptoms. We obtained an EEG which checks for seizures, which was negative. Neuro also recommended an MRI. Please follow up with your primary care physician within 1 week of discharge.  Please return to the emergency department if you develop fever or chills, new severe pain, shortness of breath, or any other new concerning symptoms.       PRINCIPAL DISCHARGE DIAGNOSIS  Diagnosis: AMS (altered mental status)  Assessment and Plan of Treatment: You were brought to the hospital for altered mental status, we did not find an obvious reason for this alteration but treated you for an infection given your history of multiple recent infections causing similar symptoms. We obtained an EEG which checks for seizures, which was negative. Neuro also recommended an MRI. Please follow up with your primary care physician within 1 week of discharge.  Please continue to take 1 more day of antibiotics on 5/19, one pill in the morning and 1 pill at night, called Augmentin, this was sent to your pharmacy.  Please return to the emergency department if you develop fever or chills, new severe pain, shortness of breath, or any other new concerning symptoms.

## 2022-05-17 NOTE — DISCHARGE NOTE PROVIDER - NSDCMRMEDTOKEN_GEN_ALL_CORE_FT
Albuterol (Eqv-Proventil HFA) 90 mcg/inh inhalation aerosol: 2 puff(s) inhaled every 6 hours, As Needed  aspirin 81 mg oral delayed release tablet: 1 tab(s) orally once a day  atorvastatin 20 mg oral tablet: 1 tab(s) orally once a day  polyethylene glycol 3350 oral powder for reconstitution: 17 gram(s) orally once a day, As needed, Constipation  senna oral tablet: 2 tab(s) orally once a day (at bedtime)  tamsulosin 0.4 mg oral capsule: 1 cap(s) orally once a day (at bedtime)  zinc sulfate 220 mg oral tablet: 1 tab(s) orally once a day   Albuterol (Eqv-Proventil HFA) 90 mcg/inh inhalation aerosol: 2 puff(s) inhaled every 6 hours, As Needed  amoxicillin-clavulanate 875 mg-125 mg oral tablet: 1 tab(s) orally 2 times a day MDD:2 tabs  aspirin 81 mg oral delayed release tablet: 1 tab(s) orally once a day  atorvastatin 20 mg oral tablet: 1 tab(s) orally once a day  polyethylene glycol 3350 oral powder for reconstitution: 17 gram(s) orally once a day, As needed, Constipation  senna oral tablet: 2 tab(s) orally once a day (at bedtime)  tamsulosin 0.4 mg oral capsule: 1 cap(s) orally once a day (at bedtime)  zinc sulfate 220 mg oral tablet: 1 tab(s) orally once a day

## 2022-05-17 NOTE — PROGRESS NOTE ADULT - PROBLEM SELECTOR PLAN 4
-Continue to monitor H/o CVA 20+ years ago with residual R-sided deficits and functional quadriplegia.  -C/w home ASA + atorvastatin  - Per Neuro, at the code stroke their impression was he presented with sudden inability to speak per their discussion with wife, qualifying event as a TIA and recommend plavix 75mg qd for 21 days and increasing atorvastatin to 40mg pending lipid panel, unless we feel he is high risk for bleeding on DAPT  - Will discuss with wife

## 2022-05-17 NOTE — DISCHARGE NOTE PROVIDER - CARE PROVIDER_API CALL
[facility]  [address] [place] [address]  Phone: [phone]  Fax: [phone]  Follow Up Time: 1 week

## 2022-05-17 NOTE — PROGRESS NOTE ADULT - ATTENDING COMMENTS
Patient seen and examined AAO1 today, reportedly approaching baseline per family. Appears comfortable   82M former smoker with PMH significant for dementia (A&Ox1-2 at baseline), CVA c/b R sided hemiplegia, functional quadriplegic, HTN, HLD, diet-controlled T2DM, CAD s/p PCI (2014), HFpEF, and sinus node dysfunction s/p PPM extraction (12/2021 2/2 bacteremia), presenting with lethargy c/w acute infectious encephalopathy secondary to presumed UTI.  Acute encephalopathy improving with antibiotics. MRI pending, per neuro no need for inpatient study given likely alternative etiology. BCx NGTD, UCx only with mild candida, will continue with empiric ABX as cultures obtained after ABX administration. Plan for 5 day course. Now at prior baseline medically clear for discharge back home with home services. d/c planning 45 min coordinating care Patient seen and examined AAO1 today, reportedly approaching baseline per family. Appears comfortable   82M former smoker with PMH significant for dementia (A&Ox1-2 at baseline), CVA c/b R sided hemiplegia, functional quadriplegic, HTN, HLD, diet-controlled T2DM, CAD s/p PCI (2014), HFpEF, and sinus node dysfunction s/p PPM extraction (12/2021 2/2 bacteremia), presenting with lethargy c/w acute infectious encephalopathy secondary to presumed UTI.  Acute encephalopathy improving with antibiotics. MRI pending, per neuro no need for inpatient study given likely alternative etiology. BCx NGTD, UCx only with mild candida, will continue with empiric ABX as cultures obtained after ABX administration. Plan for 5 day course.

## 2022-05-17 NOTE — CONSULT NOTE ADULT - SUBJECTIVE AND OBJECTIVE BOX
Wound SURGERY CONSULT NOTE    HPI:  82M former smoker with PMH significant for dementia (A&Ox1-2 at baseline), CVA c/b R sided hemiplegia, functional quadriplegic, HTN, HLD, diet-controlled T2DM, CAD s/p PCI (2014), HFpEF, and sinus node dysfunction s/p PPM extraction (12/2021 2/2 bacteremia), presenting for AMS and lethargy x few hours. Patient recently hospitalized at [facility] (4/15-4/29) after presenting with dysphagia and found to be septic 2/2 UTI and enterococcus faecalis bacteremia (HARIKA neg for endocarditis). Patient was discharged to [facility] and was meant to complete ampicillin course on 5/1 but per wife he did not get antibiotics at rehab due to difficulty with IV placement. Patient returned home on 5/12 and per wife, he's been at his baseline mental/functional status until today, when she noted he woke up from a nap and was very lethargic, not speaking, and his eyes were closed. Wife called EMS and patient brought to ED. She states that patient has also had some constipation and decreased urine output for past few days. Denies fevers/chills, cough, sob, cp, palpitations, n/v/d, abdominal pain. She notes a wound on his R buttock that wife states was bleeding after [facility] but she's been dressing it and it has improved, no open wound. Per wife, patient now improved to baseline mental status. Discussed GOC, wife states she is his HCP and wants patient to be DNR/I, MOLST completed.     In the ED, vital signs notable for bradycardia to 40's. ECG sinus bradycardia. -180's. Labs notable for lact 2.4. UA positive. Ucx/Bcx collected. Pt given ampicillin. Of note, patient initially a code stroke, CTH unremarkable. (15 May 2022 00:54)      Wound consult requested to assist w/ management of  right buttock wound. Patient known to [facility] team, last seeing 2/17/2022 and at that time right buttock wound was healed.     Current Diet: Diet, Pureed:   Mildly Thick Liquids (MILDTHICKLIQS)   Tube Feed Start Time: 02:00 (05-17-22 @ 09:05)    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, 1998  HTN (hypertension)  Dementia  HLD (hyperlipidemia)  DM (diabetes mellitus)  Asthma  CAD (coronary artery disease)  s/p stent placement  Sinus node dysfunction  s/p Medtronic PPM  Cardiac pacemaker  Medtronic (SN: [id number]; Model: 73277)  S/P coronary artery stent placement    REVIEW OF SYSTEMS: Pt unable to offer, dementia     MEDICATIONS  (STANDING):  aspirin enteric coated 81 milliGRAM(s) Oral daily  atorvastatin 20 milliGRAM(s) Oral at bedtime  enoxaparin Injectable 40 milliGRAM(s) SubCutaneous every 24 hours  piperacillin/tazobactam IVPB.. 3.375 Gram(s) IV Intermittent every 8 hours  polyethylene glycol 3350 17 Gram(s) Oral two times a day  senna 2 Tablet(s) Oral at bedtime  tamsulosin 0.4 milliGRAM(s) Oral at bedtime  zinc sulfate 220 milliGRAM(s) Oral daily    MEDICATIONS  (PRN):  Allergies    No Known Allergies    Intolerances    SOCIAL HISTORY: lives at home with wife, DNR    FAMILY HISTORY:  FH: diabetes mellitus  Mother, Brother    FHx: dementia  Father    PHYSICAL EXAM:  Vital Signs Last 24 Hrs  T(C): 36.5 (17 May 2022 05:00), Max: 36.8 (16 May 2022 20:14)  T(F): 97.7 (17 May 2022 05:00), Max: 98.3 (16 May 2022 20:14)  HR: 42 (17 May 2022 06:26) (40 - 42)  BP: 149/69 (17 May 2022 06:26) (104/46 - 149/69)  BP(mean): --  RR: 17 (17 May 2022 05:00) (16 - 17)  SpO2: 100% (17 May 2022 05:00) (96% - 100%)  BMI 20.7kg/m2     Constitutional: NAD, guarded, dementia, cachectic   (+) low airloss support surface, (+) fluidized positioning devices, (+) complete cair boots  Head: non traumatic   Cardiovascular: Bradycardia   Respiratory: Equal chest expansion   Gastrointestinal soft, fecal incontinence    incontinence, distal penile shaft and glans with hypopigmentation   Neurology: no following commands   Musculoskeletal: rigidity, right arm/hand and wrists contracted   Skin: frail,  ecchymosis w/o hematoma  Right buttock with healed wound as evident by 100% reepithelialization and hyperpigmentation, no open wounds.   No erythema, no increased warmth, no induration, no fluctuance    LABS/ CULTURES/ RADIOLOGY:                        9.6    3.10  )-----------( 171      ( 17 May 2022 05:47 )             28.9       137  |  106  |  13  ----------------------------<  75      [05-16-22 @ 07:00]  3.9   |  23  |  1.07        Ca     10.3     [05-16-22 @ 07:00]      Mg     2.00     [05-16-22 @ 07:00]      Phos  3.1     [05-17-22 @ 05:47]    TPro  6.7  /  Alb  3.7  /  TBili  1.0  /  DBili  x   /  AST  18  /  ALT  10  /  AlkPhos  68  [05-16-22 @ 07:00]    Culture - Blood (collected 05-15-22 @ 15:07)  Source: .Blood Blood-Arterial  Preliminary Report (05-16-22 @ 19:02):    No growth to date.    Culture - Blood (collected 05-15-22 @ 08:45)  Source: .Blood Blood-Venous  Preliminary Report (05-16-22 @ 15:02):    No growth to date.    Culture - Blood (collected 05-15-22 @ 08:45)  Source: .Blood Blood-Venous  Preliminary Report (05-16-22 @ 15:01):    No growth to date.    Culture - Blood (collected 05-15-22 @ 00:15)  Source: .Blood Blood-Peripheral  Preliminary Report (05-16-22 @ 11:01):    No growth to date.

## 2022-05-17 NOTE — PROGRESS NOTE ADULT - SUBJECTIVE AND OBJECTIVE BOX
Jen Linder MD  EM/IM PGY-1  Pager # [phone] or Teams  --------------------------------  ==============UNFINISHED NOTE==================    INTERVAL HPI/OVERNIGHT EVENTS: No acute events overnight, vitals remained stable, continues to be bradycardic with normal BP. All scheduled medications given, No PRN medications required.     SUBJECTIVE: Patient seen and examined at bedside.     VITAL SIGNS:  T(C): 36.5 (05-17-22 @ 05:00), Max: 36.8 (05-16-22 @ 20:14)  HR: 42 (05-17-22 @ 06:26) (40 - 42)  BP: 149/69 (05-17-22 @ 06:26) (104/46 - 149/69)  RR: 17 (05-17-22 @ 05:00) (16 - 17)  SpO2: 100% (05-17-22 @ 05:00) (96% - 100%)      PHYSICAL EXAM:        MEDICATIONS:  MEDICATIONS  (STANDING):  aspirin enteric coated 81 milliGRAM(s) Oral daily  atorvastatin 20 milliGRAM(s) Oral at bedtime  enoxaparin Injectable 40 milliGRAM(s) SubCutaneous every 24 hours  piperacillin/tazobactam IVPB.. 3.375 Gram(s) IV Intermittent every 8 hours  polyethylene glycol 3350 17 Gram(s) Oral two times a day  senna 2 Tablet(s) Oral at bedtime  tamsulosin 0.4 milliGRAM(s) Oral at bedtime  zinc sulfate 220 milliGRAM(s) Oral daily    MEDICATIONS  (PRN):      LABS:                        10.4   2.45  )-----------( 189      ( 16 May 2022 07:00 )             31.4     05-16    137  |  106  |  13  ----------------------------<  75  3.9   |  23  |  1.07    Ca    10.3      16 May 2022 07:00  Phos  3.3     05-16  Mg     2.00     05-16    TPro  6.7  /  Alb  3.7  /  TBili  1.0  /  DBili  x   /  AST  18  /  ALT  10  /  AlkPhos  68  05-16           Jen Linder MD  EM/IM PGY-1  Pager # [phone] or Teams  --------------------------------    INTERVAL HPI/OVERNIGHT EVENTS: No acute events overnight, vitals remained stable, continues to be bradycardic with normal BP. All scheduled medications given, No PRN medications required.     SUBJECTIVE: Patient seen and examined at bedside. Pt grimaces to painful stimulation, does not awake or open eyes    VITAL SIGNS:  T(C): 36.5 (05-17-22 @ 05:00), Max: 36.8 (05-16-22 @ 20:14)  HR: 42 (05-17-22 @ 06:26) (40 - 42)  BP: 149/69 (05-17-22 @ 06:26) (104/46 - 149/69)  RR: 17 (05-17-22 @ 05:00) (16 - 17)  SpO2: 100% (05-17-22 @ 05:00) (96% - 100%)      PHYSICAL EXAM:    GENERAL: Sitting comfortably in bed in no acute distress  NEURO: grimaces to pain and moves L arm, does not open eyes or respond to questions this morning. Pupils symmetric.  HEENT: No conjunctival injection or scleral icterus.   CARD: very difficult to hear heart sounds, 2+ bradycardic pulse in radial arteries bilaterally, regular rhythm. No loud murmur heard.  RESP: Clear to auscultation bilaterally in anterior lung fields, No wheezes, rales or rhonchi. Good respiratory effort.  ABD:  Nondistended, Soft and nontender to palpation in all quadrants, no guarding, no rigidity. No masses appreciated.  EXT: R arm contracted and resting near face, R leg 0/5, L leg 2/5, L arm 4/5.  SKIN: No rashes, bruising or acute skin injuries on face, limbs, abdomen, chest or back      MEDICATIONS:  MEDICATIONS  (STANDING):  aspirin enteric coated 81 milliGRAM(s) Oral daily  atorvastatin 20 milliGRAM(s) Oral at bedtime  enoxaparin Injectable 40 milliGRAM(s) SubCutaneous every 24 hours  piperacillin/tazobactam IVPB.. 3.375 Gram(s) IV Intermittent every 8 hours  polyethylene glycol 3350 17 Gram(s) Oral two times a day  senna 2 Tablet(s) Oral at bedtime  tamsulosin 0.4 milliGRAM(s) Oral at bedtime  zinc sulfate 220 milliGRAM(s) Oral daily    MEDICATIONS  (PRN):      LABS:                        10.4   2.45  )-----------( 189      ( 16 May 2022 07:00 )             31.4     05-16    137  |  106  |  13  ----------------------------<  75  3.9   |  23  |  1.07    Ca    10.3      16 May 2022 07:00  Phos  3.3     05-16  Mg     2.00     05-16    TPro  6.7  /  Alb  3.7  /  TBili  1.0  /  DBili  x   /  AST  18  /  ALT  10  /  AlkPhos  68  05-16

## 2022-05-17 NOTE — PROGRESS NOTE ADULT - PROBLEM SELECTOR PLAN 2
Pt p/w AMS + lethargy. UA 50 WBCs, large LE. Recent hospitalization with UTI.   -Ucx collected  - Historical Ucx: 4/15 candida albicans 50-99K, 12/25: >100k E Coli pan sensitive and 50-99k MRSA  - Historical Bcx: 4/15 enterococcus pan sensitive, 12/27: MRSA  -S/p Ampicillin in ED, will broaden to Zosyn (5/15- ) pending urine cultures Pt p/w AMS + lethargy. UA 50 WBCs, large LE. Recent hospitalization with UTI.   - Historical Ucx: 4/15 candida albicans 50-99K, 12/25: >100k E Coli pan sensitive and 50-99k MRSA  - Historical Bcx: 4/15 enterococcus pan sensitive, 12/27: MRSA  -S/p Ampicillin in ED, will broaden to Zosyn (5/15- ) pending urine cultures

## 2022-05-17 NOTE — PROGRESS NOTE ADULT - PROBLEM SELECTOR PLAN 6
H/o CVA 20+ years ago with residual R-sided deficits and functional quadriplegia.  -C/w home ASA + atorvastatin  - Per Neuro, at the code stroke their impression was he presented with sudden inability to speak per their discussion with wife, qualifying event as a TIA and recommend plavix 75mg qd for 21 days and increasing atorvastatin to 40mg pending lipid panel, unless we feel he is high risk for bleeding on DAPT  - Will discuss with wife DVT ppx: lovenox   Diet: Pureed wit hmildly thick liquids  PT: Eval 5/15, unable to participate in therapy therefore recommend home with prior services, not a rehab candidate  Dispo: Home pending clinical improvement  Code status: Wife states she is HCP. DNR/I per discussion with wife, MOLST completed and placed in chart.

## 2022-05-18 ENCOUNTER — TRANSCRIPTION ENCOUNTER (OUTPATIENT)
Age: 82
End: 2022-05-18

## 2022-05-18 VITALS
RESPIRATION RATE: 17 BRPM | SYSTOLIC BLOOD PRESSURE: 120 MMHG | TEMPERATURE: 98 F | DIASTOLIC BLOOD PRESSURE: 74 MMHG | HEART RATE: 55 BPM | OXYGEN SATURATION: 98 %

## 2022-05-18 LAB
ALBUMIN SERPL ELPH-MCNC: 3.5 G/DL — SIGNIFICANT CHANGE UP (ref 3.3–5)
ALP SERPL-CCNC: 64 U/L — SIGNIFICANT CHANGE UP (ref 40–120)
ALT FLD-CCNC: 10 U/L — SIGNIFICANT CHANGE UP (ref 4–41)
ANION GAP SERPL CALC-SCNC: 10 MMOL/L — SIGNIFICANT CHANGE UP (ref 7–14)
AST SERPL-CCNC: 17 U/L — SIGNIFICANT CHANGE UP (ref 4–40)
BASOPHILS # BLD AUTO: 0.02 K/UL — SIGNIFICANT CHANGE UP (ref 0–0.2)
BASOPHILS NFR BLD AUTO: 0.7 % — SIGNIFICANT CHANGE UP (ref 0–2)
BILIRUB SERPL-MCNC: 1 MG/DL — SIGNIFICANT CHANGE UP (ref 0.2–1.2)
BUN SERPL-MCNC: 10 MG/DL — SIGNIFICANT CHANGE UP (ref 7–23)
CALCIUM SERPL-MCNC: 9.9 MG/DL — SIGNIFICANT CHANGE UP (ref 8.4–10.5)
CHLORIDE SERPL-SCNC: 106 MMOL/L — SIGNIFICANT CHANGE UP (ref 98–107)
CO2 SERPL-SCNC: 23 MMOL/L — SIGNIFICANT CHANGE UP (ref 22–31)
CREAT SERPL-MCNC: 1.1 MG/DL — SIGNIFICANT CHANGE UP (ref 0.5–1.3)
EGFR: 67 ML/MIN/1.73M2 — SIGNIFICANT CHANGE UP
EOSINOPHIL # BLD AUTO: 0.26 K/UL — SIGNIFICANT CHANGE UP (ref 0–0.5)
EOSINOPHIL NFR BLD AUTO: 8.6 % — HIGH (ref 0–6)
GLUCOSE BLDC GLUCOMTR-MCNC: 122 MG/DL — HIGH (ref 70–99)
GLUCOSE BLDC GLUCOMTR-MCNC: 68 MG/DL — LOW (ref 70–99)
GLUCOSE BLDC GLUCOMTR-MCNC: 68 MG/DL — LOW (ref 70–99)
GLUCOSE BLDC GLUCOMTR-MCNC: 70 MG/DL — SIGNIFICANT CHANGE UP (ref 70–99)
GLUCOSE BLDC GLUCOMTR-MCNC: 79 MG/DL — SIGNIFICANT CHANGE UP (ref 70–99)
GLUCOSE BLDC GLUCOMTR-MCNC: 90 MG/DL — SIGNIFICANT CHANGE UP (ref 70–99)
GLUCOSE SERPL-MCNC: 75 MG/DL — SIGNIFICANT CHANGE UP (ref 70–99)
HCT VFR BLD CALC: 31.9 % — LOW (ref 39–50)
HGB BLD-MCNC: 10.5 G/DL — LOW (ref 13–17)
IANC: 0.82 K/UL — LOW (ref 1.8–7.4)
IMM GRANULOCYTES NFR BLD AUTO: 0.3 % — SIGNIFICANT CHANGE UP (ref 0–1.5)
LYMPHOCYTES # BLD AUTO: 1.62 K/UL — SIGNIFICANT CHANGE UP (ref 1–3.3)
LYMPHOCYTES # BLD AUTO: 53.6 % — HIGH (ref 13–44)
MAGNESIUM SERPL-MCNC: 2.1 MG/DL — SIGNIFICANT CHANGE UP (ref 1.6–2.6)
MCHC RBC-ENTMCNC: 30.4 PG — SIGNIFICANT CHANGE UP (ref 27–34)
MCHC RBC-ENTMCNC: 32.9 GM/DL — SIGNIFICANT CHANGE UP (ref 32–36)
MCV RBC AUTO: 92.5 FL — SIGNIFICANT CHANGE UP (ref 80–100)
MONOCYTES # BLD AUTO: 0.29 K/UL — SIGNIFICANT CHANGE UP (ref 0–0.9)
MONOCYTES NFR BLD AUTO: 9.6 % — SIGNIFICANT CHANGE UP (ref 2–14)
NEUTROPHILS # BLD AUTO: 0.82 K/UL — LOW (ref 1.8–7.4)
NEUTROPHILS NFR BLD AUTO: 27.2 % — LOW (ref 43–77)
NRBC # BLD: 0 /100 WBCS — SIGNIFICANT CHANGE UP
NRBC # FLD: 0 K/UL — SIGNIFICANT CHANGE UP
PHOSPHATE SERPL-MCNC: 3 MG/DL — SIGNIFICANT CHANGE UP (ref 2.5–4.5)
PLATELET # BLD AUTO: 192 K/UL — SIGNIFICANT CHANGE UP (ref 150–400)
POTASSIUM SERPL-MCNC: 3.8 MMOL/L — SIGNIFICANT CHANGE UP (ref 3.5–5.3)
POTASSIUM SERPL-SCNC: 3.8 MMOL/L — SIGNIFICANT CHANGE UP (ref 3.5–5.3)
PROT SERPL-MCNC: 6.5 G/DL — SIGNIFICANT CHANGE UP (ref 6–8.3)
RBC # BLD: 3.45 M/UL — LOW (ref 4.2–5.8)
RBC # FLD: 14.5 % — SIGNIFICANT CHANGE UP (ref 10.3–14.5)
SODIUM SERPL-SCNC: 139 MMOL/L — SIGNIFICANT CHANGE UP (ref 135–145)
WBC # BLD: 3.02 K/UL — LOW (ref 3.8–10.5)
WBC # FLD AUTO: 3.02 K/UL — LOW (ref 3.8–10.5)

## 2022-05-18 PROCEDURE — 99239 HOSP IP/OBS DSCHRG MGMT >30: CPT | Mod: GC

## 2022-05-18 RX ADMIN — PIPERACILLIN AND TAZOBACTAM 25 GRAM(S): 4; .5 INJECTION, POWDER, LYOPHILIZED, FOR SOLUTION INTRAVENOUS at 14:02

## 2022-05-18 RX ADMIN — Medication 81 MILLIGRAM(S): at 12:05

## 2022-05-18 RX ADMIN — POLYETHYLENE GLYCOL 3350 17 GRAM(S): 17 POWDER, FOR SOLUTION ORAL at 05:42

## 2022-05-18 RX ADMIN — PIPERACILLIN AND TAZOBACTAM 25 GRAM(S): 4; .5 INJECTION, POWDER, LYOPHILIZED, FOR SOLUTION INTRAVENOUS at 05:42

## 2022-05-18 RX ADMIN — ZINC SULFATE TAB 220 MG (50 MG ZINC EQUIVALENT) 220 MILLIGRAM(S): 220 (50 ZN) TAB at 12:03

## 2022-05-18 RX ADMIN — ENOXAPARIN SODIUM 40 MILLIGRAM(S): 100 INJECTION SUBCUTANEOUS at 12:02

## 2022-05-18 NOTE — PROGRESS NOTE ADULT - SUBJECTIVE AND OBJECTIVE BOX
Jen Linder MD  EM/IM PGY-1  Pager # [phone] or Teams  --------------------------------  ==============UNFINISHED NOTE==================    INTERVAL HPI/OVERNIGHT EVENTS: No acute events overnight, vitals remained stable. All scheduled medications given, No PRN medications required.    SUBJECTIVE: Patient seen and examined at bedside.     VITAL SIGNS:  T(C): 36.8 (05-18-22 @ 05:33), Max: 37.2 (05-17-22 @ 13:38)  HR: 51 (05-18-22 @ 05:33) (33 - 52)  BP: 137/60 (05-18-22 @ 05:33) (120/81 - 146/86)  RR: 16 (05-18-22 @ 05:33) (16 - 16)  SpO2: 100% (05-18-22 @ 05:33) (100% - 100%)      PHYSICAL EXAM:        MEDICATIONS:  MEDICATIONS  (STANDING):  aspirin enteric coated 81 milliGRAM(s) Oral daily  atorvastatin 20 milliGRAM(s) Oral at bedtime  enoxaparin Injectable 40 milliGRAM(s) SubCutaneous every 24 hours  piperacillin/tazobactam IVPB.. 3.375 Gram(s) IV Intermittent every 8 hours  polyethylene glycol 3350 17 Gram(s) Oral two times a day  senna 2 Tablet(s) Oral at bedtime  tamsulosin 0.4 milliGRAM(s) Oral at bedtime  zinc sulfate 220 milliGRAM(s) Oral daily    MEDICATIONS  (PRN):  morphine  - Injectable 2 milliGRAM(s) IV Push every 4 hours PRN Dyspnea      LABS:                        10.5   3.02  )-----------( 192      ( 18 May 2022 05:50 )             31.9       Phos  3.1     05-17             Jen Linder MD  EM/IM PGY-1  Pager # [phone] or Teams  --------------------------------    INTERVAL HPI/OVERNIGHT EVENTS: No acute events overnight, vitals remained stable. All scheduled medications given, No PRN medications required.    SUBJECTIVE: Patient seen and examined at bedside. alert and speaking, eating breakfast during exam. reports he feels well, no pain ,no new complaints. A&Ox3    VITAL SIGNS:  T(C): 36.8 (05-18-22 @ 05:33), Max: 37.2 (05-17-22 @ 13:38)  HR: 51 (05-18-22 @ 05:33) (33 - 52)  BP: 137/60 (05-18-22 @ 05:33) (120/81 - 146/86)  RR: 16 (05-18-22 @ 05:33) (16 - 16)  SpO2: 100% (05-18-22 @ 05:33) (100% - 100%)      PHYSICAL EXAM:    GENERAL: Sitting comfortably in bed in no acute distress  NEURO:Alert, awake, and oriented to self, location hospital, year 2022. Pupils symmetric.  HEENT: No conjunctival injection or scleral icterus.   CARD: very difficult to hear heart sounds, 2+ bradycardic pulse in radial arteries bilaterally, regular rhythm. No loud murmur heard.  RESP: Clear to auscultation bilaterally in anterior lung fields, No wheezes, rales or rhonchi. Good respiratory effort.  ABD:  Nondistended, Soft and nontender to palpation in all quadrants, no guarding, no rigidity. No masses appreciated.  EXT: R arm contracted and resting near face, R leg 0/5, L leg 2/5, L arm 4/5.  SKIN: No rashes, bruising or acute skin injuries on face, limbs, abdomen, chest or back, healed sacral ulcer site without break in the skin oe erythema    MEDICATIONS:  MEDICATIONS  (STANDING):  aspirin enteric coated 81 milliGRAM(s) Oral daily  atorvastatin 20 milliGRAM(s) Oral at bedtime  enoxaparin Injectable 40 milliGRAM(s) SubCutaneous every 24 hours  piperacillin/tazobactam IVPB.. 3.375 Gram(s) IV Intermittent every 8 hours  polyethylene glycol 3350 17 Gram(s) Oral two times a day  senna 2 Tablet(s) Oral at bedtime  tamsulosin 0.4 milliGRAM(s) Oral at bedtime  zinc sulfate 220 milliGRAM(s) Oral daily    MEDICATIONS  (PRN):  morphine  - Injectable 2 milliGRAM(s) IV Push every 4 hours PRN Dyspnea      LABS:                        10.5   3.02  )-----------( 192      ( 18 May 2022 05:50 )             31.9       Phos  3.1     05-17

## 2022-05-18 NOTE — PROGRESS NOTE ADULT - ATTENDING COMMENTS
Patient seen and examined AAO1 today eyes open and talking and at former baseline per family.   82M former smoker with PMH significant for dementia (A&Ox1-2 at baseline), CVA c/b R sided hemiplegia, functional quadriplegic, HTN, HLD, diet-controlled T2DM, CAD s/p PCI (2014), HFpEF, and sinus node dysfunction s/p PPM extraction (12/2021 2/2 bacteremia), presenting with lethargy c/w acute infectious encephalopathy secondary to presumed UTI.  Acute encephalopathy improving with antibiotics. MRI attempted but patient unable to tolerate study, per neuro no need for inpatient study given likely alternative etiology. BCx NGTD, UCx only with mild candida, will continue with empiric ABX as cultures obtained after ABX administration. Plan for 5 day course to complete tomorrow. Patient medically stable for discharge, plan for discharge home with prior services. D/c planning 35 min coordinating care

## 2022-05-18 NOTE — DISCHARGE NOTE NURSING/CASE MANAGEMENT/SOCIAL WORK - NSDCCRNAME_GEN_ALL_CORE_FT
Patient has HHA 9.5 hours/7 days from [facility], JO Baires [phone]. Agency: [employer] [facility] [phone]

## 2022-05-18 NOTE — PROGRESS NOTE ADULT - PROBLEM SELECTOR PLAN 4
H/o CVA 20+ years ago with residual R-sided deficits and functional quadriplegia.  -C/w home ASA + atorvastatin  - Per Neuro, at the code stroke their impression was he presented with sudden inability to speak per their discussion with wife, qualifying event as a TIA and recommend plavix 75mg qd for 21 days and increasing atorvastatin to 40mg pending lipid panel, we feel based on subsequent interviews with the wife this is low risk for TIA and he is at high risk of bleeding so will hold off on medication changes

## 2022-05-18 NOTE — DISCHARGE NOTE NURSING/CASE MANAGEMENT/SOCIAL WORK - NSDCPEFALRISK_GEN_ALL_CORE
For information on Fall & Injury Prevention, visit: [email or web address] OR  [email or web address] OR  https://www.cdc.gov/steadi/patient.html

## 2022-05-18 NOTE — PROGRESS NOTE ADULT - PROBLEM SELECTOR PLAN 1
Patient with lethargy and worsening mental status x 1 day per wife. Recent hospitalization for UTI and E faecalis bacteremia, discharged to [facility] until 5/12. Likely encephalopathy in setting of infection. Improved to baseline per wife at bedside in the ED. Although patient not meeting criteria for SIRS, patient presented similarly with neutropenia, lactic acidosis, and encephalopathy during recent hospitalization.   -Lactate 2.4 on arrival cleared after 500cc bolus  -Code stroke on presentation, CTH stable, Neuro recs MRI and EEG  - EEG 5/15: Mild to Moderate nonspecific diffuse or multifocal cerebral dysfunction. No epileptiform pattern or seizure seen.  -Concern for inadequate completion of abx course after recent hospitalization, Collected Bcx however after abx started, and treat empirically with Zosyn, currently on day 4 of abx therapy (5/15- ), plan for 5 day course  - 5/15 Bcx NGTD, Ucx 10-49k candida albicans  -UA with leuk esterase and WBC, CXR clear lungs, RVP neg  - Pending MRI Patient with lethargy and worsening mental status x 1 day per wife. Recent hospitalization for UTI and E faecalis bacteremia, discharged to [facility] until 5/12. Likely encephalopathy in setting of infection. Improved to baseline per wife at bedside in the ED. Although patient not meeting criteria for SIRS, patient presented similarly with neutropenia, lactic acidosis, and encephalopathy during recent hospitalization.   -Lactate 2.4 on arrival cleared after 500cc bolus  -Code stroke on presentation, CTH stable, Neuro recs MRI and EEG  - EEG 5/15: Mild to Moderate nonspecific diffuse or multifocal cerebral dysfunction. No epileptiform pattern or seizure seen.  -Concern for inadequate completion of abx course after recent hospitalization, Collected Bcx however after abx started, and treat empirically with Zosyn, currently on day 4 of abx therapy (5/15- ), plan for 5 day course  - 5/15 Bcx NGTD, Ucx 10-49k candida albicans  -UA with leuk esterase and WBC, CXR clear lungs, RVP neg  - Taken for MRI today, was unable to tolerate, putpt provider and wife can decide if outpt MRI is necessary as we do not believe it will .  - DC today

## 2022-05-18 NOTE — PROGRESS NOTE ADULT - PROBLEM SELECTOR PLAN 2
Pt p/w AMS + lethargy. UA 50 WBCs, large LE. Recent hospitalization with UTI.   - Historical Ucx: 4/15 candida albicans 50-99K, 12/25: >100k E Coli pan sensitive and 50-99k MRSA  - Historical Bcx: 4/15 enterococcus pan sensitive, 12/27: MRSA  -S/p Ampicillin in ED, Broadened to Zosyn (5/15- ) plan for 5 day empiric course since cultures drawn after abx started Pt p/w AMS + lethargy. UA 50 WBCs, large LE. Recent hospitalization with UTI.   - Historical Ucx: 4/15 candida albicans 50-99K, 12/25: >100k E Coli pan sensitive and 50-99k MRSA  - Historical Bcx: 4/15 enterococcus pan sensitive, 12/27: MRSA  -S/p Ampicillin in ED, Broadened to Zosyn (5/15- ) plan for 5 day empiric course since cultures drawn after abx started, will DC with 1 more day of augmentin

## 2022-05-18 NOTE — PROGRESS NOTE ADULT - PROBLEM/PLAN-4
Differential Type NOT REPORTED     Seg Neutrophils 67 (H) 36 - 65 %    Lymphocytes 23 (L) 24 - 43 %    Monocytes 8 3 - 12 %    Eosinophils % 2 1 - 4 %    Basophils 0 0 - 2 %    Immature Granulocytes 0 0 %    Segs Absolute 4.39 1.50 - 8.10 k/uL    Absolute Lymph # 1.54 1.10 - 3.70 k/uL    Absolute Mono # 0.55 0.10 - 1.20 k/uL    Absolute Eos # 0.12 0.00 - 0.44 k/uL    Basophils # <0.03 0.00 - 0.20 k/uL    Absolute Immature Granulocyte <0.03 0.00 - 0.30 k/uL    WBC Morphology NOT REPORTED     RBC Morphology NOT REPORTED     Platelet Estimate NOT REPORTED    BASIC METABOLIC PANEL    Collection Time: 04/27/19 11:38 PM   Result Value Ref Range    Glucose 105 (H) 70 - 99 mg/dL    BUN 6 6 - 20 mg/dL    CREATININE 0.64 0.50 - 0.90 mg/dL    Bun/Cre Ratio NOT REPORTED 9 - 20    Calcium 9.1 8.6 - 10.4 mg/dL    Sodium 135 135 - 144 mmol/L    Potassium 3.9 3.7 - 5.3 mmol/L    Chloride 104 98 - 107 mmol/L    CO2 23 20 - 31 mmol/L    Anion Gap 8 (L) 9 - 17 mmol/L    GFR Non-African American >60 >60 mL/min    GFR African American >60 >60 mL/min    GFR Comment          GFR Staging NOT REPORTED    MAGNESIUM    Collection Time: 04/27/19 11:38 PM   Result Value Ref Range    Magnesium 2.2 1.6 - 2.6 mg/dL   EKG 12 Lead    Collection Time: 04/28/19 12:31 AM   Result Value Ref Range    Ventricular Rate 80 BPM    Atrial Rate 80 BPM    P-R Interval 146 ms    QRS Duration 118 ms    Q-T Interval 420 ms    QTc Calculation (Bazett) 484 ms    P Axis 43 degrees    R Axis 100 degrees    T Axis 76 degrees   CBC Auto Differential    Collection Time: 04/28/19  6:14 AM   Result Value Ref Range    WBC 5.8 3.5 - 11.3 k/uL    RBC 3.41 (L) 3.95 - 5.11 m/uL    Hemoglobin 9.7 (L) 11.9 - 15.1 g/dL    Hematocrit 32.1 (L) 36.3 - 47.1 %    MCV 94.1 82.6 - 102.9 fL    MCH 28.4 25.2 - 33.5 pg    MCHC 30.2 28.4 - 34.8 g/dL    RDW 13.3 11.8 - 14.4 %    Platelets 552 936 - 912 k/uL    MPV 11.4 8.1 - 13.5 fL    NRBC Automated 0.0 0.0 per 100 WBC    Differential Type
NOT REPORTED     Seg Neutrophils 61 36 - 65 %    Lymphocytes 30 24 - 43 %    Monocytes 8 3 - 12 %    Eosinophils % 1 1 - 4 %    Basophils 0 0 - 2 %    Immature Granulocytes 0 0 %    Segs Absolute 3.47 1.50 - 8.10 k/uL    Absolute Lymph # 1.73 1.10 - 3.70 k/uL    Absolute Mono # 0.44 0.10 - 1.20 k/uL    Absolute Eos # 0.08 0.00 - 0.44 k/uL    Basophils # <0.03 0.00 - 0.20 k/uL    Absolute Immature Granulocyte <0.03 0.00 - 0.30 k/uL    WBC Morphology NOT REPORTED     RBC Morphology NOT REPORTED     Platelet Estimate NOT REPORTED        Assessment/Plan:  Vicky [place]brook [place] 21 y.o. female 10w5d based off LMP with possible R sided ectopic pregnancy with worsening abdominal pain and continued vaginal bleeding s/p Methotrexate 4/26 at outside facility, patient requesting surgical management              - Rh positive, Rhogam not indicated              - Abnormal BCHG trend:                          BHCG 4/17 320                          BHCG 4/25 423                          BHCG 4/26 432 and 475                     - CBC Hgb stable 9.5>9.7                - Vaginitis negative as of this morning   - GC/C collected and pending              - Consent obtained and in chart              - Serial abdominal exams have been benign and stable throughout the night without any concerns for hemoperitoneum or declining patient hemodynamic status    - VSS              - Morphine 2m IV PRN for pain              - Plan for surgery at 0900, Surgery desk notified        Significant cardiac history (CHF, Hx of CHD (Bell anomaly, Ebstein anomaly s/p tricuspid valve repair x 2, PFO)  Hx of heart failure, Hx of two tricuspid valve surgeries [facility]), Hx of cardiac cath, AVNRT s/p multiple ablations)              - Cardiology continued [address] echo EF 56% with R heart cath with normal L and R filling pressures trivial to mild tricuspid commissural regurgitation               - Cleared by Cardiology for obstetric
DISPLAY PLAN FREE TEXT

## 2022-05-20 LAB
CULTURE RESULTS: SIGNIFICANT CHANGE UP
SPECIMEN SOURCE: SIGNIFICANT CHANGE UP

## 2022-07-19 NOTE — ED PROVIDER NOTE - PROGRESS NOTE
From: Lb Anderson  To: Raya Nieves  Sent: 7/18/2022 10:44 AM CDT  Subject: Fatigue & sleeping     or Edward Gonzalez still fatigued in the morning after several nights of tossing & turning through nite. This fatigue seems to be on going even if I do sleep better. Previously we discontinued one of the two metoprolol after talking with you previously. Iâm thinking maybe of taking only one losartan at nite instead of the two 12 hrs apart. Have monitored bp & running between 107/65 & 125 daily. Please advise. Thank You! Stable.

## 2022-09-16 NOTE — ED ADULT NURSE NOTE - NS ED NURSE RECORD ANOTHER HT AND WT
Removed With: liquid nitrogen
Anesthesia Volume In Cc: 0
Detail Level: Simple
No
Consent: Verbal consent obtained and the risks of skin tag removal was reviewed with the patient including but not limited to bleeding, pigmentary change, infection, pain, and remote possibility of scarring.

## 2022-11-21 NOTE — PATIENT PROFILE ADULT - NSPROPOAPRESSUREINJURY_GEN_A_NUR
.Date/Time:  11/21/2022 3:22 PM    Method of communication with patient:phone    [facility] ( [facility]) made out reach to  patient by telephone to offer Complex Case Management  ( CCM). Provided introduction to self, and explanation of the Ambulatory Care . Contact at [phone] anytime Monday thru Friday 08:00-4:30.
no

## 2023-01-17 ENCOUNTER — INPATIENT (INPATIENT)
Facility: HOSPITAL | Age: 83
LOS: 15 days | Discharge: HOME HEALTH | End: 2023-02-02
Attending: INTERNAL MEDICINE | Admitting: INTERNAL MEDICINE
Payer: COMMERCIAL

## 2023-01-17 VITALS
SYSTOLIC BLOOD PRESSURE: 89 MMHG | DIASTOLIC BLOOD PRESSURE: 59 MMHG | HEART RATE: 90 BPM | TEMPERATURE: 97 F | RESPIRATION RATE: 17 BRPM | OXYGEN SATURATION: 97 %

## 2023-01-17 DIAGNOSIS — N39.0 URINARY TRACT INFECTION, SITE NOT SPECIFIED: ICD-10-CM

## 2023-01-17 DIAGNOSIS — E78.5 HYPERLIPIDEMIA, UNSPECIFIED: ICD-10-CM

## 2023-01-17 DIAGNOSIS — F03.90 UNSPECIFIED DEMENTIA, UNSPECIFIED SEVERITY, WITHOUT BEHAVIORAL DISTURBANCE, PSYCHOTIC DISTURBANCE, MOOD DISTURBANCE, AND ANXIETY: ICD-10-CM

## 2023-01-17 DIAGNOSIS — Z95.5 PRESENCE OF CORONARY ANGIOPLASTY IMPLANT AND GRAFT: Chronic | ICD-10-CM

## 2023-01-17 DIAGNOSIS — A41.9 SEPSIS, UNSPECIFIED ORGANISM: ICD-10-CM

## 2023-01-17 DIAGNOSIS — K59.00 CONSTIPATION, UNSPECIFIED: ICD-10-CM

## 2023-01-17 DIAGNOSIS — Z29.9 ENCOUNTER FOR PROPHYLACTIC MEASURES, UNSPECIFIED: ICD-10-CM

## 2023-01-17 DIAGNOSIS — E11.9 TYPE 2 DIABETES MELLITUS WITHOUT COMPLICATIONS: ICD-10-CM

## 2023-01-17 DIAGNOSIS — I63.9 CEREBRAL INFARCTION, UNSPECIFIED: ICD-10-CM

## 2023-01-17 DIAGNOSIS — Z95.0 PRESENCE OF CARDIAC PACEMAKER: Chronic | ICD-10-CM

## 2023-01-17 DIAGNOSIS — D72.819 DECREASED WHITE BLOOD CELL COUNT, UNSPECIFIED: ICD-10-CM

## 2023-01-17 DIAGNOSIS — I25.10 ATHEROSCLEROTIC HEART DISEASE OF NATIVE CORONARY ARTERY WITHOUT ANGINA PECTORIS: ICD-10-CM

## 2023-01-17 LAB
ALBUMIN SERPL ELPH-MCNC: 4 G/DL — SIGNIFICANT CHANGE UP (ref 3.3–5)
ALP SERPL-CCNC: 106 U/L — SIGNIFICANT CHANGE UP (ref 40–120)
ALT FLD-CCNC: 28 U/L — SIGNIFICANT CHANGE UP (ref 4–41)
ANION GAP SERPL CALC-SCNC: 17 MMOL/L — HIGH (ref 7–14)
ANISOCYTOSIS BLD QL: SLIGHT — SIGNIFICANT CHANGE UP
APPEARANCE UR: ABNORMAL
APTT BLD: 21.5 SEC — LOW (ref 27–36.3)
AST SERPL-CCNC: 28 U/L — SIGNIFICANT CHANGE UP (ref 4–40)
BACTERIA # UR AUTO: ABNORMAL
BASE EXCESS BLDV CALC-SCNC: -4.8 MMOL/L — LOW (ref -2–3)
BASE EXCESS BLDV CALC-SCNC: -6 MMOL/L — LOW (ref -2–3)
BASE EXCESS BLDV CALC-SCNC: -6.7 MMOL/L — LOW (ref -2–3)
BASOPHILS # BLD AUTO: 0.03 K/UL — SIGNIFICANT CHANGE UP (ref 0–0.2)
BASOPHILS NFR BLD AUTO: 1.7 % — SIGNIFICANT CHANGE UP (ref 0–2)
BILIRUB SERPL-MCNC: 1.2 MG/DL — SIGNIFICANT CHANGE UP (ref 0.2–1.2)
BILIRUB UR-MCNC: NEGATIVE — SIGNIFICANT CHANGE UP
BLOOD GAS VENOUS COMPREHENSIVE RESULT: SIGNIFICANT CHANGE UP
BUN SERPL-MCNC: 22 MG/DL — SIGNIFICANT CHANGE UP (ref 7–23)
CALCIUM SERPL-MCNC: 9.9 MG/DL — SIGNIFICANT CHANGE UP (ref 8.4–10.5)
CHLORIDE BLDV-SCNC: 102 MMOL/L — SIGNIFICANT CHANGE UP (ref 96–108)
CHLORIDE BLDV-SCNC: 105 MMOL/L — SIGNIFICANT CHANGE UP (ref 96–108)
CHLORIDE BLDV-SCNC: 105 MMOL/L — SIGNIFICANT CHANGE UP (ref 96–108)
CHLORIDE SERPL-SCNC: 103 MMOL/L — SIGNIFICANT CHANGE UP (ref 98–107)
CO2 BLDV-SCNC: 19.3 MMOL/L — LOW (ref 22–26)
CO2 BLDV-SCNC: 21.7 MMOL/L — LOW (ref 22–26)
CO2 BLDV-SCNC: 23.8 MMOL/L — SIGNIFICANT CHANGE UP (ref 22–26)
CO2 SERPL-SCNC: 20 MMOL/L — LOW (ref 22–31)
COLOR SPEC: ABNORMAL
CREAT SERPL-MCNC: 1.27 MG/DL — SIGNIFICANT CHANGE UP (ref 0.5–1.3)
DIFF PNL FLD: ABNORMAL
EGFR: 56 ML/MIN/1.73M2 — LOW
EOSINOPHIL # BLD AUTO: 0.01 K/UL — SIGNIFICANT CHANGE UP (ref 0–0.5)
EOSINOPHIL NFR BLD AUTO: 0.9 % — SIGNIFICANT CHANGE UP (ref 0–6)
FLUAV AG NPH QL: SIGNIFICANT CHANGE UP
FLUBV AG NPH QL: SIGNIFICANT CHANGE UP
GAS PNL BLDV: 133 MMOL/L — LOW (ref 136–145)
GAS PNL BLDV: 135 MMOL/L — LOW (ref 136–145)
GAS PNL BLDV: 139 MMOL/L — SIGNIFICANT CHANGE UP (ref 136–145)
GIANT PLATELETS BLD QL SMEAR: PRESENT — SIGNIFICANT CHANGE UP
GLUCOSE BLDV-MCNC: 102 MG/DL — HIGH (ref 70–99)
GLUCOSE BLDV-MCNC: 194 MG/DL — HIGH (ref 70–99)
GLUCOSE BLDV-MCNC: 79 MG/DL — SIGNIFICANT CHANGE UP (ref 70–99)
GLUCOSE SERPL-MCNC: 104 MG/DL — HIGH (ref 70–99)
GLUCOSE UR QL: NEGATIVE — SIGNIFICANT CHANGE UP
HCO3 BLDV-SCNC: 18 MMOL/L — LOW (ref 22–29)
HCO3 BLDV-SCNC: 20 MMOL/L — LOW (ref 22–29)
HCO3 BLDV-SCNC: 22 MMOL/L — SIGNIFICANT CHANGE UP (ref 22–29)
HCT VFR BLD CALC: 39.3 % — SIGNIFICANT CHANGE UP (ref 39–50)
HCT VFR BLDA CALC: 32 % — LOW (ref 39–51)
HCT VFR BLDA CALC: 35 % — LOW (ref 39–51)
HCT VFR BLDA CALC: 38 % — LOW (ref 39–51)
HGB BLD CALC-MCNC: 10.8 G/DL — LOW (ref 13–17)
HGB BLD CALC-MCNC: 11.8 G/DL — LOW (ref 13–17)
HGB BLD CALC-MCNC: 12.6 G/DL — LOW (ref 13–17)
HGB BLD-MCNC: 12.4 G/DL — LOW (ref 13–17)
IANC: 0.78 K/UL — LOW (ref 1.8–7.4)
INR BLD: 1.16 RATIO — SIGNIFICANT CHANGE UP (ref 0.88–1.16)
KETONES UR-MCNC: NEGATIVE — SIGNIFICANT CHANGE UP
LACTATE BLDV-MCNC: 5.3 MMOL/L — CRITICAL HIGH (ref 0.5–2)
LACTATE BLDV-MCNC: 5.4 MMOL/L — CRITICAL HIGH (ref 0.5–2)
LACTATE BLDV-MCNC: 5.7 MMOL/L — CRITICAL HIGH (ref 0.5–2)
LACTATE SERPL-SCNC: 6.4 MMOL/L — CRITICAL HIGH (ref 0.5–2)
LACTATE SERPL-SCNC: 6.5 MMOL/L — CRITICAL HIGH (ref 0.5–2)
LEUKOCYTE ESTERASE UR-ACNC: ABNORMAL
LYMPHOCYTES # BLD AUTO: 0.58 K/UL — LOW (ref 1–3.3)
LYMPHOCYTES # BLD AUTO: 37.9 % — SIGNIFICANT CHANGE UP (ref 13–44)
MACROCYTES BLD QL: SLIGHT — SIGNIFICANT CHANGE UP
MANUAL SMEAR VERIFICATION: SIGNIFICANT CHANGE UP
MCHC RBC-ENTMCNC: 30.7 PG — SIGNIFICANT CHANGE UP (ref 27–34)
MCHC RBC-ENTMCNC: 31.6 GM/DL — LOW (ref 32–36)
MCV RBC AUTO: 97.3 FL — SIGNIFICANT CHANGE UP (ref 80–100)
MONOCYTES # BLD AUTO: 0.01 K/UL — SIGNIFICANT CHANGE UP (ref 0–0.9)
MONOCYTES NFR BLD AUTO: 0.9 % — LOW (ref 2–14)
NEUTROPHILS # BLD AUTO: 0.69 K/UL — LOW (ref 1.8–7.4)
NEUTROPHILS NFR BLD AUTO: 39.6 % — LOW (ref 43–77)
NEUTS BAND # BLD: 5.2 % — SIGNIFICANT CHANGE UP (ref 0–6)
NITRITE UR-MCNC: NEGATIVE — SIGNIFICANT CHANGE UP
NRBC # BLD: 1 /100 — HIGH (ref 0–0)
NT-PROBNP SERPL-SCNC: 162 PG/ML — SIGNIFICANT CHANGE UP
OVALOCYTES BLD QL SMEAR: SLIGHT — SIGNIFICANT CHANGE UP
PCO2 BLDV: 34 MMHG — LOW (ref 42–55)
PCO2 BLDV: 38 MMHG — LOW (ref 42–55)
PCO2 BLDV: 54 MMHG — SIGNIFICANT CHANGE UP (ref 42–55)
PH BLDV: 7.22 — LOW (ref 7.32–7.43)
PH BLDV: 7.34 — SIGNIFICANT CHANGE UP (ref 7.32–7.43)
PH BLDV: 7.34 — SIGNIFICANT CHANGE UP (ref 7.32–7.43)
PH UR: 6 — SIGNIFICANT CHANGE UP (ref 5–8)
PLAT MORPH BLD: NORMAL — SIGNIFICANT CHANGE UP
PLATELET # BLD AUTO: 166 K/UL — SIGNIFICANT CHANGE UP (ref 150–400)
PLATELET COUNT - ESTIMATE: NORMAL — SIGNIFICANT CHANGE UP
PO2 BLDV: 22 MMHG — SIGNIFICANT CHANGE UP
PO2 BLDV: 35 MMHG — SIGNIFICANT CHANGE UP
PO2 BLDV: 48 MMHG — SIGNIFICANT CHANGE UP
POIKILOCYTOSIS BLD QL AUTO: SLIGHT — SIGNIFICANT CHANGE UP
POLYCHROMASIA BLD QL SMEAR: SLIGHT — SIGNIFICANT CHANGE UP
POTASSIUM BLDV-SCNC: 3.6 MMOL/L — SIGNIFICANT CHANGE UP (ref 3.5–5.1)
POTASSIUM BLDV-SCNC: 4.1 MMOL/L — SIGNIFICANT CHANGE UP (ref 3.5–5.1)
POTASSIUM BLDV-SCNC: 4.9 MMOL/L — SIGNIFICANT CHANGE UP (ref 3.5–5.1)
POTASSIUM SERPL-MCNC: 5.2 MMOL/L — SIGNIFICANT CHANGE UP (ref 3.5–5.3)
POTASSIUM SERPL-SCNC: 5.2 MMOL/L — SIGNIFICANT CHANGE UP (ref 3.5–5.3)
PROCALCITONIN SERPL-MCNC: 0.72 NG/ML — HIGH (ref 0.02–0.1)
PROT SERPL-MCNC: 7.7 G/DL — SIGNIFICANT CHANGE UP (ref 6–8.3)
PROT UR-MCNC: ABNORMAL
PROTHROM AB SERPL-ACNC: 13.5 SEC — HIGH (ref 10.5–13.4)
RBC # BLD: 4.04 M/UL — LOW (ref 4.2–5.8)
RBC # FLD: 13 % — SIGNIFICANT CHANGE UP (ref 10.3–14.5)
RBC BLD AUTO: ABNORMAL
RBC CASTS # UR COMP ASSIST: 14 /HPF — HIGH (ref 0–4)
RSV RNA NPH QL NAA+NON-PROBE: SIGNIFICANT CHANGE UP
SAO2 % BLDV: 19 % — SIGNIFICANT CHANGE UP
SAO2 % BLDV: 58.8 % — SIGNIFICANT CHANGE UP
SAO2 % BLDV: 77.6 % — SIGNIFICANT CHANGE UP
SARS-COV-2 RNA SPEC QL NAA+PROBE: SIGNIFICANT CHANGE UP
SODIUM SERPL-SCNC: 140 MMOL/L — SIGNIFICANT CHANGE UP (ref 135–145)
SP GR SPEC: 1.02 — SIGNIFICANT CHANGE UP (ref 1.01–1.05)
TROPONIN T, HIGH SENSITIVITY RESULT: 20 NG/L — SIGNIFICANT CHANGE UP
UROBILINOGEN FLD QL: SIGNIFICANT CHANGE UP
VARIANT LYMPHS # BLD: 13.8 % — HIGH (ref 0–6)
WBC # BLD: 1.54 K/UL — LOW (ref 3.8–10.5)
WBC # FLD AUTO: 1.54 K/UL — LOW (ref 3.8–10.5)
WBC UR QL: >50 /HPF — HIGH (ref 0–5)

## 2023-01-17 PROCEDURE — 99223 1ST HOSP IP/OBS HIGH 75: CPT | Mod: GC

## 2023-01-17 PROCEDURE — 70450 CT HEAD/BRAIN W/O DYE: CPT | Mod: 26,MA

## 2023-01-17 PROCEDURE — 74174 CTA ABD&PLVS W/CONTRAST: CPT | Mod: 26,MA

## 2023-01-17 PROCEDURE — 71045 X-RAY EXAM CHEST 1 VIEW: CPT | Mod: 26

## 2023-01-17 PROCEDURE — 99285 EMERGENCY DEPT VISIT HI MDM: CPT

## 2023-01-17 RX ORDER — DEXTROSE 50 % IN WATER 50 %
12.5 SYRINGE (ML) INTRAVENOUS ONCE
Refills: 0 | Status: DISCONTINUED | OUTPATIENT
Start: 2023-01-17 | End: 2023-02-02

## 2023-01-17 RX ORDER — SODIUM CHLORIDE 9 MG/ML
1000 INJECTION, SOLUTION INTRAVENOUS
Refills: 0 | Status: DISCONTINUED | OUTPATIENT
Start: 2023-01-17 | End: 2023-01-18

## 2023-01-17 RX ORDER — SODIUM CHLORIDE 9 MG/ML
1000 INJECTION, SOLUTION INTRAVENOUS ONCE
Refills: 0 | Status: COMPLETED | OUTPATIENT
Start: 2023-01-17 | End: 2023-01-17

## 2023-01-17 RX ORDER — DEXTROSE 50 % IN WATER 50 %
25 SYRINGE (ML) INTRAVENOUS ONCE
Refills: 0 | Status: DISCONTINUED | OUTPATIENT
Start: 2023-01-17 | End: 2023-02-02

## 2023-01-17 RX ORDER — PIPERACILLIN AND TAZOBACTAM 4; .5 G/20ML; G/20ML
3.38 INJECTION, POWDER, LYOPHILIZED, FOR SOLUTION INTRAVENOUS EVERY 8 HOURS
Refills: 0 | Status: DISCONTINUED | OUTPATIENT
Start: 2023-01-17 | End: 2023-01-18

## 2023-01-17 RX ORDER — DEXTROSE 50 % IN WATER 50 %
50 SYRINGE (ML) INTRAVENOUS ONCE
Refills: 0 | Status: COMPLETED | OUTPATIENT
Start: 2023-01-17 | End: 2023-01-17

## 2023-01-17 RX ORDER — ACETAMINOPHEN 500 MG
1000 TABLET ORAL ONCE
Refills: 0 | Status: COMPLETED | OUTPATIENT
Start: 2023-01-17 | End: 2023-01-17

## 2023-01-17 RX ORDER — ENOXAPARIN SODIUM 100 MG/ML
40 INJECTION SUBCUTANEOUS EVERY 24 HOURS
Refills: 0 | Status: DISCONTINUED | OUTPATIENT
Start: 2023-01-17 | End: 2023-02-02

## 2023-01-17 RX ORDER — DEXTROSE 50 % IN WATER 50 %
25 SYRINGE (ML) INTRAVENOUS ONCE
Refills: 0 | Status: COMPLETED | OUTPATIENT
Start: 2023-01-17 | End: 2023-01-17

## 2023-01-17 RX ORDER — ACETAMINOPHEN 500 MG
650 TABLET ORAL EVERY 6 HOURS
Refills: 0 | Status: DISCONTINUED | OUTPATIENT
Start: 2023-01-17 | End: 2023-02-02

## 2023-01-17 RX ORDER — SODIUM CHLORIDE 9 MG/ML
1000 INJECTION, SOLUTION INTRAVENOUS
Refills: 0 | Status: DISCONTINUED | OUTPATIENT
Start: 2023-01-17 | End: 2023-01-17

## 2023-01-17 RX ORDER — ACETAMINOPHEN 500 MG
1000 TABLET ORAL EVERY 8 HOURS
Refills: 0 | Status: COMPLETED | OUTPATIENT
Start: 2023-01-17 | End: 2023-01-25

## 2023-01-17 RX ORDER — SODIUM CHLORIDE 9 MG/ML
1500 INJECTION INTRAMUSCULAR; INTRAVENOUS; SUBCUTANEOUS ONCE
Refills: 0 | Status: COMPLETED | OUTPATIENT
Start: 2023-01-17 | End: 2023-01-17

## 2023-01-17 RX ORDER — GLUCAGON INJECTION, SOLUTION 0.5 MG/.1ML
1 INJECTION, SOLUTION SUBCUTANEOUS ONCE
Refills: 0 | Status: DISCONTINUED | OUTPATIENT
Start: 2023-01-17 | End: 2023-02-02

## 2023-01-17 RX ORDER — ONDANSETRON 8 MG/1
4 TABLET, FILM COATED ORAL ONCE
Refills: 0 | Status: COMPLETED | OUTPATIENT
Start: 2023-01-17 | End: 2023-01-17

## 2023-01-17 RX ORDER — PIPERACILLIN AND TAZOBACTAM 4; .5 G/20ML; G/20ML
3.38 INJECTION, POWDER, LYOPHILIZED, FOR SOLUTION INTRAVENOUS ONCE
Refills: 0 | Status: COMPLETED | OUTPATIENT
Start: 2023-01-17 | End: 2023-01-17

## 2023-01-17 RX ORDER — DEXTROSE 50 % IN WATER 50 %
15 SYRINGE (ML) INTRAVENOUS ONCE
Refills: 0 | Status: DISCONTINUED | OUTPATIENT
Start: 2023-01-17 | End: 2023-02-02

## 2023-01-17 RX ORDER — DEXTROSE 50 % IN WATER 50 %
12.5 SYRINGE (ML) INTRAVENOUS ONCE
Refills: 0 | Status: COMPLETED | OUTPATIENT
Start: 2023-01-17 | End: 2023-01-17

## 2023-01-17 RX ADMIN — Medication 400 MILLIGRAM(S): at 06:15

## 2023-01-17 RX ADMIN — PIPERACILLIN AND TAZOBACTAM 25 GRAM(S): 4; .5 INJECTION, POWDER, LYOPHILIZED, FOR SOLUTION INTRAVENOUS at 16:42

## 2023-01-17 RX ADMIN — Medication 25 GRAM(S): at 20:00

## 2023-01-17 RX ADMIN — PIPERACILLIN AND TAZOBACTAM 25 GRAM(S): 4; .5 INJECTION, POWDER, LYOPHILIZED, FOR SOLUTION INTRAVENOUS at 22:11

## 2023-01-17 RX ADMIN — Medication 10 MILLIGRAM(S): at 22:25

## 2023-01-17 RX ADMIN — SODIUM CHLORIDE 75 MILLILITER(S): 9 INJECTION, SOLUTION INTRAVENOUS at 15:17

## 2023-01-17 RX ADMIN — SODIUM CHLORIDE 1500 MILLILITER(S): 9 INJECTION INTRAMUSCULAR; INTRAVENOUS; SUBCUTANEOUS at 05:23

## 2023-01-17 RX ADMIN — SODIUM CHLORIDE 1000 MILLILITER(S): 9 INJECTION, SOLUTION INTRAVENOUS at 09:54

## 2023-01-17 RX ADMIN — Medication 1 ENEMA: at 09:54

## 2023-01-17 RX ADMIN — SODIUM CHLORIDE 75 MILLILITER(S): 9 INJECTION, SOLUTION INTRAVENOUS at 22:08

## 2023-01-17 RX ADMIN — Medication 12.5 GRAM(S): at 14:28

## 2023-01-17 RX ADMIN — Medication 50 MILLILITER(S): at 05:15

## 2023-01-17 RX ADMIN — ONDANSETRON 4 MILLIGRAM(S): 8 TABLET, FILM COATED ORAL at 05:22

## 2023-01-17 RX ADMIN — PIPERACILLIN AND TAZOBACTAM 200 GRAM(S): 4; .5 INJECTION, POWDER, LYOPHILIZED, FOR SOLUTION INTRAVENOUS at 06:14

## 2023-01-17 RX ADMIN — SODIUM CHLORIDE 75 MILLILITER(S): 9 INJECTION, SOLUTION INTRAVENOUS at 18:48

## 2023-01-17 RX ADMIN — Medication 400 MILLIGRAM(S): at 13:15

## 2023-01-17 NOTE — ED ADULT TRIAGE NOTE - CHIEF COMPLAINT QUOTE
awake responsive c/o abd pain radiating to chest no N/V/D  started this AM  bedridden right arm contracted PMHx GERD CVA DM2 dementia FS 97 in field  Blood Pressure 89/59 awake responsive c/o abd pain radiating to chest no N/V/D  started this AM  bedridden right arm contracted PMHx GERD CVA DM2 dementia FS 97 in field  Blood Pressure 89/59 fs 55 in triage

## 2023-01-17 NOTE — ED PROVIDER NOTE - PHYSICAL EXAMINATION
General: Nonverbal, intermittent humming noises produced by patient in room, vomitus on chest.  HEENT: NCAT, PERRL  Cardiac: RRR, no murmurs, 2+ peripheral pulses  Chest: CTAB  Abdomen: soft, non-distended, bowel sounds present, no ttp, no rebound or guarding  Extremities: no peripheral edema, calf tenderness, or leg size discrepancies  Skin:  no rashes  Neuro: Non verbal which at times is his baseline according to wife unclear awake and alert status, withdraws to pain stimuli

## 2023-01-17 NOTE — H&P ADULT - NSHPLABSRESULTS_GEN_ALL_CORE
Personally reviewed labs, imaging, ekg                         12.4   1.54  )-----------( 166      ( 2023 05:10 )             39.3         140  |  103  |  22  ----------------------------<  104<H>  5.2   |  20<L>  |  1.27    Ca    9.9      2023 05:10    TPro  7.7  /  Alb  4.0  /  TBili  1.2  /  DBili  x   /  AST  28  /  ALT  28  /  AlkPhos  106          Urinalysis Basic - ( 2023 08:10 )    Color: Light Orange / Appearance: Slightly Turbid / S.019 / pH: x  Gluc: x / Ketone: Negative  / Bili: Negative / Urobili: <2 mg/dL   Blood: x / Protein: 30 mg/dL / Nitrite: Negative   Leuk Esterase: Large / RBC: 14 /HPF / WBC >50 /HPF   Sq Epi: x / Non Sq Epi: x / Bacteria: Few    PT/INR - ( 2023 05:10 )   PT: 13.5 sec;   INR: 1.16 ratio      PTT - ( 2023 05:10 )  PTT:21.5 sec    Lactate Trend   @ 13:00 Lactate:6.5     CAPILLARY BLOOD GLUCOSE    POCT Blood Glucose.: 59 mg/dL (2023 14:24)    EKG: Pending    < from: CT Angio Abdomen and Pelvis w/ IV Cont (23 @ 07:22) >    FINDINGS:  LOWER CHEST: Within normal limits.    LIVER: Within normal limits.  BILE DUCTS: Normal caliber.  GALLBLADDER: Within normal limits.  SPLEEN: Within normal limits.  PANCREAS: Within normal limits.  ADRENALS: Within normal limits.  KIDNEYS/URETERS: No hydronephrosis. Bilateral renal cysts and additional   subcentimeter hypoattenuating foci too small to characterize.    BLADDER: Within normal limits.  REPRODUCTIVE ORGANS: Prostate is enlarged.    BOWEL: Stomach is distended with air and fluid. Large stool burden   throughout the colon and distending the rectum up to 9 cm. No bowel   obstruction. Appendix is not visualized.  PERITONEUM: No ascites.  VESSELS: Atherosclerotic changes. Celiac axis, SMA, and GOLD and patent.   Portal veins are patent.  RETROPERITONEUM/LYMPH NODES: No lymphadenopathy.  ABDOMINAL WALL: Unchanged focal infiltration of the subcutaneous fat   overlying the coccyx to the right of midline.  BONES: Degenerative changes.    IMPRESSION:  Large stool burden throughout the colon and distending the rectum up to 9   cm.    Patent mesenteric vasculature.      < from: CT Head No Cont (23 @ 07:16) >    FINDINGS:  There is no acute intracranial hemorrhage. There are no extra-axial   collections.  The basal cisterns are patent. The ventricles are mild to   moderately dilated, unchanged in size from prior, consistent with central   type generalized volume loss. There is no midline shift. Chronic left   gangliocapsular and left thalamic capsular infarctions extending to the   corona radiata and centrum semiovale again seen. Chronic small thalamic   lacunar infarct again seen.    There are periventricular and subcortical white matter hypodensities   consistent with severe microvascular changes.    The visualized paranasal sinuses and mastoid air cells are clear.    The calvarium is intact.    IMPRESSION:  No acute intra-cranial hemorrhage, mass effect, or midline shift.    --- End of Report --- Personally reviewed labs, imaging, ekg                         12.4   1.54  )-----------( 166      ( 2023 05:10 )             39.3         140  |  103  |  22  ----------------------------<  104<H>  5.2   |  20<L>  |  1.27    Ca    9.9      2023 05:10    TPro  7.7  /  Alb  4.0  /  TBili  1.2  /  DBili  x   /  AST  28  /  ALT  28  /  AlkPhos  106          Urinalysis Basic - ( 2023 08:10 )    Color: Light Orange / Appearance: Slightly Turbid / S.019 / pH: x  Gluc: x / Ketone: Negative  / Bili: Negative / Urobili: <2 mg/dL   Blood: x / Protein: 30 mg/dL / Nitrite: Negative   Leuk Esterase: Large / RBC: 14 /HPF / WBC >50 /HPF   Sq Epi: x / Non Sq Epi: x / Bacteria: Few    PT/INR - ( 2023 05:10 )   PT: 13.5 sec;   INR: 1.16 ratio      PTT - ( 2023 05:10 )  PTT:21.5 sec    Lactate Trend   @ 13:00 Lactate:6.5     CAPILLARY BLOOD GLUCOSE    POCT Blood Glucose.: 59 mg/dL (2023 14:24)    EKG: Pending    < from: CT Angio Abdomen and Pelvis w/ IV Cont (23 @ 07:22) >    FINDINGS:  LOWER CHEST: Within normal limits.    LIVER: Within normal limits.  BILE DUCTS: Normal caliber.  GALLBLADDER: Within normal limits.  SPLEEN: Within normal limits.  PANCREAS: Within normal limits.  ADRENALS: Within normal limits.  KIDNEYS/URETERS: No hydronephrosis. Bilateral renal cysts and additional   subcentimeter hypoattenuating foci too small to characterize.    BLADDER: Within normal limits.  REPRODUCTIVE ORGANS: Prostate is enlarged.    BOWEL: Stomach is distended with air and fluid. Large stool burden   throughout the colon and distending the rectum up to 9 cm. No bowel   obstruction. Appendix is not visualized.  PERITONEUM: No ascites.  VESSELS: Atherosclerotic changes. Celiac axis, SMA, and GOLD and patent.   Portal veins are patent.  RETROPERITONEUM/LYMPH NODES: No lymphadenopathy.  ABDOMINAL WALL: Unchanged focal infiltration of the subcutaneous fat   overlying the coccyx to the right of midline.  BONES: Degenerative changes.    IMPRESSION:  Large stool burden throughout the colon and distending the rectum up to 9   cm.    Patent mesenteric vasculature.      < from: CT Head No Cont (23 @ 07:16) >    FINDINGS:  There is no acute intracranial hemorrhage. There are no extra-axial   collections.  The basal cisterns are patent. The ventricles are mild to   moderately dilated, unchanged in size from prior, consistent with central   type generalized volume loss. There is no midline shift. Chronic left   gangliocapsular and left thalamic capsular infarctions extending to the   corona radiata and centrum semiovale again seen. Chronic small thalamic   lacunar infarct again seen.    There are periventricular and subcortical white matter hypodensities  consistent with severe microvascular changes.    The visualized paranasal sinuses and mastoid air cells are clear.    The calvarium is intact.    IMPRESSION:  No acute intra-cranial hemorrhage, mass effect, or midline shift.    --- End of Report ---

## 2023-01-17 NOTE — ED ADULT NURSE REASSESSMENT NOTE - NS ED NURSE REASSESS COMMENT FT1
Pt remains in stretcher; awake, alert, responsive to all stimuli. Non-verbal. Vital signs stable. Pt in no acute distress. Pt remains on D5 maintenance fluids. Pt was given enemas as ordered; pt noted to have large BM at this time. Pt kept clean, dry, comfortable. Safety maintained. Will continue to monitor.

## 2023-01-17 NOTE — ED PROVIDER NOTE - PROGRESS NOTE DETAILS
Christofer Rebolledo MD (PGY-3): CTAP with large stool burden, no signs of stercoral colitis or perforation or obstruction, UA c/w UTI. S/p zosyn. Repeat lactate 5.7-->5.4 s/p 1.5 L NS, add'l 1L LR ordered. [facility] paged for admission Christofer Rebolledo MD (PGY-3): Case d/w [facility], Dr. Hobbs, who accepts pt for admission.

## 2023-01-17 NOTE — H&P ADULT - NSHPPHYSICALEXAM_GEN_ALL_CORE
VITALS:   T(C): 38.6 (01-17-23 @ 12:49), Max: 39.1 (01-17-23 @ 05:50)  HR: 70 (01-17-23 @ 12:49) (70 - 106)  BP: 104/64 (01-17-23 @ 12:49) (89/59 - 110/74)  RR: 20 (01-17-23 @ 12:49) (17 - 20)  SpO2: 100% (01-17-23 @ 12:49) (97% - 100%)    GENERAL: Nonverbal currently, intermittent moaning  HEAD:  Atraumatic, Normocephalic  EYES: EOMI, PERRLA, conjunctiva and sclera clear  ENT: Moist mucous membranes  NECK: Supple, No JVD  CHEST/LUNG: Clear to auscultation bilaterally; No rales, rhonchi, wheezing, or rubs. Unlabored respirations  HEART: Regular rate and rhythm; No murmurs, rubs, or gallops  ABDOMEN: BSx4; Stool able to be palpated on left hemiabdomen  EXTREMITIES:  2+ Peripheral Pulses, brisk capillary refill. No clubbing, cyanosis, or edema  NERVOUS SYSTEM:  A&Ox0 ,right arm contracted  SKIN: No rashes or lesion VITALS:   T(C): 38.6 (01-17-23 @ 12:49), Max: 39.1 (01-17-23 @ 05:50)  HR: 70 (01-17-23 @ 12:49) (70 - 106)  BP: 104/64 (01-17-23 @ 12:49) (89/59 - 110/74)  RR: 20 (01-17-23 @ 12:49) (17 - 20)  SpO2: 100% (01-17-23 @ 12:49) (97% - 100%)    GENERAL: Nonverbal currently, intermittent moaning. Appears comfortable  HEAD:  Atraumatic, Normocephalic  EYES: EOMI, PERRLA, conjunctiva and sclera clear  ENT: Dry mucous membranes  NECK: Supple, No JVD  CHEST/LUNG: Clear to auscultation bilaterally; No rales, rhonchi, wheezing, or rubs. Unlabored respirations  HEART: AFIB; No murmurs  Peripheral edema: None  Subclavian skin tenting: Slow response  ABDOMEN: BSx4; Stool able to be palpated on left hemiabdomen  EXTREMITIES:  2+ Peripheral Pulses, brisk capillary refill. No clubbing, cyanosis, or edema  NERVOUS SYSTEM:  A&Ox0 ,right arm contracted  SKIN: No rashes or lesion VITALS:   T(C): 38.6 (01-17-23 @ 12:49), Max: 39.1 (01-17-23 @ 05:50)  HR: 70 (01-17-23 @ 12:49) (70 - 106)  BP: 104/64 (01-17-23 @ 12:49) (89/59 - 110/74)  RR: 20 (01-17-23 @ 12:49) (17 - 20)  SpO2: 100% (01-17-23 @ 12:49) (97% - 100%)    GENERAL: Nonverbal currently, intermittent moaning. Appears comfortable. Intermittent gargling of secretions  HEAD:  Atraumatic, Normocephalic  EYES: EOMI, PERRLA, conjunctiva and sclera clear  ENT: Dry mucous membranes  NECK: Supple, No JVD  CHEST/LUNG: Clear to auscultation bilaterally; No rales, rhonchi, wheezing, or rubs. Unlabored respirations  HEART: AFIB; No murmurs  Peripheral edema: None  Subclavian skin tenting: Slow response  ABDOMEN: BSx4; Stool able to be palpated on left hemiabdomen  EXTREMITIES:  2+ Peripheral Pulses, brisk capillary refill. No clubbing, cyanosis, or edema  NERVOUS SYSTEM:  A&Ox0 ,right arm contracted  SKIN: No rashes or lesion

## 2023-01-17 NOTE — H&P ADULT - PROBLEM SELECTOR PLAN 2
Large stool burden on CT scan. S/p Fleet enema in ED. Currently NPO due to poor mental status.  -Stat SMOG enema  -Introduce laxatives from above if able to tolerate PO - UA concerning for infection  - F/U urine culture  - Continue zosyn for now.  - Prior urine cultures with candida, ecoli. MRSA ISO MRSA bacteremia.

## 2023-01-17 NOTE — ED PROVIDER NOTE - ATTENDING CONTRIBUTION TO CARE
82-year-old male with past medical history of CVA, dementia, diabetes, hyperlipidemia, coronary disease presents the emergency department with reported nausea vomiting chest pain abdominal pain and shortness of breath.  Patient is able to write any history himself.  Wife states that he started at 3 AM with shortness of breath increased work of breathing and complaining of abdominal pain.  It woke him from sleep.  It did not get better and continued so the wife called EMS.  Patient who provide any history secondary to his dementia.    Vitals: I have reviewed the patients vital signs  General: nontoxic appearing  HEENT: Atraumatic, normocephalic, airway patent  Eyes: EOMI, tracking appropriately  Neck: no tracheal deviation  Chest/Lungs: no trauma, symmetric chest rise, speaking in complete sentences,  no resp distress  Heart: skin and extremities well perfused, regular rate and rhythm  Neuro: A+Ox3, appears non focal  MSK: no deformities  Skin: no cyanosis, no jaundice   Psych:  Normal mood and affect    82-year-old male with a forementioned past medical history presenting with nausea vomiting abdominal pain and reported chest pain.  Differential includes but is not limited to acute bowel ischemia, small bowel obstruction, dissection, gastroenteritis.  Based on his laboratory studies patient had an elevated lactate.  We expedited a emergence CTA of the abdomen and pelvis.  The patient is going to be signed out to ED team for final management and disposition.  Please see progress notes

## 2023-01-17 NOTE — ED ADULT NURSE NOTE - CHIEF COMPLAINT QUOTE
awake responsive c/o abd pain radiating to chest no N/V/D  started this AM  bedridden right arm contracted PMHx GERD CVA DM2 dementia FS 97 in field  Blood Pressure 89/59 fs 55 in triage

## 2023-01-17 NOTE — ED ADULT NURSE NOTE - NSICDXPASTSURGICALHX_GEN_ALL_CORE_FT
PAST SURGICAL HISTORY:  Cardiac pacemaker Medtronic (SN: [id number]; Model: [id number])    S/P coronary artery stent placement

## 2023-01-17 NOTE — ED PROVIDER NOTE - OBJECTIVE STATEMENT
82-year-old male pmhx of CVA 10 years prior, dementia, diabetes, hyperlipidemia, CAD comes to ED w/ nausea, vomiting, chest pain, SOB, abdominal pain.  Patient arrived from home via EMS, history given by wife via phone reported patient had chest pain, increased work of breathing, and abdominal pain that started randomly at 3 AM, woke up patient from sleep.  Due to the appearance of severity of symptoms and persistence wife called EMS.  Their pain/symptom is moderate to severe, constant, non mediating with rest. Started randomly. History limited due to patient's baseline nonverbal dementia status.

## 2023-01-17 NOTE — H&P ADULT - ASSESSMENT
78yo largely bedbound M with hx of HTN, dementia, prior CVA presenting to hospital with chest pain, here found to have severe sepsis with lactic acidosis with likely  source. 78yo largely bedbound M with hx of HTN, dementia, prior CVA, CAD presenting to hospital with chest pain, here found to have sepsis with lactic acidosis with likely  source.

## 2023-01-17 NOTE — H&P ADULT - CONVERSATION DETAILS
Discussed with wife Nerissa at bedside. She would like to make the patient DNR and DNI as it would be in line with the GOC of the patient and family. Should would not like to risk keeping him alive if it would increase suffering. PEDRO filled out and placed in chart.

## 2023-01-17 NOTE — ED ADULT NURSE NOTE - OBJECTIVE STATEMENT
81y/o M presents to ED from home with c/o CP & abdominal pain x today. Pt non-verbal; Hx obtained by provider via wife over telephone. Pt with Hx of dementia & CVA. Pt reportedly had abdominal pain & CP x 0300 that woke him from his sleep. During assessment pt tachypneic, febrile via rectal temp, and on CM hypotensive + tachycardic. Pt also noted to be hypoglycemic with B/G of 50. IV access established. Labs collected + sent. Medicated as per MD order. IV fluids in progress. CT scan pending.

## 2023-01-17 NOTE — H&P ADULT - PROBLEM SELECTOR PLAN 4
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

## 2023-01-17 NOTE — H&P ADULT - PROBLEM SELECTOR PLAN 3
Hx of Dementia, A&O 0-1 at baseline.   -Maintain awake and sleep cycle  -Frequent reorientation - Chronic leukopenia. ANC >500.

## 2023-01-17 NOTE — ED PROVIDER NOTE - CLINICAL SUMMARY MEDICAL DECISION MAKING FREE TEXT BOX
Impression: 82-year-old male pmhx of CVA 10 years prior, dementia, diabetes, hyperlipidemia, CAD comes to ED w/ nausea, vomiting, chest pain, SOB, abdominal pain. Their sudden symptoms are concerning for viral illness, abdominal ischemia, gastrointestinal pathology. Will evaluate for acs and other cardiac causes for chest pain.     Ordered labs, imaging, medications for diagnosis, management, and treatment.

## 2023-01-17 NOTE — H&P ADULT - NSICDXPASTSURGICALHX_GEN_ALL_CORE_FT
PAST SURGICAL HISTORY:  Cardiac pacemaker Medtronic (SN: [id number]; Model: [id number])    S/P coronary artery stent placement

## 2023-01-17 NOTE — H&P ADULT - HISTORY OF PRESENT ILLNESS
As per ED note:  82-year-old male pmhx of CVA 10 years prior, dementia, diabetes, hyperlipidemia, CAD comes to ED w/ nausea, vomiting, chest pain, SOB, abdominal pain.  Patient arrived from home via EMS, history given by wife via phone reported patient had chest pain, increased work of breathing, and abdominal pain that started randomly at 3 AM, woke up patient from sleep.  Due to the appearance of severity of symptoms and persistence wife called EMS.  Their pain/symptom is moderate to severe, constant, non mediating with rest. Started randomly. History limited due to patient's baseline nonverbal dementia status.    ED Course: Initial vitals 102.4 HR 90 89/59 97% on RA RR 17    Patient found to have elevated lactate. UA concerning for infection. Given 2.5L fluids and started on zosyn.

## 2023-01-17 NOTE — H&P ADULT - PROBLEM SELECTOR PLAN 1
Leukopenic, febrile tachycardic with elevated lactate. Suspect  source with largely + UA, likely precipitated by constipation. S/p Zosyn in the ED.  -C/w zosyn  -F/u blood and urine cultures  -Continue with maintenance IVF while NPO  -Trend lactate to peak Leukopenic, febrile, tachycardic with elevated lactate 6.5. Suspect  source with largely + UA, likely precipitated by constipation. S/p Zosyn in the ED.  -C/w zosyn (1/17-)  -F/u blood and urine cultures  -Continue with maintenance IVF while NPO  -Trend lactate to peak

## 2023-01-17 NOTE — H&P ADULT - PROBLEM SELECTOR PLAN 6
Large stool burden on CT scan. S/p Fleet enema in ED. Currently NPO due to poor mental status.  -Stat SMOG enema  -Introduce laxatives from above if able to tolerate PO

## 2023-01-17 NOTE — ED PROVIDER NOTE - NSICDXPASTSURGICALHX_GEN_ALL_CORE_FT
PAST SURGICAL HISTORY:  Cardiac pacemaker Medtronic (SN: [id number]; Model: [id number])    S/P coronary artery stent placement

## 2023-01-18 LAB
ALBUMIN SERPL ELPH-MCNC: 2.9 G/DL — LOW (ref 3.3–5)
ALBUMIN SERPL ELPH-MCNC: 3.1 G/DL — LOW (ref 3.3–5)
ALP SERPL-CCNC: 80 U/L — SIGNIFICANT CHANGE UP (ref 40–120)
ALP SERPL-CCNC: 84 U/L — SIGNIFICANT CHANGE UP (ref 40–120)
ALT FLD-CCNC: 80 U/L — HIGH (ref 4–41)
ALT FLD-CCNC: 91 U/L — HIGH (ref 4–41)
ANION GAP SERPL CALC-SCNC: 10 MMOL/L — SIGNIFICANT CHANGE UP (ref 7–14)
ANION GAP SERPL CALC-SCNC: 13 MMOL/L — SIGNIFICANT CHANGE UP (ref 7–14)
ANISOCYTOSIS BLD QL: SLIGHT — SIGNIFICANT CHANGE UP
AST SERPL-CCNC: 198 U/L — HIGH (ref 4–40)
AST SERPL-CCNC: 213 U/L — HIGH (ref 4–40)
BASE EXCESS BLDV CALC-SCNC: -4.9 MMOL/L — LOW (ref -2–3)
BASOPHILS # BLD AUTO: 0 K/UL — SIGNIFICANT CHANGE UP (ref 0–0.2)
BASOPHILS NFR BLD AUTO: 0 % — SIGNIFICANT CHANGE UP (ref 0–2)
BILIRUB SERPL-MCNC: 1.3 MG/DL — HIGH (ref 0.2–1.2)
BILIRUB SERPL-MCNC: 1.6 MG/DL — HIGH (ref 0.2–1.2)
BLOOD GAS VENOUS COMPREHENSIVE RESULT: SIGNIFICANT CHANGE UP
BUN SERPL-MCNC: 30 MG/DL — HIGH (ref 7–23)
BUN SERPL-MCNC: 30 MG/DL — HIGH (ref 7–23)
CALCIUM SERPL-MCNC: 9.2 MG/DL — SIGNIFICANT CHANGE UP (ref 8.4–10.5)
CALCIUM SERPL-MCNC: 9.5 MG/DL — SIGNIFICANT CHANGE UP (ref 8.4–10.5)
CHLORIDE BLDV-SCNC: 108 MMOL/L — SIGNIFICANT CHANGE UP (ref 96–108)
CHLORIDE SERPL-SCNC: 108 MMOL/L — HIGH (ref 98–107)
CHLORIDE SERPL-SCNC: 109 MMOL/L — HIGH (ref 98–107)
CO2 BLDV-SCNC: 21.8 MMOL/L — LOW (ref 22–26)
CO2 SERPL-SCNC: 20 MMOL/L — LOW (ref 22–31)
CO2 SERPL-SCNC: 23 MMOL/L — SIGNIFICANT CHANGE UP (ref 22–31)
CREAT SERPL-MCNC: 1.56 MG/DL — HIGH (ref 0.5–1.3)
CREAT SERPL-MCNC: 1.57 MG/DL — HIGH (ref 0.5–1.3)
E COLI DNA BLD POS QL NAA+NON-PROBE: SIGNIFICANT CHANGE UP
EGFR: 43 ML/MIN/1.73M2 — LOW
EGFR: 44 ML/MIN/1.73M2 — LOW
EOSINOPHIL # BLD AUTO: 0 K/UL — SIGNIFICANT CHANGE UP (ref 0–0.5)
EOSINOPHIL NFR BLD AUTO: 0 % — SIGNIFICANT CHANGE UP (ref 0–6)
GAS PNL BLDV: 136 MMOL/L — SIGNIFICANT CHANGE UP (ref 136–145)
GIANT PLATELETS BLD QL SMEAR: PRESENT — SIGNIFICANT CHANGE UP
GLUCOSE BLDC GLUCOMTR-MCNC: 105 MG/DL — HIGH (ref 70–99)
GLUCOSE BLDC GLUCOMTR-MCNC: 52 MG/DL — CRITICAL LOW (ref 70–99)
GLUCOSE BLDC GLUCOMTR-MCNC: 58 MG/DL — LOW (ref 70–99)
GLUCOSE BLDC GLUCOMTR-MCNC: 58 MG/DL — LOW (ref 70–99)
GLUCOSE BLDC GLUCOMTR-MCNC: 78 MG/DL — SIGNIFICANT CHANGE UP (ref 70–99)
GLUCOSE BLDC GLUCOMTR-MCNC: 79 MG/DL — SIGNIFICANT CHANGE UP (ref 70–99)
GLUCOSE BLDC GLUCOMTR-MCNC: 81 MG/DL — SIGNIFICANT CHANGE UP (ref 70–99)
GLUCOSE BLDC GLUCOMTR-MCNC: 87 MG/DL — SIGNIFICANT CHANGE UP (ref 70–99)
GLUCOSE BLDV-MCNC: 98 MG/DL — SIGNIFICANT CHANGE UP (ref 70–99)
GLUCOSE SERPL-MCNC: 112 MG/DL — HIGH (ref 70–99)
GLUCOSE SERPL-MCNC: 73 MG/DL — SIGNIFICANT CHANGE UP (ref 70–99)
GRAM STN FLD: SIGNIFICANT CHANGE UP
GRAM STN FLD: SIGNIFICANT CHANGE UP
HCO3 BLDV-SCNC: 21 MMOL/L — LOW (ref 22–29)
HCT VFR BLD CALC: 35.4 % — LOW (ref 39–50)
HCT VFR BLDA CALC: 36 % — LOW (ref 39–51)
HGB BLD CALC-MCNC: 12.1 G/DL — LOW (ref 13–17)
HGB BLD-MCNC: 11.6 G/DL — LOW (ref 13–17)
IANC: 15.35 K/UL — HIGH (ref 1.8–7.4)
LACTATE BLDV-MCNC: 4.8 MMOL/L — CRITICAL HIGH (ref 0.5–2)
LACTATE SERPL-SCNC: 2.9 MMOL/L — HIGH (ref 0.5–2)
LACTATE SERPL-SCNC: 2.9 MMOL/L — HIGH (ref 0.5–2)
LACTATE SERPL-SCNC: 4.4 MMOL/L — CRITICAL HIGH (ref 0.5–2)
LYMPHOCYTES # BLD AUTO: 0.53 K/UL — LOW (ref 1–3.3)
LYMPHOCYTES # BLD AUTO: 2.6 % — LOW (ref 13–44)
MACROCYTES BLD QL: SLIGHT — SIGNIFICANT CHANGE UP
MAGNESIUM SERPL-MCNC: 1.6 MG/DL — SIGNIFICANT CHANGE UP (ref 1.6–2.6)
MCHC RBC-ENTMCNC: 30.6 PG — SIGNIFICANT CHANGE UP (ref 27–34)
MCHC RBC-ENTMCNC: 32.8 GM/DL — SIGNIFICANT CHANGE UP (ref 32–36)
MCV RBC AUTO: 93.4 FL — SIGNIFICANT CHANGE UP (ref 80–100)
METAMYELOCYTES # FLD: 0.9 % — SIGNIFICANT CHANGE UP (ref 0–1)
METHOD TYPE: SIGNIFICANT CHANGE UP
MONOCYTES # BLD AUTO: 1.98 K/UL — HIGH (ref 0–0.9)
MONOCYTES NFR BLD AUTO: 9.7 % — SIGNIFICANT CHANGE UP (ref 2–14)
MYELOCYTES NFR BLD: 0.9 % — HIGH (ref 0–0)
NEUTROPHILS # BLD AUTO: 17.55 K/UL — HIGH (ref 1.8–7.4)
NEUTROPHILS NFR BLD AUTO: 76.3 % — SIGNIFICANT CHANGE UP (ref 43–77)
NEUTS BAND # BLD: 9.6 % — HIGH (ref 0–6)
PCO2 BLDV: 39 MMHG — LOW (ref 42–55)
PH BLDV: 7.33 — SIGNIFICANT CHANGE UP (ref 7.32–7.43)
PHOSPHATE SERPL-MCNC: 3.5 MG/DL — SIGNIFICANT CHANGE UP (ref 2.5–4.5)
PLAT MORPH BLD: NORMAL — SIGNIFICANT CHANGE UP
PLATELET # BLD AUTO: 103 K/UL — LOW (ref 150–400)
PLATELET COUNT - ESTIMATE: ABNORMAL
PO2 BLDV: 41 MMHG — SIGNIFICANT CHANGE UP
POIKILOCYTOSIS BLD QL AUTO: SLIGHT — SIGNIFICANT CHANGE UP
POLYCHROMASIA BLD QL SMEAR: SLIGHT — SIGNIFICANT CHANGE UP
POTASSIUM BLDV-SCNC: 4.2 MMOL/L — SIGNIFICANT CHANGE UP (ref 3.5–5.1)
POTASSIUM SERPL-MCNC: 4 MMOL/L — SIGNIFICANT CHANGE UP (ref 3.5–5.3)
POTASSIUM SERPL-MCNC: 5.1 MMOL/L — SIGNIFICANT CHANGE UP (ref 3.5–5.3)
POTASSIUM SERPL-SCNC: 4 MMOL/L — SIGNIFICANT CHANGE UP (ref 3.5–5.3)
POTASSIUM SERPL-SCNC: 5.1 MMOL/L — SIGNIFICANT CHANGE UP (ref 3.5–5.3)
PROT SERPL-MCNC: 6.2 G/DL — SIGNIFICANT CHANGE UP (ref 6–8.3)
PROT SERPL-MCNC: 6.3 G/DL — SIGNIFICANT CHANGE UP (ref 6–8.3)
RBC # BLD: 3.79 M/UL — LOW (ref 4.2–5.8)
RBC # FLD: 13.6 % — SIGNIFICANT CHANGE UP (ref 10.3–14.5)
RBC BLD AUTO: ABNORMAL
SAO2 % BLDV: 64.4 % — SIGNIFICANT CHANGE UP
SODIUM SERPL-SCNC: 141 MMOL/L — SIGNIFICANT CHANGE UP (ref 135–145)
SODIUM SERPL-SCNC: 142 MMOL/L — SIGNIFICANT CHANGE UP (ref 135–145)
SPECIMEN SOURCE: SIGNIFICANT CHANGE UP
SPECIMEN SOURCE: SIGNIFICANT CHANGE UP
WBC # BLD: 20.43 K/UL — HIGH (ref 3.8–10.5)
WBC # FLD AUTO: 20.43 K/UL — HIGH (ref 3.8–10.5)

## 2023-01-18 PROCEDURE — 99232 SBSQ HOSP IP/OBS MODERATE 35: CPT | Mod: GC

## 2023-01-18 RX ORDER — SODIUM CHLORIDE 9 MG/ML
1000 INJECTION, SOLUTION INTRAVENOUS
Refills: 0 | Status: DISCONTINUED | OUTPATIENT
Start: 2023-01-18 | End: 2023-01-20

## 2023-01-18 RX ORDER — DEXTROSE 50 % IN WATER 50 %
12.5 SYRINGE (ML) INTRAVENOUS ONCE
Refills: 0 | Status: DISCONTINUED | OUTPATIENT
Start: 2023-01-18 | End: 2023-01-22

## 2023-01-18 RX ORDER — DEXTROSE 50 % IN WATER 50 %
12.5 SYRINGE (ML) INTRAVENOUS ONCE
Refills: 0 | Status: COMPLETED | OUTPATIENT
Start: 2023-01-18 | End: 2023-01-18

## 2023-01-18 RX ORDER — DEXTROSE 50 % IN WATER 50 %
12.5 SYRINGE (ML) INTRAVENOUS ONCE
Refills: 0 | Status: COMPLETED | OUTPATIENT
Start: 2023-01-18 | End: 2023-01-22

## 2023-01-18 RX ORDER — PIPERACILLIN AND TAZOBACTAM 4; .5 G/20ML; G/20ML
3.38 INJECTION, POWDER, LYOPHILIZED, FOR SOLUTION INTRAVENOUS EVERY 8 HOURS
Refills: 0 | Status: DISCONTINUED | OUTPATIENT
Start: 2023-01-19 | End: 2023-01-19

## 2023-01-18 RX ORDER — MAGNESIUM SULFATE 500 MG/ML
2 VIAL (ML) INJECTION ONCE
Refills: 0 | Status: COMPLETED | OUTPATIENT
Start: 2023-01-18 | End: 2023-01-18

## 2023-01-18 RX ADMIN — ENOXAPARIN SODIUM 40 MILLIGRAM(S): 100 INJECTION SUBCUTANEOUS at 06:32

## 2023-01-18 RX ADMIN — Medication 12.5 GRAM(S): at 06:30

## 2023-01-18 RX ADMIN — SODIUM CHLORIDE 125 MILLILITER(S): 9 INJECTION, SOLUTION INTRAVENOUS at 16:27

## 2023-01-18 RX ADMIN — SODIUM CHLORIDE 100 MILLILITER(S): 9 INJECTION, SOLUTION INTRAVENOUS at 09:58

## 2023-01-18 RX ADMIN — PIPERACILLIN AND TAZOBACTAM 25 GRAM(S): 4; .5 INJECTION, POWDER, LYOPHILIZED, FOR SOLUTION INTRAVENOUS at 16:27

## 2023-01-18 RX ADMIN — Medication 25 GRAM(S): at 11:14

## 2023-01-18 RX ADMIN — PIPERACILLIN AND TAZOBACTAM 25 GRAM(S): 4; .5 INJECTION, POWDER, LYOPHILIZED, FOR SOLUTION INTRAVENOUS at 05:31

## 2023-01-18 RX ADMIN — SODIUM CHLORIDE 125 MILLILITER(S): 9 INJECTION, SOLUTION INTRAVENOUS at 19:56

## 2023-01-18 RX ADMIN — Medication 12.5 GRAM(S): at 12:18

## 2023-01-18 NOTE — PROGRESS NOTE ADULT - PROBLEM SELECTOR PLAN 2
- UA concerning for infection  - F/U urine culture  - Continue zosyn for now.  - Prior urine cultures with candida, ecoli. MRSA ISO MRSA bacteremia. - UA concerning for infection  - F/U urine culture. Blood culture with ecoli  - Continue zosyn for now.  - Prior urine cultures with candida, ecoli. MRSA ISO MRSA bacteremia.

## 2023-01-18 NOTE — PROGRESS NOTE ADULT - PROBLEM SELECTOR PLAN 10
DVT ppx: Lovenox  Diet: NPO currently  Dispo: Pending clinical course  Prognosis remains guarded DVT ppx: Lovenox  Diet: NPO currently. Dysphagia screen  Dispo: Pending clinical course  Prognosis remains guarded

## 2023-01-18 NOTE — PROGRESS NOTE ADULT - ASSESSMENT
78yo largely bedbound M with hx of HTN, dementia, prior CVA, CAD presenting to hospital with chest pain, here found to have sepsis with lactic acidosis with likely  source.

## 2023-01-18 NOTE — PROGRESS NOTE ADULT - PROBLEM SELECTOR PLAN 6
Large stool burden on CT scan. S/p Fleet enema in ED. Currently NPO due to poor mental status.  -Stat SMOG enema  -Introduce laxatives from above if able to tolerate PO - Large stool burden on CT scan. S/p Fleet enema in ED. Currently NPO due to poor mental status.  -s/p SMOG enema with 3x 'massive' bowel movements overnight  -Introduce laxatives from above if able to tolerate PO - Large stool burden on CT scan. S/p Fleet enema in ED. Currently NPO due to poor mental status.  -s/p SMOG enema with 3x 'massive' bowel movements overnight. Continues to have BM's during day.  -Introduce laxatives from above if able to tolerate PO

## 2023-01-18 NOTE — PROGRESS NOTE ADULT - SUBJECTIVE AND OBJECTIVE BOX
PROGRESS NOTE:   Authored by: Darwin Deshpande M.D.   Internal Medicine PGY-1  Please Contact Via Teams    Patient is a 82y old  Male who presents with a chief complaint of     SUBJECTIVE / OVERNIGHT EVENTS:  No acute events overnight.     Brief Daily Plan:    ADDITIONAL REVIEW OF SYSTEMS:  Patient denies fevers, chills, chest pain, shortness of breath, nausea, abdominal pain, diarrhea, constipation, dysuria, leg swelling, headache, light headedness.    MEDICATIONS  (STANDING):  dextrose 5% + sodium chloride 0.9%. 1000 milliLiter(s) (75 mL/Hr) IV Continuous <Continuous>  dextrose 50% Injectable 12.5 Gram(s) IV Push once  dextrose 50% Injectable 25 Gram(s) IV Push once  dextrose 50% Injectable 12.5 Gram(s) IV Push once  dextrose 50% Injectable 25 Gram(s) IV Push once  dextrose Oral Gel 15 Gram(s) Oral once  enoxaparin Injectable 40 milliGRAM(s) SubCutaneous every 24 hours  glucagon  Injectable 1 milliGRAM(s) IntraMuscular once  piperacillin/tazobactam IVPB.. 3.375 Gram(s) IV Intermittent every 8 hours    MEDICATIONS  (PRN):  acetaminophen     Tablet .. 650 milliGRAM(s) Oral every 6 hours PRN Temp greater or equal to 38C (100.4F), Mild Pain (1 - 3)  acetaminophen   IVPB .. 1000 milliGRAM(s) IV Intermittent every 8 hours PRN Mild Pain (1 - 3), Moderate Pain (4 - 6)      CAPILLARY BLOOD GLUCOSE      POCT Blood Glucose.: 50 mg/dL (2023 06:31)  POCT Blood Glucose.: 57 mg/dL (2023 06:30)  POCT Blood Glucose.: 144 mg/dL (2023 22:31)  POCT Blood Glucose.: 164 mg/dL (2023 22:05)  POCT Blood Glucose.: 63 mg/dL (2023 20:48)  POCT Blood Glucose.: 68 mg/dL (2023 20:29)  POCT Blood Glucose.: 70 mg/dL (2023 19:53)  POCT Blood Glucose.: 75 mg/dL (2023 19:44)  POCT Blood Glucose.: 82 mg/dL (2023 18:14)  POCT Blood Glucose.: 93 mg/dL (2023 17:40)  POCT Blood Glucose.: 99 mg/dL (2023 16:39)  POCT Blood Glucose.: 83 mg/dL (2023 15:04)  POCT Blood Glucose.: 59 mg/dL (2023 14:24)  POCT Blood Glucose.: 77 mg/dL (2023 13:37)  POCT Blood Glucose.: 92 mg/dL (2023 08:41)    I&O's Summary      PHYSICAL EXAM:  Vital Signs Last 24 Hrs  T(C): 37.2 (2023 05:35), Max: 38.6 (2023 12:49)  T(F): 98.9 (2023 05:35), Max: 101.4 (2023 12:49)  HR: 85 (2023 05:35) (68 - 106)  BP: 130/87 (2023 05:35) (100/52 - 130/87)  BP(mean): --  RR: 16 (2023 05:35) (16 - 20)  SpO2: 100% (2023 05:35) (99% - 100%)    Parameters below as of 2023 05:35  Patient On (Oxygen Delivery Method): room air        CONSTITUTIONAL: NAD, well-developed  RESPIRATORY: Normal respiratory effort; lungs are clear to auscultation bilaterally  CARDIOVASCULAR: Regular rate and rhythm, normal S1 and S2, no murmur/rub/gallop; No lower extremity edema; Peripheral pulses are 2+ bilaterally  ABDOMEN: Nontender to palpation, normoactive bowel sounds, no rebound/guarding; No hepatosplenomegaly  MUSCLOSKELETAL: no clubbing or cyanosis of digits; no joint swelling or tenderness to palpation  PSYCH: A+O to person, place, and time; affect appropriate    LABS:                        12.4   1.54  )-----------( 166      ( 2023 05:10 )             39.3     -    140  |  103  |  22  ----------------------------<  104<H>  5.2   |  20<L>  |  1.27    Ca    9.9      2023 05:10    TPro  7.7  /  Alb  4.0  /  TBili  1.2  /  DBili  x   /  AST  28  /  ALT  28  /  AlkPhos  106  01-17    PT/INR - ( 2023 05:10 )   PT: 13.5 sec;   INR: 1.16 ratio         PTT - ( 2023 05:10 )  PTT:21.5 sec      Urinalysis Basic - ( 2023 08:10 )    Color: Light Orange / Appearance: Slightly Turbid / S.019 / pH: x  Gluc: x / Ketone: Negative  / Bili: Negative / Urobili: <2 mg/dL   Blood: x / Protein: 30 mg/dL / Nitrite: Negative   Leuk Esterase: Large / RBC: 14 /HPF / WBC >50 /HPF   Sq Epi: x / Non Sq Epi: x / Bacteria: Few        Culture - Blood (collected 2023 05:50)  Source: .Blood Blood-Peripheral  Gram Stain (2023 03:11):    Growth in aerobic bottle: Gram Negative Rods  Preliminary Report (2023 03:11):    Growth in aerobic bottle: Gram Negative Rods    Culture - Blood (collected 2023 05:40)  Source: .Blood Blood-Peripheral  Gram Stain (2023 01:49):    Growth in aerobic and anaerobic bottles: Gram Negative Rods  Preliminary Report (2023 01:49):    Growth in aerobic and anaerobic bottles: Gram Negative Rods    ***Blood Panel PCR results on this specimen are available    approximately 3 hours after the Gram stain result.***    Gram stain, PCR, and/or culture results may not always    correspond due todifference in methodologies.    ************************************************************    This PCR assay was performed by multiplex PCR. This    Assay tests for 66 bacterial and resistance gene targets.    Please refer to the [facility] Labs testdirectory    at [email or web address] for details.  Organism: Blood Culture PCR (2023 03:24)  Organism: Blood Culture PCR (2023 03:24)        Tele Reviewed:    RADIOLOGY & ADDITIONAL TESTS:  Results Reviewed:   Imaging Personally Reviewed:  Electrocardiogram Personally Reviewed: PROGRESS NOTE:   Authored by: Darwin Deshpande M.D.   Internal Medicine PGY-1  Please Contact Via Teams    Patient is a 82y old  Male who presents with a chief complaint of     SUBJECTIVE / OVERNIGHT EVENTS:  Hypoglycemic overnight while on D5 fluids requiring D50. Blood cultures growing e coli. Pt this morning appears to me more alert. Attempting to responding to my questions but writer unable to make out any words.     Brief Daily Plan:  - Trend lactate  - Continue IVF and abx      MEDICATIONS  (STANDING):  dextrose 5% + sodium chloride 0.9%. 1000 milliLiter(s) (75 mL/Hr) IV Continuous <Continuous>  dextrose 50% Injectable 12.5 Gram(s) IV Push once  dextrose 50% Injectable 25 Gram(s) IV Push once  dextrose 50% Injectable 12.5 Gram(s) IV Push once  dextrose 50% Injectable 25 Gram(s) IV Push once  dextrose Oral Gel 15 Gram(s) Oral once  enoxaparin Injectable 40 milliGRAM(s) SubCutaneous every 24 hours  glucagon  Injectable 1 milliGRAM(s) IntraMuscular once  piperacillin/tazobactam IVPB.. 3.375 Gram(s) IV Intermittent every 8 hours    MEDICATIONS  (PRN):  acetaminophen     Tablet .. 650 milliGRAM(s) Oral every 6 hours PRN Temp greater or equal to 38C (100.4F), Mild Pain (1 - 3)  acetaminophen   IVPB .. 1000 milliGRAM(s) IV Intermittent every 8 hours PRN Mild Pain (1 - 3), Moderate Pain (4 - 6)      CAPILLARY BLOOD GLUCOSE      POCT Blood Glucose.: 50 mg/dL (2023 06:31)  POCT Blood Glucose.: 57 mg/dL (2023 06:30)  POCT Blood Glucose.: 144 mg/dL (2023 22:31)  POCT Blood Glucose.: 164 mg/dL (2023 22:05)  POCT Blood Glucose.: 63 mg/dL (2023 20:48)  POCT Blood Glucose.: 68 mg/dL (2023 20:29)  POCT Blood Glucose.: 70 mg/dL (2023 19:53)  POCT Blood Glucose.: 75 mg/dL (2023 19:44)  POCT Blood Glucose.: 82 mg/dL (2023 18:14)  POCT Blood Glucose.: 93 mg/dL (2023 17:40)  POCT Blood Glucose.: 99 mg/dL (2023 16:39)  POCT Blood Glucose.: 83 mg/dL (2023 15:04)  POCT Blood Glucose.: 59 mg/dL (2023 14:24)  POCT Blood Glucose.: 77 mg/dL (2023 13:37)  POCT Blood Glucose.: 92 mg/dL (2023 08:41)    I&O's Summary      PHYSICAL EXAM:  Vital Signs Last 24 Hrs  T(C): 37.2 (2023 05:35), Max: 38.6 (2023 12:49)  T(F): 98.9 (2023 05:35), Max: 101.4 (2023 12:49)  HR: 85 (2023 05:35) (68 - 106)  BP: 130/87 (2023 05:35) (100/52 - 130/87)  BP(mean): --  RR: 16 (2023 05:35) (16 - 20)  SpO2: 100% (2023 05:35) (99% - 100%)    Parameters below as of 2023 05:35  Patient On (Oxygen Delivery Method): room air    GENERAL: Nonverbal currently, intermittent mumbling. Appears comfortable.  HEAD:  Atraumatic, Normocephalic  EYES: EOMI, PERRLA, conjunctiva and sclera clear  ENT: Dry mucous membranes  NECK: Supple, No JVD  CHEST/LUNG: Clear to auscultation bilaterally; No rales, rhonchi, wheezing, or rubs. Unlabored respirations  HEART: AFIB; No murmurs  Peripheral edema: None  Subclavian skin tenting: Slow response  ABDOMEN: BSx4; Stool able to be palpated on left hemiabdomen  EXTREMITIES:  2+ Peripheral Pulses, brisk capillary refill. No clubbing, cyanosis, or edema  NERVOUS SYSTEM:  A&Ox0 ,right arm contracted  SKIN: No rashes or lesion    LABS:                        12.4   1.54  )-----------( 166      ( 2023 05:10 )             39.3     -    140  |  103  |  22  ----------------------------<  104<H>  5.2   |  20<L>  |  1.27    Ca    9.9      2023 05:10    TPro  7.7  /  Alb  4.0  /  TBili  1.2  /  DBili  x   /  AST  28  /  ALT  28  /  AlkPhos  106  01-17    PT/INR - ( 2023 05:10 )   PT: 13.5 sec;   INR: 1.16 ratio         PTT - ( 2023 05:10 )  PTT:21.5 sec      Urinalysis Basic - ( 2023 08:10 )    Color: Light Orange / Appearance: Slightly Turbid / S.019 / pH: x  Gluc: x / Ketone: Negative  / Bili: Negative / Urobili: <2 mg/dL   Blood: x / Protein: 30 mg/dL / Nitrite: Negative   Leuk Esterase: Large / RBC: 14 /HPF / WBC >50 /HPF   Sq Epi: x / Non Sq Epi: x / Bacteria: Few        Culture - Blood (collected 2023 05:50)  Source: .Blood Blood-Peripheral  Gram Stain (2023 03:11):    Growth in aerobic bottle: Gram Negative Rods  Preliminary Report (2023 03:11):    Growth in aerobic bottle: Gram Negative Rods    Culture - Blood (collected 2023 05:40)  Source: .Blood Blood-Peripheral  Gram Stain (2023 01:49):    Growth in aerobic and anaerobic bottles: Gram Negative Rods  Preliminary Report (2023 01:49):    Growth in aerobic and anaerobic bottles: Gram Negative Rods    ***Blood Panel PCR results on this specimen are available    approximately 3 hours after the Gram stain result.***    Gram stain, PCR, and/or culture results may not always    correspond due todifference in methodologies.    ************************************************************    This PCR assay was performed by multiplex PCR. This    Assay tests for 66 bacterial and resistance gene targets.    Please refer to the [facility] Labs testdirectory    at [email or web address] for details.  Organism: Blood Culture PCR (2023 03:24)  Organism: Blood Culture PCR (2023 03:24)

## 2023-01-18 NOTE — PROGRESS NOTE ADULT - PROBLEM SELECTOR PLAN 8
- Is hypoglycemic in the ED ISO sepsis and poor PO intake. Will start D5LR - Is hypoglycemic in the ED ISO sepsis and poor PO intake. Will start D5 fluids, but despite this is still having hypoglycemic episodes. - Is hypoglycemic in the ED ISO sepsis and poor PO intake.   - Continue D5 fluids, but despite this is still having hypoglycemic episodes.

## 2023-01-18 NOTE — PROGRESS NOTE ADULT - PROBLEM SELECTOR PLAN 4
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

## 2023-01-18 NOTE — PROGRESS NOTE ADULT - PROBLEM SELECTOR PLAN 1
Leukopenic, febrile, tachycardic with elevated lactate 6.5. Suspect  source with largely + UA, likely precipitated by constipation. S/p Zosyn in the ED.  -C/w zosyn (1/17-)  -F/u blood and urine cultures  -Continue with maintenance IVF while NPO  -Trend lactate to peak - Lactate downtrending but still elevated. Now bacteremic with ecoli, suspect  source with largely + UA, likely precipitated by constipation. S/p Zosyn in the ED.  -C/w zosyn (1/17-)  -F/u urine culture  -Continue with maintenance IVF while NPO  -Trend lactate

## 2023-01-18 NOTE — PATIENT PROFILE ADULT - FALL HARM RISK - HARM RISK INTERVENTIONS
Assistance with ambulation/Assistance OOB with selected safe patient handling equipment/Communicate Risk of Fall with Harm to all staff/Discuss with provider need for PT consult/Monitor gait and stability/Reinforce activity limits and safety measures with patient and family/Tailored Fall Risk Interventions/Visual Cue: Yellow wristband and red socks/Bed in lowest position, wheels locked, appropriate side rails in place/Call bell, personal items and telephone in reach/Instruct patient to call for assistance before getting out of bed or chair/Non-slip footwear when patient is out of bed/[place] to call system/Physically safe environment - no spills, clutter or unnecessary equipment/Purposeful Proactive Rounding/Room/bathroom lighting operational, light cord in reach

## 2023-01-19 LAB
-  AMIKACIN: SIGNIFICANT CHANGE UP
-  AMIKACIN: SIGNIFICANT CHANGE UP
-  AMOXICILLIN/CLAVULANIC ACID: SIGNIFICANT CHANGE UP
-  AMPICILLIN/SULBACTAM: SIGNIFICANT CHANGE UP
-  AMPICILLIN/SULBACTAM: SIGNIFICANT CHANGE UP
-  AMPICILLIN: SIGNIFICANT CHANGE UP
-  AMPICILLIN: SIGNIFICANT CHANGE UP
-  AZTREONAM: SIGNIFICANT CHANGE UP
-  AZTREONAM: SIGNIFICANT CHANGE UP
-  CEFAZOLIN: SIGNIFICANT CHANGE UP
-  CEFAZOLIN: SIGNIFICANT CHANGE UP
-  CEFEPIME: SIGNIFICANT CHANGE UP
-  CEFEPIME: SIGNIFICANT CHANGE UP
-  CEFOXITIN: SIGNIFICANT CHANGE UP
-  CEFOXITIN: SIGNIFICANT CHANGE UP
-  CEFTRIAXONE: SIGNIFICANT CHANGE UP
-  CEFTRIAXONE: SIGNIFICANT CHANGE UP
-  CEFUROXIME: SIGNIFICANT CHANGE UP
-  CIPROFLOXACIN: SIGNIFICANT CHANGE UP
-  CIPROFLOXACIN: SIGNIFICANT CHANGE UP
-  ERTAPENEM: SIGNIFICANT CHANGE UP
-  ERTAPENEM: SIGNIFICANT CHANGE UP
-  GENTAMICIN: SIGNIFICANT CHANGE UP
-  GENTAMICIN: SIGNIFICANT CHANGE UP
-  IMIPENEM: SIGNIFICANT CHANGE UP
-  IMIPENEM: SIGNIFICANT CHANGE UP
-  LEVOFLOXACIN: SIGNIFICANT CHANGE UP
-  LEVOFLOXACIN: SIGNIFICANT CHANGE UP
-  MEROPENEM: SIGNIFICANT CHANGE UP
-  MEROPENEM: SIGNIFICANT CHANGE UP
-  NITROFURANTOIN: SIGNIFICANT CHANGE UP
-  PIPERACILLIN/TAZOBACTAM: SIGNIFICANT CHANGE UP
-  PIPERACILLIN/TAZOBACTAM: SIGNIFICANT CHANGE UP
-  TOBRAMYCIN: SIGNIFICANT CHANGE UP
-  TOBRAMYCIN: SIGNIFICANT CHANGE UP
-  TRIMETHOPRIM/SULFAMETHOXAZOLE: SIGNIFICANT CHANGE UP
-  TRIMETHOPRIM/SULFAMETHOXAZOLE: SIGNIFICANT CHANGE UP
ALBUMIN SERPL ELPH-MCNC: 2.9 G/DL — LOW (ref 3.3–5)
ALP SERPL-CCNC: 87 U/L — SIGNIFICANT CHANGE UP (ref 40–120)
ALT FLD-CCNC: 86 U/L — HIGH (ref 4–41)
ANION GAP SERPL CALC-SCNC: 10 MMOL/L — SIGNIFICANT CHANGE UP (ref 7–14)
AST SERPL-CCNC: 161 U/L — HIGH (ref 4–40)
BASE EXCESS BLDV CALC-SCNC: -3 MMOL/L — LOW (ref -2–3)
BASOPHILS # BLD AUTO: 0.08 K/UL — SIGNIFICANT CHANGE UP (ref 0–0.2)
BASOPHILS NFR BLD AUTO: 0.4 % — SIGNIFICANT CHANGE UP (ref 0–2)
BILIRUB SERPL-MCNC: 1.3 MG/DL — HIGH (ref 0.2–1.2)
BLOOD GAS VENOUS COMPREHENSIVE RESULT: SIGNIFICANT CHANGE UP
BUN SERPL-MCNC: 31 MG/DL — HIGH (ref 7–23)
CALCIUM SERPL-MCNC: 9.3 MG/DL — SIGNIFICANT CHANGE UP (ref 8.4–10.5)
CHLORIDE BLDV-SCNC: 113 MMOL/L — HIGH (ref 96–108)
CHLORIDE SERPL-SCNC: 114 MMOL/L — HIGH (ref 98–107)
CO2 BLDV-SCNC: 24 MMOL/L — SIGNIFICANT CHANGE UP (ref 22–26)
CO2 SERPL-SCNC: 21 MMOL/L — LOW (ref 22–31)
CREAT SERPL-MCNC: 1.26 MG/DL — SIGNIFICANT CHANGE UP (ref 0.5–1.3)
CULTURE RESULTS: SIGNIFICANT CHANGE UP
EGFR: 57 ML/MIN/1.73M2 — LOW
EOSINOPHIL # BLD AUTO: 0.02 K/UL — SIGNIFICANT CHANGE UP (ref 0–0.5)
EOSINOPHIL NFR BLD AUTO: 0.1 % — SIGNIFICANT CHANGE UP (ref 0–6)
GAS PNL BLDV: 142 MMOL/L — SIGNIFICANT CHANGE UP (ref 136–145)
GAS PNL BLDV: SIGNIFICANT CHANGE UP
GLUCOSE BLDC GLUCOMTR-MCNC: 88 MG/DL — SIGNIFICANT CHANGE UP (ref 70–99)
GLUCOSE BLDC GLUCOMTR-MCNC: 90 MG/DL — SIGNIFICANT CHANGE UP (ref 70–99)
GLUCOSE BLDC GLUCOMTR-MCNC: 91 MG/DL — SIGNIFICANT CHANGE UP (ref 70–99)
GLUCOSE BLDC GLUCOMTR-MCNC: 94 MG/DL — SIGNIFICANT CHANGE UP (ref 70–99)
GLUCOSE BLDV-MCNC: 83 MG/DL — SIGNIFICANT CHANGE UP (ref 70–99)
GLUCOSE SERPL-MCNC: 90 MG/DL — SIGNIFICANT CHANGE UP (ref 70–99)
HCO3 BLDV-SCNC: 23 MMOL/L — SIGNIFICANT CHANGE UP (ref 22–29)
HCT VFR BLD CALC: 35.1 % — LOW (ref 39–50)
HCT VFR BLDA CALC: 36 % — LOW (ref 39–51)
HGB BLD CALC-MCNC: 11.9 G/DL — LOW (ref 13–17)
HGB BLD-MCNC: 11.3 G/DL — LOW (ref 13–17)
IANC: 19.53 K/UL — HIGH (ref 1.8–7.4)
IMM GRANULOCYTES NFR BLD AUTO: 0.4 % — SIGNIFICANT CHANGE UP (ref 0–0.9)
LACTATE BLDV-MCNC: 1.9 MMOL/L — SIGNIFICANT CHANGE UP (ref 0.5–2)
LYMPHOCYTES # BLD AUTO: 1.23 K/UL — SIGNIFICANT CHANGE UP (ref 1–3.3)
LYMPHOCYTES # BLD AUTO: 5.4 % — LOW (ref 13–44)
MAGNESIUM SERPL-MCNC: 2 MG/DL — SIGNIFICANT CHANGE UP (ref 1.6–2.6)
MCHC RBC-ENTMCNC: 30.2 PG — SIGNIFICANT CHANGE UP (ref 27–34)
MCHC RBC-ENTMCNC: 32.2 GM/DL — SIGNIFICANT CHANGE UP (ref 32–36)
MCV RBC AUTO: 93.9 FL — SIGNIFICANT CHANGE UP (ref 80–100)
METHOD TYPE: SIGNIFICANT CHANGE UP
METHOD TYPE: SIGNIFICANT CHANGE UP
MONOCYTES # BLD AUTO: 1.62 K/UL — HIGH (ref 0–0.9)
MONOCYTES NFR BLD AUTO: 7.2 % — SIGNIFICANT CHANGE UP (ref 2–14)
NEUTROPHILS # BLD AUTO: 19.53 K/UL — HIGH (ref 1.8–7.4)
NEUTROPHILS NFR BLD AUTO: 86.5 % — HIGH (ref 43–77)
NRBC # BLD: 0 /100 WBCS — SIGNIFICANT CHANGE UP (ref 0–0)
NRBC # FLD: 0 K/UL — SIGNIFICANT CHANGE UP (ref 0–0)
ORGANISM # SPEC MICROSCOPIC CNT: SIGNIFICANT CHANGE UP
PCO2 BLDV: 42 MMHG — SIGNIFICANT CHANGE UP (ref 42–55)
PH BLDV: 7.34 — SIGNIFICANT CHANGE UP (ref 7.32–7.43)
PHOSPHATE SERPL-MCNC: 2.9 MG/DL — SIGNIFICANT CHANGE UP (ref 2.5–4.5)
PLATELET # BLD AUTO: 92 K/UL — LOW (ref 150–400)
PO2 BLDV: 33 MMHG — SIGNIFICANT CHANGE UP
POTASSIUM BLDV-SCNC: 3.6 MMOL/L — SIGNIFICANT CHANGE UP (ref 3.5–5.1)
POTASSIUM SERPL-MCNC: 3.7 MMOL/L — SIGNIFICANT CHANGE UP (ref 3.5–5.3)
POTASSIUM SERPL-SCNC: 3.7 MMOL/L — SIGNIFICANT CHANGE UP (ref 3.5–5.3)
PROT SERPL-MCNC: 5.9 G/DL — LOW (ref 6–8.3)
RBC # BLD: 3.74 M/UL — LOW (ref 4.2–5.8)
RBC # FLD: 13.6 % — SIGNIFICANT CHANGE UP (ref 10.3–14.5)
SAO2 % BLDV: 49.4 % — SIGNIFICANT CHANGE UP
SODIUM SERPL-SCNC: 145 MMOL/L — SIGNIFICANT CHANGE UP (ref 135–145)
SPECIMEN SOURCE: SIGNIFICANT CHANGE UP
WBC # BLD: 22.58 K/UL — HIGH (ref 3.8–10.5)
WBC # FLD AUTO: 22.58 K/UL — HIGH (ref 3.8–10.5)

## 2023-01-19 PROCEDURE — 99232 SBSQ HOSP IP/OBS MODERATE 35: CPT | Mod: GC

## 2023-01-19 RX ORDER — CEFTRIAXONE 500 MG/1
2000 INJECTION, POWDER, FOR SOLUTION INTRAMUSCULAR; INTRAVENOUS ONCE
Refills: 0 | Status: COMPLETED | OUTPATIENT
Start: 2023-01-19 | End: 2023-01-19

## 2023-01-19 RX ORDER — CEFTRIAXONE 500 MG/1
2000 INJECTION, POWDER, FOR SOLUTION INTRAMUSCULAR; INTRAVENOUS EVERY 24 HOURS
Refills: 0 | Status: COMPLETED | OUTPATIENT
Start: 2023-01-20 | End: 2023-01-25

## 2023-01-19 RX ORDER — POTASSIUM PHOSPHATE, MONOBASIC POTASSIUM PHOSPHATE, DIBASIC 236; 224 MG/ML; MG/ML
15 INJECTION, SOLUTION INTRAVENOUS ONCE
Refills: 0 | Status: COMPLETED | OUTPATIENT
Start: 2023-01-19 | End: 2023-01-19

## 2023-01-19 RX ORDER — CEFTRIAXONE 500 MG/1
INJECTION, POWDER, FOR SOLUTION INTRAMUSCULAR; INTRAVENOUS
Refills: 0 | Status: COMPLETED | OUTPATIENT
Start: 2023-01-19 | End: 2023-01-25

## 2023-01-19 RX ORDER — CEFTRIAXONE 500 MG/1
1000 INJECTION, POWDER, FOR SOLUTION INTRAMUSCULAR; INTRAVENOUS EVERY 24 HOURS
Refills: 0 | Status: DISCONTINUED | OUTPATIENT
Start: 2023-01-19 | End: 2023-01-19

## 2023-01-19 RX ADMIN — CEFTRIAXONE 100 MILLIGRAM(S): 500 INJECTION, POWDER, FOR SOLUTION INTRAMUSCULAR; INTRAVENOUS at 16:03

## 2023-01-19 RX ADMIN — SODIUM CHLORIDE 125 MILLILITER(S): 9 INJECTION, SOLUTION INTRAVENOUS at 04:35

## 2023-01-19 RX ADMIN — ENOXAPARIN SODIUM 40 MILLIGRAM(S): 100 INJECTION SUBCUTANEOUS at 07:06

## 2023-01-19 RX ADMIN — PIPERACILLIN AND TAZOBACTAM 25 GRAM(S): 4; .5 INJECTION, POWDER, LYOPHILIZED, FOR SOLUTION INTRAVENOUS at 09:01

## 2023-01-19 RX ADMIN — POTASSIUM PHOSPHATE, MONOBASIC POTASSIUM PHOSPHATE, DIBASIC 62.5 MILLIMOLE(S): 236; 224 INJECTION, SOLUTION INTRAVENOUS at 21:24

## 2023-01-19 RX ADMIN — SODIUM CHLORIDE 125 MILLILITER(S): 9 INJECTION, SOLUTION INTRAVENOUS at 09:01

## 2023-01-19 RX ADMIN — PIPERACILLIN AND TAZOBACTAM 25 GRAM(S): 4; .5 INJECTION, POWDER, LYOPHILIZED, FOR SOLUTION INTRAVENOUS at 03:01

## 2023-01-19 NOTE — PROGRESS NOTE ADULT - PROBLEM SELECTOR PLAN 10
DVT ppx: Lovenox  Diet: NPO currently. Dysphagia screen  Dispo: Pending clinical course  Prognosis remains guarded DVT ppx: Lovenox  Diet: NPO currently. Failed speech and swallow.  Dispo: Pending clinical course  Prognosis remains guarded. Will re-discuss GOC with wife now that he has failed S+S

## 2023-01-19 NOTE — PROGRESS NOTE ADULT - PROBLEM SELECTOR PLAN 1
- Lactate downtrending but still elevated. Now bacteremic with ecoli, suspect  source with largely + UA, likely precipitated by constipation. S/p Zosyn in the ED.  -C/w zosyn (1/17-)  -F/u urine culture  -Continue with maintenance IVF while NPO  -Trend lactate - Lactate downtrending but still elevated. Now bacteremic with ecoli, suspect  source with largely + UA, likely precipitated by constipation. S/p Zosyn in the ED.  - Zosyn (1/17-1/18)  - Ceftriaxone (1/18-)  -Urine culture with pan sensitive ecoli. Switch to ceftriaxone  -Continue with maintenance IVF while NPO  -Trend lactate

## 2023-01-19 NOTE — PROGRESS NOTE ADULT - PROBLEM SELECTOR PLAN 6
- Large stool burden on CT scan. S/p Fleet enema in ED. Currently NPO due to poor mental status.  -s/p SMOG enema with 3x 'massive' bowel movements overnight. Continues to have BM's during day.  -Introduce laxatives from above if able to tolerate PO - Large stool burden on CT scan. S/p Fleet enema in ED. Currently NPO due to poor mental status.  -s/p SMOG enema with 3x 'massive' bowel movements. Continues to have BM's during day.  -Introduce laxatives from above if able to tolerate PO

## 2023-01-19 NOTE — PROGRESS NOTE ADULT - PROBLEM SELECTOR PLAN 2
- UA concerning for infection  - F/U urine culture. Blood culture with ecoli  - Continue zosyn for now.  - Prior urine cultures with candida, ecoli. MRSA ISO MRSA bacteremia. - Urine culture with pan sensitive ecoli  - Zosyn (1/17-1/18)  - Ceftriaxone (1/18-)  - Prior urine cultures with candida, ecoli. MRSA ISO MRSA bacteremia.  - Will attempt TOV tonight

## 2023-01-19 NOTE — PROGRESS NOTE ADULT - PROBLEM SELECTOR PLAN 4
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

## 2023-01-19 NOTE — PROGRESS NOTE ADULT - SUBJECTIVE AND OBJECTIVE BOX
PROGRESS NOTE:   Authored by: Darwin Deshpande M.D.   Internal Medicine PGY-1  Please Contact Via Teams    Patient is a 82y old  Male who presents with a chief complaint of     SUBJECTIVE / OVERNIGHT EVENTS:  No acute events overnight.     Brief Daily Plan:    ADDITIONAL REVIEW OF SYSTEMS:  Patient denies fevers, chills, chest pain, shortness of breath, nausea, abdominal pain, diarrhea, constipation, dysuria, leg swelling, headache, light headedness.    MEDICATIONS  (STANDING):  dextrose 5% + sodium chloride 0.9%. 1000 milliLiter(s) (125 mL/Hr) IV Continuous <Continuous>  dextrose 50% Injectable 12.5 Gram(s) IV Push once  dextrose 50% Injectable 12.5 Gram(s) IV Push once  dextrose 50% Injectable 25 Gram(s) IV Push once  dextrose 50% Injectable 12.5 Gram(s) IV Push once  dextrose 50% Injectable 25 Gram(s) IV Push once  dextrose Oral Gel 15 Gram(s) Oral once  enoxaparin Injectable 40 milliGRAM(s) SubCutaneous every 24 hours  glucagon  Injectable 1 milliGRAM(s) IntraMuscular once  piperacillin/tazobactam IVPB.. 3.375 Gram(s) IV Intermittent every 8 hours    MEDICATIONS  (PRN):  acetaminophen     Tablet .. 650 milliGRAM(s) Oral every 6 hours PRN Temp greater or equal to 38C (100.4F), Mild Pain (1 - 3)  acetaminophen   IVPB .. 1000 milliGRAM(s) IV Intermittent every 8 hours PRN Mild Pain (1 - 3), Moderate Pain (4 - 6)      CAPILLARY BLOOD GLUCOSE      POCT Blood Glucose.: 79 mg/dL (2023 23:40)  POCT Blood Glucose.: 87 mg/dL (2023 18:27)  POCT Blood Glucose.: 58 mg/dL (2023 18:26)  POCT Blood Glucose.: 105 mg/dL (2023 12:42)  POCT Blood Glucose.: 78 mg/dL (2023 12:40)  POCT Blood Glucose.: 81 mg/dL (2023 12:22)  POCT Blood Glucose.: 52 mg/dL (2023 11:50)  POCT Blood Glucose.: 58 mg/dL (2023 11:49)  POCT Blood Glucose.: 100 mg/dL (2023 07:09)    I&O's Summary    2023 07:01  -  2023 07:00  --------------------------------------------------------  IN: 0 mL / OUT: 600 mL / NET: -600 mL    2023 07:01  -  2023 06:57  --------------------------------------------------------  IN: 0 mL / OUT: 350 mL / NET: -350 mL        PHYSICAL EXAM:  Vital Signs Last 24 Hrs  T(C): 36.6 (2023 05:20), Max: 36.9 (2023 21:47)  T(F): 97.9 (2023 05:20), Max: 98.5 (2023 21:47)  HR: 98 (2023 05:20) (75 - 98)  BP: 105/79 (2023 05:20) (105/79 - 128/86)  BP(mean): --  RR: 18 (2023 05:20) (17 - 18)  SpO2: 97% (2023 05:20) (95% - 100%)    Parameters below as of 2023 05:20  Patient On (Oxygen Delivery Method): room air        CONSTITUTIONAL: NAD, well-developed  RESPIRATORY: Normal respiratory effort; lungs are clear to auscultation bilaterally  CARDIOVASCULAR: Regular rate and rhythm, normal S1 and S2, no murmur/rub/gallop; No lower extremity edema; Peripheral pulses are 2+ bilaterally  ABDOMEN: Nontender to palpation, normoactive bowel sounds, no rebound/guarding; No hepatosplenomegaly  MUSCLOSKELETAL: no clubbing or cyanosis of digits; no joint swelling or tenderness to palpation  PSYCH: A+O to person, place, and time; affect appropriate    LABS:                        11.6   20.43 )-----------( 103      ( 2023 11:26 )             35.4     -18    142  |  109<H>  |  30<H>  ----------------------------<  73  4.0   |  23  |  1.57<H>    Ca    9.5      2023 11:26  Phos  3.5       Mg     1.60         TPro  6.3  /  Alb  3.1<L>  /  TBili  1.6<H>  /  DBili  x   /  AST  213<H>  /  ALT  91<H>  /  AlkPhos  84            Urinalysis Basic - ( 2023 08:10 )    Color: Light Orange / Appearance: Slightly Turbid / S.019 / pH: x  Gluc: x / Ketone: Negative  / Bili: Negative / Urobili: <2 mg/dL   Blood: x / Protein: 30 mg/dL / Nitrite: Negative   Leuk Esterase: Large / RBC: 14 /HPF / WBC >50 /HPF   Sq Epi: x / Non Sq Epi: x / Bacteria: Few        Culture - Urine (collected 2023 11:40)  Source: Clean Catch Clean Catch (Midstream)  Preliminary Report (2023 14:41):    >100,000 CFU/ml Escherichia coli    Culture - Blood (collected 2023 05:50)  Source: .Blood Blood-Peripheral  Gram Stain (2023 03:11):    Growth in aerobic bottle: Gram Negative Rods  Preliminary Report (2023 16:55):    Growth in aerobic bottle: Escherichia coli    See previous culture [id number]    Culture - Blood (collected 2023 05:40)  Source: .Blood Blood-Peripheral  Gram Stain (2023 01:49):    Growth in aerobic and anaerobic bottles: Gram Negative Rods  Preliminary Report (2023 16:55):    Growth in aerobic and anaerobic bottles: Escherichia coli    ***Blood Panel PCR results on this specimen are available    approximately 3 hours after the Gram stain result.***    Gram stain, PCR, and/or culture results may not always    correspond due to difference in methodologies.    ************************************************************    This PCR assay was performed by multiplex PCR. This    Assay tests for 66 bacterial and resistance gene targets.    Please refer to the [facility] test directory    at [email or web address] for details.  Organism: Blood Culture PCR (2023 03:24)  Organism: Blood Culture PCR (2023 03:24)        Tele Reviewed:    RADIOLOGY & ADDITIONAL TESTS:  Results Reviewed:   Imaging Personally Reviewed:  Electrocardiogram Personally Reviewed: PROGRESS NOTE:   Authored by: Darwin Deshpande M.D.   Internal Medicine PGY-1  Please Contact Via Teams    Patient is a 82y old  Male who presents with a chief complaint of     SUBJECTIVE / OVERNIGHT EVENTS:  No acute events overnight. Pt seems slightly less alert today than yesterday. He is responding to pain but not really making an effort to speak whey spoken to.    Brief Daily Plan:  - S+S  - F/U cultures  - Dispo planning with wife        MEDICATIONS  (STANDING):  dextrose 5% + sodium chloride 0.9%. 1000 milliLiter(s) (125 mL/Hr) IV Continuous <Continuous>  dextrose 50% Injectable 12.5 Gram(s) IV Push once  dextrose 50% Injectable 12.5 Gram(s) IV Push once  dextrose 50% Injectable 25 Gram(s) IV Push once  dextrose 50% Injectable 12.5 Gram(s) IV Push once  dextrose 50% Injectable 25 Gram(s) IV Push once  dextrose Oral Gel 15 Gram(s) Oral once  enoxaparin Injectable 40 milliGRAM(s) SubCutaneous every 24 hours  glucagon  Injectable 1 milliGRAM(s) IntraMuscular once  piperacillin/tazobactam IVPB.. 3.375 Gram(s) IV Intermittent every 8 hours    MEDICATIONS  (PRN):  acetaminophen     Tablet .. 650 milliGRAM(s) Oral every 6 hours PRN Temp greater or equal to 38C (100.4F), Mild Pain (1 - 3)  acetaminophen   IVPB .. 1000 milliGRAM(s) IV Intermittent every 8 hours PRN Mild Pain (1 - 3), Moderate Pain (4 - 6)      CAPILLARY BLOOD GLUCOSE      POCT Blood Glucose.: 79 mg/dL (2023 23:40)  POCT Blood Glucose.: 87 mg/dL (2023 18:27)  POCT Blood Glucose.: 58 mg/dL (2023 18:26)  POCT Blood Glucose.: 105 mg/dL (2023 12:42)  POCT Blood Glucose.: 78 mg/dL (2023 12:40)  POCT Blood Glucose.: 81 mg/dL (2023 12:22)  POCT Blood Glucose.: 52 mg/dL (2023 11:50)  POCT Blood Glucose.: 58 mg/dL (2023 11:49)  POCT Blood Glucose.: 100 mg/dL (2023 07:09)    I&O's Summary    2023 07:01  -  2023 07:00  --------------------------------------------------------  IN: 0 mL / OUT: 600 mL / NET: -600 mL    2023 07:01  -  2023 06:57  --------------------------------------------------------  IN: 0 mL / OUT: 350 mL / NET: -350 mL        PHYSICAL EXAM:  Vital Signs Last 24 Hrs  T(C): 36.6 (2023 05:20), Max: 36.9 (2023 21:47)  T(F): 97.9 (2023 05:20), Max: 98.5 (2023 21:47)  HR: 98 (2023 05:20) (75 - 98)  BP: 105/79 (2023 05:20) (105/79 - 128/86)  BP(mean): --  RR: 18 (2023 05:20) (17 - 18)  SpO2: 97% (2023 05:20) (95% - 100%)    Parameters below as of 2023 05:20  Patient On (Oxygen Delivery Method): room air    GENERAL: Nonverbal currently. Appears comfortable.  HEAD:  Atraumatic, Normocephalic  EYES: EOMI, PERRLA, conjunctiva and sclera clear  ENT: Dry mucous membranes  NECK: Supple, No JVD  CHEST/LUNG: Clear to auscultation bilaterally; No rales, rhonchi, wheezing, or rubs. Unlabored respirations  HEART: AFIB; No murmurs  Peripheral edema: None  Subclavian skin tenting: Slow response  ABDOMEN: BSx4; Stool able to be palpated on left hemiabdomen  EXTREMITIES:  2+ Peripheral Pulses, brisk capillary refill. No clubbing, cyanosis, or edema  NERVOUS SYSTEM:  A&Ox0 ,right arm contracted  SKIN: No rashes or lesion    LABS:                        11.6   20.43 )-----------( 103      ( 2023 11:26 )             35.4         142  |  109<H>  |  30<H>  ----------------------------<  73  4.0   |  23  |  1.57<H>    Ca    9.5      2023 11:26  Phos  3.5       Mg     1.60         TPro  6.3  /  Alb  3.1<L>  /  TBili  1.6<H>  /  DBili  x   /  AST  213<H>  /  ALT  91<H>  /  AlkPhos  84            Urinalysis Basic - ( 2023 08:10 )    Color: Light Orange / Appearance: Slightly Turbid / S.019 / pH: x  Gluc: x / Ketone: Negative  / Bili: Negative / Urobili: <2 mg/dL   Blood: x / Protein: 30 mg/dL / Nitrite: Negative   Leuk Esterase: Large / RBC: 14 /HPF / WBC >50 /HPF   Sq Epi: x / Non Sq Epi: x / Bacteria: Few        Culture - Urine (collected 2023 11:40)  Source: Clean Catch Clean Catch (Midstream)  Preliminary Report (2023 14:41):    >100,000 CFU/ml Escherichia coli    Culture - Blood (collected 2023 05:50)  Source: .Blood Blood-Peripheral  Gram Stain (2023 03:11):    Growth in aerobic bottle: Gram Negative Rods  Preliminary Report (2023 16:55):    Growth in aerobic bottle: Escherichia coli    See previous culture [id number]    Culture - Blood (collected 2023 05:40)  Source: .Blood Blood-Peripheral  Gram Stain (2023 01:49):    Growth in aerobic and anaerobic bottles: Gram Negative Rods  Preliminary Report (2023 16:55):    Growth in aerobic and anaerobic bottles: Escherichia coli    ***Blood Panel PCR results on this specimen are available    approximately 3 hours after the Gram stain result.***    Gram stain, PCR, and/or culture results may not always    correspond due to difference in methodologies.    ************************************************************    This PCR assay was performed by multiplex PCR. This    Assay tests for 66 bacterial and resistance gene targets.    Please refer to the [facility] test directory    at [email or web address] for details.  Organism: Blood Culture PCR (2023 03:24)  Organism: Blood Culture PCR (2023 03:24)

## 2023-01-19 NOTE — SWALLOW BEDSIDE ASSESSMENT ADULT - SWALLOW EVAL: RECOMMENDED DIET
NPO with consideration of short-term alternate means of nutrition/hydration NPO with consideration of short-term non-oral means of nutrition/hydration

## 2023-01-19 NOTE — PROGRESS NOTE ADULT - PROBLEM SELECTOR PLAN 8
- Is hypoglycemic in the ED ISO sepsis and poor PO intake.   - Continue D5 fluids, but despite this is still having hypoglycemic episodes.

## 2023-01-19 NOTE — PROGRESS NOTE ADULT - ASSESSMENT
80yo largely bedbound M with hx of HTN, dementia, prior CVA, CAD presenting to hospital with chest pain, here found to have sepsis with lactic acidosis with likely  source. 78yo largely bedbound M with hx of HTN, dementia, prior CVA, CAD presenting to hospital with chest pain, here found to have urosepsis with pansensitive e coli bacteremia.

## 2023-01-20 LAB
ALBUMIN SERPL ELPH-MCNC: 2.5 G/DL — LOW (ref 3.3–5)
ALP SERPL-CCNC: 85 U/L — SIGNIFICANT CHANGE UP (ref 40–120)
ALT FLD-CCNC: 71 U/L — HIGH (ref 4–41)
ANION GAP SERPL CALC-SCNC: 7 MMOL/L — SIGNIFICANT CHANGE UP (ref 7–14)
AST SERPL-CCNC: 101 U/L — HIGH (ref 4–40)
BASOPHILS # BLD AUTO: 0.05 K/UL — SIGNIFICANT CHANGE UP (ref 0–0.2)
BASOPHILS NFR BLD AUTO: 0.3 % — SIGNIFICANT CHANGE UP (ref 0–2)
BILIRUB SERPL-MCNC: 0.9 MG/DL — SIGNIFICANT CHANGE UP (ref 0.2–1.2)
BUN SERPL-MCNC: 29 MG/DL — HIGH (ref 7–23)
CALCIUM SERPL-MCNC: 8.9 MG/DL — SIGNIFICANT CHANGE UP (ref 8.4–10.5)
CHLORIDE SERPL-SCNC: 119 MMOL/L — HIGH (ref 98–107)
CO2 SERPL-SCNC: 21 MMOL/L — LOW (ref 22–31)
CREAT SERPL-MCNC: 1.18 MG/DL — SIGNIFICANT CHANGE UP (ref 0.5–1.3)
CULTURE RESULTS: SIGNIFICANT CHANGE UP
CULTURE RESULTS: SIGNIFICANT CHANGE UP
EGFR: 61 ML/MIN/1.73M2 — SIGNIFICANT CHANGE UP
EOSINOPHIL # BLD AUTO: 0.25 K/UL — SIGNIFICANT CHANGE UP (ref 0–0.5)
EOSINOPHIL NFR BLD AUTO: 1.4 % — SIGNIFICANT CHANGE UP (ref 0–6)
GLUCOSE BLDC GLUCOMTR-MCNC: 145 MG/DL — HIGH (ref 70–99)
GLUCOSE BLDC GLUCOMTR-MCNC: 87 MG/DL — SIGNIFICANT CHANGE UP (ref 70–99)
GLUCOSE BLDC GLUCOMTR-MCNC: 97 MG/DL — SIGNIFICANT CHANGE UP (ref 70–99)
GLUCOSE BLDC GLUCOMTR-MCNC: 99 MG/DL — SIGNIFICANT CHANGE UP (ref 70–99)
GLUCOSE SERPL-MCNC: 84 MG/DL — SIGNIFICANT CHANGE UP (ref 70–99)
GRAM STN FLD: SIGNIFICANT CHANGE UP
HCT VFR BLD CALC: 32.1 % — LOW (ref 39–50)
HGB BLD-MCNC: 10.3 G/DL — LOW (ref 13–17)
IANC: 16.1 K/UL — HIGH (ref 1.8–7.4)
IMM GRANULOCYTES NFR BLD AUTO: 0.7 % — SIGNIFICANT CHANGE UP (ref 0–0.9)
LYMPHOCYTES # BLD AUTO: 0.96 K/UL — LOW (ref 1–3.3)
LYMPHOCYTES # BLD AUTO: 5.2 % — LOW (ref 13–44)
MAGNESIUM SERPL-MCNC: 1.9 MG/DL — SIGNIFICANT CHANGE UP (ref 1.6–2.6)
MCHC RBC-ENTMCNC: 30.3 PG — SIGNIFICANT CHANGE UP (ref 27–34)
MCHC RBC-ENTMCNC: 32.1 GM/DL — SIGNIFICANT CHANGE UP (ref 32–36)
MCV RBC AUTO: 94.4 FL — SIGNIFICANT CHANGE UP (ref 80–100)
MONOCYTES # BLD AUTO: 0.9 K/UL — SIGNIFICANT CHANGE UP (ref 0–0.9)
MONOCYTES NFR BLD AUTO: 4.9 % — SIGNIFICANT CHANGE UP (ref 2–14)
NEUTROPHILS # BLD AUTO: 16.1 K/UL — HIGH (ref 1.8–7.4)
NEUTROPHILS NFR BLD AUTO: 87.5 % — HIGH (ref 43–77)
NRBC # BLD: 0 /100 WBCS — SIGNIFICANT CHANGE UP (ref 0–0)
NRBC # FLD: 0 K/UL — SIGNIFICANT CHANGE UP (ref 0–0)
PHOSPHATE SERPL-MCNC: 2.9 MG/DL — SIGNIFICANT CHANGE UP (ref 2.5–4.5)
PLATELET # BLD AUTO: 99 K/UL — LOW (ref 150–400)
POTASSIUM SERPL-MCNC: 3.4 MMOL/L — LOW (ref 3.5–5.3)
POTASSIUM SERPL-SCNC: 3.4 MMOL/L — LOW (ref 3.5–5.3)
PROT SERPL-MCNC: 5.4 G/DL — LOW (ref 6–8.3)
RBC # BLD: 3.4 M/UL — LOW (ref 4.2–5.8)
RBC # FLD: 13.7 % — SIGNIFICANT CHANGE UP (ref 10.3–14.5)
SODIUM SERPL-SCNC: 147 MMOL/L — HIGH (ref 135–145)
WBC # BLD: 18.38 K/UL — HIGH (ref 3.8–10.5)
WBC # FLD AUTO: 18.38 K/UL — HIGH (ref 3.8–10.5)

## 2023-01-20 PROCEDURE — 99232 SBSQ HOSP IP/OBS MODERATE 35: CPT | Mod: GC

## 2023-01-20 RX ORDER — SODIUM CHLORIDE 9 MG/ML
1000 INJECTION, SOLUTION INTRAVENOUS
Refills: 0 | Status: DISCONTINUED | OUTPATIENT
Start: 2023-01-20 | End: 2023-01-20

## 2023-01-20 RX ORDER — SODIUM CHLORIDE 9 MG/ML
1000 INJECTION, SOLUTION INTRAVENOUS
Refills: 0 | Status: DISCONTINUED | OUTPATIENT
Start: 2023-01-20 | End: 2023-01-21

## 2023-01-20 RX ORDER — OXYMETAZOLINE HYDROCHLORIDE 0.5 MG/ML
2 SPRAY NASAL EVERY 12 HOURS
Refills: 0 | Status: DISCONTINUED | OUTPATIENT
Start: 2023-01-20 | End: 2023-02-02

## 2023-01-20 RX ORDER — POTASSIUM CHLORIDE 20 MEQ
10 PACKET (EA) ORAL
Refills: 0 | Status: COMPLETED | OUTPATIENT
Start: 2023-01-20 | End: 2023-01-20

## 2023-01-20 RX ADMIN — Medication 100 MILLIEQUIVALENT(S): at 12:28

## 2023-01-20 RX ADMIN — OXYMETAZOLINE HYDROCHLORIDE 2 SPRAY(S): 0.5 SPRAY NASAL at 22:04

## 2023-01-20 RX ADMIN — Medication 100 MILLIEQUIVALENT(S): at 08:49

## 2023-01-20 RX ADMIN — SODIUM CHLORIDE 125 MILLILITER(S): 9 INJECTION, SOLUTION INTRAVENOUS at 08:49

## 2023-01-20 RX ADMIN — ENOXAPARIN SODIUM 40 MILLIGRAM(S): 100 INJECTION SUBCUTANEOUS at 05:48

## 2023-01-20 RX ADMIN — Medication 100 MILLIEQUIVALENT(S): at 10:11

## 2023-01-20 RX ADMIN — CEFTRIAXONE 100 MILLIGRAM(S): 500 INJECTION, POWDER, FOR SOLUTION INTRAMUSCULAR; INTRAVENOUS at 13:47

## 2023-01-20 NOTE — PROVIDER CONTACT NOTE (CRITICAL VALUE NOTIFICATION) - BACKGROUND
Patient admitted for UTI. PMH; DM,CVA
pt is admitted with UTI
Patient admitted with Urinary track infection, PMH CAD,HLD,CVA, and DM.
Patient admitted for UTI. PMH; DM,CVA
pt is admitted with UTI

## 2023-01-20 NOTE — PROGRESS NOTE ADULT - PROBLEM SELECTOR PLAN 10
DVT ppx: Lovenox  Diet: NPO currently. Failed speech and swallow.  Dispo: Pending clinical course  Prognosis remains guarded. Will re-discuss GOC with wife now that he has failed S+S

## 2023-01-20 NOTE — PROVIDER CONTACT NOTE (CRITICAL VALUE NOTIFICATION) - NAME OF MD/NP/PA/DO NOTIFIED:
Darwin Deshpande MD
Dr.Ahmed Walter/[id number]
MD Darwin Deshpande
Darwin Deshpande MD
Dr.Ahmed Walter/[id number]

## 2023-01-20 NOTE — PROVIDER CONTACT NOTE (CRITICAL VALUE NOTIFICATION) - TEST AND RESULT REPORTED:
gram negative rods in both aerobic and anaerobic bottle
serum lactate is 6.4
Growth in aerobic bottles for e.coli, growth in anaerobic bottle for gram negative rods from blood cultures drawn 1/17/2023.
Lactate 4.4
VBG lactate 4.8

## 2023-01-20 NOTE — PROVIDER CONTACT NOTE (CRITICAL VALUE NOTIFICATION) - ACTION/TREATMENT ORDERED:
MD made aware, states no interventions needed at this time. Nursing care continued .
MD Darwin Deshpande notified, as per MD no new interventions continue abx.
no recommendation at this time. pt is on iv fluids. monitoring is ongoing.
pt is on iv zosyn.  monitoring is ongoing.
MD made aware, states no interventions needed at this time. Nursing care continued .

## 2023-01-20 NOTE — PROGRESS NOTE ADULT - PROBLEM SELECTOR PLAN 2
- Urine culture with pan sensitive ecoli  - Zosyn (1/17-1/18)  - Ceftriaxone (1/18-)  - Prior urine cultures with candida, ecoli. MRSA ISO MRSA bacteremia.  - TOV

## 2023-01-20 NOTE — PROGRESS NOTE ADULT - PROBLEM SELECTOR PLAN 1
- Lactate downtrending but still elevated. Now bacteremic with ecoli, suspect  source with largely + UA, likely precipitated by constipation. S/p Zosyn in the ED.  - Zosyn (1/17-1/18)  - Ceftriaxone (1/18-)  - Urine culture with pan sensitive ecoli. Switch to ceftriaxone  - Continue with maintenance IVF while NPO  - Trend lactate - Lactate downtrending but still elevated. Now bacteremic with ecoli, suspect  source with largely + UA, likely precipitated by constipation. S/p Zosyn in the ED.  - Zosyn (1/17-1/18)  - Ceftriaxone (1/18-23)  - Urine culture with pan sensitive ecoli. Switch to ceftriaxone  - Continue with maintenance IVF while NPO  - Trend lactate

## 2023-01-20 NOTE — PROVIDER CONTACT NOTE (CRITICAL VALUE NOTIFICATION) - SITUATION
Growth in aerobic bottles for e.coli, growth in anaerobic bottle for gram negative rods from blood cultures drawn 1/17/2023.
VBG Lactate
serum lactate is 6.4
Lactate 4.4
gram negative rods in both aerobic and anaerobic bottle from 01/17/23

## 2023-01-20 NOTE — PROVIDER CONTACT NOTE (CRITICAL VALUE NOTIFICATION) - PERSON GIVING RESULT:
Abd Deanoor/lab
Edwar Sam, [facility]
ELISABETH Vargas - [place]
Gilberto Елена/[facility]
Danilo Iniguez

## 2023-01-20 NOTE — PROGRESS NOTE ADULT - ASSESSMENT
78yo largely bedbound M with hx of HTN, dementia, prior CVA, CAD presenting to hospital with chest pain, here found to have urosepsis with pansensitive e coli bacteremia.

## 2023-01-20 NOTE — PROVIDER CONTACT NOTE (CRITICAL VALUE NOTIFICATION) - ASSESSMENT
pt is alert and confused. no distress noted.
Patient is a&ox1. No acute distress noted
pt is alert and confused. no distress noted.
Growth in aerobic bottles for e.coli, growth in anaerobic bottle for gram negative rods from blood cultures drawn 1/17/2023. Patient has no s/s of infection and comfortable.
Patient is a&ox1. No acute distress noted

## 2023-01-20 NOTE — PROGRESS NOTE ADULT - SUBJECTIVE AND OBJECTIVE BOX
PROGRESS NOTE:   Authored by: Darwin Deshpande M.D.   Internal Medicine PGY-1  Please Contact Via Teams    Patient is a 82y old  Male who presents with a chief complaint of     SUBJECTIVE / OVERNIGHT EVENTS:  No acute events overnight. Pt this am is more alert, attempting to answer my questions. Answered with 1-2 word answers.   Last bladder scan had 24mL and patient has been saturating his chucks. Will place condom cath to better monitor I/O    Brief Daily Plan:  - Continue TOV  - Continue IV ABX  - Potential S+S re-eval    MEDICATIONS  (STANDING):  bisacodyl Suppository 10 milliGRAM(s) Rectal once  cefTRIAXone   IVPB      cefTRIAXone   IVPB 2000 milliGRAM(s) IV Intermittent every 24 hours  dextrose 5% + lactated ringers. 1000 milliLiter(s) (125 mL/Hr) IV Continuous <Continuous>  dextrose 50% Injectable 25 Gram(s) IV Push once  dextrose 50% Injectable 12.5 Gram(s) IV Push once  dextrose 50% Injectable 25 Gram(s) IV Push once  dextrose 50% Injectable 12.5 Gram(s) IV Push once  dextrose 50% Injectable 12.5 Gram(s) IV Push once  dextrose Oral Gel 15 Gram(s) Oral once  enoxaparin Injectable 40 milliGRAM(s) SubCutaneous every 24 hours  glucagon  Injectable 1 milliGRAM(s) IntraMuscular once  potassium chloride  10 mEq/100 mL IVPB 10 milliEquivalent(s) IV Intermittent every 1 hour    MEDICATIONS  (PRN):  acetaminophen     Tablet .. 650 milliGRAM(s) Oral every 6 hours PRN Temp greater or equal to 38C (100.4F), Mild Pain (1 - 3)  acetaminophen   IVPB .. 1000 milliGRAM(s) IV Intermittent every 8 hours PRN Mild Pain (1 - 3), Moderate Pain (4 - 6)      CAPILLARY BLOOD GLUCOSE      POCT Blood Glucose.: 87 mg/dL (20 Jan 2023 06:08)  POCT Blood Glucose.: 88 mg/dL (19 Jan 2023 23:45)  POCT Blood Glucose.: 90 mg/dL (19 Jan 2023 18:21)  POCT Blood Glucose.: 91 mg/dL (19 Jan 2023 12:07)    I&O's Summary      PHYSICAL EXAM:  Vital Signs Last 24 Hrs  T(C): 36.8 (20 Jan 2023 05:13), Max: 36.8 (19 Jan 2023 21:41)  T(F): 98.2 (20 Jan 2023 05:13), Max: 98.2 (19 Jan 2023 21:41)  HR: 60 (20 Jan 2023 05:13) (51 - 60)  BP: 121/85 (20 Jan 2023 05:13) (121/85 - 140/72)  BP(mean): --  RR: 16 (20 Jan 2023 05:13) (16 - 16)  SpO2: 100% (20 Jan 2023 05:13) (100% - 100%)    Parameters below as of 20 Jan 2023 05:13  Patient On (Oxygen Delivery Method): room air      GENERAL: Nonverbal currently, intermittent mumbling. Appears comfortable.  HEAD:  Atraumatic, Normocephalic  EYES: EOMI, PERRLA, conjunctiva and sclera clear  ENT: Dry mucous membranes  NECK: Supple, No JVD  CHEST/LUNG: Clear to auscultation bilaterally; No rales, rhonchi, wheezing, or rubs. Unlabored respirations  HEART: AFIB; No murmurs  Peripheral edema: None  Subclavian skin tenting: Slow response  ABDOMEN: BSx4; Stool able to be palpated on left hemiabdomen  EXTREMITIES:  2+ Peripheral Pulses, brisk capillary refill. No clubbing, cyanosis, or edema  NERVOUS SYSTEM:  A&Ox0 ,right arm contracted  SKIN: No rashes or lesion      LABS:                        10.3   18.38 )-----------( 99       ( 20 Jan 2023 06:18 )             32.1     01-20    147<H>  |  119<H>  |  29<H>  ----------------------------<  84  3.4<L>   |  21<L>  |  1.18    Ca    8.9      20 Jan 2023 06:18  Phos  2.9     01-20  Mg     1.90     01-20    TPro  5.4<L>  /  Alb  2.5<L>  /  TBili  0.9  /  DBili  x   /  AST  101<H>  /  ALT  71<H>  /  AlkPhos  85  01-20              Culture - Urine (collected 17 Jan 2023 11:40)  Source: Clean Catch Clean Catch (Midstream)  Final Report (19 Jan 2023 13:12):    >100,000 CFU/ml Escherichia coli  Organism: Escherichia coli (19 Jan 2023 13:12)  Organism: Escherichia coli (19 Jan 2023 13:12)       PROGRESS NOTE:   Authored by: Darwin Deshpande M.D.   Internal Medicine PGY-1  Please Contact Via Teams    Patient is a 82y old  Male who presents with a chief complaint of     SUBJECTIVE / OVERNIGHT EVENTS:  No acute events overnight. Pt this am is more alert, attempting to answer my questions. Answered with 1-2 word answers. This afternoon much more awake, eyes open, able to answer questions.   Last bladder scan had 24mL and patient has been saturating his chucks. Will place condom cath to better monitor I/O    Brief Daily Plan:  - Continue TOV  - Continue IV ABX  - Potential S+S re-eval    MEDICATIONS  (STANDING):  bisacodyl Suppository 10 milliGRAM(s) Rectal once  cefTRIAXone   IVPB      cefTRIAXone   IVPB 2000 milliGRAM(s) IV Intermittent every 24 hours  dextrose 5% + lactated ringers. 1000 milliLiter(s) (125 mL/Hr) IV Continuous <Continuous>  dextrose 50% Injectable 25 Gram(s) IV Push once  dextrose 50% Injectable 12.5 Gram(s) IV Push once  dextrose 50% Injectable 25 Gram(s) IV Push once  dextrose 50% Injectable 12.5 Gram(s) IV Push once  dextrose 50% Injectable 12.5 Gram(s) IV Push once  dextrose Oral Gel 15 Gram(s) Oral once  enoxaparin Injectable 40 milliGRAM(s) SubCutaneous every 24 hours  glucagon  Injectable 1 milliGRAM(s) IntraMuscular once  potassium chloride  10 mEq/100 mL IVPB 10 milliEquivalent(s) IV Intermittent every 1 hour    MEDICATIONS  (PRN):  acetaminophen     Tablet .. 650 milliGRAM(s) Oral every 6 hours PRN Temp greater or equal to 38C (100.4F), Mild Pain (1 - 3)  acetaminophen   IVPB .. 1000 milliGRAM(s) IV Intermittent every 8 hours PRN Mild Pain (1 - 3), Moderate Pain (4 - 6)      CAPILLARY BLOOD GLUCOSE      POCT Blood Glucose.: 87 mg/dL (20 Jan 2023 06:08)  POCT Blood Glucose.: 88 mg/dL (19 Jan 2023 23:45)  POCT Blood Glucose.: 90 mg/dL (19 Jan 2023 18:21)  POCT Blood Glucose.: 91 mg/dL (19 Jan 2023 12:07)    I&O's Summary      PHYSICAL EXAM:  Vital Signs Last 24 Hrs  T(C): 36.8 (20 Jan 2023 05:13), Max: 36.8 (19 Jan 2023 21:41)  T(F): 98.2 (20 Jan 2023 05:13), Max: 98.2 (19 Jan 2023 21:41)  HR: 60 (20 Jan 2023 05:13) (51 - 60)  BP: 121/85 (20 Jan 2023 05:13) (121/85 - 140/72)  BP(mean): --  RR: 16 (20 Jan 2023 05:13) (16 - 16)  SpO2: 100% (20 Jan 2023 05:13) (100% - 100%)    Parameters below as of 20 Jan 2023 05:13  Patient On (Oxygen Delivery Method): room air      GENERAL: Nonverbal currently, intermittent mumbling. Appears comfortable.  HEAD:  Atraumatic, Normocephalic  EYES: EOMI, PERRLA, conjunctiva and sclera clear  ENT: Dry mucous membranes  NECK: Supple, No JVD  CHEST/LUNG: Clear to auscultation bilaterally; No rales, rhonchi, wheezing, or rubs. Unlabored respirations  HEART: AFIB; No murmurs  Peripheral edema: None  Subclavian skin tenting: Slow response  ABDOMEN: BSx4; Stool able to be palpated on left hemiabdomen  EXTREMITIES:  2+ Peripheral Pulses, brisk capillary refill. No clubbing, cyanosis, or edema  NERVOUS SYSTEM:  A&Ox0 ,right arm contracted  SKIN: No rashes or lesion      LABS:                        10.3   18.38 )-----------( 99       ( 20 Jan 2023 06:18 )             32.1     01-20    147<H>  |  119<H>  |  29<H>  ----------------------------<  84  3.4<L>   |  21<L>  |  1.18    Ca    8.9      20 Jan 2023 06:18  Phos  2.9     01-20  Mg     1.90     01-20    TPro  5.4<L>  /  Alb  2.5<L>  /  TBili  0.9  /  DBili  x   /  AST  101<H>  /  ALT  71<H>  /  AlkPhos  85  01-20              Culture - Urine (collected 17 Jan 2023 11:40)  Source: Clean Catch Clean Catch (Midstream)  Final Report (19 Jan 2023 13:12):    >100,000 CFU/ml Escherichia coli  Organism: Escherichia coli (19 Jan 2023 13:12)  Organism: Escherichia coli (19 Jan 2023 13:12)

## 2023-01-21 DIAGNOSIS — R78.81 BACTEREMIA: ICD-10-CM

## 2023-01-21 LAB
ALBUMIN SERPL ELPH-MCNC: 2.5 G/DL — LOW (ref 3.3–5)
ALP SERPL-CCNC: 89 U/L — SIGNIFICANT CHANGE UP (ref 40–120)
ALT FLD-CCNC: 59 U/L — HIGH (ref 4–41)
ANION GAP SERPL CALC-SCNC: 7 MMOL/L — SIGNIFICANT CHANGE UP (ref 7–14)
AST SERPL-CCNC: 61 U/L — HIGH (ref 4–40)
BASOPHILS # BLD AUTO: 0.04 K/UL — SIGNIFICANT CHANGE UP (ref 0–0.2)
BASOPHILS NFR BLD AUTO: 0.4 % — SIGNIFICANT CHANGE UP (ref 0–2)
BILIRUB SERPL-MCNC: 0.6 MG/DL — SIGNIFICANT CHANGE UP (ref 0.2–1.2)
BUN SERPL-MCNC: 24 MG/DL — HIGH (ref 7–23)
CALCIUM SERPL-MCNC: 9.1 MG/DL — SIGNIFICANT CHANGE UP (ref 8.4–10.5)
CHLORIDE SERPL-SCNC: 118 MMOL/L — HIGH (ref 98–107)
CO2 SERPL-SCNC: 22 MMOL/L — SIGNIFICANT CHANGE UP (ref 22–31)
CREAT SERPL-MCNC: 1.04 MG/DL — SIGNIFICANT CHANGE UP (ref 0.5–1.3)
EGFR: 71 ML/MIN/1.73M2 — SIGNIFICANT CHANGE UP
EOSINOPHIL # BLD AUTO: 0.48 K/UL — SIGNIFICANT CHANGE UP (ref 0–0.5)
EOSINOPHIL NFR BLD AUTO: 4.4 % — SIGNIFICANT CHANGE UP (ref 0–6)
GLUCOSE BLDC GLUCOMTR-MCNC: 102 MG/DL — HIGH (ref 70–99)
GLUCOSE BLDC GLUCOMTR-MCNC: 104 MG/DL — HIGH (ref 70–99)
GLUCOSE BLDC GLUCOMTR-MCNC: 64 MG/DL — LOW (ref 70–99)
GLUCOSE BLDC GLUCOMTR-MCNC: 81 MG/DL — SIGNIFICANT CHANGE UP (ref 70–99)
GLUCOSE SERPL-MCNC: 101 MG/DL — HIGH (ref 70–99)
HCT VFR BLD CALC: 32.6 % — LOW (ref 39–50)
HGB BLD-MCNC: 10.3 G/DL — LOW (ref 13–17)
IANC: 8.45 K/UL — HIGH (ref 1.8–7.4)
IMM GRANULOCYTES NFR BLD AUTO: 0.9 % — SIGNIFICANT CHANGE UP (ref 0–0.9)
LYMPHOCYTES # BLD AUTO: 1.14 K/UL — SIGNIFICANT CHANGE UP (ref 1–3.3)
LYMPHOCYTES # BLD AUTO: 10.4 % — LOW (ref 13–44)
MAGNESIUM SERPL-MCNC: 1.7 MG/DL — SIGNIFICANT CHANGE UP (ref 1.6–2.6)
MCHC RBC-ENTMCNC: 30.1 PG — SIGNIFICANT CHANGE UP (ref 27–34)
MCHC RBC-ENTMCNC: 31.6 GM/DL — LOW (ref 32–36)
MCV RBC AUTO: 95.3 FL — SIGNIFICANT CHANGE UP (ref 80–100)
MONOCYTES # BLD AUTO: 0.77 K/UL — SIGNIFICANT CHANGE UP (ref 0–0.9)
MONOCYTES NFR BLD AUTO: 7 % — SIGNIFICANT CHANGE UP (ref 2–14)
NEUTROPHILS # BLD AUTO: 8.45 K/UL — HIGH (ref 1.8–7.4)
NEUTROPHILS NFR BLD AUTO: 76.9 % — SIGNIFICANT CHANGE UP (ref 43–77)
NRBC # BLD: 0 /100 WBCS — SIGNIFICANT CHANGE UP (ref 0–0)
NRBC # FLD: 0 K/UL — SIGNIFICANT CHANGE UP (ref 0–0)
PHOSPHATE SERPL-MCNC: 2.3 MG/DL — LOW (ref 2.5–4.5)
PLATELET # BLD AUTO: 89 K/UL — LOW (ref 150–400)
POTASSIUM SERPL-MCNC: 3.5 MMOL/L — SIGNIFICANT CHANGE UP (ref 3.5–5.3)
POTASSIUM SERPL-SCNC: 3.5 MMOL/L — SIGNIFICANT CHANGE UP (ref 3.5–5.3)
PROT SERPL-MCNC: 5.1 G/DL — LOW (ref 6–8.3)
RBC # BLD: 3.42 M/UL — LOW (ref 4.2–5.8)
RBC # FLD: 13.5 % — SIGNIFICANT CHANGE UP (ref 10.3–14.5)
SODIUM SERPL-SCNC: 147 MMOL/L — HIGH (ref 135–145)
WBC # BLD: 10.98 K/UL — HIGH (ref 3.8–10.5)
WBC # FLD AUTO: 10.98 K/UL — HIGH (ref 3.8–10.5)

## 2023-01-21 PROCEDURE — 99232 SBSQ HOSP IP/OBS MODERATE 35: CPT | Mod: GC

## 2023-01-21 RX ORDER — ASPIRIN/CALCIUM CARB/MAGNESIUM 324 MG
81 TABLET ORAL DAILY
Refills: 0 | Status: DISCONTINUED | OUTPATIENT
Start: 2023-01-21 | End: 2023-02-02

## 2023-01-21 RX ORDER — POTASSIUM PHOSPHATE, MONOBASIC POTASSIUM PHOSPHATE, DIBASIC 236; 224 MG/ML; MG/ML
30 INJECTION, SOLUTION INTRAVENOUS ONCE
Refills: 0 | Status: COMPLETED | OUTPATIENT
Start: 2023-01-21 | End: 2023-01-21

## 2023-01-21 RX ORDER — MAGNESIUM SULFATE 500 MG/ML
2 VIAL (ML) INJECTION ONCE
Refills: 0 | Status: COMPLETED | OUTPATIENT
Start: 2023-01-21 | End: 2023-01-21

## 2023-01-21 RX ORDER — SODIUM CHLORIDE 9 MG/ML
1000 INJECTION, SOLUTION INTRAVENOUS
Refills: 0 | Status: DISCONTINUED | OUTPATIENT
Start: 2023-01-21 | End: 2023-01-22

## 2023-01-21 RX ORDER — ATORVASTATIN CALCIUM 80 MG/1
80 TABLET, FILM COATED ORAL AT BEDTIME
Refills: 0 | Status: DISCONTINUED | OUTPATIENT
Start: 2023-01-21 | End: 2023-02-02

## 2023-01-21 RX ORDER — TAMSULOSIN HYDROCHLORIDE 0.4 MG/1
0.4 CAPSULE ORAL AT BEDTIME
Refills: 0 | Status: DISCONTINUED | OUTPATIENT
Start: 2023-01-21 | End: 2023-02-02

## 2023-01-21 RX ADMIN — Medication 25 GRAM(S): at 09:46

## 2023-01-21 RX ADMIN — POTASSIUM PHOSPHATE, MONOBASIC POTASSIUM PHOSPHATE, DIBASIC 83.33 MILLIMOLE(S): 236; 224 INJECTION, SOLUTION INTRAVENOUS at 11:01

## 2023-01-21 RX ADMIN — Medication 650 MILLIGRAM(S): at 00:10

## 2023-01-21 RX ADMIN — ENOXAPARIN SODIUM 40 MILLIGRAM(S): 100 INJECTION SUBCUTANEOUS at 06:05

## 2023-01-21 RX ADMIN — ATORVASTATIN CALCIUM 80 MILLIGRAM(S): 80 TABLET, FILM COATED ORAL at 23:08

## 2023-01-21 RX ADMIN — Medication 81 MILLIGRAM(S): at 11:41

## 2023-01-21 RX ADMIN — CEFTRIAXONE 100 MILLIGRAM(S): 500 INJECTION, POWDER, FOR SOLUTION INTRAMUSCULAR; INTRAVENOUS at 13:21

## 2023-01-21 RX ADMIN — TAMSULOSIN HYDROCHLORIDE 0.4 MILLIGRAM(S): 0.4 CAPSULE ORAL at 23:07

## 2023-01-21 RX ADMIN — SODIUM CHLORIDE 75 MILLILITER(S): 9 INJECTION, SOLUTION INTRAVENOUS at 08:46

## 2023-01-21 RX ADMIN — Medication 650 MILLIGRAM(S): at 23:10

## 2023-01-21 RX ADMIN — SODIUM CHLORIDE 125 MILLILITER(S): 9 INJECTION, SOLUTION INTRAVENOUS at 02:31

## 2023-01-21 NOTE — PROGRESS NOTE ADULT - PROBLEM SELECTOR PLAN 7
- Large stool burden on CT scan. S/p Fleet enema in ED. Currently NPO due to poor mental status.  -s/p SMOG enema with 3x 'massive' bowel movements. Continues to have BM's during day.  -Introduce laxatives from above if able to tolerate PO

## 2023-01-21 NOTE — PROGRESS NOTE ADULT - PROBLEM SELECTOR PLAN 3
- Urine culture with pan sensitive ecoli  - Zosyn (1/17-1/18)  - Ceftriaxone (1/18-)  - Prior urine cultures with candida, ecoli. MRSA ISO MRSA bacteremia.  - TOV successful

## 2023-01-21 NOTE — PROGRESS NOTE ADULT - PROBLEM SELECTOR PLAN 1
Resolved  - Lactate downtrending but still elevated. Now bacteremic with ecoli, suspect  source with largely + UA, likely precipitated by constipation. S/p Zosyn in the ED.  - Zosyn (1/17-1/18)  - Ceftriaxone (1/18-)  - Urine culture with pan sensitive ecoli. Switch to ceftriaxone  - Trend lactate

## 2023-01-21 NOTE — PROGRESS NOTE ADULT - ASSESSMENT
80yo largely bedbound M with hx of HTN, dementia, prior CVA, CAD presenting to hospital with chest pain, here found to have urosepsis with pansensitive e coli bacteremia.

## 2023-01-21 NOTE — PROGRESS NOTE ADULT - SUBJECTIVE AND OBJECTIVE BOX
PROGRESS NOTE:   Authored by: Darwin Deshpande M.D.   Internal Medicine PGY-1  Please Contact Via Teams    Patient is a 82y old  Male who presents with a chief complaint of     SUBJECTIVE / OVERNIGHT EVENTS:  No acute events overnight.     Brief Daily Plan:    ADDITIONAL REVIEW OF SYSTEMS:  Patient denies fevers, chills, chest pain, shortness of breath, nausea, abdominal pain, diarrhea, constipation, dysuria, leg swelling, headache, light headedness.    MEDICATIONS  (STANDING):  bisacodyl Suppository 10 milliGRAM(s) Rectal once  cefTRIAXone   IVPB      cefTRIAXone   IVPB 2000 milliGRAM(s) IV Intermittent every 24 hours  dextrose 5% + lactated ringers. 1000 milliLiter(s) (125 mL/Hr) IV Continuous <Continuous>  dextrose 50% Injectable 12.5 Gram(s) IV Push once  dextrose 50% Injectable 12.5 Gram(s) IV Push once  dextrose 50% Injectable 25 Gram(s) IV Push once  dextrose 50% Injectable 12.5 Gram(s) IV Push once  dextrose 50% Injectable 25 Gram(s) IV Push once  dextrose Oral Gel 15 Gram(s) Oral once  enoxaparin Injectable 40 milliGRAM(s) SubCutaneous every 24 hours  glucagon  Injectable 1 milliGRAM(s) IntraMuscular once    MEDICATIONS  (PRN):  acetaminophen     Tablet .. 650 milliGRAM(s) Oral every 6 hours PRN Temp greater or equal to 38C (100.4F), Mild Pain (1 - 3)  acetaminophen   IVPB .. 1000 milliGRAM(s) IV Intermittent every 8 hours PRN Mild Pain (1 - 3), Moderate Pain (4 - 6)  oxymetazoline 0.05% Nasal Spray 2 Spray(s) Both Nostrils every 12 hours PRN Nasal Congestion      CAPILLARY BLOOD GLUCOSE      POCT Blood Glucose.: 145 mg/dL (20 Jan 2023 21:38)  POCT Blood Glucose.: 97 mg/dL (20 Jan 2023 17:56)  POCT Blood Glucose.: 99 mg/dL (20 Jan 2023 11:52)    I&O's Summary    20 Jan 2023 07:01  -  21 Jan 2023 06:56  --------------------------------------------------------  IN: 0 mL / OUT: 275 mL / NET: -275 mL        PHYSICAL EXAM:  Vital Signs Last 24 Hrs  T(C): 36.9 (21 Jan 2023 05:05), Max: 36.9 (21 Jan 2023 05:05)  T(F): 98.4 (21 Jan 2023 05:05), Max: 98.4 (21 Jan 2023 05:05)  HR: 60 (21 Jan 2023 05:05) (52 - 73)  BP: 110/77 (21 Jan 2023 05:05) (110/77 - 154/79)  BP(mean): --  RR: 16 (21 Jan 2023 05:05) (16 - 16)  SpO2: 100% (21 Jan 2023 05:05) (99% - 100%)    Parameters below as of 21 Jan 2023 05:05  Patient On (Oxygen Delivery Method): room air        GENERAL: Nonverbal currently, intermittent mumbling. Appears comfortable.  HEAD:  Atraumatic, Normocephalic  EYES: EOMI, PERRLA, conjunctiva and sclera clear  ENT: Dry mucous membranes  NECK: Supple, No JVD  CHEST/LUNG: Clear to auscultation bilaterally; No rales, rhonchi, wheezing, or rubs. Unlabored respirations  HEART: AFIB; No murmurs  Peripheral edema: None  Subclavian skin tenting: Slow response  ABDOMEN: BSx4; Stool able to be palpated on left hemiabdomen  EXTREMITIES:  2+ Peripheral Pulses, brisk capillary refill. No clubbing, cyanosis, or edema  NERVOUS SYSTEM:  A&Ox0 ,right arm contracted  SKIN: No rashes or lesion    LABS:                        10.3   18.38 )-----------( 99       ( 20 Jan 2023 06:18 )             32.1     01-20    147<H>  |  119<H>  |  29<H>  ----------------------------<  84  3.4<L>   |  21<L>  |  1.18    Ca    8.9      20 Jan 2023 06:18  Phos  2.9     01-20  Mg     1.90     01-20    TPro  5.4<L>  /  Alb  2.5<L>  /  TBili  0.9  /  DBili  x   /  AST  101<H>  /  ALT  71<H>  /  AlkPhos  85  01-20                Tele Reviewed:    RADIOLOGY & ADDITIONAL TESTS:  Results Reviewed:   Imaging Personally Reviewed:  Electrocardiogram Personally Reviewed: PROGRESS NOTE:   Authored by: Darwin Deshpande M.D.   Internal Medicine PGY-1  Please Contact Via Teams    Patient is a 82y old  Male who presents with a chief complaint of sepsis    SUBJECTIVE / OVERNIGHT EVENTS:  No acute events overnight. Patient awake this morning, stating he is hungry.    Brief Daily Plan:  - Continue abx    MEDICATIONS  (STANDING):  bisacodyl Suppository 10 milliGRAM(s) Rectal once  cefTRIAXone   IVPB      cefTRIAXone   IVPB 2000 milliGRAM(s) IV Intermittent every 24 hours  dextrose 5% + lactated ringers. 1000 milliLiter(s) (125 mL/Hr) IV Continuous <Continuous>  dextrose 50% Injectable 12.5 Gram(s) IV Push once  dextrose 50% Injectable 12.5 Gram(s) IV Push once  dextrose 50% Injectable 25 Gram(s) IV Push once  dextrose 50% Injectable 12.5 Gram(s) IV Push once  dextrose 50% Injectable 25 Gram(s) IV Push once  dextrose Oral Gel 15 Gram(s) Oral once  enoxaparin Injectable 40 milliGRAM(s) SubCutaneous every 24 hours  glucagon  Injectable 1 milliGRAM(s) IntraMuscular once    MEDICATIONS  (PRN):  acetaminophen     Tablet .. 650 milliGRAM(s) Oral every 6 hours PRN Temp greater or equal to 38C (100.4F), Mild Pain (1 - 3)  acetaminophen   IVPB .. 1000 milliGRAM(s) IV Intermittent every 8 hours PRN Mild Pain (1 - 3), Moderate Pain (4 - 6)  oxymetazoline 0.05% Nasal Spray 2 Spray(s) Both Nostrils every 12 hours PRN Nasal Congestion      CAPILLARY BLOOD GLUCOSE      POCT Blood Glucose.: 145 mg/dL (20 Jan 2023 21:38)  POCT Blood Glucose.: 97 mg/dL (20 Jan 2023 17:56)  POCT Blood Glucose.: 99 mg/dL (20 Jan 2023 11:52)    I&O's Summary    20 Jan 2023 07:01  -  21 Jan 2023 06:56  --------------------------------------------------------  IN: 0 mL / OUT: 275 mL / NET: -275 mL        PHYSICAL EXAM:  Vital Signs Last 24 Hrs  T(C): 36.9 (21 Jan 2023 05:05), Max: 36.9 (21 Jan 2023 05:05)  T(F): 98.4 (21 Jan 2023 05:05), Max: 98.4 (21 Jan 2023 05:05)  HR: 60 (21 Jan 2023 05:05) (52 - 73)  BP: 110/77 (21 Jan 2023 05:05) (110/77 - 154/79)  BP(mean): --  RR: 16 (21 Jan 2023 05:05) (16 - 16)  SpO2: 100% (21 Jan 2023 05:05) (99% - 100%)    Parameters below as of 21 Jan 2023 05:05  Patient On (Oxygen Delivery Method): room air        GENERAL: Nonverbal currently, intermittent mumbling. Appears comfortable.  HEAD:  Atraumatic, Normocephalic  EYES: EOMI, PERRLA, conjunctiva and sclera clear  ENT: Dry mucous membranes  NECK: Supple, No JVD  CHEST/LUNG: Clear to auscultation bilaterally; No rales, rhonchi, wheezing, or rubs. Unlabored respirations  HEART: AFIB; No murmurs  Peripheral edema: None  Subclavian skin tenting: Slow response  ABDOMEN: BSx4; Stool able to be palpated on left hemiabdomen  EXTREMITIES:  2+ Peripheral Pulses, brisk capillary refill. No clubbing, cyanosis, or edema  NERVOUS SYSTEM:  A&Ox0 ,right arm contracted  SKIN: No rashes or lesion    LABS:                        10.3   18.38 )-----------( 99       ( 20 Jan 2023 06:18 )             32.1     01-20    147<H>  |  119<H>  |  29<H>  ----------------------------<  84  3.4<L>   |  21<L>  |  1.18    Ca    8.9      20 Jan 2023 06:18  Phos  2.9     01-20  Mg     1.90     01-20    TPro  5.4<L>  /  Alb  2.5<L>  /  TBili  0.9  /  DBili  x   /  AST  101<H>  /  ALT  71<H>  /  AlkPhos  85  01-20

## 2023-01-22 ENCOUNTER — TRANSCRIPTION ENCOUNTER (OUTPATIENT)
Age: 83
End: 2023-01-22

## 2023-01-22 DIAGNOSIS — E87.0 HYPEROSMOLALITY AND HYPERNATREMIA: ICD-10-CM

## 2023-01-22 LAB
ALBUMIN SERPL ELPH-MCNC: 2.5 G/DL — LOW (ref 3.3–5)
ALP SERPL-CCNC: 95 U/L — SIGNIFICANT CHANGE UP (ref 40–120)
ALT FLD-CCNC: 50 U/L — HIGH (ref 4–41)
ANION GAP SERPL CALC-SCNC: 8 MMOL/L — SIGNIFICANT CHANGE UP (ref 7–14)
AST SERPL-CCNC: 40 U/L — SIGNIFICANT CHANGE UP (ref 4–40)
BASOPHILS # BLD AUTO: 0.03 K/UL — SIGNIFICANT CHANGE UP (ref 0–0.2)
BASOPHILS NFR BLD AUTO: 0.5 % — SIGNIFICANT CHANGE UP (ref 0–2)
BILIRUB SERPL-MCNC: 0.6 MG/DL — SIGNIFICANT CHANGE UP (ref 0.2–1.2)
BUN SERPL-MCNC: 20 MG/DL — SIGNIFICANT CHANGE UP (ref 7–23)
CALCIUM SERPL-MCNC: 9 MG/DL — SIGNIFICANT CHANGE UP (ref 8.4–10.5)
CHLORIDE SERPL-SCNC: 120 MMOL/L — HIGH (ref 98–107)
CO2 SERPL-SCNC: 21 MMOL/L — LOW (ref 22–31)
CREAT SERPL-MCNC: 0.96 MG/DL — SIGNIFICANT CHANGE UP (ref 0.5–1.3)
EGFR: 78 ML/MIN/1.73M2 — SIGNIFICANT CHANGE UP
EOSINOPHIL # BLD AUTO: 0.46 K/UL — SIGNIFICANT CHANGE UP (ref 0–0.5)
EOSINOPHIL NFR BLD AUTO: 7.8 % — HIGH (ref 0–6)
GLUCOSE BLDC GLUCOMTR-MCNC: 116 MG/DL — HIGH (ref 70–99)
GLUCOSE BLDC GLUCOMTR-MCNC: 130 MG/DL — HIGH (ref 70–99)
GLUCOSE BLDC GLUCOMTR-MCNC: 52 MG/DL — CRITICAL LOW (ref 70–99)
GLUCOSE BLDC GLUCOMTR-MCNC: 59 MG/DL — LOW (ref 70–99)
GLUCOSE BLDC GLUCOMTR-MCNC: 63 MG/DL — LOW (ref 70–99)
GLUCOSE BLDC GLUCOMTR-MCNC: 72 MG/DL — SIGNIFICANT CHANGE UP (ref 70–99)
GLUCOSE BLDC GLUCOMTR-MCNC: 80 MG/DL — SIGNIFICANT CHANGE UP (ref 70–99)
GLUCOSE BLDC GLUCOMTR-MCNC: 93 MG/DL — SIGNIFICANT CHANGE UP (ref 70–99)
GLUCOSE BLDC GLUCOMTR-MCNC: 95 MG/DL — SIGNIFICANT CHANGE UP (ref 70–99)
GLUCOSE SERPL-MCNC: 88 MG/DL — SIGNIFICANT CHANGE UP (ref 70–99)
HCT VFR BLD CALC: 34 % — LOW (ref 39–50)
HGB BLD-MCNC: 11 G/DL — LOW (ref 13–17)
IANC: 3.43 K/UL — SIGNIFICANT CHANGE UP (ref 1.8–7.4)
IMM GRANULOCYTES NFR BLD AUTO: 1.4 % — HIGH (ref 0–0.9)
LYMPHOCYTES # BLD AUTO: 1.13 K/UL — SIGNIFICANT CHANGE UP (ref 1–3.3)
LYMPHOCYTES # BLD AUTO: 19.3 % — SIGNIFICANT CHANGE UP (ref 13–44)
MAGNESIUM SERPL-MCNC: 1.9 MG/DL — SIGNIFICANT CHANGE UP (ref 1.6–2.6)
MCHC RBC-ENTMCNC: 30.1 PG — SIGNIFICANT CHANGE UP (ref 27–34)
MCHC RBC-ENTMCNC: 32.4 GM/DL — SIGNIFICANT CHANGE UP (ref 32–36)
MCV RBC AUTO: 92.9 FL — SIGNIFICANT CHANGE UP (ref 80–100)
MONOCYTES # BLD AUTO: 0.74 K/UL — SIGNIFICANT CHANGE UP (ref 0–0.9)
MONOCYTES NFR BLD AUTO: 12.6 % — SIGNIFICANT CHANGE UP (ref 2–14)
NEUTROPHILS # BLD AUTO: 3.43 K/UL — SIGNIFICANT CHANGE UP (ref 1.8–7.4)
NEUTROPHILS NFR BLD AUTO: 58.4 % — SIGNIFICANT CHANGE UP (ref 43–77)
NRBC # BLD: 0 /100 WBCS — SIGNIFICANT CHANGE UP (ref 0–0)
NRBC # FLD: 0 K/UL — SIGNIFICANT CHANGE UP (ref 0–0)
PHOSPHATE SERPL-MCNC: 3.4 MG/DL — SIGNIFICANT CHANGE UP (ref 2.5–4.5)
PLATELET # BLD AUTO: 103 K/UL — LOW (ref 150–400)
POTASSIUM SERPL-MCNC: 3.5 MMOL/L — SIGNIFICANT CHANGE UP (ref 3.5–5.3)
POTASSIUM SERPL-SCNC: 3.5 MMOL/L — SIGNIFICANT CHANGE UP (ref 3.5–5.3)
PROT SERPL-MCNC: 5.3 G/DL — LOW (ref 6–8.3)
RBC # BLD: 3.66 M/UL — LOW (ref 4.2–5.8)
RBC # FLD: 13.6 % — SIGNIFICANT CHANGE UP (ref 10.3–14.5)
SODIUM SERPL-SCNC: 149 MMOL/L — HIGH (ref 135–145)
WBC # BLD: 5.87 K/UL — SIGNIFICANT CHANGE UP (ref 3.8–10.5)
WBC # FLD AUTO: 5.87 K/UL — SIGNIFICANT CHANGE UP (ref 3.8–10.5)

## 2023-01-22 PROCEDURE — 71045 X-RAY EXAM CHEST 1 VIEW: CPT | Mod: 26

## 2023-01-22 PROCEDURE — 99232 SBSQ HOSP IP/OBS MODERATE 35: CPT

## 2023-01-22 RX ORDER — SODIUM CHLORIDE 9 MG/ML
1000 INJECTION, SOLUTION INTRAVENOUS
Refills: 0 | Status: DISCONTINUED | OUTPATIENT
Start: 2023-01-22 | End: 2023-01-22

## 2023-01-22 RX ORDER — DEXTROSE 50 % IN WATER 50 %
12.5 SYRINGE (ML) INTRAVENOUS ONCE
Refills: 0 | Status: COMPLETED | OUTPATIENT
Start: 2023-01-22 | End: 2023-01-25

## 2023-01-22 RX ORDER — IPRATROPIUM/ALBUTEROL SULFATE 18-103MCG
3 AEROSOL WITH ADAPTER (GRAM) INHALATION ONCE
Refills: 0 | Status: DISCONTINUED | OUTPATIENT
Start: 2023-01-22 | End: 2023-02-02

## 2023-01-22 RX ORDER — SODIUM CHLORIDE 9 MG/ML
1000 INJECTION, SOLUTION INTRAVENOUS
Refills: 0 | Status: DISCONTINUED | OUTPATIENT
Start: 2023-01-22 | End: 2023-01-23

## 2023-01-22 RX ORDER — ZINC SULFATE TAB 220 MG (50 MG ZINC EQUIVALENT) 220 (50 ZN) MG
1 TAB ORAL
Qty: 0 | Refills: 0 | DISCHARGE

## 2023-01-22 RX ORDER — ATORVASTATIN CALCIUM 80 MG/1
1 TABLET, FILM COATED ORAL
Qty: 0 | Refills: 0 | DISCHARGE

## 2023-01-22 RX ORDER — ATORVASTATIN CALCIUM 80 MG/1
1 TABLET, FILM COATED ORAL
Qty: 0 | Refills: 0 | DISCHARGE
Start: 2023-01-22

## 2023-01-22 RX ORDER — ALBUTEROL 90 UG/1
2 AEROSOL, METERED ORAL
Qty: 0 | Refills: 0 | DISCHARGE

## 2023-01-22 RX ORDER — DEXTROSE 50 % IN WATER 50 %
25 SYRINGE (ML) INTRAVENOUS ONCE
Refills: 0 | Status: DISCONTINUED | OUTPATIENT
Start: 2023-01-22 | End: 2023-01-22

## 2023-01-22 RX ORDER — DEXTROSE 50 % IN WATER 50 %
12.5 SYRINGE (ML) INTRAVENOUS ONCE
Refills: 0 | Status: COMPLETED | OUTPATIENT
Start: 2023-01-22 | End: 2023-01-22

## 2023-01-22 RX ADMIN — SODIUM CHLORIDE 75 MILLILITER(S): 9 INJECTION, SOLUTION INTRAVENOUS at 14:11

## 2023-01-22 RX ADMIN — SODIUM CHLORIDE 100 MILLILITER(S): 9 INJECTION, SOLUTION INTRAVENOUS at 21:32

## 2023-01-22 RX ADMIN — ENOXAPARIN SODIUM 40 MILLIGRAM(S): 100 INJECTION SUBCUTANEOUS at 05:59

## 2023-01-22 RX ADMIN — Medication 12.5 GRAM(S): at 10:29

## 2023-01-22 RX ADMIN — SODIUM CHLORIDE 50 MILLILITER(S): 9 INJECTION, SOLUTION INTRAVENOUS at 10:29

## 2023-01-22 RX ADMIN — Medication 12.5 GRAM(S): at 10:52

## 2023-01-22 RX ADMIN — ATORVASTATIN CALCIUM 80 MILLIGRAM(S): 80 TABLET, FILM COATED ORAL at 21:31

## 2023-01-22 RX ADMIN — CEFTRIAXONE 100 MILLIGRAM(S): 500 INJECTION, POWDER, FOR SOLUTION INTRAMUSCULAR; INTRAVENOUS at 14:11

## 2023-01-22 RX ADMIN — TAMSULOSIN HYDROCHLORIDE 0.4 MILLIGRAM(S): 0.4 CAPSULE ORAL at 21:32

## 2023-01-22 NOTE — PROGRESS NOTE ADULT - PROBLEM SELECTOR PLAN 4
- Now with elevated wbc. 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

## 2023-01-22 NOTE — PROGRESS NOTE ADULT - PROBLEM SELECTOR PLAN 8
- Hx of Dementia, A&O 0-1 at baseline.   - Maintain awake and sleep cycle  - Frequent reorientation - Will start on D5w.

## 2023-01-22 NOTE — DISCHARGE NOTE PROVIDER - NSDCCPCAREPLAN_GEN_ALL_CORE_FT
PRINCIPAL DISCHARGE DIAGNOSIS  Diagnosis: Urinary tract infection  Assessment and Plan of Treatment: You came to the hospital because you were feeling really sick. You had a urinary tract infection that went into your blood. You got IV antibiotics for this, and it helped the infection go away. Please follow up with your primary care doctor within 1 week of discharge for further medical care.  If you experience fevers, chills, shortness of breath, chest pain, severe abdominal pain, falls with head trauma, or fainting, then please seek immediate emergency medical services.       PRINCIPAL DISCHARGE DIAGNOSIS  Diagnosis: E coli bacteremia  Assessment and Plan of Treatment: You came into the hospital because you were complaining of sudden abdominal pain. It was found that you had a bacteria growing in your blood. This is due to an infection of your urinary tract. We treated you with antibiotics and monitored your vitals and lab work. We repeated cultures of your blood that had no growth. The bacteria that grew is E. Coli which is a common bacteria that can infect the urinary tract. It was sensitive to the antibiotics. Complete your course of antibiotics with ....  Please return to the hospital if you experience fever, chills, severe headache, dizziness, lightheadedness, loss of consciousness, visual disturbance, chest pain, palpitations, shortness of breath,  abdominal pain, nausea, vomiting, diarrhea, blood in your vomit/stool/urine, burning with urination or change in urinary pattern, numbness, weakness, tingling, or other alarming symptoms.       PRINCIPAL DISCHARGE DIAGNOSIS  Diagnosis: E coli bacteremia  Assessment and Plan of Treatment: You came into the hospital because you were complaining of sudden abdominal pain. It was found that you had a bacteria growing in your blood. This is due to an infection of your urinary tract. We treated you with antibiotics and monitored your vitals and lab work. We repeated cultures of your blood that had no growth. The bacteria that grew is E. Coli which is a common bacteria that can infect the urinary tract. It was sensitive to the antibiotics. We completed your antibiotic treatment in the hospital.   Please return to the hospital if you experience fever, chills, severe headache, dizziness, lightheadedness, loss of consciousness, visual disturbance, chest pain, palpitations, shortness of breath,  abdominal pain, nausea, vomiting, diarrhea, blood in your vomit/stool/urine, burning with urination or change in urinary pattern, numbness, weakness, tingling, or other alarming symptoms.      SECONDARY DISCHARGE DIAGNOSES  Diagnosis: Hypoglycemia  Assessment and Plan of Treatment: It was found that your blood glucose levels were low. We had a nutritionist see you and we did a calorie count to track your intake. Your calorie intake was adequate. We looked for evidence of abnormal hormone levels in your body that may explain your low glucose. We consulted endocrinology regarding this. We checked your insulin levels and they were not abnormally high when taking into account your glucose levels. We actually noted that your finger stick sugars to not correlate with your serum glucose levels (which are more accurate). We did additional testing to see if your adrenal glands responded to a hormone released by your brain. It responded appropriately meaning that you are producing adequate hormone for your sugar level. We monitored you off iv fluids and your sugars remained normal on your labs.     PRINCIPAL DISCHARGE DIAGNOSIS  Diagnosis: E coli bacteremia  Assessment and Plan of Treatment: You came into the hospital because you were complaining of sudden abdominal pain. It was found that you had a bacteria growing in your blood. This is due to an infection of your urinary tract. We treated you with antibiotics and monitored your vitals and lab work. We repeated cultures of your blood that had no growth. The bacteria that grew is E. Coli which is a common bacteria that can infect the urinary tract. It was sensitive to the antibiotics. We completed your antibiotic treatment in the hospital.   Please return to the hospital if you experience fever, chills, severe headache, dizziness, lightheadedness, loss of consciousness, visual disturbance, chest pain, palpitations, shortness of breath,  abdominal pain, nausea, vomiting, diarrhea, blood in your vomit/stool/urine, burning with urination or change in urinary pattern, numbness, weakness, tingling, or other alarming symptoms.      SECONDARY DISCHARGE DIAGNOSES  Diagnosis: Hypoglycemia  Assessment and Plan of Treatment: It was found that your blood glucose levels were low. We had a nutritionist see you and we did a calorie count to track your intake. Your calorie intake was adequate. We looked for evidence of abnormal hormone levels in your body that may explain your low glucose. We consulted endocrinology regarding this. We checked your insulin levels and they were not abnormally high when taking into account your glucose levels. We actually noted that your finger stick sugars to not correlate with your serum glucose levels (which are more accurate). We did additional testing to see if your adrenal glands responded to a hormone released by your brain. It responded appropriately meaning that you are producing adequate hormone for your sugar level. We monitored you off iv fluids and your sugars remained normal on your labs. Please continue to eat frequent meals as an outpatient to keep your sugars up.

## 2023-01-22 NOTE — PROGRESS NOTE ADULT - PROBLEM SELECTOR PLAN 3
- Urine culture with pan sensitive ecoli  - Zosyn (1/17-1/18)  - Ceftriaxone (1/18-)  - Prior urine cultures with candida, ecoli. MRSA ISO MRSA bacteremia.  - TOV successful - Now with elevated wbc.

## 2023-01-22 NOTE — PROGRESS NOTE ADULT - PROBLEM SELECTOR PLAN 7
- Large stool burden on CT scan. S/p Fleet enema in ED. Currently NPO due to poor mental status.  -s/p SMOG enema with 3x 'massive' bowel movements. Continues to have BM's during day.  -Introduce laxatives from above if able to tolerate PO - Is hypoglycemic in the ED ISO sepsis and poor PO intake.   - Continue D5 fluids, but despite this is still having hypoglycemic episodes.

## 2023-01-22 NOTE — DISCHARGE NOTE PROVIDER - CARE PROVIDER_API CALL
[facility]  [address] [place] [address]  Phone: [phone]  Fax: [phone]  Follow Up Time: 2 weeks

## 2023-01-22 NOTE — DISCHARGE NOTE PROVIDER - NSDCMRMEDTOKEN_GEN_ALL_CORE_FT
aspirin 81 mg oral delayed release tablet: 1 tab(s) orally once a day  atorvastatin 80 mg oral tablet: 1 tab(s) orally once a day (at bedtime)  tamsulosin 0.4 mg oral capsule: 1 cap(s) orally once a day (at bedtime)   aspirin 81 mg oral delayed release tablet: 1 tab(s) orally once a day  atorvastatin 80 mg oral tablet: 1 tab(s) orally once a day (at bedtime)  petrolatum topical ointment: 1 application topically once daily  polyethylene glycol 3350 oral powder for reconstitution: 17 gram(s) orally once a day  senna leaf extract oral tablet: 2 tab(s) orally once a day   tamsulosin 0.4 mg oral capsule: 1 cap(s) orally once a day (at bedtime)

## 2023-01-22 NOTE — PROGRESS NOTE ADULT - PROBLEM SELECTOR PLAN 9
- Is hypoglycemic in the ED ISO sepsis and poor PO intake.   - Continue D5 fluids, but despite this is still having hypoglycemic episodes. - Hx of Dementia, A&O 0-1 at baseline.   - Maintain awake and sleep cycle  - Frequent reorientation

## 2023-01-22 NOTE — PROGRESS NOTE ADULT - PROBLEM SELECTOR PLAN 6
- Echo from april 2022 grossly normal.  - Resume aspirin - Large stool burden on CT scan. S/p Fleet enema in ED.  - s/p SMOG enema with 3x 'massive' bowel movements. Continues to have BM's during day.  - Introduce laxatives from above if able to tolerate PO

## 2023-01-22 NOTE — PROGRESS NOTE ADULT - PROBLEM SELECTOR PLAN 2
- Zosyn (1/17-1/18)  - Ceftriaxone (1/18-)  - F/u repeat cultures - Urine culture with pan sensitive ecoli  - Zosyn (1/17-1/18)  - Ceftriaxone (1/18-)  - Prior urine cultures with candida, ecoli. MRSA ISO MRSA bacteremia.  - TOV successful

## 2023-01-22 NOTE — PROGRESS NOTE ADULT - PROBLEM SELECTOR PLAN 5
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  - Resume aspirin

## 2023-01-22 NOTE — DISCHARGE NOTE PROVIDER - HOSPITAL COURSE
HPI: 82-year-old male pmhx of CVA 10 years prior, dementia, diabetes, hyperlipidemia, CAD comes to ED w/ nausea, vomiting, chest pain, SOB, abdominal pain.  Patient arrived from home via EMS, history given by wife via phone reported patient had chest pain, increased work of breathing, and abdominal pain that started randomly at 3 AM, woke up patient from sleep.  Due to the appearance of severity of symptoms and persistence wife called EMS.  Their pain/symptom is moderate to severe, constant, non mediating with rest. Started randomly. History limited due to patient's baseline nonverbal dementia status.    ED Course: Initial vitals 102.4 HR 90 89/59 97% on RA RR 17    Patient found to have elevated lactate. UA concerning for infection. Given 2.5L fluids and started on zosyn.    After a GOC discussion with wife Nerissa, he was made DNI/DNI.    Hospital course:  Zosyn was continued and patient was found to be bacteremic with pan sensitive e coli. When sensitivities returned he was de-escalated to ceftriaxone. His lactate resolved with abx and IVF. CT scan was notable for a large stool burden and an 9cm rectum. He was given enemas and suppositories and had large bowel movements with resolution of his constipation. The patient initially failed speech and swallow, but his mental status slowly returned to baseline. He then passed speech and swallow and was recommended a puree diet with mildly thick liquids. One morning a nurse was concerned that he aspirated so  a CXR was ordered which showed some hazy infiltrate in the RLL. Vital signs were normal. He was made NPO again pending speech and swallow. His course was also complicated by hypernatremic despite changing fluids from NS—>LR—>1/2NS. This was due to poor oral intake.     Wife Nerissa preferred the patient come home with resolution of home services. 82 year old man with history of CVA, dementia (largely bedbound), diabetes, HLD, CAD presented for N/V/CP/SOB/AP. Patient was found to be febrile with + UA. Patient was started on Zosyn and found to be bacteremic with pan sensitive E. Coli due to urinary tract infection. Patient deescalated to ceftriaxone. Repeat blood cultures on 1/20/23 NGTD. CT Scan was notable for large stool burden and 9 cm rectum. Patient was given enemas and suppositories with resolution of constipation.  The patient initially failed speech and swallow, but his mental status slowly returned to baseline. He then passed speech and swallow and was recommended a puree diet with mildly thick liquids. Course complicated by aspiration, chest x-ray showing RLL. However patient saturating well without oxygen. Course complicated by hypernatremia that resolved with IVF. Course complicated by hypoglycemia requiring D5W. IVF d/c and monitored and glucose was stable. Wife, Nerissa, preferred patient come home with resolution of home services. Patient to follow-up with PCP.    Patient is medically optimized for discharge. 82 year old man with history of CVA, dementia (largely bedbound), diabetes, HLD, CAD presented for N/V/CP/SOB/AP. Patient was found to be febrile with + UA. Patient was started on Zosyn and found to be bacteremic with pan sensitive E. Coli due to urinary tract infection. Patient deescalated to ceftriaxone. Repeat blood cultures on 1/20/23 NGTD. Patient completed 7 day course of IV antibiotics. CT Scan was notable for large stool burden and 9 cm rectum. Patient was given enemas and suppositories with resolution of constipation.  The patient initially failed speech and swallow, but his mental status slowly returned to baseline. He then passed speech and swallow and was recommended a puree diet with mildly thick liquids. Course complicated by aspiration, chest x-ray showing RLL. However patient saturating well without oxygen. Course complicated by hypernatremia that resolved with IVF. Course complicated by hypoglycemia requiring D5W. Endocrinology was consulted regarding hypoglycemia. AM cortisol wnl, however cosyntropin testing done to look for adrenal insufficiency. Cortisol responded appropriately to cosyntropin without evidence of AI. Insulin/proinsulin/BHB levels were drawn however it was noted serum glucose and point of care glucose to be discordant. Patient serum glucose wnl. Wife, Nerissa, preferred patient come home with resolution of home services. Patient to follow-up with PCP.    Patient is medically optimized for discharge.

## 2023-01-22 NOTE — DISCHARGE NOTE PROVIDER - NSDCCPTREATMENT_GEN_ALL_CORE_FT
PRINCIPAL PROCEDURE  Procedure: CT angio abdomen pelvis  Findings and Treatment:   < end of copied text >  FINDINGS:  LOWER CHEST: Within normal limits.  LIVER: Within normal limits.  BILE DUCTS: Normal caliber.  GALLBLADDER: Within normal limits.  SPLEEN: Within normal limits.  PANCREAS: Within normal limits.  ADRENALS: Within normal limits.  KIDNEYS/URETERS: No hydronephrosis. Bilateral renal cysts and additional   subcentimeter hypoattenuating foci too small to characterize.  BLADDER: Within normal limits.  REPRODUCTIVE ORGANS: Prostate is enlarged.  BOWEL: Stomach is distended with air and fluid. Large stool burden   throughout the colon and distending the rectum up to 9 cm. No bowel   obstruction. Appendix is not visualized.  PERITONEUM: No ascites.  VESSELS: Atherosclerotic changes. Celiac axis, SMA, and GOLD and patent.   Portal veins are patent.  RETROPERITONEUM/LYMPH NODES: No lymphadenopathy.  ABDOMINAL WALL: Unchanged focal infiltration of the subcutaneous fat   overlying the coccyx to the right of midline.  BONES: Degenerative changes.< from: CT Angio Abdomen and Pelvis w/ IV Cont (01.17.23 @ 07:22) >        SECONDARY PROCEDURE  Procedure: CT head wo con  Findings and Treatment:   < end of copied text >  FINDINGS:  There is no acute intracranial hemorrhage. There are no extra-axial   collections.  The basal cisterns are patent. The ventricles are mild to   moderately dilated, unchanged in size from prior, consistent with central   type generalized volume loss. There is no midline shift. Chronic left   gangliocapsular and left thalamic capsular infarctions extending to the   corona radiata and centrum semiovale again seen. Chronic small thalamic   lacunar infarct again seen.  There are periventricular and subcortical white matter hypodensities   consistent with severe microvascular changes.  The visualized paranasal sinuses and mastoid air cells are clear.  The calvarium is intact.< from: CT Head No Cont (01.17.23 @ 07:16) >

## 2023-01-22 NOTE — PROGRESS NOTE ADULT - PROBLEM SELECTOR PLAN 1
Resolved  - Lactate downtrending but still elevated. Now bacteremic with ecoli, suspect  source with largely + UA, likely precipitated by constipation. S/p Zosyn in the ED.  - Zosyn (1/17-1/18)  - Ceftriaxone (1/18-)  - Urine culture with pan sensitive ecoli. Switch to ceftriaxone  - Trend lactate - Zosyn (1/17-1/18)  - Ceftriaxone (1/18-)  - F/u repeat cultures 1/20 NGTD  - Sepsis resolved

## 2023-01-22 NOTE — PROGRESS NOTE ADULT - SUBJECTIVE AND OBJECTIVE BOX
PROGRESS NOTE:   Authored by: Darwin Deshpande M.D.   Internal Medicine PGY-1  Please Contact Via Teams    Patient is a 82y old  Male who presents with a chief complaint of     SUBJECTIVE / OVERNIGHT EVENTS:  No acute events overnight.     Brief Daily Plan:    ADDITIONAL REVIEW OF SYSTEMS:  Patient denies fevers, chills, chest pain, shortness of breath, nausea, abdominal pain, diarrhea, constipation, dysuria, leg swelling, headache, light headedness.    MEDICATIONS  (STANDING):  aspirin  chewable 81 milliGRAM(s) Oral daily  atorvastatin 80 milliGRAM(s) Oral at bedtime  bisacodyl Suppository 10 milliGRAM(s) Rectal once  cefTRIAXone   IVPB      cefTRIAXone   IVPB 2000 milliGRAM(s) IV Intermittent every 24 hours  dextrose 5% + sodium chloride 0.45%. 1000 milliLiter(s) (75 mL/Hr) IV Continuous <Continuous>  dextrose 50% Injectable 25 Gram(s) IV Push once  dextrose 50% Injectable 12.5 Gram(s) IV Push once  dextrose 50% Injectable 25 Gram(s) IV Push once  dextrose 50% Injectable 12.5 Gram(s) IV Push once  dextrose 50% Injectable 12.5 Gram(s) IV Push once  dextrose Oral Gel 15 Gram(s) Oral once  enoxaparin Injectable 40 milliGRAM(s) SubCutaneous every 24 hours  glucagon  Injectable 1 milliGRAM(s) IntraMuscular once  tamsulosin 0.4 milliGRAM(s) Oral at bedtime    MEDICATIONS  (PRN):  acetaminophen     Tablet .. 650 milliGRAM(s) Oral every 6 hours PRN Temp greater or equal to 38C (100.4F), Mild Pain (1 - 3)  acetaminophen   IVPB .. 1000 milliGRAM(s) IV Intermittent every 8 hours PRN Mild Pain (1 - 3), Moderate Pain (4 - 6)  oxymetazoline 0.05% Nasal Spray 2 Spray(s) Both Nostrils every 12 hours PRN Nasal Congestion      CAPILLARY BLOOD GLUCOSE      POCT Blood Glucose.: 95 mg/dL (22 Jan 2023 01:57)  POCT Blood Glucose.: 104 mg/dL (21 Jan 2023 17:31)  POCT Blood Glucose.: 81 mg/dL (21 Jan 2023 12:35)  POCT Blood Glucose.: 64 mg/dL (21 Jan 2023 12:33)  POCT Blood Glucose.: 102 mg/dL (21 Jan 2023 08:42)    I&O's Summary    20 Jan 2023 07:01  -  21 Jan 2023 07:00  --------------------------------------------------------  IN: 0 mL / OUT: 625 mL / NET: -625 mL        PHYSICAL EXAM:  Vital Signs Last 24 Hrs  T(C): 36.5 (22 Jan 2023 05:01), Max: 37.1 (21 Jan 2023 23:47)  T(F): 97.7 (22 Jan 2023 05:01), Max: 98.7 (21 Jan 2023 23:47)  HR: 62 (22 Jan 2023 06:20) (55 - 62)  BP: 158/69 (22 Jan 2023 05:01) (158/69 - 172/88)  BP(mean): --  RR: 16 (22 Jan 2023 05:01) (16 - 16)  SpO2: 100% (22 Jan 2023 05:01) (100% - 100%)    Parameters below as of 22 Jan 2023 05:01  Patient On (Oxygen Delivery Method): room air        CONSTITUTIONAL: NAD, well-developed  RESPIRATORY: Normal respiratory effort; lungs are clear to auscultation bilaterally  CARDIOVASCULAR: Regular rate and rhythm, normal S1 and S2, no murmur/rub/gallop; No lower extremity edema; Peripheral pulses are 2+ bilaterally  ABDOMEN: Nontender to palpation, normoactive bowel sounds, no rebound/guarding; No hepatosplenomegaly  MUSCLOSKELETAL: no clubbing or cyanosis of digits; no joint swelling or tenderness to palpation  PSYCH: A+O to person, place, and time; affect appropriate    LABS:                        10.3   10.98 )-----------( 89       ( 21 Jan 2023 07:00 )             32.6     01-21    147<H>  |  118<H>  |  24<H>  ----------------------------<  101<H>  3.5   |  22  |  1.04    Ca    9.1      21 Jan 2023 07:00  Phos  2.3     01-21  Mg     1.70     01-21    TPro  5.1<L>  /  Alb  2.5<L>  /  TBili  0.6  /  DBili  x   /  AST  61<H>  /  ALT  59<H>  /  AlkPhos  89  01-21              Culture - Blood (collected 20 Jan 2023 06:33)  Source: .Blood Blood  Preliminary Report (21 Jan 2023 11:00):    No growth to date.    Culture - Blood (collected 20 Jan 2023 06:18)  Source: .Blood Blood  Preliminary Report (21 Jan 2023 11:00):    No growth to date.        Tele Reviewed:    RADIOLOGY & ADDITIONAL TESTS:  Results Reviewed:   Imaging Personally Reviewed:  Electrocardiogram Personally Reviewed: PROGRESS NOTE:   Authored by: Darwin Deshpande M.D.   Internal Medicine PGY-1  Please Contact Via Teams    Patient is a 82y old  Male who presents with a chief complaint of     SUBJECTIVE / OVERNIGHT EVENTS:  No acute events overnight. Received call from RN this morning that patient was wheezing and that he had possibly aspirated. On my exam patient comfortable, saying good morning. He did have rhonchi, for which I ordered a chest xray, which shows new RLL opacity concerning for possible aspiration. Pt made NPO, pending S+S.     Brief Daily Plan:  - F/U S+S    MEDICATIONS  (STANDING):  aspirin  chewable 81 milliGRAM(s) Oral daily  atorvastatin 80 milliGRAM(s) Oral at bedtime  bisacodyl Suppository 10 milliGRAM(s) Rectal once  cefTRIAXone   IVPB      cefTRIAXone   IVPB 2000 milliGRAM(s) IV Intermittent every 24 hours  dextrose 5% + sodium chloride 0.45%. 1000 milliLiter(s) (75 mL/Hr) IV Continuous <Continuous>  dextrose 50% Injectable 25 Gram(s) IV Push once  dextrose 50% Injectable 12.5 Gram(s) IV Push once  dextrose 50% Injectable 25 Gram(s) IV Push once  dextrose 50% Injectable 12.5 Gram(s) IV Push once  dextrose 50% Injectable 12.5 Gram(s) IV Push once  dextrose Oral Gel 15 Gram(s) Oral once  enoxaparin Injectable 40 milliGRAM(s) SubCutaneous every 24 hours  glucagon  Injectable 1 milliGRAM(s) IntraMuscular once  tamsulosin 0.4 milliGRAM(s) Oral at bedtime    MEDICATIONS  (PRN):  acetaminophen     Tablet .. 650 milliGRAM(s) Oral every 6 hours PRN Temp greater or equal to 38C (100.4F), Mild Pain (1 - 3)  acetaminophen   IVPB .. 1000 milliGRAM(s) IV Intermittent every 8 hours PRN Mild Pain (1 - 3), Moderate Pain (4 - 6)  oxymetazoline 0.05% Nasal Spray 2 Spray(s) Both Nostrils every 12 hours PRN Nasal Congestion      CAPILLARY BLOOD GLUCOSE      POCT Blood Glucose.: 95 mg/dL (22 Jan 2023 01:57)  POCT Blood Glucose.: 104 mg/dL (21 Jan 2023 17:31)  POCT Blood Glucose.: 81 mg/dL (21 Jan 2023 12:35)  POCT Blood Glucose.: 64 mg/dL (21 Jan 2023 12:33)  POCT Blood Glucose.: 102 mg/dL (21 Jan 2023 08:42)    I&O's Summary    20 Jan 2023 07:01  -  21 Jan 2023 07:00  --------------------------------------------------------  IN: 0 mL / OUT: 625 mL / NET: -625 mL        PHYSICAL EXAM:  Vital Signs Last 24 Hrs  T(C): 36.5 (22 Jan 2023 05:01), Max: 37.1 (21 Jan 2023 23:47)  T(F): 97.7 (22 Jan 2023 05:01), Max: 98.7 (21 Jan 2023 23:47)  HR: 62 (22 Jan 2023 06:20) (55 - 62)  BP: 158/69 (22 Jan 2023 05:01) (158/69 - 172/88)  BP(mean): --  RR: 16 (22 Jan 2023 05:01) (16 - 16)  SpO2: 100% (22 Jan 2023 05:01) (100% - 100%)    Parameters below as of 22 Jan 2023 05:01  Patient On (Oxygen Delivery Method): room air      GENERAL: Nonverbal currently, intermittent mumbling. Appears comfortable.  HEAD:  Atraumatic, Normocephalic  EYES: EOMI, PERRLA, conjunctiva and sclera clear  ENT: Dry mucous membranes  NECK: Supple, No JVD  CHEST/LUNG: Rhonchi R>L  HEART: AFIB; No murmurs  Peripheral edema: None  Subclavian skin tenting: Slow response  ABDOMEN: BSx4; Stool able to be palpated on left hemiabdomen  EXTREMITIES:  2+ Peripheral Pulses, brisk capillary refill. No clubbing, cyanosis, or edema  NERVOUS SYSTEM:  A&Ox0 ,right arm contracted  SKIN: No rashes or lesion    LABS:                        10.3   10.98 )-----------( 89       ( 21 Jan 2023 07:00 )             32.6     01-21    147<H>  |  118<H>  |  24<H>  ----------------------------<  101<H>  3.5   |  22  |  1.04    Ca    9.1      21 Jan 2023 07:00  Phos  2.3     01-21  Mg     1.70     01-21    TPro  5.1<L>  /  Alb  2.5<L>  /  TBili  0.6  /  DBili  x   /  AST  61<H>  /  ALT  59<H>  /  AlkPhos  89  01-21              Culture - Blood (collected 20 Jan 2023 06:33)  Source: .Blood Blood  Preliminary Report (21 Jan 2023 11:00):    No growth to date.    Culture - Blood (collected 20 Jan 2023 06:18)  Source: .Blood Blood  Preliminary Report (21 Jan 2023 11:00):    No growth to date.

## 2023-01-23 DIAGNOSIS — Z91.89 OTHER SPECIFIED PERSONAL RISK FACTORS, NOT ELSEWHERE CLASSIFIED: ICD-10-CM

## 2023-01-23 LAB
ALBUMIN SERPL ELPH-MCNC: 2.2 G/DL — LOW (ref 3.3–5)
ALP SERPL-CCNC: 64 U/L — SIGNIFICANT CHANGE UP (ref 40–120)
ALT FLD-CCNC: 40 U/L — SIGNIFICANT CHANGE UP (ref 4–41)
ANION GAP SERPL CALC-SCNC: 7 MMOL/L — SIGNIFICANT CHANGE UP (ref 7–14)
AST SERPL-CCNC: 29 U/L — SIGNIFICANT CHANGE UP (ref 4–40)
BASOPHILS # BLD AUTO: 0.03 K/UL — SIGNIFICANT CHANGE UP (ref 0–0.2)
BASOPHILS NFR BLD AUTO: 0.5 % — SIGNIFICANT CHANGE UP (ref 0–2)
BILIRUB SERPL-MCNC: 0.8 MG/DL — SIGNIFICANT CHANGE UP (ref 0.2–1.2)
BUN SERPL-MCNC: 14 MG/DL — SIGNIFICANT CHANGE UP (ref 7–23)
CALCIUM SERPL-MCNC: 8.2 MG/DL — LOW (ref 8.4–10.5)
CHLORIDE SERPL-SCNC: 113 MMOL/L — HIGH (ref 98–107)
CO2 SERPL-SCNC: 21 MMOL/L — LOW (ref 22–31)
CREAT SERPL-MCNC: 0.89 MG/DL — SIGNIFICANT CHANGE UP (ref 0.5–1.3)
EGFR: 85 ML/MIN/1.73M2 — SIGNIFICANT CHANGE UP
EOSINOPHIL # BLD AUTO: 0.51 K/UL — HIGH (ref 0–0.5)
EOSINOPHIL NFR BLD AUTO: 8.5 % — HIGH (ref 0–6)
GLUCOSE BLDC GLUCOMTR-MCNC: 100 MG/DL — HIGH (ref 70–99)
GLUCOSE BLDC GLUCOMTR-MCNC: 73 MG/DL — SIGNIFICANT CHANGE UP (ref 70–99)
GLUCOSE BLDC GLUCOMTR-MCNC: 80 MG/DL — SIGNIFICANT CHANGE UP (ref 70–99)
GLUCOSE BLDC GLUCOMTR-MCNC: 93 MG/DL — SIGNIFICANT CHANGE UP (ref 70–99)
GLUCOSE BLDC GLUCOMTR-MCNC: 93 MG/DL — SIGNIFICANT CHANGE UP (ref 70–99)
GLUCOSE SERPL-MCNC: 97 MG/DL — SIGNIFICANT CHANGE UP (ref 70–99)
HCT VFR BLD CALC: 31.5 % — LOW (ref 39–50)
HGB BLD-MCNC: 10.2 G/DL — LOW (ref 13–17)
IANC: 3.01 K/UL — SIGNIFICANT CHANGE UP (ref 1.8–7.4)
IMM GRANULOCYTES NFR BLD AUTO: 2.5 % — HIGH (ref 0–0.9)
LYMPHOCYTES # BLD AUTO: 1.43 K/UL — SIGNIFICANT CHANGE UP (ref 1–3.3)
LYMPHOCYTES # BLD AUTO: 23.8 % — SIGNIFICANT CHANGE UP (ref 13–44)
MAGNESIUM SERPL-MCNC: 1.7 MG/DL — SIGNIFICANT CHANGE UP (ref 1.6–2.6)
MCHC RBC-ENTMCNC: 30.1 PG — SIGNIFICANT CHANGE UP (ref 27–34)
MCHC RBC-ENTMCNC: 32.4 GM/DL — SIGNIFICANT CHANGE UP (ref 32–36)
MCV RBC AUTO: 92.9 FL — SIGNIFICANT CHANGE UP (ref 80–100)
MONOCYTES # BLD AUTO: 0.89 K/UL — SIGNIFICANT CHANGE UP (ref 0–0.9)
MONOCYTES NFR BLD AUTO: 14.8 % — HIGH (ref 2–14)
NEUTROPHILS # BLD AUTO: 3.01 K/UL — SIGNIFICANT CHANGE UP (ref 1.8–7.4)
NEUTROPHILS NFR BLD AUTO: 49.9 % — SIGNIFICANT CHANGE UP (ref 43–77)
NRBC # BLD: 0 /100 WBCS — SIGNIFICANT CHANGE UP (ref 0–0)
NRBC # FLD: 0 K/UL — SIGNIFICANT CHANGE UP (ref 0–0)
PHOSPHATE SERPL-MCNC: 2.6 MG/DL — SIGNIFICANT CHANGE UP (ref 2.5–4.5)
PLATELET # BLD AUTO: 111 K/UL — LOW (ref 150–400)
POTASSIUM SERPL-MCNC: 3 MMOL/L — LOW (ref 3.5–5.3)
POTASSIUM SERPL-SCNC: 3 MMOL/L — LOW (ref 3.5–5.3)
PROT SERPL-MCNC: 4.7 G/DL — LOW (ref 6–8.3)
RBC # BLD: 3.39 M/UL — LOW (ref 4.2–5.8)
RBC # FLD: 13.4 % — SIGNIFICANT CHANGE UP (ref 10.3–14.5)
SODIUM SERPL-SCNC: 141 MMOL/L — SIGNIFICANT CHANGE UP (ref 135–145)
WBC # BLD: 6.02 K/UL — SIGNIFICANT CHANGE UP (ref 3.8–10.5)
WBC # FLD AUTO: 6.02 K/UL — SIGNIFICANT CHANGE UP (ref 3.8–10.5)

## 2023-01-23 PROCEDURE — 99232 SBSQ HOSP IP/OBS MODERATE 35: CPT

## 2023-01-23 RX ORDER — LIDOCAINE 4 G/100G
1 CREAM TOPICAL
Refills: 0 | Status: DISCONTINUED | OUTPATIENT
Start: 2023-01-23 | End: 2023-01-30

## 2023-01-23 RX ORDER — PETROLATUM,WHITE
1 JELLY (GRAM) TOPICAL
Refills: 0 | Status: DISCONTINUED | OUTPATIENT
Start: 2023-01-23 | End: 2023-01-30

## 2023-01-23 RX ORDER — POTASSIUM CHLORIDE 20 MEQ
10 PACKET (EA) ORAL
Refills: 0 | Status: COMPLETED | OUTPATIENT
Start: 2023-01-23 | End: 2023-01-23

## 2023-01-23 RX ORDER — SODIUM CHLORIDE 9 MG/ML
1000 INJECTION, SOLUTION INTRAVENOUS
Refills: 0 | Status: DISCONTINUED | OUTPATIENT
Start: 2023-01-23 | End: 2023-01-24

## 2023-01-23 RX ORDER — IPRATROPIUM/ALBUTEROL SULFATE 18-103MCG
3 AEROSOL WITH ADAPTER (GRAM) INHALATION ONCE
Refills: 0 | Status: COMPLETED | OUTPATIENT
Start: 2023-01-23 | End: 2023-01-23

## 2023-01-23 RX ORDER — POTASSIUM PHOSPHATE, MONOBASIC POTASSIUM PHOSPHATE, DIBASIC 236; 224 MG/ML; MG/ML
30 INJECTION, SOLUTION INTRAVENOUS ONCE
Refills: 0 | Status: COMPLETED | OUTPATIENT
Start: 2023-01-23 | End: 2023-01-23

## 2023-01-23 RX ADMIN — Medication 100 MILLIEQUIVALENT(S): at 10:30

## 2023-01-23 RX ADMIN — SODIUM CHLORIDE 75 MILLILITER(S): 9 INJECTION, SOLUTION INTRAVENOUS at 09:07

## 2023-01-23 RX ADMIN — POTASSIUM PHOSPHATE, MONOBASIC POTASSIUM PHOSPHATE, DIBASIC 83.33 MILLIMOLE(S): 236; 224 INJECTION, SOLUTION INTRAVENOUS at 15:26

## 2023-01-23 RX ADMIN — Medication 100 MILLIEQUIVALENT(S): at 11:52

## 2023-01-23 RX ADMIN — Medication 81 MILLIGRAM(S): at 13:17

## 2023-01-23 RX ADMIN — ENOXAPARIN SODIUM 40 MILLIGRAM(S): 100 INJECTION SUBCUTANEOUS at 05:12

## 2023-01-23 RX ADMIN — Medication 100 MILLIEQUIVALENT(S): at 09:08

## 2023-01-23 RX ADMIN — TAMSULOSIN HYDROCHLORIDE 0.4 MILLIGRAM(S): 0.4 CAPSULE ORAL at 21:44

## 2023-01-23 RX ADMIN — CEFTRIAXONE 100 MILLIGRAM(S): 500 INJECTION, POWDER, FOR SOLUTION INTRAMUSCULAR; INTRAVENOUS at 13:15

## 2023-01-23 RX ADMIN — Medication 3 MILLILITER(S): at 20:55

## 2023-01-23 RX ADMIN — Medication 1 APPLICATION(S): at 18:06

## 2023-01-23 RX ADMIN — LIDOCAINE 1 APPLICATION(S): 4 CREAM TOPICAL at 17:45

## 2023-01-23 RX ADMIN — ATORVASTATIN CALCIUM 80 MILLIGRAM(S): 80 TABLET, FILM COATED ORAL at 21:44

## 2023-01-23 NOTE — PROGRESS NOTE ADULT - ASSESSMENT
80yo largely bedbound M with hx of HTN, dementia, prior CVA, CAD presenting to hospital with chest pain, here found to have urosepsis with pansensitive e coli bacteremia. 78yo largely bedbound M with hx of HTN, dementia, prior CVA, CAD presenting to hospital with chest pain, here for pansensitive e coli bacteremia secondary to UTI.

## 2023-01-23 NOTE — PROGRESS NOTE ADULT - PROBLEM SELECTOR PLAN 3
- Now with elevated wbc. NPO currently due to concern for aspiration  - D5W @ 100cc/hr  - Will have GOC discussion regarding feeding tubes.

## 2023-01-23 NOTE — SWALLOW BEDSIDE ASSESSMENT ADULT - ADDITIONAL RECOMMENDATIONS
This service to follow up for diet advancement. Medical team further advised to reconsult this department with any change in medical status and/or observed change in tolerance of recommended PO diet.
This service to follow-up as schedule permits to determine candidacy for PO intake. This service further advised to reconsult this department with any change in medical status and/or as patient becomes medically optimized.
1. This service to follow up as schedule permits for diet tolerance. 2. Medical team advised to reconsult this service if change in medical status and/or patient's tolerance to recommended PO diet.

## 2023-01-23 NOTE — SWALLOW BEDSIDE ASSESSMENT ADULT - SWALLOW EVAL: DIAGNOSIS
This is a limited assessment due to reduced alertness with progression of trials. 1.) Moderate oral dysphagia for puree and moderately-thick liquids marked by adequate acceptance and containment, prolonged bolus manipulation, delayed and prolonged oral transit with ~15 second volitional bolus hold and reduced oral clearance of puree. 2.) Mild pharyngeal dysphagia for aforementioned trials marked by suspected delay in initiaiton of pharyngeal swallow. No overt s/s of aspiration evidenced. Further PO trials were deferred given patient unable to maintain alertness for further oral intake.
1. Functional oral stage for puree, mildly thick liquids and thin liquids marked by adequate oral acceptance, collection and transfer. 2. Functional pharyngeal phase for puree and mildly thick liquids marked by a present pharyngeal swallow trigger with hyolaryngeal elevation noted upon digital palpation without evidence of airway penetration/aspiration. 3. Moderate pharyngeal dysphagia for thin liquids marked by a suspected delayed pharyngeal swallow trigger with hyolaryngeal elevation noted upon digital palpation without evidence of airway penetration/aspiration.
1.) Functional oral phase for puree, mildly-thick liquids and thin liquids marked by adequate acceptance and containment, prolonged bolus manipulation of puree however within functional limits, adequate oral transit and adequate oral clearance. 2.) Functional pharyngeal phase for puree and mildly-thick liquids marked by hyolaryngeal excursion present upon palpation and no overt s/s of aspiration evidenced. Of note, patient did present with increased work of breath with progression of oral intake, however oxygen saturation remained stable at ~100%. 3.) Moderate pharyngeal dysphagia for thin liquids marked by suspected delay in initiation of pharyngeal swallow with presence of overt s/s (throat clear and cough) concerning for penetration/aspiration.

## 2023-01-23 NOTE — SWALLOW BEDSIDE ASSESSMENT ADULT - COMMENTS
As per Internal Medicine note dated 1/23/23, "78yo largely bedbound M with hx of HTN, dementia, prior CVA, CAD presenting to hospital with chest pain, here for pansensitive e coli bacteremia secondary to UTI."    CXR 1/22/23 "Impression: Small left and trace right pleural effusion with associated passive atelectasis and increased pulmonary marking likely representing pulmonary edema."    Patient is known to this service, last seen on 1/20/23 with recommendation of puree with mildly-thick liquids. Patient is now NPO per MD.     Clinician received clearance via Teams from T3 to proceed with swallow evaluation (solids and liquids) given active diet order states "NPO for Procedure/Test."    Patient visited at bedside for clinical swallow evaluation. Patient initially in a sleep state, however easy to rouse with verbal and tactile stimuli.
Internal Medicine 1/20 "80yo largely bedbound M with hx of HTN, dementia, prior CVA, CAD presenting to hospital with chest pain, here found to have urosepsis with pansensitive e coli bacteremia."    CXR 1/17 "clear lungs"  Of Note 1: Patient seen for an initial assessment of the swallow on 1/19 with recommendations of NPO and consideration for short-term non-oral means. (please see note for details).     Of Note 2: Patient is known to this service as patient was seen during previous admissions. Patient last seen on 5/15/22 for a clinical swallow assessment with recommendations of puree with mildly thick liquids (please see report).  Patient seen at bedside this afternoon for a reassessment of the swallow function, at which time patient was alert. Patient minimally verbalized with mainly one word responses (i.e. yes). Patient noted with thick secretions on mid-posterior surface of tongue. Yankauer suction set up at bedside upon arrival. SLP manually removed secretions from tongue surface with adequate clearance and clear vocal quality noted.
As per Internal Medicine note dated 1/19/23, "78yo largely bedbound M with hx of HTN, dementia, prior CVA, CAD presenting to hospital with chest pain, here found to have sepsis with lactic acidosis with likely  source."    CXR 1/17/23 "IMPRESSION: Clear lungs."    CT Head 1/17/23 "IMPRESSION: No acute intra-cranial hemorrhage, mass effect, or midline shift."    Patient visited at bedside for clinical swallow evaluation. Patient initially in a sleep state, however able to rouse given verbal and tactile cues. Patient is able to follow basic commands. Limited verbalizations produced throughout assessment. Mild secretions noted on labial and lingual surface. Clinician provided oral care prior to initiation of PO trials.

## 2023-01-23 NOTE — PATIENT PROFILE ADULT - NSPRESCRUSEDDRG_GEN_A_NUR
No
23 y/o p0000 at 41.3 weeks w/c/o irreg ctx, and srom, clear af, good fm, no bleeding.    pnc: uncomplicated  gbs neg  abd: t=9698 g  , nt  ext: no edema  cx 4/90/-1 grossly rom, clear fluid, adeq pelvis    nst: reactive  toco: irreg ctx    admit, analgesia, anticipate nsd

## 2023-01-23 NOTE — SWALLOW BEDSIDE ASSESSMENT ADULT - PHARYNGEAL PHASE
Delayed pharyngeal swallow/Cough post oral intake/Throat clear post oral intake Within functional limits

## 2023-01-23 NOTE — SWALLOW BEDSIDE ASSESSMENT ADULT - ASR SWALLOW RECOMMEND DIAG
Objective testing is NOT indicated given patient's behavior/clinical presentation
Objective testing NOT warranted given patient exhibits overt signs of penetration/aspiration on thin liquids (i.e. coughing) and CXR does not indicate pneumonia.
Consider cinesophagram to objectively assess the swallow given concerns for aspiration per medicine team "He did have rhonchi, for which I ordered a chest xray, which shows new RLL opacity concerning for possible aspiration. Pt made NPO, pending S+S." See Internal Medicine note dated 1/22/23./VFSS/MBS

## 2023-01-23 NOTE — SWALLOW BEDSIDE ASSESSMENT ADULT - SWALLOW EVAL: RECOMMENDED FEEDING/EATING TECHNIQUES
Allow frequent breaks. Feed only when alert./allow for swallow between intakes/alternate food with liquid/check mouth frequently for oral residue/pocketing/crush medication (when feasible)/maintain upright posture during/after eating for 30 mins/oral hygiene/position upright (90 degrees)/small sips/bites

## 2023-01-23 NOTE — PROGRESS NOTE ADULT - PROBLEM SELECTOR PLAN 6
- Large stool burden on CT scan. S/p Fleet enema in ED.  - s/p SMOG enema with 3x 'massive' bowel movements. Continues to have BM's during day.  - Introduce laxatives from above if able to tolerate PO

## 2023-01-23 NOTE — PROGRESS NOTE ADULT - SUBJECTIVE AND OBJECTIVE BOX
PROGRESS NOTE:   Authored by Prashant Suarez MD   Patient is a 82y old  Male who presents with a chief complaint of     SUBJECTIVE / OVERNIGHT EVENTS:      ADDITIONAL REVIEW OF SYSTEMS:    MEDICATIONS  (STANDING):  albuterol/ipratropium for Nebulization 3 milliLiter(s) Nebulizer once  aspirin  chewable 81 milliGRAM(s) Oral daily  atorvastatin 80 milliGRAM(s) Oral at bedtime  bisacodyl Suppository 10 milliGRAM(s) Rectal once  cefTRIAXone   IVPB      cefTRIAXone   IVPB 2000 milliGRAM(s) IV Intermittent every 24 hours  dextrose 5%. 1000 milliLiter(s) (100 mL/Hr) IV Continuous <Continuous>  dextrose 50% Injectable 12.5 milliLiter(s) IV Push once  dextrose 50% Injectable 25 Gram(s) IV Push once  dextrose 50% Injectable 12.5 Gram(s) IV Push once  dextrose 50% Injectable 25 Gram(s) IV Push once  dextrose Oral Gel 15 Gram(s) Oral once  enoxaparin Injectable 40 milliGRAM(s) SubCutaneous every 24 hours  glucagon  Injectable 1 milliGRAM(s) IntraMuscular once  tamsulosin 0.4 milliGRAM(s) Oral at bedtime    MEDICATIONS  (PRN):  acetaminophen     Tablet .. 650 milliGRAM(s) Oral every 6 hours PRN Temp greater or equal to 38C (100.4F), Mild Pain (1 - 3)  acetaminophen   IVPB .. 1000 milliGRAM(s) IV Intermittent every 8 hours PRN Mild Pain (1 - 3), Moderate Pain (4 - 6)  oxymetazoline 0.05% Nasal Spray 2 Spray(s) Both Nostrils every 12 hours PRN Nasal Congestion      CAPILLARY BLOOD GLUCOSE      POCT Blood Glucose.: 93 mg/dL (23 Jan 2023 00:26)  POCT Blood Glucose.: 72 mg/dL (22 Jan 2023 19:40)  POCT Blood Glucose.: 80 mg/dL (22 Jan 2023 17:56)  POCT Blood Glucose.: 116 mg/dL (22 Jan 2023 12:30)  POCT Blood Glucose.: 130 mg/dL (22 Jan 2023 11:17)  POCT Blood Glucose.: 93 mg/dL (22 Jan 2023 10:51)  POCT Blood Glucose.: 63 mg/dL (22 Jan 2023 10:23)  POCT Blood Glucose.: 59 mg/dL (22 Jan 2023 10:07)  POCT Blood Glucose.: 52 mg/dL (22 Jan 2023 10:02)    I&O's Summary    22 Jan 2023 07:01  -  23 Jan 2023 07:00  --------------------------------------------------------  IN: 0 mL / OUT: 1050 mL / NET: -1050 mL        PHYSICAL EXAM:  Vital Signs Last 24 Hrs  T(C): 36.8 (23 Jan 2023 04:43), Max: 36.8 (23 Jan 2023 04:43)  T(F): 98.3 (23 Jan 2023 04:43), Max: 98.3 (23 Jan 2023 04:43)  HR: 62 (23 Jan 2023 04:43) (62 - 65)  BP: 153/71 (23 Jan 2023 04:43) (141/71 - 153/71)  BP(mean): --  RR: 17 (23 Jan 2023 04:43) (16 - 17)  SpO2: 99% (23 Jan 2023 04:43) (95% - 99%)    Parameters below as of 23 Jan 2023 04:43  Patient On (Oxygen Delivery Method): room air          GENERAL: Nonverbal currently, intermittent mumbling. Appears comfortable.  HEAD:  Atraumatic, Normocephalic  EYES: EOMI, PERRLA, conjunctiva and sclera clear  ENT: Dry mucous membranes  NECK: Supple, No JVD  CHEST/LUNG: Rhonchi R>L  HEART: AFIB; No murmurs  Peripheral edema: None  Subclavian skin tenting: Slow response  ABDOMEN: BSx4; Stool able to be palpated on left hemiabdomen  EXTREMITIES:  2+ Peripheral Pulses, brisk capillary refill. No clubbing, cyanosis, or edema  NERVOUS SYSTEM:  A&Ox0 ,right arm contracted  SKIN: No rashes or lesion      LABS:                        11.0   5.87  )-----------( 103      ( 22 Jan 2023 07:55 )             34.0     01-22    149<H>  |  120<H>  |  20  ----------------------------<  88  3.5   |  21<L>  |  0.96    Ca    9.0      22 Jan 2023 05:58  Phos  3.4     01-22  Mg     1.90     01-22    TPro  5.3<L>  /  Alb  2.5<L>  /  TBili  0.6  /  DBili  x   /  AST  40  /  ALT  50<H>  /  AlkPhos  95  01-22                RADIOLOGY & ADDITIONAL TESTS:  Lab Results Reviewed   Imaging Reviewed  Electrocardiogram Reviewed   PROGRESS NOTE:   Authored by Prashant Suarez MD   Patient is a 82y old  Male who presents with a chief complaint of     SUBJECTIVE / OVERNIGHT EVENTS:  No acute events overnight.  Patient following commands.   Patient minimally conversive.     ADDITIONAL REVIEW OF SYSTEMS:    MEDICATIONS  (STANDING):  albuterol/ipratropium for Nebulization 3 milliLiter(s) Nebulizer once  aspirin  chewable 81 milliGRAM(s) Oral daily  atorvastatin 80 milliGRAM(s) Oral at bedtime  bisacodyl Suppository 10 milliGRAM(s) Rectal once  cefTRIAXone   IVPB      cefTRIAXone   IVPB 2000 milliGRAM(s) IV Intermittent every 24 hours  dextrose 5%. 1000 milliLiter(s) (100 mL/Hr) IV Continuous <Continuous>  dextrose 50% Injectable 12.5 milliLiter(s) IV Push once  dextrose 50% Injectable 25 Gram(s) IV Push once  dextrose 50% Injectable 12.5 Gram(s) IV Push once  dextrose 50% Injectable 25 Gram(s) IV Push once  dextrose Oral Gel 15 Gram(s) Oral once  enoxaparin Injectable 40 milliGRAM(s) SubCutaneous every 24 hours  glucagon  Injectable 1 milliGRAM(s) IntraMuscular once  tamsulosin 0.4 milliGRAM(s) Oral at bedtime    MEDICATIONS  (PRN):  acetaminophen     Tablet .. 650 milliGRAM(s) Oral every 6 hours PRN Temp greater or equal to 38C (100.4F), Mild Pain (1 - 3)  acetaminophen   IVPB .. 1000 milliGRAM(s) IV Intermittent every 8 hours PRN Mild Pain (1 - 3), Moderate Pain (4 - 6)  oxymetazoline 0.05% Nasal Spray 2 Spray(s) Both Nostrils every 12 hours PRN Nasal Congestion      CAPILLARY BLOOD GLUCOSE      POCT Blood Glucose.: 93 mg/dL (23 Jan 2023 00:26)  POCT Blood Glucose.: 72 mg/dL (22 Jan 2023 19:40)  POCT Blood Glucose.: 80 mg/dL (22 Jan 2023 17:56)  POCT Blood Glucose.: 116 mg/dL (22 Jan 2023 12:30)  POCT Blood Glucose.: 130 mg/dL (22 Jan 2023 11:17)  POCT Blood Glucose.: 93 mg/dL (22 Jan 2023 10:51)  POCT Blood Glucose.: 63 mg/dL (22 Jan 2023 10:23)  POCT Blood Glucose.: 59 mg/dL (22 Jan 2023 10:07)  POCT Blood Glucose.: 52 mg/dL (22 Jan 2023 10:02)    I&O's Summary    22 Jan 2023 07:01  -  23 Jan 2023 07:00  --------------------------------------------------------  IN: 0 mL / OUT: 1050 mL / NET: -1050 mL        PHYSICAL EXAM:  Vital Signs Last 24 Hrs  T(C): 36.8 (23 Jan 2023 04:43), Max: 36.8 (23 Jan 2023 04:43)  T(F): 98.3 (23 Jan 2023 04:43), Max: 98.3 (23 Jan 2023 04:43)  HR: 62 (23 Jan 2023 04:43) (62 - 65)  BP: 153/71 (23 Jan 2023 04:43) (141/71 - 153/71)  BP(mean): --  RR: 17 (23 Jan 2023 04:43) (16 - 17)  SpO2: 99% (23 Jan 2023 04:43) (95% - 99%)    Parameters below as of 23 Jan 2023 04:43  Patient On (Oxygen Delivery Method): room air          GENERAL: Nonverbal currently, intermittent mumbling. Appears comfortable.  HEAD:  Atraumatic, Normocephalic  EYES: EOMI, PERRLA, conjunctiva and sclera clear  ENT: Dry mucous membranes  NECK: Supple, No elevated JVP.  CHEST/LUNG: Expiratory wheeze right lung fields.   HEART: AFIB; No murmurs  Peripheral edema: None  Subclavian skin tenting: Slow response  ABDOMEN: Non-tender non-distended.   EXTREMITIES:  2+ Peripheral Pulses, brisk capillary refill. No clubbing, cyanosis, or edema  NERVOUS SYSTEM:  A&Ox0 ,right arm contracted  SKIN: No rashes or lesion      LABS:                        11.0   5.87  )-----------( 103      ( 22 Jan 2023 07:55 )             34.0     01-22    149<H>  |  120<H>  |  20  ----------------------------<  88  3.5   |  21<L>  |  0.96    Ca    9.0      22 Jan 2023 05:58  Phos  3.4     01-22  Mg     1.90     01-22    TPro  5.3<L>  /  Alb  2.5<L>  /  TBili  0.6  /  DBili  x   /  AST  40  /  ALT  50<H>  /  AlkPhos  95  01-22                RADIOLOGY & ADDITIONAL TESTS:  Lab Results Reviewed   Imaging Reviewed  Electrocardiogram Reviewed

## 2023-01-24 LAB
ALBUMIN SERPL ELPH-MCNC: 2.8 G/DL — LOW (ref 3.3–5)
ALP SERPL-CCNC: 80 U/L — SIGNIFICANT CHANGE UP (ref 40–120)
ALT FLD-CCNC: 40 U/L — SIGNIFICANT CHANGE UP (ref 4–41)
ANION GAP SERPL CALC-SCNC: 13 MMOL/L — SIGNIFICANT CHANGE UP (ref 7–14)
AST SERPL-CCNC: 27 U/L — SIGNIFICANT CHANGE UP (ref 4–40)
BASOPHILS # BLD AUTO: 0.03 K/UL — SIGNIFICANT CHANGE UP (ref 0–0.2)
BASOPHILS NFR BLD AUTO: 0.5 % — SIGNIFICANT CHANGE UP (ref 0–2)
BILIRUB SERPL-MCNC: 1.1 MG/DL — SIGNIFICANT CHANGE UP (ref 0.2–1.2)
BUN SERPL-MCNC: 9 MG/DL — SIGNIFICANT CHANGE UP (ref 7–23)
CALCIUM SERPL-MCNC: 9 MG/DL — SIGNIFICANT CHANGE UP (ref 8.4–10.5)
CHLORIDE SERPL-SCNC: 110 MMOL/L — HIGH (ref 98–107)
CO2 SERPL-SCNC: 21 MMOL/L — LOW (ref 22–31)
CREAT SERPL-MCNC: 0.79 MG/DL — SIGNIFICANT CHANGE UP (ref 0.5–1.3)
EGFR: 88 ML/MIN/1.73M2 — SIGNIFICANT CHANGE UP
EOSINOPHIL # BLD AUTO: 0.43 K/UL — SIGNIFICANT CHANGE UP (ref 0–0.5)
EOSINOPHIL NFR BLD AUTO: 7.2 % — HIGH (ref 0–6)
GLUCOSE BLDC GLUCOMTR-MCNC: 108 MG/DL — HIGH (ref 70–99)
GLUCOSE BLDC GLUCOMTR-MCNC: 112 MG/DL — HIGH (ref 70–99)
GLUCOSE BLDC GLUCOMTR-MCNC: 50 MG/DL — CRITICAL LOW (ref 70–99)
GLUCOSE BLDC GLUCOMTR-MCNC: 53 MG/DL — CRITICAL LOW (ref 70–99)
GLUCOSE BLDC GLUCOMTR-MCNC: 58 MG/DL — LOW (ref 70–99)
GLUCOSE BLDC GLUCOMTR-MCNC: 69 MG/DL — LOW (ref 70–99)
GLUCOSE BLDC GLUCOMTR-MCNC: 75 MG/DL — SIGNIFICANT CHANGE UP (ref 70–99)
GLUCOSE BLDC GLUCOMTR-MCNC: 76 MG/DL — SIGNIFICANT CHANGE UP (ref 70–99)
GLUCOSE BLDC GLUCOMTR-MCNC: 81 MG/DL — SIGNIFICANT CHANGE UP (ref 70–99)
GLUCOSE BLDC GLUCOMTR-MCNC: 82 MG/DL — SIGNIFICANT CHANGE UP (ref 70–99)
GLUCOSE BLDC GLUCOMTR-MCNC: 91 MG/DL — SIGNIFICANT CHANGE UP (ref 70–99)
GLUCOSE BLDC GLUCOMTR-MCNC: 98 MG/DL — SIGNIFICANT CHANGE UP (ref 70–99)
GLUCOSE SERPL-MCNC: 93 MG/DL — SIGNIFICANT CHANGE UP (ref 70–99)
HCT VFR BLD CALC: 37.8 % — LOW (ref 39–50)
HGB BLD-MCNC: 12.3 G/DL — LOW (ref 13–17)
IANC: 3.07 K/UL — SIGNIFICANT CHANGE UP (ref 1.8–7.4)
IMM GRANULOCYTES NFR BLD AUTO: 3.9 % — HIGH (ref 0–0.9)
LYMPHOCYTES # BLD AUTO: 1.51 K/UL — SIGNIFICANT CHANGE UP (ref 1–3.3)
LYMPHOCYTES # BLD AUTO: 25.3 % — SIGNIFICANT CHANGE UP (ref 13–44)
MAGNESIUM SERPL-MCNC: 1.7 MG/DL — SIGNIFICANT CHANGE UP (ref 1.6–2.6)
MCHC RBC-ENTMCNC: 29.8 PG — SIGNIFICANT CHANGE UP (ref 27–34)
MCHC RBC-ENTMCNC: 32.5 GM/DL — SIGNIFICANT CHANGE UP (ref 32–36)
MCV RBC AUTO: 91.5 FL — SIGNIFICANT CHANGE UP (ref 80–100)
MONOCYTES # BLD AUTO: 0.7 K/UL — SIGNIFICANT CHANGE UP (ref 0–0.9)
MONOCYTES NFR BLD AUTO: 11.7 % — SIGNIFICANT CHANGE UP (ref 2–14)
NEUTROPHILS # BLD AUTO: 3.07 K/UL — SIGNIFICANT CHANGE UP (ref 1.8–7.4)
NEUTROPHILS NFR BLD AUTO: 51.4 % — SIGNIFICANT CHANGE UP (ref 43–77)
NRBC # BLD: 0 /100 WBCS — SIGNIFICANT CHANGE UP (ref 0–0)
NRBC # FLD: 0 K/UL — SIGNIFICANT CHANGE UP (ref 0–0)
PHOSPHATE SERPL-MCNC: 2.9 MG/DL — SIGNIFICANT CHANGE UP (ref 2.5–4.5)
PLATELET # BLD AUTO: 154 K/UL — SIGNIFICANT CHANGE UP (ref 150–400)
POTASSIUM SERPL-MCNC: 3.8 MMOL/L — SIGNIFICANT CHANGE UP (ref 3.5–5.3)
POTASSIUM SERPL-SCNC: 3.8 MMOL/L — SIGNIFICANT CHANGE UP (ref 3.5–5.3)
PROT SERPL-MCNC: 6.2 G/DL — SIGNIFICANT CHANGE UP (ref 6–8.3)
RBC # BLD: 4.13 M/UL — LOW (ref 4.2–5.8)
RBC # FLD: 13.1 % — SIGNIFICANT CHANGE UP (ref 10.3–14.5)
SODIUM SERPL-SCNC: 144 MMOL/L — SIGNIFICANT CHANGE UP (ref 135–145)
WBC # BLD: 5.97 K/UL — SIGNIFICANT CHANGE UP (ref 3.8–10.5)
WBC # FLD AUTO: 5.97 K/UL — SIGNIFICANT CHANGE UP (ref 3.8–10.5)

## 2023-01-24 PROCEDURE — 99232 SBSQ HOSP IP/OBS MODERATE 35: CPT | Mod: GC

## 2023-01-24 RX ORDER — IPRATROPIUM/ALBUTEROL SULFATE 18-103MCG
3 AEROSOL WITH ADAPTER (GRAM) INHALATION ONCE
Refills: 0 | Status: DISCONTINUED | OUTPATIENT
Start: 2023-01-24 | End: 2023-02-02

## 2023-01-24 RX ADMIN — Medication 1 APPLICATION(S): at 18:03

## 2023-01-24 RX ADMIN — ENOXAPARIN SODIUM 40 MILLIGRAM(S): 100 INJECTION SUBCUTANEOUS at 05:09

## 2023-01-24 RX ADMIN — ATORVASTATIN CALCIUM 80 MILLIGRAM(S): 80 TABLET, FILM COATED ORAL at 22:07

## 2023-01-24 RX ADMIN — TAMSULOSIN HYDROCHLORIDE 0.4 MILLIGRAM(S): 0.4 CAPSULE ORAL at 22:07

## 2023-01-24 RX ADMIN — CEFTRIAXONE 100 MILLIGRAM(S): 500 INJECTION, POWDER, FOR SOLUTION INTRAMUSCULAR; INTRAVENOUS at 13:46

## 2023-01-24 RX ADMIN — Medication 1 APPLICATION(S): at 05:08

## 2023-01-24 RX ADMIN — LIDOCAINE 1 APPLICATION(S): 4 CREAM TOPICAL at 18:06

## 2023-01-24 RX ADMIN — LIDOCAINE 1 APPLICATION(S): 4 CREAM TOPICAL at 05:06

## 2023-01-24 RX ADMIN — Medication 81 MILLIGRAM(S): at 13:45

## 2023-01-24 NOTE — PROGRESS NOTE ADULT - ASSESSMENT
80yo largely bedbound M with hx of HTN, dementia, prior CVA, CAD presenting to hospital with chest pain, here for pansensitive e coli bacteremia secondary to UTI.

## 2023-01-24 NOTE — DIETITIAN INITIAL EVALUATION ADULT - OTHER INFO
79 year old male with a PMH of HTN, dementia, prior CVA, CAD presenting to hospital with chest pain, here for pansensitive e coli bacteremia secondary to UTI, s/p swallow evaluation (1/23) w/ rec for pureed/mildly thick liquids per chart.    Patient seen at bed side during lunch. Consumed >75% of the meal and did well w/ fluids. Noted w/ 1 intake 0-25% per RN flow sheet. Noted w/ instance of hypoglycemia 2/2 poor PO intake per chart. Requires total assistance w/ meals to ensure PO intake. No GI distress noted. Last bowel movement (1/22) per RN flow sheet. On bowel regimen per chart. Has no food allergies. Unable to obtain UBW at this time. Per previous RD note (4/18/22) noted w/ weight 60.2 kg. ABW is 69 kg (1/17) per chart indicating a +14.6% weight gain x 9 months, will monitor. Noted w/ +1 L wrist/and/arm edema and no pressure injuries per RN flow sheet.

## 2023-01-24 NOTE — PROGRESS NOTE ADULT - PROBLEM SELECTOR PLAN 3
NPO currently due to concern for aspiration  - D5W @ 100cc/hr  - Will have GOC discussion regarding feeding tubes. Pureed and mildly thick liquids  - D/C fluids  - nutrition consult  - monitor

## 2023-01-24 NOTE — DIETITIAN INITIAL EVALUATION ADULT - WEIGHT (KG)
Pt stated to primary RN, [place], that he squeezed fluid that was oozing from left cheek, and that it had become more red. 68.9

## 2023-01-24 NOTE — DIETITIAN INITIAL EVALUATION ADULT - PERTINENT MEDS FT
MEDICATIONS  (STANDING):  albuterol/ipratropium for Nebulization 3 milliLiter(s) Nebulizer once  albuterol/ipratropium for Nebulization. 3 milliLiter(s) Nebulizer once  aspirin  chewable 81 milliGRAM(s) Oral daily  atorvastatin 80 milliGRAM(s) Oral at bedtime  bisacodyl Suppository 10 milliGRAM(s) Rectal once  cefTRIAXone   IVPB      cefTRIAXone   IVPB 2000 milliGRAM(s) IV Intermittent every 24 hours  dextrose 50% Injectable 12.5 milliLiter(s) IV Push once  dextrose 50% Injectable 25 Gram(s) IV Push once  dextrose 50% Injectable 12.5 Gram(s) IV Push once  dextrose 50% Injectable 25 Gram(s) IV Push once  dextrose Oral Gel 15 Gram(s) Oral once  enoxaparin Injectable 40 milliGRAM(s) SubCutaneous every 24 hours  glucagon  Injectable 1 milliGRAM(s) IntraMuscular once  lidocaine 2% Gel 1 Application(s) Topical two times a day  petrolatum white Ointment 1 Application(s) Topical two times a day  tamsulosin 0.4 milliGRAM(s) Oral at bedtime    MEDICATIONS  (PRN):  acetaminophen     Tablet .. 650 milliGRAM(s) Oral every 6 hours PRN Temp greater or equal to 38C (100.4F), Mild Pain (1 - 3)  acetaminophen   IVPB .. 1000 milliGRAM(s) IV Intermittent every 8 hours PRN Mild Pain (1 - 3), Moderate Pain (4 - 6)  oxymetazoline 0.05% Nasal Spray 2 Spray(s) Both Nostrils every 12 hours PRN Nasal Congestion

## 2023-01-24 NOTE — DIETITIAN INITIAL EVALUATION ADULT - ORAL INTAKE PTA/DIET HISTORY
Patient seen for assessment. Unable to obtain information from patient 2/2 cognitive status. Information obtained from chart review.

## 2023-01-24 NOTE — DIETITIAN INITIAL EVALUATION ADULT - NSICDXPASTSURGICALHX_GEN_ALL_CORE_FT
PAST SURGICAL HISTORY:  Cardiac pacemaker [employer] (SN: [id number]; Model: [id number])    S/P coronary artery stent placement

## 2023-01-24 NOTE — DIETITIAN INITIAL EVALUATION ADULT - ADD RECOMMEND
Continue diet as ordered. Will provide Hormel Vital Shake 1x daily (520 kcal, 22 g pro). Document PO intake to monitor trend.

## 2023-01-24 NOTE — PROGRESS NOTE ADULT - SUBJECTIVE AND OBJECTIVE BOX
PROGRESS NOTE:   Authored by Prashant Suarez MD   Patient is a 82y old  Male who presents with a chief complaint of     SUBJECTIVE / OVERNIGHT EVENTS:  Overnight patient wheezing, received duonebs x 2.     ADDITIONAL REVIEW OF SYSTEMS:    MEDICATIONS  (STANDING):  albuterol/ipratropium for Nebulization 3 milliLiter(s) Nebulizer once  albuterol/ipratropium for Nebulization. 3 milliLiter(s) Nebulizer once  aspirin  chewable 81 milliGRAM(s) Oral daily  atorvastatin 80 milliGRAM(s) Oral at bedtime  bisacodyl Suppository 10 milliGRAM(s) Rectal once  cefTRIAXone   IVPB      cefTRIAXone   IVPB 2000 milliGRAM(s) IV Intermittent every 24 hours  dextrose 5% + sodium chloride 0.45%. 1000 milliLiter(s) (75 mL/Hr) IV Continuous <Continuous>  dextrose 50% Injectable 12.5 milliLiter(s) IV Push once  dextrose 50% Injectable 25 Gram(s) IV Push once  dextrose 50% Injectable 12.5 Gram(s) IV Push once  dextrose 50% Injectable 25 Gram(s) IV Push once  dextrose Oral Gel 15 Gram(s) Oral once  enoxaparin Injectable 40 milliGRAM(s) SubCutaneous every 24 hours  glucagon  Injectable 1 milliGRAM(s) IntraMuscular once  lidocaine 2% Gel 1 Application(s) Topical two times a day  petrolatum white Ointment 1 Application(s) Topical two times a day  tamsulosin 0.4 milliGRAM(s) Oral at bedtime    MEDICATIONS  (PRN):  acetaminophen     Tablet .. 650 milliGRAM(s) Oral every 6 hours PRN Temp greater or equal to 38C (100.4F), Mild Pain (1 - 3)  acetaminophen   IVPB .. 1000 milliGRAM(s) IV Intermittent every 8 hours PRN Mild Pain (1 - 3), Moderate Pain (4 - 6)  oxymetazoline 0.05% Nasal Spray 2 Spray(s) Both Nostrils every 12 hours PRN Nasal Congestion      CAPILLARY BLOOD GLUCOSE      POCT Blood Glucose.: 73 mg/dL (23 Jan 2023 21:21)  POCT Blood Glucose.: 100 mg/dL (23 Jan 2023 17:41)  POCT Blood Glucose.: 80 mg/dL (23 Jan 2023 12:27)    I&O's Summary      PHYSICAL EXAM:  Vital Signs Last 24 Hrs  T(C): 36.8 (24 Jan 2023 05:10), Max: 37 (23 Jan 2023 12:15)  T(F): 98.2 (24 Jan 2023 05:10), Max: 98.6 (23 Jan 2023 12:15)  HR: 58 (24 Jan 2023 05:10) (46 - 59)  BP: 169/95 (24 Jan 2023 05:10) (159/87 - 176/85)  BP(mean): --  RR: 17 (24 Jan 2023 05:10) (17 - 17)  SpO2: 100% (24 Jan 2023 05:10) (99% - 100%)    Parameters below as of 24 Jan 2023 05:10  Patient On (Oxygen Delivery Method): room air        GENERAL: Nonverbal currently, intermittent mumbling. Appears comfortable.  HEAD:  Atraumatic, Normocephalic  EYES: EOMI, PERRLA, conjunctiva and sclera clear  ENT: Dry mucous membranes  NECK: Supple, No elevated JVP.  CHEST/LUNG: Expiratory wheeze right lung fields.   HEART: AFIB; No murmurs  Peripheral edema: None  Subclavian skin tenting: Slow response  ABDOMEN: Non-tender non-distended.   EXTREMITIES:  2+ Peripheral Pulses, brisk capillary refill. No clubbing, cyanosis, or edema  NERVOUS SYSTEM:  A&Ox0 ,right arm contracted  SKIN: No rashes or lesion      LABS:                        10.2   6.02  )-----------( 111      ( 23 Jan 2023 06:00 )             31.5     01-23    141  |  113<H>  |  14  ----------------------------<  97  3.0<L>   |  21<L>  |  0.89    Ca    8.2<L>      23 Jan 2023 06:00  Phos  2.6     01-23  Mg     1.70     01-23    TPro  4.7<L>  /  Alb  2.2<L>  /  TBili  0.8  /  DBili  x   /  AST  29  /  ALT  40  /  AlkPhos  64  01-23                RADIOLOGY & ADDITIONAL TESTS:  Lab Results Reviewed   Imaging Reviewed  Electrocardiogram Reviewed   PROGRESS NOTE:   Authored by Prashant Suarez MD   Patient is a 82y old  Male who presents with a chief complaint of     SUBJECTIVE / OVERNIGHT EVENTS:  Overnight patient wheezing, received duonebs x 2.   Patient without complaints this AM. More awake compared to yesterday.     ADDITIONAL REVIEW OF SYSTEMS:    MEDICATIONS  (STANDING):  albuterol/ipratropium for Nebulization 3 milliLiter(s) Nebulizer once  albuterol/ipratropium for Nebulization. 3 milliLiter(s) Nebulizer once  aspirin  chewable 81 milliGRAM(s) Oral daily  atorvastatin 80 milliGRAM(s) Oral at bedtime  bisacodyl Suppository 10 milliGRAM(s) Rectal once  cefTRIAXone   IVPB      cefTRIAXone   IVPB 2000 milliGRAM(s) IV Intermittent every 24 hours  dextrose 5% + sodium chloride 0.45%. 1000 milliLiter(s) (75 mL/Hr) IV Continuous <Continuous>  dextrose 50% Injectable 12.5 milliLiter(s) IV Push once  dextrose 50% Injectable 25 Gram(s) IV Push once  dextrose 50% Injectable 12.5 Gram(s) IV Push once  dextrose 50% Injectable 25 Gram(s) IV Push once  dextrose Oral Gel 15 Gram(s) Oral once  enoxaparin Injectable 40 milliGRAM(s) SubCutaneous every 24 hours  glucagon  Injectable 1 milliGRAM(s) IntraMuscular once  lidocaine 2% Gel 1 Application(s) Topical two times a day  petrolatum white Ointment 1 Application(s) Topical two times a day  tamsulosin 0.4 milliGRAM(s) Oral at bedtime    MEDICATIONS  (PRN):  acetaminophen     Tablet .. 650 milliGRAM(s) Oral every 6 hours PRN Temp greater or equal to 38C (100.4F), Mild Pain (1 - 3)  acetaminophen   IVPB .. 1000 milliGRAM(s) IV Intermittent every 8 hours PRN Mild Pain (1 - 3), Moderate Pain (4 - 6)  oxymetazoline 0.05% Nasal Spray 2 Spray(s) Both Nostrils every 12 hours PRN Nasal Congestion      CAPILLARY BLOOD GLUCOSE      POCT Blood Glucose.: 73 mg/dL (23 Jan 2023 21:21)  POCT Blood Glucose.: 100 mg/dL (23 Jan 2023 17:41)  POCT Blood Glucose.: 80 mg/dL (23 Jan 2023 12:27)    I&O's Summary      PHYSICAL EXAM:  Vital Signs Last 24 Hrs  T(C): 36.8 (24 Jan 2023 05:10), Max: 37 (23 Jan 2023 12:15)  T(F): 98.2 (24 Jan 2023 05:10), Max: 98.6 (23 Jan 2023 12:15)  HR: 58 (24 Jan 2023 05:10) (46 - 59)  BP: 169/95 (24 Jan 2023 05:10) (159/87 - 176/85)  BP(mean): --  RR: 17 (24 Jan 2023 05:10) (17 - 17)  SpO2: 100% (24 Jan 2023 05:10) (99% - 100%)    Parameters below as of 24 Jan 2023 05:10  Patient On (Oxygen Delivery Method): room air        GENERAL: Answering questions intermittently appropriately. Appears comfortable.  HEAD:  Atraumatic, Normocephalic  EYES: EOMI, PERRLA, conjunctiva and sclera clear  ENT: Dry mucous membranes  NECK: Supple, No elevated JVP.  CHEST/LUNG: Expiratory wheeze right lung fields.   HEART: AFIB; No murmurs  Peripheral edema: None  Subclavian skin tenting: Slow response  ABDOMEN: Non-tender non-distended.   EXTREMITIES:  No clubbing, cyanosis, or edema  NERVOUS SYSTEM:  A&Ox1 ,right arm contracted  SKIN: No rashes or lesion      LABS:                        10.2   6.02  )-----------( 111      ( 23 Jan 2023 06:00 )             31.5     01-23    141  |  113<H>  |  14  ----------------------------<  97  3.0<L>   |  21<L>  |  0.89    Ca    8.2<L>      23 Jan 2023 06:00  Phos  2.6     01-23  Mg     1.70     01-23    TPro  4.7<L>  /  Alb  2.2<L>  /  TBili  0.8  /  DBili  x   /  AST  29  /  ALT  40  /  AlkPhos  64  01-23                RADIOLOGY & ADDITIONAL TESTS:  Lab Results Reviewed   Imaging Reviewed  Electrocardiogram Reviewed

## 2023-01-24 NOTE — DIETITIAN INITIAL EVALUATION ADULT - PERTINENT LABORATORY DATA
01-24    144  |  110<H>  |  9   ----------------------------<  93  3.8   |  21<L>  |  0.79    Ca    9.0      24 Jan 2023 07:25  Phos  2.9     01-24  Mg     1.70     01-24    TPro  6.2  /  Alb  2.8<L>  /  TBili  1.1  /  DBili  x   /  AST  27  /  ALT  40  /  AlkPhos  80  01-24  POCT Blood Glucose.: 82 mg/dL (01-24-23 @ 12:24)  A1C with Estimated Average Glucose Result: 5.2 % (04-20-22 @ 10:24)

## 2023-01-25 LAB
ALBUMIN SERPL ELPH-MCNC: 2.3 G/DL — LOW (ref 3.3–5)
ALP SERPL-CCNC: 65 U/L — SIGNIFICANT CHANGE UP (ref 40–120)
ALT FLD-CCNC: 34 U/L — SIGNIFICANT CHANGE UP (ref 4–41)
ANION GAP SERPL CALC-SCNC: 7 MMOL/L — SIGNIFICANT CHANGE UP (ref 7–14)
AST SERPL-CCNC: 23 U/L — SIGNIFICANT CHANGE UP (ref 4–40)
B-OH-BUTYR SERPL-SCNC: <0 MMOL/L — SIGNIFICANT CHANGE UP (ref 0–0.4)
BASOPHILS # BLD AUTO: 0.05 K/UL — SIGNIFICANT CHANGE UP (ref 0–0.2)
BASOPHILS NFR BLD AUTO: 0.9 % — SIGNIFICANT CHANGE UP (ref 0–2)
BILIRUB SERPL-MCNC: 0.7 MG/DL — SIGNIFICANT CHANGE UP (ref 0.2–1.2)
BUN SERPL-MCNC: 9 MG/DL — SIGNIFICANT CHANGE UP (ref 7–23)
BURR CELLS BLD QL SMEAR: PRESENT — SIGNIFICANT CHANGE UP
C PEPTIDE SERPL-MCNC: 2.1 NG/ML — SIGNIFICANT CHANGE UP (ref 1.1–4.4)
CALCIUM SERPL-MCNC: 8.5 MG/DL — SIGNIFICANT CHANGE UP (ref 8.4–10.5)
CHLORIDE SERPL-SCNC: 110 MMOL/L — HIGH (ref 98–107)
CO2 SERPL-SCNC: 23 MMOL/L — SIGNIFICANT CHANGE UP (ref 22–31)
CORTIS AM PEAK SERPL-MCNC: 7 UG/DL — SIGNIFICANT CHANGE UP (ref 6–18.4)
CREAT SERPL-MCNC: 0.8 MG/DL — SIGNIFICANT CHANGE UP (ref 0.5–1.3)
CULTURE RESULTS: SIGNIFICANT CHANGE UP
CULTURE RESULTS: SIGNIFICANT CHANGE UP
EGFR: 88 ML/MIN/1.73M2 — SIGNIFICANT CHANGE UP
EOSINOPHIL # BLD AUTO: 0.37 K/UL — SIGNIFICANT CHANGE UP (ref 0–0.5)
EOSINOPHIL NFR BLD AUTO: 7.1 % — HIGH (ref 0–6)
GLUCOSE BLDC GLUCOMTR-MCNC: 110 MG/DL — HIGH (ref 70–99)
GLUCOSE BLDC GLUCOMTR-MCNC: 144 MG/DL — HIGH (ref 70–99)
GLUCOSE BLDC GLUCOMTR-MCNC: 62 MG/DL — LOW (ref 70–99)
GLUCOSE BLDC GLUCOMTR-MCNC: 65 MG/DL — LOW (ref 70–99)
GLUCOSE BLDC GLUCOMTR-MCNC: 66 MG/DL — LOW (ref 70–99)
GLUCOSE BLDC GLUCOMTR-MCNC: 67 MG/DL — LOW (ref 70–99)
GLUCOSE BLDC GLUCOMTR-MCNC: 69 MG/DL — LOW (ref 70–99)
GLUCOSE BLDC GLUCOMTR-MCNC: 72 MG/DL — SIGNIFICANT CHANGE UP (ref 70–99)
GLUCOSE BLDC GLUCOMTR-MCNC: 77 MG/DL — SIGNIFICANT CHANGE UP (ref 70–99)
GLUCOSE BLDC GLUCOMTR-MCNC: 78 MG/DL — SIGNIFICANT CHANGE UP (ref 70–99)
GLUCOSE BLDC GLUCOMTR-MCNC: 81 MG/DL — SIGNIFICANT CHANGE UP (ref 70–99)
GLUCOSE BLDC GLUCOMTR-MCNC: 88 MG/DL — SIGNIFICANT CHANGE UP (ref 70–99)
GLUCOSE BLDC GLUCOMTR-MCNC: 94 MG/DL — SIGNIFICANT CHANGE UP (ref 70–99)
GLUCOSE BLDC GLUCOMTR-MCNC: 95 MG/DL — SIGNIFICANT CHANGE UP (ref 70–99)
GLUCOSE SERPL-MCNC: 98 MG/DL — SIGNIFICANT CHANGE UP (ref 70–99)
HCT VFR BLD CALC: 30.8 % — LOW (ref 39–50)
HGB BLD-MCNC: 10.1 G/DL — LOW (ref 13–17)
IANC: 2.57 K/UL — SIGNIFICANT CHANGE UP (ref 1.8–7.4)
INSULIN SERPL-MCNC: 4.8 UU/ML — SIGNIFICANT CHANGE UP (ref 2.6–24.9)
LYMPHOCYTES # BLD AUTO: 0.69 K/UL — LOW (ref 1–3.3)
LYMPHOCYTES # BLD AUTO: 13.3 % — SIGNIFICANT CHANGE UP (ref 13–44)
MACROCYTES BLD QL: SLIGHT — SIGNIFICANT CHANGE UP
MAGNESIUM SERPL-MCNC: 1.7 MG/DL — SIGNIFICANT CHANGE UP (ref 1.6–2.6)
MANUAL SMEAR VERIFICATION: SIGNIFICANT CHANGE UP
MCHC RBC-ENTMCNC: 30 PG — SIGNIFICANT CHANGE UP (ref 27–34)
MCHC RBC-ENTMCNC: 32.8 GM/DL — SIGNIFICANT CHANGE UP (ref 32–36)
MCV RBC AUTO: 91.4 FL — SIGNIFICANT CHANGE UP (ref 80–100)
METAMYELOCYTES # FLD: 0.9 % — SIGNIFICANT CHANGE UP (ref 0–1)
MICROCYTES BLD QL: SIGNIFICANT CHANGE UP
MONOCYTES # BLD AUTO: 0.28 K/UL — SIGNIFICANT CHANGE UP (ref 0–0.9)
MONOCYTES NFR BLD AUTO: 5.3 % — SIGNIFICANT CHANGE UP (ref 2–14)
MYELOCYTES NFR BLD: 0.9 % — HIGH (ref 0–0)
NEUTROPHILS # BLD AUTO: 3.32 K/UL — SIGNIFICANT CHANGE UP (ref 1.8–7.4)
NEUTROPHILS NFR BLD AUTO: 63.7 % — SIGNIFICANT CHANGE UP (ref 43–77)
PHOSPHATE SERPL-MCNC: 2.7 MG/DL — SIGNIFICANT CHANGE UP (ref 2.5–4.5)
PLAT MORPH BLD: NORMAL — SIGNIFICANT CHANGE UP
PLATELET # BLD AUTO: 153 K/UL — SIGNIFICANT CHANGE UP (ref 150–400)
PLATELET COUNT - ESTIMATE: NORMAL — SIGNIFICANT CHANGE UP
POTASSIUM SERPL-MCNC: 3.4 MMOL/L — LOW (ref 3.5–5.3)
POTASSIUM SERPL-SCNC: 3.4 MMOL/L — LOW (ref 3.5–5.3)
PROT SERPL-MCNC: 4.9 G/DL — LOW (ref 6–8.3)
RBC # BLD: 3.37 M/UL — LOW (ref 4.2–5.8)
RBC # FLD: 13.2 % — SIGNIFICANT CHANGE UP (ref 10.3–14.5)
RBC BLD AUTO: NORMAL — SIGNIFICANT CHANGE UP
SODIUM SERPL-SCNC: 140 MMOL/L — SIGNIFICANT CHANGE UP (ref 135–145)
SPECIMEN SOURCE: SIGNIFICANT CHANGE UP
SPECIMEN SOURCE: SIGNIFICANT CHANGE UP
TSH SERPL-MCNC: 3.4 UIU/ML — SIGNIFICANT CHANGE UP (ref 0.27–4.2)
VARIANT LYMPHS # BLD: 7.9 % — HIGH (ref 0–6)
WBC # BLD: 5.21 K/UL — SIGNIFICANT CHANGE UP (ref 3.8–10.5)
WBC # FLD AUTO: 5.21 K/UL — SIGNIFICANT CHANGE UP (ref 3.8–10.5)

## 2023-01-25 PROCEDURE — 99233 SBSQ HOSP IP/OBS HIGH 50: CPT | Mod: GC

## 2023-01-25 RX ORDER — IPRATROPIUM/ALBUTEROL SULFATE 18-103MCG
3 AEROSOL WITH ADAPTER (GRAM) INHALATION ONCE
Refills: 0 | Status: COMPLETED | OUTPATIENT
Start: 2023-01-25 | End: 2023-01-25

## 2023-01-25 RX ORDER — POTASSIUM CHLORIDE 20 MEQ
40 PACKET (EA) ORAL ONCE
Refills: 0 | Status: COMPLETED | OUTPATIENT
Start: 2023-01-25 | End: 2023-01-25

## 2023-01-25 RX ORDER — DEXTROSE 50 % IN WATER 50 %
25 SYRINGE (ML) INTRAVENOUS ONCE
Refills: 0 | Status: COMPLETED | OUTPATIENT
Start: 2023-01-25 | End: 2023-01-25

## 2023-01-25 RX ORDER — MAGNESIUM OXIDE 400 MG ORAL TABLET 241.3 MG
400 TABLET ORAL DAILY
Refills: 0 | Status: DISCONTINUED | OUTPATIENT
Start: 2023-01-25 | End: 2023-02-02

## 2023-01-25 RX ORDER — DEXTROSE 50 % IN WATER 50 %
15 SYRINGE (ML) INTRAVENOUS ONCE
Refills: 0 | Status: COMPLETED | OUTPATIENT
Start: 2023-01-25 | End: 2023-01-25

## 2023-01-25 RX ORDER — SODIUM CHLORIDE 9 MG/ML
1000 INJECTION, SOLUTION INTRAVENOUS
Refills: 0 | Status: DISCONTINUED | OUTPATIENT
Start: 2023-01-25 | End: 2023-01-25

## 2023-01-25 RX ADMIN — OXYMETAZOLINE HYDROCHLORIDE 2 SPRAY(S): 0.5 SPRAY NASAL at 18:50

## 2023-01-25 RX ADMIN — Medication 25 GRAM(S): at 10:51

## 2023-01-25 RX ADMIN — SODIUM CHLORIDE 75 MILLILITER(S): 9 INJECTION, SOLUTION INTRAVENOUS at 12:34

## 2023-01-25 RX ADMIN — Medication 12.5 MILLILITER(S): at 09:46

## 2023-01-25 RX ADMIN — CEFTRIAXONE 100 MILLIGRAM(S): 500 INJECTION, POWDER, FOR SOLUTION INTRAMUSCULAR; INTRAVENOUS at 13:30

## 2023-01-25 RX ADMIN — Medication 1000 MILLIGRAM(S): at 23:33

## 2023-01-25 RX ADMIN — Medication 15 GRAM(S): at 09:12

## 2023-01-25 RX ADMIN — Medication 1 APPLICATION(S): at 19:19

## 2023-01-25 RX ADMIN — Medication 40 MILLIEQUIVALENT(S): at 12:38

## 2023-01-25 RX ADMIN — LIDOCAINE 1 APPLICATION(S): 4 CREAM TOPICAL at 18:52

## 2023-01-25 RX ADMIN — ENOXAPARIN SODIUM 40 MILLIGRAM(S): 100 INJECTION SUBCUTANEOUS at 05:51

## 2023-01-25 RX ADMIN — LIDOCAINE 1 APPLICATION(S): 4 CREAM TOPICAL at 05:50

## 2023-01-25 RX ADMIN — Medication 81 MILLIGRAM(S): at 12:41

## 2023-01-25 RX ADMIN — ATORVASTATIN CALCIUM 80 MILLIGRAM(S): 80 TABLET, FILM COATED ORAL at 22:12

## 2023-01-25 RX ADMIN — Medication 400 MILLIGRAM(S): at 23:03

## 2023-01-25 RX ADMIN — MAGNESIUM OXIDE 400 MG ORAL TABLET 400 MILLIGRAM(S): 241.3 TABLET ORAL at 12:40

## 2023-01-25 RX ADMIN — TAMSULOSIN HYDROCHLORIDE 0.4 MILLIGRAM(S): 0.4 CAPSULE ORAL at 22:17

## 2023-01-25 RX ADMIN — Medication 1 APPLICATION(S): at 06:30

## 2023-01-25 RX ADMIN — Medication 3 MILLILITER(S): at 23:03

## 2023-01-25 NOTE — PROGRESS NOTE ADULT - ASSESSMENT
78yo largely bedbound M with hx of HTN, dementia, prior CVA, CAD presenting to hospital with chest pain, here for pansensitive e coli bacteremia secondary to UTI.

## 2023-01-25 NOTE — PROVIDER CONTACT NOTE (OTHER) - REASON
Patient HR 52. Patient is asymptomatic.
Patient wheezing on assessment, extend R 20g IV
Patient's DBP is less than 60 (52)
Patient's /85, HR 46
Pt. blood glucose 72 no s/s of hypoglycemia. Pt. is also wheezing on expiration
pt's DBP is less than 60 (52)
/96

## 2023-01-25 NOTE — PROGRESS NOTE ADULT - SUBJECTIVE AND OBJECTIVE BOX
PROGRESS NOTE:   Authored by Prashant Suarez MD   Patient is a 82y old  Male who presents with a chief complaint of Urinary tract infection     (24 Jan 2023 16:04)      SUBJECTIVE / OVERNIGHT EVENTS:  Overnight FSG 50s, was given apple juice with improvement.     ADDITIONAL REVIEW OF SYSTEMS:    MEDICATIONS  (STANDING):  albuterol/ipratropium for Nebulization 3 milliLiter(s) Nebulizer once  albuterol/ipratropium for Nebulization. 3 milliLiter(s) Nebulizer once  aspirin  chewable 81 milliGRAM(s) Oral daily  atorvastatin 80 milliGRAM(s) Oral at bedtime  bisacodyl Suppository 10 milliGRAM(s) Rectal once  cefTRIAXone   IVPB      cefTRIAXone   IVPB 2000 milliGRAM(s) IV Intermittent every 24 hours  dextrose 50% Injectable 12.5 milliLiter(s) IV Push once  dextrose 50% Injectable 25 Gram(s) IV Push once  dextrose 50% Injectable 12.5 Gram(s) IV Push once  dextrose 50% Injectable 25 Gram(s) IV Push once  dextrose Oral Gel 15 Gram(s) Oral once  enoxaparin Injectable 40 milliGRAM(s) SubCutaneous every 24 hours  glucagon  Injectable 1 milliGRAM(s) IntraMuscular once  lidocaine 2% Gel 1 Application(s) Topical two times a day  petrolatum white Ointment 1 Application(s) Topical two times a day  tamsulosin 0.4 milliGRAM(s) Oral at bedtime    MEDICATIONS  (PRN):  acetaminophen     Tablet .. 650 milliGRAM(s) Oral every 6 hours PRN Temp greater or equal to 38C (100.4F), Mild Pain (1 - 3)  acetaminophen   IVPB .. 1000 milliGRAM(s) IV Intermittent every 8 hours PRN Mild Pain (1 - 3), Moderate Pain (4 - 6)  oxymetazoline 0.05% Nasal Spray 2 Spray(s) Both Nostrils every 12 hours PRN Nasal Congestion      CAPILLARY BLOOD GLUCOSE      POCT Blood Glucose.: 108 mg/dL (24 Jan 2023 23:51)  POCT Blood Glucose.: 112 mg/dL (24 Jan 2023 23:40)  POCT Blood Glucose.: 98 mg/dL (24 Jan 2023 23:14)  POCT Blood Glucose.: 91 mg/dL (24 Jan 2023 22:59)  POCT Blood Glucose.: 81 mg/dL (24 Jan 2023 22:38)  POCT Blood Glucose.: 50 mg/dL (24 Jan 2023 22:36)  POCT Blood Glucose.: 58 mg/dL (24 Jan 2023 22:20)  POCT Blood Glucose.: 53 mg/dL (24 Jan 2023 22:17)  POCT Blood Glucose.: 76 mg/dL (24 Jan 2023 17:46)  POCT Blood Glucose.: 69 mg/dL (24 Jan 2023 17:44)  POCT Blood Glucose.: 82 mg/dL (24 Jan 2023 12:24)  POCT Blood Glucose.: 75 mg/dL (24 Jan 2023 08:43)    I&O's Summary      PHYSICAL EXAM:  Vital Signs Last 24 Hrs  T(C): 36.3 (25 Jan 2023 05:07), Max: 36.4 (24 Jan 2023 12:53)  T(F): 97.3 (25 Jan 2023 05:07), Max: 97.5 (24 Jan 2023 12:53)  HR: 98 (25 Jan 2023 05:07) (60 - 98)  BP: 159/71 (25 Jan 2023 05:07) (148/89 - 162/96)  BP(mean): --  RR: 17 (25 Jan 2023 05:07) (16 - 17)  SpO2: 98% (25 Jan 2023 05:07) (95% - 98%)    Parameters below as of 25 Jan 2023 05:07  Patient On (Oxygen Delivery Method): room air        GENERAL: Answering questions intermittently appropriately. Appears comfortable.  HEAD:  Atraumatic, Normocephalic  EYES: EOMI, PERRLA, conjunctiva and sclera clear  ENT: Dry mucous membranes  NECK: Supple, No elevated JVP.  CHEST/LUNG: Expiratory wheeze right lung fields.   HEART: AFIB; No murmurs  Peripheral edema: None  Subclavian skin tenting: Slow response  ABDOMEN: Non-tender non-distended.   EXTREMITIES:  No clubbing, cyanosis, or edema  NERVOUS SYSTEM:  A&Ox1 ,right arm contracted  SKIN: No rashes or lesion      LABS:                        12.3   5.97  )-----------( 154      ( 24 Jan 2023 07:25 )             37.8     01-24    144  |  110<H>  |  9   ----------------------------<  93  3.8   |  21<L>  |  0.79    Ca    9.0      24 Jan 2023 07:25  Phos  2.9     01-24  Mg     1.70     01-24    TPro  6.2  /  Alb  2.8<L>  /  TBili  1.1  /  DBili  x   /  AST  27  /  ALT  40  /  AlkPhos  80  01-24                RADIOLOGY & ADDITIONAL TESTS:  Lab Results Reviewed   Imaging Reviewed  Electrocardiogram Reviewed   PROGRESS NOTE:   Authored by Prashant Suarez MD   Patient is a 82y old  Male who presents with a chief complaint of Urinary tract infection     (24 Jan 2023 16:04)      SUBJECTIVE / OVERNIGHT EVENTS:  Overnight FSG 50s, was given apple juice with improvement.     Given dextrose gel, amps of dextrose IV push.    Started on D5 LR @ 75 cc.    Wife updated over phone to discuss plan for calorie count and inadequate nutrition. Alternatives for nutrition were discussed.    ADDITIONAL REVIEW OF SYSTEMS:    MEDICATIONS  (STANDING):  albuterol/ipratropium for Nebulization 3 milliLiter(s) Nebulizer once  albuterol/ipratropium for Nebulization. 3 milliLiter(s) Nebulizer once  aspirin  chewable 81 milliGRAM(s) Oral daily  atorvastatin 80 milliGRAM(s) Oral at bedtime  bisacodyl Suppository 10 milliGRAM(s) Rectal once  cefTRIAXone   IVPB      cefTRIAXone   IVPB 2000 milliGRAM(s) IV Intermittent every 24 hours  dextrose 50% Injectable 12.5 milliLiter(s) IV Push once  dextrose 50% Injectable 25 Gram(s) IV Push once  dextrose 50% Injectable 12.5 Gram(s) IV Push once  dextrose 50% Injectable 25 Gram(s) IV Push once  dextrose Oral Gel 15 Gram(s) Oral once  enoxaparin Injectable 40 milliGRAM(s) SubCutaneous every 24 hours  glucagon  Injectable 1 milliGRAM(s) IntraMuscular once  lidocaine 2% Gel 1 Application(s) Topical two times a day  petrolatum white Ointment 1 Application(s) Topical two times a day  tamsulosin 0.4 milliGRAM(s) Oral at bedtime    MEDICATIONS  (PRN):  acetaminophen     Tablet .. 650 milliGRAM(s) Oral every 6 hours PRN Temp greater or equal to 38C (100.4F), Mild Pain (1 - 3)  acetaminophen   IVPB .. 1000 milliGRAM(s) IV Intermittent every 8 hours PRN Mild Pain (1 - 3), Moderate Pain (4 - 6)  oxymetazoline 0.05% Nasal Spray 2 Spray(s) Both Nostrils every 12 hours PRN Nasal Congestion      CAPILLARY BLOOD GLUCOSE      POCT Blood Glucose.: 108 mg/dL (24 Jan 2023 23:51)  POCT Blood Glucose.: 112 mg/dL (24 Jan 2023 23:40)  POCT Blood Glucose.: 98 mg/dL (24 Jan 2023 23:14)  POCT Blood Glucose.: 91 mg/dL (24 Jan 2023 22:59)  POCT Blood Glucose.: 81 mg/dL (24 Jan 2023 22:38)  POCT Blood Glucose.: 50 mg/dL (24 Jan 2023 22:36)  POCT Blood Glucose.: 58 mg/dL (24 Jan 2023 22:20)  POCT Blood Glucose.: 53 mg/dL (24 Jan 2023 22:17)  POCT Blood Glucose.: 76 mg/dL (24 Jan 2023 17:46)  POCT Blood Glucose.: 69 mg/dL (24 Jan 2023 17:44)  POCT Blood Glucose.: 82 mg/dL (24 Jan 2023 12:24)  POCT Blood Glucose.: 75 mg/dL (24 Jan 2023 08:43)    I&O's Summary      PHYSICAL EXAM:  Vital Signs Last 24 Hrs  T(C): 36.3 (25 Jan 2023 05:07), Max: 36.4 (24 Jan 2023 12:53)  T(F): 97.3 (25 Jan 2023 05:07), Max: 97.5 (24 Jan 2023 12:53)  HR: 98 (25 Jan 2023 05:07) (60 - 98)  BP: 159/71 (25 Jan 2023 05:07) (148/89 - 162/96)  BP(mean): --  RR: 17 (25 Jan 2023 05:07) (16 - 17)  SpO2: 98% (25 Jan 2023 05:07) (95% - 98%)    Parameters below as of 25 Jan 2023 05:07  Patient On (Oxygen Delivery Method): room air        GENERAL: Answering questions intermittently appropriately. Appears comfortable.  HEAD:  Atraumatic, Normocephalic  EYES: EOMI, PERRLA, conjunctiva and sclera clear  ENT: Dry mucous membranes  NECK: Supple, No elevated JVP.  CHEST/LUNG: Expiratory wheeze right lung fields.   HEART: AFIB; No murmurs  Peripheral edema: None  Subclavian skin tenting: Slow response  ABDOMEN: Non-tender non-distended.   EXTREMITIES:  No clubbing, cyanosis, or edema  NERVOUS SYSTEM:  A&Ox1 ,right arm contracted  SKIN: No rashes or lesion      LABS:                        12.3   5.97  )-----------( 154      ( 24 Jan 2023 07:25 )             37.8     01-24    144  |  110<H>  |  9   ----------------------------<  93  3.8   |  21<L>  |  0.79    Ca    9.0      24 Jan 2023 07:25  Phos  2.9     01-24  Mg     1.70     01-24    TPro  6.2  /  Alb  2.8<L>  /  TBili  1.1  /  DBili  x   /  AST  27  /  ALT  40  /  AlkPhos  80  01-24                RADIOLOGY & ADDITIONAL TESTS:  Lab Results Reviewed   Imaging Reviewed  Electrocardiogram Reviewed

## 2023-01-25 NOTE — PROGRESS NOTE ADULT - PROBLEM SELECTOR PLAN 3
Pureed and mildly thick liquids  - D/C fluids  - nutrition consult  - monitor Pureed and mildly thick liquids  - D/C fluids  - nutrition consult  - monitor  - f/u BHB, insulin, c-peptide, proinsulin. Pureed and mildly thick liquids  - D/C fluids  - nutrition consult  - monitor  - f/u BHB, insulin, c-peptide, proinsulin.  - Started on D5W LR.

## 2023-01-26 DIAGNOSIS — E11.649 TYPE 2 DIABETES MELLITUS WITH HYPOGLYCEMIA WITHOUT COMA: ICD-10-CM

## 2023-01-26 LAB
ALBUMIN SERPL ELPH-MCNC: 2.5 G/DL — LOW (ref 3.3–5)
ALP SERPL-CCNC: 68 U/L — SIGNIFICANT CHANGE UP (ref 40–120)
ALT FLD-CCNC: 34 U/L — SIGNIFICANT CHANGE UP (ref 4–41)
ANION GAP SERPL CALC-SCNC: 6 MMOL/L — LOW (ref 7–14)
AST SERPL-CCNC: 26 U/L — SIGNIFICANT CHANGE UP (ref 4–40)
BASOPHILS # BLD AUTO: 0.02 K/UL — SIGNIFICANT CHANGE UP (ref 0–0.2)
BASOPHILS NFR BLD AUTO: 0.4 % — SIGNIFICANT CHANGE UP (ref 0–2)
BILIRUB SERPL-MCNC: 0.8 MG/DL — SIGNIFICANT CHANGE UP (ref 0.2–1.2)
BUN SERPL-MCNC: 8 MG/DL — SIGNIFICANT CHANGE UP (ref 7–23)
CALCIUM SERPL-MCNC: 8.7 MG/DL — SIGNIFICANT CHANGE UP (ref 8.4–10.5)
CHLORIDE SERPL-SCNC: 108 MMOL/L — HIGH (ref 98–107)
CO2 SERPL-SCNC: 24 MMOL/L — SIGNIFICANT CHANGE UP (ref 22–31)
CREAT SERPL-MCNC: 0.75 MG/DL — SIGNIFICANT CHANGE UP (ref 0.5–1.3)
EGFR: 90 ML/MIN/1.73M2 — SIGNIFICANT CHANGE UP
EOSINOPHIL # BLD AUTO: 0.23 K/UL — SIGNIFICANT CHANGE UP (ref 0–0.5)
EOSINOPHIL NFR BLD AUTO: 4.9 % — SIGNIFICANT CHANGE UP (ref 0–6)
GLUCOSE BLDC GLUCOMTR-MCNC: 104 MG/DL — HIGH (ref 70–99)
GLUCOSE BLDC GLUCOMTR-MCNC: 116 MG/DL — HIGH (ref 70–99)
GLUCOSE BLDC GLUCOMTR-MCNC: 149 MG/DL — HIGH (ref 70–99)
GLUCOSE BLDC GLUCOMTR-MCNC: 55 MG/DL — LOW (ref 70–99)
GLUCOSE BLDC GLUCOMTR-MCNC: 59 MG/DL — LOW (ref 70–99)
GLUCOSE BLDC GLUCOMTR-MCNC: 61 MG/DL — LOW (ref 70–99)
GLUCOSE BLDC GLUCOMTR-MCNC: 62 MG/DL — LOW (ref 70–99)
GLUCOSE BLDC GLUCOMTR-MCNC: 65 MG/DL — LOW (ref 70–99)
GLUCOSE BLDC GLUCOMTR-MCNC: 66 MG/DL — LOW (ref 70–99)
GLUCOSE BLDC GLUCOMTR-MCNC: 79 MG/DL — SIGNIFICANT CHANGE UP (ref 70–99)
GLUCOSE BLDC GLUCOMTR-MCNC: 91 MG/DL — SIGNIFICANT CHANGE UP (ref 70–99)
GLUCOSE SERPL-MCNC: 69 MG/DL — LOW (ref 70–99)
HCT VFR BLD CALC: 32.2 % — LOW (ref 39–50)
HGB BLD-MCNC: 10.4 G/DL — LOW (ref 13–17)
IANC: 2.25 K/UL — SIGNIFICANT CHANGE UP (ref 1.8–7.4)
IMM GRANULOCYTES NFR BLD AUTO: 1.9 % — HIGH (ref 0–0.9)
LYMPHOCYTES # BLD AUTO: 1.67 K/UL — SIGNIFICANT CHANGE UP (ref 1–3.3)
LYMPHOCYTES # BLD AUTO: 35.5 % — SIGNIFICANT CHANGE UP (ref 13–44)
MAGNESIUM SERPL-MCNC: 1.6 MG/DL — SIGNIFICANT CHANGE UP (ref 1.6–2.6)
MCHC RBC-ENTMCNC: 30 PG — SIGNIFICANT CHANGE UP (ref 27–34)
MCHC RBC-ENTMCNC: 32.3 GM/DL — SIGNIFICANT CHANGE UP (ref 32–36)
MCV RBC AUTO: 92.8 FL — SIGNIFICANT CHANGE UP (ref 80–100)
MONOCYTES # BLD AUTO: 0.45 K/UL — SIGNIFICANT CHANGE UP (ref 0–0.9)
MONOCYTES NFR BLD AUTO: 9.6 % — SIGNIFICANT CHANGE UP (ref 2–14)
NEUTROPHILS # BLD AUTO: 2.25 K/UL — SIGNIFICANT CHANGE UP (ref 1.8–7.4)
NEUTROPHILS NFR BLD AUTO: 47.7 % — SIGNIFICANT CHANGE UP (ref 43–77)
NRBC # BLD: 0 /100 WBCS — SIGNIFICANT CHANGE UP (ref 0–0)
NRBC # FLD: 0 K/UL — SIGNIFICANT CHANGE UP (ref 0–0)
PHOSPHATE SERPL-MCNC: 2.1 MG/DL — LOW (ref 2.5–4.5)
PLATELET # BLD AUTO: 158 K/UL — SIGNIFICANT CHANGE UP (ref 150–400)
POTASSIUM SERPL-MCNC: 3.7 MMOL/L — SIGNIFICANT CHANGE UP (ref 3.5–5.3)
POTASSIUM SERPL-SCNC: 3.7 MMOL/L — SIGNIFICANT CHANGE UP (ref 3.5–5.3)
PROT SERPL-MCNC: 5.2 G/DL — LOW (ref 6–8.3)
RBC # BLD: 3.47 M/UL — LOW (ref 4.2–5.8)
RBC # FLD: 13.4 % — SIGNIFICANT CHANGE UP (ref 10.3–14.5)
SODIUM SERPL-SCNC: 138 MMOL/L — SIGNIFICANT CHANGE UP (ref 135–145)
WBC # BLD: 4.71 K/UL — SIGNIFICANT CHANGE UP (ref 3.8–10.5)
WBC # FLD AUTO: 4.71 K/UL — SIGNIFICANT CHANGE UP (ref 3.8–10.5)

## 2023-01-26 PROCEDURE — 99222 1ST HOSP IP/OBS MODERATE 55: CPT | Mod: GC

## 2023-01-26 PROCEDURE — 99233 SBSQ HOSP IP/OBS HIGH 50: CPT | Mod: GC

## 2023-01-26 RX ORDER — POTASSIUM PHOSPHATE, MONOBASIC POTASSIUM PHOSPHATE, DIBASIC 236; 224 MG/ML; MG/ML
30 INJECTION, SOLUTION INTRAVENOUS ONCE
Refills: 0 | Status: COMPLETED | OUTPATIENT
Start: 2023-01-26 | End: 2023-01-26

## 2023-01-26 RX ORDER — DEXTROSE 50 % IN WATER 50 %
15 SYRINGE (ML) INTRAVENOUS ONCE
Refills: 0 | Status: COMPLETED | OUTPATIENT
Start: 2023-01-26 | End: 2023-01-26

## 2023-01-26 RX ORDER — DEXTROSE 50 % IN WATER 50 %
50 SYRINGE (ML) INTRAVENOUS ONCE
Refills: 0 | Status: COMPLETED | OUTPATIENT
Start: 2023-01-26 | End: 2023-01-26

## 2023-01-26 RX ORDER — SODIUM CHLORIDE 9 MG/ML
1000 INJECTION, SOLUTION INTRAVENOUS
Refills: 0 | Status: DISCONTINUED | OUTPATIENT
Start: 2023-01-26 | End: 2023-01-27

## 2023-01-26 RX ORDER — POLYETHYLENE GLYCOL 3350 17 G/17G
17 POWDER, FOR SOLUTION ORAL DAILY
Refills: 0 | Status: DISCONTINUED | OUTPATIENT
Start: 2023-01-26 | End: 2023-02-02

## 2023-01-26 RX ORDER — SENNA PLUS 8.6 MG/1
1 TABLET ORAL DAILY
Refills: 0 | Status: DISCONTINUED | OUTPATIENT
Start: 2023-01-26 | End: 2023-02-02

## 2023-01-26 RX ADMIN — MAGNESIUM OXIDE 400 MG ORAL TABLET 400 MILLIGRAM(S): 241.3 TABLET ORAL at 11:38

## 2023-01-26 RX ADMIN — POLYETHYLENE GLYCOL 3350 17 GRAM(S): 17 POWDER, FOR SOLUTION ORAL at 09:27

## 2023-01-26 RX ADMIN — POTASSIUM PHOSPHATE, MONOBASIC POTASSIUM PHOSPHATE, DIBASIC 83.33 MILLIMOLE(S): 236; 224 INJECTION, SOLUTION INTRAVENOUS at 10:28

## 2023-01-26 RX ADMIN — ENOXAPARIN SODIUM 40 MILLIGRAM(S): 100 INJECTION SUBCUTANEOUS at 05:57

## 2023-01-26 RX ADMIN — LIDOCAINE 1 APPLICATION(S): 4 CREAM TOPICAL at 05:58

## 2023-01-26 RX ADMIN — TAMSULOSIN HYDROCHLORIDE 0.4 MILLIGRAM(S): 0.4 CAPSULE ORAL at 21:08

## 2023-01-26 RX ADMIN — Medication 81 MILLIGRAM(S): at 10:30

## 2023-01-26 RX ADMIN — Medication 1 APPLICATION(S): at 19:00

## 2023-01-26 RX ADMIN — Medication 1 APPLICATION(S): at 06:32

## 2023-01-26 RX ADMIN — SENNA PLUS 1 TABLET(S): 8.6 TABLET ORAL at 09:27

## 2023-01-26 RX ADMIN — Medication 15 GRAM(S): at 09:15

## 2023-01-26 RX ADMIN — Medication 50 MILLILITER(S): at 11:15

## 2023-01-26 RX ADMIN — LIDOCAINE 1 APPLICATION(S): 4 CREAM TOPICAL at 19:09

## 2023-01-26 RX ADMIN — ATORVASTATIN CALCIUM 80 MILLIGRAM(S): 80 TABLET, FILM COATED ORAL at 21:08

## 2023-01-26 RX ADMIN — Medication 15 GRAM(S): at 10:06

## 2023-01-26 RX ADMIN — SODIUM CHLORIDE 75 MILLILITER(S): 9 INJECTION, SOLUTION INTRAVENOUS at 11:21

## 2023-01-26 NOTE — PROGRESS NOTE ADULT - PROBLEM SELECTOR PLAN 11
DVT ppx: Lovenox  Diet: Passed speech and swallow. Puree diet with mildly thick liquids  Dispo: Home with services

## 2023-01-26 NOTE — CONSULT NOTE ADULT - SUBJECTIVE AND OBJECTIVE BOX
HPI:  As per ED note:  82-year-old male pmhx of CVA 10 years prior, dementia, diabetes, hyperlipidemia, CAD comes to ED w/ nausea, vomiting, chest pain, SOB, abdominal pain.  Patient arrived from home via EMS, history given by wife via phone reported patient had chest pain, increased work of breathing, and abdominal pain that started randomly at 3 AM, woke up patient from sleep.  Due to the appearance of severity of symptoms and persistence wife called EMS.  Their pain/symptom is moderate to severe, constant, non mediating with rest. Started randomly. History limited due to patient's baseline nonverbal dementia status.    ED Course: Initial vitals 102.4 HR 90 89/59 97% on RA RR 17    Patient found to have elevated lactate. UA concerning for infection. Given 2.5L fluids and started on zosyn. (17 Jan 2023 14:42)    Endocrine consulted for low BG. Concern for inappropriately normal insulin and c-peptide levels but BG was normal at the time of blood collection. Patient grimaces in response to gentle sternal rub but follows no commands and will not participate in conversation. FSBG has been as low as 50. Due to patient's condition, unable to assess for Whipple's triad. No serum BG demonstrating true hypoglycemia. Last A1c earlier this year was 5.3 so metformin he had been taking was stopped on discharge. AM cortisol 7.0 was drawn prior to 7am. TSH normal. BP elevated.    Per bedside nurse, patient ate a decent amount of his breakfast but did not eat lunch. Is now on D5 at 75cc/hr.      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, 1998      HTN (hypertension)      Dementia      HLD (hyperlipidemia)      DM (diabetes mellitus)      Asthma      CAD (coronary artery disease)  s/p stent placement      Sinus node dysfunction  s/p Medtronic PPM      Cardiac pacemaker  Medtronic (SN: [id number]; Model: [id number])      S/P coronary artery stent placement          FAMILY HISTORY:  FH: diabetes mellitus  Mother, Brother    FHx: dementia  Father        Social History: Unable to obtain.    Outpatient Medications: Home Medications:  aspirin 81 mg oral delayed release tablet: 1 tab(s) orally once a day (15 Apr 2022 18:32)  atorvastatin 80 mg oral tablet: 1 tab(s) orally once a day (at bedtime) (22 Jan 2023 18:53)  tamsulosin 0.4 mg oral capsule: 1 cap(s) orally once a day (at bedtime) (29 Apr 2022 09:51)      MEDICATIONS  (STANDING):  albuterol/ipratropium for Nebulization 3 milliLiter(s) Nebulizer once  albuterol/ipratropium for Nebulization. 3 milliLiter(s) Nebulizer once  aspirin  chewable 81 milliGRAM(s) Oral daily  atorvastatin 80 milliGRAM(s) Oral at bedtime  bisacodyl Suppository 10 milliGRAM(s) Rectal once  dextrose 5% + lactated ringers. 1000 milliLiter(s) (75 mL/Hr) IV Continuous <Continuous>  dextrose 50% Injectable 25 Gram(s) IV Push once  dextrose 50% Injectable 12.5 Gram(s) IV Push once  dextrose 50% Injectable 25 Gram(s) IV Push once  dextrose Oral Gel 15 Gram(s) Oral once  enoxaparin Injectable 40 milliGRAM(s) SubCutaneous every 24 hours  glucagon  Injectable 1 milliGRAM(s) IntraMuscular once  lidocaine 2% Gel 1 Application(s) Topical two times a day  magnesium oxide 400 milliGRAM(s) Oral daily  petrolatum white Ointment 1 Application(s) Topical two times a day  polyethylene glycol 3350 17 Gram(s) Oral daily  senna 1 Tablet(s) Oral daily  tamsulosin 0.4 milliGRAM(s) Oral at bedtime    MEDICATIONS  (PRN):  acetaminophen     Tablet .. 650 milliGRAM(s) Oral every 6 hours PRN Temp greater or equal to 38C (100.4F), Mild Pain (1 - 3)  oxymetazoline 0.05% Nasal Spray 2 Spray(s) Both Nostrils every 12 hours PRN Nasal Congestion      Allergies    No Known Allergies    Intolerances      Review of Systems:  Unable to obtain.        PHYSICAL EXAM:  VITALS: T(C): 36.9 (01-26-23 @ 12:25)  T(F): 98.4 (01-26-23 @ 12:25), Max: 99.1 (01-25-23 @ 23:20)  HR: 60 (01-26-23 @ 12:25) (51 - 60)  BP: 163/81 (01-26-23 @ 12:25) (155/104 - 189/92)  RR:  (17 - 18)  SpO2:  (95% - 100%)  Wt(kg): --  General: Cachectic male, No acute distress, not speaking.   Eye:  No proptosis.  HENT:  Normocephalic.   Neck:  Supple, Non-tender.   Respiratory:  Respirations are non-labored, Symmetric chest wall expansion, Breath sounds are equal.   Cardiovascular:  Normal rate, Regular rhythm, No edema.  Gastrointestinal:  Soft, grimaces in response to palpation, Non-distended.   Musculoskeletal:  Normal range of motion, No gross joint swelling.   Integumentary:  Warm, dry.  Mental Status Exam:  Unable to assess.  Neurologic:  Somnolent, Normal motor function, Cranial Nerves II-XII are grossly intact bilaterally.   Psychiatric:  Unable to assess.    POCT Blood Glucose.: 149 mg/dL (01-26-23 @ 11:54)  POCT Blood Glucose.: 61 mg/dL (01-26-23 @ 10:34)  POCT Blood Glucose.: 66 mg/dL (01-26-23 @ 09:36)  POCT Blood Glucose.: 62 mg/dL (01-26-23 @ 09:35)  POCT Blood Glucose.: 59 mg/dL (01-26-23 @ 08:56)  POCT Blood Glucose.: 55 mg/dL (01-26-23 @ 08:54)  POCT Blood Glucose.: 79 mg/dL (01-26-23 @ 05:20)  POCT Blood Glucose.: 95 mg/dL (01-25-23 @ 23:24)  POCT Blood Glucose.: 88 mg/dL (01-25-23 @ 22:41)  POCT Blood Glucose.: 72 mg/dL (01-25-23 @ 21:38)  POCT Blood Glucose.: 77 mg/dL (01-25-23 @ 17:40)  POCT Blood Glucose.: 94 mg/dL (01-25-23 @ 12:59)  POCT Blood Glucose.: 110 mg/dL (01-25-23 @ 11:47)  POCT Blood Glucose.: 144 mg/dL (01-25-23 @ 11:15)  POCT Blood Glucose.: 69 mg/dL (01-25-23 @ 11:11)  POCT Blood Glucose.: 81 mg/dL (01-25-23 @ 10:49)  POCT Blood Glucose.: 78 mg/dL (01-25-23 @ 10:19)  POCT Blood Glucose.: 62 mg/dL (01-25-23 @ 09:38)  POCT Blood Glucose.: 65 mg/dL (01-25-23 @ 09:36)  POCT Blood Glucose.: 67 mg/dL (01-25-23 @ 08:35)  POCT Blood Glucose.: 66 mg/dL (01-25-23 @ 08:34)  POCT Blood Glucose.: 108 mg/dL (01-24-23 @ 23:51)  POCT Blood Glucose.: 112 mg/dL (01-24-23 @ 23:40)  POCT Blood Glucose.: 98 mg/dL (01-24-23 @ 23:14)  POCT Blood Glucose.: 91 mg/dL (01-24-23 @ 22:59)  POCT Blood Glucose.: 81 mg/dL (01-24-23 @ 22:38)  POCT Blood Glucose.: 50 mg/dL (01-24-23 @ 22:36)  POCT Blood Glucose.: 58 mg/dL (01-24-23 @ 22:20)  POCT Blood Glucose.: 53 mg/dL (01-24-23 @ 22:17)  POCT Blood Glucose.: 76 mg/dL (01-24-23 @ 17:46)  POCT Blood Glucose.: 69 mg/dL (01-24-23 @ 17:44)  POCT Blood Glucose.: 82 mg/dL (01-24-23 @ 12:24)  POCT Blood Glucose.: 75 mg/dL (01-24-23 @ 08:43)  POCT Blood Glucose.: 73 mg/dL (01-23-23 @ 21:21)  POCT Blood Glucose.: 100 mg/dL (01-23-23 @ 17:41)                            10.4   4.71  )-----------( 158      ( 26 Jan 2023 07:00 )             32.2       01-26    138  |  108<H>  |  8   ----------------------------<  69<L>  3.7   |  24  |  0.75    eGFR: 90    Ca    8.7      01-26  Mg     1.60     01-26  Phos  2.1     01-26    TPro  5.2<L>  /  Alb  2.5<L>  /  TBili  0.8  /  DBili  x   /  AST  26  /  ALT  34  /  AlkPhos  68  01-26      Thyroid Function Tests:  01-25 @ 06:56 TSH 3.40 FreeT4 -- T3 -- Anti TPO -- Anti Thyroglobulin Ab -- TSI --              Radiology:

## 2023-01-26 NOTE — PROGRESS NOTE ADULT - PROBLEM SELECTOR PROBLEM 10
HLD (hyperlipidemia)
Prophylactic measure
HLD (hyperlipidemia)
HLD (hyperlipidemia)

## 2023-01-26 NOTE — PROGRESS NOTE ADULT - SUBJECTIVE AND OBJECTIVE BOX
PROGRESS NOTE:   Authored by Prashant Suarez MD   Patient is a 82y old  Male who presents with a chief complaint of Urinary tract infection     (24 Jan 2023 16:04)      SUBJECTIVE / OVERNIGHT EVENTS:  dextrose 5% IV d/c'd  Overnight FSG ~70s, asymptomatic.    ADDITIONAL REVIEW OF SYSTEMS:    MEDICATIONS  (STANDING):  albuterol/ipratropium for Nebulization 3 milliLiter(s) Nebulizer once  albuterol/ipratropium for Nebulization. 3 milliLiter(s) Nebulizer once  aspirin  chewable 81 milliGRAM(s) Oral daily  atorvastatin 80 milliGRAM(s) Oral at bedtime  bisacodyl Suppository 10 milliGRAM(s) Rectal once  dextrose 50% Injectable 25 Gram(s) IV Push once  dextrose 50% Injectable 12.5 Gram(s) IV Push once  dextrose 50% Injectable 25 Gram(s) IV Push once  dextrose Oral Gel 15 Gram(s) Oral once  enoxaparin Injectable 40 milliGRAM(s) SubCutaneous every 24 hours  glucagon  Injectable 1 milliGRAM(s) IntraMuscular once  lidocaine 2% Gel 1 Application(s) Topical two times a day  magnesium oxide 400 milliGRAM(s) Oral daily  petrolatum white Ointment 1 Application(s) Topical two times a day  tamsulosin 0.4 milliGRAM(s) Oral at bedtime    MEDICATIONS  (PRN):  acetaminophen     Tablet .. 650 milliGRAM(s) Oral every 6 hours PRN Temp greater or equal to 38C (100.4F), Mild Pain (1 - 3)  oxymetazoline 0.05% Nasal Spray 2 Spray(s) Both Nostrils every 12 hours PRN Nasal Congestion      CAPILLARY BLOOD GLUCOSE      POCT Blood Glucose.: 79 mg/dL (26 Jan 2023 05:20)  POCT Blood Glucose.: 95 mg/dL (25 Jan 2023 23:24)  POCT Blood Glucose.: 88 mg/dL (25 Jan 2023 22:41)  POCT Blood Glucose.: 72 mg/dL (25 Jan 2023 21:38)  POCT Blood Glucose.: 77 mg/dL (25 Jan 2023 17:40)  POCT Blood Glucose.: 94 mg/dL (25 Jan 2023 12:59)  POCT Blood Glucose.: 110 mg/dL (25 Jan 2023 11:47)  POCT Blood Glucose.: 144 mg/dL (25 Jan 2023 11:15)  POCT Blood Glucose.: 69 mg/dL (25 Jan 2023 11:11)  POCT Blood Glucose.: 81 mg/dL (25 Jan 2023 10:49)  POCT Blood Glucose.: 78 mg/dL (25 Jan 2023 10:19)  POCT Blood Glucose.: 62 mg/dL (25 Jan 2023 09:38)  POCT Blood Glucose.: 65 mg/dL (25 Jan 2023 09:36)  POCT Blood Glucose.: 67 mg/dL (25 Jan 2023 08:35)  POCT Blood Glucose.: 66 mg/dL (25 Jan 2023 08:34)    I&O's Summary      PHYSICAL EXAM:  Vital Signs Last 24 Hrs  T(C): 36.5 (26 Jan 2023 05:32), Max: 37.3 (25 Jan 2023 23:20)  T(F): 97.7 (26 Jan 2023 05:32), Max: 99.1 (25 Jan 2023 23:20)  HR: 51 (26 Jan 2023 05:32) (51 - 96)  BP: 169/77 (26 Jan 2023 05:32) (155/104 - 189/92)  BP(mean): --  RR: 17 (26 Jan 2023 05:32) (17 - 18)  SpO2: 100% (26 Jan 2023 05:32) (95% - 100%)    Parameters below as of 26 Jan 2023 05:32  Patient On (Oxygen Delivery Method): room air        GENERAL: Answering questions intermittently appropriately. Appears comfortable.  HEAD:  Atraumatic, Normocephalic  EYES: EOMI, PERRLA, conjunctiva and sclera clear  ENT: Dry mucous membranes  NECK: Supple, No elevated JVP.  CHEST/LUNG: Expiratory wheeze right lung fields.   HEART: AFIB; No murmurs  Peripheral edema: None  Subclavian skin tenting: Slow response  ABDOMEN: Non-tender non-distended.   EXTREMITIES:  No clubbing, cyanosis, or edema  NERVOUS SYSTEM:  A&Ox1 ,right arm contracted  SKIN: No rashes or lesion      LABS:                        10.1   5.21  )-----------( 153      ( 25 Jan 2023 06:56 )             30.8     01-25    140  |  110<H>  |  9   ----------------------------<  98  3.4<L>   |  23  |  0.80    Ca    8.5      25 Jan 2023 06:56  Phos  2.7     01-25  Mg     1.70     01-25    TPro  4.9<L>  /  Alb  2.3<L>  /  TBili  0.7  /  DBili  x   /  AST  23  /  ALT  34  /  AlkPhos  65  01-25                RADIOLOGY & ADDITIONAL TESTS:  Lab Results Reviewed   Imaging Reviewed  Electrocardiogram Reviewed   PROGRESS NOTE:   Authored by Prashant Suarez MD   Patient is a 82y old  Male who presents with a chief complaint of Urinary tract infection     (24 Jan 2023 16:04)      SUBJECTIVE / OVERNIGHT EVENTS:  dextrose 5% IV d/c'd  Overnight FSG ~60s, given dextrose gel.  Restarted on D5LR    Consulted endocrine for hypoglycemia.    ADDITIONAL REVIEW OF SYSTEMS:    MEDICATIONS  (STANDING):  albuterol/ipratropium for Nebulization 3 milliLiter(s) Nebulizer once  albuterol/ipratropium for Nebulization. 3 milliLiter(s) Nebulizer once  aspirin  chewable 81 milliGRAM(s) Oral daily  atorvastatin 80 milliGRAM(s) Oral at bedtime  bisacodyl Suppository 10 milliGRAM(s) Rectal once  dextrose 50% Injectable 25 Gram(s) IV Push once  dextrose 50% Injectable 12.5 Gram(s) IV Push once  dextrose 50% Injectable 25 Gram(s) IV Push once  dextrose Oral Gel 15 Gram(s) Oral once  enoxaparin Injectable 40 milliGRAM(s) SubCutaneous every 24 hours  glucagon  Injectable 1 milliGRAM(s) IntraMuscular once  lidocaine 2% Gel 1 Application(s) Topical two times a day  magnesium oxide 400 milliGRAM(s) Oral daily  petrolatum white Ointment 1 Application(s) Topical two times a day  tamsulosin 0.4 milliGRAM(s) Oral at bedtime    MEDICATIONS  (PRN):  acetaminophen     Tablet .. 650 milliGRAM(s) Oral every 6 hours PRN Temp greater or equal to 38C (100.4F), Mild Pain (1 - 3)  oxymetazoline 0.05% Nasal Spray 2 Spray(s) Both Nostrils every 12 hours PRN Nasal Congestion      CAPILLARY BLOOD GLUCOSE      POCT Blood Glucose.: 79 mg/dL (26 Jan 2023 05:20)  POCT Blood Glucose.: 95 mg/dL (25 Jan 2023 23:24)  POCT Blood Glucose.: 88 mg/dL (25 Jan 2023 22:41)  POCT Blood Glucose.: 72 mg/dL (25 Jan 2023 21:38)  POCT Blood Glucose.: 77 mg/dL (25 Jan 2023 17:40)  POCT Blood Glucose.: 94 mg/dL (25 Jan 2023 12:59)  POCT Blood Glucose.: 110 mg/dL (25 Jan 2023 11:47)  POCT Blood Glucose.: 144 mg/dL (25 Jan 2023 11:15)  POCT Blood Glucose.: 69 mg/dL (25 Jan 2023 11:11)  POCT Blood Glucose.: 81 mg/dL (25 Jan 2023 10:49)  POCT Blood Glucose.: 78 mg/dL (25 Jan 2023 10:19)  POCT Blood Glucose.: 62 mg/dL (25 Jan 2023 09:38)  POCT Blood Glucose.: 65 mg/dL (25 Jan 2023 09:36)  POCT Blood Glucose.: 67 mg/dL (25 Jan 2023 08:35)  POCT Blood Glucose.: 66 mg/dL (25 Jan 2023 08:34)    I&O's Summary      PHYSICAL EXAM:  Vital Signs Last 24 Hrs  T(C): 36.5 (26 Jan 2023 05:32), Max: 37.3 (25 Jan 2023 23:20)  T(F): 97.7 (26 Jan 2023 05:32), Max: 99.1 (25 Jan 2023 23:20)  HR: 51 (26 Jan 2023 05:32) (51 - 96)  BP: 169/77 (26 Jan 2023 05:32) (155/104 - 189/92)  BP(mean): --  RR: 17 (26 Jan 2023 05:32) (17 - 18)  SpO2: 100% (26 Jan 2023 05:32) (95% - 100%)    Parameters below as of 26 Jan 2023 05:32  Patient On (Oxygen Delivery Method): room air        GENERAL: Answering questions intermittently appropriately. Appears comfortable.  HEAD:  Atraumatic, Normocephalic  EYES: EOMI, PERRLA, conjunctiva and sclera clear  ENT: Dry mucous membranes  NECK: Supple, No elevated JVP.  CHEST/LUNG: Expiratory wheeze right lung fields.   HEART: AFIB; No murmurs  Peripheral edema: None  Subclavian skin tenting: Slow response  ABDOMEN: Non-tender non-distended.   EXTREMITIES:  No clubbing, cyanosis, or edema  NERVOUS SYSTEM:  A&Ox1 ,right arm contracted  SKIN: No rashes or lesion      LABS:                        10.1   5.21  )-----------( 153      ( 25 Jan 2023 06:56 )             30.8     01-25    140  |  110<H>  |  9   ----------------------------<  98  3.4<L>   |  23  |  0.80    Ca    8.5      25 Jan 2023 06:56  Phos  2.7     01-25  Mg     1.70     01-25    TPro  4.9<L>  /  Alb  2.3<L>  /  TBili  0.7  /  DBili  x   /  AST  23  /  ALT  34  /  AlkPhos  65  01-25                RADIOLOGY & ADDITIONAL TESTS:  Lab Results Reviewed   Imaging Reviewed  Electrocardiogram Reviewed

## 2023-01-26 NOTE — PROGRESS NOTE ADULT - PROBLEM SELECTOR PLAN 7
- hx  - A1C 5.2% last. - Hx of Dementia, A&O 0-1 at baseline.   - Maintain awake and sleep cycle  - Frequent reorientation

## 2023-01-26 NOTE — PROGRESS NOTE ADULT - ASSESSMENT
80yo largely bedbound M with hx of HTN, dementia, prior CVA, CAD presenting to hospital with chest pain, here for pansensitive e coli bacteremia secondary to UTI. 80yo largely bedbound M with hx of HTN, dementia, prior CVA, CAD presenting to hospital with chest pain, here for pansensitive e coli bacteremia secondary to UTI. Course complicated by hypoglycemia.

## 2023-01-26 NOTE — PROGRESS NOTE ADULT - PROBLEM SELECTOR PLAN 3
Pureed and mildly thick liquids  - D/C fluids  - nutrition consult  - monitor  - f/u BHB, insulin, c-peptide, proinsulin.  - Started on D5W LR. - Urine culture with pan sensitive ecoli  - Zosyn (1/17-1/18)  - Ceftriaxone (1/18-1/25)  - Prior urine cultures with candida, ecoli. MRSA ISO MRSA bacteremia.  - TOV successful

## 2023-01-26 NOTE — CONSULT NOTE ADULT - ATTENDING COMMENTS
82M with dementia, prior DM2 now in remission, with concern for hypoglycemia. Unable to assess whipple's triad.  Studies checked were with normal serum glucose so cannot interpret insulin, c-peptide and there is a very low suspicion for insulinoma in this patient. Suspect reduced oral intake, gluconeogenesis. Currently on D5, can try to taper off and encourage po intake.    Marilia Nunes MD  Division of Endocrinology  Pager: [phone]    If after 6PM or before 9AM, or on weekends/holidays, please call endocrine answering service for assistance ([phone]).  For nonurgent matters email [email or web address] for assistance.

## 2023-01-26 NOTE — CONSULT NOTE ADULT - ASSESSMENT
82-year-old male pmhx of CVA 10 years prior, dementia, diabetes, hyperlipidemia, CAD comes to ED w/ nausea, vomiting, chest pain, SOB, abdominal pain.  Septic from e.coli bacteremia. History limited due to patient's baseline nonverbal dementia status. Endocrine consulted for low BG. Concern for inappropriately normal insulin and c-peptide levels but BG was normal at the time of blood collection. Per bedside nurse, patient ate a decent amount of his breakfast but did not eat lunch. Is now on D5 at 75cc/hr.    1) Hypoglycemia - unable to assess for whipple's triad due to altered mental status  suspecting related to poor po intake, and sepsis, encourage po intake, tx of infection.  Concern was raised for inappropriately normal insulin and c-peptide levels but BG was normal at the time of blood collection.  AM cortisol 7.0 before 7am , repeat at 8am sharp  TSH normal  FS q4h  no insulin/correction scale  On D5@ 75 cc/hour. Can do trial off of it tomorrow, especially if patient tolerating PO  if hypoglycemic again (BG < 55) - send BMP, insulin level, c-peptide, pro-insulin, beta hydroxy butyrate STAT    2) DM2  HbA1c 5.3% last checked  stopped metformin on dc in Apr 2022  no insulin/correction scale please    3) Hyperlipidemia  atorvastatin 80mg    Tejinder Leal DO, Endocrinology Fellow  For follow-up questions, discharge recommendations, or new consults please call answering service at [phone] (weekdays), [phone] (nights/weekends). For nonurgent matters, please email [email or web address] or [email or web address].   82-year-old male pmhx of CVA 10 years prior, dementia, diabetes, hyperlipidemia, CAD comes to ED w/ nausea, vomiting, chest pain, SOB, abdominal pain.  Septic from e.coli bacteremia. History limited due to patient's baseline nonverbal dementia status. Endocrine consulted for low BG. Concern for inappropriately normal insulin and c-peptide levels but BG was normal at the time of blood collection. Per bedside nurse, patient ate a decent amount of his breakfast but did not eat lunch. Is now on D5 at 75cc/hr.    1) Hypoglycemia - unable to assess for whipple's triad due to altered mental status  suspecting related to poor po intake, and sepsis, encourage po intake, tx of infection.  Concern was raised for inappropriately normal insulin and c-peptide levels but BG was normal at the time of blood collection.  AM cortisol 7.0 before 7am , repeat at 8am sharp  TSH normal  FS q4h  no insulin/correction scale  On D5@ 75 cc/hour. Can do trial off of it tomorrow, especially if patient tolerating PO  if hypoglycemic again (BG < 55) - send BMP, insulin level, c-peptide, pro-insulin, beta hydroxy butyrate STAT    2) DM2 in remission  HbA1c 5.3% last checked  stopped metformin on dc in Apr 2022  no insulin/correction scale please    3) Hyperlipidemia  atorvastatin 80mg    Tejinder Leal DO, Endocrinology Fellow  For follow-up questions, discharge recommendations, or new consults please call answering service at [phone] (weekdays), [phone] (nights/weekends). For nonurgent matters, please email [email or web address] or [email or web address].

## 2023-01-26 NOTE — PROGRESS NOTE ADULT - PROBLEM SELECTOR PLAN 1
- Zosyn (1/17-1/18)  - Ceftriaxone (1/18-)  - F/u repeat cultures 1/20 NGTD  - Sepsis resolved - D5LR  - f/u BHB, insulin, c-peptide, proinsulin.  - Endocrine consulted for concern for lab-work concerning for insulinoma vs autoimmune insulin syndrome vs excessive insulin production.

## 2023-01-26 NOTE — PROGRESS NOTE ADULT - PROBLEM SELECTOR PLAN 9
- Hx of Dementia, A&O 0-1 at baseline.   - Maintain awake and sleep cycle  - Frequent reorientation DVT ppx: Lovenox  Diet: Passed speech and swallow. Puree diet with mildly thick liquids  Dispo: Home with services

## 2023-01-26 NOTE — PROGRESS NOTE ADULT - PROBLEM SELECTOR PLAN 2
- Urine culture with pan sensitive ecoli  - Zosyn (1/17-1/18)  - Ceftriaxone (1/18-)  - Prior urine cultures with candida, ecoli. MRSA ISO MRSA bacteremia.  - TOV successful - Zosyn (1/17-1/18)  - Ceftriaxone (1/18-)  - F/u repeat cultures 1/20 NGTD  - Resolved

## 2023-01-27 LAB
ALBUMIN SERPL ELPH-MCNC: 2.7 G/DL — LOW (ref 3.3–5)
ALP SERPL-CCNC: 73 U/L — SIGNIFICANT CHANGE UP (ref 40–120)
ALT FLD-CCNC: 30 U/L — SIGNIFICANT CHANGE UP (ref 4–41)
ANION GAP SERPL CALC-SCNC: 7 MMOL/L — SIGNIFICANT CHANGE UP (ref 7–14)
AST SERPL-CCNC: 23 U/L — SIGNIFICANT CHANGE UP (ref 4–40)
BASOPHILS # BLD AUTO: 0.02 K/UL — SIGNIFICANT CHANGE UP (ref 0–0.2)
BASOPHILS NFR BLD AUTO: 0.4 % — SIGNIFICANT CHANGE UP (ref 0–2)
BILIRUB SERPL-MCNC: 1 MG/DL — SIGNIFICANT CHANGE UP (ref 0.2–1.2)
BUN SERPL-MCNC: 6 MG/DL — LOW (ref 7–23)
CALCIUM SERPL-MCNC: 8.8 MG/DL — SIGNIFICANT CHANGE UP (ref 8.4–10.5)
CHLORIDE SERPL-SCNC: 106 MMOL/L — SIGNIFICANT CHANGE UP (ref 98–107)
CO2 SERPL-SCNC: 26 MMOL/L — SIGNIFICANT CHANGE UP (ref 22–31)
CORTIS AM PEAK SERPL-MCNC: 8 UG/DL — SIGNIFICANT CHANGE UP (ref 6–18.4)
CREAT SERPL-MCNC: 0.75 MG/DL — SIGNIFICANT CHANGE UP (ref 0.5–1.3)
EGFR: 90 ML/MIN/1.73M2 — SIGNIFICANT CHANGE UP
EOSINOPHIL # BLD AUTO: 0.23 K/UL — SIGNIFICANT CHANGE UP (ref 0–0.5)
EOSINOPHIL NFR BLD AUTO: 5 % — SIGNIFICANT CHANGE UP (ref 0–6)
GLUCOSE BLDC GLUCOMTR-MCNC: 121 MG/DL — HIGH (ref 70–99)
GLUCOSE BLDC GLUCOMTR-MCNC: 76 MG/DL — SIGNIFICANT CHANGE UP (ref 70–99)
GLUCOSE BLDC GLUCOMTR-MCNC: 78 MG/DL — SIGNIFICANT CHANGE UP (ref 70–99)
GLUCOSE BLDC GLUCOMTR-MCNC: 87 MG/DL — SIGNIFICANT CHANGE UP (ref 70–99)
GLUCOSE BLDC GLUCOMTR-MCNC: 87 MG/DL — SIGNIFICANT CHANGE UP (ref 70–99)
GLUCOSE BLDC GLUCOMTR-MCNC: 89 MG/DL — SIGNIFICANT CHANGE UP (ref 70–99)
GLUCOSE SERPL-MCNC: 76 MG/DL — SIGNIFICANT CHANGE UP (ref 70–99)
HCT VFR BLD CALC: 33.9 % — LOW (ref 39–50)
HGB BLD-MCNC: 11.3 G/DL — LOW (ref 13–17)
IANC: 2.28 K/UL — SIGNIFICANT CHANGE UP (ref 1.8–7.4)
IMM GRANULOCYTES NFR BLD AUTO: 1.5 % — HIGH (ref 0–0.9)
LYMPHOCYTES # BLD AUTO: 1.58 K/UL — SIGNIFICANT CHANGE UP (ref 1–3.3)
LYMPHOCYTES # BLD AUTO: 34.3 % — SIGNIFICANT CHANGE UP (ref 13–44)
MAGNESIUM SERPL-MCNC: 1.7 MG/DL — SIGNIFICANT CHANGE UP (ref 1.6–2.6)
MCHC RBC-ENTMCNC: 30.3 PG — SIGNIFICANT CHANGE UP (ref 27–34)
MCHC RBC-ENTMCNC: 33.3 GM/DL — SIGNIFICANT CHANGE UP (ref 32–36)
MCV RBC AUTO: 90.9 FL — SIGNIFICANT CHANGE UP (ref 80–100)
MONOCYTES # BLD AUTO: 0.43 K/UL — SIGNIFICANT CHANGE UP (ref 0–0.9)
MONOCYTES NFR BLD AUTO: 9.3 % — SIGNIFICANT CHANGE UP (ref 2–14)
NEUTROPHILS # BLD AUTO: 2.28 K/UL — SIGNIFICANT CHANGE UP (ref 1.8–7.4)
NEUTROPHILS NFR BLD AUTO: 49.5 % — SIGNIFICANT CHANGE UP (ref 43–77)
NRBC # BLD: 0 /100 WBCS — SIGNIFICANT CHANGE UP (ref 0–0)
NRBC # FLD: 0 K/UL — SIGNIFICANT CHANGE UP (ref 0–0)
PHOSPHATE SERPL-MCNC: 2.5 MG/DL — SIGNIFICANT CHANGE UP (ref 2.5–4.5)
PLATELET # BLD AUTO: 193 K/UL — SIGNIFICANT CHANGE UP (ref 150–400)
POTASSIUM SERPL-MCNC: 3.9 MMOL/L — SIGNIFICANT CHANGE UP (ref 3.5–5.3)
POTASSIUM SERPL-SCNC: 3.9 MMOL/L — SIGNIFICANT CHANGE UP (ref 3.5–5.3)
PROINSULIN SERPL-MCNC: 6.7 PMOL/L — SIGNIFICANT CHANGE UP (ref 0–10)
PROT SERPL-MCNC: 5.5 G/DL — LOW (ref 6–8.3)
RBC # BLD: 3.73 M/UL — LOW (ref 4.2–5.8)
RBC # FLD: 13.2 % — SIGNIFICANT CHANGE UP (ref 10.3–14.5)
SODIUM SERPL-SCNC: 139 MMOL/L — SIGNIFICANT CHANGE UP (ref 135–145)
WBC # BLD: 4.61 K/UL — SIGNIFICANT CHANGE UP (ref 3.8–10.5)
WBC # FLD AUTO: 4.61 K/UL — SIGNIFICANT CHANGE UP (ref 3.8–10.5)

## 2023-01-27 PROCEDURE — 99232 SBSQ HOSP IP/OBS MODERATE 35: CPT | Mod: GC

## 2023-01-27 PROCEDURE — 99232 SBSQ HOSP IP/OBS MODERATE 35: CPT

## 2023-01-27 RX ADMIN — Medication 81 MILLIGRAM(S): at 14:05

## 2023-01-27 RX ADMIN — Medication 1 APPLICATION(S): at 05:00

## 2023-01-27 RX ADMIN — LIDOCAINE 1 APPLICATION(S): 4 CREAM TOPICAL at 16:52

## 2023-01-27 RX ADMIN — MAGNESIUM OXIDE 400 MG ORAL TABLET 400 MILLIGRAM(S): 241.3 TABLET ORAL at 14:52

## 2023-01-27 RX ADMIN — Medication 1 APPLICATION(S): at 17:14

## 2023-01-27 RX ADMIN — SODIUM CHLORIDE 75 MILLILITER(S): 9 INJECTION, SOLUTION INTRAVENOUS at 05:45

## 2023-01-27 RX ADMIN — TAMSULOSIN HYDROCHLORIDE 0.4 MILLIGRAM(S): 0.4 CAPSULE ORAL at 21:31

## 2023-01-27 RX ADMIN — ATORVASTATIN CALCIUM 80 MILLIGRAM(S): 80 TABLET, FILM COATED ORAL at 21:32

## 2023-01-27 RX ADMIN — ENOXAPARIN SODIUM 40 MILLIGRAM(S): 100 INJECTION SUBCUTANEOUS at 05:10

## 2023-01-27 RX ADMIN — SENNA PLUS 1 TABLET(S): 8.6 TABLET ORAL at 14:05

## 2023-01-27 RX ADMIN — LIDOCAINE 1 APPLICATION(S): 4 CREAM TOPICAL at 05:00

## 2023-01-27 RX ADMIN — POLYETHYLENE GLYCOL 3350 17 GRAM(S): 17 POWDER, FOR SOLUTION ORAL at 14:06

## 2023-01-27 NOTE — PROGRESS NOTE ADULT - SUBJECTIVE AND OBJECTIVE BOX
HPI: 82-year-old male pmhx of CVA 10 years prior, dementia, diabetes, hyperlipidemia, CAD comes to ED w/ nausea, vomiting, chest pain, SOB, abdominal pain.  Septic from e.coli bacteremia. History limited due to patient's baseline nonverbal dementia status. Endocrine consulted for low BG.     Interval history:  no hypoglycemia since yesterday  dextrose fluids have been stopped  per team calorie count ok  pt not participating in history    MEDICATIONS  (STANDING):  albuterol/ipratropium for Nebulization 3 milliLiter(s) Nebulizer once  albuterol/ipratropium for Nebulization. 3 milliLiter(s) Nebulizer once  aspirin  chewable 81 milliGRAM(s) Oral daily  atorvastatin 80 milliGRAM(s) Oral at bedtime  bisacodyl Suppository 10 milliGRAM(s) Rectal once  dextrose 50% Injectable 25 Gram(s) IV Push once  dextrose 50% Injectable 12.5 Gram(s) IV Push once  dextrose 50% Injectable 25 Gram(s) IV Push once  dextrose Oral Gel 15 Gram(s) Oral once  enoxaparin Injectable 40 milliGRAM(s) SubCutaneous every 24 hours  glucagon  Injectable 1 milliGRAM(s) IntraMuscular once  lidocaine 2% Gel 1 Application(s) Topical two times a day  magnesium oxide 400 milliGRAM(s) Oral daily  petrolatum white Ointment 1 Application(s) Topical two times a day  polyethylene glycol 3350 17 Gram(s) Oral daily  senna 1 Tablet(s) Oral daily  tamsulosin 0.4 milliGRAM(s) Oral at bedtime    MEDICATIONS  (PRN):  acetaminophen     Tablet .. 650 milliGRAM(s) Oral every 6 hours PRN Temp greater or equal to 38C (100.4F), Mild Pain (1 - 3)  oxymetazoline 0.05% Nasal Spray 2 Spray(s) Both Nostrils every 12 hours PRN Nasal Congestion      Allergies    No Known Allergies    Intolerances        Review of Systems:  unable to assess    PHYSICAL EXAM:  VITALS: T(C): 36.8 (01-27-23 @ 05:30)  T(F): 98.2 (01-27-23 @ 05:30), Max: 98.6 (01-26-23 @ 22:52)  HR: 58 (01-27-23 @ 05:30) (58 - 63)  BP: 171/95 (01-27-23 @ 05:30) (164/80 - 171/95)  RR:  (17 - 18)  SpO2:  (100% - 100%)  Wt(kg): --  GENERAL: NAD, well-groomed, well-developed  EYES: No proptosis, no lid lag, anicteric  HEENT:  Atraumatic, normocephalic, moist mucous membranes  RESPIRATORY: nonlabored respirations, no wheezing  PSYCH: normal affect, normal mood, calm    POCT Blood Glucose.: 76 mg/dL (01-27-23 @ 12:16)  POCT Blood Glucose.: 78 mg/dL (01-27-23 @ 08:46)  POCT Blood Glucose.: 116 mg/dL (01-26-23 @ 22:18)  POCT Blood Glucose.: 104 mg/dL (01-26-23 @ 19:40)  POCT Blood Glucose.: 91 mg/dL (01-26-23 @ 17:49)  POCT Blood Glucose.: 65 mg/dL (01-26-23 @ 17:47)  POCT Blood Glucose.: 149 mg/dL (01-26-23 @ 11:54)  POCT Blood Glucose.: 61 mg/dL (01-26-23 @ 10:34)  POCT Blood Glucose.: 66 mg/dL (01-26-23 @ 09:36)  POCT Blood Glucose.: 62 mg/dL (01-26-23 @ 09:35)  POCT Blood Glucose.: 59 mg/dL (01-26-23 @ 08:56)  POCT Blood Glucose.: 55 mg/dL (01-26-23 @ 08:54)  POCT Blood Glucose.: 79 mg/dL (01-26-23 @ 05:20)  POCT Blood Glucose.: 95 mg/dL (01-25-23 @ 23:24)  POCT Blood Glucose.: 88 mg/dL (01-25-23 @ 22:41)  POCT Blood Glucose.: 72 mg/dL (01-25-23 @ 21:38)  POCT Blood Glucose.: 77 mg/dL (01-25-23 @ 17:40)  POCT Blood Glucose.: 94 mg/dL (01-25-23 @ 12:59)  POCT Blood Glucose.: 110 mg/dL (01-25-23 @ 11:47)  POCT Blood Glucose.: 144 mg/dL (01-25-23 @ 11:15)  POCT Blood Glucose.: 69 mg/dL (01-25-23 @ 11:11)  POCT Blood Glucose.: 81 mg/dL (01-25-23 @ 10:49)  POCT Blood Glucose.: 78 mg/dL (01-25-23 @ 10:19)  POCT Blood Glucose.: 62 mg/dL (01-25-23 @ 09:38)  POCT Blood Glucose.: 65 mg/dL (01-25-23 @ 09:36)  POCT Blood Glucose.: 67 mg/dL (01-25-23 @ 08:35)  POCT Blood Glucose.: 66 mg/dL (01-25-23 @ 08:34)  POCT Blood Glucose.: 108 mg/dL (01-24-23 @ 23:51)  POCT Blood Glucose.: 112 mg/dL (01-24-23 @ 23:40)  POCT Blood Glucose.: 98 mg/dL (01-24-23 @ 23:14)  POCT Blood Glucose.: 91 mg/dL (01-24-23 @ 22:59)  POCT Blood Glucose.: 81 mg/dL (01-24-23 @ 22:38)  POCT Blood Glucose.: 50 mg/dL (01-24-23 @ 22:36)  POCT Blood Glucose.: 58 mg/dL (01-24-23 @ 22:20)  POCT Blood Glucose.: 53 mg/dL (01-24-23 @ 22:17)  POCT Blood Glucose.: 76 mg/dL (01-24-23 @ 17:46)  POCT Blood Glucose.: 69 mg/dL (01-24-23 @ 17:44)                            11.3   4.61  )-----------( 193      ( 27 Jan 2023 08:00 )             33.9       01-27    139  |  106  |  6<L>  ----------------------------<  76  3.9   |  26  |  0.75    eGFR: 90    Ca    8.8      01-27  Mg     1.70     01-27  Phos  2.5     01-27    TPro  5.5<L>  /  Alb  2.7<L>  /  TBili  1.0  /  DBili  x   /  AST  23  /  ALT  30  /  AlkPhos  73  01-27      Thyroid Function Tests:  01-25 @ 06:56 TSH 3.40 FreeT4 -- T3 -- Anti TPO -- Anti Thyroglobulin Ab -- TSI --          Radiology:

## 2023-01-27 NOTE — PROGRESS NOTE ADULT - SUBJECTIVE AND OBJECTIVE BOX
PROGRESS NOTE:   Authored by Prashant Suarez MD   Patient is a 82y old  Male who presents with a chief complaint of sepsis (26 Jan 2023 17:16)      SUBJECTIVE / OVERNIGHT EVENTS:  No acute events overnight.     ADDITIONAL REVIEW OF SYSTEMS:    MEDICATIONS  (STANDING):  albuterol/ipratropium for Nebulization 3 milliLiter(s) Nebulizer once  albuterol/ipratropium for Nebulization. 3 milliLiter(s) Nebulizer once  aspirin  chewable 81 milliGRAM(s) Oral daily  atorvastatin 80 milliGRAM(s) Oral at bedtime  bisacodyl Suppository 10 milliGRAM(s) Rectal once  dextrose 5% + lactated ringers. 1000 milliLiter(s) (75 mL/Hr) IV Continuous <Continuous>  dextrose 50% Injectable 25 Gram(s) IV Push once  dextrose 50% Injectable 12.5 Gram(s) IV Push once  dextrose 50% Injectable 25 Gram(s) IV Push once  dextrose Oral Gel 15 Gram(s) Oral once  enoxaparin Injectable 40 milliGRAM(s) SubCutaneous every 24 hours  glucagon  Injectable 1 milliGRAM(s) IntraMuscular once  lidocaine 2% Gel 1 Application(s) Topical two times a day  magnesium oxide 400 milliGRAM(s) Oral daily  petrolatum white Ointment 1 Application(s) Topical two times a day  polyethylene glycol 3350 17 Gram(s) Oral daily  senna 1 Tablet(s) Oral daily  tamsulosin 0.4 milliGRAM(s) Oral at bedtime    MEDICATIONS  (PRN):  acetaminophen     Tablet .. 650 milliGRAM(s) Oral every 6 hours PRN Temp greater or equal to 38C (100.4F), Mild Pain (1 - 3)  oxymetazoline 0.05% Nasal Spray 2 Spray(s) Both Nostrils every 12 hours PRN Nasal Congestion      CAPILLARY BLOOD GLUCOSE      POCT Blood Glucose.: 116 mg/dL (26 Jan 2023 22:18)  POCT Blood Glucose.: 104 mg/dL (26 Jan 2023 19:40)  POCT Blood Glucose.: 91 mg/dL (26 Jan 2023 17:49)  POCT Blood Glucose.: 65 mg/dL (26 Jan 2023 17:47)  POCT Blood Glucose.: 149 mg/dL (26 Jan 2023 11:54)  POCT Blood Glucose.: 61 mg/dL (26 Jan 2023 10:34)  POCT Blood Glucose.: 66 mg/dL (26 Jan 2023 09:36)  POCT Blood Glucose.: 62 mg/dL (26 Jan 2023 09:35)  POCT Blood Glucose.: 59 mg/dL (26 Jan 2023 08:56)  POCT Blood Glucose.: 55 mg/dL (26 Jan 2023 08:54)    I&O's Summary      PHYSICAL EXAM:  Vital Signs Last 24 Hrs  T(C): 36.8 (27 Jan 2023 05:30), Max: 37 (26 Jan 2023 22:52)  T(F): 98.2 (27 Jan 2023 05:30), Max: 98.6 (26 Jan 2023 22:52)  HR: 58 (27 Jan 2023 05:30) (58 - 63)  BP: 171/95 (27 Jan 2023 05:30) (163/81 - 171/95)  BP(mean): --  RR: 17 (27 Jan 2023 05:30) (17 - 18)  SpO2: 100% (27 Jan 2023 05:30) (100% - 100%)    Parameters below as of 27 Jan 2023 05:30  Patient On (Oxygen Delivery Method): room air        GENERAL: Answering questions intermittently appropriately. Appears comfortable.  HEAD:  Atraumatic, Normocephalic  EYES: EOMI, PERRLA, conjunctiva and sclera clear  ENT: Dry mucous membranes  NECK: Supple, No elevated JVP.  CHEST/LUNG: Expiratory wheeze right lung fields.   HEART: AFIB; No murmurs  Peripheral edema: None  Subclavian skin tenting: Slow response  ABDOMEN: Non-tender non-distended.   EXTREMITIES:  No clubbing, cyanosis, or edema  NERVOUS SYSTEM:  A&Ox1 ,right arm contracted  SKIN: No rashes or lesion      LABS:                        10.4   4.71  )-----------( 158      ( 26 Jan 2023 07:00 )             32.2     01-26    138  |  108<H>  |  8   ----------------------------<  69<L>  3.7   |  24  |  0.75    Ca    8.7      26 Jan 2023 07:00  Phos  2.1     01-26  Mg     1.60     01-26    TPro  5.2<L>  /  Alb  2.5<L>  /  TBili  0.8  /  DBili  x   /  AST  26  /  ALT  34  /  AlkPhos  68  01-26                RADIOLOGY & ADDITIONAL TESTS:  Lab Results Reviewed   Imaging Reviewed  Electrocardiogram Reviewed   PROGRESS NOTE:   Authored by Prashant Suarez MD   Patient is a 82y old  Male who presents with a chief complaint of sepsis (26 Jan 2023 17:16)      SUBJECTIVE / OVERNIGHT EVENTS:  No acute events overnight.     Patient on D5W overnight.  ADDITIONAL REVIEW OF SYSTEMS:    MEDICATIONS  (STANDING):  albuterol/ipratropium for Nebulization 3 milliLiter(s) Nebulizer once  albuterol/ipratropium for Nebulization. 3 milliLiter(s) Nebulizer once  aspirin  chewable 81 milliGRAM(s) Oral daily  atorvastatin 80 milliGRAM(s) Oral at bedtime  bisacodyl Suppository 10 milliGRAM(s) Rectal once  dextrose 5% + lactated ringers. 1000 milliLiter(s) (75 mL/Hr) IV Continuous <Continuous>  dextrose 50% Injectable 25 Gram(s) IV Push once  dextrose 50% Injectable 12.5 Gram(s) IV Push once  dextrose 50% Injectable 25 Gram(s) IV Push once  dextrose Oral Gel 15 Gram(s) Oral once  enoxaparin Injectable 40 milliGRAM(s) SubCutaneous every 24 hours  glucagon  Injectable 1 milliGRAM(s) IntraMuscular once  lidocaine 2% Gel 1 Application(s) Topical two times a day  magnesium oxide 400 milliGRAM(s) Oral daily  petrolatum white Ointment 1 Application(s) Topical two times a day  polyethylene glycol 3350 17 Gram(s) Oral daily  senna 1 Tablet(s) Oral daily  tamsulosin 0.4 milliGRAM(s) Oral at bedtime    MEDICATIONS  (PRN):  acetaminophen     Tablet .. 650 milliGRAM(s) Oral every 6 hours PRN Temp greater or equal to 38C (100.4F), Mild Pain (1 - 3)  oxymetazoline 0.05% Nasal Spray 2 Spray(s) Both Nostrils every 12 hours PRN Nasal Congestion      CAPILLARY BLOOD GLUCOSE      POCT Blood Glucose.: 116 mg/dL (26 Jan 2023 22:18)  POCT Blood Glucose.: 104 mg/dL (26 Jan 2023 19:40)  POCT Blood Glucose.: 91 mg/dL (26 Jan 2023 17:49)  POCT Blood Glucose.: 65 mg/dL (26 Jan 2023 17:47)  POCT Blood Glucose.: 149 mg/dL (26 Jan 2023 11:54)  POCT Blood Glucose.: 61 mg/dL (26 Jan 2023 10:34)  POCT Blood Glucose.: 66 mg/dL (26 Jan 2023 09:36)  POCT Blood Glucose.: 62 mg/dL (26 Jan 2023 09:35)  POCT Blood Glucose.: 59 mg/dL (26 Jan 2023 08:56)  POCT Blood Glucose.: 55 mg/dL (26 Jan 2023 08:54)    I&O's Summary      PHYSICAL EXAM:  Vital Signs Last 24 Hrs  T(C): 36.8 (27 Jan 2023 05:30), Max: 37 (26 Jan 2023 22:52)  T(F): 98.2 (27 Jan 2023 05:30), Max: 98.6 (26 Jan 2023 22:52)  HR: 58 (27 Jan 2023 05:30) (58 - 63)  BP: 171/95 (27 Jan 2023 05:30) (163/81 - 171/95)  BP(mean): --  RR: 17 (27 Jan 2023 05:30) (17 - 18)  SpO2: 100% (27 Jan 2023 05:30) (100% - 100%)    Parameters below as of 27 Jan 2023 05:30  Patient On (Oxygen Delivery Method): room air        GENERAL: Answering questions intermittently appropriately. Appears comfortable.  HEAD:  Atraumatic, Normocephalic  EYES: EOMI, PERRLA, conjunctiva and sclera clear  ENT: Dry mucous membranes  NECK: Supple, No elevated JVP.  CHEST/LUNG: Expiratory wheeze right lung fields.   HEART: AFIB; No murmurs  Peripheral edema: None  Subclavian skin tenting: Slow response  ABDOMEN: Non-tender non-distended.   EXTREMITIES:  No clubbing, cyanosis, or edema  NERVOUS SYSTEM:  A&Ox1 ,right arm contracted  SKIN: No rashes or lesion      LABS:                        10.4   4.71  )-----------( 158      ( 26 Jan 2023 07:00 )             32.2     01-26    138  |  108<H>  |  8   ----------------------------<  69<L>  3.7   |  24  |  0.75    Ca    8.7      26 Jan 2023 07:00  Phos  2.1     01-26  Mg     1.60     01-26    TPro  5.2<L>  /  Alb  2.5<L>  /  TBili  0.8  /  DBili  x   /  AST  26  /  ALT  34  /  AlkPhos  68  01-26                RADIOLOGY & ADDITIONAL TESTS:  Lab Results Reviewed   Imaging Reviewed  Electrocardiogram Reviewed

## 2023-01-27 NOTE — PROGRESS NOTE ADULT - ASSESSMENT
80yo largely bedbound M with hx of HTN, dementia, prior CVA, CAD presenting to hospital with chest pain, here for pansensitive e coli bacteremia secondary to UTI. Course complicated by hypoglycemia.

## 2023-01-27 NOTE — PROGRESS NOTE ADULT - ASSESSMENT
82-year-old male pmhx of CVA 10 years prior, dementia, diabetes, hyperlipidemia, CAD comes to ED w/ nausea, vomiting, chest pain, SOB, abdominal pain.  Septic from e.coli bacteremia. History limited due to patient's baseline nonverbal dementia status. Endocrine consulted for low BG. Concern for inappropriately normal insulin and c-peptide levels but BG was normal at the time of blood collection.    1) Hypoglycemia - unable to assess for whipple's triad due to altered mental status  suspecting related to poor po intake, and sepsis, encourage po intake, tx of infection.  Concern was raised for inappropriately normal insulin and c-peptide levels but BG was normal at the time of blood collection.  AM cortisol 7.0 before 7am , repeat at 8am was 8. Albumin <3, may be leading to lower cortisol reading. Lower clinical suspicion for adrenal insufficiency, can rule out with cosyntropin stim testing as below    COSYNTROPIN STIM TESTING:  - recommend cosyntropin stim testing: can be done anytime or can coordinate with AM labs  - measure serum ACTH and cortisol at time 0  - give IV cosyntropin 250 mcg [0.25 mg] (time 0)  - measure serum cortisol at 30 minutes  - measure serum cortisol at 60 minutes    If cortisol >18 at 60 minutes rules out AI from adrenal issue  Need ACTH to interpret overall axis so please order ACTH with AM labs if not done already    TSH normal  FS q4h  no insulin/correction scale  Off D5@ 75 cc/hour. Monitor PO intake and FSG q6h  if hypoglycemic again (BG < 55) - send BMP, insulin level, c-peptide, pro-insulin, beta hydroxy butyrate STAT while FSG is low. If altered, prioritize treatment of hypo over labs    2) DM2 in remission  HbA1c 5.3% last checked  stopped metformin on dc in Apr 2022  no insulin/correction scale needed    3) Hyperlipidemia  atorvastatin 80mg    discussed with team    Erica Ojeda MD  Attending Physician   Department of Endocrinology, Diabetes and Metabolism   [employer] Teams on 01-27-23 @ 12:40    If before 9AM or after 5PM, or on weekends/holidays, please call the Endocrine answering service for assistance ([phone]).  For nonurgent matters, please email [email or web address] for assistance.

## 2023-01-27 NOTE — PROGRESS NOTE ADULT - PROBLEM SELECTOR PLAN 3
- Urine culture with pan sensitive ecoli  - Zosyn (1/17-1/18)  - Ceftriaxone (1/18-1/25)  - Prior urine cultures with candida, ecoli. MRSA ISO MRSA bacteremia.  - TOV successful - Urine culture with pan sensitive ecoli  - Zosyn (1/17-1/18)  - Ceftriaxone (1/18-1/25)  - Prior urine cultures with candida, ecoli. MRSA ISO MRSA bacteremia.  - TOV successful  - resolved

## 2023-01-27 NOTE — PROGRESS NOTE ADULT - PROBLEM SELECTOR PLAN 1
- D5LR  - f/u BHB, insulin, c-peptide, proinsulin.  - Endocrine consulted for concern for lab-work concerning for insulinoma vs autoimmune insulin syndrome vs excessive insulin production. - D5LR  - f/u BHB, insulin, c-peptide, proinsulin.  - Endocrine consulted for concern for lab-work concerning for insulinoma vs autoimmune insulin syndrome vs excessive insulin production.  - Labs drawn with FSG > 55,   - If patient FSG <55, will draw labs.  - Will do cosyntropin testing

## 2023-01-28 LAB
ACTH SER-ACNC: 21.1 PG/ML — SIGNIFICANT CHANGE UP (ref 7.2–63.3)
ALBUMIN SERPL ELPH-MCNC: 2.3 G/DL — LOW (ref 3.3–5)
ALP SERPL-CCNC: 66 U/L — SIGNIFICANT CHANGE UP (ref 40–120)
ALT FLD-CCNC: 30 U/L — SIGNIFICANT CHANGE UP (ref 4–41)
ANION GAP SERPL CALC-SCNC: 4 MMOL/L — LOW (ref 7–14)
AST SERPL-CCNC: 23 U/L — SIGNIFICANT CHANGE UP (ref 4–40)
BASOPHILS # BLD AUTO: 0.02 K/UL — SIGNIFICANT CHANGE UP (ref 0–0.2)
BASOPHILS NFR BLD AUTO: 0.4 % — SIGNIFICANT CHANGE UP (ref 0–2)
BILIRUB SERPL-MCNC: 0.9 MG/DL — SIGNIFICANT CHANGE UP (ref 0.2–1.2)
BUN SERPL-MCNC: 8 MG/DL — SIGNIFICANT CHANGE UP (ref 7–23)
CALCIUM SERPL-MCNC: 8.8 MG/DL — SIGNIFICANT CHANGE UP (ref 8.4–10.5)
CHLORIDE SERPL-SCNC: 108 MMOL/L — HIGH (ref 98–107)
CO2 SERPL-SCNC: 25 MMOL/L — SIGNIFICANT CHANGE UP (ref 22–31)
CREAT SERPL-MCNC: 0.8 MG/DL — SIGNIFICANT CHANGE UP (ref 0.5–1.3)
EGFR: 88 ML/MIN/1.73M2 — SIGNIFICANT CHANGE UP
EOSINOPHIL # BLD AUTO: 0.23 K/UL — SIGNIFICANT CHANGE UP (ref 0–0.5)
EOSINOPHIL NFR BLD AUTO: 4.7 % — SIGNIFICANT CHANGE UP (ref 0–6)
GLUCOSE BLDC GLUCOMTR-MCNC: 104 MG/DL — HIGH (ref 70–99)
GLUCOSE BLDC GLUCOMTR-MCNC: 117 MG/DL — HIGH (ref 70–99)
GLUCOSE BLDC GLUCOMTR-MCNC: 122 MG/DL — HIGH (ref 70–99)
GLUCOSE BLDC GLUCOMTR-MCNC: 131 MG/DL — HIGH (ref 70–99)
GLUCOSE BLDC GLUCOMTR-MCNC: 62 MG/DL — LOW (ref 70–99)
GLUCOSE BLDC GLUCOMTR-MCNC: 64 MG/DL — LOW (ref 70–99)
GLUCOSE BLDC GLUCOMTR-MCNC: 89 MG/DL — SIGNIFICANT CHANGE UP (ref 70–99)
GLUCOSE BLDC GLUCOMTR-MCNC: 95 MG/DL — SIGNIFICANT CHANGE UP (ref 70–99)
GLUCOSE SERPL-MCNC: 103 MG/DL — HIGH (ref 70–99)
HCT VFR BLD CALC: 29.6 % — LOW (ref 39–50)
HGB BLD-MCNC: 9.6 G/DL — LOW (ref 13–17)
IANC: 2.45 K/UL — SIGNIFICANT CHANGE UP (ref 1.8–7.4)
IMM GRANULOCYTES NFR BLD AUTO: 1 % — HIGH (ref 0–0.9)
LYMPHOCYTES # BLD AUTO: 1.6 K/UL — SIGNIFICANT CHANGE UP (ref 1–3.3)
LYMPHOCYTES # BLD AUTO: 32.5 % — SIGNIFICANT CHANGE UP (ref 13–44)
MAGNESIUM SERPL-MCNC: 1.6 MG/DL — SIGNIFICANT CHANGE UP (ref 1.6–2.6)
MCHC RBC-ENTMCNC: 30.1 PG — SIGNIFICANT CHANGE UP (ref 27–34)
MCHC RBC-ENTMCNC: 32.4 GM/DL — SIGNIFICANT CHANGE UP (ref 32–36)
MCV RBC AUTO: 92.8 FL — SIGNIFICANT CHANGE UP (ref 80–100)
MONOCYTES # BLD AUTO: 0.57 K/UL — SIGNIFICANT CHANGE UP (ref 0–0.9)
MONOCYTES NFR BLD AUTO: 11.6 % — SIGNIFICANT CHANGE UP (ref 2–14)
NEUTROPHILS # BLD AUTO: 2.45 K/UL — SIGNIFICANT CHANGE UP (ref 1.8–7.4)
NEUTROPHILS NFR BLD AUTO: 49.8 % — SIGNIFICANT CHANGE UP (ref 43–77)
NRBC # BLD: 0 /100 WBCS — SIGNIFICANT CHANGE UP (ref 0–0)
NRBC # FLD: 0 K/UL — SIGNIFICANT CHANGE UP (ref 0–0)
PHOSPHATE SERPL-MCNC: 2.6 MG/DL — SIGNIFICANT CHANGE UP (ref 2.5–4.5)
PLATELET # BLD AUTO: 185 K/UL — SIGNIFICANT CHANGE UP (ref 150–400)
POTASSIUM SERPL-MCNC: 3.6 MMOL/L — SIGNIFICANT CHANGE UP (ref 3.5–5.3)
POTASSIUM SERPL-SCNC: 3.6 MMOL/L — SIGNIFICANT CHANGE UP (ref 3.5–5.3)
PROT SERPL-MCNC: 5 G/DL — LOW (ref 6–8.3)
RBC # BLD: 3.19 M/UL — LOW (ref 4.2–5.8)
RBC # FLD: 13.4 % — SIGNIFICANT CHANGE UP (ref 10.3–14.5)
SODIUM SERPL-SCNC: 137 MMOL/L — SIGNIFICANT CHANGE UP (ref 135–145)
WBC # BLD: 4.92 K/UL — SIGNIFICANT CHANGE UP (ref 3.8–10.5)
WBC # FLD AUTO: 4.92 K/UL — SIGNIFICANT CHANGE UP (ref 3.8–10.5)

## 2023-01-28 PROCEDURE — 99233 SBSQ HOSP IP/OBS HIGH 50: CPT | Mod: GC

## 2023-01-28 PROCEDURE — 99232 SBSQ HOSP IP/OBS MODERATE 35: CPT

## 2023-01-28 RX ORDER — MAGNESIUM SULFATE 500 MG/ML
1 VIAL (ML) INJECTION ONCE
Refills: 0 | Status: COMPLETED | OUTPATIENT
Start: 2023-01-28 | End: 2023-01-28

## 2023-01-28 RX ORDER — COSYNTROPIN 0.25 MG/ML
0.25 INJECTION, SOLUTION INTRAVENOUS ONCE
Refills: 0 | Status: COMPLETED | OUTPATIENT
Start: 2023-01-28 | End: 2023-12-27

## 2023-01-28 RX ORDER — COSYNTROPIN 0.25 MG/ML
0.25 INJECTION, SOLUTION INTRAVENOUS ONCE
Refills: 0 | Status: COMPLETED | OUTPATIENT
Start: 2023-01-28 | End: 2023-01-28

## 2023-01-28 RX ORDER — DEXTROSE 50 % IN WATER 50 %
12.5 SYRINGE (ML) INTRAVENOUS ONCE
Refills: 0 | Status: COMPLETED | OUTPATIENT
Start: 2023-01-28 | End: 2023-01-28

## 2023-01-28 RX ADMIN — TAMSULOSIN HYDROCHLORIDE 0.4 MILLIGRAM(S): 0.4 CAPSULE ORAL at 21:52

## 2023-01-28 RX ADMIN — SENNA PLUS 1 TABLET(S): 8.6 TABLET ORAL at 11:14

## 2023-01-28 RX ADMIN — POLYETHYLENE GLYCOL 3350 17 GRAM(S): 17 POWDER, FOR SOLUTION ORAL at 11:13

## 2023-01-28 RX ADMIN — Medication 81 MILLIGRAM(S): at 11:12

## 2023-01-28 RX ADMIN — ENOXAPARIN SODIUM 40 MILLIGRAM(S): 100 INJECTION SUBCUTANEOUS at 06:27

## 2023-01-28 RX ADMIN — Medication 12.5 GRAM(S): at 06:31

## 2023-01-28 RX ADMIN — MAGNESIUM OXIDE 400 MG ORAL TABLET 400 MILLIGRAM(S): 241.3 TABLET ORAL at 11:13

## 2023-01-28 RX ADMIN — COSYNTROPIN 0.25 MILLIGRAM(S): 0.25 INJECTION, SOLUTION INTRAVENOUS at 14:23

## 2023-01-28 RX ADMIN — Medication 1 APPLICATION(S): at 06:59

## 2023-01-28 RX ADMIN — LIDOCAINE 1 APPLICATION(S): 4 CREAM TOPICAL at 18:34

## 2023-01-28 RX ADMIN — ATORVASTATIN CALCIUM 80 MILLIGRAM(S): 80 TABLET, FILM COATED ORAL at 21:52

## 2023-01-28 RX ADMIN — LIDOCAINE 1 APPLICATION(S): 4 CREAM TOPICAL at 06:28

## 2023-01-28 RX ADMIN — Medication 100 GRAM(S): at 11:11

## 2023-01-28 NOTE — PROVIDER CONTACT NOTE (OTHER) - ACTION/TREATMENT ORDERED:
ACP Hesham made aware and order Duoneb for wheezing. No further instructions regarding the blood glucose. ACP is comfortable with the blood glucose level. Safety maintained
MD made aware, no interventions needed. Nursing care continued.
no recommendation at this time. will continue monitoring.
MD made aware. will order nebulizer and xray. will extend iv
MD made aware, no new orders, continue to monitor
MD Don Eli notified and made aware. No new orders at this time.
MD made aware no interventions needed at this time.
MD made aware, no recommendations at this time. Nursing care continued

## 2023-01-28 NOTE — PROGRESS NOTE ADULT - ASSESSMENT
82-year-old male pmhx of CVA 10 years prior, dementia, diabetes, hyperlipidemia, CAD comes to ED w/ nausea, vomiting, chest pain, SOB, abdominal pain.  Septic from e.coli bacteremia. History limited due to patient's baseline nonverbal dementia status. Endocrine consulted for low BG. Concern for inappropriately normal insulin and c-peptide levels but BG was normal at the time of blood collection.    1) Hypoglycemia - unable to assess for whipple's triad due to altered mental status  suspecting related to poor po intake, and sepsis, encourage po intake, tx of infection.  Concern was raised for inappropriately normal insulin and c-peptide levels but BG was normal at the time of blood collection.  AM cortisol 7.0 before 7am , repeat at 8am was 8. Albumin <3, may be leading to lower cortisol reading. Lower clinical suspicion for adrenal insufficiency, can rule out with cosyntropin stim testing as below    COSYNTROPIN STIM TESTING:  - recommend cosyntropin stim testing: can be done anytime or can coordinate with AM labs  - measure serum ACTH and cortisol at time 0  - give IV cosyntropin 250 mcg [0.25 mg] (time 0)  - measure serum cortisol at 30 minutes  - measure serum cortisol at 60 minutes    If cortisol >18 at 60 minutes rules out AI from adrenal issue  Need ACTH to interpret overall axis so please order ACTH with AM labs if not done already    TSH normal  FS q4h  no insulin/correction scale  Off D5@ 75 cc/hour. Monitor PO intake and FSG q6h  if hypoglycemic again (BG < 55) - send BMP, insulin level, c-peptide, pro-insulin, beta hydroxy butyrate STAT while FSG is low. If altered, prioritize treatment of hypo over labs  Recommendation:   -Currently on dextrose ivf   -At this time will need to obtain hypoglycemia labs when patient is undergoing a hypoglycemic episode, < 55mg/dl     Protocol below is activated:   -Start off by measuring fingerstick every 1 hour, once noted to have a FS less or equal to 55 --> Please draw BMP, insulin, pro-insulin, cortisol, beta hydroxybutyrate, cpeptide   (Please keep lab tubes at bedside)  -Can resume D5 drip after that      2) DM2 in remission  HbA1c 5.3% last checked  stopped metformin on dc in Apr 2022  no insulin/correction scale needed given hypoglycemia and poor po intake     3) Hyperlipidemia  atorvastatin 80mg    discussed with medicine resident

## 2023-01-28 NOTE — PROGRESS NOTE ADULT - SUBJECTIVE AND OBJECTIVE BOX
Chief Complaint/Follow-up on: concern for hypoglycemia    Subjective:  hypoglycemia noted this am   awaiting stim test       MEDICATIONS  (STANDING):  albuterol/ipratropium for Nebulization 3 milliLiter(s) Nebulizer once  albuterol/ipratropium for Nebulization. 3 milliLiter(s) Nebulizer once  aspirin  chewable 81 milliGRAM(s) Oral daily  atorvastatin 80 milliGRAM(s) Oral at bedtime  bisacodyl Suppository 10 milliGRAM(s) Rectal once  dextrose 50% Injectable 25 Gram(s) IV Push once  dextrose 50% Injectable 12.5 Gram(s) IV Push once  dextrose 50% Injectable 25 Gram(s) IV Push once  dextrose Oral Gel 15 Gram(s) Oral once  enoxaparin Injectable 40 milliGRAM(s) SubCutaneous every 24 hours  glucagon  Injectable 1 milliGRAM(s) IntraMuscular once  lidocaine 2% Gel 1 Application(s) Topical two times a day  magnesium oxide 400 milliGRAM(s) Oral daily  petrolatum white Ointment 1 Application(s) Topical two times a day  polyethylene glycol 3350 17 Gram(s) Oral daily  senna 1 Tablet(s) Oral daily  tamsulosin 0.4 milliGRAM(s) Oral at bedtime    MEDICATIONS  (PRN):  acetaminophen     Tablet .. 650 milliGRAM(s) Oral every 6 hours PRN Temp greater or equal to 38C (100.4F), Mild Pain (1 - 3)  oxymetazoline 0.05% Nasal Spray 2 Spray(s) Both Nostrils every 12 hours PRN Nasal Congestion      PHYSICAL EXAM:  VITALS: T(C): 36.7 (01-28-23 @ 12:42)  T(F): 98.1 (01-28-23 @ 12:42), Max: 98.5 (01-28-23 @ 04:47)  HR: 50 (01-28-23 @ 12:42) (50 - 62)  BP: 133/65 (01-28-23 @ 12:42) (133/65 - 155/78)  RR:  (16 - 17)  SpO2:  (98% - 100%)  Wt(kg): --  GENERAL: NAD, elderly male   HEENT:  Atraumatic, Normocephalic, moist mucous membranes  RESPIRATORY: Clear to auscultation bilaterally; No rales, rhonchi, wheezing, or rubs  CARDIOVASCULAR: Regular rate and rhythm; No murmurs; no peripheral edema  GI: Soft, nontender, non distended, normal bowel sounds      POCT Blood Glucose.: 95 mg/dL (01-28-23 @ 12:11)  POCT Blood Glucose.: 89 mg/dL (01-28-23 @ 08:41)  POCT Blood Glucose.: 131 mg/dL (01-28-23 @ 06:52)  POCT Blood Glucose.: 64 mg/dL (01-28-23 @ 06:20)  POCT Blood Glucose.: 62 mg/dL (01-28-23 @ 06:18)  POCT Blood Glucose.: 121 mg/dL (01-27-23 @ 23:44)  POCT Blood Glucose.: 89 mg/dL (01-27-23 @ 18:46)  POCT Blood Glucose.: 87 mg/dL (01-27-23 @ 17:48)  POCT Blood Glucose.: 87 mg/dL (01-27-23 @ 16:53)  POCT Blood Glucose.: 76 mg/dL (01-27-23 @ 12:16)  POCT Blood Glucose.: 78 mg/dL (01-27-23 @ 08:46)  POCT Blood Glucose.: 116 mg/dL (01-26-23 @ 22:18)  POCT Blood Glucose.: 104 mg/dL (01-26-23 @ 19:40)  POCT Blood Glucose.: 91 mg/dL (01-26-23 @ 17:49)  POCT Blood Glucose.: 65 mg/dL (01-26-23 @ 17:47)  POCT Blood Glucose.: 149 mg/dL (01-26-23 @ 11:54)  POCT Blood Glucose.: 61 mg/dL (01-26-23 @ 10:34)  POCT Blood Glucose.: 66 mg/dL (01-26-23 @ 09:36)  POCT Blood Glucose.: 62 mg/dL (01-26-23 @ 09:35)  POCT Blood Glucose.: 59 mg/dL (01-26-23 @ 08:56)  POCT Blood Glucose.: 55 mg/dL (01-26-23 @ 08:54)  POCT Blood Glucose.: 79 mg/dL (01-26-23 @ 05:20)  POCT Blood Glucose.: 95 mg/dL (01-25-23 @ 23:24)  POCT Blood Glucose.: 88 mg/dL (01-25-23 @ 22:41)  POCT Blood Glucose.: 72 mg/dL (01-25-23 @ 21:38)  POCT Blood Glucose.: 77 mg/dL (01-25-23 @ 17:40)    01-28    137  |  108<H>  |  8   ----------------------------<  103<H>  3.6   |  25  |  0.80    eGFR: 88    Ca    8.8      01-28  Mg     1.60     01-28  Phos  2.6     01-28    TPro  5.0<L>  /  Alb  2.3<L>  /  TBili  0.9  /  DBili  x   /  AST  23  /  ALT  30  /  AlkPhos  66  01-28          Thyroid Function Tests:  01-25 @ 06:56 TSH 3.40 FreeT4 -- T3 -- Anti TPO -- Anti Thyroglobulin Ab -- TSI --

## 2023-01-28 NOTE — PROVIDER CONTACT NOTE (OTHER) - RECOMMENDATIONS
Notified the provider
Notify MD
Notify MD
MD Don Eli notified and made aware.
Notify MD
Notify MD
continue to monitor
notify MD

## 2023-01-28 NOTE — PROGRESS NOTE ADULT - PROBLEM SELECTOR PLAN 1
- D5LR  - f/u BHB, insulin, c-peptide, proinsulin.  - Endocrine consulted for concern for lab-work concerning for insulinoma vs autoimmune insulin syndrome vs excessive insulin production.  - Labs drawn with FSG > 55,   - If patient FSG <55, will draw labs.  - Will do cosyntropin testing

## 2023-01-28 NOTE — PROGRESS NOTE ADULT - PROBLEM SELECTOR PLAN 3
- Urine culture with pan sensitive ecoli  - Zosyn (1/17-1/18)  - Ceftriaxone (1/18-1/25)  - Prior urine cultures with candida, ecoli. MRSA ISO MRSA bacteremia.  - TOV successful  - resolved

## 2023-01-28 NOTE — PROVIDER CONTACT NOTE (OTHER) - SITUATION
Patient HR 52. Patient is asymptomatic.
/96
Patient's  DBP is less than 60 (52)
pt's DBP is less than 60 (52)
Patient's HR 50
Pt. blood glucose 72 no s/s of hypoglycemia. Pt. is also wheezing on expiration, Pt. is currently eating food from home. blood glucose repeat 95.
Patient wheezing on assessment. extend R 20g IV
Patient's /85, HR 46

## 2023-01-28 NOTE — PROGRESS NOTE ADULT - SUBJECTIVE AND OBJECTIVE BOX
Patient is a 82y old  Male who presents with a chief complaint of sepsis (26 Jan 2023 17:16)      SUBJECTIVE / OVERNIGHT EVENTS: Patient seen and examined at bedside.    MEDICATIONS  (STANDING):  albuterol/ipratropium for Nebulization 3 milliLiter(s) Nebulizer once  albuterol/ipratropium for Nebulization. 3 milliLiter(s) Nebulizer once  aspirin  chewable 81 milliGRAM(s) Oral daily  atorvastatin 80 milliGRAM(s) Oral at bedtime  bisacodyl Suppository 10 milliGRAM(s) Rectal once  dextrose 50% Injectable 25 Gram(s) IV Push once  dextrose 50% Injectable 12.5 Gram(s) IV Push once  dextrose 50% Injectable 25 Gram(s) IV Push once  dextrose Oral Gel 15 Gram(s) Oral once  enoxaparin Injectable 40 milliGRAM(s) SubCutaneous every 24 hours  glucagon  Injectable 1 milliGRAM(s) IntraMuscular once  lidocaine 2% Gel 1 Application(s) Topical two times a day  magnesium oxide 400 milliGRAM(s) Oral daily  petrolatum white Ointment 1 Application(s) Topical two times a day  polyethylene glycol 3350 17 Gram(s) Oral daily  senna 1 Tablet(s) Oral daily  tamsulosin 0.4 milliGRAM(s) Oral at bedtime    MEDICATIONS  (PRN):  acetaminophen     Tablet .. 650 milliGRAM(s) Oral every 6 hours PRN Temp greater or equal to 38C (100.4F), Mild Pain (1 - 3)  oxymetazoline 0.05% Nasal Spray 2 Spray(s) Both Nostrils every 12 hours PRN Nasal Congestion      Vital Signs Last 24 Hrs  T(C): 36.9 (28 Jan 2023 04:47), Max: 36.9 (28 Jan 2023 04:47)  T(F): 98.5 (28 Jan 2023 04:47), Max: 98.5 (28 Jan 2023 04:47)  HR: 61 (28 Jan 2023 04:47) (51 - 62)  BP: 137/69 (28 Jan 2023 04:47) (137/69 - 162/76)  BP(mean): --  RR: 16 (28 Jan 2023 04:47) (16 - 17)  SpO2: 98% (28 Jan 2023 04:47) (97% - 98%)    Parameters below as of 28 Jan 2023 04:47  Patient On (Oxygen Delivery Method): room air      CAPILLARY BLOOD GLUCOSE      POCT Blood Glucose.: 131 mg/dL (28 Jan 2023 06:52)  POCT Blood Glucose.: 64 mg/dL (28 Jan 2023 06:20)  POCT Blood Glucose.: 62 mg/dL (28 Jan 2023 06:18)  POCT Blood Glucose.: 121 mg/dL (27 Jan 2023 23:44)  POCT Blood Glucose.: 89 mg/dL (27 Jan 2023 18:46)  POCT Blood Glucose.: 87 mg/dL (27 Jan 2023 17:48)  POCT Blood Glucose.: 87 mg/dL (27 Jan 2023 16:53)  POCT Blood Glucose.: 76 mg/dL (27 Jan 2023 12:16)  POCT Blood Glucose.: 78 mg/dL (27 Jan 2023 08:46)    I&O's Summary      PHYSICAL EXAM:      LABS:                        11.3   4.61  )-----------( 193      ( 27 Jan 2023 08:00 )             33.9     01-27    139  |  106  |  6<L>  ----------------------------<  76  3.9   |  26  |  0.75    Ca    8.8      27 Jan 2023 08:00  Phos  2.5     01-27  Mg     1.70     01-27    TPro  5.5<L>  /  Alb  2.7<L>  /  TBili  1.0  /  DBili  x   /  AST  23  /  ALT  30  /  AlkPhos  73  01-27              RADIOLOGY & ADDITIONAL TESTS:    Imaging Personally Reviewed:    Consultant(s) Notes Reviewed:      Care Discussed with Consultants/Other Providers:    Carmen Tenorio, PGY-2; [facility] Pager: [phone]; [facility] Pager: [phone]   Patient is a 82y old  Male who presents with a chief complaint of sepsis (26 Jan 2023 17:16)      SUBJECTIVE / OVERNIGHT EVENTS: Patient seen and examined at bedside. Pt resting comfortably in bed, has no acute complaints. Answering questions appropriately. Denies chest pain, SOB, abdominal pain, muscle pain, increased thirst or headaches.     MEDICATIONS  (STANDING):  albuterol/ipratropium for Nebulization 3 milliLiter(s) Nebulizer once  albuterol/ipratropium for Nebulization. 3 milliLiter(s) Nebulizer once  aspirin  chewable 81 milliGRAM(s) Oral daily  atorvastatin 80 milliGRAM(s) Oral at bedtime  bisacodyl Suppository 10 milliGRAM(s) Rectal once  dextrose 50% Injectable 25 Gram(s) IV Push once  dextrose 50% Injectable 12.5 Gram(s) IV Push once  dextrose 50% Injectable 25 Gram(s) IV Push once  dextrose Oral Gel 15 Gram(s) Oral once  enoxaparin Injectable 40 milliGRAM(s) SubCutaneous every 24 hours  glucagon  Injectable 1 milliGRAM(s) IntraMuscular once  lidocaine 2% Gel 1 Application(s) Topical two times a day  magnesium oxide 400 milliGRAM(s) Oral daily  petrolatum white Ointment 1 Application(s) Topical two times a day  polyethylene glycol 3350 17 Gram(s) Oral daily  senna 1 Tablet(s) Oral daily  tamsulosin 0.4 milliGRAM(s) Oral at bedtime    MEDICATIONS  (PRN):  acetaminophen     Tablet .. 650 milliGRAM(s) Oral every 6 hours PRN Temp greater or equal to 38C (100.4F), Mild Pain (1 - 3)  oxymetazoline 0.05% Nasal Spray 2 Spray(s) Both Nostrils every 12 hours PRN Nasal Congestion      Vital Signs Last 24 Hrs  T(C): 36.9 (28 Jan 2023 04:47), Max: 36.9 (28 Jan 2023 04:47)  T(F): 98.5 (28 Jan 2023 04:47), Max: 98.5 (28 Jan 2023 04:47)  HR: 61 (28 Jan 2023 04:47) (51 - 62)  BP: 137/69 (28 Jan 2023 04:47) (137/69 - 162/76)  BP(mean): --  RR: 16 (28 Jan 2023 04:47) (16 - 17)  SpO2: 98% (28 Jan 2023 04:47) (97% - 98%)    Parameters below as of 28 Jan 2023 04:47  Patient On (Oxygen Delivery Method): room air      CAPILLARY BLOOD GLUCOSE      POCT Blood Glucose.: 131 mg/dL (28 Jan 2023 06:52)  POCT Blood Glucose.: 64 mg/dL (28 Jan 2023 06:20)  POCT Blood Glucose.: 62 mg/dL (28 Jan 2023 06:18)  POCT Blood Glucose.: 121 mg/dL (27 Jan 2023 23:44)  POCT Blood Glucose.: 89 mg/dL (27 Jan 2023 18:46)  POCT Blood Glucose.: 87 mg/dL (27 Jan 2023 17:48)  POCT Blood Glucose.: 87 mg/dL (27 Jan 2023 16:53)  POCT Blood Glucose.: 76 mg/dL (27 Jan 2023 12:16)  POCT Blood Glucose.: 78 mg/dL (27 Jan 2023 08:46)    I&O's Summary      PHYSICAL EXAM:  GENERAL APPEARANCE: Well developed, NAD  HEENT:  PERRL, EOMI. hearing grossly intact.  CARDIAC: Normal S1 and S2. no mrg. RRR  LUNGS: R lung sounds rhodochrous   ABDOMEN: Soft , NTND, bowel sounds normal. No guarding or rebound.   MUSCULOSKELETAL: R arm contracted  EXTREMITIES: No edema. Peripheral pulses intact.   NEUROLOGICAL: Non focal. Strength and sensation symmetric and intact throughout.   SKIN: Warm and dry , Well perfused  PSYCHIATRIC: AOx1,      GENERAL: Answering questions intermittently appropriately. Appears comfortable.  HEAD:  Atraumatic, Normocephalic  EYES: EOMI, PERRLA, conjunctiva and sclera clear  ENT: Dry mucous membranes  NECK: Supple, No elevated JVP.  CHEST/LUNG: Expiratory wheeze right lung fields.   HEART: AFIB; No murmurs  Peripheral edema: None  Subclavian skin tenting: Slow response  ABDOMEN: Non-tender non-distended.   EXTREMITIES:  No clubbing, cyanosis, or edema  NERVOUS SYSTEM:  A&Ox1 ,right arm contracted  SKIN: No rashes or lesion      LABS:                        11.3   4.61  )-----------( 193      ( 27 Jan 2023 08:00 )             33.9     01-27    139  |  106  |  6<L>  ----------------------------<  76  3.9   |  26  |  0.75    Ca    8.8      27 Jan 2023 08:00  Phos  2.5     01-27  Mg     1.70     01-27    TPro  5.5<L>  /  Alb  2.7<L>  /  TBili  1.0  /  DBili  x   /  AST  23  /  ALT  30  /  AlkPhos  73  01-27              RADIOLOGY & ADDITIONAL TESTS:    Imaging Personally Reviewed:    Consultant(s) Notes Reviewed:      Care Discussed with Consultants/Other Providers:    Carmen Tenorio, PGY-2; [facility] Pager: [phone]; [facility] Pager: [phone]

## 2023-01-28 NOTE — PROVIDER CONTACT NOTE (OTHER) - DATE AND TIME:
18-Jan-2023 00:12
28-Jan-2023 12:45
24-Jan-2023 21:56
20-Jan-2023 21:50
22-Jan-2023 07:10
23-Jan-2023 12:20
18-Jan-2023 13:05
25-Jan-2023 09:59

## 2023-01-28 NOTE — PROVIDER CONTACT NOTE (OTHER) - NAME OF MD/NP/PA/DO NOTIFIED:
Emily Dahl MD
MD Hesham Kee
RADHA ORANTES
Charlee COVARRUBIAS
Darwin Deshpande MD
Don Eli
Prashant Suarez MD
Dr.Ahmed Ochoa/[id number]

## 2023-01-28 NOTE — PROVIDER CONTACT NOTE (OTHER) - BACKGROUND
83 y/o male admitted for UTI with hx of DM, HLD, HTN, CVA, dementia, asthma
81 y/o male admitted for UTI with hx of DM, HLD, HTN, CVA, dementia, asthma
Dx UTI
Patient admitted for urinary tract infection.
Patient admitted for urinary tract infection.
pt is admitted with UTI
Patient admitted for UTI. PMH; DM, HLD, CAD
pt is admitted with UTI

## 2023-01-29 LAB
ALBUMIN SERPL ELPH-MCNC: 2.9 G/DL — LOW (ref 3.3–5)
ALP SERPL-CCNC: 71 U/L — SIGNIFICANT CHANGE UP (ref 40–120)
ALT FLD-CCNC: 24 U/L — SIGNIFICANT CHANGE UP (ref 4–41)
ANION GAP SERPL CALC-SCNC: 6 MMOL/L — LOW (ref 7–14)
ANION GAP SERPL CALC-SCNC: 6 MMOL/L — LOW (ref 7–14)
AST SERPL-CCNC: 21 U/L — SIGNIFICANT CHANGE UP (ref 4–40)
B-OH-BUTYR SERPL-SCNC: <0 MMOL/L — SIGNIFICANT CHANGE UP (ref 0–0.4)
BASOPHILS # BLD AUTO: 0.04 K/UL — SIGNIFICANT CHANGE UP (ref 0–0.2)
BASOPHILS NFR BLD AUTO: 0.9 % — SIGNIFICANT CHANGE UP (ref 0–2)
BILIRUB SERPL-MCNC: 1 MG/DL — SIGNIFICANT CHANGE UP (ref 0.2–1.2)
BUN SERPL-MCNC: 8 MG/DL — SIGNIFICANT CHANGE UP (ref 7–23)
BUN SERPL-MCNC: 8 MG/DL — SIGNIFICANT CHANGE UP (ref 7–23)
CALCIUM SERPL-MCNC: 8.9 MG/DL — SIGNIFICANT CHANGE UP (ref 8.4–10.5)
CALCIUM SERPL-MCNC: 9.1 MG/DL — SIGNIFICANT CHANGE UP (ref 8.4–10.5)
CHLORIDE SERPL-SCNC: 104 MMOL/L — SIGNIFICANT CHANGE UP (ref 98–107)
CHLORIDE SERPL-SCNC: 105 MMOL/L — SIGNIFICANT CHANGE UP (ref 98–107)
CO2 SERPL-SCNC: 24 MMOL/L — SIGNIFICANT CHANGE UP (ref 22–31)
CO2 SERPL-SCNC: 27 MMOL/L — SIGNIFICANT CHANGE UP (ref 22–31)
CREAT SERPL-MCNC: 0.73 MG/DL — SIGNIFICANT CHANGE UP (ref 0.5–1.3)
CREAT SERPL-MCNC: 0.78 MG/DL — SIGNIFICANT CHANGE UP (ref 0.5–1.3)
EGFR: 88 ML/MIN/1.73M2 — SIGNIFICANT CHANGE UP
EGFR: 90 ML/MIN/1.73M2 — SIGNIFICANT CHANGE UP
EOSINOPHIL # BLD AUTO: 0.18 K/UL — SIGNIFICANT CHANGE UP (ref 0–0.5)
EOSINOPHIL NFR BLD AUTO: 4 % — SIGNIFICANT CHANGE UP (ref 0–6)
GLUCOSE BLDC GLUCOMTR-MCNC: 157 MG/DL — HIGH (ref 70–99)
GLUCOSE BLDC GLUCOMTR-MCNC: 45 MG/DL — CRITICAL LOW (ref 70–99)
GLUCOSE BLDC GLUCOMTR-MCNC: 63 MG/DL — LOW (ref 70–99)
GLUCOSE BLDC GLUCOMTR-MCNC: 67 MG/DL — LOW (ref 70–99)
GLUCOSE BLDC GLUCOMTR-MCNC: 75 MG/DL — SIGNIFICANT CHANGE UP (ref 70–99)
GLUCOSE BLDC GLUCOMTR-MCNC: 77 MG/DL — SIGNIFICANT CHANGE UP (ref 70–99)
GLUCOSE BLDC GLUCOMTR-MCNC: 78 MG/DL — SIGNIFICANT CHANGE UP (ref 70–99)
GLUCOSE BLDC GLUCOMTR-MCNC: 88 MG/DL — SIGNIFICANT CHANGE UP (ref 70–99)
GLUCOSE BLDC GLUCOMTR-MCNC: 89 MG/DL — SIGNIFICANT CHANGE UP (ref 70–99)
GLUCOSE SERPL-MCNC: 101 MG/DL — HIGH (ref 70–99)
GLUCOSE SERPL-MCNC: 67 MG/DL — LOW (ref 70–99)
HCT VFR BLD CALC: 32.5 % — LOW (ref 39–50)
HGB BLD-MCNC: 10.5 G/DL — LOW (ref 13–17)
IANC: 2.11 K/UL — SIGNIFICANT CHANGE UP (ref 1.8–7.4)
IMM GRANULOCYTES NFR BLD AUTO: 0.9 % — SIGNIFICANT CHANGE UP (ref 0–0.9)
LYMPHOCYTES # BLD AUTO: 1.65 K/UL — SIGNIFICANT CHANGE UP (ref 1–3.3)
LYMPHOCYTES # BLD AUTO: 37 % — SIGNIFICANT CHANGE UP (ref 13–44)
MAGNESIUM SERPL-MCNC: 2 MG/DL — SIGNIFICANT CHANGE UP (ref 1.6–2.6)
MCHC RBC-ENTMCNC: 30.1 PG — SIGNIFICANT CHANGE UP (ref 27–34)
MCHC RBC-ENTMCNC: 32.3 GM/DL — SIGNIFICANT CHANGE UP (ref 32–36)
MCV RBC AUTO: 93.1 FL — SIGNIFICANT CHANGE UP (ref 80–100)
MONOCYTES # BLD AUTO: 0.44 K/UL — SIGNIFICANT CHANGE UP (ref 0–0.9)
MONOCYTES NFR BLD AUTO: 9.9 % — SIGNIFICANT CHANGE UP (ref 2–14)
NEUTROPHILS # BLD AUTO: 2.11 K/UL — SIGNIFICANT CHANGE UP (ref 1.8–7.4)
NEUTROPHILS NFR BLD AUTO: 47.3 % — SIGNIFICANT CHANGE UP (ref 43–77)
NRBC # BLD: 0 /100 WBCS — SIGNIFICANT CHANGE UP (ref 0–0)
NRBC # FLD: 0 K/UL — SIGNIFICANT CHANGE UP (ref 0–0)
PHOSPHATE SERPL-MCNC: 2.7 MG/DL — SIGNIFICANT CHANGE UP (ref 2.5–4.5)
PLATELET # BLD AUTO: 218 K/UL — SIGNIFICANT CHANGE UP (ref 150–400)
POTASSIUM SERPL-MCNC: 3.9 MMOL/L — SIGNIFICANT CHANGE UP (ref 3.5–5.3)
POTASSIUM SERPL-MCNC: 4.2 MMOL/L — SIGNIFICANT CHANGE UP (ref 3.5–5.3)
POTASSIUM SERPL-SCNC: 3.9 MMOL/L — SIGNIFICANT CHANGE UP (ref 3.5–5.3)
POTASSIUM SERPL-SCNC: 4.2 MMOL/L — SIGNIFICANT CHANGE UP (ref 3.5–5.3)
PROT SERPL-MCNC: 5.7 G/DL — LOW (ref 6–8.3)
RBC # BLD: 3.49 M/UL — LOW (ref 4.2–5.8)
RBC # FLD: 13.5 % — SIGNIFICANT CHANGE UP (ref 10.3–14.5)
SODIUM SERPL-SCNC: 134 MMOL/L — LOW (ref 135–145)
SODIUM SERPL-SCNC: 138 MMOL/L — SIGNIFICANT CHANGE UP (ref 135–145)
WBC # BLD: 4.46 K/UL — SIGNIFICANT CHANGE UP (ref 3.8–10.5)
WBC # FLD AUTO: 4.46 K/UL — SIGNIFICANT CHANGE UP (ref 3.8–10.5)

## 2023-01-29 PROCEDURE — 99233 SBSQ HOSP IP/OBS HIGH 50: CPT | Mod: GC

## 2023-01-29 RX ORDER — DEXTROSE 50 % IN WATER 50 %
50 SYRINGE (ML) INTRAVENOUS ONCE
Refills: 0 | Status: COMPLETED | OUTPATIENT
Start: 2023-01-29 | End: 2023-01-29

## 2023-01-29 RX ADMIN — LIDOCAINE 1 APPLICATION(S): 4 CREAM TOPICAL at 05:44

## 2023-01-29 RX ADMIN — LIDOCAINE 1 APPLICATION(S): 4 CREAM TOPICAL at 17:05

## 2023-01-29 RX ADMIN — TAMSULOSIN HYDROCHLORIDE 0.4 MILLIGRAM(S): 0.4 CAPSULE ORAL at 21:58

## 2023-01-29 RX ADMIN — ATORVASTATIN CALCIUM 80 MILLIGRAM(S): 80 TABLET, FILM COATED ORAL at 21:58

## 2023-01-29 RX ADMIN — Medication 1 APPLICATION(S): at 05:49

## 2023-01-29 RX ADMIN — Medication 50 MILLILITER(S): at 18:24

## 2023-01-29 RX ADMIN — ENOXAPARIN SODIUM 40 MILLIGRAM(S): 100 INJECTION SUBCUTANEOUS at 06:04

## 2023-01-29 RX ADMIN — Medication 81 MILLIGRAM(S): at 11:00

## 2023-01-29 RX ADMIN — Medication 1 APPLICATION(S): at 17:06

## 2023-01-29 RX ADMIN — MAGNESIUM OXIDE 400 MG ORAL TABLET 400 MILLIGRAM(S): 241.3 TABLET ORAL at 11:01

## 2023-01-29 NOTE — PROGRESS NOTE ADULT - SUBJECTIVE AND OBJECTIVE BOX
PROGRESS NOTE:   Authored by Prashant Suarez MD   Patient is a 82y old  Male who presents with a chief complaint of sepsis (26 Jan 2023 17:16)      SUBJECTIVE / OVERNIGHT EVENTS:  No acute events overnight.     ADDITIONAL REVIEW OF SYSTEMS:    MEDICATIONS  (STANDING):  albuterol/ipratropium for Nebulization 3 milliLiter(s) Nebulizer once  albuterol/ipratropium for Nebulization. 3 milliLiter(s) Nebulizer once  aspirin  chewable 81 milliGRAM(s) Oral daily  atorvastatin 80 milliGRAM(s) Oral at bedtime  bisacodyl Suppository 10 milliGRAM(s) Rectal once  dextrose 50% Injectable 25 Gram(s) IV Push once  dextrose 50% Injectable 12.5 Gram(s) IV Push once  dextrose 50% Injectable 25 Gram(s) IV Push once  dextrose Oral Gel 15 Gram(s) Oral once  enoxaparin Injectable 40 milliGRAM(s) SubCutaneous every 24 hours  glucagon  Injectable 1 milliGRAM(s) IntraMuscular once  lidocaine 2% Gel 1 Application(s) Topical two times a day  magnesium oxide 400 milliGRAM(s) Oral daily  petrolatum white Ointment 1 Application(s) Topical two times a day  polyethylene glycol 3350 17 Gram(s) Oral daily  senna 1 Tablet(s) Oral daily  tamsulosin 0.4 milliGRAM(s) Oral at bedtime    MEDICATIONS  (PRN):  acetaminophen     Tablet .. 650 milliGRAM(s) Oral every 6 hours PRN Temp greater or equal to 38C (100.4F), Mild Pain (1 - 3)  oxymetazoline 0.05% Nasal Spray 2 Spray(s) Both Nostrils every 12 hours PRN Nasal Congestion      CAPILLARY BLOOD GLUCOSE      POCT Blood Glucose.: 78 mg/dL (29 Jan 2023 06:49)  POCT Blood Glucose.: 77 mg/dL (29 Jan 2023 06:48)  POCT Blood Glucose.: 117 mg/dL (28 Jan 2023 23:55)  POCT Blood Glucose.: 122 mg/dL (28 Jan 2023 21:46)  POCT Blood Glucose.: 104 mg/dL (28 Jan 2023 17:48)  POCT Blood Glucose.: 95 mg/dL (28 Jan 2023 12:11)  POCT Blood Glucose.: 89 mg/dL (28 Jan 2023 08:41)    I&O's Summary      PHYSICAL EXAM:  Vital Signs Last 24 Hrs  T(C): 36.2 (29 Jan 2023 06:00), Max: 37.1 (28 Jan 2023 22:01)  T(F): 97.2 (29 Jan 2023 06:00), Max: 98.7 (28 Jan 2023 22:01)  HR: 55 (29 Jan 2023 06:00) (50 - 55)  BP: 158/82 (29 Jan 2023 06:00) (133/65 - 158/82)  BP(mean): --  RR: 18 (29 Jan 2023 06:00) (17 - 18)  SpO2: 98% (29 Jan 2023 06:00) (98% - 100%)    Parameters below as of 29 Jan 2023 06:00  Patient On (Oxygen Delivery Method): room air        PHYSICAL EXAM:  GENERAL APPEARANCE: Well developed, NAD  HEENT:  PERRL, EOMI. hearing grossly intact.  CARDIAC: Normal S1 and S2. no mrg. RRR  LUNGS: R lung sounds rhodochrous   ABDOMEN: Soft , NTND, bowel sounds normal. No guarding or rebound.   MUSCULOSKELETAL: R arm contracted  EXTREMITIES: No edema. Peripheral pulses intact.   NEUROLOGICAL: Non focal. Strength and sensation symmetric and intact throughout.   SKIN: Warm and dry , Well perfused  PSYCHIATRIC: AOx1      LABS:                        9.6    4.92  )-----------( 185      ( 28 Jan 2023 08:15 )             29.6     01-28    137  |  108<H>  |  8   ----------------------------<  103<H>  3.6   |  25  |  0.80    Ca    8.8      28 Jan 2023 08:15  Phos  2.6     01-28  Mg     1.60     01-28    TPro  5.0<L>  /  Alb  2.3<L>  /  TBili  0.9  /  DBili  x   /  AST  23  /  ALT  30  /  AlkPhos  66  01-28                RADIOLOGY & ADDITIONAL TESTS:  Lab Results Reviewed   Imaging Reviewed  Electrocardiogram Reviewed

## 2023-01-29 NOTE — PROGRESS NOTE ADULT - PROBLEM SELECTOR PLAN 1
- D5LR  - f/u BHB, insulin, c-peptide, proinsulin.  - Endocrine consulted for concern for lab-work concerning for insulinoma vs autoimmune insulin syndrome vs excessive insulin production.  - Labs drawn with FSG > 55,   - If patient FSG <55, will draw labs.  - Will do cosyntropin testing - D5LR  - f/u BHB, insulin, c-peptide, proinsulin.  - Endocrine consulted for concern for lab-work concerning for insulinoma vs autoimmune insulin syndrome vs excessive insulin production.  - Labs drawn with FSG > 55,   - If patient FSG <55, will draw labs.  - Pending cosyntropin testing.

## 2023-01-30 LAB
ANION GAP SERPL CALC-SCNC: 6 MMOL/L — LOW (ref 7–14)
BUN SERPL-MCNC: 8 MG/DL — SIGNIFICANT CHANGE UP (ref 7–23)
C PEPTIDE SERPL-MCNC: 1.4 NG/ML — SIGNIFICANT CHANGE UP (ref 1.1–4.4)
CALCIUM SERPL-MCNC: 9.3 MG/DL — SIGNIFICANT CHANGE UP (ref 8.4–10.5)
CHLORIDE SERPL-SCNC: 102 MMOL/L — SIGNIFICANT CHANGE UP (ref 98–107)
CO2 SERPL-SCNC: 28 MMOL/L — SIGNIFICANT CHANGE UP (ref 22–31)
CREAT SERPL-MCNC: 0.79 MG/DL — SIGNIFICANT CHANGE UP (ref 0.5–1.3)
EGFR: 88 ML/MIN/1.73M2 — SIGNIFICANT CHANGE UP
GLUCOSE BLDC GLUCOMTR-MCNC: 102 MG/DL — HIGH (ref 70–99)
GLUCOSE BLDC GLUCOMTR-MCNC: 87 MG/DL — SIGNIFICANT CHANGE UP (ref 70–99)
GLUCOSE BLDC GLUCOMTR-MCNC: 91 MG/DL — SIGNIFICANT CHANGE UP (ref 70–99)
GLUCOSE BLDC GLUCOMTR-MCNC: 98 MG/DL — SIGNIFICANT CHANGE UP (ref 70–99)
GLUCOSE SERPL-MCNC: 96 MG/DL — SIGNIFICANT CHANGE UP (ref 70–99)
HCT VFR BLD CALC: 33.9 % — LOW (ref 39–50)
HGB BLD-MCNC: 11 G/DL — LOW (ref 13–17)
INSULIN SERPL-MCNC: 4.7 UU/ML — SIGNIFICANT CHANGE UP (ref 2.6–24.9)
MAGNESIUM SERPL-MCNC: 1.9 MG/DL — SIGNIFICANT CHANGE UP (ref 1.6–2.6)
MCHC RBC-ENTMCNC: 29.9 PG — SIGNIFICANT CHANGE UP (ref 27–34)
MCHC RBC-ENTMCNC: 32.4 GM/DL — SIGNIFICANT CHANGE UP (ref 32–36)
MCV RBC AUTO: 92.1 FL — SIGNIFICANT CHANGE UP (ref 80–100)
NRBC # BLD: 0 /100 WBCS — SIGNIFICANT CHANGE UP (ref 0–0)
NRBC # FLD: 0 K/UL — SIGNIFICANT CHANGE UP (ref 0–0)
PHOSPHATE SERPL-MCNC: 2.7 MG/DL — SIGNIFICANT CHANGE UP (ref 2.5–4.5)
PLATELET # BLD AUTO: 225 K/UL — SIGNIFICANT CHANGE UP (ref 150–400)
POTASSIUM SERPL-MCNC: 4.6 MMOL/L — SIGNIFICANT CHANGE UP (ref 3.5–5.3)
POTASSIUM SERPL-SCNC: 4.6 MMOL/L — SIGNIFICANT CHANGE UP (ref 3.5–5.3)
RBC # BLD: 3.68 M/UL — LOW (ref 4.2–5.8)
RBC # FLD: 13.5 % — SIGNIFICANT CHANGE UP (ref 10.3–14.5)
SODIUM SERPL-SCNC: 136 MMOL/L — SIGNIFICANT CHANGE UP (ref 135–145)
T4 FREE SERPL-MCNC: 1.5 NG/DL — SIGNIFICANT CHANGE UP (ref 0.9–1.8)
WBC # BLD: 3.85 K/UL — SIGNIFICANT CHANGE UP (ref 3.8–10.5)
WBC # FLD AUTO: 3.85 K/UL — SIGNIFICANT CHANGE UP (ref 3.8–10.5)

## 2023-01-30 PROCEDURE — 99232 SBSQ HOSP IP/OBS MODERATE 35: CPT

## 2023-01-30 PROCEDURE — 99233 SBSQ HOSP IP/OBS HIGH 50: CPT | Mod: GC

## 2023-01-30 RX ORDER — COSYNTROPIN 0.25 MG/ML
0.25 INJECTION, SOLUTION INTRAVENOUS ONCE
Refills: 0 | Status: DISCONTINUED | OUTPATIENT
Start: 2023-01-30 | End: 2023-01-30

## 2023-01-30 RX ORDER — COSYNTROPIN 0.25 MG/ML
0.25 INJECTION, SOLUTION INTRAVENOUS ONCE
Refills: 0 | Status: COMPLETED | OUTPATIENT
Start: 2023-01-31 | End: 2023-01-31

## 2023-01-30 RX ADMIN — LIDOCAINE 1 APPLICATION(S): 4 CREAM TOPICAL at 05:29

## 2023-01-30 RX ADMIN — MAGNESIUM OXIDE 400 MG ORAL TABLET 400 MILLIGRAM(S): 241.3 TABLET ORAL at 18:11

## 2023-01-30 RX ADMIN — ENOXAPARIN SODIUM 40 MILLIGRAM(S): 100 INJECTION SUBCUTANEOUS at 05:33

## 2023-01-30 RX ADMIN — TAMSULOSIN HYDROCHLORIDE 0.4 MILLIGRAM(S): 0.4 CAPSULE ORAL at 22:22

## 2023-01-30 RX ADMIN — SENNA PLUS 1 TABLET(S): 8.6 TABLET ORAL at 18:12

## 2023-01-30 RX ADMIN — Medication 81 MILLIGRAM(S): at 18:12

## 2023-01-30 RX ADMIN — ATORVASTATIN CALCIUM 80 MILLIGRAM(S): 80 TABLET, FILM COATED ORAL at 22:22

## 2023-01-30 RX ADMIN — POLYETHYLENE GLYCOL 3350 17 GRAM(S): 17 POWDER, FOR SOLUTION ORAL at 18:10

## 2023-01-30 NOTE — CHART NOTE - NSCHARTNOTEFT_GEN_A_CORE
please see consult note from 1/29 for full plan of care   no hypoglycemia noted     stim test done with free cortisol- this can take upwards to a week to return   would recommend that SERUM CORTISOL be checked not free cortisol for a stim test       I do not suspect AI given pts Blood pressures with systolics >130        Hypoglycemia - unable to assess for whipple's triad due to altered mental status  suspecting related to poor po intake, and sepsis, encourage po intake, tx of infection.  Concern was raised for inappropriately normal insulin and c-peptide levels but BG was normal at the time of blood collection.  AM cortisol 7.0 before 7am , repeat at 8am was 8. Albumin <3, may be leading to lower cortisol reading. Lower clinical suspicion for adrenal insufficiency, can rule out with cosyntropin stim testing as below    COSYNTROPIN STIM TESTING:  - recommend cosyntropin stim testing:  coordinate with 8 AM labs tomorrow as follows  to be done with SERUM CORTISOL NOT FREE CORTISOL   - measure serum ACTH and cortisol at time 0  - give IV cosyntropin 250 mcg [0.25 mg] (time 0)  - measure serum cortisol at 30 minutes  - measure serum cortisol at 60 minutes    If cortisol >18 at 60 minutes rules out AI from adrenal issue            Estrella Pearl MD  Attending Physician   Department of Endocrinology, Diabetes and Metabolism     weekdays: 9am to 5pm: email [facility] or [facility] and or TEAMS     Nights and weekends: [phone]  Please note that this patient may be followed by a different provider tomorrow.   If no answer or after hours, please contact [phone].  For final dc reccomendations, please call [phone] or page the endocrine fellow on call.
Source: Patient [ ]    Family [ ]     other [ x] chart review    Patient seen for nutrition f/u. 79 year old male with a PMH of HTN, dementia, prior CVA, CAD presenting to hospital with chest pain, here for pansensitive E. coli bacteremia secondary to UTI. Course complicated by hypoglycemia per chart.    Patient seen for assessment. Unable to obtain information from patient 2/2 cognitive status. Noted w/ poor PO intake of breakfast per observation. Noted w/ some intakes % and 0-25% per RN flow sheet. Receives Hormel Vital Shake 1x daily (520 kcal, 22 g pro) to help meet nutritional needs. No GI distress noted. Noted w/ +1 L arm and +2 R arm edema w/ no pressure injuries per RN flow sheet.     Diet : Diet, Pureed:   Mildly Thick Liquids (MILDTHICKLIQS)    Start Time: 06:00 (01-24-23 @ 18:55)    Current Weight: no new weight to assess  69 kg (1/17)    Pertinent Medications: MEDICATIONS  (STANDING):  albuterol/ipratropium for Nebulization 3 milliLiter(s) Nebulizer once  albuterol/ipratropium for Nebulization. 3 milliLiter(s) Nebulizer once  aspirin  chewable 81 milliGRAM(s) Oral daily  atorvastatin 80 milliGRAM(s) Oral at bedtime  bisacodyl Suppository 10 milliGRAM(s) Rectal once  dextrose 50% Injectable 25 Gram(s) IV Push once  dextrose 50% Injectable 12.5 Gram(s) IV Push once  dextrose 50% Injectable 25 Gram(s) IV Push once  dextrose Oral Gel 15 Gram(s) Oral once  enoxaparin Injectable 40 milliGRAM(s) SubCutaneous every 24 hours  glucagon  Injectable 1 milliGRAM(s) IntraMuscular once  magnesium oxide 400 milliGRAM(s) Oral daily  polyethylene glycol 3350 17 Gram(s) Oral daily  senna 1 Tablet(s) Oral daily  tamsulosin 0.4 milliGRAM(s) Oral at bedtime    MEDICATIONS  (PRN):  acetaminophen     Tablet .. 650 milliGRAM(s) Oral every 6 hours PRN Temp greater or equal to 38C (100.4F), Mild Pain (1 - 3)  cosyntropin Injectable 0.25 milliGRAM(s) IV Push once PRN for cosyntropin testing.  oxymetazoline 0.05% Nasal Spray 2 Spray(s) Both Nostrils every 12 hours PRN Nasal Congestion    Pertinent Labs:  01-30 Na136 mmol/L Glu 96 mg/dL K+ 4.6 mmol/L Cr  0.79 mg/dL BUN 8 mg/dL 01-30 Phos 2.7 mg/dL 01-29 Alb 2.9 g/dL<L>  CAPILLARY BLOOD GLUCOSE  POCT Blood Glucose.: 91 mg/dL (30 Jan 2023 12:33)  POCT Blood Glucose.: 98 mg/dL (30 Jan 2023 05:55)  POCT Blood Glucose.: 75 mg/dL (29 Jan 2023 23:58)  POCT Blood Glucose.: 67 mg/dL (29 Jan 2023 23:55)  POCT Blood Glucose.: 88 mg/dL (29 Jan 2023 22:08)  POCT Blood Glucose.: 157 mg/dL (29 Jan 2023 19:23)  POCT Blood Glucose.: 45 mg/dL (29 Jan 2023 18:04)  POCT Blood Glucose.: 63 mg/dL (29 Jan 2023 17:42)    Estimated Needs: [ x] no change since previous assessment    Previous Nutrition Diagnosis: Inadequate oral intake    Nutrition Diagnosis is [ x] ongoing  [ ] resolved [ ] not applicable     Recommend  - continue diet as ordered  - will provide Hormel Vital Shake BID (1,040 kcal, 44 g pro)  - multivitamin for micronutrient repletion  - obtain current weight and document PO intake to monitor trend     Monitoring and Evaluation:   [x ] PO intake [x ] Tolerance to diet prescription [x ] weights [x ] follow up per protocol

## 2023-01-30 NOTE — PROGRESS NOTE ADULT - SUBJECTIVE AND OBJECTIVE BOX
PROGRESS NOTE:   Authored by Prashant Suarez MD   Patient is a 82y old  Male who presents with a chief complaint of sepsis (26 Jan 2023 17:16)      SUBJECTIVE / OVERNIGHT EVENTS:  FSG @ 45 6PM, kevin hypoglycemia labs.  Serum glucose on BMP ~100.  Given D50    ADDITIONAL REVIEW OF SYSTEMS:    MEDICATIONS  (STANDING):  albuterol/ipratropium for Nebulization 3 milliLiter(s) Nebulizer once  albuterol/ipratropium for Nebulization. 3 milliLiter(s) Nebulizer once  aspirin  chewable 81 milliGRAM(s) Oral daily  atorvastatin 80 milliGRAM(s) Oral at bedtime  bisacodyl Suppository 10 milliGRAM(s) Rectal once  dextrose 50% Injectable 25 Gram(s) IV Push once  dextrose 50% Injectable 12.5 Gram(s) IV Push once  dextrose 50% Injectable 25 Gram(s) IV Push once  dextrose Oral Gel 15 Gram(s) Oral once  enoxaparin Injectable 40 milliGRAM(s) SubCutaneous every 24 hours  glucagon  Injectable 1 milliGRAM(s) IntraMuscular once  lidocaine 2% Gel 1 Application(s) Topical two times a day  magnesium oxide 400 milliGRAM(s) Oral daily  petrolatum white Ointment 1 Application(s) Topical two times a day  polyethylene glycol 3350 17 Gram(s) Oral daily  senna 1 Tablet(s) Oral daily  tamsulosin 0.4 milliGRAM(s) Oral at bedtime    MEDICATIONS  (PRN):  acetaminophen     Tablet .. 650 milliGRAM(s) Oral every 6 hours PRN Temp greater or equal to 38C (100.4F), Mild Pain (1 - 3)  oxymetazoline 0.05% Nasal Spray 2 Spray(s) Both Nostrils every 12 hours PRN Nasal Congestion      CAPILLARY BLOOD GLUCOSE      POCT Blood Glucose.: 98 mg/dL (30 Jan 2023 05:55)  POCT Blood Glucose.: 75 mg/dL (29 Jan 2023 23:58)  POCT Blood Glucose.: 67 mg/dL (29 Jan 2023 23:55)  POCT Blood Glucose.: 88 mg/dL (29 Jan 2023 22:08)  POCT Blood Glucose.: 157 mg/dL (29 Jan 2023 19:23)  POCT Blood Glucose.: 45 mg/dL (29 Jan 2023 18:04)  POCT Blood Glucose.: 63 mg/dL (29 Jan 2023 17:42)  POCT Blood Glucose.: 89 mg/dL (29 Jan 2023 12:06)    I&O's Summary      PHYSICAL EXAM:  Vital Signs Last 24 Hrs  T(C): 37 (29 Jan 2023 21:39), Max: 37 (29 Jan 2023 21:39)  T(F): 98.6 (29 Jan 2023 21:39), Max: 98.6 (29 Jan 2023 21:39)  HR: 67 (29 Jan 2023 21:39) (55 - 67)  BP: 148/93 (29 Jan 2023 21:39) (148/93 - 159/69)  BP(mean): --  RR: 18 (29 Jan 2023 21:39) (18 - 19)  SpO2: 94% (29 Jan 2023 21:39) (94% - 100%)    Parameters below as of 29 Jan 2023 21:39  Patient On (Oxygen Delivery Method): room air        GENERAL: No apparent distress.   HEAD:  Atraumatic, Normocephalic  EYES: EOMI, PERRLA, conjunctiva and sclera clear  NECK: Supple, no lymphadenopathy, no elevated JVP  CHEST/LUNG: Clear to auscultation bilateral and symmetric; No wheezes, rales, or rhonchi  HEART: S1 and S2 normal. Regular rate and rhythm; No murmurs, rubs, or gallops  ABDOMEN: Soft, non-tender, non-distended; normal bowel sounds  EXTREMITIES:  2+ peripheral pulses b/l, No clubbing, cyanosis, or edema  NEUROLOGY: A&O x 3, no focal deficits  SKIN: No rashes or lesions    LABS:                        10.5   4.46  )-----------( 218      ( 29 Jan 2023 05:38 )             32.5     01-29    138  |  105  |  8   ----------------------------<  101<H>  3.9   |  27  |  0.73    Ca    9.1      29 Jan 2023 18:10  Phos  2.7     01-29  Mg     2.00     01-29    TPro  5.7<L>  /  Alb  2.9<L>  /  TBili  1.0  /  DBili  x   /  AST  21  /  ALT  24  /  AlkPhos  71  01-29                RADIOLOGY & ADDITIONAL TESTS:  Lab Results Reviewed   Imaging Reviewed  Electrocardiogram Reviewed   PROGRESS NOTE:   Authored by Prashant Suarez MD   Patient is a 82y old  Male who presents with a chief complaint of sepsis (26 Jan 2023 17:16)      SUBJECTIVE / OVERNIGHT EVENTS:  FSG @ 45 6PM, kevin hypoglycemia labs.  Serum glucose on BMP ~101.  Given D50    ADDITIONAL REVIEW OF SYSTEMS:    MEDICATIONS  (STANDING):  albuterol/ipratropium for Nebulization 3 milliLiter(s) Nebulizer once  albuterol/ipratropium for Nebulization. 3 milliLiter(s) Nebulizer once  aspirin  chewable 81 milliGRAM(s) Oral daily  atorvastatin 80 milliGRAM(s) Oral at bedtime  bisacodyl Suppository 10 milliGRAM(s) Rectal once  dextrose 50% Injectable 25 Gram(s) IV Push once  dextrose 50% Injectable 12.5 Gram(s) IV Push once  dextrose 50% Injectable 25 Gram(s) IV Push once  dextrose Oral Gel 15 Gram(s) Oral once  enoxaparin Injectable 40 milliGRAM(s) SubCutaneous every 24 hours  glucagon  Injectable 1 milliGRAM(s) IntraMuscular once  lidocaine 2% Gel 1 Application(s) Topical two times a day  magnesium oxide 400 milliGRAM(s) Oral daily  petrolatum white Ointment 1 Application(s) Topical two times a day  polyethylene glycol 3350 17 Gram(s) Oral daily  senna 1 Tablet(s) Oral daily  tamsulosin 0.4 milliGRAM(s) Oral at bedtime    MEDICATIONS  (PRN):  acetaminophen     Tablet .. 650 milliGRAM(s) Oral every 6 hours PRN Temp greater or equal to 38C (100.4F), Mild Pain (1 - 3)  oxymetazoline 0.05% Nasal Spray 2 Spray(s) Both Nostrils every 12 hours PRN Nasal Congestion      CAPILLARY BLOOD GLUCOSE      POCT Blood Glucose.: 98 mg/dL (30 Jan 2023 05:55)  POCT Blood Glucose.: 75 mg/dL (29 Jan 2023 23:58)  POCT Blood Glucose.: 67 mg/dL (29 Jan 2023 23:55)  POCT Blood Glucose.: 88 mg/dL (29 Jan 2023 22:08)  POCT Blood Glucose.: 157 mg/dL (29 Jan 2023 19:23)  POCT Blood Glucose.: 45 mg/dL (29 Jan 2023 18:04)  POCT Blood Glucose.: 63 mg/dL (29 Jan 2023 17:42)  POCT Blood Glucose.: 89 mg/dL (29 Jan 2023 12:06)    I&O's Summary      PHYSICAL EXAM:  Vital Signs Last 24 Hrs  T(C): 37 (29 Jan 2023 21:39), Max: 37 (29 Jan 2023 21:39)  T(F): 98.6 (29 Jan 2023 21:39), Max: 98.6 (29 Jan 2023 21:39)  HR: 67 (29 Jan 2023 21:39) (55 - 67)  BP: 148/93 (29 Jan 2023 21:39) (148/93 - 159/69)  BP(mean): --  RR: 18 (29 Jan 2023 21:39) (18 - 19)  SpO2: 94% (29 Jan 2023 21:39) (94% - 100%)    Parameters below as of 29 Jan 2023 21:39  Patient On (Oxygen Delivery Method): room air        GENERAL: No apparent distress.   HEAD:  Atraumatic, Normocephalic  EYES: EOMI, PERRLA, conjunctiva and sclera clear  NECK: Supple, no lymphadenopathy, no elevated JVP  CHEST/LUNG: Clear to auscultation bilateral and symmetric; No wheezes, rales, or rhonchi  HEART: S1 and S2 normal. Regular rate and rhythm; No murmurs, rubs, or gallops  ABDOMEN: Soft, non-tender, non-distended; normal bowel sounds  EXTREMITIES:  2+ peripheral pulses b/l, No clubbing, cyanosis, or edema  NEUROLOGY: A&O x 3, no focal deficits  SKIN: No rashes or lesions    LABS:                        10.5   4.46  )-----------( 218      ( 29 Jan 2023 05:38 )             32.5     01-29    138  |  105  |  8   ----------------------------<  101<H>  3.9   |  27  |  0.73    Ca    9.1      29 Jan 2023 18:10  Phos  2.7     01-29  Mg     2.00     01-29    TPro  5.7<L>  /  Alb  2.9<L>  /  TBili  1.0  /  DBili  x   /  AST  21  /  ALT  24  /  AlkPhos  71  01-29                RADIOLOGY & ADDITIONAL TESTS:  Lab Results Reviewed   Imaging Reviewed  Electrocardiogram Reviewed

## 2023-01-30 NOTE — PROGRESS NOTE ADULT - TIME BILLING
review of laboratory data, radiology results, discussion with patient/family/consultants, documenting in [place]. Interventions were performed as documented above.
review of laboratory data, radiology results, discussion with patient, documenting in [place]. Interventions were performed as documented above.

## 2023-01-30 NOTE — PROGRESS NOTE ADULT - PROBLEM SELECTOR PLAN 1
- D5LR  - f/u BHB, insulin, c-peptide, proinsulin.  - Endocrine consulted for concern for lab-work concerning for insulinoma vs autoimmune insulin syndrome vs excessive insulin production.  - Labs drawn with FSG > 55,   - If patient FSG <55, will draw labs.  - Pending cosyntropin testing. - D5LR  - f/u BHB, insulin, c-peptide, proinsulin.  - Endocrine consulted for concern for lab-work concerning for insulinoma vs autoimmune insulin syndrome vs excessive insulin production.  - Labs drawn with FSG > 55,   - If patient FSG <55, will draw labs.  - Pending cosyntropin testing.  - FSG and BMP glucose are not correlating.

## 2023-01-30 NOTE — PROGRESS NOTE ADULT - SUBJECTIVE AND OBJECTIVE BOX
Chief Complaint: Hypoglycemia    History: nonverbal  no apparent distress  some of pureed food on tray eaten    MEDICATIONS  (STANDING):  albuterol/ipratropium for Nebulization 3 milliLiter(s) Nebulizer once  albuterol/ipratropium for Nebulization. 3 milliLiter(s) Nebulizer once  aspirin  chewable 81 milliGRAM(s) Oral daily  atorvastatin 80 milliGRAM(s) Oral at bedtime  bisacodyl Suppository 10 milliGRAM(s) Rectal once  dextrose 50% Injectable 25 Gram(s) IV Push once  dextrose 50% Injectable 12.5 Gram(s) IV Push once  dextrose 50% Injectable 25 Gram(s) IV Push once  dextrose Oral Gel 15 Gram(s) Oral once  enoxaparin Injectable 40 milliGRAM(s) SubCutaneous every 24 hours  glucagon  Injectable 1 milliGRAM(s) IntraMuscular once  magnesium oxide 400 milliGRAM(s) Oral daily  polyethylene glycol 3350 17 Gram(s) Oral daily  senna 1 Tablet(s) Oral daily  tamsulosin 0.4 milliGRAM(s) Oral at bedtime    MEDICATIONS  (PRN):  acetaminophen     Tablet .. 650 milliGRAM(s) Oral every 6 hours PRN Temp greater or equal to 38C (100.4F), Mild Pain (1 - 3)  cosyntropin Injectable 0.25 milliGRAM(s) IV Push once PRN for cosyntropin testing.  oxymetazoline 0.05% Nasal Spray 2 Spray(s) Both Nostrils every 12 hours PRN Nasal Congestion      Allergies    No Known Allergies    Intolerances      Review of Systems:  UNABLE TO OBTAIN    PHYSICAL EXAM:  VITALS: T(C): 36.3 (01-30-23 @ 12:23)  T(F): 97.3 (01-30-23 @ 12:23), Max: 98.6 (01-29-23 @ 21:39)  HR: 76 (01-30-23 @ 12:23) (67 - 76)  BP: 149/69 (01-30-23 @ 12:23) (148/93 - 149/69)  RR:  (18 - 18)  SpO2:  (94% - 98%)  Wt(kg): --  GENERAL: NAD, unresponsive  EYES: No proptosis  HEENT:  Atraumatic, Normocephalic, moist mucous membranes  RESPIRATORY: nonlabored respirations, no wheezing  GI: Soft, nontender, non distended      CAPILLARY BLOOD GLUCOSE      POCT Blood Glucose.: 91 mg/dL (30 Jan 2023 12:33)  POCT Blood Glucose.: 98 mg/dL (30 Jan 2023 05:55)  POCT Blood Glucose.: 75 mg/dL (29 Jan 2023 23:58)  POCT Blood Glucose.: 67 mg/dL (29 Jan 2023 23:55)  POCT Blood Glucose.: 88 mg/dL (29 Jan 2023 22:08)  POCT Blood Glucose.: 157 mg/dL (29 Jan 2023 19:23)  POCT Blood Glucose.: 45 mg/dL (29 Jan 2023 18:04)  POCT Blood Glucose.: 63 mg/dL (29 Jan 2023 17:42)      01-30    136  |  102  |  8   ----------------------------<  96  4.6   |  28  |  0.79    eGFR: 88    Ca    9.3      01-30  Mg     1.90     01-30  Phos  2.7     01-30    TPro  5.7<L>  /  Alb  2.9<L>  /  TBili  1.0  /  DBili  x   /  AST  21  /  ALT  24  /  AlkPhos  71  01-29      A1C with Estimated Average Glucose Result: 5.2 % (04-20-22 @ 10:24)      Thyroid Function Tests:  01-30 @ 05:57 TSH -- FreeT4 1.5 T3 -- Anti TPO -- Anti Thyroglobulin Ab -- TSI --  01-25 @ 06:56 TSH 3.40 FreeT4 -- T3 -- Anti TPO -- Anti Thyroglobulin Ab -- TSI --

## 2023-01-30 NOTE — PROGRESS NOTE ADULT - TIME-BASED BILLING (NON-CRITICAL CARE)
Patient expressed no known problems or needs
Time-based billing (NON-critical care)
Time-based billing (NON-critical care)

## 2023-01-30 NOTE — PROGRESS NOTE ADULT - ASSESSMENT
82-year-old male pmhx of CVA 10 years prior, dementia, diabetes, hyperlipidemia, CAD comes to ED w/ nausea, vomiting, chest pain, SOB, abdominal pain.  Septic from e.coli bacteremia. History limited due to patient's baseline nonverbal dementia status. Endocrine consulted for low BG. Concern for inappropriately normal insulin and c-peptide levels but BG was normal at the time of blood collection.    1) Hypoglycemia - unable to assess for whipple's triad due to altered mental status  suspecting related to poor po intake, and sepsis, encourage po intake, tx of infection.  Concern was raised for inappropriately normal insulin and c-peptide levels but BG was normal at the time of blood collection.  AM cortisol 7.0 before 7am , repeat at 8am was 8. Albumin <3, may be leading to lower cortisol reading. Lower clinical suspicion for adrenal insufficiency.     A stim test was done sending free cortisol instead of serum cortisol, so unable to interpret (results take long time to result and no established ranges).  May consider repeating stim test but if so would use serum cortisol ("AM cortisol).    FS yesterday 45 but within 6 minutes serum glucose was 101 normal.  Labs sent (insulin, c-peptide etc,) cannot be interpreted given normal serum glucose.  This raises the suspicion for the point of care FS to be falsely low and perhaps no true concern of  hypoglycemia.  TSH normal. Free T4 added on 1.5 normal.    2) DM2 in remission  HbA1c 5.3% last checked  stopped metformin on dc in Apr 2022  no insulin/correction scale needed given hypoglycemia and poor po intake     3) Hyperlipidemia  atorvastatin 80mg    Marilia Nunes MD  Division of Endocrinology  Pager: [phone]    If after 6PM or before 9AM, or on weekends/holidays, please call endocrine answering service for assistance ([phone]).  For nonurgent matters email [email or web address] for assistance.

## 2023-01-30 NOTE — PROGRESS NOTE ADULT - ASSESSMENT
78yo largely bedbound M with hx of HTN, dementia, prior CVA, CAD presenting to hospital with chest pain, here for pansensitive e coli bacteremia secondary to UTI. Course complicated by hypoglycemia.

## 2023-01-31 LAB
A1C WITH ESTIMATED AVERAGE GLUCOSE RESULT: 5 % — SIGNIFICANT CHANGE UP (ref 4–5.6)
ACTH SER-ACNC: 54.1 PG/ML — SIGNIFICANT CHANGE UP (ref 7.2–63.3)
ANION GAP SERPL CALC-SCNC: 6 MMOL/L — LOW (ref 7–14)
BASOPHILS # BLD AUTO: 0.06 K/UL — SIGNIFICANT CHANGE UP (ref 0–0.2)
BASOPHILS NFR BLD AUTO: 1.7 % — SIGNIFICANT CHANGE UP (ref 0–2)
BUN SERPL-MCNC: 8 MG/DL — SIGNIFICANT CHANGE UP (ref 7–23)
CALCIUM SERPL-MCNC: 9.4 MG/DL — SIGNIFICANT CHANGE UP (ref 8.4–10.5)
CHLORIDE SERPL-SCNC: 104 MMOL/L — SIGNIFICANT CHANGE UP (ref 98–107)
CO2 SERPL-SCNC: 28 MMOL/L — SIGNIFICANT CHANGE UP (ref 22–31)
CORTIS AM PEAK SERPL-MCNC: 10.8 UG/DL — SIGNIFICANT CHANGE UP (ref 6–18.4)
CORTIS AM PEAK SERPL-MCNC: 11 UG/DL — SIGNIFICANT CHANGE UP (ref 6–18.4)
CORTIS AM PEAK SERPL-MCNC: 17 UG/DL — SIGNIFICANT CHANGE UP (ref 6–18.4)
CORTIS AM PEAK SERPL-MCNC: 20.3 UG/DL — HIGH (ref 6–18.4)
CREAT SERPL-MCNC: 0.87 MG/DL — SIGNIFICANT CHANGE UP (ref 0.5–1.3)
EGFR: 86 ML/MIN/1.73M2 — SIGNIFICANT CHANGE UP
EOSINOPHIL # BLD AUTO: 0.22 K/UL — SIGNIFICANT CHANGE UP (ref 0–0.5)
EOSINOPHIL NFR BLD AUTO: 6.1 % — HIGH (ref 0–6)
ESTIMATED AVERAGE GLUCOSE: 97 — SIGNIFICANT CHANGE UP
GLUCOSE BLDC GLUCOMTR-MCNC: 61 MG/DL — LOW (ref 70–99)
GLUCOSE BLDC GLUCOMTR-MCNC: 61 MG/DL — LOW (ref 70–99)
GLUCOSE BLDC GLUCOMTR-MCNC: 79 MG/DL — SIGNIFICANT CHANGE UP (ref 70–99)
GLUCOSE BLDC GLUCOMTR-MCNC: 95 MG/DL — SIGNIFICANT CHANGE UP (ref 70–99)
GLUCOSE BLDC GLUCOMTR-MCNC: 96 MG/DL — SIGNIFICANT CHANGE UP (ref 70–99)
GLUCOSE SERPL-MCNC: 92 MG/DL — SIGNIFICANT CHANGE UP (ref 70–99)
HCT VFR BLD CALC: 31.5 % — LOW (ref 39–50)
HGB BLD-MCNC: 10.3 G/DL — LOW (ref 13–17)
IANC: 1.38 K/UL — LOW (ref 1.8–7.4)
IMM GRANULOCYTES NFR BLD AUTO: 0.6 % — SIGNIFICANT CHANGE UP (ref 0–0.9)
LYMPHOCYTES # BLD AUTO: 1.53 K/UL — SIGNIFICANT CHANGE UP (ref 1–3.3)
LYMPHOCYTES # BLD AUTO: 42.4 % — SIGNIFICANT CHANGE UP (ref 13–44)
MAGNESIUM SERPL-MCNC: 1.9 MG/DL — SIGNIFICANT CHANGE UP (ref 1.6–2.6)
MCHC RBC-ENTMCNC: 30.4 PG — SIGNIFICANT CHANGE UP (ref 27–34)
MCHC RBC-ENTMCNC: 32.7 GM/DL — SIGNIFICANT CHANGE UP (ref 32–36)
MCV RBC AUTO: 92.9 FL — SIGNIFICANT CHANGE UP (ref 80–100)
MONOCYTES # BLD AUTO: 0.4 K/UL — SIGNIFICANT CHANGE UP (ref 0–0.9)
MONOCYTES NFR BLD AUTO: 11.1 % — SIGNIFICANT CHANGE UP (ref 2–14)
NEUTROPHILS # BLD AUTO: 1.38 K/UL — LOW (ref 1.8–7.4)
NEUTROPHILS NFR BLD AUTO: 38.1 % — LOW (ref 43–77)
NRBC # BLD: 0 /100 WBCS — SIGNIFICANT CHANGE UP (ref 0–0)
NRBC # FLD: 0 K/UL — SIGNIFICANT CHANGE UP (ref 0–0)
PHOSPHATE SERPL-MCNC: 2.7 MG/DL — SIGNIFICANT CHANGE UP (ref 2.5–4.5)
PLATELET # BLD AUTO: 233 K/UL — SIGNIFICANT CHANGE UP (ref 150–400)
POTASSIUM SERPL-MCNC: 4.2 MMOL/L — SIGNIFICANT CHANGE UP (ref 3.5–5.3)
POTASSIUM SERPL-SCNC: 4.2 MMOL/L — SIGNIFICANT CHANGE UP (ref 3.5–5.3)
RBC # BLD: 3.39 M/UL — LOW (ref 4.2–5.8)
RBC # FLD: 13.6 % — SIGNIFICANT CHANGE UP (ref 10.3–14.5)
SODIUM SERPL-SCNC: 138 MMOL/L — SIGNIFICANT CHANGE UP (ref 135–145)
WBC # BLD: 3.61 K/UL — LOW (ref 3.8–10.5)
WBC # FLD AUTO: 3.61 K/UL — LOW (ref 3.8–10.5)

## 2023-01-31 PROCEDURE — 99232 SBSQ HOSP IP/OBS MODERATE 35: CPT | Mod: GC

## 2023-01-31 PROCEDURE — 99232 SBSQ HOSP IP/OBS MODERATE 35: CPT

## 2023-01-31 RX ORDER — DEXTROSE 50 % IN WATER 50 %
12.5 SYRINGE (ML) INTRAVENOUS ONCE
Refills: 0 | Status: DISCONTINUED | OUTPATIENT
Start: 2023-01-31 | End: 2023-02-02

## 2023-01-31 RX ADMIN — Medication 81 MILLIGRAM(S): at 13:02

## 2023-01-31 RX ADMIN — ATORVASTATIN CALCIUM 80 MILLIGRAM(S): 80 TABLET, FILM COATED ORAL at 22:17

## 2023-01-31 RX ADMIN — ENOXAPARIN SODIUM 40 MILLIGRAM(S): 100 INJECTION SUBCUTANEOUS at 05:38

## 2023-01-31 RX ADMIN — SENNA PLUS 1 TABLET(S): 8.6 TABLET ORAL at 13:02

## 2023-01-31 RX ADMIN — POLYETHYLENE GLYCOL 3350 17 GRAM(S): 17 POWDER, FOR SOLUTION ORAL at 13:02

## 2023-01-31 RX ADMIN — TAMSULOSIN HYDROCHLORIDE 0.4 MILLIGRAM(S): 0.4 CAPSULE ORAL at 22:17

## 2023-01-31 RX ADMIN — MAGNESIUM OXIDE 400 MG ORAL TABLET 400 MILLIGRAM(S): 241.3 TABLET ORAL at 13:02

## 2023-01-31 RX ADMIN — COSYNTROPIN 0.25 MILLIGRAM(S): 0.25 INJECTION, SOLUTION INTRAVENOUS at 08:28

## 2023-01-31 NOTE — PROGRESS NOTE ADULT - ASSESSMENT
82-year-old male pmhx of CVA 10 years prior, dementia, diabetes, hyperlipidemia, CAD comes to ED w/ nausea, vomiting, chest pain, SOB, abdominal pain.  Septic from e.coli bacteremia. History limited due to patient's baseline nonverbal dementia status. Endocrine consulted for low BG. Concern for inappropriately normal insulin and c-peptide levels but BG was normal at the time of blood collection.    1) Hypoglycemia - unable to assess for whipple's triad due to altered mental status  suspecting related to poor po intake, and sepsis, encourage po intake, tx of infection.  Concern was raised for inappropriately normal insulin and c-peptide levels but BG was normal at the time of blood collection.  1/30 AM cortisol 7.0 before 7am , repeat at 8am was 8. Albumin <3, may be leading to lower cortisol reading.   1/29 Stim test performed cortisol level with appropriate response.  Adrenal Insufficiency ruled out      1/29 FS 45 but within 6 minutes serum glucose was 101 normal  Labs sent (insulin, c-peptide etc,) cannot be interpreted given normal serum glucose.  This raises the suspicion for the point of care FS to be falsely low and perhaps no true concern of  hypoglycemia.  TSH normal. Free T4 1.5 normal.      2) DM2 in remission  HbA1c 5.3% last checked Repeat HbA1c 5.0%  stopped metformin on dc in Apr 2022  no insulin/correction scale needed given hypoglycemia and poor po intake   No need to check FS before meals and at bedtime unless clinical suspicion of hypoglycemia noted and should send a serum glucose as well to correlate   Trend serum glucose   Encourage oral intake  Please assist with meals     3) Hyperlipidemia  atorvastatin 80mg      Endocrine to sign off. Please call with questions   Above plan d/w Dr. Marinelli   D/w Team Dr. Daniela Redding  Nurse Practitioner  Division of Endocrinology & Diabetes  In house pager #[phone]    If before 9AM or after 6PM, or on weekends/holidays, please call endocrine answering service for assistance ([phone]).For nonurgent matters email [email or web address] for assistance.    82-year-old male pmhx of CVA 10 years prior, dementia, diabetes, hyperlipidemia, CAD comes to ED w/ nausea, vomiting, chest pain, SOB, abdominal pain.  Septic from e.coli bacteremia. History limited due to patient's baseline nonverbal dementia status. Endocrine consulted for low BG. Concern for inappropriately normal insulin and c-peptide levels but BG was normal at the time of blood collection.    1) Hypoglycemia - unable to assess for whipple's triad due to altered mental status  suspecting related to poor po intake, and sepsis, encourage po intake, tx of infection.  Concern was raised for inappropriately normal insulin and c-peptide levels but BG was normal at the time of blood collection.  1/30 AM cortisol 7.0 before 7am , repeat at 8am was 8. Albumin <3, may be leading to lower cortisol reading.   1/29 Stim test performed cortisol level with appropriate response.  Adrenal Insufficiency ruled out      1/29 FS 45 but within 6 minutes serum glucose was 101 normal  Labs sent (insulin, c-peptide etc,) cannot be interpreted given normal serum glucose.  This raises the suspicion for the point of care FS to be falsely low and perhaps no true concern of  hypoglycemia.  1/31 FS 61 this am as per team dextrose IVP was not given ;Serum BMP 92 ; Suspect FS are falsely low;   TSH normal. Free T4 1.5 normal.      2) DM2 in remission  HbA1c 5.3% last checked Repeat HbA1c 5.0%  stopped metformin on dc in Apr 2022  no insulin/correction scale needed given hypoglycemia and poor po intake   No need to check FS before meals and at bedtime unless clinical suspicion of hypoglycemia noted and should send a serum glucose as well to correlate   Trend serum glucose   Encourage oral intake  Please assist with meals     3) Hyperlipidemia  atorvastatin 80mg      Endocrine to sign off. Please call with questions   Above plan d/w Dr. Marinelli   D/w Team Dr. Daniela Redding  Nurse Practitioner  Division of Endocrinology & Diabetes  In house pager #[phone]    If before 9AM or after 6PM, or on weekends/holidays, please call endocrine answering service for assistance ([phone]).For nonurgent matters email [email or web address] for assistance.

## 2023-01-31 NOTE — PROVIDER CONTACT NOTE (HYPOGLYCEMIA EVENT) - NSPROVCONTHYPO_TYPEOFDIABETES
Diabetes, Type 2
None
Diabetes, Type 2

## 2023-01-31 NOTE — PROVIDER CONTACT NOTE (HYPOGLYCEMIA EVENT) - NS PROVIDER CONTACT RECOMMEND-HYPO
Notify MD
Start patient on continuous IV dextrose. 
Notify MD. Adjust patients continuous infusion of Dextrose 5%. Change FS to Q6. 
- suggested to MD to restart patient on D5W  MD made aware and with order to follow hypogylcemia protocol, no new orders 
Notify MD. Encourage patient to increase PO intake. Assist patient with meals.
Increase PO intake or have IV 5% dextrose throughout the shift. MD notified and aware. Gives the dextrose 50% 12.5g. Follow hypoglycemia policy. 
Recheck FS

## 2023-01-31 NOTE — PROVIDER CONTACT NOTE (HYPOGLYCEMIA EVENT) - NS PROVIDER CONTACT BACKGROUND-HYPO
Age: 82y    Gender: Male    POCT Blood Glucose:  100 mg/dL (01-18-23 @ 07:09)  97 mg/dL (01-18-23 @ 06:55)  50 mg/dL (01-18-23 @ 06:31)  57 mg/dL (01-18-23 @ 06:30)  144 mg/dL (01-17-23 @ 22:31)  164 mg/dL (01-17-23 @ 22:05)  63 mg/dL (01-17-23 @ 20:48)  68 mg/dL (01-17-23 @ 20:29)      eMAR:dextrose 50% Injectable   12.5 Gram(s) IV Push (01-17-23 @ 14:28)    dextrose 50% Injectable   12.5 Gram(s) IV Push (01-18-23 @ 06:30)    dextrose 50% Injectable   25 Gram(s) IV Push (01-17-23 @ 20:00)    
Age: 82y    Gender: Male    POCT Blood Glucose:  94 mg/dL (01-25-23 @ 12:59)  110 mg/dL (01-25-23 @ 11:47)  144 mg/dL (01-25-23 @ 11:15)  69 mg/dL (01-25-23 @ 11:11)  81 mg/dL (01-25-23 @ 10:49)  78 mg/dL (01-25-23 @ 10:19)  62 mg/dL (01-25-23 @ 09:38)  65 mg/dL (01-25-23 @ 09:36)  67 mg/dL (01-25-23 @ 08:35)  66 mg/dL (01-25-23 @ 08:34)    eMAR:atorvastatin   80 milliGRAM(s) Oral (01-24-23 @ 22:07)    dextrose 50% Injectable   12.5 milliLiter(s) IV Push (01-25-23 @ 09:46)    dextrose 50% Injectable   25 Gram(s) IV Push (01-25-23 @ 10:51)    dextrose Oral Gel   15 Gram(s) Oral (01-25-23 @ 09:12)    
Age: 82y    Gender: Male    POCT Blood Glucose:  157 mg/dL (01-29-23 @ 19:23)  45 mg/dL (01-29-23 @ 18:04)  63 mg/dL (01-29-23 @ 17:42)  89 mg/dL (01-29-23 @ 12:06)  78 mg/dL (01-29-23 @ 06:49)  77 mg/dL (01-29-23 @ 06:48)  117 mg/dL (01-28-23 @ 23:55)  122 mg/dL (01-28-23 @ 21:46)      eMAR:atorvastatin   80 milliGRAM(s) Oral (01-28-23 @ 21:52)    dextrose 50% Injectable   50 milliLiter(s) IV Push (01-29-23 @ 18:24)    
Age: 82y    Gender: Male    POCT Blood Glucose:  61 mg/dL (01-31-23 @ 07:29)  61 mg/dL (01-31-23 @ 07:24)  102 mg/dL (01-30-23 @ 23:37)  87 mg/dL (01-30-23 @ 17:37)  91 mg/dL (01-30-23 @ 12:33)      eMAR:atorvastatin   80 milliGRAM(s) Oral (01-30-23 @ 22:22)    
Age: 82y    Gender: Male    POCT Blood Glucose:  80 mg/dL (01-22-23 @ 17:56)  116 mg/dL (01-22-23 @ 12:30)  130 mg/dL (01-22-23 @ 11:17)  93 mg/dL (01-22-23 @ 10:51)  63 mg/dL (01-22-23 @ 10:23)  59 mg/dL (01-22-23 @ 10:07)  52 mg/dL (01-22-23 @ 10:02)  95 mg/dL (01-22-23 @ 01:57)      eMAR:atorvastatin   80 milliGRAM(s) Oral (01-21-23 @ 23:08)    dextrose 50% Injectable   12.5 Gram(s) IV Push (01-22-23 @ 10:52)    dextrose 50% Injectable   12.5 Gram(s) IV Push (01-22-23 @ 10:29)    
Age: 82y    Gender: Male    POCT Blood Glucose:  105 mg/dL (01-18-23 @ 12:42)  78 mg/dL (01-18-23 @ 12:40)  81 mg/dL (01-18-23 @ 12:22)  52 mg/dL (01-18-23 @ 11:50)  58 mg/dL (01-18-23 @ 11:49)  100 mg/dL (01-18-23 @ 07:09)  97 mg/dL (01-18-23 @ 06:55)  50 mg/dL (01-18-23 @ 06:31)      eMAR:dextrose 50% Injectable   12.5 Gram(s) IV Push (01-18-23 @ 12:18)    dextrose 50% Injectable   12.5 Gram(s) IV Push (01-17-23 @ 14:28)    dextrose 50% Injectable   12.5 Gram(s) IV Push (01-18-23 @ 06:30)    dextrose 50% Injectable   25 Gram(s) IV Push (01-17-23 @ 20:00)    
Age: 82y    Gender: Male    POCT Blood Glucose:  131 mg/dL (01-28-23 @ 06:52)  64 mg/dL (01-28-23 @ 06:20)  62 mg/dL (01-28-23 @ 06:18)  121 mg/dL (01-27-23 @ 23:44)  89 mg/dL (01-27-23 @ 18:46)  87 mg/dL (01-27-23 @ 17:48)  87 mg/dL (01-27-23 @ 16:53)  76 mg/dL (01-27-23 @ 12:16)      eMAR:atorvastatin   80 milliGRAM(s) Oral (01-27-23 @ 21:32)    dextrose 50% Injectable   12.5 Gram(s) IV Push (01-28-23 @ 06:31)    
Age: 82y    Gender: Male    POCT Blood Glucose:  108 mg/dL (01-24-23 @ 23:51)  112 mg/dL (01-24-23 @ 23:40)  98 mg/dL (01-24-23 @ 23:14)  91 mg/dL (01-24-23 @ 22:59)  81 mg/dL (01-24-23 @ 22:38)  50 mg/dL (01-24-23 @ 22:36) apple juice 4oz given  58 mg/dL (01-24-23 @ 22:20) apple juice 4oz given   53 mg/dL (01-24-23 @ 22:17)      eMAR:atorvastatin   80 milliGRAM(s) Oral (01-24-23 @ 22:07)    
Age: 82y    Gender: Male    POCT Blood Glucose:  149 mg/dL (01-26-23 @ 11:54)  61 mg/dL (01-26-23 @ 10:34)  66 mg/dL (01-26-23 @ 09:36)  62 mg/dL (01-26-23 @ 09:35)  59 mg/dL (01-26-23 @ 08:56)  55 mg/dL (01-26-23 @ 08:54)  79 mg/dL (01-26-23 @ 05:20)  95 mg/dL (01-25-23 @ 23:24)      eMAR:atorvastatin   80 milliGRAM(s) Oral (01-25-23 @ 22:12)    dextrose 50% Injectable   50 milliLiter(s) IV Push (01-26-23 @ 11:15)    dextrose Oral Gel   15 Gram(s) Oral (01-26-23 @ 10:06)    dextrose Oral Gel   15 Gram(s) Oral (01-26-23 @ 09:15)

## 2023-01-31 NOTE — PROVIDER CONTACT NOTE (HYPOGLYCEMIA EVENT) - NS PROVIDER CONTACT NOTE-NOTIFY DATE & TIME-HYPO
26-Jan-2023 09:04
29-Jan-2023 17:42
25-Jan-2023 08:42
18-Jan-2023 07:37
24-Jan-2023 22:17
31-Jan-2023 07:40
28-Jan-2023 07:12
18-Jan-2023 11:52
22-Jan-2023 10:15

## 2023-01-31 NOTE — PROGRESS NOTE ADULT - SUBJECTIVE AND OBJECTIVE BOX
Chief Complaint:  Hypoglycemia    History: Pt seen at bedside. As per Rn pt tolerating oral diet. As per RN pt has no signs of nausea and vomiting. Hypoglycemia noted this am. Serum glucose 92. Suspect FS are incaccurate    MEDICATIONS  (STANDING):  albuterol/ipratropium for Nebulization 3 milliLiter(s) Nebulizer once  albuterol/ipratropium for Nebulization. 3 milliLiter(s) Nebulizer once  aspirin  chewable 81 milliGRAM(s) Oral daily  atorvastatin 80 milliGRAM(s) Oral at bedtime  bisacodyl Suppository 10 milliGRAM(s) Rectal once  dextrose 50% Injectable 25 Gram(s) IV Push once  dextrose 50% Injectable 12.5 Gram(s) IV Push once  dextrose 50% Injectable 25 Gram(s) IV Push once  dextrose 50% Injectable 12.5 Gram(s) IV Push once  dextrose Oral Gel 15 Gram(s) Oral once  enoxaparin Injectable 40 milliGRAM(s) SubCutaneous every 24 hours  glucagon  Injectable 1 milliGRAM(s) IntraMuscular once  magnesium oxide 400 milliGRAM(s) Oral daily  polyethylene glycol 3350 17 Gram(s) Oral daily  senna 1 Tablet(s) Oral daily  tamsulosin 0.4 milliGRAM(s) Oral at bedtime    MEDICATIONS  (PRN):  acetaminophen     Tablet .. 650 milliGRAM(s) Oral every 6 hours PRN Temp greater or equal to 38C (100.4F), Mild Pain (1 - 3)  oxymetazoline 0.05% Nasal Spray 2 Spray(s) Both Nostrils every 12 hours PRN Nasal Congestion      Allergies: No Known Allergies    Review of Systems:  UNABLE TO OBTAIN    PHYSICAL EXAM:  VITALS: T(C): 36.3 (01-31-23 @ 12:21)  T(F): 97.3 (01-31-23 @ 12:21), Max: 98.2 (01-30-23 @ 21:45)  HR: 60 (01-31-23 @ 12:21) (54 - 95)  BP: 164/76 (01-31-23 @ 12:21) (146/78 - 164/76)  RR:  (17 - 18)  SpO2:  (96% - 100%)  Wt(kg): --  RESPIRATORY: No labored breathing   GI: Soft, nontender, non distended  PSYCH: Alert and oriented x 0      CAPILLARY BLOOD GLUCOSE  POCT Blood Glucose.: 79 mg/dL (31 Jan 2023 17:34)  POCT Blood Glucose.: 95 mg/dL (31 Jan 2023 13:18)  POCT Blood Glucose.: 61 mg/dL (31 Jan 2023 07:29)  POCT Blood Glucose.: 61 mg/dL (31 Jan 2023 07:24)  POCT Blood Glucose.: 102 mg/dL (30 Jan 2023 23:37)    A1C with Estimated Average Glucose (01.31.23 @ 08:25)    A1C with Estimated Average Glucose Result: 5.0    Estimated Average Glucose: 97      01-31    138  |  104  |  8   ----------------------------<  92  4.2   |  28  |  0.87    eGFR: 86    Ca    9.4      01-31  Mg     1.90     01-31  Phos  2.7     01-31    TPro  5.7<L>  /  Alb  2.9<L>  /  TBili  1.0  /  DBili  x   /  AST  21  /  ALT  24  /  AlkPhos  71  01-29      Thyroid Function Tests:  01-30 @ 05:57 TSH -- FreeT4 1.5 T3 -- Anti TPO -- Anti Thyroglobulin Ab -- TSI --  01-25 @ 06:56 TSH 3.40 FreeT4 -- T3 -- Anti TPO -- Anti Thyroglobulin Ab -- TSI --      Diet, Pureed:   Mildly Thick Liquids (MILDTHICKLIQS)    Start Time: 06:00 (01-24-23 @ 18:55) [Active]

## 2023-01-31 NOTE — PROGRESS NOTE ADULT - SUBJECTIVE AND OBJECTIVE BOX
PROGRESS NOTE:   Authored by Prashant Suarez MD   Patient is a 82y old  Male who presents with a chief complaint of sepsis (26 Jan 2023 17:16)      SUBJECTIVE / OVERNIGHT EVENTS:  No acute events overnight.     ADDITIONAL REVIEW OF SYSTEMS:    MEDICATIONS  (STANDING):  albuterol/ipratropium for Nebulization 3 milliLiter(s) Nebulizer once  albuterol/ipratropium for Nebulization. 3 milliLiter(s) Nebulizer once  aspirin  chewable 81 milliGRAM(s) Oral daily  atorvastatin 80 milliGRAM(s) Oral at bedtime  bisacodyl Suppository 10 milliGRAM(s) Rectal once  cosyntropin Injectable 0.25 milliGRAM(s) IV Push once  dextrose 50% Injectable 25 Gram(s) IV Push once  dextrose 50% Injectable 12.5 Gram(s) IV Push once  dextrose 50% Injectable 25 Gram(s) IV Push once  dextrose Oral Gel 15 Gram(s) Oral once  enoxaparin Injectable 40 milliGRAM(s) SubCutaneous every 24 hours  glucagon  Injectable 1 milliGRAM(s) IntraMuscular once  magnesium oxide 400 milliGRAM(s) Oral daily  polyethylene glycol 3350 17 Gram(s) Oral daily  senna 1 Tablet(s) Oral daily  tamsulosin 0.4 milliGRAM(s) Oral at bedtime    MEDICATIONS  (PRN):  acetaminophen     Tablet .. 650 milliGRAM(s) Oral every 6 hours PRN Temp greater or equal to 38C (100.4F), Mild Pain (1 - 3)  oxymetazoline 0.05% Nasal Spray 2 Spray(s) Both Nostrils every 12 hours PRN Nasal Congestion      CAPILLARY BLOOD GLUCOSE      POCT Blood Glucose.: 102 mg/dL (30 Jan 2023 23:37)  POCT Blood Glucose.: 87 mg/dL (30 Jan 2023 17:37)  POCT Blood Glucose.: 91 mg/dL (30 Jan 2023 12:33)    I&O's Summary      PHYSICAL EXAM:  Vital Signs Last 24 Hrs  T(C): 36.8 (31 Jan 2023 05:05), Max: 36.8 (30 Jan 2023 21:45)  T(F): 98.2 (31 Jan 2023 05:05), Max: 98.2 (30 Jan 2023 21:45)  HR: 95 (31 Jan 2023 05:05) (54 - 95)  BP: 154/90 (31 Jan 2023 05:05) (146/78 - 154/90)  BP(mean): --  RR: 18 (31 Jan 2023 05:05) (18 - 18)  SpO2: 100% (31 Jan 2023 05:05) (96% - 100%)    Parameters below as of 31 Jan 2023 05:05  Patient On (Oxygen Delivery Method): room air        GENERAL: No apparent distress.   HEAD:  Atraumatic, Normocephalic  EYES: EOMI, PERRLA, conjunctiva and sclera clear  NECK: Supple, no lymphadenopathy, no elevated JVP  CHEST/LUNG: Clear to auscultation bilateral and symmetric; No wheezes, rales, or rhonchi  HEART: S1 and S2 normal. Regular rate and rhythm; No murmurs, rubs, or gallops  ABDOMEN: Soft, non-tender, non-distended; normal bowel sounds  EXTREMITIES:  2+ peripheral pulses b/l, No clubbing, cyanosis, or edema  NEUROLOGY: A&O x 3, no focal deficits  SKIN: No rashes or lesions      LABS:                        11.0   3.85  )-----------( 225      ( 30 Jan 2023 05:57 )             33.9     01-30    136  |  102  |  8   ----------------------------<  96  4.6   |  28  |  0.79    Ca    9.3      30 Jan 2023 05:57  Phos  2.7     01-30  Mg     1.90     01-30                  RADIOLOGY & ADDITIONAL TESTS:  Lab Results Reviewed   Imaging Reviewed  Electrocardiogram Reviewed   PROGRESS NOTE:   Authored by Prashant Suarez MD   Patient is a 82y old  Male who presents with a chief complaint of sepsis (26 Jan 2023 17:16)      SUBJECTIVE / OVERNIGHT EVENTS:  No acute events overnight.     Cosyntropin testing done this AM. Patient opening eyes spontaneously.    ADDITIONAL REVIEW OF SYSTEMS:    MEDICATIONS  (STANDING):  albuterol/ipratropium for Nebulization 3 milliLiter(s) Nebulizer once  albuterol/ipratropium for Nebulization. 3 milliLiter(s) Nebulizer once  aspirin  chewable 81 milliGRAM(s) Oral daily  atorvastatin 80 milliGRAM(s) Oral at bedtime  bisacodyl Suppository 10 milliGRAM(s) Rectal once  cosyntropin Injectable 0.25 milliGRAM(s) IV Push once  dextrose 50% Injectable 25 Gram(s) IV Push once  dextrose 50% Injectable 12.5 Gram(s) IV Push once  dextrose 50% Injectable 25 Gram(s) IV Push once  dextrose Oral Gel 15 Gram(s) Oral once  enoxaparin Injectable 40 milliGRAM(s) SubCutaneous every 24 hours  glucagon  Injectable 1 milliGRAM(s) IntraMuscular once  magnesium oxide 400 milliGRAM(s) Oral daily  polyethylene glycol 3350 17 Gram(s) Oral daily  senna 1 Tablet(s) Oral daily  tamsulosin 0.4 milliGRAM(s) Oral at bedtime    MEDICATIONS  (PRN):  acetaminophen     Tablet .. 650 milliGRAM(s) Oral every 6 hours PRN Temp greater or equal to 38C (100.4F), Mild Pain (1 - 3)  oxymetazoline 0.05% Nasal Spray 2 Spray(s) Both Nostrils every 12 hours PRN Nasal Congestion      CAPILLARY BLOOD GLUCOSE      POCT Blood Glucose.: 102 mg/dL (30 Jan 2023 23:37)  POCT Blood Glucose.: 87 mg/dL (30 Jan 2023 17:37)  POCT Blood Glucose.: 91 mg/dL (30 Jan 2023 12:33)    I&O's Summary      PHYSICAL EXAM:  Vital Signs Last 24 Hrs  T(C): 36.8 (31 Jan 2023 05:05), Max: 36.8 (30 Jan 2023 21:45)  T(F): 98.2 (31 Jan 2023 05:05), Max: 98.2 (30 Jan 2023 21:45)  HR: 95 (31 Jan 2023 05:05) (54 - 95)  BP: 154/90 (31 Jan 2023 05:05) (146/78 - 154/90)  BP(mean): --  RR: 18 (31 Jan 2023 05:05) (18 - 18)  SpO2: 100% (31 Jan 2023 05:05) (96% - 100%)    Parameters below as of 31 Jan 2023 05:05  Patient On (Oxygen Delivery Method): room air        GENERAL: No apparent distress.   HEAD:  Atraumatic, Normocephalic  EYES: EOMI, PERRLA, conjunctiva and sclera clear  NECK: Supple, no lymphadenopathy, no elevated JVP  CHEST/LUNG: Clear to auscultation bilateral and symmetric; No wheezes, rales, or rhonchi  HEART: S1 and S2 normal. Regular rate and rhythm; No murmurs, rubs, or gallops  ABDOMEN: Soft, non-tender, non-distended; normal bowel sounds  EXTREMITIES:  2+ peripheral pulses b/l, No clubbing, cyanosis, or edema  NEUROLOGY: A&O x 3, no focal deficits  SKIN: No rashes or lesions      LABS:                        11.0   3.85  )-----------( 225      ( 30 Jan 2023 05:57 )             33.9     01-30    136  |  102  |  8   ----------------------------<  96  4.6   |  28  |  0.79    Ca    9.3      30 Jan 2023 05:57  Phos  2.7     01-30  Mg     1.90     01-30                  RADIOLOGY & ADDITIONAL TESTS:  Lab Results Reviewed   Imaging Reviewed  Electrocardiogram Reviewed

## 2023-01-31 NOTE — PROVIDER CONTACT NOTE (HYPOGLYCEMIA EVENT) - NS PROVIDER CONTACT NOTE-TREATMENT-HYPO
Dextrose 50% 12.5 Grams IV Push
2 - 4 oz apple juice 10:08/4 oz Fruit Juice (Specify quantity, date/time)/Dextrose 50% 12.5 Grams IV Push
Dextrose 50% 12.5 Grams IV Push
Glucose gel 1 tube/Dextrose 50% 12.5 Grams IV Push/Dextrose 50% 25 Grams IV Push
Glucose gel 1 tube/Dextrose 50% 25 Grams IV Push
50 mg/dL (01-24-23 @ 22:36) apple juice 4oz given  58 mg/dL (01-24-23 @ 22:20) apple juice 4oz given/4 oz Fruit Juice (Specify quantity, date/time)

## 2023-01-31 NOTE — PROGRESS NOTE ADULT - PROBLEM SELECTOR PLAN 1
- D5LR  - f/u BHB, insulin, c-peptide, proinsulin.  - Endocrine consulted for concern for lab-work concerning for insulinoma vs autoimmune insulin syndrome vs excessive insulin production.  - Labs drawn with FSG > 55,   - If patient FSG <55, will draw labs.  - Pending cosyntropin testing.  - FSG and BMP glucose are not correlating. - D5LR  - f/u BHB, insulin, c-peptide, proinsulin.  - Endocrine consulted for concern for lab-work concerning for insulinoma vs autoimmune insulin syndrome vs excessive insulin production.  - Labs drawn with FSG > 55,   - If patient FSG <55, will draw labs.  - FSG and BMP glucose are not correlating x 1 ( on 1/29/23)  - Cosyntropin testing with appropriate rise in cortisol. Negative for AI.

## 2023-01-31 NOTE — PROVIDER CONTACT NOTE (HYPOGLYCEMIA EVENT) - NS PROVIDER CONTACT NOTE-PROVIDER NOTIFIED-HYPO
Daniela Cerda
RADHA ORANTES
Prashant Suarez
Prashant Suarez MD
Darwin Deshpande MD 
Dr.Ahmed Ochoa/[id number]
Prashant Saba 
Darwin Deshpande MD

## 2023-01-31 NOTE — PROGRESS NOTE ADULT - PROBLEM SELECTOR PLAN 3
- Urine culture with pan sensitive ecoli  - Zosyn (1/17-1/18)  - Ceftriaxone (1/18-1/25)  - Prior urine cultures with candida, ecoli. MRSA ISO MRSA bacteremia.  - TOV successful  - resolved - Urine culture with pan sensitive ecoli  - Zosyn (1/17-1/18)  - Ceftriaxone (1/18-1/25)  - Prior urine cultures with candida, ecoli. MRSA ISO MRSA bacteremia.  - TOV successful  - resolved  - Bladder scan 1/30 ~160 cc.

## 2023-01-31 NOTE — PROVIDER CONTACT NOTE (HYPOGLYCEMIA EVENT) - NS PROVIDER CONTACT CONTRIBUTING FACTORS OF EPISODE
Poor oral intake within the last 24 hours/Previous finger stick less than 100 mg/dL
Patient NPO greater than 8 hours
Poor oral intake within the last 24 hours/Previous finger stick less than 100 mg/dL
Poor oral intake within the last 24 hours
Poor oral intake within the last 24 hours
Previous finger stick less than 100 mg/dL
Poor oral intake within the last 24 hours

## 2023-02-01 LAB
ANION GAP SERPL CALC-SCNC: 6 MMOL/L — LOW (ref 7–14)
BASOPHILS # BLD AUTO: 0.07 K/UL — SIGNIFICANT CHANGE UP (ref 0–0.2)
BASOPHILS NFR BLD AUTO: 2.1 % — HIGH (ref 0–2)
BUN SERPL-MCNC: 9 MG/DL — SIGNIFICANT CHANGE UP (ref 7–23)
CALCIUM SERPL-MCNC: 9.2 MG/DL — SIGNIFICANT CHANGE UP (ref 8.4–10.5)
CHLORIDE SERPL-SCNC: 104 MMOL/L — SIGNIFICANT CHANGE UP (ref 98–107)
CO2 SERPL-SCNC: 27 MMOL/L — SIGNIFICANT CHANGE UP (ref 22–31)
CREAT SERPL-MCNC: 0.85 MG/DL — SIGNIFICANT CHANGE UP (ref 0.5–1.3)
EGFR: 86 ML/MIN/1.73M2 — SIGNIFICANT CHANGE UP
EOSINOPHIL # BLD AUTO: 0.2 K/UL — SIGNIFICANT CHANGE UP (ref 0–0.5)
EOSINOPHIL NFR BLD AUTO: 5.9 % — SIGNIFICANT CHANGE UP (ref 0–6)
GLUCOSE BLDC GLUCOMTR-MCNC: 73 MG/DL — SIGNIFICANT CHANGE UP (ref 70–99)
GLUCOSE BLDC GLUCOMTR-MCNC: 82 MG/DL — SIGNIFICANT CHANGE UP (ref 70–99)
GLUCOSE SERPL-MCNC: 73 MG/DL — SIGNIFICANT CHANGE UP (ref 70–99)
HCT VFR BLD CALC: 30.8 % — LOW (ref 39–50)
HGB BLD-MCNC: 9.8 G/DL — LOW (ref 13–17)
IANC: 1.19 K/UL — LOW (ref 1.8–7.4)
IMM GRANULOCYTES NFR BLD AUTO: 0.6 % — SIGNIFICANT CHANGE UP (ref 0–0.9)
LYMPHOCYTES # BLD AUTO: 1.51 K/UL — SIGNIFICANT CHANGE UP (ref 1–3.3)
LYMPHOCYTES # BLD AUTO: 44.5 % — HIGH (ref 13–44)
MAGNESIUM SERPL-MCNC: 2 MG/DL — SIGNIFICANT CHANGE UP (ref 1.6–2.6)
MCHC RBC-ENTMCNC: 29.9 PG — SIGNIFICANT CHANGE UP (ref 27–34)
MCHC RBC-ENTMCNC: 31.8 GM/DL — LOW (ref 32–36)
MCV RBC AUTO: 93.9 FL — SIGNIFICANT CHANGE UP (ref 80–100)
MONOCYTES # BLD AUTO: 0.4 K/UL — SIGNIFICANT CHANGE UP (ref 0–0.9)
MONOCYTES NFR BLD AUTO: 11.8 % — SIGNIFICANT CHANGE UP (ref 2–14)
NEUTROPHILS # BLD AUTO: 1.19 K/UL — LOW (ref 1.8–7.4)
NEUTROPHILS NFR BLD AUTO: 35.1 % — LOW (ref 43–77)
NRBC # BLD: 0 /100 WBCS — SIGNIFICANT CHANGE UP (ref 0–0)
NRBC # FLD: 0 K/UL — SIGNIFICANT CHANGE UP (ref 0–0)
PHOSPHATE SERPL-MCNC: 2.8 MG/DL — SIGNIFICANT CHANGE UP (ref 2.5–4.5)
PLATELET # BLD AUTO: 226 K/UL — SIGNIFICANT CHANGE UP (ref 150–400)
POTASSIUM SERPL-MCNC: 4 MMOL/L — SIGNIFICANT CHANGE UP (ref 3.5–5.3)
POTASSIUM SERPL-SCNC: 4 MMOL/L — SIGNIFICANT CHANGE UP (ref 3.5–5.3)
PROINSULIN SERPL-MCNC: 5.2 PMOL/L — SIGNIFICANT CHANGE UP (ref 0–10)
RBC # BLD: 3.28 M/UL — LOW (ref 4.2–5.8)
RBC # FLD: 13.8 % — SIGNIFICANT CHANGE UP (ref 10.3–14.5)
SODIUM SERPL-SCNC: 137 MMOL/L — SIGNIFICANT CHANGE UP (ref 135–145)
WBC # BLD: 3.39 K/UL — LOW (ref 3.8–10.5)
WBC # FLD AUTO: 3.39 K/UL — LOW (ref 3.8–10.5)

## 2023-02-01 PROCEDURE — 99232 SBSQ HOSP IP/OBS MODERATE 35: CPT | Mod: GC

## 2023-02-01 RX ORDER — PETROLATUM,WHITE
1 JELLY (GRAM) TOPICAL ONCE
Refills: 0 | Status: DISCONTINUED | OUTPATIENT
Start: 2023-02-01 | End: 2023-02-02

## 2023-02-01 RX ADMIN — TAMSULOSIN HYDROCHLORIDE 0.4 MILLIGRAM(S): 0.4 CAPSULE ORAL at 22:25

## 2023-02-01 RX ADMIN — SENNA PLUS 1 TABLET(S): 8.6 TABLET ORAL at 11:34

## 2023-02-01 RX ADMIN — MAGNESIUM OXIDE 400 MG ORAL TABLET 400 MILLIGRAM(S): 241.3 TABLET ORAL at 11:34

## 2023-02-01 RX ADMIN — Medication 81 MILLIGRAM(S): at 11:34

## 2023-02-01 RX ADMIN — ATORVASTATIN CALCIUM 80 MILLIGRAM(S): 80 TABLET, FILM COATED ORAL at 22:25

## 2023-02-01 RX ADMIN — ENOXAPARIN SODIUM 40 MILLIGRAM(S): 100 INJECTION SUBCUTANEOUS at 05:52

## 2023-02-01 RX ADMIN — POLYETHYLENE GLYCOL 3350 17 GRAM(S): 17 POWDER, FOR SOLUTION ORAL at 11:34

## 2023-02-01 NOTE — PROGRESS NOTE ADULT - SUBJECTIVE AND OBJECTIVE BOX
PROGRESS NOTE:   Authored by Prashant Suarez MD   Patient is a 82y old  Male who presents with a chief complaint of sepsis (26 Jan 2023 17:16)      SUBJECTIVE / OVERNIGHT EVENTS:  No acute events overnight.     ADDITIONAL REVIEW OF SYSTEMS:    MEDICATIONS  (STANDING):  albuterol/ipratropium for Nebulization 3 milliLiter(s) Nebulizer once  albuterol/ipratropium for Nebulization. 3 milliLiter(s) Nebulizer once  aspirin  chewable 81 milliGRAM(s) Oral daily  atorvastatin 80 milliGRAM(s) Oral at bedtime  bisacodyl Suppository 10 milliGRAM(s) Rectal once  dextrose 50% Injectable 25 Gram(s) IV Push once  dextrose 50% Injectable 12.5 Gram(s) IV Push once  dextrose 50% Injectable 25 Gram(s) IV Push once  dextrose 50% Injectable 12.5 Gram(s) IV Push once  dextrose Oral Gel 15 Gram(s) Oral once  enoxaparin Injectable 40 milliGRAM(s) SubCutaneous every 24 hours  glucagon  Injectable 1 milliGRAM(s) IntraMuscular once  magnesium oxide 400 milliGRAM(s) Oral daily  polyethylene glycol 3350 17 Gram(s) Oral daily  senna 1 Tablet(s) Oral daily  tamsulosin 0.4 milliGRAM(s) Oral at bedtime    MEDICATIONS  (PRN):  acetaminophen     Tablet .. 650 milliGRAM(s) Oral every 6 hours PRN Temp greater or equal to 38C (100.4F), Mild Pain (1 - 3)  oxymetazoline 0.05% Nasal Spray 2 Spray(s) Both Nostrils every 12 hours PRN Nasal Congestion      CAPILLARY BLOOD GLUCOSE      POCT Blood Glucose.: 73 mg/dL (01 Feb 2023 06:24)  POCT Blood Glucose.: 82 mg/dL (01 Feb 2023 00:23)  POCT Blood Glucose.: 96 mg/dL (31 Jan 2023 21:15)  POCT Blood Glucose.: 79 mg/dL (31 Jan 2023 17:34)  POCT Blood Glucose.: 95 mg/dL (31 Jan 2023 13:18)  POCT Blood Glucose.: 61 mg/dL (31 Jan 2023 07:29)  POCT Blood Glucose.: 61 mg/dL (31 Jan 2023 07:24)    I&O's Summary      PHYSICAL EXAM:  Vital Signs Last 24 Hrs  T(C): 36.3 (01 Feb 2023 05:00), Max: 36.4 (31 Jan 2023 21:07)  T(F): 97.4 (01 Feb 2023 05:00), Max: 97.5 (31 Jan 2023 21:07)  HR: 55 (01 Feb 2023 05:00) (53 - 60)  BP: 142/69 (01 Feb 2023 05:00) (141/69 - 164/76)  BP(mean): --  RR: 17 (01 Feb 2023 05:00) (17 - 17)  SpO2: 100% (01 Feb 2023 05:00) (98% - 100%)    Parameters below as of 01 Feb 2023 05:00  Patient On (Oxygen Delivery Method): room air      GENERAL: Answering questions intermittently appropriately. Appears comfortable.  HEAD:  Atraumatic, Normocephalic  EYES: EOMI, PERRLA, conjunctiva and sclera clear  ENT: Dry mucous membranes  NECK: Supple, No elevated JVP.  CHEST/LUNG: Expiratory wheeze right lung fields.   HEART: AFIB; No murmurs  Peripheral edema: None  Subclavian skin tenting: Slow response  ABDOMEN: Non-tender non-distended.   EXTREMITIES:  No clubbing, cyanosis, or edema  NERVOUS SYSTEM:  A&Ox1 ,right arm contracted  SKIN: No rashes or lesion        LABS:                        10.3   3.61  )-----------( 233      ( 31 Jan 2023 08:25 )             31.5     01-31    138  |  104  |  8   ----------------------------<  92  4.2   |  28  |  0.87    Ca    9.4      31 Jan 2023 08:25  Phos  2.7     01-31  Mg     1.90     01-31                  RADIOLOGY & ADDITIONAL TESTS:  Lab Results Reviewed   Imaging Reviewed  Electrocardiogram Reviewed

## 2023-02-01 NOTE — PROGRESS NOTE ADULT - PROBLEM SELECTOR PLAN 5
Additional Comments: Patient supplied her own Cosentyx - Echo from april 2022 grossly normal.  - Resume aspirin

## 2023-02-01 NOTE — PROGRESS NOTE ADULT - PROBLEM SELECTOR PLAN 1
- D5LR  - f/u BHB, insulin, c-peptide, proinsulin.  - Endocrine consulted for concern for lab-work concerning for insulinoma vs autoimmune insulin syndrome vs excessive insulin production.  - Labs drawn with FSG > 55,   - If patient FSG <55, will draw labs.  - FSG and BMP glucose are not correlating x 1 ( on 1/29/23)  - Cosyntropin testing with appropriate rise in cortisol. Negative for AI.

## 2023-02-01 NOTE — PROGRESS NOTE ADULT - PROBLEM SELECTOR PLAN 3
- Urine culture with pan sensitive ecoli  - Zosyn (1/17-1/18)  - Ceftriaxone (1/18-1/25)  - Prior urine cultures with candida, ecoli. MRSA ISO MRSA bacteremia.  - TOV successful  - resolved  - Bladder scan 1/30 ~160 cc.

## 2023-02-02 ENCOUNTER — TRANSCRIPTION ENCOUNTER (OUTPATIENT)
Age: 83
End: 2023-02-02

## 2023-02-02 VITALS
DIASTOLIC BLOOD PRESSURE: 75 MMHG | RESPIRATION RATE: 18 BRPM | SYSTOLIC BLOOD PRESSURE: 137 MMHG | HEART RATE: 55 BPM | OXYGEN SATURATION: 100 % | TEMPERATURE: 98 F

## 2023-02-02 LAB
ALBUMIN SERPL ELPH-MCNC: 3.1 G/DL — LOW (ref 3.3–5)
ALP SERPL-CCNC: 76 U/L — SIGNIFICANT CHANGE UP (ref 40–120)
ALT FLD-CCNC: 18 U/L — SIGNIFICANT CHANGE UP (ref 4–41)
ANION GAP SERPL CALC-SCNC: 6 MMOL/L — LOW (ref 7–14)
AST SERPL-CCNC: 18 U/L — SIGNIFICANT CHANGE UP (ref 4–40)
BASOPHILS # BLD AUTO: 0.08 K/UL — SIGNIFICANT CHANGE UP (ref 0–0.2)
BASOPHILS NFR BLD AUTO: 2.2 % — HIGH (ref 0–2)
BILIRUB SERPL-MCNC: 1 MG/DL — SIGNIFICANT CHANGE UP (ref 0.2–1.2)
BUN SERPL-MCNC: 9 MG/DL — SIGNIFICANT CHANGE UP (ref 7–23)
CALCIUM SERPL-MCNC: 9.4 MG/DL — SIGNIFICANT CHANGE UP (ref 8.4–10.5)
CHLORIDE SERPL-SCNC: 106 MMOL/L — SIGNIFICANT CHANGE UP (ref 98–107)
CO2 SERPL-SCNC: 26 MMOL/L — SIGNIFICANT CHANGE UP (ref 22–31)
CREAT SERPL-MCNC: 0.85 MG/DL — SIGNIFICANT CHANGE UP (ref 0.5–1.3)
EGFR: 86 ML/MIN/1.73M2 — SIGNIFICANT CHANGE UP
EOSINOPHIL # BLD AUTO: 0.13 K/UL — SIGNIFICANT CHANGE UP (ref 0–0.5)
EOSINOPHIL NFR BLD AUTO: 3.6 % — SIGNIFICANT CHANGE UP (ref 0–6)
GLUCOSE SERPL-MCNC: 68 MG/DL — LOW (ref 70–99)
HCT VFR BLD CALC: 32.1 % — LOW (ref 39–50)
HGB BLD-MCNC: 10.4 G/DL — LOW (ref 13–17)
IANC: 1.47 K/UL — LOW (ref 1.8–7.4)
IMM GRANULOCYTES NFR BLD AUTO: 0.3 % — SIGNIFICANT CHANGE UP (ref 0–0.9)
LYMPHOCYTES # BLD AUTO: 1.44 K/UL — SIGNIFICANT CHANGE UP (ref 1–3.3)
LYMPHOCYTES # BLD AUTO: 40 % — SIGNIFICANT CHANGE UP (ref 13–44)
MAGNESIUM SERPL-MCNC: 2 MG/DL — SIGNIFICANT CHANGE UP (ref 1.6–2.6)
MCHC RBC-ENTMCNC: 30.6 PG — SIGNIFICANT CHANGE UP (ref 27–34)
MCHC RBC-ENTMCNC: 32.4 GM/DL — SIGNIFICANT CHANGE UP (ref 32–36)
MCV RBC AUTO: 94.4 FL — SIGNIFICANT CHANGE UP (ref 80–100)
MONOCYTES # BLD AUTO: 0.47 K/UL — SIGNIFICANT CHANGE UP (ref 0–0.9)
MONOCYTES NFR BLD AUTO: 13.1 % — SIGNIFICANT CHANGE UP (ref 2–14)
NEUTROPHILS # BLD AUTO: 1.47 K/UL — LOW (ref 1.8–7.4)
NEUTROPHILS NFR BLD AUTO: 40.8 % — LOW (ref 43–77)
NRBC # BLD: 0 /100 WBCS — SIGNIFICANT CHANGE UP (ref 0–0)
NRBC # FLD: 0 K/UL — SIGNIFICANT CHANGE UP (ref 0–0)
PHOSPHATE SERPL-MCNC: 2.5 MG/DL — SIGNIFICANT CHANGE UP (ref 2.5–4.5)
PLATELET # BLD AUTO: 219 K/UL — SIGNIFICANT CHANGE UP (ref 150–400)
POTASSIUM SERPL-MCNC: 4.1 MMOL/L — SIGNIFICANT CHANGE UP (ref 3.5–5.3)
POTASSIUM SERPL-SCNC: 4.1 MMOL/L — SIGNIFICANT CHANGE UP (ref 3.5–5.3)
PROT SERPL-MCNC: 5.8 G/DL — LOW (ref 6–8.3)
RBC # BLD: 3.4 M/UL — LOW (ref 4.2–5.8)
RBC # FLD: 13.7 % — SIGNIFICANT CHANGE UP (ref 10.3–14.5)
SODIUM SERPL-SCNC: 138 MMOL/L — SIGNIFICANT CHANGE UP (ref 135–145)
WBC # BLD: 3.6 K/UL — LOW (ref 3.8–10.5)
WBC # FLD AUTO: 3.6 K/UL — LOW (ref 3.8–10.5)

## 2023-02-02 PROCEDURE — 99239 HOSP IP/OBS DSCHRG MGMT >30: CPT | Mod: GC

## 2023-02-02 RX ORDER — POLYETHYLENE GLYCOL 3350 17 G/17G
17 POWDER, FOR SOLUTION ORAL
Qty: 510 | Refills: 0
Start: 2023-02-02 | End: 2023-03-03

## 2023-02-02 RX ORDER — SENNA PLUS 8.6 MG/1
2 TABLET ORAL
Qty: 60 | Refills: 0
Start: 2023-02-02 | End: 2023-03-03

## 2023-02-02 RX ORDER — PETROLATUM,WHITE
1 JELLY (GRAM) TOPICAL
Qty: 60 | Refills: 0
Start: 2023-02-02 | End: 2023-02-02

## 2023-02-02 RX ADMIN — SENNA PLUS 1 TABLET(S): 8.6 TABLET ORAL at 11:39

## 2023-02-02 RX ADMIN — ENOXAPARIN SODIUM 40 MILLIGRAM(S): 100 INJECTION SUBCUTANEOUS at 07:10

## 2023-02-02 RX ADMIN — MAGNESIUM OXIDE 400 MG ORAL TABLET 400 MILLIGRAM(S): 241.3 TABLET ORAL at 11:39

## 2023-02-02 RX ADMIN — POLYETHYLENE GLYCOL 3350 17 GRAM(S): 17 POWDER, FOR SOLUTION ORAL at 11:39

## 2023-02-02 RX ADMIN — Medication 81 MILLIGRAM(S): at 11:39

## 2023-02-02 NOTE — PROGRESS NOTE ADULT - PROVIDER SPECIALTY LIST ADULT
Internal Medicine
Endocrinology
Internal Medicine
Endocrinology
Internal Medicine

## 2023-02-02 NOTE — DISCHARGE NOTE NURSING/CASE MANAGEMENT/SOCIAL WORK - NSDCPEFALRISK_GEN_ALL_CORE
For information on Fall & Injury Prevention, visit: [email or web address] OR  [email or web address] OR  https://www.cdc.gov/steadi/patient.html

## 2023-02-02 NOTE — DISCHARGE NOTE NURSING/CASE MANAGEMENT/SOCIAL WORK - PATIENT PORTAL LINK FT
You can access the FollowMyHealth Patient Portal offered by [facility] by registering at the following website: [email or web address]. By joining [employer]s FollowMyHealth portal, you will also be able to view your health information using other applications (apps) compatible with our system.

## 2023-02-02 NOTE — PROGRESS NOTE ADULT - SUBJECTIVE AND OBJECTIVE BOX
PROGRESS NOTE:   Authored by Prashant Suarez MD   Patient is a 82y old  Male who presents with a chief complaint of sepsis (26 Jan 2023 17:16)      SUBJECTIVE / OVERNIGHT EVENTS:  No acute events overnight.     ADDITIONAL REVIEW OF SYSTEMS:    MEDICATIONS  (STANDING):  albuterol/ipratropium for Nebulization 3 milliLiter(s) Nebulizer once  albuterol/ipratropium for Nebulization. 3 milliLiter(s) Nebulizer once  AQUAPHOR (petrolatum Ointment) 1 Application(s) Topical once  aspirin  chewable 81 milliGRAM(s) Oral daily  atorvastatin 80 milliGRAM(s) Oral at bedtime  bisacodyl Suppository 10 milliGRAM(s) Rectal once  dextrose 50% Injectable 25 Gram(s) IV Push once  dextrose 50% Injectable 12.5 Gram(s) IV Push once  dextrose 50% Injectable 25 Gram(s) IV Push once  dextrose 50% Injectable 12.5 Gram(s) IV Push once  dextrose Oral Gel 15 Gram(s) Oral once  enoxaparin Injectable 40 milliGRAM(s) SubCutaneous every 24 hours  glucagon  Injectable 1 milliGRAM(s) IntraMuscular once  magnesium oxide 400 milliGRAM(s) Oral daily  polyethylene glycol 3350 17 Gram(s) Oral daily  senna 1 Tablet(s) Oral daily  tamsulosin 0.4 milliGRAM(s) Oral at bedtime    MEDICATIONS  (PRN):  acetaminophen     Tablet .. 650 milliGRAM(s) Oral every 6 hours PRN Temp greater or equal to 38C (100.4F), Mild Pain (1 - 3)  oxymetazoline 0.05% Nasal Spray 2 Spray(s) Both Nostrils every 12 hours PRN Nasal Congestion      CAPILLARY BLOOD GLUCOSE        I&O's Summary      PHYSICAL EXAM:  Vital Signs Last 24 Hrs  T(C): 36.7 (02 Feb 2023 05:07), Max: 36.7 (02 Feb 2023 05:07)  T(F): 98.1 (02 Feb 2023 05:07), Max: 98.1 (02 Feb 2023 05:07)  HR: 60 (02 Feb 2023 05:07) (55 - 60)  BP: 163/83 (02 Feb 2023 05:07) (147/84 - 163/83)  BP(mean): --  RR: 18 (02 Feb 2023 05:07) (18 - 18)  SpO2: 99% (02 Feb 2023 05:07) (99% - 100%)    Parameters below as of 02 Feb 2023 05:07  Patient On (Oxygen Delivery Method): room air        GENERAL: Answering questions intermittently appropriately. Appears comfortable.  HEAD:  Atraumatic, Normocephalic  EYES: EOMI, PERRLA, conjunctiva and sclera clear  ENT: Dry mucous membranes  NECK: Supple, No elevated JVP.  CHEST/LUNG: Expiratory wheeze right lung fields.   HEART: AFIB; No murmurs  Peripheral edema: None  Subclavian skin tenting: Slow response  ABDOMEN: Non-tender non-distended.   EXTREMITIES:  No clubbing, cyanosis, or edema  NERVOUS SYSTEM:  A&Ox1 ,right arm contracted  SKIN: No rashes or lesion    LABS:                        9.8    3.39  )-----------( 226      ( 01 Feb 2023 06:25 )             30.8     02-01    137  |  104  |  9   ----------------------------<  73  4.0   |  27  |  0.85    Ca    9.2      01 Feb 2023 06:25  Phos  2.8     02-01  Mg     2.00     02-01                  RADIOLOGY & ADDITIONAL TESTS:  Lab Results Reviewed   Imaging Reviewed  Electrocardiogram Reviewed   PROGRESS NOTE:   Authored by Prashant Suarez MD   Patient is a 82y old  Male who presents with a chief complaint of sepsis (26 Jan 2023 17:16)      SUBJECTIVE / OVERNIGHT EVENTS:  No acute events overnight.     Wife updated over phone with plan for d/c.     ADDITIONAL REVIEW OF SYSTEMS:    MEDICATIONS  (STANDING):  albuterol/ipratropium for Nebulization 3 milliLiter(s) Nebulizer once  albuterol/ipratropium for Nebulization. 3 milliLiter(s) Nebulizer once  AQUAPHOR (petrolatum Ointment) 1 Application(s) Topical once  aspirin  chewable 81 milliGRAM(s) Oral daily  atorvastatin 80 milliGRAM(s) Oral at bedtime  bisacodyl Suppository 10 milliGRAM(s) Rectal once  dextrose 50% Injectable 25 Gram(s) IV Push once  dextrose 50% Injectable 12.5 Gram(s) IV Push once  dextrose 50% Injectable 25 Gram(s) IV Push once  dextrose 50% Injectable 12.5 Gram(s) IV Push once  dextrose Oral Gel 15 Gram(s) Oral once  enoxaparin Injectable 40 milliGRAM(s) SubCutaneous every 24 hours  glucagon  Injectable 1 milliGRAM(s) IntraMuscular once  magnesium oxide 400 milliGRAM(s) Oral daily  polyethylene glycol 3350 17 Gram(s) Oral daily  senna 1 Tablet(s) Oral daily  tamsulosin 0.4 milliGRAM(s) Oral at bedtime    MEDICATIONS  (PRN):  acetaminophen     Tablet .. 650 milliGRAM(s) Oral every 6 hours PRN Temp greater or equal to 38C (100.4F), Mild Pain (1 - 3)  oxymetazoline 0.05% Nasal Spray 2 Spray(s) Both Nostrils every 12 hours PRN Nasal Congestion      CAPILLARY BLOOD GLUCOSE        I&O's Summary      PHYSICAL EXAM:  Vital Signs Last 24 Hrs  T(C): 36.7 (02 Feb 2023 05:07), Max: 36.7 (02 Feb 2023 05:07)  T(F): 98.1 (02 Feb 2023 05:07), Max: 98.1 (02 Feb 2023 05:07)  HR: 60 (02 Feb 2023 05:07) (55 - 60)  BP: 163/83 (02 Feb 2023 05:07) (147/84 - 163/83)  BP(mean): --  RR: 18 (02 Feb 2023 05:07) (18 - 18)  SpO2: 99% (02 Feb 2023 05:07) (99% - 100%)    Parameters below as of 02 Feb 2023 05:07  Patient On (Oxygen Delivery Method): room air        GENERAL: Answering questions intermittently appropriately. Appears comfortable.  HEAD:  Atraumatic, Normocephalic  EYES: EOMI, PERRLA, conjunctiva and sclera clear  ENT: Dry mucous membranes  NECK: Supple, No elevated JVP.  CHEST/LUNG: Expiratory wheeze right lung fields.   HEART: AFIB; No murmurs  Peripheral edema: None  Subclavian skin tenting: Slow response  ABDOMEN: Non-tender non-distended.   EXTREMITIES:  No clubbing, cyanosis, or edema  NERVOUS SYSTEM:  A&Ox1 ,right arm contracted  SKIN: No rashes or lesion    LABS:                        9.8    3.39  )-----------( 226      ( 01 Feb 2023 06:25 )             30.8     02-01    137  |  104  |  9   ----------------------------<  73  4.0   |  27  |  0.85    Ca    9.2      01 Feb 2023 06:25  Phos  2.8     02-01  Mg     2.00     02-01                  RADIOLOGY & ADDITIONAL TESTS:  Lab Results Reviewed   Imaging Reviewed  Electrocardiogram Reviewed

## 2023-02-02 NOTE — DISCHARGE NOTE NURSING/CASE MANAGEMENT/SOCIAL WORK - NSDCFUADDAPPT_GEN_ALL_CORE_FT
02/2/2023                                   PICK-UP TIME: 4:45PM—NEXT AVAILABLE   [facility]  [phone]  [phone]

## 2023-02-02 NOTE — PROGRESS NOTE ADULT - ATTENDING COMMENTS
82-year-old male with CVA 10 years prior, dementia, diabetes, hyperlipidemia, CAD comes to ED w/ nausea, vomiting, chest pain, SOB, abdominal pain found to be septic secondary to UTI with E. coli bacteremia now s/p ceftriaxone.    # Hypoglycemia - mutiple episodes of asymtomatic hypoglycemia, BMP has not correlate with low POC level. TSH, AM cortisol normal, Stim test no consistent with AI; Appreciate endo recs. Plan for monitoring glucose with BMP  # Sepsis secondary to UTI  # E coli bacteremia    - Sepsis now resolved, repeat blood cultures on 1/20 with NGTD  - completed 7 day course of ceftriaxone  # Toxic metabolic encephalopathy - now back to baseline of AAOx1.    dispo: plan to return home with prior level of care, d/w SW. Possible discharge tomorrow.
82-year-old male with CVA 10 years prior, dementia, diabetes, hyperlipidemia, CAD comes to ED w/ nausea, vomiting, chest pain, SOB, abdominal pain found to be septic secondary to UTI.    # Sepsis secondary to UTI  # E coli bacteremia    - Sepsis now resolved, repeat blood cultures on 1/20 with NGTD  - continue with ceftriaxone for now, plan for 10 day course   # Toxic metabolic encephalopathy - now approaching baseline of AAOx1.  # Hypernatremia, Hypoglycemia  - repeatedly hypoglycemic after discontinuation of dextrose fluids. TSH, AM cortisol normal; C-peptide possible inappropriately elevated. Check Corticotrophin stim test. F/U endo recs. However suspect likely secondary to failure to thrive. Continue check calorie count. May need PEG. Will continue with ongoing GOC discussions.
82-year-old male with CVA 10 years prior, dementia, diabetes, hyperlipidemia, CAD comes to ED w/ nausea, vomiting, chest pain, SOB, abdominal pain found to be septic secondary to UTI with E. coli bacteremia now s/p ceftriaxone.    # Hypoglycemia - multiple episodes of asymptomatic hypoglycemia, BMP has not correlated with low POC level. TSH, AM cortisol normal, Stim test no consistent with AI; Appreciate endo recs. Possible failure to thrive contributing to overall picture, however patient has been eating 100% of meals. Per discussion with wife feeding tube would not be safe option as patient almost certainly would self remove. Plan for discharge home   # Sepsis secondary to UTI  # E coli bacteremia    - Sepsis now resolved, repeat blood cultures on 1/20 with NGTD  - completed 7 day course of ceftriaxone  # Toxic metabolic encephalopathy - now back to baseline of AAOx1.    Dispo: plan to return home with prior level of care, d/c home today d/c planning 45 min coordinating care.
82-year-old male with CVA 10 years prior, dementia, diabetes, hyperlipidemia, CAD comes to ED w/ nausea, vomiting, chest pain, SOB, abdominal pain found to be septic secondary to UTI.    # Hyponatremia - repeatedly hypoglycemic after discontinuation of dextrose fluids. TSH, AM cortisol normal; C-peptide possible inappropriately elevated. Check Corticotrophin stim test. F/U endo recs. However suspect likely secondary to failure to thrive, will continue with ongoing GOC with family.   # Sepsis secondary to UTI  # E coli bacteremia    - Sepsis now resolved, repeat blood cultures on 1/20 with NGTD  - completed 7 day course of ceftriaxone   # Toxic metabolic encephalopathy - appears more lethargic today. Unclear reson will check bladder scan, UA.
82-year-old male with CVA 10 years prior, dementia, diabetes, hyperlipidemia, CAD comes to ED w/ nausea, vomiting, chest pain, SOB, abdominal pain found to be septic secondary to UTI.    # Sepsis secondary to UTI  # E coli bacteremia    - Sepsis now resolved, repeat blood cultures on 1/20 with NGTD  - continue with ceftriaxone for now, plan for 10 day course   # Toxic metabolic encephalopathy - now approaching baseline of AAOx1.  # Hypernatremia, Hypoglycemia  - repeatedly hypoglycemic after discontinuation of dextrose fluids. TSH, AM cortisol normal; C-peptide possible inappropriately elevated. F/U endo recs. However suspect likely secondary to failure to thrive. Continue check calorie count. May need PEG. Will continue with ongoing GOC discussions.
82-year-old male with CVA 10 years prior, dementia, diabetes, hyperlipidemia, CAD comes to ED w/ nausea, vomiting, chest pain, SOB, abdominal pain found to be septic secondary to UTI with E. coli bacteremia now s/p ceftriaxone.    # Hyponatremia - repeatedly hypoglycemic after discontinuation of dextrose fluids, although most recent episode BMP did not correlate with low POC level. TSH, AM cortisol normal; C-peptide possible inappropriately elevated. Corticotrophin stim test not consistent with AI. Recheck BMP with next episode of hypoglycemia. F/U endo recs. Overall likely with some component of failure to thrive in setting of advanced dementia, will continue with ongoing GOC with family.   # Sepsis secondary to UTI  # E coli bacteremia    - Sepsis now resolved, repeat blood cultures on 1/20 with NGTD  - completed 7 day course of ceftriaxone   # Toxic metabolic encephalopathy - now back to baseline of AAOx1
82-year-old male with CVA 10 years prior, dementia, diabetes, hyperlipidemia, CAD comes to ED w/ nausea, vomiting, chest pain, SOB, abdominal pain found to be septic secondary to UTI.    # Sepsis secondary to UTI  # E coli bacteremia    - Sepsis now resolved, repeat blood cultures on 1/20 with NGTD  - continue with ceftriaxone for now, plan for 10 day course   # Toxic metabolic encephalopathy - pt lethargy improving, now approaching baseline of AAOx1.  # Hypernatremia, Hypoglycemia  - repeatedly hypoglycemic after discontinuation of dextrose fluids. TSH, AM cortisol normal; C-peptide possible inappropriately elevated will discuss with endo, however suspect likely secondary to failure to thrive. Continue check calorie count through today. Initiated discussion regarding feeding tube placement, will continue with ongoing GOC discussions.
82-year-old male pmhx of CVA 10 years prior, dementia, diabetes, hyperlipidemia, CAD comes to ED w/ nausea, vomiting, chest pain, SOB, abdominal pain found be severely septic 2/2 UTI in the setting of obstruction from severe constipation.   - Pt with severe sepsis 2/2 UTI and bacteremia. Sepsis is now resolved. UA positive. BCx with E.coli. E. coli is mcmahon sensitive. De-escalated to ceftriaxone today. Will receive ceftriaxone 2g q24 hours (day 4/7). Patient is NPO so therefore we cannot transition to PO. If passes speech and swallow will plan to de-escalate.   - Pt with lactic acidosis, lactate 6 but downtrended with fluid resuscitation - resolved.   - AMS, pt lethargic and minimally arousable yesterday. Now more awake and responding to stimuli. Toxic metabolic encephalopathy 2/2 sepsis.   - Severe constipation (resolved) - CT scan showing 9cm rectum 2/2 stool burden but without stercoral colitis likely causing abdominal pain which is now improved. S/p 3 BMs. On exam, his abdomen is softer and he is no longer wincing in response to palpation.   - TOV in progress - he has been urinating and bladder scan with 24cc.   - S&S eval - failed given limited alertness and recommendations are to remain NPO. Pt is more alert today - reassess. Remain NPO for now. Continue with D5+NS.  - Dispo: home with home services when medically ready.
82-year-old male pmhx of CVA 10 years prior, dementia, diabetes, hyperlipidemia, CAD comes to ED w/ nausea, vomiting, chest pain, SOB, abdominal pain found be severely septic 2/2 UTI in the setting of obstruction from severe constipation.   - Pt with severe sepsis 2/2 UTI and bacteremia. Sepsis is now resolved. UA positive. BCx with E.coli. E. coli is mcmahon sensitive. De-escalated to ceftriaxone today. Will receive ceftriaxone 2g q24 hours (day 4/7). Patient is NPO so therefore we cannot transition to PO. If passes speech and swallow will plan to de-escalate.   - Pt with lactic acidosis, lactate 6 but downtrended with fluid resuscitation - resolved.   - AMS, pt lethargic and minimally arousable yesterday. Now more awake and responding to stimuli. Toxic metabolic encephalopathy 2/2 sepsis.   - Severe constipation (resolved) - CT scan showing 9cm rectum 2/2 stool burden but without stercoral colitis likely causing abdominal pain which is now improved. S/p 3 BMs. On exam, his abdomen is softer and he is no longer wincing in response to palpation.   - TOV in progress - he has been urinating and bladder scan with 24cc.   - S&S eval - failed given limited alertness and recommendations are to remain NPO. Pt is more alert today - reassess. Slightly hypernatremic switched fluids to D51/2NS  - Dispo: home with home services when medically ready.
82-year-old male with CVA 10 years prior, dementia, diabetes, hyperlipidemia, CAD comes to ED w/ nausea, vomiting, chest pain, SOB, abdominal pain found to be septic secondary to UTI.    # Sepsis secondary to UTI  # E coli bacteremia    - Sepsis now resolved, repeat blood cultures on 1/20 with NGTD  - continue with ceftriaxone for now, plan for 10 day course   # Toxic metabolic encephalopathy - pt lethargy improving, now approaching baseline of AAOx1.  # Hypernatremia, Hypoglycemia  - repeatedly hypoglycemic after discontinuation of dextrose fluids. TSH, AM cortisol, C-peptide normal. Suspect likely secondary to failure to thrive. Will check calorie count today and plan for further GOC discussion with family.
82-year-old male pmhx of CVA 10 years prior, dementia, diabetes, hyperlipidemia, CAD comes to ED w/ nausea, vomiting, chest pain, SOB, abdominal pain found be severely septic 2/2 UTI in the setting of obstruction from severe constipation.   - Pt with severe sepsis 2/2 UTI and bacteremia. UA positive. BCx with E.coli. E. coli is mcmahon sensitive. De-escalated to ceftriaxone today.   - Pt with lactic acidosis, lactate 6 but downtrended with fluid resuscitation - resolved.   - AMS, pt lethargic and minimally arousable yesterday. Now more awake and responding to stimuli. Toxic metabolic encephalopathy 2/2 sepsis.   - Severe constipation - CT scan showing 9cm rectum 2/2 stool burden but without stercoral colitis likely causing abdominal pain which is now improved. S/p 3 BMs. On exam, his abdomen is softer and he is no longer wincing in response to palpation.   - Urinary retention, valencia placed in the setting of constipation. Will attempt TOV this evening given patient is now having multiple BMs.   - S&S eval - failed given limited alertness and recommendations are to remain NPO. Will ask for reassessment if patient is more alert. Remain NPO for now. Continue with D5+NS.
82-year-old male pmhx of CVA 10 years prior, dementia, diabetes, hyperlipidemia, CAD comes to ED w/ nausea, vomiting, chest pain, SOB, abdominal pain found be severely septic 2/2 UTI in the setting of obstruction from severe constipation.   - Pt with severe sepsis 2/2 UTI and bacteremia. UA positive. BCx with E.coli. Prior cultures from urinary tract infection reviewed and shows E. coli sensitive to Zosyn. Also hx of MRSA in urine but pt also had MRSA bacteremia which is the likely cause for MRSA in urine. Less likely with MRSA in the urine at this time. Continue with Zosyn for now. F/u antibiotic sensitivities.   - Pt with lactic acidosis, lactate 6 but now downtrending to 2.9 today with fluid resuscitation.   - AMS, pt lethargic and minimally arousable yesterday. Now more awake and responding to stimuli. Toxic metabolic encephalopathy 2/2 sepsis.   - Severe constipation - CT scan showing 9cm rectum 2/2 stool burden but without stercoral colitis likely causing abdominal pain which is now improved. S/p 3 BMs. On exam, his abdomen is softer and he is no longer wincing in response to palpation.   - Assess ability to tolerate PO with bedside dysphagia.
82-year-old male with CVA 10 years prior, dementia, diabetes, hyperlipidemia, CAD comes to ED w/ nausea, vomiting, chest pain, SOB, abdominal pain found to be septic secondary to UTI.    # Sepsis secondary to UTI  # E coli bacteremia    - Sepsis now resolved, repeat blood cultures on 1/20 with NGTD  - continue with ceftriaxone for now, possible transition to cipro on d/c to complete 10 day course     # Toxic metabolic encephalopathy - pt lethargic and minimally yesterday. Now more awake and responding to stimuli.   # Severe constipation (resolved) - CT scan showing 9cm rectum 2/2 stool burden but without stercoral colitis likely causing abdominal pain which is now improved. S/p multiple BMs. c/w bowel regmen   # Hypernatremia, Hypoglycemia  - likely secondary to poor PO intake, will continue to monitor if improves with treatment of infection. Needs further GOC discussion
82-year-old male with CVA 10 years prior, dementia, diabetes, hyperlipidemia, CAD comes to ED w/ nausea, vomiting, chest pain, SOB, abdominal pain found to be septic secondary to UTI.    # Sepsis secondary to UTI  # E coli bacteremia    - Sepsis now resolved, repeat blood cultures on 1/20 with NGTD  - continue with ceftriaxone for now, possible transition to cipro on d/c to complete 10 day course  # Toxic metabolic encephalopathy - pt lethargy improving, now approaching baseline of AAOx1.  # Hypernatremia, Hypoglycemia  - will d/c IVF today and encourage/ monitor PO intake.    updated wife Nerissa today via phone
82-year-old male pmhx of CVA 10 years prior, dementia, diabetes, hyperlipidemia, CAD comes to ED w/ nausea, vomiting, chest pain, SOB, abdominal pain found be severely septic 2/2 UTI in the setting of obstruction from severe constipation.   - Pt with severe sepsis 2/2 UTI and bacteremia. Sepsis is now resolved. UA positive. BCx with E.coli. E. coli is mcmahon sensitive. De-escalated to ceftriaxone today. Will receive ceftriaxone 2g q24 hours (day 4/7). Patient is NPO so therefore we cannot transition to PO. If passes speech and swallow will plan to de-escalate.   - Pt with lactic acidosis, lactate 6 but downtrended with fluid resuscitation - resolved.   - AMS, pt lethargic and minimally arousable yesterday. Now more awake and responding to stimuli. Toxic metabolic encephalopathy 2/2 sepsis.   - Severe constipation (resolved) - CT scan showing 9cm rectum 2/2 stool burden but without stercoral colitis likely causing abdominal pain which is now improved. S/p 3 BMs. On exam, his abdomen is softer and he is no longer wincing in response to palpation.   - S&S eval - was started on puree diet but pt seemed to have aspirated, will make NP and have SLP re-evaluate. Slightly hypernatremic and hypoglycemic, suspect FTT, cont fluids to D5. Need further GOC discussion    - Dispo: home with home services when medically ready.
82-year-old male with CVA 10 years prior, dementia, diabetes, hyperlipidemia, CAD comes to ED w/ nausea, vomiting, chest pain, SOB, abdominal pain found to be septic secondary to UTI.    # Sepsis secondary to UTI  # E coli bacteremia    - Sepsis now resolved, repeat blood cultures on 1/20 with NGTD  - continue with ceftriaxone for now, plan for 10 day course   # Toxic metabolic encephalopathy - pt lethargy improving, now approaching baseline of AAOx1.  # Hypernatremia, Hypoglycemia  - repeatedly hypoglycemic after discontinuation of dextrose fluids. TSH, AM cortisol normal; C-peptide possible inappropriately elevated will discuss with endo, however suspect likely secondary to failure to thrive. Continue check calorie count through today. Initiated discussion regarding feeding tube placement, will continue with ongoing GOC discussions.

## 2023-02-08 NOTE — ED ADULT NURSE NOTE - NSFALLRSKASSESASSIST_ED_ALL_ED
LAB INR in ER    DX: AF     Goal range: 2.0 - 3.0    Todays INR is 4.9. Dose decreased per protocol.  Follow up 1 weeks. See Ambulatory Anticoagulation Flow Sheet.    Patient contacted with INR results and instructions.   Teaching: warfarin dose, return date, conditions requiring contact with Anticoagulation Clinic reviewed. Patient verbalized understanding of instructions.    Dr. Botello supervising     yes

## 2023-02-11 LAB
CORTICOSTEROID BINDING GLOBULIN RESULT: 0.8 MG/DL — LOW
CORTIS F/TOTAL MFR SERPL: 38 % — SIGNIFICANT CHANGE UP
CORTIS SERPL-MCNC: 7.9 UG/DL — SIGNIFICANT CHANGE UP
CORTISOL, FREE RESULT: 3 UG/DL — HIGH

## 2023-02-21 LAB
CORTICOSTEROID BINDING GLOBULIN RESULT: 1 MG/DL — LOW
CORTIS F/TOTAL MFR SERPL: 59 % — SIGNIFICANT CHANGE UP
CORTIS SERPL-MCNC: 16 UG/DL — SIGNIFICANT CHANGE UP
CORTISOL, FREE RESULT: 9.4 UG/DL — HIGH

## 2023-04-05 NOTE — H&P CARDIOLOGY - HISTORY OF PRESENT ILLNESS
81yoM with PMHx of CVA with RT sided weakness, dementia, HTN, CAD s/p stent 10 years ago, PPM, asthma BIBEMS for hematuria. (+) Fver of 104 in ED. UA gross positive, suggestive of UTI [As Noted in HPI] : as noted in HPI [Negative] : Heme/Lymph 80 y/o male with a PMHx of CAD s/p stent placement, CVA with residual right sided weakness and right upper extremity contracture, sinus node dysfunction s/p PPM, HTN, HLD, DM, asthma and dementia presents as a transfer from [facility] for pacemaker extraction. Pt is a poor historian and wife did not answer phone call. Pt initially presented to [facility] with hematuria, chills and lethargy. Pt was found to be febrile with leukocytosis in the setting of sepsis. Subsequent blood cultures revealing E. coli and MRSA. Pt was seen by ID and started on Ceftriaxone and Vancomycin for likely GI vs  source. Pt was seen by EP and pacemaker interrogation revealed sinus bradycardia; patient is not pacer dependent. In light of MRSA presence with a pacemaker, a device explant (including wires) is warranted to ensure complete resolution of bacteremia. Pt is now transferred to [facility] for pacemaker explant. Pt only admits to mild suprapubic pain at this time and otherwise denies headache, dizziness, visual deficits, chest pain, shortness of breath, orthopnea, palpitations, N/V/D/C, hematochezia, melena, syncope, peripheral edema.

## 2023-04-26 NOTE — ED PROVIDER NOTE - TEMPLATE, MLM
Hospital Progress Note     Patient: Mike Maynard Date: 4/26/2023   YOB: 1956 Admission Date: 4/25/2023   MRN: [id number] Attending: Gene Tran DO     Chief Complaint: vision issues    SUBJECTIVE  Mike Maynard is a 67 year old male who was admitted on 4/25/2023 for stroke.  The patient had no acute overnight events. Slept. Eating. Still vision issues of right. No new neuro complaints. BG uncontrolled.     OBJECTIVE  Scheduled Medications insulin glargine, 10 Units, Once  insulin glargine, 20 Units, Nightly  heparin (porcine), 5,000 Units, 3 times per day  atorvastatin, 80 mg, Nightly  docusate sodium, 100 mg, BID  Potassium Standard Replacement Protocol (Levels 3.5 and lower), , See Admin Instructions  Magnesium Standard Replacement Protocol, , See Admin Instructions  insulin lispro, , TID WC  insulin lispro, , TID WC  insulin lispro, , Nightly  nicotine, 1 patch, Daily  clopidogrel, 75 mg, Daily  aspirin, 81 mg, Daily      Continuous Infusions   Current Facility-Administered Medications   Medication Dose Route Frequency Provider Last Rate Last Admin       PHYSICAL EXAM  Vital signs:    Visit Vitals  /72   Pulse 70   Temp 99.5 °F (37.5 °C) (Oral)   Resp 20   Ht 5' 7\" (1.702 m)   Wt 73.6 kg (162 lb 4.8 oz)   SpO2 96%   BMI 25.42 kg/m²       General:  Alert and cooperative, male.   Cardiovascular:  RRR, no murmur, no B/L LE edema  Respiratory:  CTAB, no increased work of breathing  Gastrointestinal:  +BS, soft, NT/ND  Neurologic: Right visual field deficit    LAB RESULTS  Recent Labs   Lab 04/26/23  0631 04/25/23  0745   WBC 12.0* 12.1*   HCT 43.3 46.7   HGB 14.7 15.9    324     Recent Labs   Lab 04/25/23  0755 04/25/23  0745   SODIUM  --  129*   POTASSIUM  --  4.0   CHLORIDE  --  98   CO2  --  27   GLUCOSE  --  513*   BUN  --  26*   CREATININE 1.30* 1.38*       IMAGING  Reviewed    ASSESSMENT/PLAN  Mike Maynard is a 67 year old male who was admitted on 4/25/2023:     #Acute ischemic  stroke  - Statin/ASA/Plavix    #Newly diagnosed diabetes mellitus type 2  - Dietician  - Increase Lantus and Humalog    #Tobacco abuse  Advised to stop    #Peripheral arterial disease  #Hyperlipidemia  #Essential hypertension, controlled    #Deep vein thrombosis prophylaxis  Heparin TID    Dispo: DC tomorrow. Need better control of BG. Watch  Neuro status for next 24 hours. He is agreeable.     Gene Tran, DO  Hospitalist     Abdominal Pain, N/V/D

## 2023-04-27 NOTE — ED PROVIDER NOTE - DATE/TIME 2
11-Sep-2020 00:10 O-T Advancement Flap Text: The defect edges were debeveled with a #15 scalpel blade.  Given the location of the defect, shape of the defect and the proximity to free margins an O-T advancement flap was deemed most appropriate.  Using a sterile surgical marker, an appropriate advancement flap was drawn incorporating the defect and placing the expected incisions within the relaxed skin tension lines where possible.    The area thus outlined was incised deep to adipose tissue with a #15 scalpel blade.  The skin margins were undermined to an appropriate distance in all directions utilizing iris scissors.

## 2023-05-07 NOTE — PROVIDER CONTACT NOTE (CHANGE IN STATUS NOTIFICATION) - RECOMMENDATIONS
Recommendation for continuous pulse oximetry to monitor oxygen saturation on room air. Neurovascular

## 2023-08-03 NOTE — DISCHARGE NOTE PROVIDER - CARE PROVIDER_API CALL
Transfer to [facility] 12/29,   Phone: (   )    -  Fax: (   )    -  Follow Up Time:     Ariel Perez [facility]  [address] [place] [address]  Phone: [phone]  Fax: [phone]  Follow Up Time: Routine   KELLEE Bergman- Pt feeling better after ER stay, labs and CT unremarkable. Pt stable for dc. Advised to f/u with cardiology.

## 2023-08-22 NOTE — PATIENT PROFILE ADULT - TRANSPORTATION
Routing refill request to provider for review/approval because:  Drug not on the [facility] refill protocol  early request for refill    Last Written Prescription Date:  7/28/2023  Last Fill Quantity: 90,  # refills: 0   Last office visit provider:  5/4/2023     Requested Prescriptions   Pending Prescriptions Disp Refills    melatonin ([facility] MELATONIN) 3 MG tablet 90 tablet 0     Sig: TAKE 1 TABLET (3 MG) BY MOUTH NIGHTLY AS NEEDED FOR SLEEP Strength: 3 mg       There is no refill protocol information for this order          Marylou Hidalgo RN 08/22/23 9:32 AM   no

## 2024-05-06 NOTE — CONSULT NOTE ADULT - ASSESSMENT
81M with CAD s/p stent, s/p PPM, CVA with residual R weakness, dementia, HTN, HLD, GERD, asthma who presented for hematuria and bladder pain, found to have stercoral colitis, bladder stones and RLL consolidation on imaging.    #RLL consolidation  CT AP: RLL consolidation concerning for atelectasis vs PNA  COVID-19 and RVP neg  Afebrile, WBC 11.65 on admission, no respiratory complaints    - incomplete note -    Stacey Zaldivar  Internal Medicine, PGY-1   81M with CAD s/p stent, s/p PPM, CVA with residual R weakness, dementia, HTN, HLD, GERD, asthma who presented for hematuria and bladder pain, found to have stercoral colitis, bladder stones and RLL consolidation on imaging.    #RLL consolidation  CT AP: RLL consolidation concerning for atelectasis vs PNA  COVID-19 and RVP neg  RLL consolidation may be atelectasis vs aspiration pneumonitis, less likely PNA given lack of respiratory complaints or fever  Leukocytosis can be attributed to stercoral colitis  - would monitor off antibiotics at this time    Stacey Zaldivar  Internal Medicine, PGY-1   Fall with Harm Risk

## 2024-07-02 NOTE — PATIENT PROFILE ADULT - NSFALLSECTIONLABEL_GEN_A_CORE
2024  EMPLOYEE INFORMATION: EMPLOYER INFORMATION:   NAME: James Padgett [facility] ( ALL SITES)   : 2003 [phone]    DATE OF INJURY/EVENT: 5/15/2024           Location: [facility]   Treating Provider: Estevan Contreras DO  Time In:  8:37 AM Time Out:  9:19 AM      DIAGNOSIS:   1. Contusion of right elbow, subsequent encounter    2. Right elbow pain      STATUS: This injury is determined to be WORK RELATED.  RETURN TO WORK: Employee may return to work without restrictions.Employee is discharged from care. Return Date: 2024          RESTRICTIONS:  No Restrictions  TREATMENT PLAN:   Medications for this injury/condition: Ibuprofen or tylenol as needed   Referral/Consult: None  Diagnostic Testing: None       Instructions: Continue with home exercise program and strengthening indefinitely stretching as tolerated.   NEXT RETURN VISIT: None as needed   Thank you for the privilege of providing medical care for this injury/condition.  If there are any questions, please call the occupational health clinic at Dept: [phone].  Electronically signed on 2024 at 9:19 AM by:   DO Dolores Gonzalez [facility]       .

## 2024-09-20 NOTE — PROVIDER CONTACT NOTE (OTHER) - BACKGROUND
Hx of dementia
81M history of cad s/p stent, PPM, CVA with residual R weakness, dementia, HTN, Hyperlipidemia, GERD, Asthma admitted for chest pain.
labor

## 2024-11-07 NOTE — ED ADULT NURSE NOTE - PRIMARY CARE PROVIDER
Prior Authorization Approval    Medication: ZEPBOUND 7.5 MG/0.5ML SC SOAJ  Authorization Effective Date: 11/7/2024  Authorization Expiration Date: 5/7/2025  Approved Dose/Quantity: 2ml per 28 days  Reference #: [id number]   Insurance Company: [employer] - Phone [phone] Fax [phone]  Expected CoPay: $    CoPay Card Available:      Financial Assistance Needed:   Which Pharmacy is filling the prescription:    Pharmacy Notified:   Patient Notified:       unknown

## 2025-02-06 NOTE — PROVIDER CONTACT NOTE (OTHER) - SITUATION
Mild obstructive sleep apnea with an AHI of 9.5 any recent diagnostic sleep study given the excessive daytime sleepiness was ordered for auto CPAP but he didn't get it stating given high deductible co-pay finance issues he states.  He states now he wants to get it given the symptoms still continue with the excessive daytime sleepiness I again discussed the other treatment options for the mild obstructive sleep apnea with mandibular advancing appliance but he is not inclined towards that  I have ordered the auto CPAP and discussed the need for compliance with the CPAP machine any concerns after starting the use of the CPAP he will give the office a call.  Discussed need for follow-up in 3 months with a CPAP download compliance  Cautioned against driving when sleepy    Orders:    CPAP Auto New DME     Pt F/S of 78

## 2025-04-29 NOTE — ED ADULT NURSE NOTE - NSFALLRSKASSESASSIST_ED_ALL_ED
[FreeTextEntry1] : Ms Grimm was a patient her many years ago followed for a thyroid nodule with sonograms.  She recently had a cardiac exam and a calcium score which did show multivessel disease but less than 24% stenosis in the arteries.  Score was 4 9 84 percentile she was started on a statin but has refused to take it at this time.  Because of the abnormal calcium score she feels she has hypercalcemia although her calcium level was 10 and a correction for albumin is 9.54 which I explained is normal.  She is still not convinced.  She never had kidney stones but was told she has a stone in her kidney that is Non obstructing and she was also told she has osteoporosis but never treated.
yes

## 2025-06-09 NOTE — PROGRESS NOTE ADULT - PROBLEM SELECTOR PLAN 7
Instructions   from Dr. Finesse Baeza MD    Proceed with treatment as ordered (labs reviewed). Last treatment today  RV about 3-4 weeks   PET/CT scan before RV      Patient is scheduled for PET/CT on 6-  F/U on 7-7-2025    - hx  - A1C 5.2% last.

## (undated) DEVICE — CATH IV SAFE BC 22G X 1" (BLUE)

## (undated) DEVICE — TUBING IV SET GRAVITY 3Y 100" MACRO

## (undated) DEVICE — ELCTR ECG CONDUCTIVE ADHESIVE

## (undated) DEVICE — BIOPSY FORCEP RADIAL JAW 4 STANDARD WITH NEEDLE

## (undated) DEVICE — BASIN EMESIS 10IN GRADUATED MAUVE

## (undated) DEVICE — DRSG BANDAID 0.75X3"

## (undated) DEVICE — BIOPSY FORCEP COLD DISP

## (undated) DEVICE — CONTAINER FORMALIN 80ML YELLOW

## (undated) DEVICE — FACESHIELD FULL VISOR

## (undated) DEVICE — TUBING MEDI-VAC W MAXIGRIP CONNECTORS 1/4"X6'

## (undated) DEVICE — PACK IV START WITH CHG

## (undated) DEVICE — SALIVA EJECTOR (BLUE)

## (undated) DEVICE — CLAMP BX HOT RAD JAW 3

## (undated) DEVICE — DRSG CURITY GAUZE SPONGE 4 X 4" 12-PLY NON-STERILE

## (undated) DEVICE — ELCTR GROUNDING PAD ADULT COVIDIEN

## (undated) DEVICE — LINE BREATHE SAMPLNG

## (undated) DEVICE — DRSG 2X2

## (undated) DEVICE — GOWN LG

## (undated) DEVICE — TUBING SUCTION NONCONDUCTIVE 6MM X 12FT

## (undated) DEVICE — LUBRICATING JELLY HR ONE SHOT 3G